# Patient Record
Sex: MALE | Race: WHITE
[De-identification: names, ages, dates, MRNs, and addresses within clinical notes are randomized per-mention and may not be internally consistent; named-entity substitution may affect disease eponyms.]

---

## 2017-01-12 ENCOUNTER — HOSPITAL ENCOUNTER (OUTPATIENT)
Dept: HOSPITAL 75 - RAD | Age: 77
End: 2017-01-12
Attending: INTERNAL MEDICINE
Payer: MEDICARE

## 2017-01-12 DIAGNOSIS — I48.0: ICD-10-CM

## 2017-01-12 DIAGNOSIS — I65.23: ICD-10-CM

## 2017-01-12 DIAGNOSIS — R06.02: ICD-10-CM

## 2017-01-12 DIAGNOSIS — E78.2: ICD-10-CM

## 2017-01-12 DIAGNOSIS — J96.00: Primary | ICD-10-CM

## 2017-01-12 DIAGNOSIS — I10: ICD-10-CM

## 2017-01-12 PROCEDURE — 71020: CPT

## 2017-01-12 NOTE — DIAGNOSTIC IMAGING REPORT
INDICATION: Respiratory failure.



PA and lateral chest.



FINDINGS: There is interstitial fibrosis at the lung bases.

There are no consolidating alveolar infiltrates. Heart size and

pulmonary vascularity are normal.



IMPRESSION: Basilar interstitial fibrosis. No interval change

compared to 12/31/2016.



Dictated by:



Dictated on workstation # [id number]

## 2017-02-13 ENCOUNTER — HOSPITAL ENCOUNTER (INPATIENT)
Dept: HOSPITAL 75 - ER | Age: 77
LOS: 4 days | Discharge: NURSING, REHAB OR LONG-TERM CARE | DRG: 871 | End: 2017-02-17
Attending: FAMILY MEDICINE | Admitting: FAMILY MEDICINE
Payer: MEDICARE

## 2017-02-13 VITALS — DIASTOLIC BLOOD PRESSURE: 72 MMHG | SYSTOLIC BLOOD PRESSURE: 100 MMHG

## 2017-02-13 VITALS — SYSTOLIC BLOOD PRESSURE: 122 MMHG | DIASTOLIC BLOOD PRESSURE: 91 MMHG

## 2017-02-13 VITALS — SYSTOLIC BLOOD PRESSURE: 116 MMHG | DIASTOLIC BLOOD PRESSURE: 87 MMHG

## 2017-02-13 VITALS — HEIGHT: 69 IN | WEIGHT: 179.25 LBS | BODY MASS INDEX: 26.55 KG/M2

## 2017-02-13 VITALS — SYSTOLIC BLOOD PRESSURE: 104 MMHG | DIASTOLIC BLOOD PRESSURE: 67 MMHG

## 2017-02-13 VITALS — SYSTOLIC BLOOD PRESSURE: 109 MMHG | DIASTOLIC BLOOD PRESSURE: 70 MMHG

## 2017-02-13 VITALS — SYSTOLIC BLOOD PRESSURE: 102 MMHG | DIASTOLIC BLOOD PRESSURE: 59 MMHG

## 2017-02-13 DIAGNOSIS — Z79.4: ICD-10-CM

## 2017-02-13 DIAGNOSIS — Z95.1: ICD-10-CM

## 2017-02-13 DIAGNOSIS — I48.91: ICD-10-CM

## 2017-02-13 DIAGNOSIS — Z86.73: ICD-10-CM

## 2017-02-13 DIAGNOSIS — I25.10: ICD-10-CM

## 2017-02-13 DIAGNOSIS — Z90.2: ICD-10-CM

## 2017-02-13 DIAGNOSIS — Z99.81: ICD-10-CM

## 2017-02-13 DIAGNOSIS — E11.9: ICD-10-CM

## 2017-02-13 DIAGNOSIS — G47.33: ICD-10-CM

## 2017-02-13 DIAGNOSIS — A41.52: Primary | ICD-10-CM

## 2017-02-13 DIAGNOSIS — Z87.891: ICD-10-CM

## 2017-02-13 DIAGNOSIS — Z95.5: ICD-10-CM

## 2017-02-13 DIAGNOSIS — J44.0: ICD-10-CM

## 2017-02-13 DIAGNOSIS — J15.1: ICD-10-CM

## 2017-02-13 LAB
ALBUMIN SERPL-MCNC: 3.6 G/DL (ref 3.2–4.5)
ALT SERPL-CCNC: 12 U/L (ref 0–55)
ANION GAP SERPL CALC-SCNC: 8 MMOL/L (ref 5–14)
APTT BLD: 81 SEC (ref 24–35)
AST SERPL-CCNC: 20 U/L (ref 5–34)
BASOPHILS # BLD AUTO: 0 10^3/UL (ref 0–0.1)
BASOPHILS NFR BLD AUTO: 0 % (ref 0–10)
BILIRUB SERPL-MCNC: 0.4 MG/DL (ref 0.1–1)
BILIRUB UR QL STRIP: NEGATIVE
BUN SERPL-MCNC: 16 MG/DL (ref 7–18)
BUN/CREAT SERPL: 15
CALCIUM SERPL-MCNC: 8.2 MG/DL (ref 8.5–10.1)
CHLORIDE SERPL-SCNC: 101 MMOL/L (ref 98–107)
CO2 SERPL-SCNC: 26 MMOL/L (ref 21–32)
CREAT SERPL-MCNC: 1.09 MG/DL (ref 0.6–1.3)
EOSINOPHIL # BLD AUTO: 0.1 10^3/UL (ref 0–0.3)
EOSINOPHIL NFR BLD AUTO: 1 % (ref 0–10)
ERYTHROCYTE [DISTWIDTH] IN BLOOD BY AUTOMATED COUNT: 15.4 % (ref 10–14.5)
GFR SERPLBLD BASED ON 1.73 SQ M-ARVRAT: > 60 ML/MIN
GLUCOSE SERPL-MCNC: 189 MG/DL (ref 70–105)
INR PPP: 2.7 (ref 0.8–1.4)
KETONES UR QL STRIP: NEGATIVE
LEUKOCYTE ESTERASE UR QL STRIP: NEGATIVE
LYMPHOCYTES # BLD AUTO: 1.2 X 10^3 (ref 1–4)
LYMPHOCYTES NFR BLD AUTO: 19 % (ref 12–44)
MCH RBC QN AUTO: 30 PG (ref 25–34)
MCHC RBC AUTO-ENTMCNC: 33 G/DL (ref 32–36)
MCV RBC AUTO: 92 FL (ref 80–99)
MONOCYTES # BLD AUTO: 0.8 X 10^3 (ref 0–1)
MONOCYTES NFR BLD AUTO: 13 % (ref 0–12)
NEUTROPHILS # BLD AUTO: 4.2 X 10^3 (ref 1.8–7.8)
NEUTROPHILS NFR BLD AUTO: 67 % (ref 42–75)
NITRITE UR QL STRIP: NEGATIVE
PH UR STRIP: 6 [PH] (ref 5–9)
PLATELET # BLD: 221 10^3/UL (ref 130–400)
PMV BLD AUTO: 9 FL (ref 7.4–10.4)
POTASSIUM SERPL-SCNC: 3.7 MMOL/L (ref 3.6–5)
PROT SERPL-MCNC: 6.8 G/DL (ref 6.4–8.2)
PROT UR QL STRIP: NEGATIVE
PROTHROMBIN TIME: 28.8 SEC (ref 12.2–14.7)
RBC # BLD AUTO: 3.6 10^6/UL (ref 4.35–5.85)
SODIUM SERPL-SCNC: 135 MMOL/L (ref 135–145)
SP GR UR STRIP: 1.01 (ref 1.02–1.02)
SQUAMOUS #/AREA URNS HPF: (no result) /HPF
UROBILINOGEN UR-MCNC: NORMAL MG/DL
WBC # BLD AUTO: 6.3 10^3/UL (ref 4.3–11)
WBC #/AREA URNS HPF: (no result) /HPF

## 2017-02-13 PROCEDURE — 83605 ASSAY OF LACTIC ACID: CPT

## 2017-02-13 PROCEDURE — 87081 CULTURE SCREEN ONLY: CPT

## 2017-02-13 PROCEDURE — 87070 CULTURE OTHR SPECIMN AEROBIC: CPT

## 2017-02-13 PROCEDURE — 80053 COMPREHEN METABOLIC PANEL: CPT

## 2017-02-13 PROCEDURE — 96361 HYDRATE IV INFUSION ADD-ON: CPT

## 2017-02-13 PROCEDURE — 71010: CPT

## 2017-02-13 PROCEDURE — 85025 COMPLETE CBC W/AUTO DIFF WBC: CPT

## 2017-02-13 PROCEDURE — 85027 COMPLETE CBC AUTOMATED: CPT

## 2017-02-13 PROCEDURE — 86141 C-REACTIVE PROTEIN HS: CPT

## 2017-02-13 PROCEDURE — 84100 ASSAY OF PHOSPHORUS: CPT

## 2017-02-13 PROCEDURE — 94640 AIRWAY INHALATION TREATMENT: CPT

## 2017-02-13 PROCEDURE — 96365 THER/PROPH/DIAG IV INF INIT: CPT

## 2017-02-13 PROCEDURE — 83880 ASSAY OF NATRIURETIC PEPTIDE: CPT

## 2017-02-13 PROCEDURE — 82962 GLUCOSE BLOOD TEST: CPT

## 2017-02-13 PROCEDURE — 85730 THROMBOPLASTIN TIME PARTIAL: CPT

## 2017-02-13 PROCEDURE — 85610 PROTHROMBIN TIME: CPT

## 2017-02-13 PROCEDURE — 87205 SMEAR GRAM STAIN: CPT

## 2017-02-13 PROCEDURE — 80048 BASIC METABOLIC PNL TOTAL CA: CPT

## 2017-02-13 PROCEDURE — 94664 DEMO&/EVAL PT USE INHALER: CPT

## 2017-02-13 PROCEDURE — 94760 N-INVAS EAR/PLS OXIMETRY 1: CPT

## 2017-02-13 PROCEDURE — 80202 ASSAY OF VANCOMYCIN: CPT

## 2017-02-13 PROCEDURE — 83735 ASSAY OF MAGNESIUM: CPT

## 2017-02-13 PROCEDURE — 87804 INFLUENZA ASSAY W/OPTIC: CPT

## 2017-02-13 PROCEDURE — 36415 COLL VENOUS BLD VENIPUNCTURE: CPT

## 2017-02-13 PROCEDURE — 81000 URINALYSIS NONAUTO W/SCOPE: CPT

## 2017-02-13 PROCEDURE — 87040 BLOOD CULTURE FOR BACTERIA: CPT

## 2017-02-13 RX ADMIN — INSULIN HUMAN SCH UNIT: 100 INJECTION, SOLUTION PARENTERAL at 19:21

## 2017-02-13 RX ADMIN — SODIUM CHLORIDE SCH MLS/HR: 900 INJECTION, SOLUTION INTRAVENOUS at 19:29

## 2017-02-13 NOTE — ED GENERAL
General


Chief Complaint:  Respiratory Problems


Stated Complaint:  LUNG INFECTION, SOA, FEVER


Nursing Triage Note:  


PT C/O COUGH STARTING LAST NIGHT.  FEVER STARTING TODAY.


Nursing Sepsis Screen:  Possible Sepsis Risk


Source of Information:  Patient, Family, Old Records


Exam Limitations:  No Limitations





History of Present Illness


Time Seen by Provider:  13:22


Initial Comments


This 76-year-old gentleman presents to the emergency room with complaints of 

acute illness that started last night.  He reportedly coughed throughout the 

night and developed chills.  He has fever of 101.6 on presentation.  Patient 

has a history of recurrent pneumonia and is concerned he may have developed 

pneumonia again.  His oxygen saturations are stable on his home 2 L/m.  Wife 

reports that she herself has recently experienced bronchitis.  They were to see 

Dr. Connelly today but were directed to the emergency room given his sudden 

onset of fever and other symptoms.





Allergies and Home Medications


Allergies


Coded Allergies:  


     morphine (Verified  Allergy, Mild, 16)


     Sulfa (Sulfonamide Antibiotics) (Verified  Allergy, Unknown, 16)


     penicillin G (Verified  Allergy, Unknown, 16)


     levofloxacin (Verified  Adverse Reaction, Unknown, 16)





Home Medications


Albuterol Sulfate 2.5 Mg/3 Ml Vial.neb 2.5 MG IH Q6H PRN PRN SHORTNESS OF 

BREATH (Reported) 


Dronedarone HCl 400 Mg Tablet 400 MG PO BID (Reported) 


Enalapril Maleate 2.5 Mg Tablet 2.5 MG PO DAILY (Reported) 


Ferrous Sulfate 325 Mg Tablet 325 MG PO DAILY@1730 (Reported) 


Fluticasone/Vilanterol 1 Each Blst.w.dev 1 PUFF IH DAILY (Reported) 


Furosemide 40 Mg Tablet 40 MG PO DAILY (Reported) 


Insulin Nph Human Recom 10 Unit/0.1 Ml Susp 10 UNIT SQ HS (Reported) 


   MIXES WITH 4 UNITS OF R INSULIN EVERY EVENING FOR A TOTAL DOSE OF 14 UNITS 


Insulin Nph Human Recom 10 Unit/0.1 Ml Susp 15 UNIT SQ AM (Reported) 


   MIXES WITH 4 UNITS OF R INSULIN EVERY MORNING FOR A TOTAL DOSE OF 19 UNITS 


Insulin Regular, Human 100 Unit/1 Ml Vial 4 UNIT SQ BID (Reported) 


   MIXES WITH N INSULIN EVERY MORNING AND EVENING 


Pantoprazole Sodium 40 Mg Tablet.dr 40 MG PO DAILY (Reported) 


Potassium Chloride 10 Meq Tab.er.prt 10 MEQ PO Q48H (Reported) 


Simvastatin 20 Mg Tablet 20 MG PO HS (Reported) 


Warfarin Sodium 5 Mg Tablet 5 MG PO LilliuWGary (Reported) 


   LAST FILL DATE 16 #30 


Warfarin Sodium 5 Mg Tablet 7.5 MG PO Th (Reported) 


   TAKES 1 & 1/2 (5MG) TABLETS 





Constitutional:   see HPI


EENTM:   no symptoms reported


Respiratory:   see HPI


Cardiovascular:   no symptoms reported


Gastrointestinal:   no symptoms reported


Genitourinary:   no symptoms reported


Musculoskeletal:   no symptoms reported


Skin:   no symptoms reported


Psychiatric/Neurological:   No Symptoms Reported


Hematologic/Lymphatic:   No Symptoms Reported





Past Medical-Social-Family Hx


Patient Social History


Alcohol Use:  Denies Use


Recreational Drug Use:  No


Smoking Status:  Former Smoker


Type Used:  Cigarettes


Former Smoker/When Quit:  1985


2nd Hand Smoke Exposure:  No


Recent Foreign Travel:  No


Contact w/Someone Who Travel:  No


Recent Infectious Disease Expo:  No


Recent Hopitalizations:  Yes (WEMT HOME ABOUT 4 DAYS AGO FROM HOSPITAL FROM 

PNEUMONIA)





Immunizations Up To Date


Tetanus Booster (TDap):  More than 5yrs


PED Vaccines UTD:  No


Date of Pneumonia Vaccine:  Oct 3, 2016


Date of Influenza Vaccine:  Oct 1, 2016





Seasonal Allergies


Seasonal Allergies:  No





Surgeries


HX Surgeries:  Yes (LUNG SX, PROSTATE SX)


Surgeries:  CABG, Coronary Stent, Eye Surgery, Joint Replacement, Lobectomy, 

Orthopedic, Prostatectomy, Tonsillectomy





Respiratory


Hx Respiratory Disorders:  Yes (1/2 OF RIGHT LUNG REMOVED. )


Respiratory Disorders:  Pneumonia, COPD, Emphysema





Cardiovascular


Hx Cardiac Disorders:  Yes (implanted monitor)


Cardiac Disorders:  Atrial Fibrillation, Coronary Artery Disease





Neurological


Hx Neurological Disorders:  Yes


Neurological Disorders:  Stroke





Reproductive System


Hx Reproductive Disorders:  No


Sexually Transmitted Disease:  No





Genitourinary


Hx Genitourinary Disorders:  Yes


Genitourinary Disorders:  Benign Prostatic Hyperpl





Gastrointestinal


Hx Gastrointestinal Disorders:  No





Musculoskeletal


Hx Musculoskeletal Disorders:  Yes


Musculoskeletal Disorders:  Arthritis





Endocrine


Hx Endocrine Disorders:  Yes


Endocrine Disorders:  Diabetes, Insulin dep





HEENT


HX ENT Disorders:  Yes (CATARACTS REMOVED)


HEENT Disorders:  Cataract


Loss of Vision:  Denies


Hearing Impairment:  Hard of Hearing





Cancer


Hx Cancer:  No





Psychosocial


Hx Psychiatric Problems:  No





Integumentary


HX Skin/Integumentary Disorder:  No





Blood Transfusions


Hx Blood Disorders:  No


Adverse Reaction to a Blood Tr:  No





Family Medical History


Significant Family History:  Heart Disease, Cancer, Stroke, Other Conditions/Hx


Family Medial History:  


BLADD


  09 BROTHER


BLADD


  09 BROTHER


Cancer


  03 FATHER (THROAT)


  09 BROTHER


Dementia


  03 MOTHER


FH: bladder cancer


FH: bladder cancer


Family history: Diabetes mellitus


  03 MOTHER


Family history: Thyroid disorder


  03 MOTHER


Infertile


  03 MOTHER (X5 MISCARRIAGES)


Myocardial infarction


  09 BROTHER





No Family History of:


  Abdominal aortic aneurysm


  O'Brien's disease


  Alcoholism


  Aphasia


  Cancer of colon


  Cataract


  Chest pain


  Congenital heart disease


  Congestive heart failure


  Cystic fibrosis


  Dysphagia


  Family history: Allergy


  Family history: Alzheimer's disease


  Family history: Arthritis


  Family history: Asthma


  Family history: Breast disease


  Family history: Cardiovascular disease


  Family history: Coronary thrombosis


  Family history: Gastrointestinal disease


  Family history: Glaucoma


  Family history: Hypertension


  Family history: Osteoporosis


  Headache


  Hearing loss


  Heart disease


  Hereditary disease


  History of - anemia


  History of - disorder


  History of - respiratory disease


  History of drug abuse


  Human immunodeficiency virus (HIV) seropositivity


  Hypercholesterolemia


  Kidney disease


  Malignant neoplasm of lung


  Parkinson's disease


  Prostate cancer


  Psychotic disorder


  Seizure disorder


  Stroke


  Tuberculosis


  Visual impairment





Physical Exam


Vital Signs





 Vital Sign - Last 12Hours








 17





 13:10


 


Temp 101.6


 


Pulse 120


 


Resp 28


 


B/P 128/63


 


Pulse Ox 98


 


O2 Delivery Nasal Cannula


 


O2 Flow Rate 2





Capillary Refill : Less Than 3 Seconds


General Appearance:   WD/WN Mild Distress


HEENT:   PERRL/EOMI Normal ENT Inspection Pharynx Normal


Neck:   Normal Inspection


Respiratory:   No Accessory Muscle Use No Respiratory Distress Crackles (Coarse 

crackles in the left chest) Wheezing


Cardiovascular:   No Edema No Murmur Irregularly Irregular


Gastrointestinal:   Normal Bowel Sounds Non Tender Soft


Extremity:   Normal Inspection No Pedal Edema


Neurologic/Psychiatric:   Alert Oriented x3 No Motor/Sensory Deficits Normal 

Mood/Affect CNs II-XII Norm as Tested


Skin:   Normal Color Warm/Dry





Progress/Results/Core Measures


Results/Orders


Lab Results





 Laboratory Tests








Test


  17


13:30 17


14:35 17


18:17 Range/Units


 


 


Activated Partial


Thromboplast Time 81 H


  


  


  24-35  SEC


 


 


Alanine Aminotransferase


(ALT/SGPT) 12 


  


  


  0-55  U/L


 


 


Albumin 3.6    3.2-4.5  G/DL


 


Alkaline Phosphatase 63      U/L


 


Anion Gap 8    5-14  MMOL/L


 


Aspartate Amino Transf


(AST/SGOT) 20 


  


  


  5-34  U/L


 


 


BUN/Creatinine Ratio 15     


 


Basophils # (Auto)


  0.0 


  


  


  0.0-0.1


10^3/uL


 


Basophils (%) (Auto) 0    0-10  %


 


Blood Urea Nitrogen 16    7-18  MG/DL


 


C-Reactive Protein High


Sensitivity 9.59 H


  


  


  0.00-0.50


MG/DL


 


Calcium Level 8.2 L   8.5-10.1  MG/DL


 


Carbon Dioxide Level 26    21-32  MMOL/L


 


Chloride Level 101      MMOL/L


 


Creatinine


  1.09 


  


  


  0.60-1.30


MG/DL


 


Eosinophils # (Auto)


  0.1 


  


  


  0.0-0.3


10^3/uL


 


Eosinophils (%) (Auto) 1    0-10  %


 


Estimat Glomerular Filtration


Rate > 60 


  


  


   


 


 


Glucose Level 189 H     MG/DL


 


Hematocrit 33 L   40-54  %


 


Hemoglobin 10.8 L   13.3-17.7  G/DL


 


INR Comment 2.7 H   0.8-1.4  


 


Lactic Acid Level 1.6    0.5-2.0  MMOL/L


 


Lymphocytes # (Auto) 1.2    1.0-4.0  X 10^3


 


Lymphocytes (%) (Auto) 19    12-44  %


 


Mean Corpuscular Hemoglobin 30    25-34  PG


 


Mean Corpuscular Hemoglobin


Concent 33 


  


  


  32-36  G/DL


 


 


Mean Corpuscular Volume 92    80-99  FL


 


Mean Platelet Volume 9.0    7.4-10.4  FL


 


Monocytes # (Auto) 0.8    0.0-1.0  X 10^3


 


Monocytes (%) (Auto) 13 H   0-12  %


 


Neutrophils # (Auto) 4.2    1.8-7.8  X 10^3


 


Neutrophils (%) (Auto) 67    42-75  %


 


Platelet Count


  221 


  


  


  130-400


10^3/uL


 


Potassium Level 3.7    3.6-5.0  MMOL/L


 


Prothrombin Time 28.8 H   12.2-14.7  SEC


 


Red Blood Count


  3.60 L


  


  


  4.35-5.85


10^6/uL


 


Red Cell Distribution Width 15.4 H   10.0-14.5  %


 


Sodium Level 135    135-145  MMOL/L


 


Total Bilirubin 0.4    0.1-1.0  MG/DL


 


Total Protein 6.8    6.4-8.2  G/DL


 


White Blood Count


  6.3 


  


  


  4.3-11.0


10^3/uL


 


Urine Bacteria  NEGATIVE    /HPF


 


Urine Bilirubin  NEGATIVE   NEGATIVE  


 


Urine Casts  NONE    /LPF


 


Urine Clarity  CLEAR    


 


Urine Color  YELLOW    


 


Urine Crystals  NONE    /LPF


 


Urine Culture Indicated  NO    


 


Urine Glucose (UA)  NEGATIVE   NEGATIVE  


 


Urine Ketones  NEGATIVE   NEGATIVE  


 


Urine Leukocyte Esterase  NEGATIVE   NEGATIVE  


 


Urine Mucus  NEGATIVE    /LPF


 


Urine Nitrite  NEGATIVE   NEGATIVE  


 


Urine Protein  NEGATIVE   NEGATIVE  


 


Urine RBC  RARE    /HPF


 


Urine RBC (Auto)  4+ H  NEGATIVE  


 


Urine Specific Gravity  1.015 L  1.016-1.022  


 


Urine Squamous Epithelial


Cells 


  RARE 


  


   /HPF


 


 


Urine Urobilinogen  NORMAL   NORMAL  MG/DL


 


Urine WBC  NONE    /HPF


 


Urine pH  6   5-9  


 


Glucometer   138 H   MG/DL








Micro Results





 Microbiology


17 Influenza Types A,B Antigen ([place]) - Final, Complete


          





My Orders





 Orders-SHANNEN PALMER MD


Cbc With Automated Diff (17 13:29)


Comprehensive Metabolic Panel (17:)


Lactic Acid Analyzer (17:29)


Blood Culture (17:)


Sputum Culture (17:29)


Ua Culture If Indicated (17:29)


Protime With Inr (17:)


Partial Thromboplastin Time (17:29)


Chest 1 View, Ap/Pa Only (17:29)


O2 (17 13:29)


Saline Lock/Iv-Start (17 13:29)


Vital Signs Adult Sepsis Patie Q1HR (17 13:29)


Remove Rings In Anticipation O (17 13:29)


Influenza A And B Antigens (17 14:14)


Hs C Reactive Protein (17 14:48)


Acetaminophen  Tablet (Tylenol  Tablet) (17 16:15)


Ns Iv 1000 Ml (Sodium Chloride 0.9%) (17 16:09)


Meropenem (Merrem 500 Mg) (17 16:15)


Meropenem (Merrem 500 Mg) (17 16:13)


Ns (Ivpb) (Sodium Chloride 0.9% Ivpb Bag (17 16:13)


Albuterol/Ipra Inhalation Soln (Duoneb I (17 16:33)





Medications Given in ED





 Current Medications








 Medications  Dose


 Ordered  Sig/Bhavin


 Route  Start Time


 Stop Time Status Last Admin


Dose Admin


 


 Acetaminophen


 1000 mg  1,000 mg  ONCE  ONCE


 PO  17 16:15


 2/13/17 16:16 DC 17 16:25


1,000 MG


 


 Albuterol/


 Ipratropium  3 ml  STK-MED ONCE


 .ROUTE  17 16:33


 17 16:35 DC 17 16:36


3 ML


 


 Meropenem/Sodium


 Chloride  100 ml @ 


 200 mls/hr  ONCE  ONCE


 IV  17 16:15


 17 16:44 DC 17 16:24


200 MLS/HR


 


 Sodium Chloride  1,000 ml @ 


 0 mls/hr  Q0M ONCE


 IV  17 16:09


 17 16:11 DC 17 16:24


1,000 MLS/HR








Vital Signs/I&O





 Vital Sign - Last 12Hours








 17





 13:10 13:15 16:38 18:00


 


Temp 101.6   99.5


 


Pulse 120   84


 


Resp 28   24


 


B/P 128/63   


 


Pulse Ox 98  99 95


 


O2 Delivery Nasal Cannula Nasal Cannula  


 


O2 Flow Rate 2 2 2 2


 


    





 17





 18:00 18:05 18:30 19:00


 


Temp 99.5 99.5  


 


Pulse 84 109  90


 


Resp 24 24  26


 


B/P 91/59 100/72  116/87


 


Pulse Ox 95 99 99 99


 


O2 Delivery Nasal Cannula Nasal Cannula  Nasal Cannula


 


O2 Flow Rate 2 2.00 2.00 2.00


 


    





 17





 19:00 20:00 21:00 22:00


 


Temp  98.1  


 


Pulse 81 78 102 95


 


Resp  11  30


 


B/P  102/59 109/70 104/67


 


Pulse Ox  89 93 99


 


O2 Delivery  Nasal Cannula Nasal Cannula Nasal Cannula


 


O2 Flow Rate  2.00 2.00 2.00


 


    





 17   





 23:00   


 


Pulse 109   


 


Resp 22   


 


B/P 122/91   


 


Pulse Ox 98   


 


O2 Delivery Nasal Cannula   


 


O2 Flow Rate 2.00   














 Intake and Output 


 


 17





 00:00


 


Intake Total 200 ml


 


Balance 200 ml








Blood Pressure Mean:  84


Progress Note #1:  


   Time:  16:30


Progress Note


Source of infection was not identified until after x-ray was reviewed after 14:

30.  Sepsis was then suspected.  Prior cultures were reviewed and meropenem was 

ordered for initial antibiotic therapy.  Patient has multiple antibiotic 

allergy limitations as well.  IV fluids and meropenem are now infusing.  

Tylenol is being administered for fever.  DuoNeb will also be administered as 

patient's last treatment was at noon.  Case has been reviewed with Dr. Connelly and he agrees with administration of meropenem as initial therapy.  

He would like to consult Dr. Patino as well.


Progress Note #2:  


   Time:  16:39


Progress Note


Case was reviewed with Dr. Patino to would like to provide targeted antibiotic 

therapy with meropenem based on prior culture results.  Because of recent 

hospital admission with discharge , vancomycin will be added.  The 3 

drug therapy was not used because of fluoroquinolone allergy and known culture 

results from recent pneumonia.  DuoNeb treatment was given before admission.





Diagnostic Imaging





   Diagonstic Imaging:  Xray


   Plain Films/CT/US/NM/MRI:  chest


Comments


Chest x-ray viewed by me and report reviewed.  See report below:





NAME:   NESHA PANCHAL  


[facility] REC#:   [id number]  


ACCOUNT#:   [id number]  


PT STATUS:   REG ER  


:   1940  


PHYSICIAN:   SHANNEN PALMER MD  


ADMIT DATE:   17/ER  


 ***Signed***  


Date of Exam:17  


 


CHEST 1 VIEW, AP/PA ONLY  


 


Portable upright radiograph of the chest.  


 


 INDICATION: Shortness of breath, infection.  


 


 COMPARISON: 2017.  


 


 FINDINGS:  


 There are stable interstitial fibrotic changes in the lungs with  


 elevation of the right hemidiaphragm and opacified right lung  


 apex similar to 2017, exam. There is question of increased  


 opacity in the left infrahilar region when compared to the  


 previous study that may relate to superimposed pneumonia. The  


 heart size is mildly enlarged. No effusion or pneumothorax.  


 


 The mediastinum and andrey appear unremarkable. Cardiac monitor  


 and medial upper left lung surgical clip seen.  


 


 IMPRESSION: Extensive interstitial scarring is seen with  


 suggestion of increased opacity in the left infrahilar region  


 may relate to superimposed infiltrate or atelectasis.  


 


 Dictated by:  


 


 Dictated on workstation # [id number]  


 


Dict:   17 1419  


Trans:   17 1428  


 [id number]  


 


Interpreted by:     RADHA GALLEGO MD  


Electronically signed by: RADHA GALLEGO MD 17 1430





Departure


Communication


Communication


Leydi


Communication/Consulting


Carey





Impression


Impression:  


 Primary Impression:  


 Sepsis


 Qualified Code:  A41.9 - Sepsis, unspecified organism


 Additional Impression:  


 Pneumonia


 Qualified Code:  J18.9 - Pneumonia, unspecified organism


Disposition:   ADMITTED AS INPATIENT


Condition:  Improved


Decision to Admit Reason:  Admit from ER (General)


Decision to Admit/Date:  2017


Time/Decision to Admit Time:  14:45





Departure-Patient Inst.


Referrals:  


TOR CONNELLY DO (PCP/Family)


Primary Care Physician








SHANNEN PALMER MD 2017 14:50

## 2017-02-13 NOTE — DIAGNOSTIC IMAGING REPORT
Portable upright radiograph of the chest.



INDICATION: Shortness of breath, infection.



COMPARISON: 1/12/2017.



FINDINGS:

There are stable interstitial fibrotic changes in the lungs with

elevation of the right hemidiaphragm and opacified right lung

apex similar to 1/12/2017, exam. There is question of increased

opacity in the left infrahilar region when compared to the

previous study that may relate to superimposed pneumonia. The

heart size is mildly enlarged. No effusion or pneumothorax.



The mediastinum and andrey appear unremarkable. Cardiac monitor

and medial upper left lung surgical clip seen.



IMPRESSION: Extensive interstitial scarring is seen with

suggestion of increased opacity in the left infrahilar region

may relate to superimposed infiltrate or atelectasis.



Dictated by:



Dictated on workstation # [id number]

## 2017-02-13 NOTE — HISTORY & PHYSICIAL
History of Present Illness


History of Present Illness


Reason for visit/HPI


cough, congestion, running an elevated temperature and short of the air.


Patient states his wife had bronchitis and felt he has it.


Chest x-ray at hospital from the emergency room shows pneumonia.


Patient has a history of resistant pseudomonas.


Patient admitted


Surgeries mostly lung surgeries.


Patient has history of COPD.


Patient has history of atrial fibrillation.


Patient has history of smoking.


Patient known diabetic


Date of Admission


2017 at 17:32


I consulted on this patient on


17


 19:08


Attending Physician


Jay Connelly DO


Admitting Physician


Jay Connelly DO


Consult








Allergies and Home Medications


Allergies


Coded Allergies:  


     morphine (Verified  Allergy, Mild, 16)


     Sulfa (Sulfonamide Antibiotics) (Verified  Allergy, Unknown, 16)


     penicillin G (Verified  Allergy, Unknown, 16)


     levofloxacin (Verified  Adverse Reaction, Unknown, 16)





Home Medications


Albuterol Sulfate 2.5 Mg/3 Ml Vial.neb 2.5 MG IH Q6H PRN PRN SHORTNESS OF 

BREATH (Reported) 


Dronedarone HCl 400 Mg Tablet 400 MG PO BID (Reported) 


Enalapril Maleate 2.5 Mg Tablet 2.5 MG PO DAILY (Reported) 


Ferrous Sulfate 325 Mg Tablet 325 MG PO DAILY@1730 (Reported) 


Fluticasone/Vilanterol 1 Each Blst.w.dev 1 PUFF IH DAILY (Reported) 


Furosemide 40 Mg Tablet 40 MG PO DAILY (Reported) 


Insulin Nph Human Recom 10 Unit/0.1 Ml Susp 10 UNIT SQ HS (Reported) 


   MIXES WITH 4 UNITS OF R INSULIN EVERY EVENING FOR A TOTAL DOSE OF 14 UNITS 


Insulin Nph Human Recom 10 Unit/0.1 Ml Susp 15 UNIT SQ AM (Reported) 


   MIXES WITH 4 UNITS OF R INSULIN EVERY MORNING FOR A TOTAL DOSE OF 19 UNITS 


Insulin Regular, Human 100 Unit/1 Ml Vial 4 UNIT SQ BID (Reported) 


   MIXES WITH N INSULIN EVERY MORNING AND EVENING 


Pantoprazole Sodium 40 Mg Tablet.dr 40 MG PO DAILY (Reported) 


Potassium Chloride 10 Meq Tab.er.prt 10 MEQ PO Q48H (Reported) 


Simvastatin 20 Mg Tablet 20 MG PO HS (Reported) 


Warfarin Sodium 5 Mg Tablet 5 MG PO SuMoTuWeFrSa (Reported) 


   LAST FILL DATE 16 #30 


Warfarin Sodium 5 Mg Tablet 7.5 MG PO Th (Reported) 


   TAKES 1 & 1/2 (5MG) TABLETS 





Past Medical-Social-Family Hx


Patient Social History


Marrital Status:  


Employed/Student:  unemployed


Alcohol Use:  Denies Use


Recreational Drug Use:  No


Smoking Status:  Former Smoker


Former smoker/When Quit:  1985


Type Used:  Cigarettes


2nd Hand Smoke Exposure:  No


Physical Abuse Screen:  No


Sexual Abuse:  No


Recent Foreign Travel:  No


Contact w/other who traveled:  No


Recent Hopitalizations:  Yes (WEMT HOME ABOUT 4 DAYS AGO FROM HOSPITAL FROM 

PNEUMONIA)


Recent Infectious Disease Expo:  No





Immunizations Up To Date


Tetanus Booster (TDap):  More than 5yrs


Date of Pneumonia Vaccine:  Oct 3, 2016


Date of Influenza Vaccine:  Oct 1, 2016





Seasonal Allergies


Seasonal Allergies:  No





Surgeries


HX Surgeries:  Yes (LUNG SX, PROSTATE SX)


Surgeries:  CABG, Coronary Stent, Eye Surgery, Joint Replacement, Lobectomy, 

Orthopedic, Prostatectomy, Tonsillectomy





Respiratory


Hx Respiratory Disorders:  Yes (1/2 OF RIGHT LUNG REMOVED. )


Respiratory Disorders:  COPD, Emphysema, Pneumonia





Cardiovascular


Hx Cardiovascular Disorders:  Yes (implanted monitor)


Cardiac Disorders:  Atrial Fibrillation, Coronary Artery Disease





Neurological


Hx Neurological Disorders:  Yes


Neurological Disorders:  Stroke





Reproductive System


Hx Reproductive Disorders:  No


Sexually Transmitted Disease:  No





Genitourinary


Hx Genitourinary Disorders:  Yes


Genitourinary Disorders:  Benign Prostatic Hyperpl





Gastrointestinal


Hx Gastrointestinal Disorders:  No





Musculoskeletal


Hx Musculoskeletal Disorders:  Yes


Musculoskeletal Disorders:  Arthritis





Endocrine


Hx Endocrine Disorders:  Yes


Endocrine Disorders:  Diabetes, Insulin dep





HEENT


HX ENT Disorders:  Yes (CATARACTS REMOVED)


HEENT Disorders:  Cataract


Loss of Vision:  Denies


Hearing Impairment:  Hard of Hearing





Cancer


Hx Cancer:  No





Psychosocial


Hx Psychiatric Problems:  No





Integumentary


HX Skin/Integumentary Disorder:  No





Blood Transfusions


Hx Blood Disorders:  No


Adverse Reaction to a Blood Tr:  No





Family Medical History


Significant Family History:  Heart Disease, Cancer, Stroke, Other Conditions/Hx


Family Hx:  


BLADD


  09 BROTHER


BLADD


  09 BROTHER


Cancer


  03 FATHER (THROAT)


  09 BROTHER


Dementia


  03 MOTHER


FH: bladder cancer


FH: bladder cancer


Family history: Diabetes mellitus


  03 MOTHER


Family history: Thyroid disorder


  03 MOTHER


Infertile


  03 MOTHER (X5 MISCARRIAGES)


Myocardial infarction


  09 BROTHER





No Family History of:


  Abdominal aortic aneurysm


  Shayne's disease


  Alcoholism


  Aphasia


  Cancer of colon


  Cataract


  Chest pain


  Congenital heart disease


  Congestive heart failure


  Cystic fibrosis


  Dysphagia


  Family history: Allergy


  Family history: Alzheimer's disease


  Family history: Arthritis


  Family history: Asthma


  Family history: Breast disease


  Family history: Cardiovascular disease


  Family history: Coronary thrombosis


  Family history: Gastrointestinal disease


  Family history: Glaucoma


  Family history: Hypertension


  Family history: Osteoporosis


  Headache


  Hearing loss


  Heart disease


  Hereditary disease


  History of - anemia


  History of - disorder


  History of - respiratory disease


  History of drug abuse


  Human immunodeficiency virus (HIV) seropositivity


  Hypercholesterolemia


  Kidney disease


  Malignant neoplasm of lung


  Parkinson's disease


  Prostate cancer


  Psychotic disorder


  Seizure disorder


  Stroke


  Tuberculosis


  Visual impairment





Constitutional:   chills weakness


EENTM:   no symptoms reported


Respiratory:   cough short of breath wheezing


Cardiovascular:   other (history of atrial fibrillation, CAD,)


Gastrointestinal:   no symptoms reported


Genitourinary:   no symptoms reported





Physical Exam


Vital Signs





 Vital Sign - Last 12Hours








 17





 13:10


 


Temp 101.6


 


Pulse 120


 


Resp 28


 


B/P 128/63


 


Pulse Ox 98


 


O2 Delivery Nasal Cannula


 


O2 Flow Rate 2





Capillary Refill : Less Than 3 Seconds


General Appearance:   No Apparent Distress WD/WN


Eyes:  Bilateral Eye Normal Inspection


Neck:   Full Range of Motion Non Tender


Respiratory:   No Accessory Muscle Use No Respiratory Distress Decreased Breath 

Sounds


Cardiovascular:   Regular Rate, Rhythm No Murmur


Gastrointestinal:   Non Tender Soft





Assessment/Plan


Assessment and Plan


sepsis.


Pneumonia.


COPD.


Coronary artery disease.


History of atrial fibrillation.


Diabetes.





Clinical Quality Measures


DVT/VTE Risk/Contraindication:


Risk Factor Score Per Nursin


RFS Level Per Nursing on Admit:  4+=Very High








JAY CONNELLY DO 2017 19:11

## 2017-02-14 VITALS — DIASTOLIC BLOOD PRESSURE: 70 MMHG | SYSTOLIC BLOOD PRESSURE: 90 MMHG

## 2017-02-14 VITALS — SYSTOLIC BLOOD PRESSURE: 94 MMHG | DIASTOLIC BLOOD PRESSURE: 80 MMHG

## 2017-02-14 VITALS — SYSTOLIC BLOOD PRESSURE: 93 MMHG | DIASTOLIC BLOOD PRESSURE: 67 MMHG

## 2017-02-14 VITALS — DIASTOLIC BLOOD PRESSURE: 67 MMHG | SYSTOLIC BLOOD PRESSURE: 93 MMHG

## 2017-02-14 VITALS — SYSTOLIC BLOOD PRESSURE: 110 MMHG | DIASTOLIC BLOOD PRESSURE: 82 MMHG

## 2017-02-14 VITALS — DIASTOLIC BLOOD PRESSURE: 73 MMHG | SYSTOLIC BLOOD PRESSURE: 123 MMHG

## 2017-02-14 VITALS — DIASTOLIC BLOOD PRESSURE: 69 MMHG | SYSTOLIC BLOOD PRESSURE: 123 MMHG

## 2017-02-14 VITALS — SYSTOLIC BLOOD PRESSURE: 95 MMHG | DIASTOLIC BLOOD PRESSURE: 65 MMHG

## 2017-02-14 VITALS — DIASTOLIC BLOOD PRESSURE: 44 MMHG | SYSTOLIC BLOOD PRESSURE: 87 MMHG

## 2017-02-14 VITALS — SYSTOLIC BLOOD PRESSURE: 94 MMHG | DIASTOLIC BLOOD PRESSURE: 49 MMHG

## 2017-02-14 VITALS — SYSTOLIC BLOOD PRESSURE: 112 MMHG | DIASTOLIC BLOOD PRESSURE: 53 MMHG

## 2017-02-14 VITALS — DIASTOLIC BLOOD PRESSURE: 76 MMHG | SYSTOLIC BLOOD PRESSURE: 124 MMHG

## 2017-02-14 LAB
ANION GAP SERPL CALC-SCNC: 8 MMOL/L (ref 5–14)
BASOPHILS # BLD AUTO: 0 10^3/UL (ref 0–0.1)
BASOPHILS # BLD AUTO: 0 10^3/UL (ref 0–0.1)
BASOPHILS NFR BLD AUTO: 0 % (ref 0–10)
BASOPHILS NFR BLD AUTO: 1 % (ref 0–10)
BUN SERPL-MCNC: 11 MG/DL (ref 7–18)
BUN/CREAT SERPL: 13
CALCIUM SERPL-MCNC: 7.7 MG/DL (ref 8.5–10.1)
CHLORIDE SERPL-SCNC: 106 MMOL/L (ref 98–107)
CO2 SERPL-SCNC: 23 MMOL/L (ref 21–32)
CREAT SERPL-MCNC: 0.86 MG/DL (ref 0.6–1.3)
EOSINOPHIL # BLD AUTO: 0.1 10^3/UL (ref 0–0.3)
EOSINOPHIL # BLD AUTO: 0.2 10^3/UL (ref 0–0.3)
EOSINOPHIL NFR BLD AUTO: 2 % (ref 0–10)
EOSINOPHIL NFR BLD AUTO: 6 % (ref 0–10)
ERYTHROCYTE [DISTWIDTH] IN BLOOD BY AUTOMATED COUNT: 15.2 % (ref 10–14.5)
ERYTHROCYTE [DISTWIDTH] IN BLOOD BY AUTOMATED COUNT: 15.4 % (ref 10–14.5)
GFR SERPLBLD BASED ON 1.73 SQ M-ARVRAT: > 60 ML/MIN
GLUCOSE SERPL-MCNC: 169 MG/DL (ref 70–105)
INR PPP: 2.5 (ref 0.8–1.4)
INR PPP: 3.1 (ref 0.8–1.4)
LYMPHOCYTES # BLD AUTO: 0.7 X 10^3 (ref 1–4)
LYMPHOCYTES # BLD AUTO: 0.7 X 10^3 (ref 1–4)
LYMPHOCYTES NFR BLD AUTO: 15 % (ref 12–44)
LYMPHOCYTES NFR BLD AUTO: 19 % (ref 12–44)
MAGNESIUM SERPL-MCNC: 1.6 MG/DL (ref 1.8–2.4)
MCH RBC QN AUTO: 30 PG (ref 25–34)
MCH RBC QN AUTO: 30 PG (ref 25–34)
MCHC RBC AUTO-ENTMCNC: 32 G/DL (ref 32–36)
MCHC RBC AUTO-ENTMCNC: 32 G/DL (ref 32–36)
MCV RBC AUTO: 92 FL (ref 80–99)
MCV RBC AUTO: 93 FL (ref 80–99)
MONOCYTES # BLD AUTO: 0.5 X 10^3 (ref 0–1)
MONOCYTES # BLD AUTO: 0.6 X 10^3 (ref 0–1)
MONOCYTES NFR BLD AUTO: 12 % (ref 0–12)
MONOCYTES NFR BLD AUTO: 14 % (ref 0–12)
NEUTROPHILS # BLD AUTO: 2.3 X 10^3 (ref 1.8–7.8)
NEUTROPHILS # BLD AUTO: 3.4 X 10^3 (ref 1.8–7.8)
NEUTROPHILS NFR BLD AUTO: 60 % (ref 42–75)
NEUTROPHILS NFR BLD AUTO: 71 % (ref 42–75)
PHOSPHATE SERPL-MCNC: 2.4 MG/DL (ref 2.3–4.7)
PLATELET # BLD: 159 10^3/UL (ref 130–400)
PLATELET # BLD: 183 10^3/UL (ref 130–400)
PMV BLD AUTO: 8.6 FL (ref 7.4–10.4)
PMV BLD AUTO: 8.8 FL (ref 7.4–10.4)
POTASSIUM SERPL-SCNC: 4.3 MMOL/L (ref 3.6–5)
PROTHROMBIN TIME: 26.9 SEC (ref 12.2–14.7)
PROTHROMBIN TIME: 31.6 SEC (ref 12.2–14.7)
RBC # BLD AUTO: 3.31 10^6/UL (ref 4.35–5.85)
RBC # BLD AUTO: 3.48 10^6/UL (ref 4.35–5.85)
SODIUM SERPL-SCNC: 137 MMOL/L (ref 135–145)
WBC # BLD AUTO: 3.8 10^3/UL (ref 4.3–11)
WBC # BLD AUTO: 4.7 10^3/UL (ref 4.3–11)

## 2017-02-14 RX ADMIN — ACETAMINOPHEN PRN MG: 500 TABLET ORAL at 04:35

## 2017-02-14 RX ADMIN — FLUTICASONE PROPIONATE AND SALMETEROL XINAFOATE SCH PUFF: 115; 21 AEROSOL, METERED RESPIRATORY (INHALATION) at 18:45

## 2017-02-14 RX ADMIN — IPRATROPIUM BROMIDE AND ALBUTEROL SULFATE SCH ML: .5; 3 SOLUTION RESPIRATORY (INHALATION) at 11:02

## 2017-02-14 RX ADMIN — ATORVASTATIN CALCIUM SCH MG: 10 TABLET, FILM COATED ORAL at 20:46

## 2017-02-14 RX ADMIN — WARFARIN SODIUM SCH MG: 5 TABLET ORAL at 17:34

## 2017-02-14 RX ADMIN — IPRATROPIUM BROMIDE AND ALBUTEROL SULFATE SCH ML: .5; 3 SOLUTION RESPIRATORY (INHALATION) at 14:34

## 2017-02-14 RX ADMIN — FERROUS SULFATE TAB 325 MG (65 MG ELEMENTAL FE) SCH MG: 325 (65 FE) TAB at 17:34

## 2017-02-14 RX ADMIN — VANCOMYCIN HYDROCHLORIDE SCH MLS/HR: 1 INJECTION, POWDER, LYOPHILIZED, FOR SOLUTION INTRAVENOUS at 07:27

## 2017-02-14 RX ADMIN — INSULIN HUMAN SCH UNIT: 100 INJECTION, SOLUTION PARENTERAL at 06:00

## 2017-02-14 RX ADMIN — ACETAMINOPHEN PRN MG: 500 TABLET ORAL at 17:50

## 2017-02-14 RX ADMIN — SODIUM CHLORIDE SCH MLS/HR: 900 INJECTION INTRAVENOUS at 17:35

## 2017-02-14 RX ADMIN — IPRATROPIUM BROMIDE AND ALBUTEROL SULFATE SCH ML: .5; 3 SOLUTION RESPIRATORY (INHALATION) at 18:45

## 2017-02-14 RX ADMIN — VANCOMYCIN HYDROCHLORIDE SCH MLS/HR: 1 INJECTION, POWDER, LYOPHILIZED, FOR SOLUTION INTRAVENOUS at 18:50

## 2017-02-14 RX ADMIN — SODIUM CHLORIDE SCH MLS/HR: 900 INJECTION INTRAVENOUS at 11:57

## 2017-02-14 RX ADMIN — INSULIN HUMAN SCH UNIT: 100 INJECTION, SOLUTION PARENTERAL at 20:40

## 2017-02-14 RX ADMIN — DILTIAZEM HYDROCHLORIDE SCH MG: 30 TABLET, FILM COATED ORAL at 20:46

## 2017-02-14 RX ADMIN — SODIUM CHLORIDE SCH MLS/HR: 900 INJECTION INTRAVENOUS at 01:01

## 2017-02-14 RX ADMIN — SODIUM CHLORIDE SCH MLS/HR: 900 INJECTION INTRAVENOUS at 23:42

## 2017-02-14 RX ADMIN — SODIUM CHLORIDE SCH MLS/HR: 900 INJECTION INTRAVENOUS at 05:38

## 2017-02-14 RX ADMIN — INSULIN HUMAN SCH UNIT: 100 INJECTION, SOLUTION PARENTERAL at 14:30

## 2017-02-14 RX ADMIN — FLUTICASONE PROPIONATE AND SALMETEROL XINAFOATE SCH PUFF: 115; 21 AEROSOL, METERED RESPIRATORY (INHALATION) at 06:53

## 2017-02-14 RX ADMIN — MAGNESIUM SULFATE IN DEXTROSE SCH MLS/HR: 10 INJECTION, SOLUTION INTRAVENOUS at 06:07

## 2017-02-14 RX ADMIN — POTASSIUM CHLORIDE SCH MEQ: 750 TABLET, FILM COATED, EXTENDED RELEASE ORAL at 17:34

## 2017-02-14 RX ADMIN — IPRATROPIUM BROMIDE AND ALBUTEROL SULFATE SCH ML: .5; 3 SOLUTION RESPIRATORY (INHALATION) at 06:52

## 2017-02-14 RX ADMIN — MAGNESIUM SULFATE IN DEXTROSE SCH MLS/HR: 10 INJECTION, SOLUTION INTRAVENOUS at 08:14

## 2017-02-14 RX ADMIN — SODIUM CHLORIDE SCH MLS/HR: 900 INJECTION, SOLUTION INTRAVENOUS at 06:07

## 2017-02-14 RX ADMIN — INSULIN HUMAN SCH UNIT: 100 INJECTION, SOLUTION PARENTERAL at 10:30

## 2017-02-14 NOTE — DIAGNOSTIC IMAGING REPORT
INDICATION:

Dyspnea.



COMPARISON STUDY:

Chest from yesterday.



FINDINGS:

A portable semiupright view of the chest demonstrates a decrease

in the inspiratory effort compared to the previous day with

increased crowding of the lung markings. No focal area of

worsening is present. Pleural thickening or fluid in the right

apex and a right pleural effusion in the base are stable.

Diffuse bilateral pulmonary infiltrates are present. There is

somewhat more nodularity of the infiltrates in the left lower

lung. This is along the diaphragm now and previously was up

higher; however, due to the poor inspiration, it is much lower.

The heart size is normal.



IMPRESSION:

There is poor aspiration with increased crowding of the lung

markings. The pulmonary infiltrates, right pleural effusion,

right apical fluid or thickening, and ill-defined nodularity in

the left lower lung are stable.



Dictated by:



Dictated on workstation # [id number]

## 2017-02-14 NOTE — PULMONARY CONSULTATION
History of Present Illness


History of Present Illness


Date of Consultation


17


 06:52


Date of Admission





History of Present Illness


75yo with hx of severe COPD, pulmonary fibrosis/scarring, and frequent 

hospitalizations secondary to pneumonia presented secondary to worsening SOB 

and fever of 102. Pt has been coughing up sputum. He has grown out pseudomonus 

in the past. In the ED Merrem and vanco was started. Vital signs have been 

stable. I am consulted for pulmonary management.





Allergies and Home Medications


Allergies


Coded Allergies:  


     morphine (Verified  Allergy, Mild, 16)


     Sulfa (Sulfonamide Antibiotics) (Verified  Allergy, Unknown, 16)


     penicillin G (Verified  Allergy, Unknown, 16)


     levofloxacin (Verified  Adverse Reaction, Unknown, 16)





Home Medications


Albuterol Sulfate 2.5 Mg/3 Ml Vial.neb 2.5 MG IH Q6H PRN PRN SHORTNESS OF 

BREATH (Reported) 


Diltiazem HCl 30 Mg Tablet 30 MG PO TID (Reported) 


Enalapril Maleate 2.5 Mg Tablet 2.5 MG PO DAILY (Reported) 


Ferrous Sulfate 325 Mg Tablet 325 MG PO 1730 (Reported) 


Fluticasone/Vilanterol 1 Each Blst.w.dev 1 PUFF IH DAILY (Reported) 


Furosemide 40 Mg Tablet 40 MG PO DAILY (Reported) 


Insulin Nph Human Recom 10 Unit/0.1 Ml Susp 10 UNIT SQ HS (Reported) 


   MIXES WITH 4 UNITS OF R INSULIN EVERY EVENING FOR A TOTAL DOSE OF 14 UNITS 


Insulin Nph Human Recom 10 Unit/0.1 Ml Susp 15 UNIT SQ AM (Reported) 


   MIXES WITH 4 UNITS OF R INSULIN EVERY MORNING FOR A TOTAL DOSE OF 19 UNITS 


Insulin Regular, Human 100 Unit/1 Ml Vial 4 UNIT SQ BID (Reported) 


   MIXES WITH N INSULIN EVERY MORNING AND EVENING 


Pantoprazole Sodium 40 Mg Tablet.dr 40 MG PO DAILY (Reported) 


   LAST FILLED #30 16 


Potassium Chloride 10 Meq Tab.er.prt 10 MEQ PO Q48H (Reported) 


Simvastatin 20 Mg Tablet 20 MG PO HS (Reported) 


Warfarin Sodium 5 Mg Tablet 5 MG PO SuMoTuWeFrSa (Reported) 


Warfarin Sodium 5 Mg Tablet 7.5 MG PO Th (Reported) 


   TAKES 1 & 1/2 (5MG) TABLETS 





Past Medical-Social-Family Hx


Patient Social History


Alcohol Use:  Denies Use


Recreational Drug Use:  No


Smoking Status:  Former Smoker


Type Used:  Cigarettes


Former Smoker/When Quit:  1985


2nd Hand Smoke Exposure:  No


Recent Foreign Travel:  No


Contact w/Someone Who Travel:  No


Recent Infectious Disease Expo:  No


Recent Hopitalizations:  Yes (WEMT HOME ABOUT 4 DAYS AGO FROM HOSPITAL FROM 

PNEUMONIA)


Physical Abuse Screen:  No


Sexual Abuse:  No





Immunizations Up To Date


Tetanus Booster (TDap):  More than 5yrs


PED Vaccines UTD:  No


Date of Pneumonia Vaccine:  Oct 3, 2016


Date of Influenza Vaccine:  Oct 1, 2016





Seasonal Allergies


Seasonal Allergies:  No





Surgeries


HX Surgeries:  Yes (LUNG SX, PROSTATE SX)


Surgeries:  CABG, Coronary Stent, Eye Surgery, Joint Replacement, Lobectomy, 

Orthopedic, Prostatectomy, Tonsillectomy





Respiratory


Hx Respiratory Disorders:  Yes (1/2 OF RIGHT LUNG REMOVED. )


Respiratory Disorders:  Pneumonia, COPD, Emphysema





Cardiovascular


Hx Cardiac Disorders:  Yes (implanted monitor)


Cardiac Disorders:  Atrial Fibrillation, Coronary Artery Disease





Neurological


Hx Neurological Disorders:  Yes


Neurological Disorders:  Stroke





Reproductive System


Hx Reproductive Disorders:  No


Sexually Transmitted Disease:  No





Genitourinary


Hx Genitourinary Disorders:  Yes


Genitourinary Disorders:  Benign Prostatic Hyperpl





Gastrointestinal


Hx Gastrointestinal Disorders:  No





Musculoskeletal


Hx Musculoskeletal Disorders:  Yes


Musculoskeletal Disorders:  Arthritis





Endocrine


Hx Endocrine Disorders:  Yes


Endocrine Disorders:  Diabetes, Insulin dep





HEENT


HX ENT Disorders:  Yes (CATARACTS REMOVED)


HEENT Disorders:  Cataract


Loss of Vision:  Denies


Hearing Impairment:  Hard of Hearing





Cancer


Hx Cancer:  No





Psychosocial


Hx Psychiatric Problems:  No





Integumentary


HX Skin/Integumentary Disorder:  No





Blood Transfusions


Hx Blood Disorders:  No


Adverse Reaction to a Blood Tr:  No





Family Medical History


Significant Family History:  Heart Disease, Cancer, Stroke, Other Conditions/Hx


Family Medial History:  


BLADD


  09 BROTHER


BLADD


  09 BROTHER


Cancer


  03 FATHER (THROAT)


  09 BROTHER


Dementia


  03 MOTHER


FH: bladder cancer


FH: bladder cancer


Family history: Diabetes mellitus


  03 MOTHER


Family history: Thyroid disorder


  03 MOTHER


Infertile


  03 MOTHER (X5 MISCARRIAGES)


Myocardial infarction


  09 BROTHER





No Family History of:


  Abdominal aortic aneurysm


  [place]'s disease


  Alcoholism


  Aphasia


  Cancer of colon


  Cataract


  Chest pain


  Congenital heart disease


  Congestive heart failure


  Cystic fibrosis


  Dysphagia


  Family history: Allergy


  Family history: Alzheimer's disease


  Family history: Arthritis


  Family history: Asthma


  Family history: Breast disease


  Family history: Cardiovascular disease


  Family history: Coronary thrombosis


  Family history: Gastrointestinal disease


  Family history: Glaucoma


  Family history: Hypertension


  Family history: Osteoporosis


  Headache


  Hearing loss


  Heart disease


  Hereditary disease


  History of - anemia


  History of - disorder


  History of - respiratory disease


  History of drug abuse


  Human immunodeficiency virus (HIV) seropositivity


  Hypercholesterolemia


  Kidney disease


  Malignant neoplasm of lung


  Parkinson's disease


  Prostate cancer


  Psychotic disorder


  Seizure disorder


  Stroke


  Tuberculosis


  Visual impairment





Exam


Exam





 Vital Signs








  Date Time  Temp Pulse Resp B/P Pulse Ox O2 Delivery O2 Flow Rate FiO2


 


17 06:00  115 35 95/65 90 Nasal Cannula 2.00 


 


17 05:15 101.5       


 


17 05:00  100 15 87/44 97 Nasal Cannula 2.00 


 


17 04:35 102.0       


 


17 04:00 102.3 102 10 94/49 95 Nasal Cannula 2.00 


 


17 04:00     96  2.00 


 


17 03:00  124 12 123/69 94 Nasal Cannula 2.00 


 


17 02:34     98  2.00 


 


17 02:13     98   


 


17 02:00  99 38 94/80 100 Nasal Cannula 2.00 


 


17 01:00  106      


 


17 01:00  106 27 90/70 100 Nasal Cannula 2.00 


 


17 00:00 99.6 112 24 124/76 98 Nasal Cannula 2.00 


 


17 00:00     98  2.00 


 


17 23:00  109 22 122/91 98 Nasal Cannula 2.00 


 


17 22:00     98  2.00 


 


17 22:00  95 30 104/67 99 Nasal Cannula 2.00 


 


17 21:00  102 26 109/70 93 Nasal Cannula 2.00 


 


17 20:00 98.1 78 11 102/59 89 Nasal Cannula 2.00 


 


17 20:00       2.00 


 


17 19:00  81      


 


17 19:00  90 26 116/87 99 Nasal Cannula 2.00 


 


17 18:30     99  2.00 


 


17 18:05 99.5 109 24 100/72 99 Nasal Cannula 2.00 


 


17 18:00 99.5 84 24 91/59 95 Nasal Cannula 2 


 


17 18:00 99.5 84 24  95  2 


 


17 16:38     99  2 


 


17 13:15      Nasal Cannula 2 


 


17 13:10 101.6 120 28 128/63 98 Nasal Cannula 2 














 I & O 


 


 17





 07:00


 


Intake Total 2470 ml


 


Output Total 1550 ml


 


Balance 920 ml








General Appearance:   No Apparent Distress WD/WN


HEENT:   PERRL/EOMI Normal ENT Inspection Pharynx Normal


Neck:   Normal Inspection


Respiratory:   No Accessory Muscle Use No Respiratory Distress Crackles (Coarse 

crackles in the left chest) Wheezing


Cardiovascular:   No Edema No Murmur Irregularly Irregular


Capillary Refill:  Less Than 3 Seconds


Extremity:   Normal Inspection No Pedal Edema


Neurologic/Psychiatric:   Alert Oriented x3 No Motor/Sensory Deficits Normal 

Mood/Affect CNs II-XII Norm as Tested


Skin:   Normal Color Warm/Dry





Results


Lab


Laboratory Tests


17 13:30








17 03:53











Assessment/Plan


Assessment/Plan


PNeumonia with sepsis and hx of multi drug resistant pseudomonas -


   -Continue Merrem and vanco for now - await cultures


   -Decrease IVF to KVO 


   -Frequent pneumonia with hospitalizations--- will screen for CVID as out 

patient once pt is over pneumonia 





COPD and hx of fibrosis


   -SVNs  QID 


   -Oxygen 


[place]


hx of Afib 


CAD


DM





Clinical Quality Measures


DVT/VTE Risk/Contraindication:


Risk Factor Score Per Nursin


RFS Level Per Nursing on Admit:  4+=Very High


Contraindications-Pharm:  Other *list below*








SMITH MUNOZ DO 2017 06:58

## 2017-02-14 NOTE — PROGRESS NOTE (SOAP)
Subjective


Subjective/Events-last exam


patient states he's feeling better today.


Patient running an elevated temperature of 101.5.


Patient had a episode of coughing and spitting up stuff with breathing 

treatment.


Patient hypotensive.


Patient to go to medical floor today.


Pneumonia.


COPD.


Coronary artery disease.


History of atrial fibrillation.


Diabetes





Objective


Exam





 Vital Signs








  Date Time  Temp Pulse Resp B/P Pulse Ox O2 Delivery O2 Flow Rate FiO2


 


17 06:54     98  2.00 


 


17 06:53     98  2.00 


 


17 06:00  115 35 95/65 90 Nasal Cannula 2.00 


 


17 05:15 101.5       


 


17 05:00  100 15 87/44 97 Nasal Cannula 2.00 


 


17 04:35 102.0       


 


17 04:00 102.3 102 10 94/49 95 Nasal Cannula 2.00 


 


17 04:00     96  2.00 


 


17 03:00  124 12 123/69 94 Nasal Cannula 2.00 


 


17 02:34     98  2.00 


 


17 02:13     98   


 


17 02:00  99 38 94/80 100 Nasal Cannula 2.00 


 


17 01:00  106      


 


17 01:00  106 27 90/70 100 Nasal Cannula 2.00 


 


17 00:00 99.6 112 24 124/76 98 Nasal Cannula 2.00 


 


17 00:00     98  2.00 


 


17 23:00  109 22 122/91 98 Nasal Cannula 2.00 


 


17 22:00     98  2.00 


 


17 22:00  95 30 104/67 99 Nasal Cannula 2.00 


 


17 21:00  102 26 109/70 93 Nasal Cannula 2.00 


 


17 20:00 98.1 78 11 102/59 89 Nasal Cannula 2.00 


 


17 20:00       2.00 


 


17 19:00  81      


 


17 19:00  90 26 116/87 99 Nasal Cannula 2.00 


 


17 18:30     99  2.00 


 


17 18:05 99.5 109 24 100/72 99 Nasal Cannula 2.00 


 


17 18:00 99.5 84 24 91/59 95 Nasal Cannula 2 


 


17 18:00 99.5 84 24  95  2 


 


17 16:38     99  2 


 


17 13:15      Nasal Cannula 2 


 


17 13:10 101.6 120 28 128/63 98 Nasal Cannula 2 














 I & O 


 


 17





 07:00


 


Intake Total 2470 ml


 


Output Total 1550 ml


 


Balance 920 ml





Capillary Refill : Less Than 3 Seconds


General Appearance:   No Apparent Distress WD/WN


HEENT:   Normal ENT Inspection


Neck:   Full Range of Motion Normal Inspection Non Tender


Respiratory:   Chest Non Tender No Accessory Muscle Use No Respiratory Distress 

Decreased Breath Sounds Other (ingestion)


Cardiovascular:   Regular Rate, Rhythm No Murmur


Gastrointestinal:   non tender soft





Results


Lab


Laboratory Tests


17 13:30








17 03:53








 Laboratory Tests


17 13:30: 


Activated Partial Thromboplast Time 81H, Alanine Aminotransferase (ALT/SGPT) 12

, Albumin 3.6, Alkaline Phosphatase 63, Anion Gap 8, Aspartate Amino Transf (AST

/SGOT) 20, BUN/Creatinine Ratio 15, Basophils # (Auto) 0.0, Basophils (%) (Auto

) 0, Blood Urea Nitrogen 16, C-Reactive Protein High Sensitivity 9.59H, Calcium 

Level 8.2L, Carbon Dioxide Level 26, Chloride Level 101, Creatinine 1.09, 

Eosinophils # (Auto) 0.1, Eosinophils (%) (Auto) 1, Estimat Glomerular 

Filtration Rate > 60, Glucose Level 189H, Hematocrit 33L, Hemoglobin 10.8L, INR 

Comment 2.7H, Lactic Acid Level 1.6, Lymphocytes # (Auto) 1.2, Lymphocytes (%) (

Auto) 19, Mean Corpuscular Hemoglobin 30, Mean Corpuscular Hemoglobin Concent 33

, Mean Corpuscular Volume 92, Mean Platelet Volume 9.0, Monocytes # (Auto) 0.8, 

Monocytes (%) (Auto) 13H, Neutrophils # (Auto) 4.2, Neutrophils (%) (Auto) 67, 

Platelet Count 221, Potassium Level 3.7, Prothrombin Time 28.8H, Red Blood 

Count 3.60L, Red Cell Distribution Width 15.4H, Sodium Level 135, Total 

Bilirubin 0.4, Total Protein 6.8, White Blood Count 6.3


17 14:35: 


Urine Bacteria NEGATIVE, Urine Bilirubin NEGATIVE, Urine Casts NONE, Urine 

Clarity CLEAR, Urine Color YELLOW, Urine Crystals NONE, Urine Culture Indicated 

NO, Urine Glucose (UA) NEGATIVE, Urine Ketones NEGATIVE, Urine Leukocyte 

Esterase NEGATIVE, Urine Mucus NEGATIVE, Urine Nitrite NEGATIVE, Urine Protein 

NEGATIVE, Urine RBC RARE, Urine RBC (Auto) 4+H, Urine Specific Gravity 1.015L, 

Urine Squamous Epithelial Cells RARE, Urine Urobilinogen NORMAL, Urine WBC NONE

, Urine pH 6


17 18:17: Glucometer 138H


17 03:53: 


Anion Gap 8, BUN/Creatinine Ratio 13, Basophils # (Auto) 0.0, Basophils (%) (

Auto) 0, Blood Urea Nitrogen 11, Calcium Level 7.7L, Carbon Dioxide Level 23, 

Chloride Level 106, Creatinine 0.86, Eosinophils # (Auto) 0.1, Eosinophils (%) (

Auto) 2, Estimat Glomerular Filtration Rate > 60, Glucose Level 169H, 

Hematocrit 31L, Hemoglobin 9.9L, INR Comment 3.1H, Lymphocytes # (Auto) 0.7L, 

Lymphocytes (%) (Auto) 15, Mean Corpuscular Hemoglobin 30, Mean Corpuscular 

Hemoglobin Concent 32, Mean Corpuscular Volume 92, Mean Platelet Volume 8.8, 

Monocytes # (Auto) 0.6, Monocytes (%) (Auto) 12, Neutrophils # (Auto) 3.4, 

Neutrophils (%) (Auto) 71, Platelet Count 183, Potassium Level 4.3, Prothrombin 

Time 31.6H, Red Blood Count 3.31L, Red Cell Distribution Width 15.2H, Sodium 

Level 137, White Blood Count 4.7, Magnesium Level 1.6L, Phosphorus Level 2.4





 Microbiology


17 Influenza Types A,B Antigen ([place]) - Final, Complete





Assessment/Plan


Assessment/Plan


Assess & Plan/Chief Complaint


pneumonia.


COPD.


Diabetes.


History of atrial fibrillation.


Coronary artery disease.


Diagnosis/Problems:  





Clinical Quality Measures


DVT/VTE Risk/Contraindication:


Risk Factor Score Per Nursin


RFS Level Per Nursing on Admit:  4+=Very High


Contraindications-Pharm:  Other *list below*








TOR CONNELLY DO 2017 07:12

## 2017-02-15 VITALS — DIASTOLIC BLOOD PRESSURE: 58 MMHG | SYSTOLIC BLOOD PRESSURE: 99 MMHG

## 2017-02-15 VITALS — SYSTOLIC BLOOD PRESSURE: 107 MMHG | DIASTOLIC BLOOD PRESSURE: 65 MMHG

## 2017-02-15 VITALS — DIASTOLIC BLOOD PRESSURE: 87 MMHG | SYSTOLIC BLOOD PRESSURE: 135 MMHG

## 2017-02-15 VITALS — SYSTOLIC BLOOD PRESSURE: 96 MMHG | DIASTOLIC BLOOD PRESSURE: 55 MMHG

## 2017-02-15 VITALS — DIASTOLIC BLOOD PRESSURE: 51 MMHG | SYSTOLIC BLOOD PRESSURE: 92 MMHG

## 2017-02-15 LAB
ALBUMIN SERPL-MCNC: 3 G/DL (ref 3.2–4.5)
ALT SERPL-CCNC: 14 U/L (ref 0–55)
ANION GAP SERPL CALC-SCNC: 7 MMOL/L (ref 5–14)
AST SERPL-CCNC: 18 U/L (ref 5–34)
BASOPHILS # BLD AUTO: 0 10^3/UL (ref 0–0.1)
BASOPHILS NFR BLD AUTO: 0 % (ref 0–10)
BILIRUB SERPL-MCNC: 0.3 MG/DL (ref 0.1–1)
BUN SERPL-MCNC: 10 MG/DL (ref 7–18)
BUN/CREAT SERPL: 13
CALCIUM SERPL-MCNC: 8.3 MG/DL (ref 8.5–10.1)
CHLORIDE SERPL-SCNC: 106 MMOL/L (ref 98–107)
CO2 SERPL-SCNC: 25 MMOL/L (ref 21–32)
CREAT SERPL-MCNC: 0.79 MG/DL (ref 0.6–1.3)
EOSINOPHIL # BLD AUTO: 0.4 10^3/UL (ref 0–0.3)
EOSINOPHIL NFR BLD AUTO: 7 % (ref 0–10)
ERYTHROCYTE [DISTWIDTH] IN BLOOD BY AUTOMATED COUNT: 15.2 % (ref 10–14.5)
GFR SERPLBLD BASED ON 1.73 SQ M-ARVRAT: > 60 ML/MIN
GLUCOSE SERPL-MCNC: 140 MG/DL (ref 70–105)
LYMPHOCYTES # BLD AUTO: 0.9 X 10^3 (ref 1–4)
LYMPHOCYTES NFR BLD AUTO: 15 % (ref 12–44)
MAGNESIUM SERPL-MCNC: 2 MG/DL (ref 1.8–2.4)
MCH RBC QN AUTO: 30 PG (ref 25–34)
MCHC RBC AUTO-ENTMCNC: 32 G/DL (ref 32–36)
MCV RBC AUTO: 93 FL (ref 80–99)
MONOCYTES # BLD AUTO: 0.7 X 10^3 (ref 0–1)
MONOCYTES NFR BLD AUTO: 12 % (ref 0–12)
NEUTROPHILS # BLD AUTO: 3.9 X 10^3 (ref 1.8–7.8)
NEUTROPHILS NFR BLD AUTO: 66 % (ref 42–75)
PHOSPHATE SERPL-MCNC: 2.6 MG/DL (ref 2.3–4.7)
PLATELET # BLD: 183 10^3/UL (ref 130–400)
PMV BLD AUTO: 9.6 FL (ref 7.4–10.4)
POTASSIUM SERPL-SCNC: 4.4 MMOL/L (ref 3.6–5)
PROT SERPL-MCNC: 5.9 G/DL (ref 6.4–8.2)
RBC # BLD AUTO: 3.65 10^6/UL (ref 4.35–5.85)
SODIUM SERPL-SCNC: 138 MMOL/L (ref 135–145)
WBC # BLD AUTO: 5.8 10^3/UL (ref 4.3–11)

## 2017-02-15 RX ADMIN — INSULIN HUMAN SCH UNIT: 100 INJECTION, SOLUTION PARENTERAL at 06:00

## 2017-02-15 RX ADMIN — IPRATROPIUM BROMIDE AND ALBUTEROL SULFATE SCH ML: .5; 3 SOLUTION RESPIRATORY (INHALATION) at 19:24

## 2017-02-15 RX ADMIN — PANTOPRAZOLE SODIUM SCH MG: 40 TABLET, DELAYED RELEASE ORAL at 08:00

## 2017-02-15 RX ADMIN — SODIUM CHLORIDE SCH MLS/HR: 900 INJECTION INTRAVENOUS at 12:21

## 2017-02-15 RX ADMIN — FLUTICASONE PROPIONATE AND SALMETEROL XINAFOATE SCH PUFF: 115; 21 AEROSOL, METERED RESPIRATORY (INHALATION) at 19:24

## 2017-02-15 RX ADMIN — FERROUS SULFATE TAB 325 MG (65 MG ELEMENTAL FE) SCH MG: 325 (65 FE) TAB at 17:15

## 2017-02-15 RX ADMIN — ENALAPRIL MALEATE SCH MG: 2.5 TABLET ORAL at 08:36

## 2017-02-15 RX ADMIN — DILTIAZEM HYDROCHLORIDE SCH MG: 30 TABLET, FILM COATED ORAL at 08:37

## 2017-02-15 RX ADMIN — WARFARIN SODIUM SCH MG: 5 TABLET ORAL at 17:15

## 2017-02-15 RX ADMIN — VANCOMYCIN HYDROCHLORIDE SCH MLS/HR: 1 INJECTION, POWDER, LYOPHILIZED, FOR SOLUTION INTRAVENOUS at 07:00

## 2017-02-15 RX ADMIN — ATORVASTATIN CALCIUM SCH MG: 10 TABLET, FILM COATED ORAL at 20:52

## 2017-02-15 RX ADMIN — DILTIAZEM HYDROCHLORIDE SCH MG: 30 TABLET, FILM COATED ORAL at 12:21

## 2017-02-15 RX ADMIN — INSULIN HUMAN SCH UNIT: 100 INJECTION, SOLUTION PARENTERAL at 11:16

## 2017-02-15 RX ADMIN — IPRATROPIUM BROMIDE AND ALBUTEROL SULFATE SCH ML: .5; 3 SOLUTION RESPIRATORY (INHALATION) at 06:45

## 2017-02-15 RX ADMIN — SODIUM CHLORIDE SCH MLS/HR: 900 INJECTION INTRAVENOUS at 17:16

## 2017-02-15 RX ADMIN — INSULIN HUMAN SCH UNIT: 100 INJECTION, SOLUTION PARENTERAL at 15:42

## 2017-02-15 RX ADMIN — DILTIAZEM HYDROCHLORIDE SCH MG: 30 TABLET, FILM COATED ORAL at 20:52

## 2017-02-15 RX ADMIN — INSULIN HUMAN SCH UNIT: 100 INJECTION, SOLUTION PARENTERAL at 20:57

## 2017-02-15 RX ADMIN — IPRATROPIUM BROMIDE AND ALBUTEROL SULFATE SCH ML: .5; 3 SOLUTION RESPIRATORY (INHALATION) at 14:08

## 2017-02-15 RX ADMIN — FLUTICASONE PROPIONATE AND SALMETEROL XINAFOATE SCH PUFF: 115; 21 AEROSOL, METERED RESPIRATORY (INHALATION) at 10:38

## 2017-02-15 RX ADMIN — ACETAMINOPHEN PRN MG: 500 TABLET ORAL at 12:27

## 2017-02-15 RX ADMIN — SODIUM CHLORIDE SCH MLS/HR: 900 INJECTION INTRAVENOUS at 06:04

## 2017-02-15 RX ADMIN — IPRATROPIUM BROMIDE AND ALBUTEROL SULFATE SCH ML: .5; 3 SOLUTION RESPIRATORY (INHALATION) at 10:39

## 2017-02-15 RX ADMIN — FUROSEMIDE SCH MG: 40 TABLET ORAL at 08:36

## 2017-02-15 NOTE — DIAGNOSTIC IMAGING REPORT
INDICATION:

Dyspnea.



COMPARISON:

02/14/2017.



FINDINGS:

The severe COPD is a redemonstrated finding. There has been

improved inspiratory volume when compared to the prior exam. The

5 lobed infiltrates, right-sided pleural fluid, and thickening

at the base and apex are all unchanged. The upper lobe vascular

congestion is unchanged. The post interventional changes are

stable.



IMPRESSION:

There is improved inspiratory volume with no other change. The

five lobed infiltrates are chronic. The lung disease and pleural

reaction on the right are unchanged.



Dictated by:



Dictated on workstation # [id number]

## 2017-02-15 NOTE — PULMONARY PROGRESS NOTE
Subjective


Subjective/Events-last exam


Pt is doing much better. No complications noted.





Exam


Exam





 Vital Signs








  Date Time  Temp Pulse Resp B/P Pulse Ox O2 Delivery O2 Flow Rate FiO2


 


2/15/17 04:00 97.9       


 


2/15/17 04:00  84  96/55  Nasal Cannula 2.00 


 


2/15/17 01:00  62      


 


2/15/17 00:00 98.9       


 


2/15/17 00:00  75  92/51  Nasal Cannula 2.00 


 


17 21:43 99.8       


 


17 20:00     98  2.00 


 


17 20:00  105  112/53 97 Nasal Cannula 2.00 


 


17 19:00  118      


 


17 18:56     98  2.00 


 


17 18:48     100  2.00 


 


17 17:46 100.8 113 18 93/67 95 Nasal Cannula 2.00 


 


17 16:00     98  2.00 


 


17 13:00  120      


 


17 11:58 97.3 103 16 123/73 97 Nasal Cannula 2.00 


 


17 11:58     97  2.00 


 


17 11:02     100  2.00 


 


17 08:05     97  2.00 


 


17 08:00 98.9 112 16 110/82 97 Nasal Cannula 2.00 


 


17 07:00  100      


 


17 06:54     98  2.00 


 


17 06:53     98  2.00 














 I & O 


 


 2/15/17





 07:00


 


Intake Total 1860 ml


 


Output Total 2045 ml


 


Balance -185 ml








General Appearance:   No Apparent Distress WD/WN


HEENT:   PERRL/EOMI Normal ENT Inspection Pharynx Normal


Neck:   Normal Inspection


Respiratory:   No Accessory Muscle Use No Respiratory Distress Crackles (Coarse 

crackles in the left chest) Wheezing


Cardiovascular:   No Edema No Murmur Irregularly Irregular


Capillary Refill:  Less Than 3 Seconds


Gastrointestinal:   non tender soft


Extremity:   Normal Inspection No Pedal Edema


Neurologic/Psychiatric:   Alert Oriented x3 No Motor/Sensory Deficits Normal 

Mood/Affect CNs II-XII Norm as Tested


Skin:   Normal Color Warm/Dry





Results


Lab


Laboratory Tests


17 13:30








17 03:53








17 14:55








2/15/17 03:42











Assessment/Plan


Assessment/Plan


PNeumonia with sepsis and hx of multi drug resistant pseudomonas -  HX of RLL 

lobectomy 


   -Continue Merrem and vanco for now - await cultures


   - KVO -- hep lock IVF and check BNP and give lasix 40mg x 1 


   -Frequent pneumonia with hospitalizations--- will screen for CVID as out 

patient once pt is over pneumonia 





COPD and hx of fibrosis


   -SVNs  QID 


   -Oxygen 


[place]


hx of Afib 


CAD


DM





Clinical Quality Measures


DVT/VTE Risk/Contraindication:


Risk Factor Score Per Nursin


RFS Level Per Nursing on Admit:  4+=Very High


Contraindications-Pharm:  Other *list below*








SMITH MUNOZ DO Feb 15, 2017 06:52

## 2017-02-15 NOTE — PROGRESS NOTE (SOAP)
Subjective


Subjective/Events-last exam


pneumonia.


patient feels that it today.


Patient has a loose congestion in his chest.


Pseudomonas with sepsis.


COPD.


[place].


Coronary artery disease.


Diabetes mellitus.


Atrial fibrillation history





Objective


Exam





 Vital Signs








  Date Time  Temp Pulse Resp B/P Pulse Ox O2 Delivery O2 Flow Rate FiO2


 


2/15/17 04:00 97.9       


 


2/15/17 04:00  84  96/55  Nasal Cannula 2.00 


 


2/15/17 01:00  62      


 


2/15/17 00:00 98.9       


 


2/15/17 00:00  75  92/51  Nasal Cannula 2.00 


 


17 21:43 99.8       


 


17 20:00     98  2.00 


 


17 20:00  105  112/53 97 Nasal Cannula 2.00 


 


17 19:00  118      


 


17 18:56     98  2.00 


 


17 18:48     100  2.00 


 


17 17:46 100.8 113 18 93/67 95 Nasal Cannula 2.00 


 


17 16:00     98  2.00 


 


17 13:00  120      


 


17 11:58 97.3 103 16 123/73 97 Nasal Cannula 2.00 


 


17 11:58     97  2.00 


 


17 11:02     100  2.00 


 


17 08:05     97  2.00 


 


17 08:00 98.9 112 16 110/82 97 Nasal Cannula 2.00 














 I & O 


 


 2/15/17





 07:00


 


Intake Total 1860 ml


 


Output Total 2045 ml


 


Balance -185 ml





Capillary Refill : Less Than 3 Seconds


General Appearance:   No Apparent Distress WD/WN


HEENT:   Normal ENT Inspection


Neck:   Normal Inspection


Respiratory:   No Accessory Muscle Use No Respiratory Distress Decreased Breath 

Sounds Other (loose congestion)


Cardiovascular:   Regular Rate, Rhythm No Murmur


Gastrointestinal:   non tender soft





Results


Lab


Laboratory Tests


17 14:55








2/15/17 03:42








 Laboratory Tests


17 10:03: Glucometer 232H


17 14:17: Glucometer 135H


17 14:55: 


Basophils # (Auto) 0.0, Basophils (%) (Auto) 1, Eosinophils # (Auto) 0.2, 

Eosinophils (%) (Auto) 6, Hematocrit 32L, Hemoglobin 10.5L, INR Comment 2.5H, 

Lymphocytes # (Auto) 0.7L, Lymphocytes (%) (Auto) 19, Mean Corpuscular 

Hemoglobin 30, Mean Corpuscular Hemoglobin Concent 32, Mean Corpuscular Volume 

93, Mean Platelet Volume 8.6, Monocytes # (Auto) 0.5, Monocytes (%) (Auto) 14H, 

Neutrophils # (Auto) 2.3, Neutrophils (%) (Auto) 60, Platelet Count 159, 

Prothrombin Time 26.9H, Red Blood Count 3.48L, Red Cell Distribution Width 15.4H

, White Blood Count 3.8L


17 17:45: Vancomycin Level Trough 14.0


17 20:30: Glucometer 266H


2/15/17 03:42: 


Alanine Aminotransferase (ALT/SGPT) 14, Albumin 3.0L, Alkaline Phosphatase 57, 

Anion Gap 7, Aspartate Amino Transf (AST/SGOT) 18, BUN/Creatinine Ratio 13, 

Basophils # (Auto) 0.0, Basophils (%) (Auto) 0, Blood Urea Nitrogen 10, Calcium 

Level 8.3L, Carbon Dioxide Level 25, Chloride Level 106, Creatinine 0.79, 

Eosinophils # (Auto) 0.4H, Eosinophils (%) (Auto) 7, Estimat Glomerular 

Filtration Rate > 60, Glucose Level 140H, Hematocrit 34L, Hemoglobin 11.0L, 

Lymphocytes # (Auto) 0.9L, Lymphocytes (%) (Auto) 15, Magnesium Level 2.0, Mean 

Corpuscular Hemoglobin 30, Mean Corpuscular Hemoglobin Concent 32, Mean 

Corpuscular Volume 93, Mean Platelet Volume 9.6, Monocytes # (Auto) 0.7, 

Monocytes (%) (Auto) 12, Neutrophils # (Auto) 3.9, Neutrophils (%) (Auto) 66, 

Phosphorus Level 2.6, Platelet Count 183, Potassium Level 4.4, Red Blood Count 

3.65L, Red Cell Distribution Width 15.2H, Sodium Level 138, Total Bilirubin 0.3

, Total Protein 5.9L, White Blood Count 5.8





 Microbiology


17 Blood Culture - Preliminary, Resulted


          No growth


17 MRSA Screen - Final, Complete


          MRSA not isolated





Assessment/Plan


Assessment/Plan


Assess & Plan/Chief Complaint


pneumonia.


COPD.


Diabetes.


History of atrial fibrillation.


Coronary artery disease..


.


2/15/17 area


Sepsis.


Pneumonia.


Pseudomonas.


COPD.


[place].


CAD.


Diabetes mellitus.


Atrial fibrillation history


Diagnosis/Problems:  





Clinical Quality Measures


DVT/VTE Risk/Contraindication:


Risk Factor Score Per Nursin


RFS Level Per Nursing on Admit:  4+=Very High


Contraindications-Pharm:  Other *list below*








TOR CONNELLY DO Feb 15, 2017 07:10

## 2017-02-16 VITALS — SYSTOLIC BLOOD PRESSURE: 102 MMHG | DIASTOLIC BLOOD PRESSURE: 52 MMHG

## 2017-02-16 VITALS — SYSTOLIC BLOOD PRESSURE: 121 MMHG | DIASTOLIC BLOOD PRESSURE: 62 MMHG

## 2017-02-16 VITALS — SYSTOLIC BLOOD PRESSURE: 112 MMHG | DIASTOLIC BLOOD PRESSURE: 73 MMHG

## 2017-02-16 LAB
ANION GAP SERPL CALC-SCNC: 7 MMOL/L (ref 5–14)
BASOPHILS # BLD AUTO: 0 10^3/UL (ref 0–0.1)
BASOPHILS NFR BLD AUTO: 0 % (ref 0–10)
BUN SERPL-MCNC: 10 MG/DL (ref 7–18)
BUN/CREAT SERPL: 13
CALCIUM SERPL-MCNC: 8.1 MG/DL (ref 8.5–10.1)
CHLORIDE SERPL-SCNC: 104 MMOL/L (ref 98–107)
CO2 SERPL-SCNC: 26 MMOL/L (ref 21–32)
CREAT SERPL-MCNC: 0.76 MG/DL (ref 0.6–1.3)
EOSINOPHIL # BLD AUTO: 0.3 10^3/UL (ref 0–0.3)
EOSINOPHIL NFR BLD AUTO: 5 % (ref 0–10)
ERYTHROCYTE [DISTWIDTH] IN BLOOD BY AUTOMATED COUNT: 15 % (ref 10–14.5)
GFR SERPLBLD BASED ON 1.73 SQ M-ARVRAT: > 60 ML/MIN
GLUCOSE SERPL-MCNC: 124 MG/DL (ref 70–105)
LYMPHOCYTES # BLD AUTO: 1.6 X 10^3 (ref 1–4)
LYMPHOCYTES NFR BLD AUTO: 24 % (ref 12–44)
MAGNESIUM SERPL-MCNC: 1.7 MG/DL (ref 1.8–2.4)
MCH RBC QN AUTO: 30 PG (ref 25–34)
MCHC RBC AUTO-ENTMCNC: 32 G/DL (ref 32–36)
MCV RBC AUTO: 92 FL (ref 80–99)
MONOCYTES # BLD AUTO: 0.7 X 10^3 (ref 0–1)
MONOCYTES NFR BLD AUTO: 11 % (ref 0–12)
NEUTROPHILS # BLD AUTO: 3.9 X 10^3 (ref 1.8–7.8)
NEUTROPHILS NFR BLD AUTO: 60 % (ref 42–75)
PHOSPHATE SERPL-MCNC: 2.9 MG/DL (ref 2.3–4.7)
PLATELET # BLD: 169 10^3/UL (ref 130–400)
PMV BLD AUTO: 9.4 FL (ref 7.4–10.4)
POTASSIUM SERPL-SCNC: 4.2 MMOL/L (ref 3.6–5)
RBC # BLD AUTO: 3.35 10^6/UL (ref 4.35–5.85)
SODIUM SERPL-SCNC: 137 MMOL/L (ref 135–145)
WBC # BLD AUTO: 6.4 10^3/UL (ref 4.3–11)

## 2017-02-16 RX ADMIN — SODIUM CHLORIDE SCH MLS/HR: 900 INJECTION INTRAVENOUS at 11:50

## 2017-02-16 RX ADMIN — SODIUM CHLORIDE SCH MLS/HR: 900 INJECTION INTRAVENOUS at 06:06

## 2017-02-16 RX ADMIN — DILTIAZEM HYDROCHLORIDE SCH MG: 30 TABLET, FILM COATED ORAL at 08:23

## 2017-02-16 RX ADMIN — SODIUM CHLORIDE SCH MLS/HR: 900 INJECTION INTRAVENOUS at 00:13

## 2017-02-16 RX ADMIN — IPRATROPIUM BROMIDE AND ALBUTEROL SULFATE SCH ML: .5; 3 SOLUTION RESPIRATORY (INHALATION) at 14:31

## 2017-02-16 RX ADMIN — INSULIN HUMAN SCH UNIT: 100 INJECTION, SOLUTION PARENTERAL at 14:30

## 2017-02-16 RX ADMIN — INSULIN HUMAN SCH UNIT: 100 INJECTION, SOLUTION PARENTERAL at 20:47

## 2017-02-16 RX ADMIN — FLUTICASONE PROPIONATE AND SALMETEROL XINAFOATE SCH PUFF: 115; 21 AEROSOL, METERED RESPIRATORY (INHALATION) at 18:38

## 2017-02-16 RX ADMIN — IPRATROPIUM BROMIDE AND ALBUTEROL SULFATE SCH ML: .5; 3 SOLUTION RESPIRATORY (INHALATION) at 18:24

## 2017-02-16 RX ADMIN — ATORVASTATIN CALCIUM SCH MG: 10 TABLET, FILM COATED ORAL at 20:46

## 2017-02-16 RX ADMIN — SODIUM CHLORIDE SCH MLS/HR: 900 INJECTION INTRAVENOUS at 17:15

## 2017-02-16 RX ADMIN — INSULIN HUMAN SCH UNIT: 100 INJECTION, SOLUTION PARENTERAL at 11:51

## 2017-02-16 RX ADMIN — ENALAPRIL MALEATE SCH MG: 2.5 TABLET ORAL at 08:23

## 2017-02-16 RX ADMIN — FLUTICASONE PROPIONATE AND SALMETEROL XINAFOATE SCH PUFF: 115; 21 AEROSOL, METERED RESPIRATORY (INHALATION) at 07:35

## 2017-02-16 RX ADMIN — IPRATROPIUM BROMIDE AND ALBUTEROL SULFATE SCH ML: .5; 3 SOLUTION RESPIRATORY (INHALATION) at 07:27

## 2017-02-16 RX ADMIN — DILTIAZEM HYDROCHLORIDE SCH MG: 30 TABLET, FILM COATED ORAL at 20:46

## 2017-02-16 RX ADMIN — POTASSIUM CHLORIDE SCH MEQ: 750 TABLET, FILM COATED, EXTENDED RELEASE ORAL at 17:15

## 2017-02-16 RX ADMIN — FERROUS SULFATE TAB 325 MG (65 MG ELEMENTAL FE) SCH MG: 325 (65 FE) TAB at 17:15

## 2017-02-16 RX ADMIN — IPRATROPIUM BROMIDE AND ALBUTEROL SULFATE SCH ML: .5; 3 SOLUTION RESPIRATORY (INHALATION) at 11:01

## 2017-02-16 RX ADMIN — DILTIAZEM HYDROCHLORIDE SCH MG: 30 TABLET, FILM COATED ORAL at 14:16

## 2017-02-16 RX ADMIN — INSULIN HUMAN SCH UNIT: 100 INJECTION, SOLUTION PARENTERAL at 06:00

## 2017-02-16 RX ADMIN — PANTOPRAZOLE SODIUM SCH MG: 40 TABLET, DELAYED RELEASE ORAL at 06:06

## 2017-02-16 RX ADMIN — SODIUM CHLORIDE SCH MLS/HR: 900 INJECTION INTRAVENOUS at 23:31

## 2017-02-16 RX ADMIN — FUROSEMIDE SCH MG: 40 TABLET ORAL at 08:23

## 2017-02-16 NOTE — PROGRESS NOTE (SOAP)
Subjective


Subjective/Events-last exam


patient states she's feeling better today.


Patient states he is breathing better today patient's lying in bed comfortable





Objective


Exam





 Vital Signs








  Date Time  Temp Pulse Resp B/P Pulse Ox O2 Delivery O2 Flow Rate FiO2


 


17 07:45 98.6 80 20 121/62 95 Nasal Cannula 2.00 


 


17 07:35     94  2.00 


 


17 07:27     93  2.00 


 


17 00:00 99.3 88 20 102/52 96 Nasal Cannula 2.00 


 


2/15/17 19:35       2.00 


 


2/15/17 19:27       2.00 


 


2/15/17 19:24     97  2.00 


 


2/15/17 16:25 98.9 74 20 99/58 95 Nasal Cannula 2.00 


 


2/15/17 14:09     92  2.00 


 


2/15/17 13:19 100.0       


 


2/15/17 13:00  88      


 


2/15/17 12:27 100.3       


 


2/15/17 12:15 100.3 103 24 107/65 98 Nasal Cannula 2.00 


 


2/15/17 08:35 99.6 118 16 135/87 97 Nasal Cannula 2.00 


 


2/15/17 08:30     97  2.00 














 I & O 


 


 17





 07:00


 


Intake Total 2480 ml


 


Output Total 1150 ml


 


Balance 1330 ml





Capillary Refill : Less Than 3 Seconds


General Appearance:   No Apparent Distress WD/WN


HEENT:   Normal ENT Inspection


Neck:   Full Range of Motion Normal Inspection Non Tender


Respiratory:   Chest Non Tender Normal Breath Sounds No Accessory Muscle Use No 

Respiratory Distress Decreased Breath Sounds


Cardiovascular:   Regular Rate, Rhythm No Murmur


Gastrointestinal:   non tender soft





Results


Lab


Laboratory Tests


17 05:59








 Laboratory Tests


2/15/17 11:08: Glucometer 312H


2/15/17 15:25: Glucometer 189H


2/15/17 18:05: Vancomycin Level Trough 16.7


2/15/17 20:54: Glucometer 238H


17 05:59: 


Anion Gap 7, BUN/Creatinine Ratio 13, Basophils # (Auto) 0.0, Basophils (%) (

Auto) 0, Blood Urea Nitrogen 10, Calcium Level 8.1L, Carbon Dioxide Level 26, 

Chloride Level 104, Creatinine 0.76, Eosinophils # (Auto) 0.3, Eosinophils (%) (

Auto) 5, Estimat Glomerular Filtration Rate > 60, Glucometer 141H, Glucose 

Level 124H, Hematocrit 31L, Hemoglobin 10.0L, Lymphocytes # (Auto) 1.6, 

Lymphocytes (%) (Auto) 24, Magnesium Level 1.7L, Mean Corpuscular Hemoglobin 30

, Mean Corpuscular Hemoglobin Concent 32, Mean Corpuscular Volume 92, Mean 

Platelet Volume 9.4, Monocytes # (Auto) 0.7, Monocytes (%) (Auto) 11, 

Neutrophils # (Auto) 3.9, Neutrophils (%) (Auto) 60, Phosphorus Level 2.9, 

Platelet Count 169, Potassium Level 4.2, Red Blood Count 3.35L, Red Cell 

Distribution Width 15.0H, Sodium Level 137, White Blood Count 6.4





 Microbiology


17 Blood Culture - Preliminary, Resulted


          No growth


17 MRSA Screen - Final, Complete


          MRSA not isolated





Assessment/Plan


Assessment/Plan


Assess & Plan/Chief Complaint


pneumonia.


COPD.


Diabetes.


History of atrial fibrillation.


Coronary artery disease..


.


2/15/17 area


Sepsis.


Pneumonia.


Pseudomonas.


COPD.


[place].


CAD.


Diabetes mellitus.


Atrial fibrillation history.


.


17.


Pneumonia.


COPD.


Diabetes.


Patient in sinus rhythm but history of atrial fibrillation.


Coronary artery disease.


Patient feels he is improving


Diagnosis/Problems:  





Clinical Quality Measures


DVT/VTE Risk/Contraindication:


Risk Factor Score Per Nursin


RFS Level Per Nursing on Admit:  4+=Very High


Contraindications-Pharm:  Other *list below*








TOR CONNELLY DO 2017 08:02

## 2017-02-16 NOTE — PULMONARY PROGRESS NOTE
Subjective


Subjective/Events-last exam


no complications noted





Exam


Exam





 Vital Signs








  Date Time  Temp Pulse Resp B/P Pulse Ox O2 Delivery O2 Flow Rate FiO2


 


17 07:45 98.6 80 20 121/62 95 Nasal Cannula 2.00 


 


17 07:35     94  2.00 


 


17 07:27     93  2.00 


 


17 00:00 99.3 88 20 102/52 96 Nasal Cannula 2.00 


 


2/15/17 19:35       2.00 


 


2/15/17 19:27       2.00 


 


2/15/17 19:24     97  2.00 


 


2/15/17 16:25 98.9 74 20 99/58 95 Nasal Cannula 2.00 


 


2/15/17 14:09     92  2.00 


 


2/15/17 13:19 100.0       


 


2/15/17 13:00  88      


 


2/15/17 12:27 100.3       


 


2/15/17 12:15 100.3 103 24 107/65 98 Nasal Cannula 2.00 














 I & O 


 


 17





 07:00


 


Intake Total 2480 ml


 


Output Total 1150 ml


 


Balance 1330 ml








General Appearance:   No Apparent Distress WD/WN


HEENT:   Normal ENT Inspection


Neck:   Full Range of Motion Normal Inspection Non Tender


Respiratory:   Chest Non Tender Normal Breath Sounds No Accessory Muscle Use No 

Respiratory Distress Decreased Breath Sounds


Cardiovascular:   Regular Rate, Rhythm No Murmur


Capillary Refill:  Less Than 3 Seconds


Gastrointestinal:   non tender soft


Extremity:   Normal Inspection No Pedal Edema


Neurologic/Psychiatric:   Alert Oriented x3 No Motor/Sensory Deficits Normal 

Mood/Affect CNs II-XII Norm as Tested


Skin:   Normal Color Warm/Dry





Results


Lab


Laboratory Tests


17 14:55








2/15/17 03:42








17 05:59











Assessment/Plan


Assessment/Plan


PNeumonia with sepsis and hx of multi drug resistant pseudomonas -  HX of RLL 

lobectomy 


   -Merrem 


   -Frequent pneumonia with hospitalizations--- will screen for CVID as out 

patient once pt is over pneumonia 





COPD and hx of fibrosis


   -SVNs  QID 


   -Oxygen 


[place]


hx of Afib 


CAD


DM





Clinical Quality Measures


DVT/VTE Risk/Contraindication:


Risk Factor Score Per Nursin


RFS Level Per Nursing on Admit:  4+=Very High


Contraindications-Pharm:  Other *list below*








SMITH MUNOZ DO 2017 08:43

## 2017-02-16 NOTE — DIAGNOSTIC IMAGING REPORT
INDICATION: Dyspnea



Frontal chest obtained at 5:09 AM and compared with yesterday.



There is cardiomegaly. There are diffuse chronic appearing

increased interstitial markings. There is patchy alveolar

infiltrate in both lung bases, right greater than left,

compatible with superimposed pneumonia. There is no

pneumothorax. There is unchanged right apical pleural

thickening.



IMPRESSION:



Stable appearance compared to yesterday with bibasilar alveolar

infiltrates and chronic interstitial changes. There is prominent

right apical pleural thickening which is also unchanged.



Dictated by:



Dictated on workstation # [id number]

## 2017-02-17 ENCOUNTER — HOSPITAL ENCOUNTER (INPATIENT)
Dept: HOSPITAL 75 - 4TH | Age: 77
LOS: 3 days | Discharge: HOME | DRG: 871 | End: 2017-02-20
Attending: FAMILY MEDICINE | Admitting: FAMILY MEDICINE
Payer: MEDICARE

## 2017-02-17 VITALS — HEIGHT: 69 IN | BODY MASS INDEX: 26.55 KG/M2 | WEIGHT: 179.25 LBS

## 2017-02-17 VITALS — SYSTOLIC BLOOD PRESSURE: 112 MMHG | DIASTOLIC BLOOD PRESSURE: 69 MMHG

## 2017-02-17 VITALS — SYSTOLIC BLOOD PRESSURE: 113 MMHG | DIASTOLIC BLOOD PRESSURE: 66 MMHG

## 2017-02-17 VITALS — SYSTOLIC BLOOD PRESSURE: 124 MMHG | DIASTOLIC BLOOD PRESSURE: 70 MMHG

## 2017-02-17 DIAGNOSIS — Z87.891: ICD-10-CM

## 2017-02-17 DIAGNOSIS — Z86.73: ICD-10-CM

## 2017-02-17 DIAGNOSIS — Z99.81: ICD-10-CM

## 2017-02-17 DIAGNOSIS — Z90.2: ICD-10-CM

## 2017-02-17 DIAGNOSIS — G47.33: ICD-10-CM

## 2017-02-17 DIAGNOSIS — A41.52: Primary | ICD-10-CM

## 2017-02-17 DIAGNOSIS — Z79.4: ICD-10-CM

## 2017-02-17 DIAGNOSIS — I48.91: ICD-10-CM

## 2017-02-17 DIAGNOSIS — Z95.1: ICD-10-CM

## 2017-02-17 DIAGNOSIS — J44.0: ICD-10-CM

## 2017-02-17 DIAGNOSIS — Z95.5: ICD-10-CM

## 2017-02-17 DIAGNOSIS — I25.10: ICD-10-CM

## 2017-02-17 DIAGNOSIS — J15.1: ICD-10-CM

## 2017-02-17 DIAGNOSIS — E11.9: ICD-10-CM

## 2017-02-17 LAB
ANION GAP SERPL CALC-SCNC: 9 MMOL/L (ref 5–14)
BASOPHILS # BLD AUTO: 0 10^3/UL (ref 0–0.1)
BASOPHILS NFR BLD AUTO: 0 % (ref 0–10)
BUN SERPL-MCNC: 14 MG/DL (ref 7–18)
BUN/CREAT SERPL: 18
CALCIUM SERPL-MCNC: 8.3 MG/DL (ref 8.5–10.1)
CHLORIDE SERPL-SCNC: 104 MMOL/L (ref 98–107)
CO2 SERPL-SCNC: 26 MMOL/L (ref 21–32)
CREAT SERPL-MCNC: 0.79 MG/DL (ref 0.6–1.3)
EOSINOPHIL # BLD AUTO: 0.3 10^3/UL (ref 0–0.3)
EOSINOPHIL NFR BLD AUTO: 6 % (ref 0–10)
ERYTHROCYTE [DISTWIDTH] IN BLOOD BY AUTOMATED COUNT: 14.9 % (ref 10–14.5)
GFR SERPLBLD BASED ON 1.73 SQ M-ARVRAT: > 60 ML/MIN
GLUCOSE SERPL-MCNC: 111 MG/DL (ref 70–105)
LYMPHOCYTES # BLD AUTO: 1.5 X 10^3 (ref 1–4)
LYMPHOCYTES NFR BLD AUTO: 32 % (ref 12–44)
MAGNESIUM SERPL-MCNC: 2 MG/DL (ref 1.8–2.4)
MCH RBC QN AUTO: 30 PG (ref 25–34)
MCHC RBC AUTO-ENTMCNC: 33 G/DL (ref 32–36)
MCV RBC AUTO: 91 FL (ref 80–99)
MONOCYTES # BLD AUTO: 0.6 X 10^3 (ref 0–1)
MONOCYTES NFR BLD AUTO: 12 % (ref 0–12)
NEUTROPHILS # BLD AUTO: 2.4 X 10^3 (ref 1.8–7.8)
NEUTROPHILS NFR BLD AUTO: 50 % (ref 42–75)
PHOSPHATE SERPL-MCNC: 3.4 MG/DL (ref 2.3–4.7)
PLATELET # BLD: 191 10^3/UL (ref 130–400)
PMV BLD AUTO: 9.5 FL (ref 7.4–10.4)
POTASSIUM SERPL-SCNC: 4.3 MMOL/L (ref 3.6–5)
RBC # BLD AUTO: 3.39 10^6/UL (ref 4.35–5.85)
SODIUM SERPL-SCNC: 139 MMOL/L (ref 135–145)
WBC # BLD AUTO: 4.7 10^3/UL (ref 4.3–11)

## 2017-02-17 PROCEDURE — 94760 N-INVAS EAR/PLS OXIMETRY 1: CPT

## 2017-02-17 PROCEDURE — 94640 AIRWAY INHALATION TREATMENT: CPT

## 2017-02-17 PROCEDURE — 82962 GLUCOSE BLOOD TEST: CPT

## 2017-02-17 PROCEDURE — 71020: CPT

## 2017-02-17 RX ADMIN — FLUTICASONE PROPIONATE AND SALMETEROL XINAFOATE SCH PUFF: 115; 21 AEROSOL, METERED RESPIRATORY (INHALATION) at 07:51

## 2017-02-17 RX ADMIN — PANTOPRAZOLE SODIUM SCH MG: 40 TABLET, DELAYED RELEASE ORAL at 06:39

## 2017-02-17 RX ADMIN — ENALAPRIL MALEATE SCH MG: 2.5 TABLET ORAL at 10:40

## 2017-02-17 RX ADMIN — INSULIN HUMAN SCH UNIT: 100 INJECTION, SOLUTION PARENTERAL at 20:52

## 2017-02-17 RX ADMIN — WARFARIN SODIUM SCH MG: 5 TABLET ORAL at 18:20

## 2017-02-17 RX ADMIN — IPRATROPIUM BROMIDE AND ALBUTEROL SULFATE SCH ML: .5; 3 SOLUTION RESPIRATORY (INHALATION) at 11:26

## 2017-02-17 RX ADMIN — INSULIN HUMAN SCH UNIT: 100 INJECTION, SOLUTION PARENTERAL at 15:00

## 2017-02-17 RX ADMIN — DILTIAZEM HYDROCHLORIDE SCH MG: 30 TABLET, FILM COATED ORAL at 10:40

## 2017-02-17 RX ADMIN — IPRATROPIUM BROMIDE AND ALBUTEROL SULFATE SCH ML: .5; 3 SOLUTION RESPIRATORY (INHALATION) at 22:15

## 2017-02-17 RX ADMIN — DILTIAZEM HYDROCHLORIDE SCH MG: 30 TABLET, FILM COATED ORAL at 20:52

## 2017-02-17 RX ADMIN — FERROUS SULFATE TAB 325 MG (65 MG ELEMENTAL FE) SCH MG: 325 (65 FE) TAB at 16:23

## 2017-02-17 RX ADMIN — SODIUM CHLORIDE SCH MLS/HR: 900 INJECTION INTRAVENOUS at 05:36

## 2017-02-17 RX ADMIN — IPRATROPIUM BROMIDE AND ALBUTEROL SULFATE SCH ML: .5; 3 SOLUTION RESPIRATORY (INHALATION) at 18:47

## 2017-02-17 RX ADMIN — DILTIAZEM HYDROCHLORIDE SCH MG: 30 TABLET, FILM COATED ORAL at 14:01

## 2017-02-17 RX ADMIN — FUROSEMIDE SCH MG: 40 TABLET ORAL at 10:40

## 2017-02-17 RX ADMIN — SODIUM CHLORIDE SCH MLS/HR: 900 INJECTION INTRAVENOUS at 18:20

## 2017-02-17 RX ADMIN — SODIUM CHLORIDE SCH MLS/HR: 900 INJECTION INTRAVENOUS at 11:47

## 2017-02-17 RX ADMIN — INSULIN HUMAN SCH UNIT: 100 INJECTION, SOLUTION PARENTERAL at 10:45

## 2017-02-17 RX ADMIN — IPRATROPIUM BROMIDE AND ALBUTEROL SULFATE SCH ML: .5; 3 SOLUTION RESPIRATORY (INHALATION) at 14:19

## 2017-02-17 RX ADMIN — IPRATROPIUM BROMIDE AND ALBUTEROL SULFATE SCH ML: .5; 3 SOLUTION RESPIRATORY (INHALATION) at 07:47

## 2017-02-17 RX ADMIN — ATORVASTATIN CALCIUM SCH MG: 10 TABLET, FILM COATED ORAL at 20:52

## 2017-02-17 RX ADMIN — INSULIN HUMAN SCH UNIT: 100 INJECTION, SOLUTION PARENTERAL at 05:38

## 2017-02-17 RX ADMIN — FLUTICASONE PROPIONATE AND SALMETEROL XINAFOATE SCH PUFF: 115; 21 AEROSOL, METERED RESPIRATORY (INHALATION) at 18:47

## 2017-02-17 NOTE — PULMONARY PROGRESS NOTE
Subjective


Subjective/Events-last exam


no complications noted. Pt is doing better





Exam


Exam





 Vital Signs








  Date Time  Temp Pulse Resp B/P Pulse Ox O2 Delivery O2 Flow Rate FiO2


 


17 00:28 98.1 100 20 112/69 97   


 


17 19:30       2.00 


 


17 18:39     95  2.00 


 


17 18:25     95  2.00 


 


17 16:00 98.3 99 18 112/73 96 Nasal Cannula 2.00 


 


17 14:32     95  2.00 


 


17 11:01     90   


 


17 08:00     90  2.00 


 


17 07:45 98.6 80 20 121/62 95 Nasal Cannula 2.00 


 


17 07:35     94  2.00 


 


17 07:27     93  2.00 














 I & O 


 


 17





 07:00


 


Intake Total 1920 ml


 


Balance 1920 ml








General Appearance:   No Apparent Distress WD/WN


HEENT:   Normal ENT Inspection


Neck:   Full Range of Motion Normal Inspection Non Tender


Respiratory:   Chest Non Tender Normal Breath Sounds No Accessory Muscle Use No 

Respiratory Distress Decreased Breath Sounds


Cardiovascular:   Regular Rate, Rhythm No Murmur


Capillary Refill:  Less Than 3 Seconds


Gastrointestinal:   non tender soft


Extremity:   Normal Inspection No Pedal Edema


Neurologic/Psychiatric:   Alert Oriented x3 No Motor/Sensory Deficits Normal 

Mood/Affect CNs II-XII Norm as Tested


Skin:   Normal Color Warm/Dry





Results


Lab


Laboratory Tests


17 05:59








17 05:38











Assessment/Plan


Assessment/Plan


PNeumonia with sepsis and hx of multi drug resistant pseudomonas -  HX of RLL 

lobectomy 


   -Merrem 


   -Frequent pneumonia with hospitalizations--- will screen for CVID as out 

patient once pt is over pneumonia 





COPD and hx of fibrosis


   -SVNs  QID 


   -Oxygen 


[place]


hx of Afib 


CAD


DM





Clinical Quality Measures


DVT/VTE Risk/Contraindication:


Risk Factor Score Per Nursin


RFS Level Per Nursing on Admit:  4+=Very High


Contraindications-Pharm:  Other *list below*








SMITH MUNOZ DO 2017 07:25

## 2017-02-17 NOTE — PROGRESS NOTE (SOAP)
Subjective


Subjective/Events-last exam


patient feeling better.


Meropenem is working.


Sensitivity not back.


Not sure which oral antibiotics to give





Objective


Exam





 Vital Signs








  Date Time  Temp Pulse Resp B/P Pulse Ox O2 Delivery O2 Flow Rate FiO2


 


17 07:49     96  2.00 


 


17 00:28 98.1 100 20 112/69 97   


 


17 19:30       2.00 


 


17 18:39     95  2.00 


 


17 18:25     95  2.00 


 


17 16:00 98.3 99 18 112/73 96 Nasal Cannula 2.00 


 


17 14:32     95  2.00 


 


17 11:01     90   














 I & O 


 


 17





 06:59


 


Intake Total 1920 ml


 


Balance 1920 ml





Capillary Refill : Less Than 3 Seconds


General Appearance:   No Apparent Distress WD/WN


HEENT:   Normal ENT Inspection


Neck:   Normal Inspection


Respiratory:   Chest Non Tender No Accessory Muscle Use No Respiratory Distress


Cardiovascular:   Regular Rate, Rhythm No Murmur


Gastrointestinal:   non tender soft





Results


Lab


Laboratory Tests


17 05:38








 Laboratory Tests


17 10:12: Glucometer 264H


17 11:38: Glucometer 329H


17 14:37: Glucometer 145H


17 20:29: Glucometer 243H


17 05:37: Glucometer 113H


17 05:38: 


Anion Gap 9, BUN/Creatinine Ratio 18, Basophils # (Auto) 0.0, Basophils (%) (

Auto) 0, Blood Urea Nitrogen 14, Calcium Level 8.3L, Carbon Dioxide Level 26, 

Chloride Level 104, Creatinine 0.79, Eosinophils # (Auto) 0.3, Eosinophils (%) (

Auto) 6, Estimat Glomerular Filtration Rate > 60, Glucose Level 111H, 

Hematocrit 31L, Hemoglobin 10.1L, Lymphocytes # (Auto) 1.5, Lymphocytes (%) (

Auto) 32, Magnesium Level 2.0, Mean Corpuscular Hemoglobin 30, Mean Corpuscular 

Hemoglobin Concent 33, Mean Corpuscular Volume 91, Mean Platelet Volume 9.5, 

Monocytes # (Auto) 0.6, Monocytes (%) (Auto) 12, Neutrophils # (Auto) 2.4, 

Neutrophils (%) (Auto) 50, Phosphorus Level 3.4, Platelet Count 191, Potassium 

Level 4.3, Red Blood Count 3.39L, Red Cell Distribution Width 14.9H, Sodium 

Level 139, White Blood Count 4.7





 Microbiology


17 Blood Culture - Preliminary, Resulted


          No growth


17 MRSA Screen - Final, Complete


          MRSA not isolated





Assessment/Plan


Assessment/Plan


Assess & Plan/Chief Complaint


pneumonia.


COPD.


Diabetes.


History of atrial fibrillation.


Coronary artery disease..


.


2/15/17 area


Sepsis.


Pneumonia.


Pseudomonas.


COPD.


[place].


CAD.


Diabetes mellitus.


Atrial fibrillation history.


.


17.


Pneumonia.


COPD.


Diabetes.


Patient in sinus rhythm but history of atrial fibrillation.


Coronary artery disease.


Patient feels he is improving.


.


17.


Pneumonia.


COPD.


Diabetes.


History of atrial fibrillation.


Coronary artery disease.


Patient feeling better and doing better.


Patient doing good on meropenem.


Try to figure out what oral antibiotics


Diagnosis/Problems:  





Clinical Quality Measures


DVT/VTE Risk/Contraindication:


Risk Factor Score Per Nursin


RFS Level Per Nursing on Admit:  4+=Very High


Contraindications-Pharm:  Other *list below*








TOR CONNELLY DO 2017 08:16

## 2017-02-18 VITALS — DIASTOLIC BLOOD PRESSURE: 58 MMHG | SYSTOLIC BLOOD PRESSURE: 127 MMHG

## 2017-02-18 VITALS — SYSTOLIC BLOOD PRESSURE: 109 MMHG | DIASTOLIC BLOOD PRESSURE: 54 MMHG

## 2017-02-18 RX ADMIN — SODIUM CHLORIDE SCH MLS/HR: 900 INJECTION INTRAVENOUS at 06:22

## 2017-02-18 RX ADMIN — SODIUM CHLORIDE SCH MLS/HR: 900 INJECTION INTRAVENOUS at 00:30

## 2017-02-18 RX ADMIN — IPRATROPIUM BROMIDE AND ALBUTEROL SULFATE SCH ML: .5; 3 SOLUTION RESPIRATORY (INHALATION) at 09:58

## 2017-02-18 RX ADMIN — IPRATROPIUM BROMIDE AND ALBUTEROL SULFATE SCH ML: .5; 3 SOLUTION RESPIRATORY (INHALATION) at 13:50

## 2017-02-18 RX ADMIN — PANTOPRAZOLE SODIUM SCH MG: 40 TABLET, DELAYED RELEASE ORAL at 06:22

## 2017-02-18 RX ADMIN — DILTIAZEM HYDROCHLORIDE SCH MG: 30 TABLET, FILM COATED ORAL at 13:50

## 2017-02-18 RX ADMIN — DILTIAZEM HYDROCHLORIDE SCH MG: 30 TABLET, FILM COATED ORAL at 08:42

## 2017-02-18 RX ADMIN — IPRATROPIUM BROMIDE AND ALBUTEROL SULFATE SCH ML: .5; 3 SOLUTION RESPIRATORY (INHALATION) at 06:44

## 2017-02-18 RX ADMIN — DILTIAZEM HYDROCHLORIDE SCH MG: 30 TABLET, FILM COATED ORAL at 20:05

## 2017-02-18 RX ADMIN — FLUTICASONE PROPIONATE AND SALMETEROL XINAFOATE SCH PUFF: 115; 21 AEROSOL, METERED RESPIRATORY (INHALATION) at 06:44

## 2017-02-18 RX ADMIN — SODIUM CHLORIDE SCH MLS/HR: 900 INJECTION INTRAVENOUS at 12:56

## 2017-02-18 RX ADMIN — IPRATROPIUM BROMIDE AND ALBUTEROL SULFATE SCH ML: .5; 3 SOLUTION RESPIRATORY (INHALATION) at 18:52

## 2017-02-18 RX ADMIN — FUROSEMIDE SCH MG: 40 TABLET ORAL at 08:42

## 2017-02-18 RX ADMIN — INSULIN HUMAN SCH UNIT: 100 INJECTION, SOLUTION PARENTERAL at 11:32

## 2017-02-18 RX ADMIN — IPRATROPIUM BROMIDE AND ALBUTEROL SULFATE SCH ML: .5; 3 SOLUTION RESPIRATORY (INHALATION) at 22:26

## 2017-02-18 RX ADMIN — ATORVASTATIN CALCIUM SCH MG: 10 TABLET, FILM COATED ORAL at 20:05

## 2017-02-18 RX ADMIN — INSULIN HUMAN SCH UNIT: 100 INJECTION, SOLUTION PARENTERAL at 17:17

## 2017-02-18 RX ADMIN — IPRATROPIUM BROMIDE AND ALBUTEROL SULFATE SCH ML: .5; 3 SOLUTION RESPIRATORY (INHALATION) at 02:00

## 2017-02-18 RX ADMIN — FLUTICASONE PROPIONATE AND SALMETEROL XINAFOATE SCH PUFF: 115; 21 AEROSOL, METERED RESPIRATORY (INHALATION) at 18:52

## 2017-02-18 RX ADMIN — INSULIN HUMAN SCH UNIT: 100 INJECTION, SOLUTION PARENTERAL at 20:05

## 2017-02-18 RX ADMIN — ENALAPRIL MALEATE SCH MG: 2.5 TABLET ORAL at 08:42

## 2017-02-18 RX ADMIN — WARFARIN SODIUM SCH MG: 5 TABLET ORAL at 17:16

## 2017-02-18 RX ADMIN — FERROUS SULFATE TAB 325 MG (65 MG ELEMENTAL FE) SCH MG: 325 (65 FE) TAB at 17:16

## 2017-02-18 RX ADMIN — INSULIN HUMAN SCH UNIT: 100 INJECTION, SOLUTION PARENTERAL at 06:00

## 2017-02-18 RX ADMIN — SODIUM CHLORIDE SCH MLS/HR: 900 INJECTION INTRAVENOUS at 18:40

## 2017-02-19 VITALS — DIASTOLIC BLOOD PRESSURE: 64 MMHG | SYSTOLIC BLOOD PRESSURE: 107 MMHG

## 2017-02-19 VITALS — DIASTOLIC BLOOD PRESSURE: 57 MMHG | SYSTOLIC BLOOD PRESSURE: 102 MMHG

## 2017-02-19 RX ADMIN — PANTOPRAZOLE SODIUM SCH MG: 40 TABLET, DELAYED RELEASE ORAL at 06:06

## 2017-02-19 RX ADMIN — SODIUM CHLORIDE SCH MLS/HR: 900 INJECTION INTRAVENOUS at 05:03

## 2017-02-19 RX ADMIN — IPRATROPIUM BROMIDE AND ALBUTEROL SULFATE SCH ML: .5; 3 SOLUTION RESPIRATORY (INHALATION) at 18:36

## 2017-02-19 RX ADMIN — DOCUSATE SODIUM AND SENNOSIDES SCH EA: 8.6; 5 TABLET, FILM COATED ORAL at 21:17

## 2017-02-19 RX ADMIN — IPRATROPIUM BROMIDE AND ALBUTEROL SULFATE SCH ML: .5; 3 SOLUTION RESPIRATORY (INHALATION) at 22:00

## 2017-02-19 RX ADMIN — FLUTICASONE PROPIONATE AND SALMETEROL XINAFOATE SCH PUFF: 115; 21 AEROSOL, METERED RESPIRATORY (INHALATION) at 06:45

## 2017-02-19 RX ADMIN — INSULIN HUMAN SCH UNIT: 100 INJECTION, SOLUTION PARENTERAL at 21:14

## 2017-02-19 RX ADMIN — FUROSEMIDE SCH MG: 40 TABLET ORAL at 09:54

## 2017-02-19 RX ADMIN — DILTIAZEM HYDROCHLORIDE SCH MG: 30 TABLET, FILM COATED ORAL at 09:54

## 2017-02-19 RX ADMIN — INSULIN HUMAN SCH UNIT: 100 INJECTION, SOLUTION PARENTERAL at 15:13

## 2017-02-19 RX ADMIN — INSULIN HUMAN SCH UNIT: 100 INJECTION, SOLUTION PARENTERAL at 12:42

## 2017-02-19 RX ADMIN — INSULIN HUMAN SCH UNIT: 100 INJECTION, SOLUTION PARENTERAL at 05:03

## 2017-02-19 RX ADMIN — IPRATROPIUM BROMIDE AND ALBUTEROL SULFATE SCH ML: .5; 3 SOLUTION RESPIRATORY (INHALATION) at 14:00

## 2017-02-19 RX ADMIN — IPRATROPIUM BROMIDE AND ALBUTEROL SULFATE SCH ML: .5; 3 SOLUTION RESPIRATORY (INHALATION) at 02:00

## 2017-02-19 RX ADMIN — DILTIAZEM HYDROCHLORIDE SCH MG: 30 TABLET, FILM COATED ORAL at 21:17

## 2017-02-19 RX ADMIN — SODIUM CHLORIDE SCH MLS/HR: 900 INJECTION INTRAVENOUS at 18:32

## 2017-02-19 RX ADMIN — SODIUM CHLORIDE SCH MLS/HR: 900 INJECTION INTRAVENOUS at 12:40

## 2017-02-19 RX ADMIN — IPRATROPIUM BROMIDE AND ALBUTEROL SULFATE SCH ML: .5; 3 SOLUTION RESPIRATORY (INHALATION) at 06:45

## 2017-02-19 RX ADMIN — SODIUM CHLORIDE SCH MLS/HR: 900 INJECTION INTRAVENOUS at 23:58

## 2017-02-19 RX ADMIN — SODIUM CHLORIDE SCH MLS/HR: 900 INJECTION INTRAVENOUS at 00:20

## 2017-02-19 RX ADMIN — IPRATROPIUM BROMIDE AND ALBUTEROL SULFATE SCH ML: .5; 3 SOLUTION RESPIRATORY (INHALATION) at 10:00

## 2017-02-19 RX ADMIN — ENALAPRIL MALEATE SCH MG: 2.5 TABLET ORAL at 09:54

## 2017-02-19 RX ADMIN — ATORVASTATIN CALCIUM SCH MG: 10 TABLET, FILM COATED ORAL at 21:17

## 2017-02-19 RX ADMIN — FLUTICASONE PROPIONATE AND SALMETEROL XINAFOATE SCH PUFF: 115; 21 AEROSOL, METERED RESPIRATORY (INHALATION) at 18:36

## 2017-02-19 RX ADMIN — FERROUS SULFATE TAB 325 MG (65 MG ELEMENTAL FE) SCH MG: 325 (65 FE) TAB at 17:16

## 2017-02-19 RX ADMIN — DILTIAZEM HYDROCHLORIDE SCH MG: 30 TABLET, FILM COATED ORAL at 14:11

## 2017-02-19 RX ADMIN — WARFARIN SODIUM SCH MG: 5 TABLET ORAL at 17:16

## 2017-02-20 VITALS — DIASTOLIC BLOOD PRESSURE: 67 MMHG | SYSTOLIC BLOOD PRESSURE: 116 MMHG

## 2017-02-20 RX ADMIN — INSULIN HUMAN SCH UNIT: 100 INJECTION, SOLUTION PARENTERAL at 09:30

## 2017-02-20 RX ADMIN — DILTIAZEM HYDROCHLORIDE SCH MG: 30 TABLET, FILM COATED ORAL at 08:29

## 2017-02-20 RX ADMIN — FUROSEMIDE SCH MG: 40 TABLET ORAL at 08:29

## 2017-02-20 RX ADMIN — INSULIN HUMAN SCH UNIT: 100 INJECTION, SOLUTION PARENTERAL at 05:39

## 2017-02-20 RX ADMIN — IPRATROPIUM BROMIDE AND ALBUTEROL SULFATE SCH ML: .5; 3 SOLUTION RESPIRATORY (INHALATION) at 02:00

## 2017-02-20 RX ADMIN — PANTOPRAZOLE SODIUM SCH MG: 40 TABLET, DELAYED RELEASE ORAL at 05:39

## 2017-02-20 RX ADMIN — SODIUM CHLORIDE SCH MLS/HR: 900 INJECTION INTRAVENOUS at 11:23

## 2017-02-20 RX ADMIN — IPRATROPIUM BROMIDE AND ALBUTEROL SULFATE SCH ML: .5; 3 SOLUTION RESPIRATORY (INHALATION) at 10:44

## 2017-02-20 RX ADMIN — FLUTICASONE PROPIONATE AND SALMETEROL XINAFOATE SCH PUFF: 115; 21 AEROSOL, METERED RESPIRATORY (INHALATION) at 06:53

## 2017-02-20 RX ADMIN — DOCUSATE SODIUM AND SENNOSIDES SCH EA: 8.6; 5 TABLET, FILM COATED ORAL at 08:29

## 2017-02-20 RX ADMIN — ENALAPRIL MALEATE SCH MG: 2.5 TABLET ORAL at 08:29

## 2017-02-20 RX ADMIN — IPRATROPIUM BROMIDE AND ALBUTEROL SULFATE SCH ML: .5; 3 SOLUTION RESPIRATORY (INHALATION) at 06:53

## 2017-02-20 RX ADMIN — SODIUM CHLORIDE SCH MLS/HR: 900 INJECTION INTRAVENOUS at 05:39

## 2017-02-20 NOTE — PROGRESS NOTE (SOAP)
Subjective


Subjective/Events-last exam


COPD with acute exacerbation.


Pneumonia.


Patient feeling better today.


Patient breathing good for him.


Waiting for C&S report





Objective


Exam





 Vital Signs








  Date Time  Temp Pulse Resp B/P Pulse Ox O2 Delivery O2 Flow Rate FiO2


 


2/20/17 06:53     90   


 


2/19/17 21:00      Nasal Cannula 2.00 


 


2/19/17 19:46 97.8 100 22 107/64 92 Nasal Cannula 2.00 


 


2/19/17 18:36     91   


 


2/19/17 09:00     95 Nasal Cannula 2.00 


 


2/19/17 08:00 97.6 106 32 102/57 94 Nasal Cannula 2.00 














 I & O 


 


 2/20/17





 07:00


 


Intake Total 2162 ml


 


Output Total 300 ml


 


Balance 1862 ml





Capillary Refill :


General Appearance:   No Apparent Distress WD/WN


HEENT:   Normal ENT Inspection


Neck:   Full Range of Motion Normal Inspection


Respiratory:   Chest Non Tender No Accessory Muscle Use No Respiratory Distress 

Decreased Breath Sounds


Cardiovascular:   Regular Rate, Rhythm


Gastrointestinal:   non tender soft





Results


Lab


 Laboratory Tests


2/19/17 09:37: Glucometer 205H


2/19/17 14:34: Glucometer 352H


2/19/17 21:12: Glucometer 182H


2/20/17 05:32: Glucometer 136H





Assessment/Plan


Assessment/Plan


Assess & Plan/Chief Complaint


COPD with acute exacerbation Pseudomonas organism.


Pneumonia..


Diabetes.


Coronary artery disease.





History of atrial fibrillation


Diagnosis/Problems:  





Clinical Quality Measures


DVT/VTE Risk/Contraindication:


Contraindications-Pharm:  Other *list below*








TOR CONNELLY DO Feb 20, 2017 07:52

## 2017-02-20 NOTE — DIAGNOSTIC IMAGING REPORT
INDICATION: Pneumonia.



PA and lateral chest.



There is improved aeration at the right apex with some faint

residual infiltrate. There is some interstitial infiltrate at

both lung bases. There are surgical staples at the left apex.



IMPRESSION: Improving right upper lobe consolidation. There

continues to be basilar interstitial fibrosis/atelectasis.



Dictated by:



Dictated on workstation # [id number]

## 2017-02-20 NOTE — PULMONARY PROGRESS NOTE
Subjective


Subjective/Events-last exam


PT feels improved. No complications noted.





Exam


Exam





 Vital Signs








  Date Time  Temp Pulse Resp B/P Pulse Ox O2 Delivery O2 Flow Rate FiO2


 


2/20/17 06:53     90   


 


2/19/17 21:00      Nasal Cannula 2.00 


 


2/19/17 19:46 97.8 100 22 107/64 92 Nasal Cannula 2.00 


 


2/19/17 18:36     91   


 


2/19/17 09:00     95 Nasal Cannula 2.00 


 


2/19/17 08:00 97.6 106 32 102/57 94 Nasal Cannula 2.00 














 I & O 


 


 2/20/17





 07:00


 


Intake Total 2162 ml


 


Output Total 300 ml


 


Balance 1862 ml








General Appearance:   No Apparent Distress WD/WN


HEENT:   Normal ENT Inspection


Neck:   Full Range of Motion Normal Inspection


Respiratory:   Chest Non Tender No Accessory Muscle Use No Respiratory Distress 

Decreased Breath Sounds


Cardiovascular:   Regular Rate, Rhythm


Gastrointestinal:   non tender soft





Assessment/Plan


Assessment/Plan


PNeumonia with sepsis and hx of multi drug resistant pseudomonas -  HX of RLL 

lobectomy 


   -Merrem continue for 14 days 


   -Frequent pneumonia with hospitalizations--- will screen for CVID as out 

patient once pt is over pneumonia 





COPD and hx of fibrosis


   -SVNs  QID 


   -Oxygen 


[place]


hx of Afib 


CAD


DM





Clinical Quality Measures


DVT/VTE Risk/Contraindication:


Contraindications-Pharm:  Other *list below*








SMITH MUNOZ DO Feb 20, 2017 07:56

## 2017-02-25 NOTE — DISCHARGE SUMMARY
Diagnosis/Chief Complaint


Date of Admission


Feb 17, 2017 at 09:00


Date of Discharge


Feb 20, 2017 at 12:30


Discharge Date:  Feb 20, 2017


Discharge Diagnosis


sepsis due to Pseudomonas.


Chronic obstructive pulmonary disease.


Pneumonia due to his Pseudomonas.


Obstructive sleep apnea.


Coronary artery disease.


Unspecified atrial fibrillation.


Type II diabetes.


acquired absence of lung part





Discharge Summary


Discharge Physical Examination


Allergies:  


Coded Allergies:  


     morphine (Verified  Allergy, Mild, 12/16/16)


     Sulfa (Sulfonamide Antibiotics) (Verified  Allergy, Unknown, 12/16/16)


     penicillin G (Verified  Allergy, Unknown, 12/16/16)


     levofloxacin (Verified  Adverse Reaction, Unknown, 12/16/16)


Vitals & I&Os





Vital Signs








  Date Time  Temp Pulse Resp B/P Pulse Ox O2 Delivery O2 Flow Rate FiO2


 


2/20/17 12:30        


 


2/20/17 12:09     94   


 


2/20/17 10:44       2.00 


 


2/20/17 09:00      Nasal Cannula  


 


2/20/17 08:00 96.5 105 20     











Hospital Course


patient discharged feeling good.


Patient sent home on oral antibiotics


Labs (last 24 hrs)


 Laboratory Tests


2/17/17 14:50: Glucometer 188H


2/17/17 20:27: Glucometer 279H


2/18/17 05:26: Glucometer 121H


2/18/17 10:14: Glucometer 259H


2/18/17 16:01: Glucometer 228H


2/18/17 19:48: Glucometer 243H


2/19/17 04:24: Glucometer 132H


2/19/17 09:37: Glucometer 205H


2/19/17 14:34: Glucometer 352H


2/19/17 21:12: Glucometer 182H


2/20/17 05:32: Glucometer 136H


2/20/17 10:08: Glucometer 229H





Pending Labs


Laboratory Tests


2/17/17 14:50: Glucometer 188


2/17/17 20:27: Glucometer 279


2/18/17 05:26: Glucometer 121


2/18/17 10:14: Glucometer 259


2/18/17 16:01: Glucometer 228


2/18/17 19:48: Glucometer 243


2/19/17 04:24: Glucometer 132


2/19/17 09:37: Glucometer 205


2/19/17 14:34: Glucometer 352


2/19/17 21:12: Glucometer 182


2/20/17 05:32: Glucometer 136


2/20/17 10:08: Glucometer 229








Discussion & Recommendations


patient sent home on Levaquin.


To do pro time daily since sent home on a quinolone





Discharge


Home Medications:





 Active Scripts


 Active


Levaquin (Levofloxacin) 500 Mg Tablet 500 Mg PO DAILY 7 Days


 Reported


Diltiazem HCl 30 Mg Tablet 30 Mg PO TID


Breo Ellipta 100-25 Mcg INH (Fluticasone/Vilanterol) 1 Each Blst.w.dev 1 Puff 

IH DAILY


Warfarin Sodium 5 Mg Tablet 7.5 Mg PO TH


     TAKES 1 & 1/2 (5MG) TABLETS


Coumadin (Warfarin Sodium) 5 Mg Tablet 5 Mg PO SUMOTUWEFRSA


Furosemide 40 Mg Tablet 40 Mg PO DAILY


Ferrous Sulfate 325 Mg Tablet 325 Mg PO 1730


Protonix (Pantoprazole Sodium) 40 Mg Tablet.dr 40 Mg PO DAILY


     LAST FILLED #30 12-26-16


Albuterol Sulfate 2.5 Mg/3 Ml Vial.neb 2.5 Mg IH Q6H PRN


Klor-Con M10 (Potassium Chloride) 10 Meq Tab.er.prt 10 Meq PO Q48H


Enalapril Maleate 2.5 Mg Tablet 2.5 Mg PO DAILY


Simvastatin 20 Mg Tablet 20 Mg PO HS


Novolin N (Insulin Human NPH) 10 Unit/0.1 Ml Susp 15 Unit SQ AM


     MIXES WITH 4 UNITS OF R INSULIN EVERY MORNING FOR A TOTAL DOSE OF 19


     UNITS


Novolin N (Insulin Human NPH) 10 Unit/0.1 Ml Susp 10 Unit SQ HS


     MIXES WITH 4 UNITS OF R INSULIN EVERY EVENING FOR A TOTAL DOSE OF 14


     UNITS


Novolin R (Insulin Regular, Human) 100 Unit/1 Ml Vial 4 Unit SQ BID


     MIXES WITH N INSULIN EVERY MORNING AND EVENING





Instructions to patient/family


Please see electonic discharge instructions given to patient.





Clinical Quality Measures


DVT/VTE Risk/Contraindication:


Contraindications-Pharm:  Other *list below*








TOR CONNELLY DO Feb 25, 2017 07:24

## 2017-02-25 NOTE — DISCHARGE SUMMARY
Diagnosis/Chief Complaint


Date of Admission


2017 at 17:32


Date of Discharge


2017 at 08:45


Discharge Date:  2017


Admission Diagnosis


Admission Diagnosis


sepsis.


Pneumonia.


COPD.


Coronary artery disease.


History of atrial fibrillation.


Diabetes.





Discharge Diagnosis


sepsis.


Sepsis due to Pseudomonas.


Chronic obstructive pulmonary disease.


Pneumonia due to Pseudomonas.


Obstructive sleep apnea.


Coronary artery disease.


History of atrial fibrillation.


Type II diabetes.


Anemia.





Reason Hospital Visit


cough, congestion, running an elevated temperature and short of the air.


Patient states his wife had bronchitis and felt he has it.


Chest x-ray at hospital from the emergency room shows pneumonia.


Patient has a history of resistant pseudomonas.


Patient admitted


Surgeries mostly lung surgeries.


Patient has history of COPD.


Patient has history of atrial fibrillation.


Patient has history of smoking.


Patient known diabetic





Discharge Summary


Consultations


pulmonology


Discharge Physical Examination


Allergies:  


Coded Allergies:  


     morphine (Verified  Allergy, Mild, 16)


     Sulfa (Sulfonamide Antibiotics) (Verified  Allergy, Unknown, 16)


     penicillin G (Verified  Allergy, Unknown, 16)


     levofloxacin (Verified  Adverse Reaction, Unknown, 16)





Hospital Course


patient doing good.


Patient transferred to [facility] bed to receive IV antibiotics


Labs (last 24 hrs)


 Laboratory Tests


17 13:30: 


Activated Partial Thromboplast Time 81H, Alanine Aminotransferase (ALT/SGPT) 12

, Albumin 3.6, Alkaline Phosphatase 63, Anion Gap 8, Aspartate Amino Transf (AST

/SGOT) 20, BUN/Creatinine Ratio 15, Basophils # (Auto) 0.0, Basophils (%) (Auto

) 0, Blood Urea Nitrogen 16, C-Reactive Protein High Sensitivity 9.59H, Calcium 

Level 8.2L, Carbon Dioxide Level 26, Chloride Level 101, Creatinine 1.09, 

Eosinophils # (Auto) 0.1, Eosinophils (%) (Auto) 1, Estimat Glomerular 

Filtration Rate > 60, Glucose Level 189H, Hematocrit 33L, Hemoglobin 10.8L, INR 

Comment 2.7H, Lactic Acid Level 1.6, Lymphocytes # (Auto) 1.2, Lymphocytes (%) (

Auto) 19, Mean Corpuscular Hemoglobin 30, Mean Corpuscular Hemoglobin Concent 33

, Mean Corpuscular Volume 92, Mean Platelet Volume 9.0, Monocytes # (Auto) 0.8, 

Monocytes (%) (Auto) 13H, Neutrophils # (Auto) 4.2, Neutrophils (%) (Auto) 67, 

Platelet Count 221, Potassium Level 3.7, Prothrombin Time 28.8H, Red Blood 

Count 3.60L, Red Cell Distribution Width 15.4H, Sodium Level 135, Total 

Bilirubin 0.4, Total Protein 6.8, White Blood Count 6.3


17 14:35: 


Urine Bacteria NEGATIVE, Urine Bilirubin NEGATIVE, Urine Casts NONE, Urine 

Clarity CLEAR, Urine Color YELLOW, Urine Crystals NONE, Urine Culture Indicated 

NO, Urine Glucose (UA) NEGATIVE, Urine Ketones NEGATIVE, Urine Leukocyte 

Esterase NEGATIVE, Urine Mucus NEGATIVE, Urine Nitrite NEGATIVE, Urine Protein 

NEGATIVE, Urine RBC RARE, Urine RBC (Auto) 4+H, Urine Specific Gravity 1.015L, 

Urine Squamous Epithelial Cells RARE, Urine Urobilinogen NORMAL, Urine WBC NONE

, Urine pH 6


17 18:17: Glucometer 138H


17 03:53: 


Anion Gap 8, BUN/Creatinine Ratio 13, Basophils # (Auto) 0.0, Basophils (%) (

Auto) 0, Blood Urea Nitrogen 11, Calcium Level 7.7L, Carbon Dioxide Level 23, 

Chloride Level 106, Creatinine 0.86, Eosinophils # (Auto) 0.1, Eosinophils (%) (

Auto) 2, Estimat Glomerular Filtration Rate > 60, Glucose Level 169H, 

Hematocrit 31L, Hemoglobin 9.9L, INR Comment 3.1H, Lymphocytes # (Auto) 0.7L, 

Lymphocytes (%) (Auto) 15, Mean Corpuscular Hemoglobin 30, Mean Corpuscular 

Hemoglobin Concent 32, Mean Corpuscular Volume 92, Mean Platelet Volume 8.8, 

Monocytes # (Auto) 0.6, Monocytes (%) (Auto) 12, Neutrophils # (Auto) 3.4, 

Neutrophils (%) (Auto) 71, Platelet Count 183, Potassium Level 4.3, Prothrombin 

Time 31.6H, Red Blood Count 3.31L, Red Cell Distribution Width 15.2H, Sodium 

Level 137, White Blood Count 4.7, Magnesium Level 1.6L, Phosphorus Level 2.4


17 10:03: Glucometer 232H


17 14:17: Glucometer 135H


17 14:55: 


Basophils # (Auto) 0.0, Basophils (%) (Auto) 1, Eosinophils # (Auto) 0.2, 

Eosinophils (%) (Auto) 6, Hematocrit 32L, Hemoglobin 10.5L, INR Comment 2.5H, 

Lymphocytes # (Auto) 0.7L, Lymphocytes (%) (Auto) 19, Mean Corpuscular 

Hemoglobin 30, Mean Corpuscular Hemoglobin Concent 32, Mean Corpuscular Volume 

93, Mean Platelet Volume 8.6, Monocytes # (Auto) 0.5, Monocytes (%) (Auto) 14H, 

Neutrophils # (Auto) 2.3, Neutrophils (%) (Auto) 60, Platelet Count 159, 

Prothrombin Time 26.9H, Red Blood Count 3.48L, Red Cell Distribution Width 15.4H

, White Blood Count 3.8L


17 17:45: Vancomycin Level Trough 14.0


17 20:30: Glucometer 266H


2/15/17 03:42: 


Alanine Aminotransferase (ALT/SGPT) 14, Albumin 3.0L, Alkaline Phosphatase 57, 

Anion Gap 7, Aspartate Amino Transf (AST/SGOT) 18, B-Type Natriuretic Peptide 

210.9H, BUN/Creatinine Ratio 13, Basophils # (Auto) 0.0, Basophils (%) (Auto) 0

, Blood Urea Nitrogen 10, Calcium Level 8.3L, Carbon Dioxide Level 25, Chloride 

Level 106, Creatinine 0.79, Eosinophils # (Auto) 0.4H, Eosinophils (%) (Auto) 7

, Estimat Glomerular Filtration Rate > 60, Glucose Level 140H, Hematocrit 34L, 

Hemoglobin 11.0L, Lymphocytes # (Auto) 0.9L, Lymphocytes (%) (Auto) 15, 

Magnesium Level 2.0, Mean Corpuscular Hemoglobin 30, Mean Corpuscular 

Hemoglobin Concent 32, Mean Corpuscular Volume 93, Mean Platelet Volume 9.6, 

Monocytes # (Auto) 0.7, Monocytes (%) (Auto) 12, Neutrophils # (Auto) 3.9, 

Neutrophils (%) (Auto) 66, Phosphorus Level 2.6, Platelet Count 183, Potassium 

Level 4.4, Red Blood Count 3.65L, Red Cell Distribution Width 15.2H, Sodium 

Level 138, Total Bilirubin 0.3, Total Protein 5.9L, White Blood Count 5.8


2/15/17 11:08: Glucometer 312H


2/15/17 15:25: Glucometer 189H


2/15/17 18:05: Vancomycin Level Trough 16.7


2/15/17 20:54: Glucometer 238H


17 05:59: 


Glucometer 141H, Anion Gap 7, BUN/Creatinine Ratio 13, Basophils # (Auto) 0.0, 

Basophils (%) (Auto) 0, Blood Urea Nitrogen 10, Calcium Level 8.1L, Carbon 

Dioxide Level 26, Chloride Level 104, Creatinine 0.76, Eosinophils # (Auto) 0.3

, Eosinophils (%) (Auto) 5, Estimat Glomerular Filtration Rate > 60, Glucose 

Level 124H, Hematocrit 31L, Hemoglobin 10.0L, Lymphocytes # (Auto) 1.6, 

Lymphocytes (%) (Auto) 24, Magnesium Level 1.7L, Mean Corpuscular Hemoglobin 30

, Mean Corpuscular Hemoglobin Concent 32, Mean Corpuscular Volume 92, Mean 

Platelet Volume 9.4, Monocytes # (Auto) 0.7, Monocytes (%) (Auto) 11, 

Neutrophils # (Auto) 3.9, Neutrophils (%) (Auto) 60, Phosphorus Level 2.9, 

Platelet Count 169, Potassium Level 4.2, Red Blood Count 3.35L, Red Cell 

Distribution Width 15.0H, Sodium Level 137, White Blood Count 6.4


17 10:12: Glucometer 264H


17 11:38: Glucometer 329H


17 14:37: Glucometer 145H


17 20:29: Glucometer 243H


17 05:37: Glucometer 113H


17 05:38: 


Anion Gap 9, BUN/Creatinine Ratio 18, Basophils # (Auto) 0.0, Basophils (%) (

Auto) 0, Blood Urea Nitrogen 14, Calcium Level 8.3L, Carbon Dioxide Level 26, 

Chloride Level 104, Creatinine 0.79, Eosinophils # (Auto) 0.3, Eosinophils (%) (

Auto) 6, Estimat Glomerular Filtration Rate > 60, Glucose Level 111H, 

Hematocrit 31L, Hemoglobin 10.1L, Lymphocytes # (Auto) 1.5, Lymphocytes (%) (

Auto) 32, Magnesium Level 2.0, Mean Corpuscular Hemoglobin 30, Mean Corpuscular 

Hemoglobin Concent 33, Mean Corpuscular Volume 91, Mean Platelet Volume 9.5, 

Monocytes # (Auto) 0.6, Monocytes (%) (Auto) 12, Neutrophils # (Auto) 2.4, 

Neutrophils (%) (Auto) 50, Phosphorus Level 3.4, Platelet Count 191, Potassium 

Level 4.3, Red Blood Count 3.39L, Red Cell Distribution Width 14.9H, Sodium 

Level 139, White Blood Count 4.7


17 10:21: Glucometer 261H





 Microbiology


17 Blood Culture - Final, Complete


          No growth


17 MRSA Screen - Final, Complete


          MRSA not isolated





Laboratory Tests


17 13:30








17 03:53








17 14:55








2/15/17 03:42








17 05:59








17 05:38








Pending Labs





 Microbiology








 Date/Time


Source Procedure


Growth Status


 


 


 17 13:50


Peripheral Lt Ac Blood Culture - Final


No growth Complete


 


 17 13:30


Peripheral Rt Forearm Blood Culture - Final


No growth Complete


 


 17 18:40


Nasal MRSA Screen - Final


MRSA not isolated Complete





 17 16:25


Sputum Expectorated Gram Stain - Final Complete


 


 17 16:25 Sputum Culture - Final


Pseudomonas Aeruginosa Complete


 


 17 13:32


Nasopharynx Influenza Types A,B Antigen ([place]) - Final Complete





Laboratory Tests


17 13:30: 


Activated Partial Thromboplast Time 81, Alanine Aminotransferase (ALT/SGPT) 12, 

Albumin 3.6, Alkaline Phosphatase 63, Anion Gap 8, Aspartate Amino Transf (AST/

SGOT) 20, BUN/Creatinine Ratio 15, Basophils # (Auto) 0.0, Basophils (%) (Auto) 

0, Blood Urea Nitrogen 16, C-Reactive Protein High Sensitivity 9.59, Calcium 

Level 8.2, Carbon Dioxide Level 26, Chloride Level 101, Creatinine 1.09, 

Eosinophils # (Auto) 0.1, Eosinophils (%) (Auto) 1, Estimat Glomerular 

Filtration Rate > 60, Glucose Level 189, Hematocrit 33, Hemoglobin 10.8, INR 

Comment 2.7, Lactic Acid Level 1.6, Lymphocytes # (Auto) 1.2, Lymphocytes (%) (

Auto) 19, Mean Corpuscular Hemoglobin 30, Mean Corpuscular Hemoglobin Concent 33

, Mean Corpuscular Volume 92, Mean Platelet Volume 9.0, Monocytes # (Auto) 0.8, 

Monocytes (%) (Auto) 13, Neutrophils # (Auto) 4.2, Neutrophils (%) (Auto) 67, 

Platelet Count 221, Potassium Level 3.7, Prothrombin Time 28.8, Red Blood Count 

3.60, Red Cell Distribution Width 15.4, Sodium Level 135, Total Bilirubin 0.4, 

Total Protein 6.8, White Blood Count 6.3


17 14:35: 


Urine Bacteria NEGATIVE, Urine Bilirubin NEGATIVE, Urine Casts NONE, Urine 

Clarity CLEAR, Urine Color YELLOW, Urine Crystals NONE, Urine Culture Indicated 

NO, Urine Glucose (UA) NEGATIVE, Urine Ketones NEGATIVE, Urine Leukocyte 

Esterase NEGATIVE, Urine Mucus NEGATIVE, Urine Nitrite NEGATIVE, Urine Protein 

NEGATIVE, Urine RBC RARE, Urine RBC (Auto) 4+, Urine Specific Gravity 1.015, 

Urine Squamous Epithelial Cells RARE, Urine Urobilinogen NORMAL, Urine WBC NONE

, Urine pH 6


17 18:17: Glucometer 138


17 03:53: 


Anion Gap 8, BUN/Creatinine Ratio 13, Basophils # (Auto) 0.0, Basophils (%) (

Auto) 0, Blood Urea Nitrogen 11, Calcium Level 7.7, Carbon Dioxide Level 23, 

Chloride Level 106, Creatinine 0.86, Eosinophils # (Auto) 0.1, Eosinophils (%) (

Auto) 2, Estimat Glomerular Filtration Rate > 60, Glucose Level 169, Hematocrit 

31, Hemoglobin 9.9, INR Comment 3.1, Lymphocytes # (Auto) 0.7, Lymphocytes (%) (

Auto) 15, Mean Corpuscular Hemoglobin 30, Mean Corpuscular Hemoglobin Concent 32

, Mean Corpuscular Volume 92, Mean Platelet Volume 8.8, Monocytes # (Auto) 0.6, 

Monocytes (%) (Auto) 12, Neutrophils # (Auto) 3.4, Neutrophils (%) (Auto) 71, 

Platelet Count 183, Potassium Level 4.3, Prothrombin Time 31.6, Red Blood Count 

3.31, Red Cell Distribution Width 15.2, Sodium Level 137, White Blood Count 4.7

, Magnesium Level 1.6, Phosphorus Level 2.4


17 10:03: Glucometer 232


17 14:17: Glucometer 135


17 14:55: 


Basophils # (Auto) 0.0, Basophils (%) (Auto) 1, Eosinophils # (Auto) 0.2, 

Eosinophils (%) (Auto) 6, Hematocrit 32, Hemoglobin 10.5, INR Comment 2.5, 

Lymphocytes # (Auto) 0.7, Lymphocytes (%) (Auto) 19, Mean Corpuscular 

Hemoglobin 30, Mean Corpuscular Hemoglobin Concent 32, Mean Corpuscular Volume 

93, Mean Platelet Volume 8.6, Monocytes # (Auto) 0.5, Monocytes (%) (Auto) 14, 

Neutrophils # (Auto) 2.3, Neutrophils (%) (Auto) 60, Platelet Count 159, 

Prothrombin Time 26.9, Red Blood Count 3.48, Red Cell Distribution Width 15.4, 

White Blood Count 3.8


17 17:45: Vancomycin Level Trough 14.0


17 20:30: Glucometer 266


2/15/17 03:42: 


Alanine Aminotransferase (ALT/SGPT) 14, Albumin 3.0, Alkaline Phosphatase 57, 

Anion Gap 7, Aspartate Amino Transf (AST/SGOT) 18, B-Type Natriuretic Peptide 

210.9, BUN/Creatinine Ratio 13, Basophils # (Auto) 0.0, Basophils (%) (Auto) 0, 

Blood Urea Nitrogen 10, Calcium Level 8.3, Carbon Dioxide Level 25, Chloride 

Level 106, Creatinine 0.79, Eosinophils # (Auto) 0.4, Eosinophils (%) (Auto) 7, 

Estimat Glomerular Filtration Rate > 60, Glucose Level 140, Hematocrit 34, 

Hemoglobin 11.0, Lymphocytes # (Auto) 0.9, Lymphocytes (%) (Auto) 15, Magnesium 

Level 2.0, Mean Corpuscular Hemoglobin 30, Mean Corpuscular Hemoglobin Concent 

32, Mean Corpuscular Volume 93, Mean Platelet Volume 9.6, Monocytes # (Auto) 0.7

, Monocytes (%) (Auto) 12, Neutrophils # (Auto) 3.9, Neutrophils (%) (Auto) 66, 

Phosphorus Level 2.6, Platelet Count 183, Potassium Level 4.4, Red Blood Count 

3.65, Red Cell Distribution Width 15.2, Sodium Level 138, Total Bilirubin 0.3, 

Total Protein 5.9, White Blood Count 5.8


2/15/17 11:08: Glucometer 312


2/15/17 15:25: Glucometer 189


2/15/17 18:05: Vancomycin Level Trough 16.7


2/15/17 20:54: Glucometer 238


17 05:59: 


Glucometer 141, Anion Gap 7, BUN/Creatinine Ratio 13, Basophils # (Auto) 0.0, 

Basophils (%) (Auto) 0, Blood Urea Nitrogen 10, Calcium Level 8.1, Carbon 

Dioxide Level 26, Chloride Level 104, Creatinine 0.76, Eosinophils # (Auto) 0.3

, Eosinophils (%) (Auto) 5, Estimat Glomerular Filtration Rate > 60, Glucose 

Level 124, Hematocrit 31, Hemoglobin 10.0, Lymphocytes # (Auto) 1.6, 

Lymphocytes (%) (Auto) 24, Magnesium Level 1.7, Mean Corpuscular Hemoglobin 30, 

Mean Corpuscular Hemoglobin Concent 32, Mean Corpuscular Volume 92, Mean 

Platelet Volume 9.4, Monocytes # (Auto) 0.7, Monocytes (%) (Auto) 11, 

Neutrophils # (Auto) 3.9, Neutrophils (%) (Auto) 60, Phosphorus Level 2.9, 

Platelet Count 169, Potassium Level 4.2, Red Blood Count 3.35, Red Cell 

Distribution Width 15.0, Sodium Level 137, White Blood Count 6.4


17 10:12: Glucometer 264


17 11:38: Glucometer 329


17 14:37: Glucometer 145


17 20:29: Glucometer 243


17 05:37: Glucometer 113


17 05:38: 


Anion Gap 9, BUN/Creatinine Ratio 18, Basophils # (Auto) 0.0, Basophils (%) (

Auto) 0, Blood Urea Nitrogen 14, Calcium Level 8.3, Carbon Dioxide Level 26, 

Chloride Level 104, Creatinine 0.79, Eosinophils # (Auto) 0.3, Eosinophils (%) (

Auto) 6, Estimat Glomerular Filtration Rate > 60, Glucose Level 111, Hematocrit 

31, Hemoglobin 10.1, Lymphocytes # (Auto) 1.5, Lymphocytes (%) (Auto) 32, 

Magnesium Level 2.0, Mean Corpuscular Hemoglobin 30, Mean Corpuscular 

Hemoglobin Concent 33, Mean Corpuscular Volume 91, Mean Platelet Volume 9.5, 

Monocytes # (Auto) 0.6, Monocytes (%) (Auto) 12, Neutrophils # (Auto) 2.4, 

Neutrophils (%) (Auto) 50, Phosphorus Level 3.4, Platelet Count 191, Potassium 

Level 4.3, Red Blood Count 3.39, Red Cell Distribution Width 14.9, Sodium Level 

139, White Blood Count 4.7


17 10:21: Glucometer 261


Radiology Reviewed


chest x-ray on admission increased opacity in left f lung region.


Chest x-ray patchy alveolar infiltrate in both lung bases





Discussion & Recommendations


patient went to swing bed for IV antibiotics





Discharge


Home Medications:





 Active Scripts


 Active


Levaquin (Levofloxacin) 500 Mg Tablet 500 Mg PO DAILY 7 Days


 Reported


Diltiazem HCl 30 Mg Tablet 30 Mg PO TID


Breo Ellipta 100-25 Mcg INH (Fluticasone/Vilanterol) 1 Each Blst.w.dev 1 Puff 

IH DAILY


Warfarin Sodium 5 Mg Tablet 7.5 Mg PO TH


     TAKES 1 & 1/2 (5MG) TABLETS


Coumadin (Warfarin Sodium) 5 Mg Tablet 5 Mg PO SUMOTUWEFRSA


Furosemide 40 Mg Tablet 40 Mg PO DAILY


Ferrous Sulfate 325 Mg Tablet 325 Mg PO 1730


Protonix (Pantoprazole Sodium) 40 Mg Tablet.dr 40 Mg PO DAILY


     LAST FILLED #30 16


Albuterol Sulfate 2.5 Mg/3 Ml Vial.neb 2.5 Mg IH Q6H PRN


Klor-Con M10 (Potassium Chloride) 10 Meq Tab.er.prt 10 Meq PO Q48H


Enalapril Maleate 2.5 Mg Tablet 2.5 Mg PO DAILY


Simvastatin 20 Mg Tablet 20 Mg PO HS


Novolin N (Insulin Human NPH) 10 Unit/0.1 Ml Susp 15 Unit SQ AM


     MIXES WITH 4 UNITS OF R INSULIN EVERY MORNING FOR A TOTAL DOSE OF 19


     UNITS


Novolin N (Insulin Human NPH) 10 Unit/0.1 Ml Susp 10 Unit SQ HS


     MIXES WITH 4 UNITS OF R INSULIN EVERY EVENING FOR A TOTAL DOSE OF 14


     UNITS


Novolin R (Insulin Regular, Human) 100 Unit/1 Ml Vial 4 Unit SQ BID


     MIXES WITH N INSULIN EVERY MORNING AND EVENING





Instructions to patient/family


Please see electonic discharge instructions given to patient.





Clinical Quality Measures


DVT/VTE Risk/Contraindication:


Risk Factor Score Per Nursin


RFS Level Per Nursing on Admit:  4+=Very High


Contraindications-Pharm:  Other *list below*








TOR CONNELLY DO 2017 07:37

## 2017-08-16 ENCOUNTER — HOSPITAL ENCOUNTER (OUTPATIENT)
Dept: HOSPITAL 75 - CARD | Age: 77
End: 2017-08-16
Attending: INTERNAL MEDICINE
Payer: MEDICARE

## 2017-08-16 DIAGNOSIS — I25.10: Primary | ICD-10-CM

## 2017-08-16 DIAGNOSIS — R06.02: ICD-10-CM

## 2017-08-16 DIAGNOSIS — I47.2: ICD-10-CM

## 2017-08-16 DIAGNOSIS — E78.2: ICD-10-CM

## 2017-08-16 DIAGNOSIS — I10: ICD-10-CM

## 2017-08-16 PROCEDURE — 93306 TTE W/DOPPLER COMPLETE: CPT

## 2017-09-27 ENCOUNTER — HOSPITAL ENCOUNTER (OUTPATIENT)
Dept: HOSPITAL 75 - CARD | Age: 77
End: 2017-09-27
Attending: INTERNAL MEDICINE
Payer: MEDICARE

## 2017-09-27 VITALS — SYSTOLIC BLOOD PRESSURE: 108 MMHG | DIASTOLIC BLOOD PRESSURE: 74 MMHG

## 2017-09-27 VITALS — BODY MASS INDEX: 29.92 KG/M2 | HEIGHT: 69 IN | WEIGHT: 202 LBS

## 2017-09-27 VITALS — SYSTOLIC BLOOD PRESSURE: 113 MMHG | DIASTOLIC BLOOD PRESSURE: 69 MMHG

## 2017-09-27 DIAGNOSIS — E78.2: ICD-10-CM

## 2017-09-27 DIAGNOSIS — I47.2: ICD-10-CM

## 2017-09-27 DIAGNOSIS — I25.10: Primary | ICD-10-CM

## 2017-09-27 DIAGNOSIS — R06.02: ICD-10-CM

## 2017-09-27 DIAGNOSIS — I10: ICD-10-CM

## 2017-09-27 PROCEDURE — 78452 HT MUSCLE IMAGE SPECT MULT: CPT

## 2017-09-27 PROCEDURE — 93017 CV STRESS TEST TRACING ONLY: CPT

## 2017-09-28 NOTE — STRESS TEST
DATE OF SERVICE:  09/27/2017



LEXISCAN MYOVIEW STRESS TEST REPORT 



REFERRING PHYSICIAN:  

Dr. Holley.  



Baseline heart rate is 62.  Baseline blood pressure is 113/69.  Baseline EKG is

sinus rhythm with no ischemic changes.



In summary, the patient was injected with 10.22 mCi of technetium-99 Myoview and

the resting images were obtained.  Then, the patient received 0.4 mg of Lexiscan

followed by 29.2 mCi of technetium-99 Myoview.  Throughout the test, there were

no EKG changes.



The resting and stress images were reviewed and compared in the short axis,

horizontal long axis, and vertical long axis views.  Review of the images showed

diaphragmatic attenuation with decreased uptake involving the whole inferior

wall inferoapical segment and small segment of the anterior apical segment with

mild reversibility involving the mid to apical inferior wall, true apex and

anteroapical segment.  SSS is 8, SDS 4, TID value 1.04.  On the gated images,

the left ventricle appeared to be in normal size with normal contractility,

calculated ejection fraction 50%.



CONCLUSION:

1.  The patient tolerated Lexiscan well.

2.  Mild ischemia involving the mid to apical inferior wall, true apex and

anteroapical segment with diaphragmatic attenuation affecting the quality of the

images.

3.  Normal left ventricular size with normal contractility, calculated ejection

fraction 50%.





Job ID: [id number]

DocumentID: [id number]

Dictated Date:  09/27/2017 11:40:04

Transcription Date: 09/27/2017 12:50:13

Dictated By: SHAHRIAR BARRY MD

## 2017-10-04 ENCOUNTER — HOSPITAL ENCOUNTER (OUTPATIENT)
Dept: HOSPITAL 75 - CATH | Age: 77
Discharge: HOME | End: 2017-10-04
Attending: INTERNAL MEDICINE
Payer: MEDICARE

## 2017-10-04 VITALS — DIASTOLIC BLOOD PRESSURE: 77 MMHG | SYSTOLIC BLOOD PRESSURE: 148 MMHG

## 2017-10-04 VITALS — BODY MASS INDEX: 27.99 KG/M2 | HEIGHT: 69 IN | WEIGHT: 189 LBS

## 2017-10-04 VITALS — DIASTOLIC BLOOD PRESSURE: 67 MMHG | SYSTOLIC BLOOD PRESSURE: 139 MMHG

## 2017-10-04 VITALS — DIASTOLIC BLOOD PRESSURE: 79 MMHG | SYSTOLIC BLOOD PRESSURE: 139 MMHG

## 2017-10-04 VITALS — SYSTOLIC BLOOD PRESSURE: 131 MMHG | DIASTOLIC BLOOD PRESSURE: 80 MMHG

## 2017-10-04 VITALS — SYSTOLIC BLOOD PRESSURE: 135 MMHG | DIASTOLIC BLOOD PRESSURE: 68 MMHG

## 2017-10-04 VITALS — DIASTOLIC BLOOD PRESSURE: 87 MMHG | SYSTOLIC BLOOD PRESSURE: 122 MMHG

## 2017-10-04 VITALS — SYSTOLIC BLOOD PRESSURE: 112 MMHG | DIASTOLIC BLOOD PRESSURE: 62 MMHG

## 2017-10-04 VITALS — SYSTOLIC BLOOD PRESSURE: 139 MMHG | DIASTOLIC BLOOD PRESSURE: 79 MMHG

## 2017-10-04 VITALS — SYSTOLIC BLOOD PRESSURE: 138 MMHG | DIASTOLIC BLOOD PRESSURE: 94 MMHG

## 2017-10-04 VITALS — DIASTOLIC BLOOD PRESSURE: 77 MMHG | SYSTOLIC BLOOD PRESSURE: 129 MMHG

## 2017-10-04 VITALS — DIASTOLIC BLOOD PRESSURE: 70 MMHG | SYSTOLIC BLOOD PRESSURE: 127 MMHG

## 2017-10-04 DIAGNOSIS — Z79.899: ICD-10-CM

## 2017-10-04 DIAGNOSIS — I25.10: Primary | ICD-10-CM

## 2017-10-04 DIAGNOSIS — I25.84: ICD-10-CM

## 2017-10-04 DIAGNOSIS — Z95.5: ICD-10-CM

## 2017-10-04 DIAGNOSIS — E11.9: ICD-10-CM

## 2017-10-04 DIAGNOSIS — I47.2: ICD-10-CM

## 2017-10-04 DIAGNOSIS — Z99.81: ICD-10-CM

## 2017-10-04 DIAGNOSIS — I48.0: ICD-10-CM

## 2017-10-04 DIAGNOSIS — J44.9: ICD-10-CM

## 2017-10-04 DIAGNOSIS — Z87.891: ICD-10-CM

## 2017-10-04 DIAGNOSIS — I70.203: ICD-10-CM

## 2017-10-04 DIAGNOSIS — I49.5: ICD-10-CM

## 2017-10-04 DIAGNOSIS — E78.5: ICD-10-CM

## 2017-10-04 DIAGNOSIS — Z79.01: ICD-10-CM

## 2017-10-04 DIAGNOSIS — Z79.4: ICD-10-CM

## 2017-10-04 DIAGNOSIS — I10: ICD-10-CM

## 2017-10-04 LAB
ALBUMIN SERPL-MCNC: 3.7 GM/DL (ref 3.2–4.5)
ALT SERPL-CCNC: 17 U/L (ref 0–55)
ANION GAP SERPL CALC-SCNC: 9 MMOL/L (ref 5–14)
APTT BLD: 29 SEC (ref 24–35)
AST SERPL-CCNC: 17 U/L (ref 5–34)
BILIRUB SERPL-MCNC: 0.3 MG/DL (ref 0.1–1)
BILIRUB UR QL STRIP: NEGATIVE
BUN SERPL-MCNC: 18 MG/DL (ref 7–18)
BUN/CREAT SERPL: 20
CALCIUM SERPL-MCNC: 9.1 MG/DL (ref 8.5–10.1)
CHLORIDE SERPL-SCNC: 103 MMOL/L (ref 98–107)
CHOLEST SERPL-MCNC: 159 MG/DL (ref ?–200)
CO2 SERPL-SCNC: 26 MMOL/L (ref 21–32)
CREAT SERPL-MCNC: 0.92 MG/DL (ref 0.6–1.3)
ERYTHROCYTE [DISTWIDTH] IN BLOOD BY AUTOMATED COUNT: 14.2 % (ref 10–14.5)
GFR SERPLBLD BASED ON 1.73 SQ M-ARVRAT: > 60 ML/MIN
GLUCOSE SERPL-MCNC: 153 MG/DL (ref 70–105)
HYALINE CASTS #/AREA URNS LPF: (no result) /LPF
INR PPP: 1 (ref 0.8–1.4)
KETONES UR QL STRIP: NEGATIVE
LDLC SERPL DIRECT ASSAY-MCNC: 86 MG/DL (ref 1–129)
LEUKOCYTE ESTERASE UR QL STRIP: (no result)
MCH RBC QN AUTO: 31 PG (ref 25–34)
MCHC RBC AUTO-ENTMCNC: 33 G/DL (ref 32–36)
MCV RBC AUTO: 93 FL (ref 80–99)
NITRITE UR QL STRIP: NEGATIVE
PH UR STRIP: 6 [PH] (ref 5–9)
PLATELET # BLD: 398 10^3/UL (ref 130–400)
PMV BLD AUTO: 8.8 FL (ref 7.4–10.4)
POTASSIUM SERPL-SCNC: 4.4 MMOL/L (ref 3.6–5)
PROT SERPL-MCNC: 6.9 GM/DL (ref 6.4–8.2)
PROT UR QL STRIP: (no result)
PROTHROMBIN TIME: 13.1 SEC (ref 12.2–14.7)
RBC # BLD AUTO: 3.86 10^6/UL (ref 4.35–5.85)
SODIUM SERPL-SCNC: 138 MMOL/L (ref 135–145)
SP GR UR STRIP: 1.01 (ref 1.02–1.02)
SQUAMOUS #/AREA URNS HPF: (no result) /HPF
TRIGL SERPL-MCNC: 52 MG/DL (ref ?–150)
UROBILINOGEN UR-MCNC: NORMAL MG/DL
VLDLC SERPL CALC-MCNC: 10 MG/DL (ref 5–40)
WBC # BLD AUTO: 9.8 10^3/UL (ref 4.3–11)
WBC #/AREA URNS HPF: (no result) /HPF

## 2017-10-04 PROCEDURE — 36415 COLL VENOUS BLD VENIPUNCTURE: CPT

## 2017-10-04 PROCEDURE — 87081 CULTURE SCREEN ONLY: CPT

## 2017-10-04 PROCEDURE — 93005 ELECTROCARDIOGRAM TRACING: CPT

## 2017-10-04 PROCEDURE — 85730 THROMBOPLASTIN TIME PARTIAL: CPT

## 2017-10-04 PROCEDURE — 81000 URINALYSIS NONAUTO W/SCOPE: CPT

## 2017-10-04 PROCEDURE — 93458 L HRT ARTERY/VENTRICLE ANGIO: CPT

## 2017-10-04 PROCEDURE — 87088 URINE BACTERIA CULTURE: CPT

## 2017-10-04 PROCEDURE — 71010: CPT

## 2017-10-04 PROCEDURE — 85610 PROTHROMBIN TIME: CPT

## 2017-10-04 PROCEDURE — 85027 COMPLETE CBC AUTOMATED: CPT

## 2017-10-04 PROCEDURE — 75716 ARTERY X-RAYS ARMS/LEGS: CPT

## 2017-10-04 PROCEDURE — 80053 COMPREHEN METABOLIC PANEL: CPT

## 2017-10-04 PROCEDURE — 36245 INS CATH ABD/L-EXT ART 1ST: CPT

## 2017-10-04 PROCEDURE — 80061 LIPID PANEL: CPT

## 2017-10-04 NOTE — XMS REPORT
MU2 Ambulatory Summary

 Created on: 2016



Bernardo Moulton

External Reference #: [id number]

: 1940

Sex: Male



Demographics







 Address  [address]

[place]  [address]

 

 Home Phone  [phone]

 

 Preferred Language  English

 

 Marital Status  Never 

 

 Judaism Affiliation  Unknown

 

 Race  White

 

 Ethnic Group  Not  or 





Author







 Author  JUAN MANUEL Helms

 

 Organization  [facility]

 

 Address  [address]

[place], [place]  [id number]



 

 Phone  [phone]







Care Team Providers







 Care Team Member Name  Role  Phone

 

 JUAN MANUEL Helms  PCP  Unavailable







Allergies and Adverse Reactions







 Name  Reaction  Notes

 

 morphine      

 

 PENICILLINS      







Plan of Treatment

Not available.



Medications







 Active 

 

 Name  Start Date  Estimated Completion Date  SIG  Comments

 

 furosemide oral            

 

 pantoprazole 40 mg oral tablet,delayed release (DR/EC)        take 1 tablet (
40 mg) by oral route once daily   

 

 Xarelto 20 mg oral tablet        take 1 tablet (20 mg) by oral route once 
daily with the evening meal   

 

 linezolid 600 mg oral tablet        take 1 tablet (600 mg) by oral route every 
12 hours   

 

 albuterol sulfate 2.5 mg /3 mL (0.083 %) inhalation solution for nebulization 
       Inhale ever 4 hrs as needed for shortness of breath   

 

 enalapril maleate 2.5 mg oral tablet        take 1 tablet (2.5 mg) by oral 
route once daily   

 

 Novolin N 100 unit/mL subcutaneous suspension            

 

 Novolin R 100 unit/mL injection solution            

 

 nitrofurantoin macrocrystal 50 mg oral capsule        Take one tablet PO after 
supper   









 Discontinued 

 

 Name  Start Date  Discontinued Date  SIG  Comments

 

 warfarin oral     2015      

 

 Plavix oral     2015      

 

 simvastatin oral     2015      

 

 omeprazole oral     2015      







Problem List







 Description  Status  Onset

 

 Benign hypertrophy of prostate  Active  2015

 

 Post-traumatic bulbous urethral stricture  Active  2/3/2016

 

 Benign non-nodular prostatic hyperplasia without lower urinary tract symptoms  
Active  2/3/2016

 

 Bacterial UTI  Active  2/3/2016

 

 Stricture of male urethra  Active  6/3/2016

 

 Adenofibromatous hypertrophy of prostate  Active  6/3/2016







Vital Signs







 Date  Time  BP-Sys(mm[Hg]  BP-[place](mm[Hg])  HR(bpm)  RR(rpm)  Temp  WT  HT  HC  
BMI  BSA  BMI Percentile  O2 Sat(%)

 

 6/3/2016  9:56:00 AM                 183 lbs  69 in     27.02 kg/m2  2.01 m2  
    

 

 2/3/2016  8:11:00 AM  108 mmHg  62 mmHg  69 bpm  18 rpm  97.7 F  184 lbs  69 
in     27.1718 kg/m  2.0157 m     97 %

 

 2015  10:04:00 AM                 185 lbs  69 in     27.32 kg/m2  2.02 
m2      

 

 6/3/2015  11:25:00 AM                 205 lbs  69 in     30.2729 kg/m  
2.1277 m      

 

 2015  9:49:00 AM                 205 lbs  69 in     30.27 kg/m2  2.13 m2  
    







Social History







 Name  Description  Comments

 

 Alcohol  Never   

 

 Tobacco  Never smoker   







History of Procedures







 Date Ordered  Description  Order Status

 

 2/3/2016 12:00 AM  ASSAY OF PSA TOTAL  Reviewed

 

 6/3/2016 1:10 PM  URINALYSIS AUTO W/O SCOPE  Reviewed

 

 2015 1:41 PM  URINALYSIS AUTO W/O SCOPE  Reviewed

 

 2015 12:00 AM  ASSAY OF PSA TOTAL  Reviewed







Results Summary







 Data and Description  Results

 

 2015 1:41 PM  Bilirub Ur Ql Strip -VE Glucose Ur-sCnc -VE Hgb Ur Ql Strip -
VE Ketones Ur Ql Strip -VE Nitrite Ur Ql Strip -VE pH Ur-LsCnc 6.0 Prot Ur Ql 
Strip -VE Sp Gr Ur Qn 1020 Urobilinogen Ur-mCnc -VE WBC Est Ur Ql Strip -VE 

 

 2016 9:00 AM  COLOR YELLOW APPEARANCE CLEAR SPEC GRAV >=1.030 pH 6.0 
PROTEIN NEGATIVE GLUCOSE NEGATIVE mg/dLKETONE NEGATIVE BILIRUBIN NEGATIVE BLOOD 
SMALL NITRITE NEGATIVE LEUK SCREEN NEGATIVE CASTS/LPF NEGATIVE /LPFCRYSTALS 
NEGATIVE MUCOUS THRDS 1+ BACTERIA NEGATIVE EPITH CELLS FEW SQUAMOUS /
HPFTRICHOMONAS NEGATIVE YEAST NEGATIVE 

 

 6/3/2016 1:10 PM  Clarity Ur CLEAR Color Ur -- Glucose Ur-sCnc -VE Bilirub Ur 
Ql Strip -VE Ketones Ur Ql Strip -VE Sp Gr Ur Qn 1020 Hgb Ur Ql Strip -VE pH Ur-
LsCnc 6.00 Prot Ur Ql Strip TRACE Urobilinogen Ur-mCnc -VE Nitrite Ur Ql Strip -
VE WBC Est Ur Ql Strip -VE 







History Of Immunizations

Not available.



History of Past Illness







 Name  Date of Onset  Comments

 

 Benign hypertrophy of prostate  2015   

 

 Recurrent UTI      

 

 Post-traumatic bulbous urethral stricture  2016   

 

 Benign non-nodular prostatic hyperplasia without lower urinary tract symptoms  
2016   

 

 Bacterial UTI  2016   

 

 Stricture of male urethra  2016   

 

 Adenofibromatous hypertrophy of prostate  2016   

 

 Benign hypertrophy of prostate  2015  9:56AM   

 

 Benign Hypertrophy of Prostate (BPH) Stable  Paulo  3 2015 11:26AM   

 

 BPH (benign prostatic hypertrophy)  Feb  3 2016  8:33AM   

 

 Screening for prostate cancer  Feb  3 2016  8:33AM   

 

 Resolved Bacterial UTI  Feb  3 2016  8:16AM   

 

 Benign non-nodular prostatic hyperplasia without lower urinary tract symptoms  
Feb  3 2016  8:16AM   

 

 Post-traumatic bulbous urethral stricture  Feb  3 2016  8:16AM   

 

 Adenofibromatous hypertrophy of prostate  Paulo  3 2016  9:57AM   

 

 Resolved Stricture of male urethra  Paulo  3 2016  9:57AM   

 

 Resolved Bacterial UTI  Paulo  3 2016  9:57AM   







Payers







 Insurance Name  Company Name  Plan Name  Plan Number  Policy Number  Policy 
Group Number  Start Date

 

    Medicare Part B  [employer] [place]     [id number]     N/A

 

    [employer]     [id number]     N/A







History of Encounters







 Visit Date  Visit Type  Provider

 

 6/3/2016  Office visit  V ADA Helms MD

 

 2/3/2016  Office visit  V ADA Helms MD

 

 2016  Hospital  V ADA Helms MD

 

 2015  Office visit  V ADA Helms MD

 

 6/3/2015  Office visit   

 

 6/3/2015  Office visit  V ADA Helms MD

 

 2015  Office visit  V ADA Helms MD

## 2017-10-04 NOTE — XMS REPORT
MU2 Ambulatory Summary

 Created on: 2016



Bernardo Moulton

External Reference #: [id number]

: 1940

Sex: Male



Demographics







 Address  [address]

[place]  [address]

 

 Home Phone  [phone]

 

 Preferred Language  English

 

 Marital Status  Never 

 

 Synagogue Affiliation  Unknown

 

 Race  White

 

 Ethnic Group  Not  or 





Author







 Author  JUAN MANUEL Helms

 

 Organization  [facility]

 

 Address  [address]

[place], [place]  [id number]



 

 Phone  [phone]







Care Team Providers







 Care Team Member Name  Role  Phone

 

 JUAN MANUEL Helms  PCP  Unavailable







Allergies and Adverse Reactions







 Name  Reaction  Notes

 

 morphine      

 

 PENICILLINS      







Plan of Treatment

Not available.



Medications







 Active 

 

 Name  Start Date  Estimated Completion Date  SIG  Comments

 

 furosemide oral            

 

 pantoprazole 40 mg oral tablet,delayed release (DR/EC)        take 1 tablet (
40 mg) by oral route once daily   

 

 Xarelto 20 mg oral tablet        take 1 tablet (20 mg) by oral route once 
daily with the evening meal   

 

 linezolid 600 mg oral tablet        take 1 tablet (600 mg) by oral route every 
12 hours   

 

 albuterol sulfate 2.5 mg /3 mL (0.083 %) inhalation solution for nebulization 
       Inhale ever 4 hrs as needed for shortness of breath   

 

 enalapril maleate 2.5 mg oral tablet        take 1 tablet (2.5 mg) by oral 
route once daily   

 

 Novolin N 100 unit/mL subcutaneous suspension            

 

 Novolin R 100 unit/mL injection solution            

 

 nitrofurantoin macrocrystal 50 mg oral capsule        Take one tablet PO after 
supper   









 Discontinued 

 

 Name  Start Date  Discontinued Date  SIG  Comments

 

 warfarin oral     2015      

 

 Plavix oral     2015      

 

 simvastatin oral     2015      

 

 omeprazole oral     2015      







Problem List







 Description  Status  Onset

 

 Benign hypertrophy of prostate  Active  2015

 

 Post-traumatic bulbous urethral stricture  Active  2/3/2016

 

 Benign non-nodular prostatic hyperplasia without lower urinary tract symptoms  
Active  2/3/2016

 

 Bacterial UTI  Active  2/3/2016

 

 Stricture of male urethra  Active  6/3/2016

 

 Adenofibromatous hypertrophy of prostate  Active  6/3/2016

 

 Idiopathic stricture of urethra  Active  2016







Vital Signs







 Date  Time  BP-Sys(mm[Hg]  BP-Ansley(mm[Hg])  HR(bpm)  RR(rpm)  Temp  WT  HT  HC  
BMI  BSA  BMI Percentile  O2 Sat(%)

 

 2016  12:58:00 PM  106 mmHg  66 mmHg  66 bpm  20 rpm  97.9 F  200 lbs  69 
in     29.53 kg/m2  2.10 m2     98 %

 

 6/3/2016  9:56:00 AM                 183 lbs  69 in     27.0241 kg/m  2.0102 
m      

 

 2/3/2016  8:11:00 AM  108 mmHg  62 mmHg  69 bpm  18 rpm  97.7 F  184 lbs  69 
in     27.17 kg/m2  2.02 m2     97 %

 

 2015  10:04:00 AM                 185 lbs  69 in     27.3194 kg/m  
2.0212 m      

 

 6/3/2015  11:25:00 AM                 205 lbs  69 in     30.27 kg/m2  2.13 m2 
     

 

 2015  9:49:00 AM                 205 lbs  69 in     30.27 kg/m2  2.1277 m
      







Social History







 Name  Description  Comments

 

 Alcohol  Never   

 

 Tobacco  Never smoker   







History of Procedures







 Date Ordered  Description  Order Status

 

 2/3/2016 12:00 AM  ASSAY OF PSA TOTAL  Reviewed

 

 6/3/2016 1:10 PM  URINALYSIS AUTO W/O SCOPE  Reviewed

 

 2016 2:36 PM  URINALYSIS AUTO W/O SCOPE  Reviewed

 

 2015 1:41 PM  URINALYSIS AUTO W/O SCOPE  Reviewed

 

 2015 12:00 AM  ASSAY OF PSA TOTAL  Reviewed







Results Summary







 Data and Description  Results

 

 2015 1:41 PM  Bilirub Ur Ql Strip -VE Glucose Ur-sCnc -VE Hgb Ur Ql Strip -
VE Ketones Ur Ql Strip -VE Nitrite Ur Ql Strip -VE pH Ur-LsCnc 6.0 Prot Ur Ql 
Strip -VE Sp Gr Ur Qn 1020 Urobilinogen Ur-mCnc -VE WBC Est Ur Ql Strip -VE 

 

 2016 9:00 AM  COLOR YELLOW APPEARANCE CLEAR SPEC GRAV >=1.030 pH 6.0 
PROTEIN NEGATIVE GLUCOSE NEGATIVE mg/dLKETONE NEGATIVE BILIRUBIN NEGATIVE BLOOD 
SMALL NITRITE NEGATIVE LEUK SCREEN NEGATIVE WBC/HPF RARE RBC/HPF NEGATIVE CASTS/
LPF NEGATIVE /LPFCRYSTALS NEGATIVE MUCOUS THRDS 1+ BACTERIA NEGATIVE EPITH 
CELLS FEW SQUAMOUS /HPFTRICHOMONAS NEGATIVE YEAST NEGATIVE CULT ORDERED YES 

 

 6/3/2016 1:10 PM  Clarity Ur CLEAR Color Ur -- Glucose Ur-sCnc -VE Bilirub Ur 
Ql Strip -VE Ketones Ur Ql Strip -VE Sp Gr Ur Qn 1020 Hgb Ur Ql Strip -VE pH Ur-
LsCnc 6.00 Prot Ur Ql Strip TRACE Urobilinogen Ur-mCnc -VE Nitrite Ur Ql Strip -
VE WBC Est Ur Ql Strip -VE 

 

 2016 2:36 PM  Clarity Ur CLEAR Color Ur ------- Glucose Ur-sCnc -VE 
Bilirub Ur Ql Strip -VE Ketones Ur Ql Strip -VE Sp Gr Ur Qn 1015 Hgb Ur Ql 
Strip -VE pH Ur-LsCnc 6.0 Prot Ur Ql Strip TRACE Urobilinogen Ur-mCnc -VE 
Nitrite Ur Ql Strip -VE WBC Est Ur Ql Strip -VE 







History Of Immunizations

Not available.



History of Past Illness







 Name  Date of Onset  Comments

 

 Benign hypertrophy of prostate  2015   

 

 Recurrent UTI      

 

 Post-traumatic bulbous urethral stricture  2016   

 

 Benign non-nodular prostatic hyperplasia without lower urinary tract symptoms  
2016   

 

 Bacterial UTI  2016   

 

 Stricture of male urethra  2016   

 

 Adenofibromatous hypertrophy of prostate  2016   

 

 Idiopathic stricture of urethra  2016   

 

 Benign hypertrophy of prostate  2015  9:56AM   

 

 Benign Hypertrophy of Prostate (BPH) Stable  Paulo  3 2015 11:26AM   

 

 BPH (benign prostatic hypertrophy)  Feb  3 2016  8:33AM   

 

 Screening for prostate cancer  Feb  3 2016  8:33AM   

 

 Resolved Bacterial UTI  Feb  3 2016  8:16AM   

 

 Benign non-nodular prostatic hyperplasia without lower urinary tract symptoms  
Feb  3 2016  8:16AM   

 

 Post-traumatic bulbous urethral stricture  Feb  3 2016  8:16AM   

 

 Adenofibromatous hypertrophy of prostate  Paulo  3 2016  9:57AM   

 

 Resolved Stricture of male urethra  Paulo  3 2016  9:57AM   

 

 Resolved Bacterial UTI  Paulo  3 2016  9:57AM   

 

 Urinary tract infection, site not specified  Paulo  3 2016  9:57AM   

 

 Bacterial infection, unspecified  Paulo  3 2016  9:57AM   

 

 Adenofibromatous hypertrophy of prostate  Dec  7 2016  1:01PM   

 

 Resolved Idiopathic stricture of urethra  Dec  7 2016  1:01PM   

 

 Urinary tract infection, site not specified  Dec  7 2016  1:01PM   

 

 Bacterial infection, unspecified  Dec  7 2016  1:01PM   







Payers







 Insurance Name  Company Name  Plan Name  Plan Number  Policy Number  Policy 
Group Number  Start Date

 

    Medicare Part B  [employer] [place]     [id number]     N/A

 

    [id number]     N/A







History of Encounters







 Visit Date  Visit Type  Provider

 

 2016  Office visit  V ADA Helms MD

 

 6/3/2016  Office visit  V ADA Helms MD

 

 2/3/2016  Office visit  V ADA Helms MD

 

 2016  [facility]  V ADA Helms MD

 

 2015  Office visit  V ADA Helms MD

 

 6/3/2015  Office visit   

 

 6/3/2015  Office visit  V ADA Helms MD

 

 2015  Office visit  V ADA Helms MD

## 2017-10-04 NOTE — XMS REPORT
MU2 Ambulatory Summary

 Created on: 2016



Bernardo Moulton

External Reference #: [id number]

: 1940

Sex: Male



Demographics







 Address  [address]

[place]  [address]

 

 Home Phone  [phone]

 

 Preferred Language  English

 

 Marital Status  Never 

 

 Baptism Affiliation  Unknown

 

 Race  White

 

 Ethnic Group  Not  or 





Author







 Author  JUAN MANUEL Helms

 

 Organization  [facility]

 

 Address  [address]

[place], [place]  [id number]



 

 Phone  [phone]







Care Team Providers







 Care Team Member Name  Role  Phone

 

 JUAN MANUEL Helms  PCP  Unavailable







Allergies and Adverse Reactions







 Name  Reaction  Notes

 

 morphine      

 

 PENICILLINS      







Plan of Treatment







 Planned Activity  Comments  Planned Date  Planned Time  Plan/Goal

 

 ASSAY OF PSA TOTAL     2/3/2016  12:00 AM   







Medications







 Active 

 

 Name  Start Date  Estimated Completion Date  SIG  Comments

 

 furosemide oral            

 

 pantoprazole 40 mg oral tablet,delayed release (DR/EC)        take 1 tablet (
40 mg) by oral route once daily   

 

 Xarelto 20 mg oral tablet        take 1 tablet (20 mg) by oral route once 
daily with the evening meal   

 

 linezolid 600 mg oral tablet        take 1 tablet (600 mg) by oral route every 
12 hours   

 

 albuterol sulfate 2.5 mg /3 mL (0.083 %) inhalation solution for nebulization 
       Inhale ever 4 hrs as needed for shortness of breath   

 

 enalapril maleate 2.5 mg oral tablet        take 1 tablet (2.5 mg) by oral 
route once daily   

 

 Novolin N 100 unit/mL subcutaneous suspension            

 

 Novolin R 100 unit/mL injection solution            

 

 nitrofurantoin macrocrystal 50 mg oral capsule        Take one tablet PO after 
supper   









 Discontinued 

 

 Name  Start Date  Discontinued Date  SIG  Comments

 

 warfarin oral     2015      

 

 Plavix oral     2015      

 

 simvastatin oral     2015      

 

 omeprazole oral     2015      







Problem List







 Description  Status  Onset

 

 Benign hypertrophy of prostate  Active  2015







Vital Signs







 Date  Time  BP-Sys(mm[Hg]  BP-Ansley(mm[Hg])  HR(bpm)  RR(rpm)  Temp  WT  HT  HC  
BMI  BSA  BMI Percentile  O2 Sat(%)

 

 2/3/2016  8:11:00 AM  108 mmHg  62 mmHg  69 bpm  18 rpm  97.7 F  184 lbs  69 
in     27.17 kg/m2  2.02 m2     97 %

 

 2015  10:04:00 AM                 185 lbs  69 in     27.32 kg/m2  2.0212 
m      

 

 6/3/2015  11:25:00 AM                 205 lbs  69 in     30.2729 kg/m  
2.1277 m      

 

 2015  9:49:00 AM                 205 lbs  69 in     30.27 kg/m2  2.13 m2  
    







Social History







 Name  Description  Comments

 

 Alcohol  Never   

 

 Tobacco  Never smoker   







History of Procedures







 Date Ordered  Description  Order Status

 

 2015 1:41 PM  URINALYSIS AUTO W/O SCOPE  Reviewed

 

 2015 12:00 AM  ASSAY OF PSA TOTAL  Reviewed







Results Summary







 Data and Description  Results

 

 2015 1:41 PM  Bilirub Ur Ql Strip -VE Glucose Ur-sCnc -VE Hgb Ur Ql Strip -
VE Ketones Ur Ql Strip -VE Nitrite Ur Ql Strip -VE pH Ur-LsCnc 6.0 Prot Ur Ql 
Strip -VE Sp Gr Ur Qn 1020 Urobilinogen Ur-mCnc -VE WBC Est Ur Ql Strip -VE 

 

 2016 9:00 AM  COLOR YELLOW APPEARANCE CLEAR SPEC GRAV >=1.030 pH 6.0 
PROTEIN NEGATIVE GLUCOSE NEGATIVE mg/dLKETONE NEGATIVE BILIRUBIN NEGATIVE BLOOD 
SMALL NITRITE NEGATIVE LEUK SCREEN NEGATIVE CASTS/LPF NEGATIVE /LPFCRYSTALS 
NEGATIVE MUCOUS THRDS 1+ BACTERIA NEGATIVE EPITH CELLS FEW SQUAMOUS /
HPFTRICHOMONAS NEGATIVE YEAST NEGATIVE 







History Of Immunizations

Not available.



History of Past Illness







 Name  Date of Onset  Comments

 

 Benign hypertrophy of prostate  2015   

 

 Recurrent UTI      

 

 Benign hypertrophy of prostate  2015  9:56AM   

 

 Benign Hypertrophy of Prostate (BPH) Stable  Paulo  3 2015 11:26AM   

 

 BPH (benign prostatic hypertrophy)  Feb  3 2016  8:33AM   

 

 Screening for prostate cancer  Feb  3 2016  8:33AM   







Payers







 Insurance Name  Company Name  Plan Name  Plan Number  Policy Number  Policy 
Group Number  Start Date

 

    Medicare Part B  Medicare [facility]     [id number]     N/A

 

    BCBS  [employer] [facility]     [id number]     N/A







History of Encounters







 Visit Date  Visit Type  Provider

 

 2/3/2016  Office visit  V ADA Helms MD

 

 2016  Hospital  V ADA Helms MD

 

 2015  Office visit  V ADA Helms MD

 

 6/3/2015  Office visit   

 

 6/3/2015  Office visit  V ADA Helms MD

 

 2015  Office visit  V ADA Helms MD

## 2017-10-04 NOTE — XMS REPORT
MU2 Ambulatory Summary

 Created on: 2016



Bernardo Moulton

External Reference #: [id number]

: 1940

Sex: Male



Demographics







 Address  [address]

[place]  [address]

 

 Home Phone  [phone]

 

 Preferred Language  English

 

 Marital Status  Never 

 

 Orthodox Affiliation  Unknown

 

 Race  White

 

 Ethnic Group  Not  or 





Author







 Author  JUAN MANUEL Helms

 

 Organization  [facility]

 

 Address  [address]

[place], [place]  [address]



 

 Phone  [phone]







Care Team Providers







 Care Team Member Name  Role  Phone

 

 JUAN MANUEL Helms  PCP  Unavailable







Allergies and Adverse Reactions







 Name  Reaction  Notes

 

 morphine      

 

 PENICILLINS      







Plan of Treatment







 Planned Activity  Comments  Planned Date  Planned Time  Plan/Goal

 

 ASSAY OF PSA TOTAL     2/3/2016  12:00 AM   







Medications







 Active 

 

 Name  Start Date  Estimated Completion Date  SIG  Comments

 

 furosemide oral            

 

 pantoprazole 40 mg oral tablet,delayed release (DR/EC)        take 1 tablet (
40 mg) by oral route once daily   

 

 Xarelto 20 mg oral tablet        take 1 tablet (20 mg) by oral route once 
daily with the evening meal   

 

 linezolid 600 mg oral tablet        take 1 tablet (600 mg) by oral route every 
12 hours   

 

 albuterol sulfate 2.5 mg /3 mL (0.083 %) inhalation solution for nebulization 
       Inhale ever 4 hrs as needed for shortness of breath   

 

 enalapril maleate 2.5 mg oral tablet        take 1 tablet (2.5 mg) by oral 
route once daily   

 

 Novolin N 100 unit/mL subcutaneous suspension            

 

 Novolin R 100 unit/mL injection solution            

 

 nitrofurantoin macrocrystal 50 mg oral capsule        Take one tablet PO after 
supper   









 Discontinued 

 

 Name  Start Date  Discontinued Date  SIG  Comments

 

 warfarin oral     2015      

 

 Plavix oral     2015      

 

 simvastatin oral     2015      

 

 omeprazole oral     2015      







Problem List







 Description  Status  Onset

 

 Benign hypertrophy of prostate  Active  2015

 

 Post-traumatic bulbous urethral stricture  Active  2/3/2016

 

 Benign non-nodular prostatic hyperplasia without lower urinary tract symptoms  
Active  2/3/2016

 

 Bacterial UTI  Active  2/3/2016







Vital Signs







 Date  Time  BP-Sys(mm[Hg]  BP-Ansley(mm[Hg])  HR(bpm)  RR(rpm)  Temp  WT  HT  HC  
BMI  BSA  BMI Percentile  O2 Sat(%)

 

 2/3/2016  8:11:00 AM  108 mmHg  62 mmHg  69 bpm  18 rpm  97.7 F  184 lbs  69 
in     27.17 kg/m2  2.02 m2     97 %

 

 2015  10:04:00 AM                 185 lbs  69 in     27.32 kg/m2  2.0212 
m      

 

 6/3/2015  11:25:00 AM                 205 lbs  69 in     30.2729 kg/m  
2.1277 m      

 

 2015  9:49:00 AM                 205 lbs  69 in     30.27 kg/m2  2.13 m2  
    







Social History







 Name  Description  Comments

 

 Alcohol  Never   

 

 Tobacco  Never smoker   







History of Procedures







 Date Ordered  Description  Order Status

 

 2015 1:41 PM  URINALYSIS AUTO W/O SCOPE  Reviewed

 

 2015 12:00 AM  ASSAY OF PSA TOTAL  Reviewed







Results Summary







 Data and Description  Results

 

 2015 1:41 PM  Bilirub Ur Ql Strip -VE Glucose Ur-sCnc -VE Hgb Ur Ql Strip -
VE Ketones Ur Ql Strip -VE Nitrite Ur Ql Strip -VE pH Ur-LsCnc 6.0 Prot Ur Ql 
Strip -VE Sp Gr Ur Qn 1020 Urobilinogen Ur-mCnc -VE WBC Est Ur Ql Strip -VE 

 

 2016 9:00 AM  COLOR YELLOW APPEARANCE CLEAR SPEC GRAV >=1.030 pH 6.0 
PROTEIN NEGATIVE GLUCOSE NEGATIVE mg/dLKETONE NEGATIVE BILIRUBIN NEGATIVE BLOOD 
SMALL NITRITE NEGATIVE LEUK SCREEN NEGATIVE CASTS/LPF NEGATIVE /LPFCRYSTALS 
NEGATIVE MUCOUS THRDS 1+ BACTERIA NEGATIVE EPITH CELLS FEW SQUAMOUS /
HPFTRICHOMONAS NEGATIVE YEAST NEGATIVE 







History Of Immunizations

Not available.



History of Past Illness







 Name  Date of Onset  Comments

 

 Benign hypertrophy of prostate  2015   

 

 Recurrent UTI      

 

 Post-traumatic bulbous urethral stricture  2016   

 

 Benign non-nodular prostatic hyperplasia without lower urinary tract symptoms  
2016   

 

 Bacterial UTI  2016   

 

 Benign hypertrophy of prostate  2015  9:56AM   

 

 Benign Hypertrophy of Prostate (BPH) Stable  Paulo  3 2015 11:26AM   

 

 BPH (benign prostatic hypertrophy)  Feb  3 2016  8:33AM   

 

 Screening for prostate cancer  Feb  3 2016  8:33AM   

 

 Resolved Bacterial UTI  Feb  3 2016  8:16AM   

 

 Benign non-nodular prostatic hyperplasia without lower urinary tract symptoms  
Feb  3 2016  8:16AM   

 

 Post-traumatic bulbous urethral stricture  Feb  3 2016  8:16AM   







Payers







 Insurance Name  Company Name  Plan Name  Plan Number  Policy Number  Policy 
Group Number  Start Date

 

    Medicare Part B  [employer] [facility] [place]     [id number]     N/A

 

    BCBS  [employer] [facility]     [id number]     N/A







History of Encounters







 Visit Date  Visit Type  Provider

 

 2/3/2016  Office visit  V AAD Helms MD

 

 2016  [facility]  V ADA Helms MD

 

 2015  Office visit  V ADA Helms MD

 

 6/3/2015  Office visit   

 

 6/3/2015  Office visit  V ADA Helms MD

 

 2015  Office visit  JUAN MANUEL Helms MD

## 2017-10-04 NOTE — CARDIAC PROCEDURE NOTE-CS/ASA
Pre-Procedure Note


Pre-Op Procedure Note


H&P Reviewed


The H&P was reviewed, patient examined and no changes noted.


Date H&P Reviewed:  Oct 4, 2017


Time H&P Reviewed:  07:55





Conscious Sedation Pre-Proced


Time Reviewed:  07:55


ASA Class:  3











Airway Mallampati Classification: ([place] appropriate class) I.  II.  III,  IV


 


Lungs 


 


Heart 


 


 ASA score


 


 ASA 1: a normal healthy patient


 


 ASA 2:  a patient with a mild systemic disease (mid diabetes, controlled 

hypertension, obesity 


 


x ASA 3:  a patient with a severe systemic disease that limits activity  (angina

, COPD, prior Myocardial infarction)


 


 ASA 4:  a patient with an incapacitating disease that is a constant threat to 

life (CHF, renal failure)


 


 ASA 5:  a moribund patient not expected to survive 24 hrs.  (ruptured aneurysm)


 


 ASA 6:  a declared brain dead patient whose organs are being harvested.


 


 For emergent operations, add the letter E after the classification








Grade 3


Sedation Plan:  Analgesia, Amnesia, Plan communicated to team members, 

Discussed options with patient/fam, Discussed risks with patient/fam


Note


The patient is an appropriate candidate to undergo the planned procedure, 

sedation, and anesthesia.





The patient immediately re-assessed prior to indication.











SHAHRIAR BARRY MD Oct 4, 2017 07:55

## 2017-10-04 NOTE — CARDIAC CATH REPORT
Cardiac Cath Report


Physician (s)/Assistant (s)


Physician


SHAHRIAR BARRY MD





Pre-Procedure Diagnosis


Pre-Procedure Diagnosis:  CAD





Post-Procedure Note


Procedure Start Date:  Oct 4, 2017


Procedure Start Time:  08:00


Name of Procedure:  


C


Bilateral lower ext runoff


1st order


Findings/Procedure Note


PROCEDURE NOTE:


After explaining the procedure to the patient, all pros and cons were explained,


all questions were answered.  The patient signed the consent and then she  was


placed on the cardiac catheterization laboratory.





The patient was placed on the cardiac catheterization laboratory.


Groin was prepped SL fashion local anesthesia was used. 


Sheath placed in the artery with difficulties, I was unable to advance the wire 

initially, had manipulated then I advanced the wire through the iliac artery 

and I used long exchange wire throughout the procedure


Manuel right and left catheter were used to access the coronary system, 

Manuel right was prolapsed into the left ventricle, pressure was measured, 

pullback LV to aorta was done, I did cross over to the left iliac artery and 

did runoff to the left lower extremity then placed the right Manuel catheter 

into the right common iliac artery and did runoff to the right lower extremity


Left ventriculogram was not done


Aortic arch angiogram was not done


At the end of the procedure the sheath was removed.


Closure device was not used, manual pressure applied





FINDINGS:





Hemodynamics 


/13 end-diastolic pressure of 13


Aorta 147/77 mean of 60





ANATOMY:


Left Main has a patent stent with ostial 50 percent stenosis did not change 

compared to the previous study


Left Anterior Descending is small with mild to moderate diffuse disease, patent 

stent nonobstructive disease


Left Circumflex a small with mild-to-moderate disease nonobstructive disease


Right Coronory Artery is moderate in size, heavily calcified artery, no 

obstructive disease, at the bifurcation the PDA and PLV are very small arteries


Left lower extremity runoff was done with a Manuel right catheter crossing 

over at the left common iliac artery, moderate disease nonobstructive disease 

down to the knee.


Right lower extremity runoff done with a Manuel right at the right common 

iliac artery the stent in the iliac artery is patent, mild disease down to the 

trifurcation nonobstructive disease





CONCLUSION:


1.  Patent stent in the left main, 50 percent ostial left main stenosis 

nonobstructive disease


2.  Heavily calcified LAD and circumflex artery with patent stent, mild-to-

moderate disease nonobstructive disease


3.  Heavily calcified right coronary artery with mild to moderate disease 

nonobstructive disease


4.  Patent stent in the right iliac artery with mild disease in the right lower 

extremity


5.  Mild disease at the left lower except he nonobstructive disease











DISCUSSION AND RECOMMENDATION:





Medical therapy is recommended no intervention is warranted


Anesthesia Type:  Conscious Sedation


Estimated blood loss (mL):  5 ml


Contrast Amount:  67 ml


Total Radiation Dose:  546 mGy





Post-Procedure Diagnosis


Post-operative diagnosis:  


Coronary artery disease


Peripheral arterial disease


Hypertension


Hyperlipidemia














SHAHRIAR BARRY MD Oct 4, 2017 08:49

## 2017-10-04 NOTE — XMS REPORT
MU2 Ambulatory Summary

 Created on: 2017



Bernadro Moulton

External Reference #: [id number]

: 1940

Sex: Male



Demographics







 Address  [address]

[place]  [address]

 

 Home Phone  [phone]

 

 Preferred Language  English

 

 Marital Status  Never 

 

 Temple Affiliation  Unknown

 

 Race  White

 

 Ethnic Group  Not  or 





Author







 Author  JUAN MANUEL Helms

 

 Organization  [facility]

 

 Address  [address]

[place], [place]  [address]



 

 Phone  [phone]







Care Team Providers







 Care Team Member Name  Role  Phone

 

 JUAN MANUEL Helms  PCP  Unavailable







Allergies and Adverse Reactions







 Name  Reaction  Notes

 

 morphine      

 

 PENICILLINS      







Plan of Treatment

Not available.



Medications







 Active 

 

 Name  Start Date  Estimated Completion Date  SIG  Comments

 

 furosemide oral            

 

 pantoprazole 40 mg oral tablet,delayed release (DR/EC)        take 1 tablet (
40 mg) by oral route once daily   

 

 Xarelto 20 mg oral tablet        take 1 tablet (20 mg) by oral route once 
daily with the evening meal   

 

 linezolid 600 mg oral tablet        take 1 tablet (600 mg) by oral route every 
12 hours   

 

 albuterol sulfate 2.5 mg /3 mL (0.083 %) inhalation solution for nebulization 
       Inhale ever 4 hrs as needed for shortness of breath   

 

 enalapril maleate 2.5 mg oral tablet        take 1 tablet (2.5 mg) by oral 
route once daily   

 

 Novolin N 100 unit/mL subcutaneous suspension            

 

 Novolin R 100 unit/mL injection solution            

 

 nitrofurantoin macrocrystal 50 mg oral capsule        Take one tablet PO after 
supper   









 Discontinued 

 

 Name  Start Date  Discontinued Date  SIG  Comments

 

 warfarin oral     2015      

 

 Plavix oral     2015      

 

 simvastatin oral     2015      

 

 omeprazole oral     2015      







Problem List







 Description  Status  Onset

 

 Benign hypertrophy of prostate  Active  2015

 

 Post-traumatic bulbous urethral stricture  Active  2/3/2016

 

 Benign non-nodular prostatic hyperplasia without lower urinary tract symptoms  
Active  2/3/2016

 

 Bacterial UTI  Active  2/3/2016

 

 Stricture of male urethra  Active  6/3/2016

 

 Adenofibromatous hypertrophy of prostate  Active  6/3/2016

 

 Idiopathic stricture of urethra  Active  2016







Vital Signs







 Date  Time  BP-Sys(mm[Hg]  BP-Ansley(mm[Hg])  HR(bpm)  RR(rpm)  Temp  WT  HT  HC  
BMI  BSA  BMI Percentile  O2 Sat(%)

 

 2017  9:44:00 AM  100 mmHg  68 mmHg  72 bpm  18 rpm  98.1 F  198 lbs  69 
in     29.24 kg/m2  2.09 m2     96 %

 

 2016  12:58:00 PM  106 mmHg  66 mmHg  66 bpm  20 rpm  97.9 F  200 lbs  69 
in     29.5345 kg/m  2.1015 m     98 %

 

 6/3/2016  9:56:00 AM                 183 lbs  69 in     27.02 kg/m2  2.01 m2  
    

 

 2/3/2016  8:11:00 AM  108 mmHg  62 mmHg  69 bpm  18 rpm  97.7 F  184 lbs  69 
in     27.1718 kg/m  2.0157 m     97 %

 

 2015  10:04:00 AM                 185 lbs  69 in     27.32 kg/m2  2.02 
m2      

 

 6/3/2015  11:25:00 AM                 205 lbs  69 in     30.2729 kg/m  
2.1277 m      

 

 2015  9:49:00 AM                 205 lbs  69 in     30.27 kg/m2  2.13 m2  
    







Social History







 Name  Description  Comments

 

 Alcohol  Never   

 

 Tobacco  Never smoker   







History of Procedures







 Date Ordered  Description  Order Status

 

 2/3/2016 12:00 AM  ASSAY OF PSA TOTAL  Reviewed

 

 6/3/2016 1:10 PM  URINALYSIS AUTO W/O SCOPE  Reviewed

 

 2016 2:36 PM  URINALYSIS AUTO W/O SCOPE  Reviewed

 

 2017 12:00 AM  ASSAY OF PSA TOTAL  Reviewed

 

 2017 1:14 PM  URINALYSIS AUTO W/O SCOPE  Reviewed

 

 2015 1:41 PM  URINALYSIS AUTO W/O SCOPE  Reviewed

 

 2015 12:00 AM  ASSAY OF PSA TOTAL  Reviewed







Results Summary







 Date and Description  Results

 

 2015 1:41 PM  Bilirub Ur Ql Strip -VE Glucose Ur-sCnc -VE Hgb Ur Ql Strip -
VE Ketones Ur Ql Strip -VE Nitrite Ur Ql Strip -VE pH Ur-LsCnc 6.0 Prot Ur Ql 
Strip -VE Sp Gr Ur Qn 1020 Urobilinogen Ur-mCnc -VE WBC Est Ur Ql Strip -VE 

 

 2016 9:00 AM  COLOR YELLOW APPEARANCE CLEAR SPEC GRAV >=1.030 pH 6.0 
PROTEIN NEGATIVE GLUCOSE NEGATIVE mg/dLKETONE NEGATIVE BILIRUBIN NEGATIVE BLOOD 
SMALL NITRITE NEGATIVE LEUK SCREEN NEGATIVE WBC/HPF RARE RBC/HPF NEGATIVE CASTS/
LPF NEGATIVE /LPFCRYSTALS NEGATIVE MUCOUS THRDS 1+ BACTERIA NEGATIVE EPITH 
CELLS FEW SQUAMOUS /HPFTRICHOMONAS NEGATIVE YEAST NEGATIVE CULT ORDERED YES 

 

 6/3/2016 1:10 PM  Clarity Ur CLEAR Color Ur -- Glucose Ur-sCnc -VE Bilirub Ur 
Ql Strip -VE Ketones Ur Ql Strip -VE Sp Gr Ur Qn 1020 Hgb Ur Ql Strip -VE pH Ur-
LsCnc 6.00 Prot Ur Ql Strip TRACE Urobilinogen Ur-mCnc -VE Nitrite Ur Ql Strip -
VE WBC Est Ur Ql Strip -VE 

 

 2016 2:36 PM  Clarity Ur CLEAR Color Ur ------- Glucose Ur-sCnc -VE 
Bilirub Ur Ql Strip -VE Ketones Ur Ql Strip -VE Sp Gr Ur Qn 1015 Hgb Ur Ql 
Strip -VE pH Ur-LsCnc 6.0 Prot Ur Ql Strip TRACE Urobilinogen Ur-mCnc -VE 
Nitrite Ur Ql Strip -VE WBC Est Ur Ql Strip -VE 

 

 2017 1:14 PM  Clarity Ur CLEAR Color Ur --- Glucose Ur-sCnc -VE Bilirub 
Ur Ql Strip -VE Ketones Ur Ql Strip -VE Sp Gr Ur Qn 1020 Hgb Ur Ql Strip -VE pH 
Ur-LsCnc 5.0 Prot Ur Ql Strip -VE Urobilinogen Ur-mCnc -VE Nitrite Ur Ql Strip -
VE WBC Est Ur Ql Strip -VE 







History Of Immunizations

Not available.



History of Past Illness







 Name  Date of Onset  Comments

 

 Benign hypertrophy of prostate  2015   

 

 Recurrent UTI      

 

 Post-traumatic bulbous urethral stricture  2016   

 

 Benign non-nodular prostatic hyperplasia without lower urinary tract symptoms  
2016   

 

 Bacterial UTI  2016   

 

 Stricture of male urethra  2016   

 

 Adenofibromatous hypertrophy of prostate  2016   

 

 Idiopathic stricture of urethra  2016   

 

 Benign hypertrophy of prostate  2015  9:56AM   

 

 Benign Hypertrophy of Prostate (BPH) Stable  Paulo  3 2015 11:26AM   

 

 BPH (benign prostatic hypertrophy)  Feb  3 2016  8:33AM   

 

 Screening for prostate cancer  Feb  3 2016  8:33AM   

 

 Resolved Bacterial UTI  Feb  3 2016  8:16AM   

 

 Benign non-nodular prostatic hyperplasia without lower urinary tract symptoms  
Feb  3 2016  8:16AM   

 

 Post-traumatic bulbous urethral stricture  Feb  3 2016  8:16AM   

 

 Adenofibromatous hypertrophy of prostate  Paulo  3 2016  9:57AM   

 

 Resolved Stricture of male urethra  Paulo  3 2016  9:57AM   

 

 Resolved Bacterial UTI  Paulo  3 2016  9:57AM   

 

 Urinary tract infection, site not specified  Paulo  3 2016  9:57AM   

 

 Bacterial infection, unspecified  Paulo  3 2016  9:57AM   

 

 Adenofibromatous hypertrophy of prostate  Dec  7 2016  1:01PM   

 

 Resolved Idiopathic stricture of urethra  Dec  7 2016  1:01PM   

 

 Urinary tract infection, site not specified  Dec  7 2016  1:01PM   

 

 Bacterial infection, unspecified  Dec  7 2016  1:01PM   

 

 BPH (benign prostatic hypertrophy)  May 22 2017  8:15AM   

 

 Screening for prostate cancer  May 22 2017  8:15AM   

 

 Adenofibromatous hypertrophy of prostate  2017  9:47AM   

 

 Stricture of male urethra Stable  2017  9:47AM   







Payers







 Insurance Name  Company Name  Plan Name  Plan Number  Policy Number  Policy 
Group Number  Start Date

 

    Medicare Part B  [employer] [place]     [id number]     N/A

 

    BCBS  [employer] [facility]     [id number]     N/A







History of Encounters







 Visit Date  Visit Type  Provider

 

 2017  Office visit  V ADA Helms MD

 

 2016  Office visit  V ADA Helms MD

 

 6/3/2016  Office visit  V ADA Helms MD

 

 2/3/2016  Office visit  V ADA Helms MD

 

 2016  Hospital  V ADA Helms MD

 

 2015  Office visit  V ADA Helms MD

 

 6/3/2015  Office visit   

 

 6/3/2015  Office visit  V ADA Helms MD

 

 2015  Office visit  V ADA Helms MD

## 2017-10-04 NOTE — XMS REPORT
MU2 Ambulatory Summary

 Created on: 2017



Bernardo Moulton

External Reference #: [id number]

: 1940

Sex: Male



Demographics







 Address  [address]

[place]  [address]

 

 Home Phone  [phone]

 

 Preferred Language  English

 

 Marital Status  Never 

 

 Moravian Affiliation  Unknown

 

 Race  White

 

 Ethnic Group  Not  or 





Author







 Author  JUAN MANUEL Helms

 

 Organization  [facility]

 

 Address  [address]

[place], [place]  [address]



 

 Phone  [phone]







Care Team Providers







 Care Team Member Name  Role  Phone

 

 JUAN MANUEL Helms  PCP  Unavailable







Allergies and Adverse Reactions







 Name  Reaction  Notes

 

 morphine      

 

 PENICILLINS      







Plan of Treatment







 Planned Activity  Comments  Planned Date  Planned Time  Plan/Goal

 

 PSA  TOTAL     2017  12:00 AM   







Medications







 Active 

 

 Name  Start Date  Estimated Completion Date  SIG  Comments

 

 furosemide oral            

 

 pantoprazole 40 mg oral tablet,delayed release (DR/EC)        take 1 tablet (
40 mg) by oral route once daily   

 

 Xarelto 20 mg oral tablet        take 1 tablet (20 mg) by oral route once 
daily with the evening meal   

 

 linezolid 600 mg oral tablet        take 1 tablet (600 mg) by oral route every 
12 hours   

 

 albuterol sulfate 2.5 mg /3 mL (0.083 %) inhalation solution for nebulization 
       Inhale ever 4 hrs as needed for shortness of breath   

 

 enalapril maleate 2.5 mg oral tablet        take 1 tablet (2.5 mg) by oral 
route once daily   

 

 Novolin N 100 unit/mL subcutaneous suspension            

 

 Novolin R 100 unit/mL injection solution            

 

 nitrofurantoin macrocrystal 50 mg oral capsule        Take one tablet PO after 
supper   









 Discontinued 

 

 Name  Start Date  Discontinued Date  SIG  Comments

 

 warfarin oral     2015      

 

 Plavix oral     2015      

 

 simvastatin oral     2015      

 

 omeprazole oral     2015      







Problem List







 Description  Status  Onset

 

 Benign hypertrophy of prostate  Active  2015

 

 Post-traumatic bulbous urethral stricture  Active  2/3/2016

 

 Benign non-nodular prostatic hyperplasia without lower urinary tract symptoms  
Active  2/3/2016

 

 Bacterial UTI  Active  2/3/2016

 

 Stricture of male urethra  Active  6/3/2016

 

 Adenofibromatous hypertrophy of prostate  Active  6/3/2016

 

 Idiopathic stricture of urethra  Active  2016







Vital Signs







 Date  Time  BP-Sys(mm[Hg]  BP-Ansley(mm[Hg])  HR(bpm)  RR(rpm)  Temp  WT  HT  HC  
BMI  BSA  BMI Percentile  O2 Sat(%)

 

 2016  12:58:00 PM  106 mmHg  66 mmHg  66 bpm  20 rpm  97.9 F  200 lbs  69 
in     29.53 kg/m2  2.10 m2     98 %

 

 6/3/2016  9:56:00 AM                 183 lbs  69 in     27.0241 kg/m  2.0102 
m      

 

 2/3/2016  8:11:00 AM  108 mmHg  62 mmHg  69 bpm  18 rpm  97.7 F  184 lbs  69 
in     27.17 kg/m2  2.02 m2     97 %

 

 2015  10:04:00 AM                 185 lbs  69 in     27.3194 kg/m  
2.0212 m      

 

 6/3/2015  11:25:00 AM                 205 lbs  69 in     30.27 kg/m2  2.13 m2 
     

 

 2015  9:49:00 AM                 205 lbs  69 in     30.27 kg/m2  2.1277 m
      







Social History







 Name  Description  Comments

 

 Alcohol  Never   

 

 Tobacco  Never smoker   







History of Procedures







 Date Ordered  Description  Order Status

 

 2/3/2016 12:00 AM  ASSAY OF PSA TOTAL  Reviewed

 

 6/3/2016 1:10 PM  URINALYSIS AUTO W/O SCOPE  Reviewed

 

 2016 2:36 PM  URINALYSIS AUTO W/O SCOPE  Reviewed

 

 2015 1:41 PM  URINALYSIS AUTO W/O SCOPE  Reviewed

 

 2015 12:00 AM  ASSAY OF PSA TOTAL  Reviewed







Results Summary







 Date and Description  Results

 

 2015 1:41 PM  Bilirub Ur Ql Strip -VE Glucose Ur-sCnc -VE Hgb Ur Ql Strip -
VE Ketones Ur Ql Strip -VE Nitrite Ur Ql Strip -VE pH Ur-LsCnc 6.0 Prot Ur Ql 
Strip -VE Sp Gr Ur Qn 1020 Urobilinogen Ur-mCnc -VE WBC Est Ur Ql Strip -VE 

 

 2016 9:00 AM  COLOR YELLOW APPEARANCE CLEAR SPEC GRAV >=1.030 pH 6.0 
PROTEIN NEGATIVE GLUCOSE NEGATIVE mg/dLKETONE NEGATIVE BILIRUBIN NEGATIVE BLOOD 
SMALL NITRITE NEGATIVE LEUK SCREEN NEGATIVE WBC/HPF RARE RBC/HPF NEGATIVE CASTS/
LPF NEGATIVE /LPFCRYSTALS NEGATIVE MUCOUS THRDS 1+ BACTERIA NEGATIVE EPITH 
CELLS FEW SQUAMOUS /HPFTRICHOMONAS NEGATIVE YEAST NEGATIVE CULT ORDERED YES 

 

 6/3/2016 1:10 PM  Clarity Ur CLEAR Color Ur -- Glucose Ur-sCnc -VE Bilirub Ur 
Ql Strip -VE Ketones Ur Ql Strip -VE Sp Gr Ur Qn 1020 Hgb Ur Ql Strip -VE pH Ur-
LsCnc 6.00 Prot Ur Ql Strip TRACE Urobilinogen Ur-mCnc -VE Nitrite Ur Ql Strip -
VE WBC Est Ur Ql Strip -VE 

 

 2016 2:36 PM  Clarity Ur CLEAR Color Ur ------- Glucose Ur-sCnc -VE 
Bilirub Ur Ql Strip -VE Ketones Ur Ql Strip -VE Sp Gr Ur Qn 1015 Hgb Ur Ql 
Strip -VE pH Ur-LsCnc 6.0 Prot Ur Ql Strip TRACE Urobilinogen Ur-mCnc -VE 
Nitrite Ur Ql Strip -VE WBC Est Ur Ql Strip -VE 







History Of Immunizations

Not available.



History of Past Illness







 Name  Date of Onset  Comments

 

 Benign hypertrophy of prostate  2015   

 

 Recurrent UTI      

 

 Post-traumatic bulbous urethral stricture  2016   

 

 Benign non-nodular prostatic hyperplasia without lower urinary tract symptoms  
2016   

 

 Bacterial UTI  2016   

 

 Stricture of male urethra  2016   

 

 Adenofibromatous hypertrophy of prostate  2016   

 

 Idiopathic stricture of urethra  2016   

 

 Benign hypertrophy of prostate  2015  9:56AM   

 

 Benign Hypertrophy of Prostate (BPH) Stable  Paulo  3 2015 11:26AM   

 

 BPH (benign prostatic hypertrophy)  Feb  3 2016  8:33AM   

 

 Screening for prostate cancer  Feb  3 2016  8:33AM   

 

 Resolved Bacterial UTI  Feb  3 2016  8:16AM   

 

 Benign non-nodular prostatic hyperplasia without lower urinary tract symptoms  
Feb  3 2016  8:16AM   

 

 Post-traumatic bulbous urethral stricture  Feb  3 2016  8:16AM   

 

 Adenofibromatous hypertrophy of prostate  Paulo  3 2016  9:57AM   

 

 Resolved Stricture of male urethra  Paulo  3 2016  9:57AM   

 

 Resolved Bacterial UTI  Paulo  3 2016  9:57AM   

 

 Urinary tract infection, site not specified  Paulo  3 2016  9:57AM   

 

 Bacterial infection, unspecified  Paulo  3 2016  9:57AM   

 

 Adenofibromatous hypertrophy of prostate  Dec  7 2016  1:01PM   

 

 Resolved Idiopathic stricture of urethra  Dec  7 2016  1:01PM   

 

 Urinary tract infection, site not specified  Dec  7 2016  1:01PM   

 

 Bacterial infection, unspecified  Dec  7 2016  1:01PM   

 

 BPH (benign prostatic hypertrophy)  May 22 2017  8:15AM   

 

 Screening for prostate cancer  May 22 2017  8:15AM   







Payers







 Insurance Name  Company Name  Plan Name  Plan Number  Policy Number  Policy 
Group Number  Start Date

 

    Medicare Part B  [facility]ic[facility]     [id number]     N/A

 

    BCBS  [employer] [place]     [id number]     N/A







History of Encounters







 Visit Date  Visit Type  Provider

 

 2016  Office visit  V ADA Helms MD

 

 6/3/2016  Office visit  V ADA Helms MD

 

 2/3/2016  Office visit  V ADA Helms MD

 

 2016  Hospital  V ADA Helms MD

 

 2015  Office visit  V ADA Helms MD

 

 6/3/2015  Office visit   

 

 6/3/2015  Office visit  V ADA Helms MD

 

 2015  Office visit  V ADA Helms MD

## 2017-10-04 NOTE — XMS REPORT
MU2 Ambulatory Summary

 Created on: 2015



Bernardo Moulton

External Reference #: [id number]

: 1940

Sex: Male



Demographics







 Address  [address]

[place]  [address]

 

 Home Phone  [phone]

 

 Preferred Language  English

 

 Marital Status  Never 

 

 Congregation Affiliation  Unknown

 

 Race  White

 

 Ethnic Group  Not  or 





Author







 Author  JUAN MANUEL Helms

 

 Organization  [facility]

 

 Address  [address]

[place], [place]  [address]



 

 Phone  [phone]







Care Team Providers







 Care Team Member Name  Role  Phone

 

 JUAN MANUEL Helms  PCP  Unavailable







Allergies and Adverse Reactions







 Name  Reaction  Notes

 

 morphine      

 

 PENICILLINS      







Plan of Treatment

Not available.



Medications







 Active 

 

 Name  Start Date  Estimated Completion Date  SIG  Comments

 

 furosemide oral            

 

 pantoprazole 40 mg oral tablet,delayed release (DR/EC)        take 1 tablet (
40 mg) by oral route once daily   

 

 Xarelto 20 mg oral tablet        take 1 tablet (20 mg) by oral route once 
daily with the evening meal   

 

 linezolid 600 mg oral tablet        take 1 tablet (600 mg) by oral route every 
12 hours   

 

 albuterol sulfate 2.5 mg /3 mL (0.083 %) inhalation solution for nebulization 
       Inhale ever 4 hrs as needed for shortness of breath   

 

 enalapril maleate 2.5 mg oral tablet        take 1 tablet (2.5 mg) by oral 
route once daily   

 

 Novolin N 100 unit/mL subcutaneous suspension            

 

 Novolin R 100 unit/mL injection solution            









 Discontinued 

 

 Name  Start Date  Discontinued Date  SIG  Comments

 

 warfarin oral     2015      

 

 Plavix oral     2015      

 

 simvastatin oral     2015      

 

 omeprazole oral     2015      







Problem List







 Description  Status  Onset

 

 Benign hypertrophy of prostate  Active  2015







Vital Signs







 Date  Time  BP-Sys(mm[Hg]  BP-Ansley(mm[Hg])  HR(bpm)  RR(rpm)  Temp  WT  HT  HC  
BMI  BSA  BMI Percentile  O2 Sat(%)

 

 2015  10:04:00 AM                 185 lbs  69 in     27.32 kg/m2  2.02 
m2      

 

 6/3/2015  11:25:00 AM                 205 lbs  69 in     30.2729 kg/m  
2.1277 m      

 

 2015  9:49:00 AM                 205 lbs  69 in     30.27 kg/m2  2.13 m2  
    







Social History







 Name  Description  Comments

 

 Alcohol  Never   

 

 Tobacco  Never smoker   







History of Procedures







 Date Ordered  Description  Order Status

 

 2015 1:41 PM  URINALYSIS AUTO W/O SCOPE  Reviewed

 

 2015 12:00 AM  ASSAY OF PSA TOTAL  Reviewed







Results Summary







 Data and Description  Results

 

 2015 1:41 PM  Bilirub Ur Ql Strip -VE Glucose Ur-sCnc -VE Hgb Ur Ql Strip -
VE Ketones Ur Ql Strip -VE Nitrite Ur Ql Strip -VE pH Ur-LsCnc 6.0 Prot Ur Ql 
Strip -VE Sp Gr Ur Qn 1020 Urobilinogen Ur-mCnc -VE WBC Est Ur Ql Strip -VE 







History Of Immunizations

Not available.



History of Past Illness







 Name  Date of Onset  Comments

 

 Benign hypertrophy of prostate  2015   

 

 Benign hypertrophy of prostate  2015  9:56AM   

 

 Benign Hypertrophy of Prostate (BPH) Stable  Paulo  3 2015 11:26AM   







Payers







 Insurance Name  Company Name  Plan Name  Plan Number  Policy Number  Policy 
Group Number  Start Date

 

    Medicare Part B  [employer] [place]     [id number]     N/A

 

    [facility]     [id number]     N/A







History of Encounters







 Visit Date  Visit Type  Provider

 

 2015  Office visit  V ADA Helms MD

 

 6/3/2015  Office visit  V ADA Helms MD

 

 2015  Office visit  V ADA Helms MD

## 2017-10-04 NOTE — DIAGNOSTIC IMAGING REPORT
INDICATION: Pre-cardiac catheterization, possible coronary

angioplasty.



COMPARISON STUDY: Chest from February the 20th.



FINDINGS: Frontal view of the chest demonstrates previous

surgical clips in the left upper chest. Interstitial lung disease

is unchanged. A cardiac monitoring device is again identified.

The heart size and vascularity are normal.



IMPRESSION: Stable chronic interstitial lung disease.



Dictated by: 



  Dictated on workstation # [id number]

## 2017-10-04 NOTE — XMS REPORT
MU2 Ambulatory Summary

 Created on: 2015



Bernardo Moulton

External Reference #: [id number]

: 1940

Sex: Male



Demographics







 Address  [address]

[place]  [address]

 

 Home Phone  [phone]

 

 Preferred Language  English

 

 Marital Status  Never 

 

 Yazidi Affiliation  Unknown

 

 Race  White

 

 Ethnic Group  Not  or 





Author







 Author  [facility]

 

 Organization  [facility]

 

 Address  [address]

[place], [place]  [address]



 

 Phone  [phone]







Care Team Providers







 Care Team Member Name  Role  Phone

 

  PCP  Unavailable







Allergies and Adverse Reactions







 Name  Reaction  Notes

 

 morphine      

 

 PENICILLINS      







Plan of Treatment

Not available.



Medications







 Active 

 

 Name  Start Date  Estimated Completion Date  SIG  Comments

 

 warfarin oral            

 

 Plavix oral            

 

 simvastatin oral            

 

 furosemide oral            

 

 omeprazole oral            







Problem List







 Description  Status  Onset

 

 Benign hypertrophy of prostate  Active  2015







Vital Signs







 Date  Time  BP-Sys(mm[Hg]  BP-Ansley(mm[Hg])  HR(bpm)  RR(rpm)  Temp  WT  HT  HC  
BMI  BSA  BMI Percentile  O2 Sat(%)

 

 6/3/2015  11:25:00 AM                 205 lbs  69 in     30.27 kg/m2  2.13 m2 
     

 

 2015  9:49:00 AM                 205 lbs  69 in     30.2729 kg/m  2.1277 
m      







Social History







 Name  Description  Comments

 

 Alcohol      

 

 Tobacco  Never smoker   







History of Procedures







 Date Ordered  Description  Order Status

 

 2015 1:41 PM  URINALYSIS AUTO W/O SCOPE  Reviewed

 

 2015 12:00 AM  ASSAY OF PSA TOTAL  Reviewed







Results Summary







 Data and Description  Results

 

 2015 1:41 PM  Bilirub Ur Ql Strip -VE Glucose Ur-sCnc -VE Hgb Ur Ql Strip -
VE Ketones Ur Ql Strip -VE Nitrite Ur Ql Strip -VE pH Ur-LsCnc 6.0 Prot Ur Ql 
Strip -VE Sp Gr Ur Qn 1020 Urobilinogen Ur-mCnc -VE WBC Est Ur Ql Strip -VE 







History Of Immunizations

Not available.



History of Past Illness







 Name  Date of Onset  Comments

 

 Benign hypertrophy of prostate  2015   

 

 Benign hypertrophy of prostate  2015  9:56AM   

 

 Benign Hypertrophy of Prostate (BPH) Stable  Paulo  3 2015 11:26AM   







Payers







 Insurance Name  Company Name  Plan Name  Plan Number  Policy Number  Policy 
Group Number  Start Date

 

    Medicare Part B  [employer] [place]     [id number]     N/A

 

    [facility]     [id number]     N/A







History of Encounters







 Visit Date  Visit Type  Provider

 

 6/3/2015  Office visit  V ADA Helms MD

 

 2015  Office visit  V ADA Helms MD

## 2017-10-04 NOTE — XMS REPORT
MU2 Ambulatory Summary

 Created on: 2016



Bernardo Moulton

External Reference #: [id number]

: 1940

Sex: Male



Demographics







 Address  [address]

[place]  [address]

 

 Home Phone  [phone]

 

 Preferred Language  English

 

 Marital Status  Never 

 

 Spiritism Affiliation  Unknown

 

 Race  White

 

 Ethnic Group  Not  or 





Author







 Author  JUAN MANUEL Helms

 

 Organization  [facility]

 

 Address  [address]

[place], [place]  [id number]



 

 Phone  [phone]







Care Team Providers







 Care Team Member Name  Role  Phone

 

 JUAN MANUEL Helms  PCP  Unavailable







Allergies and Adverse Reactions







 Name  Reaction  Notes

 

 morphine      

 

 PENICILLINS      







Plan of Treatment

Not available.



Medications







 Active 

 

 Name  Start Date  Estimated Completion Date  SIG  Comments

 

 furosemide oral            

 

 pantoprazole 40 mg oral tablet,delayed release (DR/EC)        take 1 tablet (
40 mg) by oral route once daily   

 

 Xarelto 20 mg oral tablet        take 1 tablet (20 mg) by oral route once 
daily with the evening meal   

 

 linezolid 600 mg oral tablet        take 1 tablet (600 mg) by oral route every 
12 hours   

 

 albuterol sulfate 2.5 mg /3 mL (0.083 %) inhalation solution for nebulization 
       Inhale ever 4 hrs as needed for shortness of breath   

 

 enalapril maleate 2.5 mg oral tablet        take 1 tablet (2.5 mg) by oral 
route once daily   

 

 Novolin N 100 unit/mL subcutaneous suspension            

 

 Novolin R 100 unit/mL injection solution            

 

 nitrofurantoin macrocrystal 50 mg oral capsule        Take one tablet PO after 
supper   









 Discontinued 

 

 Name  Start Date  Discontinued Date  SIG  Comments

 

 warfarin oral     2015      

 

 Plavix oral     2015      

 

 simvastatin oral     2015      

 

 omeprazole oral     2015      







Problem List







 Description  Status  Onset

 

 Benign hypertrophy of prostate  Active  2015

 

 Post-traumatic bulbous urethral stricture  Active  2/3/2016

 

 Benign non-nodular prostatic hyperplasia without lower urinary tract symptoms  
Active  2/3/2016

 

 Bacterial UTI  Active  2/3/2016

 

 Stricture of male urethra  Active  6/3/2016

 

 Adenofibromatous hypertrophy of prostate  Active  6/3/2016







Vital Signs







 Date  Time  BP-Sys(mm[Hg]  BP-[place](mm[Hg])  HR(bpm)  RR(rpm)  Temp  WT  HT  HC  
BMI  BSA  BMI Percentile  O2 Sat(%)

 

 2016  12:58:00 PM  106 mmHg  66 mmHg  66 bpm  20 rpm  97.9 F  200 lbs  69 
in     29.53 kg/m2  2.10 m2     98 %

 

 6/3/2016  9:56:00 AM                 183 lbs  69 in     27.0241 kg/m  2.0102 
m      

 

 2/3/2016  8:11:00 AM  108 mmHg  62 mmHg  69 bpm  18 rpm  97.7 F  184 lbs  69 
in     27.17 kg/m2  2.02 m2     97 %

 

 2015  10:04:00 AM                 185 lbs  69 in     27.3194 kg/m  
2.0212 m      

 

 6/3/2015  11:25:00 AM                 205 lbs  69 in     30.27 kg/m2  2.13 m2 
     

 

 2015  9:49:00 AM                 205 lbs  69 in     30.27 kg/m2  2.1277 m
      







Social History







 Name  Description  Comments

 

 Alcohol  Never   

 

 Tobacco  Never smoker   







History of Procedures







 Date Ordered  Description  Order Status

 

 2/3/2016 12:00 AM  ASSAY OF PSA TOTAL  Reviewed

 

 6/3/2016 1:10 PM  URINALYSIS AUTO W/O SCOPE  Reviewed

 

 2015 1:41 PM  URINALYSIS AUTO W/O SCOPE  Reviewed

 

 2015 12:00 AM  ASSAY OF PSA TOTAL  Reviewed







Results Summary







 Data and Description  Results

 

 2015 1:41 PM  Bilirub Ur Ql Strip -VE Glucose Ur-sCnc -VE Hgb Ur Ql Strip -
VE Ketones Ur Ql Strip -VE Nitrite Ur Ql Strip -VE pH Ur-LsCnc 6.0 Prot Ur Ql 
Strip -VE Sp Gr Ur Qn 1020 Urobilinogen Ur-mCnc -VE WBC Est Ur Ql Strip -VE 

 

 2016 9:00 AM  COLOR YELLOW APPEARANCE CLEAR SPEC GRAV >=1.030 pH 6.0 
PROTEIN NEGATIVE GLUCOSE NEGATIVE mg/dLKETONE NEGATIVE BILIRUBIN NEGATIVE BLOOD 
SMALL NITRITE NEGATIVE LEUK SCREEN NEGATIVE WBC/HPF RARE RBC/HPF NEGATIVE CASTS/
LPF NEGATIVE /LPFCRYSTALS NEGATIVE MUCOUS THRDS 1+ BACTERIA NEGATIVE EPITH 
CELLS FEW SQUAMOUS /HPFTRICHOMONAS NEGATIVE YEAST NEGATIVE CULT ORDERED YES 

 

 6/3/2016 1:10 PM  Clarity Ur CLEAR Color Ur -- Glucose Ur-sCnc -VE Bilirub Ur 
Ql Strip -VE Ketones Ur Ql Strip -VE Sp Gr Ur Qn 1020 Hgb Ur Ql Strip -VE pH Ur-
LsCnc 6.00 Prot Ur Ql Strip TRACE Urobilinogen Ur-mCnc -VE Nitrite Ur Ql Strip -
VE WBC Est Ur Ql Strip -VE 







History Of Immunizations

Not available.



History of Past Illness







 Name  Date of Onset  Comments

 

 Benign hypertrophy of prostate  2015   

 

 Recurrent UTI      

 

 Post-traumatic bulbous urethral stricture  2016   

 

 Benign non-nodular prostatic hyperplasia without lower urinary tract symptoms  
2016   

 

 Bacterial UTI  2016   

 

 Stricture of male urethra  2016   

 

 Adenofibromatous hypertrophy of prostate  2016   

 

 Benign hypertrophy of prostate  2015  9:56AM   

 

 Benign Hypertrophy of Prostate (BPH) Stable  Paulo  3 2015 11:26AM   

 

 BPH (benign prostatic hypertrophy)  Feb  3 2016  8:33AM   

 

 Screening for prostate cancer  Feb  3 2016  8:33AM   

 

 Resolved Bacterial UTI  Feb  3 2016  8:16AM   

 

 Benign non-nodular prostatic hyperplasia without lower urinary tract symptoms  
Feb  3 2016  8:16AM   

 

 Post-traumatic bulbous urethral stricture  Feb  3 2016  8:16AM   

 

 Adenofibromatous hypertrophy of prostate  Paulo  3 2016  9:57AM   

 

 Resolved Stricture of male urethra  Paulo  3 2016  9:57AM   

 

 Resolved Bacterial UTI  Paulo  3 2016  9:57AM   

 

 Urinary tract infection, site not specified  Paulo  3 2016  9:57AM   

 

 Bacterial infection, unspecified  Paulo  3 2016  9:57AM   







Payers







 Insurance Name  Company Name  Plan Name  Plan Number  Policy Number  Policy 
Group Number  Start Date

 

    Medicare Part B  Medicare [facility]     [id number]     N/A

 

    BCBS  [facility]     [id number]     N/A







History of Encounters







 Visit Date  Visit Type  Provider

 

 2016  Office visit  V ADA Helms MD

 

 6/3/2016  Office visit  V ADA Helms MD

 

 2/3/2016  Office visit  V ADA Helms MD

 

 2016  Hospital  V ADA Helms MD

 

 2015  Office visit  V ADA Helms MD

 

 6/3/2015  Office visit   

 

 6/3/2015  Office visit  V ADA Helms MD

 

 2015  Office visit  V ADA Helms MD

## 2017-10-04 NOTE — XMS REPORT
Patient Summary (HL7 CCD)

 Created on: 2016



BARBARA PANCHAL

External Reference #: [id number]

: 1940

Sex: Male



Demographics







 Address  [address]

[place]  [address]

 

 Home Phone  [phone]

 

 Preferred Language  English

 

 Marital Status  Unknown

 

 Restoration Affiliation  Unknown

 

 Race  White

 

 Ethnic Group  Not  or 





Author







 Author  RADHIKA CASANOVA

 

 Organization  Unknown

 

 Address  [address]

[place], [place]  [address]



 

 Phone  [phone]







Care Team Providers







 Care Team Member Name  Role  Phone

 

 JUAN MANUEL STEELE MD  Attphys  [phone]



                                            



Vital Signs

          Unknown or Not Available.                                            
                        



Allergies

          





 Allergy        Code        Allergy Type        Reaction        Status    

 

 MORPHINE        7052        Drug allergy                 Active    

 

 PENICILLIN        0        Drug allergy                 Active    



                                                                               
         



Procedures

          





 Procedure        Code        Procedure Type        Date    

 

 Cystourethroscopy        37788        CPT        2016    

 

 UA W/MICRO W/C&S        [id number]        SNOMED CT        2016    

 

 CULTURE URINE        [id number]        SNOMED CT        2016    



                                                                               
                   



History of Immunizations

          Unknown or Not Available.                                            
                                  



Problems

          Unknown or Not Available.                                            
                                            



Results

          







 UA W/MICRO W/C&S - Collect Date/Time: 2016 09:00     

 

 Test Name        Code        Test Result        Test Units        Test Ref 
Range    

 

 COLOR                 YELLOW        N/A        NL: YELLOW    

 

 APPEARANCE                 CLEAR        N/A        NL: CLEAR    

 

 SPEC GRAV                 >=1.030        N/A        NL: 1.002 - 1.022    

 

 pH                 6.0        N/A        NL: 5 - 9    

 

 PROTEIN                 NEGATIVE        N/A        NL: NEGATIVE  mg/dl    

 

 GLUCOSE                 NEGATIVE        N/A        NL: NEGATIVE  mg/dl    

 

 KETONE                 NEGATIVE        N/A        NL: NEGATIVE  mg/dl    

 

 BILIRUBIN                 NEGATIVE        N/A        NL: NEGATIVE    

 

 BLOOD                 SMALL        N/A        NL: NEGATIVE    

 

 NITRITE                 NEGATIVE        N/A        NL: NEGATIVE    

 

 LEUK SCREEN                 NEGATIVE        N/A        NL: NEGATIVE    

 

 WBC/HPF                 RARE        N/A        NL: NEGATIVE    

 

 RBC/HPF                 NEGATIVE        N/A        NL: NEGATIVE    

 

 CASTS/LPF                 NEGATIVE        N/A        NL: NEGATIVE    

 

 CRYSTALS                 NEGATIVE        N/A        NL: NEGATIVE    

 

 MUCOUS THRDS                 1+        N/A        NL: NEGATIVE    

 

 BACTERIA                 NEGATIVE        N/A        NL: NEGATIVE    

 

 EPITH CELLS                 FEW SQUAMOUS        N/A        NL: NEGATIVE    

 

 TRICHOMONAS                 NEGATIVE        N/A        NL: NEGATIVE    

 

 YEAST                 NEGATIVE        N/A        NL: NEGATIVE    

 

 CULT ORDERED                 YES        N/A             



                                                                               
         



Active Medications

          Unknown or Not Available.                                            
                        



Medications Administered During Visit

          Unknown or Not Available.                                            
                                  



Encounters

          





 Encounter Diagnosis        Diagnosis Code        Start Date    

 

 Urinary tract infection, site not specified        N390        2016    



                                                                    



Social History

          





 Smoking Status        Code        Start Date        End Date    

 

 Never smoker        [id number]                      



                                                                    



Patient Decision Aids

          





 Patient Decision Aid    

 

 CYSTOSCOPY; AFTER THE PROCEDURE    



                                                                    



Discharge Instructions

          







         



You were admitted to        [facility] on 2016 07:39 with a principal 
diagnosis of Urinary tract infection, site not specified        



You had the following procedures done:                  Cystourethroscopy      
          



You had the following tests done:                  UA W/MICRO W/C&S            
    



You were discharged from        [facility] on 2016 09:15        



Should you have any questions prior to discharge, please contact a member of 
your healthcare team. If you have left the hospital and have any questions, 
please contact your primary care physician.          



                                                          



Chief Complaint and Reason For Visit

          





 Chief Complaint        Date of Onset    

 

 URO CYSTOSCOPY             



                                                          



Function Status

          Unknown or Not Available.                                            
              



Plan of Care

          Unknown or Not Available.                                            
              



Referral/Transition of Care

          Unknown or Not Available.

## 2018-01-11 ENCOUNTER — HOSPITAL ENCOUNTER (OUTPATIENT)
Dept: HOSPITAL 75 - RAD | Age: 78
End: 2018-01-11
Attending: FAMILY MEDICINE
Payer: MEDICARE

## 2018-01-11 DIAGNOSIS — R31.9: Primary | ICD-10-CM

## 2018-01-11 DIAGNOSIS — M51.36: ICD-10-CM

## 2018-01-11 PROCEDURE — 74176 CT ABD & PELVIS W/O CONTRAST: CPT

## 2018-01-11 NOTE — DIAGNOSTIC IMAGING REPORT
PROCEDURE: CT abdomen and pelvis without contrast.



TECHNIQUE: Multiple contiguous axial images were obtained through

the abdomen and pelvis without the use of intravenous contrast. 



INDICATION: Hematuria.



FINDINGS: The previous CT chest, abdomen and pelvis exam of

06/09/2010 fail to show any sign of nephrolithiasis or

urolithiasis. On this exam, there is still no evidence for a

calculus within either kidney or along the expected paths of the

ureters. Furthermore, the kidneys do not appear to be obstructed.

There is no renal mass identified although small renal mass could

be present yet undetected on noncontrast exam such as this. The

bladder wall is thickened. This is probably related to incomplete

distention. The possibility that there is an element of cystitis

present should still be considered. If further evaluation of the

bladder is desired, then cystoscopy would be recommended.



As noted on the previous exam, there are innumerable diverticula

in the sigmoid and descending colon. There is no evidence for

acute diverticulitis. The appendix was visualized and does not

seem to be abnormally thickened. There is no pelvic mass or free

fluid collection noted. The prostate gland is not enlarged.



The liver, spleen, pancreas, adrenals, gallbladder, aorta and

inferior vena cava are unremarkable for an acute abnormality. The

stomach is partially filled with particulate matter and

consequently difficult to assess.



The images through lung the bases again show chronic pulmonary

changes. There does seem to be a small amount of

pneumonia/atelectasis superimposed on the chronic changes in the

right lung base. Clinical followup is recommended. The bone

windows show no sign of a fracture or destructive lesion. There

is anterior translation of L4 with respect to L5 and narrowing of

the L4-L5 disc space. There also appears to be trefoil stenosis

at this level.



IMPRESSION:

1. There is no evidence for nephrolithiasis or urolithiasis to

account for patient's hematuria. The thickened appearance of the

bladder wall may be secondary to incomplete distention, but the

possibility that there is an element of cystitis present should

still be considered.

2. There is no acute abnormality of the abdomen or pelvis noted

otherwise.

3. There does appear to be small amount of pneumonia/atelectasis

involving the right lung base. Clinical followup is recommended.

4. 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

and there is trefoil stenosis at this level. 



Dictated by: 



  Dictated on workstation # [id number]

## 2018-04-30 ENCOUNTER — HOSPITAL ENCOUNTER (INPATIENT)
Dept: HOSPITAL 75 - ER | Age: 78
LOS: 7 days | Discharge: HOME | DRG: 871 | End: 2018-05-07
Attending: FAMILY MEDICINE | Admitting: FAMILY MEDICINE
Payer: MEDICARE

## 2018-04-30 VITALS — BODY MASS INDEX: 26.75 KG/M2 | HEIGHT: 69 IN | WEIGHT: 180.6 LBS

## 2018-04-30 VITALS — SYSTOLIC BLOOD PRESSURE: 99 MMHG | DIASTOLIC BLOOD PRESSURE: 55 MMHG

## 2018-04-30 VITALS — DIASTOLIC BLOOD PRESSURE: 53 MMHG | SYSTOLIC BLOOD PRESSURE: 98 MMHG

## 2018-04-30 DIAGNOSIS — M19.91: ICD-10-CM

## 2018-04-30 DIAGNOSIS — H91.90: ICD-10-CM

## 2018-04-30 DIAGNOSIS — Z79.4: ICD-10-CM

## 2018-04-30 DIAGNOSIS — Z95.828: ICD-10-CM

## 2018-04-30 DIAGNOSIS — I48.91: ICD-10-CM

## 2018-04-30 DIAGNOSIS — Z97.8: ICD-10-CM

## 2018-04-30 DIAGNOSIS — Z95.1: ICD-10-CM

## 2018-04-30 DIAGNOSIS — Z90.2: ICD-10-CM

## 2018-04-30 DIAGNOSIS — G47.30: ICD-10-CM

## 2018-04-30 DIAGNOSIS — Z99.81: ICD-10-CM

## 2018-04-30 DIAGNOSIS — Z90.79: ICD-10-CM

## 2018-04-30 DIAGNOSIS — I25.10: ICD-10-CM

## 2018-04-30 DIAGNOSIS — J18.9: ICD-10-CM

## 2018-04-30 DIAGNOSIS — I11.0: ICD-10-CM

## 2018-04-30 DIAGNOSIS — A41.9: Primary | ICD-10-CM

## 2018-04-30 DIAGNOSIS — E78.00: ICD-10-CM

## 2018-04-30 DIAGNOSIS — Z86.73: ICD-10-CM

## 2018-04-30 DIAGNOSIS — Z96.651: ICD-10-CM

## 2018-04-30 DIAGNOSIS — Z87.891: ICD-10-CM

## 2018-04-30 DIAGNOSIS — Z95.5: ICD-10-CM

## 2018-04-30 DIAGNOSIS — I50.9: ICD-10-CM

## 2018-04-30 DIAGNOSIS — E11.51: ICD-10-CM

## 2018-04-30 DIAGNOSIS — J43.9: ICD-10-CM

## 2018-04-30 LAB
ALBUMIN SERPL-MCNC: 3.9 GM/DL (ref 3.2–4.5)
ALP SERPL-CCNC: 71 U/L (ref 40–136)
ALT SERPL-CCNC: 15 U/L (ref 0–55)
APTT BLD: 47 SEC (ref 24–35)
APTT PPP: YELLOW S
BACTERIA #/AREA URNS HPF: (no result) /HPF
BASOPHILS # BLD AUTO: 0 10^3/UL (ref 0–0.1)
BASOPHILS NFR BLD AUTO: 0 % (ref 0–10)
BILIRUB SERPL-MCNC: 0.6 MG/DL (ref 0.1–1)
BILIRUB UR QL STRIP: NEGATIVE
BUN/CREAT SERPL: 18
CALCIUM SERPL-MCNC: 8.5 MG/DL (ref 8.5–10.1)
CHLORIDE SERPL-SCNC: 102 MMOL/L (ref 98–107)
CK MB SERPL-MCNC: 1.4 NG/ML (ref ?–6.6)
CK SERPL-CCNC: 136 U/L (ref 30–200)
CO2 SERPL-SCNC: 26 MMOL/L (ref 21–32)
CREAT SERPL-MCNC: 0.98 MG/DL (ref 0.6–1.3)
EOSINOPHIL # BLD AUTO: 0 10^3/UL (ref 0–0.3)
EOSINOPHIL NFR BLD AUTO: 0 % (ref 0–10)
ERYTHROCYTE [DISTWIDTH] IN BLOOD BY AUTOMATED COUNT: 14.3 % (ref 10–14.5)
FIBRINOGEN PPP-MCNC: CLEAR MG/DL
GFR SERPLBLD BASED ON 1.73 SQ M-ARVRAT: > 60 ML/MIN
GLUCOSE SERPL-MCNC: 198 MG/DL (ref 70–105)
GLUCOSE UR STRIP-MCNC: NEGATIVE MG/DL
HCT VFR BLD CALC: 35 % (ref 40–54)
HGB BLD-MCNC: 11.7 G/DL (ref 13.3–17.7)
INR PPP: 1.5 (ref 0.8–1.4)
KETONES UR QL STRIP: NEGATIVE
LEUKOCYTE ESTERASE UR QL STRIP: NEGATIVE
LYMPHOCYTES # BLD AUTO: 1.5 X 10^3 (ref 1–4)
LYMPHOCYTES NFR BLD AUTO: 12 % (ref 12–44)
MAGNESIUM SERPL-MCNC: 2 MG/DL (ref 1.8–2.4)
MANUAL DIFFERENTIAL PERFORMED BLD QL: NO
MCH RBC QN AUTO: 31 PG (ref 25–34)
MCHC RBC AUTO-ENTMCNC: 33 G/DL (ref 32–36)
MCV RBC AUTO: 95 FL (ref 80–99)
MONOCYTES # BLD AUTO: 1.3 X 10^3 (ref 0–1)
MONOCYTES NFR BLD AUTO: 10 % (ref 0–12)
NEUTROPHILS # BLD AUTO: 10.4 X 10^3 (ref 1.8–7.8)
NEUTROPHILS NFR BLD AUTO: 78 % (ref 42–75)
NITRITE UR QL STRIP: NEGATIVE
PH UR STRIP: 5 [PH] (ref 5–9)
PLATELET # BLD: 234 10^3/UL (ref 130–400)
PMV BLD AUTO: 9 FL (ref 7.4–10.4)
POTASSIUM SERPL-SCNC: 4.3 MMOL/L (ref 3.6–5)
PROT SERPL-MCNC: 7 GM/DL (ref 6.4–8.2)
PROT UR QL STRIP: NEGATIVE
PROTHROMBIN TIME: 18.5 SEC (ref 12.2–14.7)
RBC # BLD AUTO: 3.72 10^6/UL (ref 4.35–5.85)
RBC #/AREA URNS HPF: (no result) /HPF
SODIUM SERPL-SCNC: 137 MMOL/L (ref 135–145)
SP GR UR STRIP: 1.01 (ref 1.02–1.02)
SQUAMOUS #/AREA URNS HPF: (no result) /HPF
UROBILINOGEN UR-MCNC: NORMAL MG/DL
WBC # BLD AUTO: 13.3 10^3/UL (ref 4.3–11)
WBC #/AREA URNS HPF: (no result) /HPF

## 2018-04-30 PROCEDURE — 71045 X-RAY EXAM CHEST 1 VIEW: CPT

## 2018-04-30 PROCEDURE — 80053 COMPREHEN METABOLIC PANEL: CPT

## 2018-04-30 PROCEDURE — 80048 BASIC METABOLIC PNL TOTAL CA: CPT

## 2018-04-30 PROCEDURE — 85007 BL SMEAR W/DIFF WBC COUNT: CPT

## 2018-04-30 PROCEDURE — 82553 CREATINE MB FRACTION: CPT

## 2018-04-30 PROCEDURE — 81000 URINALYSIS NONAUTO W/SCOPE: CPT

## 2018-04-30 PROCEDURE — 96375 TX/PRO/DX INJ NEW DRUG ADDON: CPT

## 2018-04-30 PROCEDURE — 87077 CULTURE AEROBIC IDENTIFY: CPT

## 2018-04-30 PROCEDURE — 85610 PROTHROMBIN TIME: CPT

## 2018-04-30 PROCEDURE — 85730 THROMBOPLASTIN TIME PARTIAL: CPT

## 2018-04-30 PROCEDURE — 85027 COMPLETE CBC AUTOMATED: CPT

## 2018-04-30 PROCEDURE — 87205 SMEAR GRAM STAIN: CPT

## 2018-04-30 PROCEDURE — 71046 X-RAY EXAM CHEST 2 VIEWS: CPT

## 2018-04-30 PROCEDURE — 94640 AIRWAY INHALATION TREATMENT: CPT

## 2018-04-30 PROCEDURE — 82550 ASSAY OF CK (CPK): CPT

## 2018-04-30 PROCEDURE — 83605 ASSAY OF LACTIC ACID: CPT

## 2018-04-30 PROCEDURE — 87804 INFLUENZA ASSAY W/OPTIC: CPT

## 2018-04-30 PROCEDURE — 96374 THER/PROPH/DIAG INJ IV PUSH: CPT

## 2018-04-30 PROCEDURE — 83880 ASSAY OF NATRIURETIC PEPTIDE: CPT

## 2018-04-30 PROCEDURE — 93005 ELECTROCARDIOGRAM TRACING: CPT

## 2018-04-30 PROCEDURE — 93041 RHYTHM ECG TRACING: CPT

## 2018-04-30 PROCEDURE — 87040 BLOOD CULTURE FOR BACTERIA: CPT

## 2018-04-30 PROCEDURE — 84484 ASSAY OF TROPONIN QUANT: CPT

## 2018-04-30 PROCEDURE — 36415 COLL VENOUS BLD VENIPUNCTURE: CPT

## 2018-04-30 PROCEDURE — 83735 ASSAY OF MAGNESIUM: CPT

## 2018-04-30 PROCEDURE — 94760 N-INVAS EAR/PLS OXIMETRY 1: CPT

## 2018-04-30 PROCEDURE — 94664 DEMO&/EVAL PT USE INHALER: CPT

## 2018-04-30 PROCEDURE — 87186 SC STD MICRODIL/AGAR DIL: CPT

## 2018-04-30 PROCEDURE — 87070 CULTURE OTHR SPECIMN AEROBIC: CPT

## 2018-04-30 PROCEDURE — 85025 COMPLETE CBC W/AUTO DIFF WBC: CPT

## 2018-04-30 PROCEDURE — 82962 GLUCOSE BLOOD TEST: CPT

## 2018-04-30 RX ADMIN — IPRATROPIUM BROMIDE AND ALBUTEROL SULFATE SCH ML: .5; 3 SOLUTION RESPIRATORY (INHALATION) at 19:12

## 2018-04-30 RX ADMIN — DILTIAZEM HYDROCHLORIDE SCH MG: 30 TABLET, FILM COATED ORAL at 21:34

## 2018-04-30 RX ADMIN — SODIUM CHLORIDE SCH MLS/HR: 900 INJECTION INTRAVENOUS at 14:12

## 2018-04-30 RX ADMIN — Medication SCH ML: at 14:13

## 2018-04-30 RX ADMIN — SODIUM CHLORIDE SCH MLS/HR: 900 INJECTION INTRAVENOUS at 15:18

## 2018-04-30 RX ADMIN — METHYLPREDNISOLONE SODIUM SUCCINATE SCH MG: 125 INJECTION, POWDER, FOR SOLUTION INTRAMUSCULAR; INTRAVENOUS at 17:08

## 2018-04-30 RX ADMIN — METHYLPREDNISOLONE SODIUM SUCCINATE SCH MG: 125 INJECTION, POWDER, FOR SOLUTION INTRAMUSCULAR; INTRAVENOUS at 23:23

## 2018-04-30 RX ADMIN — WARFARIN SODIUM SCH MG: 5 TABLET ORAL at 17:08

## 2018-04-30 RX ADMIN — FLUTICASONE PROPIONATE AND SALMETEROL XINAFOATE SCH PUFF: 115; 21 AEROSOL, METERED RESPIRATORY (INHALATION) at 19:13

## 2018-04-30 RX ADMIN — Medication SCH ML: at 21:35

## 2018-04-30 RX ADMIN — IPRATROPIUM BROMIDE AND ALBUTEROL SULFATE SCH ML: .5; 3 SOLUTION RESPIRATORY (INHALATION) at 22:57

## 2018-04-30 RX ADMIN — POTASSIUM CHLORIDE SCH MEQ: 750 TABLET, FILM COATED, EXTENDED RELEASE ORAL at 17:08

## 2018-04-30 RX ADMIN — INSULIN ASPART SCH UNIT: 100 INJECTION, SOLUTION INTRAVENOUS; SUBCUTANEOUS at 21:34

## 2018-04-30 RX ADMIN — DILTIAZEM HYDROCHLORIDE SCH MG: 30 TABLET, FILM COATED ORAL at 17:08

## 2018-04-30 RX ADMIN — INSULIN ASPART SCH UNITS: 100 INJECTION, SOLUTION INTRAVENOUS; SUBCUTANEOUS at 17:22

## 2018-04-30 RX ADMIN — SIMVASTATIN SCH MG: 20 TABLET, FILM COATED ORAL at 21:35

## 2018-04-30 NOTE — DIAGNOSTIC IMAGING REPORT
INDICATION:  Several days of shortness of breath..



TECHNIQUE:  Single view chest 11:48 AM.



CORRELATION STUDY:  02/20/2017, 10/04/2017



FINDINGS: 

Asymmetric elevated right diaphragm is again demonstrated. There

appears to be some degree of increasing atelectasis or perhaps

infiltrate of the right lung base. Additionally, there is

irregular parenchymal density in the right lung apex.  The

remaining lung fields with prominent interstitial markings

overall generally stable. Heart size is enlarged with a cardiac

monitor device over the left heart margins. Vasculature is

increased suggesting some degree of fluid overload or failure.

Some fullness in the right andrey appears generally stable.

Deformity about the left posterior lateral chest wall unchanged.

Surgical clips over the left superior paramediastinal region.



IMPRESSION: 

1. Chronic change of the lung fields. There does appear to be

likely superimposed area of infiltrate of the right lung base.

2. Cardiac enlargement with vascular congestion appearing

adversely changed since previous imaging.

3. Suggestion of faint parenchymal density right lung apex. Would

recommend short-term followup imaging for reassessment. This

could be owing to chronic pleural thickening with possibility of

mass lesion not excluded.     



Dictated by: 



  Dictated on workstation # [id number]

## 2018-04-30 NOTE — ED RESPIRATORY
General


Chief Complaint:  Respiratory Problems


Stated Complaint:  PNEUMONIA;SEPSIS;CHF


Nursing Triage Note:  


PATIENT ON FLOOR OF ED SUDA HUI. PLACED IN WHEELCHAIR AND SENT BACK FOR 


SOB. PATIENT SOB SINCE SATURDAY, CAME FROM DR. CONNELLY'S OFFICE. PATIENT 


STATES HE HAS 1/2 A LUNG.


Source:  patient





History of Present Illness


Date Seen by Provider:  Apr 30, 2018


Time Seen by Provider:  11:28


Initial Comments


PT ARRIVES VIA POV FROM DR. CONNELLY'S OFFICE-NO CALL


PT UNABLE TO MAKE IT INTO ER FROM PARKING LOT--COLLAPSED IN THE ENTRYWAY, DUE 

TO SHORTNESS OF BREATH  AND GENERALIZED WEAKNESS--NO INJURY OR SYNCOPE


PT STATES HE HAS BEEN SHORT OF BREATH SINCE SATURDAY 04/28/18


HAS ALSO BEEN RUNNING FEVER SINCE AT LEAST YESTERDAY--PT STATES IT  AT 

DR. CONNELLY'S OFFICE


TOOK 1 TYLENOL LAST PM FOR FEVER


C/O PRODUCTIVE COUGH WITH COLORED SPUTUM


NO CHEST PAIN 


NO SWELLING IN LEGS /FEET OR PAIN IN CALVES





PT STATES HE ONLY HAS HALF A LUNG ON THE RIGHT--PT STATES WAS REMOVED FOR 

BENIGN DISEASE


WEARS HOME O2 AT NIGHT ONLY


HAS COPD, HAS NOT USED INHALERS THIS MORNING





PCP: DR. CONNELLY





Allergies and Home Medications


Allergies


Coded Allergies:  


     morphine (Verified  Allergy, Mild, 12/16/16)


     Sulfa (Sulfonamide Antibiotics) (Verified  Allergy, Unknown, 12/16/16)


     penicillin G (Verified  Allergy, Unknown, 12/16/16)


     levofloxacin (Verified  Adverse Reaction, Unknown, 12/16/16)





Home Medications


Albuterol Sulfate 2.5 Mg/3 Ml Vial.neb, 2.5 MG IH Q6H PRN for SHORTNESS OF 

BREATH, (Reported)


Diltiazem HCl 30 Mg Tablet, 30 MG PO TID, (Reported)


Enalapril Maleate 2.5 Mg Tablet, 2.5 MG PO DAILY, (Reported)


Fluticasone/Vilanterol 1 Each Blst.w.dev, 1 PUFF IH DAILY, (Reported)


Furosemide 40 Mg Tablet, 40 MG PO DAILY, (Reported)


Gabapentin 100 Mg Capsule, 100 MG PO TID, (Reported)


Insulin Nph Human Recom 10 Unit/0.1 Ml Susp, 10 UNIT SQ HS, (Reported)


   MIXES WITH 4 UNITS OF R INSULIN EVERY EVENING FOR A TOTAL DOSE OF 14 UNITS 


Insulin Nph Human Recom 10 Unit/0.1 Ml Susp, 15 UNIT SQ AM, (Reported)


   MIXES WITH 4 UNITS OF R INSULIN EVERY MORNING FOR A TOTAL DOSE OF 19 UNITS 


Insulin Regular, Human 100 Unit/1 Ml Vial, 4 UNIT SQ BID, (Reported)


   MIXES WITH N INSULIN EVERY MORNING AND EVENING 


Montelukast Sodium 10 Mg Tablet, 10 MG PO DAILY, (Reported)


Pantoprazole Sodium 40 Mg Tablet.dr, 40 MG PO DAILY, (Reported)


Potassium Chloride 10 Meq Tab.er.prt, 10 MEQ PO Q48H, (Reported)


Simvastatin 20 Mg Tablet, 20 MG PO HS, (Reported)


Tramadol HCl 50 Mg Tablet, 50 MG PO Q6HR, (Reported)


Warfarin Sodium 5 Mg Tablet, 5 MG PO SuMoTuWeFrSa, (Reported)


Warfarin Sodium 5 Mg Tablet, 7.5 MG PO Th, (Reported)


   TAKES 1 & 1/2 (5MG) TABLETS 





Patient Home Medication List


Home Medication List Reviewed:  Yes





Review of Systems


Constitutional:  see HPI, chills, fever, malaise, weakness


EENTM:  no symptoms reported


Respiratory:  see HPI, cough, dyspnea on exertion, phlegm, short of breath, 

wheezing


Cardiovascular:  No chest pain, No edema, No palpitations, No syncope


Gastrointestinal:  no symptoms reported


Genitourinary:  no symptoms reported


Musculoskeletal:  no symptoms reported


Skin:  no symptoms reported


Psychiatric/Neurological:  No Symptoms Reported


Hematologic/Lymphatic:  No Symptoms Reported


Immunological/Allergic:  no symptoms reported





Past Medical-Social-Family Hx


Patient Social History


Alcohol Use:  Denies Use


Recreational Drug Use:  No


Smoking Status:  Former Smoker (1 PPD SINCE AGE 6-SMOKED FOR OVER 40 YEARS, 

QUIT 1985)


Type Used:  Cigarettes


2nd Hand Smoke Exposure:  No


Recent Foreign Travel:  No


Contact w/Someone Who Travel:  No


Recent Infectious Disease Expo:  No


Recent Hopitalizations:  No (WEMT HOME ABOUT 4 DAYS AGO FROM HOSPITAL FROM 

PNEUMONIA)


Physical Abuse:  No


Sexual Abuse:  No





Immunizations Up To Date


Tetanus Booster (TDap):  More than 5yrs


PED Vaccines UTD:  No


Date of Pneumonia Vaccine:  Oct 3, 2016


Date of Influenza Vaccine:  Sep 4, 2017





Seasonal Allergies


Seasonal Allergies:  No





Past Medical History


Surgeries:  Yes (RIGHT PARTIAL PNUEMONECTOMY, PROSTATE SX; LOOP RECORDER 

IMPLANTED; CATARACTS; RIGHT KNEE REPLACEMENT; CARDIAC CATHS--CORONARY STENTS + 

RIGHT ILIAC STENT--LAST CARDIAC CATH 10/04/17--NO INTERVENTION)


Cardiac, CABG, Coronary Stent, Eye Surgery, Joint Replacement, Lobectomy, 

Orthopedic, Prostatectomy, Tonsillectomy


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 )


Pneumonia, Sleep Apnea, COPD, Emphysema


Currently Using CPAP:  No


Currently Using BIPAP:  No


Cardiac:  Yes (IMPLATED LOOP RECORDER; CAROTID DISEASE + PERIPHERAL VASCULAR 

DISEASE; CARDIAC STENTS + RIGHT ILIAC STENT; EPISODE OF V-TACH)


Atrial Fibrillation, Coronary Artery Disease, High Cholesterol, Hypertension, 

Peripheral Vascular


Neurological:  Yes


Stroke


Reproductive Disorders:  No


Sexually Transmitted Disease:  No


Genitourinary:  Yes


Benign Prostatic Hyperpl, Bladder Infection


Gastrointestinal:  Yes


Gastrointestinal Bleed


Musculoskeletal:  Yes


Arthritis


Endocrine:  Yes


Diabetes, Insulin dep


HEENT:  Yes


Cataract


Loss of Vision:  Denies


Hearing Impairment:  Hard of Hearing


Cancer:  No


Psychosocial:  No


Nursing Suicide Risk Score:  0


Integumentary:  No


Blood Disorders:  No


Adverse Reaction/Blood Tranf:  No





Family Medical History





BLADD


  09 BROTHER


BLADD


  09 BROTHER


Cancer


  03 FATHER (THROAT)


  09 BROTHER


Dementia


  03 MOTHER


FH: bladder cancer


FH: bladder cancer


Family history: Diabetes mellitus


  03 MOTHER


Family history: Thyroid disorder


  03 MOTHER


Infertile


  03 MOTHER (X5 MISCARRIAGES)


Myocardial infarction


  09 BROTHER





No Family History of:


  Abdominal aortic aneurysm


  [place]'s disease


  Alcoholism


  Aphasia


  Cancer of colon


  Cataract


  Chest pain


  Congenital heart disease


  Congestive heart failure


  Cystic fibrosis


  Dysphagia


  Family history: Allergy


  Family history: Alzheimer's disease


  Family history: Arthritis


  Family history: Asthma


  Family history: Breast disease


  Family history: Cardiovascular disease


  Family history: Coronary thrombosis


  Family history: Gastrointestinal disease


  Family history: Glaucoma


  Family history: Hypertension


  Family history: Osteoporosis


  Headache


  Hearing loss


  Heart disease


  Hereditary disease


  History of - anemia


  History of - disorder


  History of - respiratory disease


  History of drug abuse


  Human immunodeficiency virus (HIV) seropositivity


  Hypercholesterolemia


  Kidney disease


  Malignant neoplasm of lung


  Parkinson's disease


  Prostate cancer


  Psychotic disorder


  Seizure disorder


  Stroke


  Tuberculosis


  Visual impairment


Heart Disease, Cancer, Stroke, Other Conditions/Hx





Physical Exam


Vital Signs





Vital Signs - First Documented








 4/30/18





 11:30


 


Temp 100.7


 


Pulse 95


 


Resp 26


 


B/P (MAP) 137/79 (98)


 


Pulse Ox 97


 


O2 Delivery Nasal Cannula


 


O2 Flow Rate 2.00





Capillary Refill : Less Than 3 Seconds


General Appearance:  moderate distress


HEENT:  PERRL/EOMI


Neck:  normal inspection


Respiratory:  respiratory distress, decreased breath sounds (IN RIGHT BASE), 

accessory muscle use, rales, wheezing


Cardiovascular:  normal peripheral pulses, regular rate, rhythm, no edema, no 

JVD, no murmur


Gastrointestinal:  normal bowel sounds, non tender, soft


Extremities:  normal inspection, no pedal edema, no calf tenderness, normal 

capillary refill


Neurologic/Psychiatric:  CNs II-XII nml as tested, no motor/sensory deficits, 

alert, normal mood/affect, oriented x 3


Skin:  normal color, warm/dry





Focused Exam


Lactate Level


4/30/18 11:30: Lactic Acid Level 2.77*H





Lactic Acid Level





Laboratory Tests








Test


 4/30/18


11:30


 


Lactic Acid Level


 2.77 MMOL/L


(0.50-2.00)  *H











Progress/Results/Core Measures


Suspected Sepsis


Recent Fever Within 48 Hours:  Yes


Infection Criteria Present:  Suspected New Infection


New/Unexplained  Altered Menta:  No


Sepsis Screen:  Possible Severe Sepsis Risk


SIRS


Temperature:100.7 


Pulse: 91 


Respiratory Rate: 25


 


Laboratory Tests


4/30/18 11:30: White Blood Count 13.3H


Blood Pressure 111 /49 


Mean: 69


 


4/30/18 11:30: Lactic Acid Level 2.77*H


Laboratory Tests


4/30/18 11:30: 


Creatinine 0.98, INR Comment 1.5H, Platelet Count 234, Total Bilirubin 0.6








Results/Orders


Lab Results





Laboratory Tests








Test


 4/30/18


11:30 4/30/18


12:15 Range/Units


 


 


White Blood Count


 13.3 H


 


 4.3-11.0


10^3/uL


 


Red Blood Count


 3.72 L


 


 4.35-5.85


10^6/uL


 


Hemoglobin 11.7 L  13.3-17.7  G/DL


 


Hematocrit 35 L  40-54  %


 


Mean Corpuscular Volume 95   80-99  FL


 


Mean Corpuscular Hemoglobin 31   25-34  PG


 


Mean Corpuscular Hemoglobin


Concent 33 


 


 32-36  G/DL





 


Red Cell Distribution Width 14.3   10.0-14.5  %


 


Platelet Count


 234 


 


 130-400


10^3/uL


 


Mean Platelet Volume 9.0   7.4-10.4  FL


 


Neutrophils (%) (Auto) 78 H  42-75  %


 


Lymphocytes (%) (Auto) 12   12-44  %


 


Monocytes (%) (Auto) 10   0-12  %


 


Eosinophils (%) (Auto) 0   0-10  %


 


Basophils (%) (Auto) 0   0-10  %


 


Neutrophils # (Auto) 10.4 H  1.8-7.8  X 10^3


 


Lymphocytes # (Auto) 1.5   1.0-4.0  X 10^3


 


Monocytes # (Auto) 1.3 H  0.0-1.0  X 10^3


 


Eosinophils # (Auto)


 0.0 


 


 0.0-0.3


10^3/uL


 


Basophils # (Auto)


 0.0 


 


 0.0-0.1


10^3/uL


 


Prothrombin Time 18.5 H  12.2-14.7  SEC


 


INR Comment 1.5 H  0.8-1.4  


 


Activated Partial


Thromboplast Time 47 H


 


 24-35  SEC





 


Sodium Level 137   135-145  MMOL/L


 


Potassium Level 4.3   3.6-5.0  MMOL/L


 


Chloride Level 102     MMOL/L


 


Carbon Dioxide Level 26   21-32  MMOL/L


 


Anion Gap 9   5-14  MMOL/L


 


Blood Urea Nitrogen 18   7-18  MG/DL


 


Creatinine


 0.98 


 


 0.60-1.30


MG/DL


 


Estimat Glomerular Filtration


Rate > 60 


 


  





 


BUN/Creatinine Ratio 18    


 


Glucose Level 198 H    MG/DL


 


Lactic Acid Level


 2.77 *H


 


 0.50-2.00


MMOL/L


 


Calcium Level 8.5   8.5-10.1  MG/DL


 


Magnesium Level 2.0   1.8-2.4  MG/DL


 


Total Bilirubin 0.6   0.1-1.0  MG/DL


 


Aspartate Amino Transf


(AST/SGOT) 18 


 


 5-34  U/L





 


Alanine Aminotransferase


(ALT/SGPT) 15 


 


 0-55  U/L





 


Alkaline Phosphatase 71     U/L


 


Total Creatine Kinase 136     U/L


 


Creatine Kinase MB 1.4   <6.6  NG/ML


 


Troponin I < 0.30   <0.30  NG/ML


 


B-Type Natriuretic Peptide 127.1 H  <100.0  PG/ML


 


Total Protein 7.0   6.4-8.2  GM/DL


 


Albumin 3.9   3.2-4.5  GM/DL


 


Urine Color  YELLOW   


 


Urine Clarity  CLEAR   


 


Urine pH  5  5-9  


 


Urine Specific Gravity  1.010 L 1.016-1.022  


 


Urine Protein  NEGATIVE  NEGATIVE  


 


Urine Glucose (UA)  NEGATIVE  NEGATIVE  


 


Urine Ketones  NEGATIVE  NEGATIVE  


 


Urine Nitrite  NEGATIVE  NEGATIVE  


 


Urine Bilirubin  NEGATIVE  NEGATIVE  


 


Urine Urobilinogen  NORMAL  NORMAL  MG/DL


 


Urine Leukocyte Esterase  NEGATIVE  NEGATIVE  


 


Urine RBC (Auto)  3+ H NEGATIVE  


 


Urine RBC  0-2   /HPF


 


Urine WBC  NONE   /HPF


 


Urine Squamous Epithelial


Cells 


 0-2 


  /HPF





 


Urine Crystals  NONE   /LPF


 


Urine Bacteria  TRACE   /HPF


 


Urine Casts  NONE   /LPF


 


Urine Mucus  NEGATIVE   /LPF


 


Urine Culture Indicated  NO   








Micro Results





Microbiology


4/30/18 Influenza Types A,B Antigen ([place]) - Final, Complete


          





My Orders





Orders - BACILIO MICHELLE DO


Saline Lock/Iv-Start (4/30/18 11:33)


Ekg Tracing (4/30/18 11:33)


O2 (4/30/18 11:33)


Monitor-Rhythm Ecg Trace Only (4/30/18 11:33)


BNP (4/30/18 11:33)


Cbc With Automated Diff (4/30/18 11:33)


Comprehensive Metabolic Panel (4/30/18 11:33)


Creatine Kinase (4/30/18 11:33)


Creatine Kinase Mb (4/30/18 11:33)


Lactic Acid Analyzer (4/30/18 11:33)


Magnesium (4/30/18 11:33)


Protime With Inr (4/30/18 11:33)


Partial Thromboplastin Time (4/30/18 11:33)


Troponin I (4/30/18 11:33)


Ua Culture If Indicated (4/30/18 11:33)


Blood Culture (4/30/18 11:33)


Influenza A And B Antigens (4/30/18 11:33)


Chest 1 View, Ap/Pa Only (4/30/18 11:33)


Methylprednisolone Sod Succ (Solu-Medrol (4/30/18 11:45)


Albuterol/Ipra Inhalation Soln (Duoneb I (4/30/18 11:45)


Dexamethasone Injection (Decadron Inject (4/30/18 11:45)


Rt Request For Service (Other) (4/30/18 11:33)


Svn Small Volume Nebulizer (4/30/18 11:33)


Sputum Culture (4/30/18 11:33)


Acetaminophen  Tablet (Tylenol  Tablet) (4/30/18 12:00)


Ibuprofen  Tablet (Motrin  Tablet) (4/30/18 12:00)


Furosemide  Injection (Lasix  Injection) (4/30/18 12:45)





Medications Given in ED





Current Medications








 Medications  Dose


 Ordered  Sig/Bhavin


 Route  Start Time


 Stop Time Status Last Admin


Dose Admin


 


 Acetaminophen  1,000 mg  ONCE  ONCE


 PO  4/30/18 12:00


 4/30/18 12:01 DC 4/30/18 12:05


1,000 MG


 


 Albuterol/


 Ipratropium  3 ml  ONCE  ONCE


 INH  4/30/18 11:45


 4/30/18 11:46 DC 4/30/18 11:49


3 ML


 


 Dexamethasone


 Sodium Phosphate  20 mg  ONCE  ONCE


 IH  4/30/18 11:45


 4/30/18 11:46 DC 4/30/18 11:49


20 MG


 


 Ibuprofen  800 mg  ONCE  ONCE


 PO  4/30/18 12:00


 4/30/18 12:01 DC 4/30/18 12:05


800 MG


 


 Methylprednisolone


 Sodium Succinate  125 mg  ONCE  ONCE


 IVP  4/30/18 11:45


 4/30/18 11:46 DC 4/30/18 11:53


125 MG








Vital Signs/I&O











 4/30/18 4/30/18 4/30/18 4/30/18





 11:30 11:31 11:49 12:50


 


Temp 100.7   100.7


 


Pulse 95   91


 


Resp 26   25


 


B/P (MAP) 137/79 (98)   111/49


 


Pulse Ox 97 97 98 98


 


O2 Delivery Nasal Cannula Nasal Cannula Nasal Cannula Nasal Cannula


 


O2 Flow Rate 2.00 2.00 2.00 2.00





Capillary Refill : Less Than 3 Seconds








Blood Pressure Mean:  69








Progress Note :  


Progress Note


INCREASED AERATION, DECREASED WHEEZING AND DECREASED DYSPNEA AFTER O2 AND NEB 

TREATMENT


NO DETERIORATION IN PT'S CONDITION DURING ER STAY





ECG


Initial ECG Impression Date:  Apr 30, 2018


Initial ECG Impression Time:  11:40


Initial ECG Rate:  95


Initial ECG Rhythm:  Normal Sinus (PAC'S, INTERIOR/INFERIOR Q WAVES)


Initial ECG Impression:  Nonspecific Changes





Diagnostic Imaging





Comments


CXR--CHRONIC CHANGES ,BUT HAS SUPERIMPOSED INFILTRATE IN RLL, INCREASED 

CARDIOMEGALY AND VASCULAR CONGESTION, RIGHT APEX DENSITY--PER RADIOLOGIST 

REPORT @ 1242


   Reviewed:  Reviewed by Me





Departure


Communication (Admissions)


1242--SPOKE WITH DR. CONNELLY, ACCEPTS PT FOR ADMIT.





Impression





 Primary Impression:  


 RLL pneumonia


 Additional Impressions:  


 COPD (chronic obstructive pulmonary disease)


 CHF (congestive heart failure)


 Sepsis


 History of atrial fibrillation


Disposition:  09 ADMITTED AS INPATIENT


Condition:  Improved





Admissions


Decision to Admit Reason:  Admit from ER (General)


Decision to Admit/Date:  Apr 30, 2018


Time/Decision to Admit Time:  12:45





Departure-Patient Inst.


Referrals:  


TOR CONNELLY DO (PCP/Family)


Primary Care Physician











BACILIO MICHELLE DO Apr 30, 2018 13:07

## 2018-04-30 NOTE — XMS REPORT
MU2 Ambulatory Summary

 Created on: 2018



Bernardo Moulton

External Reference #: [id number]

: 1940

Sex: Male



Demographics







 Address  [address]

[place]  [address]

 

 Home Phone  [phone]

 

 Preferred Language  English

 

 Marital Status  Never 

 

 Pentecostalism Affiliation  Unknown

 

 Race  White

 

 Ethnic Group  Not  or 





Author







 Author  JUAN MANUEL Helms

 

 Organization  [facility]

 

 Address  [address]

[place], [place]  [address]



 

 Phone  [phone]







Care Team Providers







 Care Team Member Name  Role  Phone

 

 JUAN MANUEL Helms  PCP  Unavailable







Allergies and Adverse Reactions







 Name  Reaction  Notes

 

 morphine      

 

 PENICILLINS      







Plan of Treatment

Not available.



Medications







 Active 

 

 Name  Start Date  Estimated Completion Date  SIG  Comments

 

 furosemide oral            

 

 pantoprazole 40 mg oral tablet,delayed release (DR/EC)        take 1 tablet (
40 mg) by oral route once daily   

 

 Xarelto 20 mg oral tablet        take 1 tablet (20 mg) by oral route once 
daily with the evening meal   

 

 linezolid 600 mg oral tablet        take 1 tablet (600 mg) by oral route every 
12 hours   

 

 albuterol sulfate 2.5 mg /3 mL (0.083 %) inhalation solution for nebulization 
       Inhale ever 4 hrs as needed for shortness of breath   

 

 enalapril maleate 2.5 mg oral tablet        take 1 tablet (2.5 mg) by oral 
route once daily   

 

 Novolin N 100 unit/mL subcutaneous suspension            

 

 Novolin R 100 unit/mL injection solution            

 

 nitrofurantoin macrocrystal 50 mg oral capsule        Take one tablet PO after 
supper   









 Discontinued 

 

 Name  Start Date  Discontinued Date  SIG  Comments

 

 warfarin oral     2015      

 

 Plavix oral     2015      

 

 simvastatin oral     2015      

 

 omeprazole oral     2015      







Problem List







 Description  Status  Onset

 

 Benign hypertrophy of prostate  Active  2015

 

 Post-traumatic bulbous urethral stricture  Active  2/3/2016

 

 Benign non-nodular prostatic hyperplasia without lower urinary tract symptoms  
Active  2/3/2016

 

 Bacterial UTI  Active  2/3/2016

 

 Stricture of male urethra  Active  6/3/2016

 

 Adenofibromatous hypertrophy of prostate  Active  6/3/2016

 

 Idiopathic stricture of urethra  Active  2016

 

 Anticoagulated on warfarin  Active  2018

 

 Urethral stenosis  Active  2018

 

 Hematuria  Active  2018







Vital Signs







 Date  Time  BP-Sys(mm[Hg]  BP-[place](mm[Hg])  HR(bpm)  RR(rpm)  Temp  WT  HT  HC  
BMI  BSA  BMI Percentile  O2 Sat(%)

 

 2018  11:46:00 AM  128 mmHg  68 mmHg  63 bpm  18 rpm     185 lbs  69 in  
   27.32 kg/m2  2.02 m2     99 %

 

 2017  9:44:00 AM  100 mmHg  68 mmHg  72 bpm  18 rpm  98.1 F  198 lbs  69 
in     29.2392 kg/m  2.091 m     96 %

 

 2016  12:58:00 PM  106 mmHg  66 mmHg  66 bpm  20 rpm  97.9 F  200 lbs  69 
in     29.53 kg/m2  2.10 m2     98 %

 

 6/3/2016  9:56:00 AM                 183 lbs  69 in     27.0241 kg/m  2.0102 
m      

 

 2/3/2016  8:11:00 AM  108 mmHg  62 mmHg  69 bpm  18 rpm  97.7 F  184 lbs  69 
in     27.17 kg/m2  2.02 m2     97 %

 

 2015  10:04:00 AM                 185 lbs  69 in     27.3194 kg/m  
2.0212 m      

 

 6/3/2015  11:25:00 AM                 205 lbs  69 in     30.27 kg/m2  2.13 m2 
     

 

 2015  9:49:00 AM                 205 lbs  69 in     30.2729 kg/m  2.1277 
m      







Social History







 Name  Description  Comments

 

 Alcohol  Never   

 

 Tobacco  Never smoker   







History of Procedures







 Date Ordered  Description  Order Status

 

 2/3/2016 12:00 AM  ASSAY OF PSA TOTAL  Reviewed

 

 6/3/2016 1:10 PM  URINALYSIS AUTO W/O SCOPE  Reviewed

 

 2016 2:36 PM  URINALYSIS AUTO W/O SCOPE  Reviewed

 

 2017 12:00 AM  ASSAY OF PSA TOTAL  Reviewed

 

 2017 1:14 PM  URINALYSIS AUTO W/O SCOPE  Reviewed

 

 2018 3:15 PM  URINALYSIS AUTO W/O SCOPE  Reviewed

 

 2015 1:41 PM  URINALYSIS AUTO W/O SCOPE  Reviewed

 

 2015 12:00 AM  ASSAY OF PSA TOTAL  Reviewed







Results Summary







 Date and Description  Results

 

 2015 1:41 PM  Bilirub Ur Ql Strip -VE Glucose Ur-sCnc -VE Hgb Ur Ql Strip -
VE Ketones Ur Ql Strip -VE Nitrite Ur Ql Strip -VE pH Ur-LsCnc 6.0 Prot Ur Ql 
Strip -VE Sp Gr Ur Qn 1020 Urobilinogen Ur-mCnc -VE WBC Est Ur Ql Strip -VE 

 

 6/3/2016 1:10 PM  Clarity Ur CLEAR Color Ur -- Glucose Ur-sCnc -VE Bilirub Ur 
Ql Strip -VE Ketones Ur Ql Strip -VE Sp Gr Ur Qn 1020 Hgb Ur Ql Strip -VE pH Ur-
LsCnc 6.00 Prot Ur Ql Strip TRACE Urobilinogen Ur-mCnc -VE Nitrite Ur Ql Strip -
VE WBC Est Ur Ql Strip -VE 

 

 2016 2:36 PM  Clarity Ur CLEAR Color Ur ------- Glucose Ur-sCnc -VE 
Bilirub Ur Ql Strip -VE Ketones Ur Ql Strip -VE Sp Gr Ur Qn 1015 Hgb Ur Ql 
Strip -VE pH Ur-LsCnc 6.0 Prot Ur Ql Strip TRACE Urobilinogen Ur-mCnc -VE 
Nitrite Ur Ql Strip -VE WBC Est Ur Ql Strip -VE 

 

 2017 1:14 PM  Clarity Ur CLEAR Color Ur --- Glucose Ur-sCnc -VE Bilirub 
Ur Ql Strip -VE Ketones Ur Ql Strip -VE Sp Gr Ur Qn 1020 Hgb Ur Ql Strip -VE pH 
Ur-LsCnc 5.0 Prot Ur Ql Strip -VE Urobilinogen Ur-mCnc -VE Nitrite Ur Ql Strip -
VE WBC Est Ur Ql Strip -VE 

 

 2018 3:15 PM  Clarity Ur clear Color Ur ---- Glucose Ur-sCnc -VE Bilirub 
Ur Ql Strip -VE Ketones Ur Ql Strip -VE Sp Gr Ur Qn 1015 Hgb Ur Ql Strip -VE pH 
Ur-LsCnc 6.0 Prot Ur Ql Strip -VE Urobilinogen Ur-mCnc -VE Nitrite Ur Ql Strip -
VE WBC Est Ur Ql Strip -VE 







History Of Immunizations

Not available.



History of Past Illness







 Name  Date of Onset  Comments

 

 Benign hypertrophy of prostate  2015   

 

 Recurrent UTI      

 

 Post-traumatic bulbous urethral stricture  2016   

 

 Benign non-nodular prostatic hyperplasia without lower urinary tract symptoms  
2016   

 

 Bacterial UTI  2016   

 

 Stricture of male urethra  2016   

 

 Adenofibromatous hypertrophy of prostate  2016   

 

 Idiopathic stricture of urethra  2016   

 

 Anticoagulated on warfarin  2018   

 

 Urethral stenosis  2018   

 

 Hematuria  2018   

 

 Benign hypertrophy of prostate  2015  9:56AM   

 

 Benign Hypertrophy of Prostate (BPH) Stable  Paulo  3 2015 11:26AM   

 

 BPH (benign prostatic hypertrophy)  Feb  3 2016  8:33AM   

 

 Screening for prostate cancer  Feb  3 2016  8:33AM   

 

 Resolved Bacterial UTI  Feb  3 2016  8:16AM   

 

 Benign non-nodular prostatic hyperplasia without lower urinary tract symptoms  
Feb  3 2016  8:16AM   

 

 Post-traumatic bulbous urethral stricture  Feb  3 2016  8:16AM   

 

 Adenofibromatous hypertrophy of prostate  Paulo  3 2016  9:57AM   

 

 Resolved Stricture of male urethra  Paulo  3 2016  9:57AM   

 

 Resolved Bacterial UTI  Paulo  3 2016  9:57AM   

 

 Urinary tract infection, site not specified  Paulo  3 2016  9:57AM   

 

 Bacterial infection, unspecified  Paulo  3 2016  9:57AM   

 

 Adenofibromatous hypertrophy of prostate  Dec  7 2016  1:01PM   

 

 Resolved Idiopathic stricture of urethra  Dec  7 2016  1:01PM   

 

 Urinary tract infection, site not specified  Dec  7 2016  1:01PM   

 

 Bacterial infection, unspecified  Dec  7 2016  1:01PM   

 

 BPH (benign prostatic hypertrophy)  May 22 2017  8:15AM   

 

 Screening for prostate cancer  May 22 2017  8:15AM   

 

 Adenofibromatous hypertrophy of prostate  2017  9:47AM   

 

 Stricture of male urethra Stable  2017  9:47AM   

 

 Hematuria  2018 11:50AM   

 

 Urethral stenosis  2018 11:50AM   

 

 Adenofibromatous hypertrophy of prostate  2018 11:50AM   

 

 Anticoagulated on warfarin  2018 11:50AM   







Payers







 Insurance Name  Company Name  Plan Name  Plan Number  Policy Number  Policy 
Group Number  Start Date

 

    Medicare Part B  Medicare [facility]     [id number]     N/A

 

    BCBS  [employer] [facility]     [id number]     N/A







History of Encounters







 Visit Date  Visit Type  Provider

 

 2018  Office visit  V ADA Helms MD

 

 2017  Office visit  V ADA Helms MD

 

 2016  Office visit  V ADA Helms MD

 

 6/3/2016  Office visit  V ADA Helms MD

 

 2/3/2016  Office visit  V ADA Helms MD

 

 2016  Hospital  V ADA Helms MD

 

 2015  Office visit  V ADA Helms MD

 

 6/3/2015  Office visit   

 

 6/3/2015  Office visit  V ADA Helms MD

 

 2015  Office visit  JUAN MANUEL Helms MD

## 2018-05-01 VITALS — SYSTOLIC BLOOD PRESSURE: 119 MMHG | DIASTOLIC BLOOD PRESSURE: 56 MMHG

## 2018-05-01 VITALS — SYSTOLIC BLOOD PRESSURE: 139 MMHG | DIASTOLIC BLOOD PRESSURE: 61 MMHG

## 2018-05-01 VITALS — DIASTOLIC BLOOD PRESSURE: 57 MMHG | SYSTOLIC BLOOD PRESSURE: 98 MMHG

## 2018-05-01 VITALS — DIASTOLIC BLOOD PRESSURE: 60 MMHG | SYSTOLIC BLOOD PRESSURE: 110 MMHG

## 2018-05-01 VITALS — SYSTOLIC BLOOD PRESSURE: 107 MMHG | DIASTOLIC BLOOD PRESSURE: 50 MMHG

## 2018-05-01 VITALS — SYSTOLIC BLOOD PRESSURE: 126 MMHG | DIASTOLIC BLOOD PRESSURE: 62 MMHG

## 2018-05-01 LAB
ALBUMIN SERPL-MCNC: 3.5 GM/DL (ref 3.2–4.5)
ALP SERPL-CCNC: 64 U/L (ref 40–136)
ALT SERPL-CCNC: 15 U/L (ref 0–55)
BASOPHILS # BLD AUTO: 0 10^3/UL (ref 0–0.1)
BASOPHILS NFR BLD AUTO: 0 % (ref 0–10)
BILIRUB SERPL-MCNC: 0.3 MG/DL (ref 0.1–1)
BUN/CREAT SERPL: 19
CALCIUM SERPL-MCNC: 8.7 MG/DL (ref 8.5–10.1)
CHLORIDE SERPL-SCNC: 100 MMOL/L (ref 98–107)
CO2 SERPL-SCNC: 24 MMOL/L (ref 21–32)
CREAT SERPL-MCNC: 1.34 MG/DL (ref 0.6–1.3)
EOSINOPHIL # BLD AUTO: 0 10^3/UL (ref 0–0.3)
EOSINOPHIL NFR BLD AUTO: 0 % (ref 0–10)
ERYTHROCYTE [DISTWIDTH] IN BLOOD BY AUTOMATED COUNT: 13.7 % (ref 10–14.5)
GFR SERPLBLD BASED ON 1.73 SQ M-ARVRAT: 51 ML/MIN
GLUCOSE SERPL-MCNC: 400 MG/DL (ref 70–105)
HCT VFR BLD CALC: 33 % (ref 40–54)
HGB BLD-MCNC: 11.1 G/DL (ref 13.3–17.7)
INR PPP: 1.7 (ref 0.8–1.4)
LYMPHOCYTES # BLD AUTO: 0.2 X 10^3 (ref 1–4)
LYMPHOCYTES NFR BLD AUTO: 3 % (ref 12–44)
MAGNESIUM SERPL-MCNC: 1.8 MG/DL (ref 1.8–2.4)
MANUAL DIFFERENTIAL PERFORMED BLD QL: NO
MCH RBC QN AUTO: 31 PG (ref 25–34)
MCHC RBC AUTO-ENTMCNC: 34 G/DL (ref 32–36)
MCV RBC AUTO: 93 FL (ref 80–99)
MONOCYTES # BLD AUTO: 0.3 X 10^3 (ref 0–1)
MONOCYTES NFR BLD AUTO: 4 % (ref 0–12)
NEUTROPHILS # BLD AUTO: 8.4 X 10^3 (ref 1.8–7.8)
NEUTROPHILS NFR BLD AUTO: 94 % (ref 42–75)
PLATELET # BLD: 194 10^3/UL (ref 130–400)
PMV BLD AUTO: 9.3 FL (ref 7.4–10.4)
POTASSIUM SERPL-SCNC: 3.4 MMOL/L (ref 3.6–5)
PROT SERPL-MCNC: 6.4 GM/DL (ref 6.4–8.2)
PROTHROMBIN TIME: 19.9 SEC (ref 12.2–14.7)
RBC # BLD AUTO: 3.57 10^6/UL (ref 4.35–5.85)
SODIUM SERPL-SCNC: 137 MMOL/L (ref 135–145)
WBC # BLD AUTO: 9 10^3/UL (ref 4.3–11)

## 2018-05-01 RX ADMIN — SODIUM CHLORIDE SCH MLS/HR: 900 INJECTION INTRAVENOUS at 13:24

## 2018-05-01 RX ADMIN — DILTIAZEM HYDROCHLORIDE SCH MG: 30 TABLET, FILM COATED ORAL at 21:22

## 2018-05-01 RX ADMIN — ENALAPRIL MALEATE SCH MG: 2.5 TABLET ORAL at 08:13

## 2018-05-01 RX ADMIN — SODIUM CHLORIDE SCH MLS/HR: 900 INJECTION INTRAVENOUS at 01:11

## 2018-05-01 RX ADMIN — Medication SCH ML: at 06:27

## 2018-05-01 RX ADMIN — IPRATROPIUM BROMIDE AND ALBUTEROL SULFATE SCH ML: .5; 3 SOLUTION RESPIRATORY (INHALATION) at 19:20

## 2018-05-01 RX ADMIN — WARFARIN SODIUM SCH MG: 5 TABLET ORAL at 17:48

## 2018-05-01 RX ADMIN — INSULIN ASPART SCH UNIT: 100 INJECTION, SOLUTION INTRAVENOUS; SUBCUTANEOUS at 06:31

## 2018-05-01 RX ADMIN — IPRATROPIUM BROMIDE AND ALBUTEROL SULFATE SCH ML: .5; 3 SOLUTION RESPIRATORY (INHALATION) at 11:46

## 2018-05-01 RX ADMIN — IPRATROPIUM BROMIDE AND ALBUTEROL SULFATE SCH ML: .5; 3 SOLUTION RESPIRATORY (INHALATION) at 22:45

## 2018-05-01 RX ADMIN — MONTELUKAST SCH MG: 10 TABLET, FILM COATED ORAL at 08:13

## 2018-05-01 RX ADMIN — GABAPENTIN SCH MG: 100 CAPSULE ORAL at 11:28

## 2018-05-01 RX ADMIN — INSULIN ASPART SCH UNIT: 100 INJECTION, SOLUTION INTRAVENOUS; SUBCUTANEOUS at 16:11

## 2018-05-01 RX ADMIN — IPRATROPIUM BROMIDE AND ALBUTEROL SULFATE SCH ML: .5; 3 SOLUTION RESPIRATORY (INHALATION) at 03:15

## 2018-05-01 RX ADMIN — FUROSEMIDE SCH MG: 40 TABLET ORAL at 08:13

## 2018-05-01 RX ADMIN — IPRATROPIUM BROMIDE AND ALBUTEROL SULFATE SCH ML: .5; 3 SOLUTION RESPIRATORY (INHALATION) at 07:54

## 2018-05-01 RX ADMIN — DILTIAZEM HYDROCHLORIDE SCH MG: 30 TABLET, FILM COATED ORAL at 13:24

## 2018-05-01 RX ADMIN — SODIUM CHLORIDE SCH MLS/HR: 900 INJECTION INTRAVENOUS at 08:14

## 2018-05-01 RX ADMIN — INSULIN ASPART SCH UNIT: 100 INJECTION, SOLUTION INTRAVENOUS; SUBCUTANEOUS at 11:29

## 2018-05-01 RX ADMIN — INSULIN DETEMIR SCH UNIT: 100 INJECTION, SOLUTION SUBCUTANEOUS at 21:23

## 2018-05-01 RX ADMIN — Medication SCH ML: at 13:25

## 2018-05-01 RX ADMIN — INSULIN ASPART SCH UNIT: 100 INJECTION, SOLUTION INTRAVENOUS; SUBCUTANEOUS at 21:23

## 2018-05-01 RX ADMIN — Medication SCH ML: at 21:24

## 2018-05-01 RX ADMIN — DILTIAZEM HYDROCHLORIDE SCH MG: 30 TABLET, FILM COATED ORAL at 06:27

## 2018-05-01 RX ADMIN — METHYLPREDNISOLONE SODIUM SUCCINATE SCH MG: 125 INJECTION, POWDER, FOR SOLUTION INTRAMUSCULAR; INTRAVENOUS at 06:27

## 2018-05-01 RX ADMIN — GABAPENTIN SCH MG: 100 CAPSULE ORAL at 06:27

## 2018-05-01 RX ADMIN — SIMVASTATIN SCH MG: 20 TABLET, FILM COATED ORAL at 21:22

## 2018-05-01 RX ADMIN — IPRATROPIUM BROMIDE AND ALBUTEROL SULFATE SCH ML: .5; 3 SOLUTION RESPIRATORY (INHALATION) at 15:18

## 2018-05-01 RX ADMIN — MONTELUKAST SCH MG: 10 TABLET, FILM COATED ORAL at 21:22

## 2018-05-01 RX ADMIN — FLUTICASONE PROPIONATE AND SALMETEROL XINAFOATE SCH PUFF: 115; 21 AEROSOL, METERED RESPIRATORY (INHALATION) at 07:56

## 2018-05-01 RX ADMIN — PANTOPRAZOLE SODIUM SCH MG: 40 TABLET, DELAYED RELEASE ORAL at 06:27

## 2018-05-01 NOTE — HISTORY & PHYSICIAL
History of Present Illness


History of Present Illness


Reason for visit/HPI


Patient came to the office yesterday short of breath running an elevated 

temperature of 102, and wheezing.


Patient sent out to the emergency room after speaking to [facility].


Chest x-ray and emergency room shows right lower lobe pneumonia.


Patient has a history of COPD.


Patient has a history of atrial fibrillation.


Surgeries partial removal of lung for benign condition.  Consults, prostate,.


Family history daughter has asthma.  Brother  of cancer.  Denies TB diabetes


Date of Admission


2018 at 12:42


Time Seen by Provider:  07:45


I consulted on this patient on


18


 07:44


Attending Physician


Jay Connelly DO


Admitting Physician


Jay Connelly DO


Consult








Allergies and Home Medications


Allergies


Coded Allergies:  


     morphine (Verified  Allergy, Mild, 16)


     Sulfa (Sulfonamide Antibiotics) (Verified  Allergy, Unknown, 16)


     penicillin G (Verified  Allergy, Unknown, 16)


     levofloxacin (Verified  Adverse Reaction, Unknown, 16)





Home Medications


Diltiazem HCl 30 Mg Tablet, 30 MG PO TID, (Reported)


Enalapril Maleate 2.5 Mg Tablet, 2.5 MG PO DAILY, (Reported)


Fluticasone/Vilanterol 1 Each Blst.w.dev, 1 PUFF IH DAILY, (Reported)


Furosemide 40 Mg Tablet, 40 MG PO DAILY, (Reported)


Gabapentin 100 Mg Capsule, 100 MG PO 0700,1200, (Reported)


Gabapentin 100 Mg Capsule, 200-300 MG PO HS, (Reported)


   TAKES 2-3 OF A (100 MG) CAPSULE / DEPENDING ON PAIN LEVEL 


Insulin NPH Human Isophane 100 Unit/1 Ml Vial, 5 UNITS SQ BID, (Reported)


   MIXES WITH 5 UNITS OF R INSULIN EVERY MORNING AND EVERY EVENING FOR A TOTAL 

DOSE OF 10 UNITS 


Insulin Regular, Human 1,000 Units/10 Ml Soln, 5 UNITS SQ BID, (Reported)


   MIXES WITH 5 UNITS OF N INSULIN EVERY MORNING AND EVERY EVENING FOR A TOTAL 

DOSE OF 10 UNITS 


Montelukast Sodium 10 Mg Tablet, 10 MG PO HS, (Reported)


Pantoprazole Sodium 40 Mg Tablet.dr, 40 MG PO DAILY, (Reported)


Potassium Chloride 10 Meq Tab.er.prt, 10 MEQ PO Q48H, (Reported)


Simvastatin 20 Mg Tablet, 20 MG PO HS, (Reported)


Warfarin Sodium 5 Mg Tablet, 5 MG PO DAILY@1830, (Reported)





Patient Home Medication List


Home Medication List Reviewed:  Yes





Past Medical-Social-Family Hx


Patient Social History


Marrital Status:  


Employed/Student:  retired


Alcohol Use:  Denies Use


Recreational Drug Use:  No


Smoking Status:  Former Smoker


Former Smoker, Quit:  1985


Type Used:  Cigarettes


2nd Hand Smoke Exposure:  No


Physical Abuse Screen:  No


Sexual Abuse:  No


Recent Foreign Travel:  No


Contact w/other who traveled:  No


Recent Hopitalizations:  No


Recent Infectious Disease Expo:  No





Immunizations Up To Date


Tetanus Booster (TDap):  More than 5yrs


Pediatric:  No


Date of Pneumonia Vaccine:  Oct 3, 2016


Date of Influenza Vaccine:  Sep 4, 2017





Seasonal Allergies


Seasonal Allergies:  No





Surgeries


Yes (1/2 right lung removed)


Cardiac, CABG, Coronary Stent, Eye Surgery, Joint Replacement, Lobectomy, 

Orthopedic, Prostatectomy, Tonsillectomy





Respiratory


Yes (1/2 OF RIGHT LUNG REMOVED FOR BENIGN DISEASE, PER PT. )


COPD, Emphysema, Pneumonia


Currently Using CPAP:  No


Currently Using BIPAP:  No





Cardiovascular


Yes (stents)


Atrial Fibrillation, Coronary Artery Disease, High Cholesterol, Hypertension, 

Peripheral Vascular





Neurological


Yes


Stroke





Reproductive System


Hx Reproductive Disorders:  No


Sexually Transmitted Disease:  No


HIV/AIDS:  No





Genitourinary


Yes


Benign Prostatic Hyperpl, Bladder Infection





Gastrointestinal


Yes


Gastrointestinal Bleed





Musculoskeletal


Yes


Arthritis





Endocrine


History of Endocrine Disorders:  Yes


Endocrine Disorders:  Diabetes, Insulin dep





HEENT


History of HEENT Disorders:  Yes


HEENT Disorders:  Cataract


Loss of Vision:  Denies


Hearing Impairment:  Hard of Hearing





Cancer


No





Psychosocial


History of Psychiatric Problem:  No





Integumentary


History of Skin or Integumenta:  No





Blood Transfusions


History of Blood Disorders:  No


Adverse Reaction to a Blood Tr:  No





Family Medical History


Significant Family History:  Heart Disease, Cancer, Stroke, Other Conditions/Hx


Family Hx:  


BLADD


  09 BROTHER


BLADD


  09 BROTHER


Cancer


  03 FATHER (THROAT)


  09 BROTHER


Dementia


  03 MOTHER


FH: bladder cancer


FH: bladder cancer


Family history: Diabetes mellitus


  03 MOTHER


Family history: Thyroid disorder


  03 MOTHER


Infertile


  03 MOTHER (X5 MISCARRIAGES)


Myocardial infarction


  09 BROTHER





No Family History of:


  Abdominal aortic aneurysm


  Shayne's disease


  Alcoholism


  Aphasia


  Cancer of colon


  Cataract


  Chest pain


  Congenital heart disease


  Congestive heart failure


  Cystic fibrosis


  Dysphagia


  Family history: Allergy


  Family history: Alzheimer's disease


  Family history: Arthritis


  Family history: Asthma


  Family history: Breast disease


  Family history: Cardiovascular disease


  Family history: Coronary thrombosis


  Family history: Gastrointestinal disease


  Family history: Glaucoma


  Family history: Hypertension


  Family history: Osteoporosis


  Headache


  Hearing loss


  Heart disease


  Hereditary disease


  History of - anemia


  History of - disorder


  History of - respiratory disease


  History of drug abuse


  Human immunodeficiency virus (HIV) seropositivity


  Hypercholesterolemia


  Kidney disease


  Malignant neoplasm of lung


  Parkinson's disease


  Prostate cancer


  Psychotic disorder


  Seizure disorder


  Stroke


  Tuberculosis


  Visual impairment





Constitutional:  chills, malaise, weakness


EENTM:  no symptoms reported


Respiratory:  short of breath, wheezing


Cardiovascular:  no symptoms reported


Gastrointestinal:  no symptoms reported


Genitourinary:  no symptoms reported





Physical Exam


Vital Signs





Vital Signs - First Documented








 18





 11:30 15:26


 


Temp 100.7 


 


Pulse 95 


 


Resp 26 


 


B/P (MAP) 137/79 (98) 


 


Pulse Ox 97 


 


O2 Delivery Nasal Cannula 


 


O2 Flow Rate 2.00 


 


FiO2  28





Capillary Refill : Less Than 3 Seconds


General Appearance:  WD/WN


Eyes:  Bilateral Eye Normal Inspection


HEENT:  Normal ENT Inspection


Neck:  Normal Inspection, Non Tender


Respiratory:  Decreased Breath Sounds, Wheezing


Cardiovascular:  Regular Rate, Rhythm, No Murmur


Gastrointestinal:  Non Tender, Soft





Assessment/Plan


Assessment and Plan


Right lower lobe pneumonia.


COPD.


Congestive heart failure.


Sepsis.


Diabetes.





Admission Diagnosis


Admission Status:  Inpatient Order (span 2 midnights)


Reason for Inpatient Admission:  


Right lower lobe pneumonia.


Temperature 502.


Short of breath.


COPD.


Congestive heart failure..


Coronary artery disease





Clinical Quality Measures


DVT/VTE Risk/Contraindication:


Risk Factor Score Per Nursin


RFS Level Per Nursing on Admit:  4+=Very High











JAY CONNELLY DO May 1, 2018 07:50

## 2018-05-01 NOTE — PHYSICAL THERAPY EVALUATION
PT Evaluation-General


Medical Diagnosis


Admission Date


2018 at 12:42


Medical Diagnosis:  pneumonia/sepsis/CHF


Onset Date:  2018





Therapy Diagnosis


Therapy Diagnosis:  debility





Height/Weight


Height (Feet):  5


Height (Inches):  9.00


Weight (Pounds):  184


Weight (Ounces):  0.0





Precautions


Precautions/Isolations:  Standard Precautions





Weight Bear Status


Right Lower Extremity:  Right


Full Weight Bearing


Left Lower Extremity:  Left


Weight Bearing/Tolerated





Referral


Physician:  Monica


Reason for Referral:  Evaluation/Treatment





Medical History


Pertinent Medical History:  Atrial Fib, Arthritis, CABG, CAD, COPD, CVA, DM


Additional Medical History


recent hospital stay


he has 1/2 lung


Current History


ED with increase SOB x 3-4 days/collapsed on ED floor


Reviewed History:  Yes





Social History


Home:  Single Level


Current Living Status:  Spouse





Prior/Core FIM


Prior Level of Function


              Functional Cameron Measure


0=Not Assessed/NA   4=Minimal Assistance


1=Total Assistance   5=Supervision or Setup


2=Maximal Assistance   6=Modified Cameron


3=Moderate Assistance   7=Complete Cameron


Bed Mobility:  7


Transfers (B,C,W/C) (FIM):  7


Gait:  7


Locomotion:  7





PT Evaluation-Current


Subjective


Patient agrees to PT.





Pain





   Numeric Pain Scale:  0-No Pain


   Location:  No Pain Reported





Objective


Patient Orientation:  Normal For Age


Problem Solving:  Good


Attachments:  Oxygen (HS)





ROM/Strength


ROM Lower Extremities


bilateral LE WFL


Strength Lower Extremities


4+/5 bilaterally all planes





Integumentary/Posture


Integumentary


refer to nursing notes





Neuromuscular


(Tone, Coordination, Reflexes)


grossly intact





Sensory


Vision:  Functional


Hearing:  Functional


Sensation Right Lower Extremit:  Impaired


Sensation Left Lower Extremity:  Impaired





Transfers


              Functional Cameron Measure


0=Not Assessed/NA   4=Minimal Assistance


1=Total Assistance   5=Supervision or Setup


2=Maximal Assistance   6=Modified Cameron


3=Moderate Assistance   7=Complete Cameron


Transfers (B, C, W/C) (FIM):  7


Scootin


Rollin


Supine to/from Sit:  7


Sit to/from Stand:  7





Gait


Mode of Locomotion:  Walk


Anticipated Mode of Locomotion:  Walk


Gait (FIM):  7


Distance (FIM):  3=150 ft


Distance:  500'


Gait Level of Assist:  7


Gait Assistive Device:  None


Comments/Gait Description


safe and functional





Balance


Sitting Static:  Normal


Sitting Dynamic:  Normal


Standing Static:  Normal


 Standing Dynamic:  Normal





Assessment/Needs


77 y.o. male, is currently at independent [facility] with all gross motor skills 

safely and does not require skilled PT at this time.  PT instructed patient to 

ambulate PRN in hallway.  He voiced understanding.


Rehab Potential:  Fair


Post Rehab Potential-Barriers:  COPD





PT Plan


Treatment/Plan


Treatment Plan:  Discontinue PT, goals met


Treatment Plan:  Other


Treatment Duration:  May 1, 2018


Frequency:  1 time per week


Estimated Hrs Per Day:  .25 hour per day


Patient and/or Family Agrees t:  Yes





Safety Risks/Education


Patient Education:  Safety Issues





Discharge Recommendations


Therapy D/C Recommendations:  Home w/ Family Support





Time/GCodes


Time In:  906


Time Out:  920


Total Billed Treatment Time:  14


Total Billed Treatment


1 visit


EVModC 14 min


G Codes Necessary:  DOMINGO Gordon PT May 1, 2018 10:02

## 2018-05-02 VITALS — DIASTOLIC BLOOD PRESSURE: 55 MMHG | SYSTOLIC BLOOD PRESSURE: 115 MMHG

## 2018-05-02 VITALS — DIASTOLIC BLOOD PRESSURE: 69 MMHG | SYSTOLIC BLOOD PRESSURE: 97 MMHG

## 2018-05-02 VITALS — SYSTOLIC BLOOD PRESSURE: 106 MMHG | DIASTOLIC BLOOD PRESSURE: 53 MMHG

## 2018-05-02 VITALS — SYSTOLIC BLOOD PRESSURE: 113 MMHG | DIASTOLIC BLOOD PRESSURE: 56 MMHG

## 2018-05-02 VITALS — SYSTOLIC BLOOD PRESSURE: 99 MMHG | DIASTOLIC BLOOD PRESSURE: 62 MMHG

## 2018-05-02 VITALS — SYSTOLIC BLOOD PRESSURE: 98 MMHG | DIASTOLIC BLOOD PRESSURE: 54 MMHG

## 2018-05-02 VITALS — SYSTOLIC BLOOD PRESSURE: 97 MMHG | DIASTOLIC BLOOD PRESSURE: 50 MMHG

## 2018-05-02 LAB
BASOPHILS # BLD AUTO: 0 10^3/UL (ref 0–0.1)
BASOPHILS NFR BLD AUTO: 0 % (ref 0–10)
BUN/CREAT SERPL: 37
CALCIUM SERPL-MCNC: 8.8 MG/DL (ref 8.5–10.1)
CHLORIDE SERPL-SCNC: 105 MMOL/L (ref 98–107)
CO2 SERPL-SCNC: 27 MMOL/L (ref 21–32)
CREAT SERPL-MCNC: 0.87 MG/DL (ref 0.6–1.3)
EOSINOPHIL # BLD AUTO: 0 10^3/UL (ref 0–0.3)
EOSINOPHIL NFR BLD AUTO: 0 % (ref 0–10)
ERYTHROCYTE [DISTWIDTH] IN BLOOD BY AUTOMATED COUNT: 14.2 % (ref 10–14.5)
GFR SERPLBLD BASED ON 1.73 SQ M-ARVRAT: > 60 ML/MIN
GLUCOSE SERPL-MCNC: 273 MG/DL (ref 70–105)
HCT VFR BLD CALC: 31 % (ref 40–54)
HGB BLD-MCNC: 10.3 G/DL (ref 13.3–17.7)
LYMPHOCYTES # BLD AUTO: 0.3 X 10^3 (ref 1–4)
LYMPHOCYTES NFR BLD AUTO: 3 % (ref 12–44)
MANUAL DIFFERENTIAL PERFORMED BLD QL: NO
MCH RBC QN AUTO: 31 PG (ref 25–34)
MCHC RBC AUTO-ENTMCNC: 34 G/DL (ref 32–36)
MCV RBC AUTO: 93 FL (ref 80–99)
MONOCYTES # BLD AUTO: 0.6 X 10^3 (ref 0–1)
MONOCYTES NFR BLD AUTO: 6 % (ref 0–12)
NEUTROPHILS # BLD AUTO: 8.9 X 10^3 (ref 1.8–7.8)
NEUTROPHILS NFR BLD AUTO: 90 % (ref 42–75)
PLATELET # BLD: 226 10^3/UL (ref 130–400)
PMV BLD AUTO: 9.1 FL (ref 7.4–10.4)
POTASSIUM SERPL-SCNC: 4 MMOL/L (ref 3.6–5)
RBC # BLD AUTO: 3.27 10^6/UL (ref 4.35–5.85)
SODIUM SERPL-SCNC: 141 MMOL/L (ref 135–145)
WBC # BLD AUTO: 9.9 10^3/UL (ref 4.3–11)

## 2018-05-02 RX ADMIN — IPRATROPIUM BROMIDE AND ALBUTEROL SULFATE SCH ML: .5; 3 SOLUTION RESPIRATORY (INHALATION) at 19:13

## 2018-05-02 RX ADMIN — SODIUM CHLORIDE SCH MLS/HR: 900 INJECTION INTRAVENOUS at 20:20

## 2018-05-02 RX ADMIN — INSULIN ASPART SCH UNIT: 100 INJECTION, SOLUTION INTRAVENOUS; SUBCUTANEOUS at 17:04

## 2018-05-02 RX ADMIN — INSULIN ASPART SCH UNIT: 100 INJECTION, SOLUTION INTRAVENOUS; SUBCUTANEOUS at 06:10

## 2018-05-02 RX ADMIN — Medication SCH ML: at 06:08

## 2018-05-02 RX ADMIN — SODIUM CHLORIDE SCH MLS/HR: 900 INJECTION INTRAVENOUS at 10:09

## 2018-05-02 RX ADMIN — WARFARIN SODIUM SCH MG: 5 TABLET ORAL at 19:25

## 2018-05-02 RX ADMIN — DILTIAZEM HYDROCHLORIDE SCH MG: 30 TABLET, FILM COATED ORAL at 16:48

## 2018-05-02 RX ADMIN — IPRATROPIUM BROMIDE AND ALBUTEROL SULFATE SCH ML: .5; 3 SOLUTION RESPIRATORY (INHALATION) at 10:48

## 2018-05-02 RX ADMIN — GABAPENTIN SCH MG: 100 CAPSULE ORAL at 11:48

## 2018-05-02 RX ADMIN — Medication SCH ML: at 20:21

## 2018-05-02 RX ADMIN — FLUTICASONE PROPIONATE AND SALMETEROL XINAFOATE SCH PUFF: 115; 21 AEROSOL, METERED RESPIRATORY (INHALATION) at 07:08

## 2018-05-02 RX ADMIN — IPRATROPIUM BROMIDE AND ALBUTEROL SULFATE SCH ML: .5; 3 SOLUTION RESPIRATORY (INHALATION) at 07:08

## 2018-05-02 RX ADMIN — IPRATROPIUM BROMIDE AND ALBUTEROL SULFATE SCH ML: .5; 3 SOLUTION RESPIRATORY (INHALATION) at 02:15

## 2018-05-02 RX ADMIN — SIMVASTATIN SCH MG: 20 TABLET, FILM COATED ORAL at 20:20

## 2018-05-02 RX ADMIN — IPRATROPIUM BROMIDE AND ALBUTEROL SULFATE SCH ML: .5; 3 SOLUTION RESPIRATORY (INHALATION) at 23:07

## 2018-05-02 RX ADMIN — ENALAPRIL MALEATE SCH MG: 2.5 TABLET ORAL at 10:09

## 2018-05-02 RX ADMIN — INSULIN ASPART SCH UNIT: 100 INJECTION, SOLUTION INTRAVENOUS; SUBCUTANEOUS at 11:48

## 2018-05-02 RX ADMIN — POTASSIUM CHLORIDE SCH MEQ: 750 TABLET, FILM COATED, EXTENDED RELEASE ORAL at 06:08

## 2018-05-02 RX ADMIN — FUROSEMIDE SCH MG: 40 TABLET ORAL at 10:09

## 2018-05-02 RX ADMIN — PANTOPRAZOLE SODIUM SCH MG: 40 TABLET, DELAYED RELEASE ORAL at 06:10

## 2018-05-02 RX ADMIN — SODIUM CHLORIDE SCH MLS/HR: 900 INJECTION INTRAVENOUS at 16:48

## 2018-05-02 RX ADMIN — DILTIAZEM HYDROCHLORIDE SCH MG: 30 TABLET, FILM COATED ORAL at 06:09

## 2018-05-02 RX ADMIN — DILTIAZEM HYDROCHLORIDE SCH MG: 30 TABLET, FILM COATED ORAL at 21:17

## 2018-05-02 RX ADMIN — INSULIN DETEMIR SCH UNIT: 100 INJECTION, SOLUTION SUBCUTANEOUS at 21:17

## 2018-05-02 RX ADMIN — INSULIN ASPART SCH UNIT: 100 INJECTION, SOLUTION INTRAVENOUS; SUBCUTANEOUS at 21:17

## 2018-05-02 RX ADMIN — IPRATROPIUM BROMIDE AND ALBUTEROL SULFATE SCH ML: .5; 3 SOLUTION RESPIRATORY (INHALATION) at 15:11

## 2018-05-02 RX ADMIN — MONTELUKAST SCH MG: 10 TABLET, FILM COATED ORAL at 20:20

## 2018-05-02 RX ADMIN — Medication SCH ML: at 16:48

## 2018-05-02 RX ADMIN — SODIUM CHLORIDE SCH MLS/HR: 900 INJECTION INTRAVENOUS at 01:08

## 2018-05-02 RX ADMIN — GABAPENTIN SCH MG: 100 CAPSULE ORAL at 06:09

## 2018-05-02 NOTE — PROGRESS NOTE (SOAP)
Subjective


Time Seen by Provider:  07:45


Subjective/Events-last exam


Patient states he feels better today.


Patient breathing better.


Patient to have chest x-ray this morning.


COPD.


History of atrial fibrillation.





Focused Exam


Lactate Level


18 11:30: Lactic Acid Level 2.77*H


18 13:55: Lactic Acid Level 0.76





Objective


Exam





Vital Signs








  Date Time  Temp Pulse Resp B/P (MAP) Pulse Ox O2 Delivery O2 Flow Rate FiO2


 


18 07:08     93 Nasal Cannula 2.00 


 


18 03:55 97.7 99 20 99/62 (74) 95 Nasal Cannula 2.00 


 


18 02:15     92 Nasal Cannula 2.00 


 


18 01:00  93      


 


18 00:06 97.6 99 17 97/69 (78) 94 Nasal Cannula 2.00 


 


18 22:46     92 Nasal Cannula 2.00 


 


18 20:35     90 Room Air 2.00 


 


18 19:40 97.3 99 20 119/56 (77) 95 Nasal Cannula 2.00 


 


18 19:21     97 Nasal Cannula 2.00 


 


18 19:00  94      


 


18 15:20 98.0 97 18 107/50 (69) 94 Nasal Cannula 2.00 


 


18 15:19     93 Nasal Cannula 2.00 


 


18 13:10  101      


 


18 12:00 98.6 93 20 110/60 (77) 95 Nasal Cannula 2.00 


 


18 11:46     93 Nasal Cannula 2.00 


 


18 08:00     92 Nasal Cannula 2.00 


 


18 08:00 98.0 105 16 98/57 (71) 93 Nasal Cannula 2.00 


 


18 07:54     92 Nasal Cannula 2.00 














I & O 


 


 18





 07:00


 


Intake Total 2190 ml


 


Balance 2190 ml





Capillary Refill : Less Than 3 Seconds


General Appearance:  No Apparent Distress, WD/WN


HEENT:  Normal ENT Inspection


Neck:  Full Range of Motion


Respiratory:  Lungs Clear, No Accessory Muscle Use, No Respiratory Distress, 

Decreased Breath Sounds


Cardiovascular:  Regular Rate, Rhythm, No Murmur


Gastrointestinal:  non tender, soft





Results


Lab


Laboratory Tests


18 05:40








Laboratory Tests


18 11:10: Glucometer 316H


18 15:20: Glucometer 360H


18 20:30: Glucometer 443*H


18 05:37: Glucometer 285H


18 05:40: 


White Blood Count 9.9, Red Blood Count 3.27L, Hemoglobin 10.3L, Hematocrit 31L, 

Mean Corpuscular Volume 93, Mean Corpuscular Hemoglobin 31, Mean Corpuscular 

Hemoglobin Concent 34, Red Cell Distribution Width 14.2, Platelet Count 226, 

Mean Platelet Volume 9.1, Neutrophils (%) (Auto) 90H, Lymphocytes (%) (Auto) 3L

, Monocytes (%) (Auto) 6, Eosinophils (%) (Auto) 0, Basophils (%) (Auto) 0, 

Neutrophils # (Auto) 8.9H, Lymphocytes # (Auto) 0.3L, Monocytes # (Auto) 0.6, 

Eosinophils # (Auto) 0.0, Basophils # (Auto) 0.0, Sodium Level 141, Potassium 

Level 4.0, Chloride Level 105, Carbon Dioxide Level 27, Anion Gap 9, Blood Urea 

Nitrogen 32H, Creatinine 0.87, Estimat Glomerular Filtration Rate > 60, BUN/

Creatinine Ratio 37, Glucose Level 273H, Calcium Level 8.8





Microbiology


18 Blood Culture - Preliminary, Resulted


          No growth


18 Gram Stain - Final, Resulted


          


18 Sputum Culture - Preliminary, Resulted


          Gram Negative Chepe





Assessment/Plan


Assessment/Plan


Assess & Plan/Chief Complaint


Pneumonia.


Short of breath.


Febrile.


COPD 


Patient feeling better today





Clinical Quality Measures


Admission Status


Admission Dx


Right lower lobe pneumonia.


COPD.


Congestive heart failure.


Sepsis.


Diabetes.





DVT/VTE Risk/Contraindication:


Risk Factor Score Per Nursin


RFS Level Per Nursing on Admit:  4+=Very High


Contraindications-Pharm:  Other *list below*











TOR CONNELLY DO May 2, 2018 07:50

## 2018-05-02 NOTE — DIAGNOSTIC IMAGING REPORT
INDICATION: Pneumonia, followup.



TIME OF EXAMINATION: 07:58 a.m.



COMPARISON: Correlation is made with prior study from one day

earlier.



FINDINGS: Heart size is stable. Bibasilar infiltrates do show

some mild improvement and partial clearing. Right hemidiaphragm

is chronically elevated. Mid and upper lung fields are clear.

Surgical clips overlie the medial left upper chest. No

pneumothorax is seen.



IMPRESSION: Improving bibasilar infiltrates when compared with

exam one day earlier.



Dictated by: 



  Dictated on workstation # [id number]

## 2018-05-03 VITALS — DIASTOLIC BLOOD PRESSURE: 55 MMHG | SYSTOLIC BLOOD PRESSURE: 99 MMHG

## 2018-05-03 VITALS — SYSTOLIC BLOOD PRESSURE: 114 MMHG | DIASTOLIC BLOOD PRESSURE: 56 MMHG

## 2018-05-03 VITALS — SYSTOLIC BLOOD PRESSURE: 115 MMHG | DIASTOLIC BLOOD PRESSURE: 60 MMHG

## 2018-05-03 VITALS — SYSTOLIC BLOOD PRESSURE: 105 MMHG | DIASTOLIC BLOOD PRESSURE: 53 MMHG

## 2018-05-03 VITALS — SYSTOLIC BLOOD PRESSURE: 90 MMHG | DIASTOLIC BLOOD PRESSURE: 51 MMHG

## 2018-05-03 LAB
BASOPHILS # BLD AUTO: 0 10^3/UL (ref 0–0.1)
BASOPHILS NFR BLD AUTO: 0 % (ref 0–10)
BUN/CREAT SERPL: 22
CALCIUM SERPL-MCNC: 8.3 MG/DL (ref 8.5–10.1)
CHLORIDE SERPL-SCNC: 103 MMOL/L (ref 98–107)
CO2 SERPL-SCNC: 27 MMOL/L (ref 21–32)
CREAT SERPL-MCNC: 0.94 MG/DL (ref 0.6–1.3)
EOSINOPHIL # BLD AUTO: 0 10^3/UL (ref 0–0.3)
EOSINOPHIL NFR BLD AUTO: 0 % (ref 0–10)
ERYTHROCYTE [DISTWIDTH] IN BLOOD BY AUTOMATED COUNT: 14.2 % (ref 10–14.5)
GFR SERPLBLD BASED ON 1.73 SQ M-ARVRAT: > 60 ML/MIN
GLUCOSE SERPL-MCNC: 280 MG/DL (ref 70–105)
HCT VFR BLD CALC: 34 % (ref 40–54)
HGB BLD-MCNC: 11.1 G/DL (ref 13.3–17.7)
INR PPP: 2.5 (ref 0.8–1.4)
LYMPHOCYTES # BLD AUTO: 1 X 10^3 (ref 1–4)
LYMPHOCYTES NFR BLD AUTO: 9 % (ref 12–44)
MANUAL DIFFERENTIAL PERFORMED BLD QL: NO
MCH RBC QN AUTO: 31 PG (ref 25–34)
MCHC RBC AUTO-ENTMCNC: 33 G/DL (ref 32–36)
MCV RBC AUTO: 95 FL (ref 80–99)
MONOCYTES # BLD AUTO: 1.1 X 10^3 (ref 0–1)
MONOCYTES NFR BLD AUTO: 10 % (ref 0–12)
NEUTROPHILS # BLD AUTO: 8.6 X 10^3 (ref 1.8–7.8)
NEUTROPHILS NFR BLD AUTO: 80 % (ref 42–75)
PLATELET # BLD: 225 10^3/UL (ref 130–400)
PMV BLD AUTO: 9.4 FL (ref 7.4–10.4)
POTASSIUM SERPL-SCNC: 4.4 MMOL/L (ref 3.6–5)
PROTHROMBIN TIME: 26.6 SEC (ref 12.2–14.7)
RBC # BLD AUTO: 3.56 10^6/UL (ref 4.35–5.85)
SODIUM SERPL-SCNC: 139 MMOL/L (ref 135–145)
WBC # BLD AUTO: 10.8 10^3/UL (ref 4.3–11)

## 2018-05-03 RX ADMIN — INSULIN ASPART SCH UNIT: 100 INJECTION, SOLUTION INTRAVENOUS; SUBCUTANEOUS at 20:55

## 2018-05-03 RX ADMIN — DILTIAZEM HYDROCHLORIDE SCH MG: 30 TABLET, FILM COATED ORAL at 14:06

## 2018-05-03 RX ADMIN — INSULIN ASPART SCH UNIT: 100 INJECTION, SOLUTION INTRAVENOUS; SUBCUTANEOUS at 06:31

## 2018-05-03 RX ADMIN — DILTIAZEM HYDROCHLORIDE SCH MG: 30 TABLET, FILM COATED ORAL at 20:59

## 2018-05-03 RX ADMIN — SODIUM CHLORIDE SCH MLS/HR: 900 INJECTION INTRAVENOUS at 20:54

## 2018-05-03 RX ADMIN — SODIUM CHLORIDE SCH MLS/HR: 900 INJECTION INTRAVENOUS at 02:28

## 2018-05-03 RX ADMIN — INSULIN DETEMIR SCH UNIT: 100 INJECTION, SOLUTION SUBCUTANEOUS at 20:55

## 2018-05-03 RX ADMIN — Medication SCH ML: at 14:06

## 2018-05-03 RX ADMIN — IPRATROPIUM BROMIDE AND ALBUTEROL SULFATE SCH ML: .5; 3 SOLUTION RESPIRATORY (INHALATION) at 18:29

## 2018-05-03 RX ADMIN — IPRATROPIUM BROMIDE AND ALBUTEROL SULFATE SCH ML: .5; 3 SOLUTION RESPIRATORY (INHALATION) at 23:05

## 2018-05-03 RX ADMIN — GABAPENTIN SCH MG: 100 CAPSULE ORAL at 06:29

## 2018-05-03 RX ADMIN — DILTIAZEM HYDROCHLORIDE SCH MG: 30 TABLET, FILM COATED ORAL at 06:37

## 2018-05-03 RX ADMIN — IPRATROPIUM BROMIDE AND ALBUTEROL SULFATE SCH ML: .5; 3 SOLUTION RESPIRATORY (INHALATION) at 07:17

## 2018-05-03 RX ADMIN — GABAPENTIN SCH MG: 100 CAPSULE ORAL at 11:20

## 2018-05-03 RX ADMIN — Medication SCH ML: at 20:55

## 2018-05-03 RX ADMIN — FUROSEMIDE SCH MG: 40 TABLET ORAL at 08:26

## 2018-05-03 RX ADMIN — IPRATROPIUM BROMIDE AND ALBUTEROL SULFATE SCH ML: .5; 3 SOLUTION RESPIRATORY (INHALATION) at 02:55

## 2018-05-03 RX ADMIN — WARFARIN SODIUM SCH MG: 5 TABLET ORAL at 18:12

## 2018-05-03 RX ADMIN — MONTELUKAST SCH MG: 10 TABLET, FILM COATED ORAL at 20:54

## 2018-05-03 RX ADMIN — SODIUM CHLORIDE SCH MLS/HR: 900 INJECTION INTRAVENOUS at 08:26

## 2018-05-03 RX ADMIN — ENALAPRIL MALEATE SCH MG: 2.5 TABLET ORAL at 08:26

## 2018-05-03 RX ADMIN — IPRATROPIUM BROMIDE AND ALBUTEROL SULFATE SCH ML: .5; 3 SOLUTION RESPIRATORY (INHALATION) at 11:26

## 2018-05-03 RX ADMIN — Medication SCH ML: at 06:30

## 2018-05-03 RX ADMIN — INSULIN ASPART SCH UNIT: 100 INJECTION, SOLUTION INTRAVENOUS; SUBCUTANEOUS at 11:14

## 2018-05-03 RX ADMIN — PANTOPRAZOLE SODIUM SCH MG: 40 TABLET, DELAYED RELEASE ORAL at 06:29

## 2018-05-03 RX ADMIN — IPRATROPIUM BROMIDE AND ALBUTEROL SULFATE SCH ML: .5; 3 SOLUTION RESPIRATORY (INHALATION) at 14:48

## 2018-05-03 RX ADMIN — SIMVASTATIN SCH MG: 20 TABLET, FILM COATED ORAL at 20:54

## 2018-05-03 RX ADMIN — INSULIN ASPART SCH UNIT: 100 INJECTION, SOLUTION INTRAVENOUS; SUBCUTANEOUS at 16:05

## 2018-05-03 NOTE — DIAGNOSTIC IMAGING REPORT
Indication:  Pneumonia.



Procedure:  PA and lateral chest obtained at 8:42 hours a.m. and

compared with 05/02/2018.



Findings:  Heart is normal in size.  There is extensive chronic

interstitial disease which appears similar to the prior study.

Mild patchy bibasilar infiltrates remain stable compared to the

previous day. There is no pneumothorax or pleural fluid.



Impression:

Underlying chronic interstitial disease. Unchanged bibasilar

infiltrates compared to the prior study of yesterday. No new

finding is seen.



Dictated by: 



  Dictated on workstation # [id number]

## 2018-05-03 NOTE — PROGRESS NOTE (SOAP)
Subjective


Time Seen by Provider:  07:55


Subjective/Events-last exam


Patient feeling better.


Patient still has some congestion in his chest.


Sputum cultures shows 3 organisms.


Patient improving





Focused Exam


Lactate Level


18 11:30: Lactic Acid Level 2.77*H


18 13:55: Lactic Acid Level 0.76





Objective


Exam





Vital Signs








  Date Time  Temp Pulse Resp B/P (MAP) Pulse Ox O2 Delivery O2 Flow Rate FiO2


 


5/3/18 07:18  95   86   


 


5/3/18 07:18     86 Room Air  


 


5/3/18 04:15 99.2 93 24 114/56 (75) 97 Nasal Cannula 2.00 


 


5/3/18 01:00  103      


 


18 23:54 98.6 91 14 106/53 (70) 95 Nasal Cannula 2.00 


 


18 20:30      Room Air 2.00 


 


18 20:00 97.4 97 14 97/50 (66) 93   


 


18 19:14     95 Nasal Cannula 2.00 


 


18 19:00  93      


 


18 17:37 97.6 95 16 113/56 (75) 93   


 


18 15:11     94 Room Air  


 


18 13:00  95      


 


18 12:00 98.0 104 20 115/55 (75) 94 Nasal Cannula 2.00 


 


18 10:48     92   


 


18 08:30     90 Room Air 2.00 


 


18 08:00 97.1 90 20 98/54 (69) 95 Nasal Cannula 2.00 














I & O 


 


 5/3/18





 07:00


 


Intake Total 2380 ml


 


Balance 2380 ml





Capillary Refill : Less Than 3 Seconds


General Appearance:  No Apparent Distress, WD/WN


HEENT:  Normal ENT Inspection


Neck:  Normal Inspection


Respiratory:  No Accessory Muscle Use, No Respiratory Distress, Decreased 

Breath Sounds, Other (Congestion and chest)


Cardiovascular:  Regular Rate, Rhythm, No Murmur


Gastrointestinal:  non tender, soft





Results


Lab


Laboratory Tests


18 11:23: Glucometer 369H


18 16:41: Glucometer 346H


18 20:53: Glucometer 275H


5/3/18 05:06: Glucometer 109





Microbiology


18 Blood Culture - Preliminary, Resulted


          No growth


18 Gram Stain - Final, Resulted


          


18 Sputum Culture - Preliminary, Resulted


          Serratia liquefaciens complex


          Probable Pseudomonas


          Normal haydee





Assessment/Plan


Assessment/Plan


Assess & Plan/Chief Complaint


Pneumonia.


Short of breath.


Febrile.


COPD 


Patient feeling better today.


.


5/3/18.


Pneumonia.


Short of breath better.


Febrile resolved.


COPD.


Diabetes.


Coronary artery disease.


Sputum culture shows 3 organisms.





Clinical Quality Measures


Admission Status


Admission Dx


Right lower lobe pneumonia.


COPD.


Congestive heart failure.


Sepsis.


Diabetes.





DVT/VTE Risk/Contraindication:


Risk Factor Score Per Nursin


RFS Level Per Nursing on Admit:  4+=Very High


Contraindications-Pharm:  Other *list below*











TOR CONNELLY DO May 3, 2018 08:01

## 2018-05-04 VITALS — DIASTOLIC BLOOD PRESSURE: 65 MMHG | SYSTOLIC BLOOD PRESSURE: 95 MMHG

## 2018-05-04 VITALS — DIASTOLIC BLOOD PRESSURE: 62 MMHG | SYSTOLIC BLOOD PRESSURE: 115 MMHG

## 2018-05-04 VITALS — DIASTOLIC BLOOD PRESSURE: 54 MMHG | SYSTOLIC BLOOD PRESSURE: 105 MMHG

## 2018-05-04 VITALS — DIASTOLIC BLOOD PRESSURE: 56 MMHG | SYSTOLIC BLOOD PRESSURE: 112 MMHG

## 2018-05-04 VITALS — DIASTOLIC BLOOD PRESSURE: 61 MMHG | SYSTOLIC BLOOD PRESSURE: 115 MMHG

## 2018-05-04 VITALS — SYSTOLIC BLOOD PRESSURE: 97 MMHG | DIASTOLIC BLOOD PRESSURE: 57 MMHG

## 2018-05-04 LAB
BASOPHILS # BLD AUTO: 0 10^3/UL (ref 0–0.1)
BASOPHILS NFR BLD AUTO: 0 % (ref 0–10)
BUN/CREAT SERPL: 23
CALCIUM SERPL-MCNC: 8.2 MG/DL (ref 8.5–10.1)
CHLORIDE SERPL-SCNC: 102 MMOL/L (ref 98–107)
CO2 SERPL-SCNC: 28 MMOL/L (ref 21–32)
CREAT SERPL-MCNC: 0.81 MG/DL (ref 0.6–1.3)
EOSINOPHIL # BLD AUTO: 0 10^3/UL (ref 0–0.3)
EOSINOPHIL NFR BLD AUTO: 0 % (ref 0–10)
ERYTHROCYTE [DISTWIDTH] IN BLOOD BY AUTOMATED COUNT: 14.3 % (ref 10–14.5)
GFR SERPLBLD BASED ON 1.73 SQ M-ARVRAT: > 60 ML/MIN
GLUCOSE SERPL-MCNC: 97 MG/DL (ref 70–105)
HCT VFR BLD CALC: 32 % (ref 40–54)
HGB BLD-MCNC: 10.7 G/DL (ref 13.3–17.7)
INR PPP: 2.5 (ref 0.8–1.4)
LYMPHOCYTES # BLD AUTO: 1.5 X 10^3 (ref 1–4)
LYMPHOCYTES NFR BLD AUTO: 16 % (ref 12–44)
MANUAL DIFFERENTIAL PERFORMED BLD QL: NO
MCH RBC QN AUTO: 31 PG (ref 25–34)
MCHC RBC AUTO-ENTMCNC: 33 G/DL (ref 32–36)
MCV RBC AUTO: 94 FL (ref 80–99)
MONOCYTES # BLD AUTO: 1 X 10^3 (ref 0–1)
MONOCYTES NFR BLD AUTO: 11 % (ref 0–12)
NEUTROPHILS # BLD AUTO: 6.9 X 10^3 (ref 1.8–7.8)
NEUTROPHILS NFR BLD AUTO: 73 % (ref 42–75)
PLATELET # BLD: 214 10^3/UL (ref 130–400)
PMV BLD AUTO: 9.2 FL (ref 7.4–10.4)
POTASSIUM SERPL-SCNC: 3.9 MMOL/L (ref 3.6–5)
PROTHROMBIN TIME: 26.9 SEC (ref 12.2–14.7)
RBC # BLD AUTO: 3.43 10^6/UL (ref 4.35–5.85)
SODIUM SERPL-SCNC: 140 MMOL/L (ref 135–145)
WBC # BLD AUTO: 9.4 10^3/UL (ref 4.3–11)

## 2018-05-04 RX ADMIN — Medication SCH ML: at 14:11

## 2018-05-04 RX ADMIN — IPRATROPIUM BROMIDE AND ALBUTEROL SULFATE SCH ML: .5; 3 SOLUTION RESPIRATORY (INHALATION) at 18:53

## 2018-05-04 RX ADMIN — INSULIN ASPART SCH UNIT: 100 INJECTION, SOLUTION INTRAVENOUS; SUBCUTANEOUS at 21:02

## 2018-05-04 RX ADMIN — FUROSEMIDE SCH MG: 40 TABLET ORAL at 09:45

## 2018-05-04 RX ADMIN — Medication SCH ML: at 21:03

## 2018-05-04 RX ADMIN — DILTIAZEM HYDROCHLORIDE SCH MG: 30 TABLET, FILM COATED ORAL at 21:02

## 2018-05-04 RX ADMIN — SIMVASTATIN SCH MG: 20 TABLET, FILM COATED ORAL at 21:02

## 2018-05-04 RX ADMIN — WARFARIN SODIUM SCH MG: 5 TABLET ORAL at 19:25

## 2018-05-04 RX ADMIN — PANTOPRAZOLE SODIUM SCH MG: 40 TABLET, DELAYED RELEASE ORAL at 05:50

## 2018-05-04 RX ADMIN — DILTIAZEM HYDROCHLORIDE SCH MG: 30 TABLET, FILM COATED ORAL at 14:13

## 2018-05-04 RX ADMIN — ENALAPRIL MALEATE SCH MG: 2.5 TABLET ORAL at 09:45

## 2018-05-04 RX ADMIN — DILTIAZEM HYDROCHLORIDE SCH MG: 30 TABLET, FILM COATED ORAL at 05:50

## 2018-05-04 RX ADMIN — INSULIN ASPART SCH UNIT: 100 INJECTION, SOLUTION INTRAVENOUS; SUBCUTANEOUS at 16:16

## 2018-05-04 RX ADMIN — Medication SCH ML: at 05:49

## 2018-05-04 RX ADMIN — IPRATROPIUM BROMIDE AND ALBUTEROL SULFATE SCH ML: .5; 3 SOLUTION RESPIRATORY (INHALATION) at 15:47

## 2018-05-04 RX ADMIN — INSULIN ASPART SCH UNIT: 100 INJECTION, SOLUTION INTRAVENOUS; SUBCUTANEOUS at 14:10

## 2018-05-04 RX ADMIN — GABAPENTIN SCH MG: 100 CAPSULE ORAL at 05:51

## 2018-05-04 RX ADMIN — IPRATROPIUM BROMIDE AND ALBUTEROL SULFATE SCH ML: .5; 3 SOLUTION RESPIRATORY (INHALATION) at 10:52

## 2018-05-04 RX ADMIN — INSULIN ASPART SCH UNIT: 100 INJECTION, SOLUTION INTRAVENOUS; SUBCUTANEOUS at 05:52

## 2018-05-04 RX ADMIN — SODIUM CHLORIDE SCH MLS/HR: 900 INJECTION INTRAVENOUS at 21:02

## 2018-05-04 RX ADMIN — IPRATROPIUM BROMIDE AND ALBUTEROL SULFATE SCH ML: .5; 3 SOLUTION RESPIRATORY (INHALATION) at 21:17

## 2018-05-04 RX ADMIN — GABAPENTIN SCH MG: 100 CAPSULE ORAL at 14:11

## 2018-05-04 RX ADMIN — SODIUM CHLORIDE SCH MLS/HR: 900 INJECTION INTRAVENOUS at 09:45

## 2018-05-04 RX ADMIN — MONTELUKAST SCH MG: 10 TABLET, FILM COATED ORAL at 21:02

## 2018-05-04 RX ADMIN — FLUTICASONE PROPIONATE AND SALMETEROL XINAFOATE SCH PUFF: 115; 21 AEROSOL, METERED RESPIRATORY (INHALATION) at 07:12

## 2018-05-04 RX ADMIN — INSULIN DETEMIR SCH UNIT: 100 INJECTION, SOLUTION SUBCUTANEOUS at 21:02

## 2018-05-04 RX ADMIN — POTASSIUM CHLORIDE SCH MEQ: 750 TABLET, FILM COATED, EXTENDED RELEASE ORAL at 05:50

## 2018-05-04 RX ADMIN — IPRATROPIUM BROMIDE AND ALBUTEROL SULFATE SCH ML: .5; 3 SOLUTION RESPIRATORY (INHALATION) at 07:12

## 2018-05-04 RX ADMIN — IPRATROPIUM BROMIDE AND ALBUTEROL SULFATE SCH ML: .5; 3 SOLUTION RESPIRATORY (INHALATION) at 03:10

## 2018-05-04 NOTE — DIAGNOSTIC IMAGING REPORT
Indication: Pneumonia.



PA and lateral chest obtained at 1137 hrs. a.m. and compared to

yesterday.



Findings:  Heart is normal in size. There is extensive chronic

interstitial disease again noted with no significant change from

yesterday.  There are mild patchy infiltrates in both lung bases

which are also stable.  There is no pneumothorax or pleural fluid

or other new finding. There are surgical clips over the left side

of the neck.



Impression:



Stable underlying chronic interstitial disease. Unchanged

bibasilar infiltrates compared to the prior study of yesterday.

No new abnormality.



Dictated by: 



  Dictated on workstation # [id number]

## 2018-05-04 NOTE — PROGRESS NOTE (SOAP)
Subjective


Time Seen by Provider:  08:20


Subjective/Events-last exam


Patient feeling better.


Patient wheezing.


Patient has history of getting pneumonia and in getting it again.


Patient needs more IV antibiotics.


Plan to discharge patient on Monday





Objective


Exam





Vital Signs








  Date Time  Temp Pulse Resp B/P (MAP) Pulse Ox O2 Delivery O2 Flow Rate FiO2


 


18 07:12     93 Nasal Cannula 2.00 


 


18 04:30 98.3 80 20 97/57 (70) 93 Nasal Cannula 2.00 


 


18 03:11     94 Nasal Cannula 2.00 


 


18 01:00  94      


 


18 00:27 98.2 99 22 95/65 (75) 95 Nasal Cannula 2.00 


 


5/3/18 23:06     90 Room Air  


 


5/3/18 20:15 99.7 96 18 105/53 (70) 96 Nasal Cannula 2.00 


 


5/3/18 20:00      Room Air 2.00 


 


5/3/18 19:00  102      


 


5/3/18 18:31     93 Nasal Cannula 2.00 


 


5/3/18 15:55 100.2 111 20 90/51 (64) 93 Nasal Cannula 2.00 


 


5/3/18 14:49     97 Nasal Cannula 2.00 


 


5/3/18 13:00  99      


 


5/3/18 12:00 99.8 99 20 99/55 (70) 94 Nasal Cannula 2.00 


 


5/3/18 11:26     95 Nasal Cannula 2.00 


 


5/3/18 09:19 99.5 96 18 115/60 (78) 90 Nasal Cannula 2.00 














I & O 


 


 18





 07:00


 


Intake Total 1860 ml


 


Output Total 450 ml


 


Balance 1410 ml





Capillary Refill : Less Than 3 Seconds


General Appearance:  No Apparent Distress, WD/WN


Neck:  Normal Inspection


Respiratory:  Decreased Breath Sounds, Wheezing


Cardiovascular:  Regular Rate, Rhythm, No Murmur


Gastrointestinal:  non tender, soft





Results


Lab


Laboratory Tests


5/3/18 09:28








18 04:32








Laboratory Tests


5/3/18 09:28: 


White Blood Count 10.8, Red Blood Count 3.56L, Hemoglobin 11.1L, Hematocrit 34L

, Mean Corpuscular Volume 95, Mean Corpuscular Hemoglobin 31, Mean Corpuscular 

Hemoglobin Concent 33, Red Cell Distribution Width 14.2, Platelet Count 225, 

Mean Platelet Volume 9.4, Neutrophils (%) (Auto) 80H, Lymphocytes (%) (Auto) 9L

, Monocytes (%) (Auto) 10, Eosinophils (%) (Auto) 0, Basophils (%) (Auto) 0, 

Neutrophils # (Auto) 8.6H, Lymphocytes # (Auto) 1.0, Monocytes # (Auto) 1.1H, 

Eosinophils # (Auto) 0.0, Basophils # (Auto) 0.0, Prothrombin Time 26.6H, INR 

Comment 2.5H, Sodium Level 139, Potassium Level 4.4, Chloride Level 103, Carbon 

Dioxide Level 27, Anion Gap 9, Blood Urea Nitrogen 21H, Creatinine 0.94, 

Estimat Glomerular Filtration Rate > 60, BUN/Creatinine Ratio 22, Glucose Level 

280H, Calcium Level 8.3L


5/3/18 10:56: Glucometer 388H


5/3/18 15:56: Glucometer 135H


5/3/18 20:13: Glucometer 311H


18 04:32: 


White Blood Count 9.4, Red Blood Count 3.43L, Hemoglobin 10.7L, Hematocrit 32L, 

Mean Corpuscular Volume 94, Mean Corpuscular Hemoglobin 31, Mean Corpuscular 

Hemoglobin Concent 33, Red Cell Distribution Width 14.3, Platelet Count 214, 

Mean Platelet Volume 9.2, Neutrophils (%) (Auto) 73, Lymphocytes (%) (Auto) 16, 

Monocytes (%) (Auto) 11, Eosinophils (%) (Auto) 0, Basophils (%) (Auto) 0, 

Neutrophils # (Auto) 6.9, Lymphocytes # (Auto) 1.5, Monocytes # (Auto) 1.0, 

Eosinophils # (Auto) 0.0, Basophils # (Auto) 0.0, Prothrombin Time 26.9H, INR 

Comment 2.5H, Sodium Level 140, Potassium Level 3.9, Chloride Level 102, Carbon 

Dioxide Level 28, Anion Gap 10, Blood Urea Nitrogen 19H, Creatinine 0.81, 

Estimat Glomerular Filtration Rate > 60, BUN/Creatinine Ratio 23, Glucose Level 

97, Calcium Level 8.2L


18 05:12: Glucometer 107





Microbiology


18 Blood Culture - Preliminary, Resulted


          No growth


18 Gram Stain - Final, Resulted


          


18 Sputum Culture - Preliminary, Resulted


          Serratia liquefaciens complex


          Pseudomonas aeruginosa


          Normal haydee





Assessment/Plan


Assessment/Plan


Assess & Plan/Chief Complaint


Pneumonia.


Short of breath.


Febrile.


COPD 


Patient feeling better today.


.


5/3/18.


Pneumonia.


Short of breath better.


Febrile resolved.


COPD.


Diabetes.


Coronary artery disease.


Sputum culture shows 3 organisms..


.


18.


Pneumonia.


COPD.


Coronary artery disease.


Patient will need more IV antibiotics till Monday





Clinical Quality Measures


Admission Status


Admission Dx


Right lower lobe pneumonia.


COPD.


Congestive heart failure.


Sepsis.


Diabetes.





DVT/VTE Risk/Contraindication:


Risk Factor Score Per Nursin


RFS Level Per Nursing on Admit:  4+=Very High


Contraindications-Pharm:  Other *list below*











TOR CONNELLY DO May 4, 2018 08:21

## 2018-05-05 VITALS — SYSTOLIC BLOOD PRESSURE: 179 MMHG | DIASTOLIC BLOOD PRESSURE: 84 MMHG

## 2018-05-05 VITALS — DIASTOLIC BLOOD PRESSURE: 58 MMHG | SYSTOLIC BLOOD PRESSURE: 115 MMHG

## 2018-05-05 VITALS — SYSTOLIC BLOOD PRESSURE: 112 MMHG | DIASTOLIC BLOOD PRESSURE: 54 MMHG

## 2018-05-05 VITALS — SYSTOLIC BLOOD PRESSURE: 100 MMHG | DIASTOLIC BLOOD PRESSURE: 53 MMHG

## 2018-05-05 VITALS — DIASTOLIC BLOOD PRESSURE: 51 MMHG | SYSTOLIC BLOOD PRESSURE: 95 MMHG

## 2018-05-05 VITALS — SYSTOLIC BLOOD PRESSURE: 100 MMHG | DIASTOLIC BLOOD PRESSURE: 54 MMHG

## 2018-05-05 VITALS — DIASTOLIC BLOOD PRESSURE: 54 MMHG | SYSTOLIC BLOOD PRESSURE: 101 MMHG

## 2018-05-05 LAB
BASOPHILS # BLD AUTO: 0 10^3/UL (ref 0–0.1)
BASOPHILS NFR BLD AUTO: 0 % (ref 0–10)
BUN/CREAT SERPL: 24
CALCIUM SERPL-MCNC: 8.6 MG/DL (ref 8.5–10.1)
CHLORIDE SERPL-SCNC: 106 MMOL/L (ref 98–107)
CO2 SERPL-SCNC: 26 MMOL/L (ref 21–32)
CREAT SERPL-MCNC: 0.84 MG/DL (ref 0.6–1.3)
EOSINOPHIL # BLD AUTO: 0.2 10^3/UL (ref 0–0.3)
EOSINOPHIL NFR BLD AUTO: 3 % (ref 0–10)
ERYTHROCYTE [DISTWIDTH] IN BLOOD BY AUTOMATED COUNT: 13.8 % (ref 10–14.5)
GFR SERPLBLD BASED ON 1.73 SQ M-ARVRAT: > 60 ML/MIN
GLUCOSE SERPL-MCNC: 90 MG/DL (ref 70–105)
HCT VFR BLD CALC: 33 % (ref 40–54)
HGB BLD-MCNC: 11 G/DL (ref 13.3–17.7)
INR PPP: 1.7 (ref 0.8–1.4)
LYMPHOCYTES # BLD AUTO: 1.5 X 10^3 (ref 1–4)
LYMPHOCYTES NFR BLD AUTO: 19 % (ref 12–44)
MANUAL DIFFERENTIAL PERFORMED BLD QL: NO
MCH RBC QN AUTO: 31 PG (ref 25–34)
MCHC RBC AUTO-ENTMCNC: 33 G/DL (ref 32–36)
MCV RBC AUTO: 94 FL (ref 80–99)
MONOCYTES # BLD AUTO: 0.9 X 10^3 (ref 0–1)
MONOCYTES NFR BLD AUTO: 11 % (ref 0–12)
NEUTROPHILS # BLD AUTO: 5.3 X 10^3 (ref 1.8–7.8)
NEUTROPHILS NFR BLD AUTO: 68 % (ref 42–75)
PLATELET # BLD: 219 10^3/UL (ref 130–400)
PMV BLD AUTO: 9.4 FL (ref 7.4–10.4)
POTASSIUM SERPL-SCNC: 4.2 MMOL/L (ref 3.6–5)
PROTHROMBIN TIME: 20.2 SEC (ref 12.2–14.7)
RBC # BLD AUTO: 3.53 10^6/UL (ref 4.35–5.85)
SODIUM SERPL-SCNC: 141 MMOL/L (ref 135–145)
WBC # BLD AUTO: 7.8 10^3/UL (ref 4.3–11)

## 2018-05-05 RX ADMIN — Medication SCH ML: at 14:52

## 2018-05-05 RX ADMIN — SODIUM CHLORIDE SCH MLS/HR: 900 INJECTION INTRAVENOUS at 09:11

## 2018-05-05 RX ADMIN — GUAIFENESIN AND DEXTROMETHORPHAN PRN ML: 100; 10 SYRUP ORAL at 22:19

## 2018-05-05 RX ADMIN — IPRATROPIUM BROMIDE AND ALBUTEROL SULFATE SCH ML: .5; 3 SOLUTION RESPIRATORY (INHALATION) at 21:27

## 2018-05-05 RX ADMIN — MONTELUKAST SCH MG: 10 TABLET, FILM COATED ORAL at 21:04

## 2018-05-05 RX ADMIN — Medication SCH ML: at 06:00

## 2018-05-05 RX ADMIN — SIMVASTATIN SCH MG: 20 TABLET, FILM COATED ORAL at 21:04

## 2018-05-05 RX ADMIN — INSULIN ASPART SCH UNIT: 100 INJECTION, SOLUTION INTRAVENOUS; SUBCUTANEOUS at 12:52

## 2018-05-05 RX ADMIN — FLUTICASONE PROPIONATE AND SALMETEROL XINAFOATE SCH PUFF: 115; 21 AEROSOL, METERED RESPIRATORY (INHALATION) at 06:32

## 2018-05-05 RX ADMIN — DILTIAZEM HYDROCHLORIDE SCH MG: 30 TABLET, FILM COATED ORAL at 05:59

## 2018-05-05 RX ADMIN — Medication SCH ML: at 21:05

## 2018-05-05 RX ADMIN — WARFARIN SODIUM SCH MG: 5 TABLET ORAL at 17:51

## 2018-05-05 RX ADMIN — INSULIN DETEMIR SCH UNIT: 100 INJECTION, SOLUTION SUBCUTANEOUS at 21:05

## 2018-05-05 RX ADMIN — DILTIAZEM HYDROCHLORIDE SCH MG: 30 TABLET, FILM COATED ORAL at 14:51

## 2018-05-05 RX ADMIN — IPRATROPIUM BROMIDE AND ALBUTEROL SULFATE SCH ML: .5; 3 SOLUTION RESPIRATORY (INHALATION) at 09:35

## 2018-05-05 RX ADMIN — INSULIN ASPART SCH UNIT: 100 INJECTION, SOLUTION INTRAVENOUS; SUBCUTANEOUS at 06:00

## 2018-05-05 RX ADMIN — IPRATROPIUM BROMIDE AND ALBUTEROL SULFATE SCH ML: .5; 3 SOLUTION RESPIRATORY (INHALATION) at 18:39

## 2018-05-05 RX ADMIN — INSULIN ASPART SCH UNIT: 100 INJECTION, SOLUTION INTRAVENOUS; SUBCUTANEOUS at 21:05

## 2018-05-05 RX ADMIN — GABAPENTIN SCH MG: 100 CAPSULE ORAL at 05:59

## 2018-05-05 RX ADMIN — PANTOPRAZOLE SODIUM SCH MG: 40 TABLET, DELAYED RELEASE ORAL at 05:59

## 2018-05-05 RX ADMIN — IPRATROPIUM BROMIDE AND ALBUTEROL SULFATE SCH ML: .5; 3 SOLUTION RESPIRATORY (INHALATION) at 03:20

## 2018-05-05 RX ADMIN — ENALAPRIL MALEATE SCH MG: 2.5 TABLET ORAL at 09:11

## 2018-05-05 RX ADMIN — DILTIAZEM HYDROCHLORIDE SCH MG: 30 TABLET, FILM COATED ORAL at 21:04

## 2018-05-05 RX ADMIN — IPRATROPIUM BROMIDE AND ALBUTEROL SULFATE SCH ML: .5; 3 SOLUTION RESPIRATORY (INHALATION) at 14:39

## 2018-05-05 RX ADMIN — GABAPENTIN SCH MG: 100 CAPSULE ORAL at 12:52

## 2018-05-05 RX ADMIN — IPRATROPIUM BROMIDE AND ALBUTEROL SULFATE SCH ML: .5; 3 SOLUTION RESPIRATORY (INHALATION) at 06:32

## 2018-05-05 RX ADMIN — FUROSEMIDE SCH MG: 40 TABLET ORAL at 09:11

## 2018-05-05 RX ADMIN — INSULIN ASPART SCH UNIT: 100 INJECTION, SOLUTION INTRAVENOUS; SUBCUTANEOUS at 16:56

## 2018-05-05 RX ADMIN — SODIUM CHLORIDE SCH MLS/HR: 900 INJECTION INTRAVENOUS at 21:04

## 2018-05-05 NOTE — PROGRESS NOTE-HOSPITALIST
Subjective


HPI/CC On Admission


Date Seen by Provider:  May 5, 2018


Time Seen by Provider:  07:45


Subjective/Events-last exam


Patient is doing well and has no complaints


Breathing better


Maintain on oxygen 24/7 at home


Cefepime tolerated well


Will likely go home at the first of the week


Bowels are moving





Review of Systems


General:  Fatigue, Malaise


Pulmonary:  Dyspnea





Objective


Exam


Vital Signs





Vital Signs








  Date Time  Temp Pulse Resp B/P (MAP) Pulse Ox O2 Delivery O2 Flow Rate FiO2


 


18 12:00 97.7 70 18 115/58 (77) 94 Nasal Cannula 2.00 


 


18 15:26        28





Capillary Refill : Less Than 3 Seconds


General Appearance:  No Apparent Distress, WD/WN, Chronically ill


Respiratory:  No Accessory Muscle Use, No Respiratory Distress, Crackles, 

Decreased Breath Sounds, Wheezing


Cardiovascular:  No Edema, Irregularly Irregular


Neurologic/Psychiatric:  Alert, Oriented x3, No Motor/Sensory Deficits, Normal 

Mood/Affect, CNs II-XII Norm as Tested


Skin:  Normal Color, Warm/Dry


Lymphatic:  No Adenopathy





Results/Procedures


Lab


Laboratory Tests


18 06:00








Patient resulted labs reviewed.





Assessment/Plan


Assessment and Plan


Assess & Plan/Chief Complaint


Sepsis


Pneumonia


Congestive heart failure


COPD


Oxygen dependent





Plan:


Maintain antibiotics


Maintain nebulizer treatments and MDI


Monitor closely





Diagnosis/Problems


Diagnosis/Problems





(1) Sepsis


Status:  Resolved


Qualifiers:  


   Sepsis type:  sepsis due to unspecified organism  Qualified Codes:  A41.9 - 

Sepsis, unspecified organism


(2) RLL pneumonia


Status:  Acute


Qualifiers:  


   Pneumonia type:  due to unspecified organism  Qualified Codes:  J18.1 - 

Lobar pneumonia, unspecified organism


(3) History of atrial fibrillation


Status:  Chronic


(4) CHF (congestive heart failure)


Status:  Acute


Qualifiers:  


   Heart failure type:  systolic  Heart failure chronicity:  acute on chronic  

Qualified Codes:  I50.23 - Acute on chronic systolic (congestive) heart failure


(5) COPD (chronic obstructive pulmonary disease)


Status:  Chronic


Qualifiers:  


   COPD type:  unspecified COPD  Qualified Codes:  J44.9 - Chronic obstructive 

pulmonary disease, unspecified





Clinical Quality Measures


DVT/VTE Risk/Contraindication:


Risk Factor Score Per Nursin


RFS Level Per Nursing on Admit:  4+=Very High


Contraindications-Pharm:  Other *list below*











ALEKS WILSON DO May 5, 2018 08:06

## 2018-05-06 VITALS — SYSTOLIC BLOOD PRESSURE: 158 MMHG | DIASTOLIC BLOOD PRESSURE: 70 MMHG

## 2018-05-06 VITALS — SYSTOLIC BLOOD PRESSURE: 94 MMHG | DIASTOLIC BLOOD PRESSURE: 55 MMHG

## 2018-05-06 VITALS — DIASTOLIC BLOOD PRESSURE: 51 MMHG | SYSTOLIC BLOOD PRESSURE: 90 MMHG

## 2018-05-06 VITALS — SYSTOLIC BLOOD PRESSURE: 151 MMHG | DIASTOLIC BLOOD PRESSURE: 68 MMHG

## 2018-05-06 VITALS — SYSTOLIC BLOOD PRESSURE: 102 MMHG | DIASTOLIC BLOOD PRESSURE: 51 MMHG

## 2018-05-06 VITALS — SYSTOLIC BLOOD PRESSURE: 106 MMHG | DIASTOLIC BLOOD PRESSURE: 51 MMHG

## 2018-05-06 RX ADMIN — Medication SCH ML: at 05:53

## 2018-05-06 RX ADMIN — INSULIN ASPART SCH UNIT: 100 INJECTION, SOLUTION INTRAVENOUS; SUBCUTANEOUS at 06:14

## 2018-05-06 RX ADMIN — DILTIAZEM HYDROCHLORIDE SCH MG: 30 TABLET, FILM COATED ORAL at 05:52

## 2018-05-06 RX ADMIN — INSULIN ASPART SCH UNIT: 100 INJECTION, SOLUTION INTRAVENOUS; SUBCUTANEOUS at 11:32

## 2018-05-06 RX ADMIN — GABAPENTIN SCH MG: 100 CAPSULE ORAL at 11:45

## 2018-05-06 RX ADMIN — DILTIAZEM HYDROCHLORIDE SCH MG: 30 TABLET, FILM COATED ORAL at 14:21

## 2018-05-06 RX ADMIN — SIMVASTATIN SCH MG: 20 TABLET, FILM COATED ORAL at 21:30

## 2018-05-06 RX ADMIN — IPRATROPIUM BROMIDE AND ALBUTEROL SULFATE SCH ML: .5; 3 SOLUTION RESPIRATORY (INHALATION) at 14:23

## 2018-05-06 RX ADMIN — FLUTICASONE PROPIONATE AND SALMETEROL XINAFOATE SCH PUFF: 115; 21 AEROSOL, METERED RESPIRATORY (INHALATION) at 07:17

## 2018-05-06 RX ADMIN — ENALAPRIL MALEATE SCH MG: 2.5 TABLET ORAL at 08:37

## 2018-05-06 RX ADMIN — DILTIAZEM HYDROCHLORIDE SCH MG: 30 TABLET, FILM COATED ORAL at 21:30

## 2018-05-06 RX ADMIN — PANTOPRAZOLE SODIUM SCH MG: 40 TABLET, DELAYED RELEASE ORAL at 05:52

## 2018-05-06 RX ADMIN — WARFARIN SODIUM SCH MG: 5 TABLET ORAL at 17:43

## 2018-05-06 RX ADMIN — GUAIFENESIN AND DEXTROMETHORPHAN PRN ML: 100; 10 SYRUP ORAL at 22:55

## 2018-05-06 RX ADMIN — IPRATROPIUM BROMIDE AND ALBUTEROL SULFATE SCH ML: .5; 3 SOLUTION RESPIRATORY (INHALATION) at 19:18

## 2018-05-06 RX ADMIN — IPRATROPIUM BROMIDE AND ALBUTEROL SULFATE SCH ML: .5; 3 SOLUTION RESPIRATORY (INHALATION) at 01:12

## 2018-05-06 RX ADMIN — POTASSIUM CHLORIDE SCH MEQ: 750 TABLET, FILM COATED, EXTENDED RELEASE ORAL at 05:52

## 2018-05-06 RX ADMIN — SODIUM CHLORIDE SCH MLS/HR: 900 INJECTION INTRAVENOUS at 21:31

## 2018-05-06 RX ADMIN — INSULIN ASPART SCH UNIT: 100 INJECTION, SOLUTION INTRAVENOUS; SUBCUTANEOUS at 21:30

## 2018-05-06 RX ADMIN — GABAPENTIN SCH MG: 100 CAPSULE ORAL at 05:52

## 2018-05-06 RX ADMIN — IPRATROPIUM BROMIDE AND ALBUTEROL SULFATE SCH ML: .5; 3 SOLUTION RESPIRATORY (INHALATION) at 07:16

## 2018-05-06 RX ADMIN — MONTELUKAST SCH MG: 10 TABLET, FILM COATED ORAL at 21:30

## 2018-05-06 RX ADMIN — IPRATROPIUM BROMIDE AND ALBUTEROL SULFATE SCH ML: .5; 3 SOLUTION RESPIRATORY (INHALATION) at 10:54

## 2018-05-06 RX ADMIN — Medication SCH ML: at 21:31

## 2018-05-06 RX ADMIN — INSULIN DETEMIR SCH UNIT: 100 INJECTION, SOLUTION SUBCUTANEOUS at 21:30

## 2018-05-06 RX ADMIN — SODIUM CHLORIDE SCH MLS/HR: 900 INJECTION INTRAVENOUS at 08:36

## 2018-05-06 RX ADMIN — Medication SCH ML: at 14:21

## 2018-05-06 RX ADMIN — INSULIN ASPART SCH UNIT: 100 INJECTION, SOLUTION INTRAVENOUS; SUBCUTANEOUS at 16:55

## 2018-05-06 RX ADMIN — FUROSEMIDE SCH MG: 40 TABLET ORAL at 08:37

## 2018-05-06 NOTE — PROGRESS NOTE-HOSPITALIST
Subjective


HPI/CC On Admission


Date Seen by Provider:  May 6, 2018


Time Seen by Provider:  13:00


Subjective/Events-last exam


Patient doing well except for a cough and I did give Tessalon Perles and 

Robitussin DM and that has helped him


Denies any pain


Waiting for discharge tomorrow





Review of Systems


Pulmonary:  Cough





Objective


Exam


Vital Signs





Vital Signs








  Date Time  Temp Pulse Resp B/P (MAP) Pulse Ox O2 Delivery O2 Flow Rate FiO2


 


18 10:56     93 Nasal Cannula 2.00 


 


18 08:30 98.4 82 20 151/68 (95)    


 


18 07:21        93





Capillary Refill : Less Than 3 Seconds


General Appearance:  No Apparent Distress, WD/WN, Chronically ill


Respiratory:  Decreased Breath Sounds, Wheezing


Cardiovascular:  No Edema


Neurologic/Psychiatric:  Alert, Oriented x3, No Motor/Sensory Deficits, Normal 

Mood/Affect, CNs II-XII Norm as Tested





Results/Procedures


Lab


Patient resulted labs reviewed.





Assessment/Plan


Assessment and Plan


Assess & Plan/Chief Complaint


Sepsis


Pneumonia


Congestive heart failure


COPD


Oxygen dependent


Cough





Plan:


Maintain antibiotics


Maintain nebulizer treatments and MDI


Monitor closely


Chanda Huynh





Diagnosis/Problems


Diagnosis/Problems





(1) Sepsis


Status:  Resolved


Qualifiers:  


   Sepsis type:  sepsis due to unspecified organism  Qualified Codes:  A41.9 - 

Sepsis, unspecified organism


(2) RLL pneumonia


Status:  Acute


Qualifiers:  


   Pneumonia type:  due to unspecified organism  Qualified Codes:  J18.1 - 

Lobar pneumonia, unspecified organism


(3) History of atrial fibrillation


Status:  Chronic


(4) CHF (congestive heart failure)


Status:  Acute


Qualifiers:  


   Heart failure type:  systolic  Heart failure chronicity:  acute on chronic  

Qualified Codes:  I50.23 - Acute on chronic systolic (congestive) heart failure


(5) COPD (chronic obstructive pulmonary disease)


Status:  Chronic


Qualifiers:  


   COPD type:  unspecified COPD  Qualified Codes:  J44.9 - Chronic obstructive 

pulmonary disease, unspecified





Clinical Quality Measures


DVT/VTE Risk/Contraindication:


Risk Factor Score Per Nursin


RFS Level Per Nursing on Admit:  4+=Very High


Contraindications-Pharm:  Other *list below*











ALEKS WILSON DO May 6, 2018 12:54

## 2018-05-07 VITALS — SYSTOLIC BLOOD PRESSURE: 120 MMHG | DIASTOLIC BLOOD PRESSURE: 57 MMHG

## 2018-05-07 VITALS — DIASTOLIC BLOOD PRESSURE: 62 MMHG | SYSTOLIC BLOOD PRESSURE: 98 MMHG

## 2018-05-07 VITALS — SYSTOLIC BLOOD PRESSURE: 107 MMHG | DIASTOLIC BLOOD PRESSURE: 64 MMHG

## 2018-05-07 VITALS — SYSTOLIC BLOOD PRESSURE: 115 MMHG | DIASTOLIC BLOOD PRESSURE: 57 MMHG

## 2018-05-07 VITALS — DIASTOLIC BLOOD PRESSURE: 57 MMHG | SYSTOLIC BLOOD PRESSURE: 115 MMHG

## 2018-05-07 LAB
BASOPHILS # BLD AUTO: 0 10^3/UL (ref 0–0.1)
BASOPHILS NFR BLD AUTO: 0 % (ref 0–10)
BUN/CREAT SERPL: 21
CALCIUM SERPL-MCNC: 8.6 MG/DL (ref 8.5–10.1)
CHLORIDE SERPL-SCNC: 101 MMOL/L (ref 98–107)
CO2 SERPL-SCNC: 28 MMOL/L (ref 21–32)
CREAT SERPL-MCNC: 0.96 MG/DL (ref 0.6–1.3)
EOSINOPHIL # BLD AUTO: 0.4 10^3/UL (ref 0–0.3)
EOSINOPHIL NFR BLD AUTO: 6 % (ref 0–10)
EOSINOPHIL NFR BLD MANUAL: 5 %
ERYTHROCYTE [DISTWIDTH] IN BLOOD BY AUTOMATED COUNT: 13.7 % (ref 10–14.5)
GFR SERPLBLD BASED ON 1.73 SQ M-ARVRAT: > 60 ML/MIN
GLUCOSE SERPL-MCNC: 301 MG/DL (ref 70–105)
HCT VFR BLD CALC: 34 % (ref 40–54)
HGB BLD-MCNC: 11.2 G/DL (ref 13.3–17.7)
LYMPHOCYTES # BLD AUTO: 1.4 X 10^3 (ref 1–4)
LYMPHOCYTES NFR BLD AUTO: 22 % (ref 12–44)
MCH RBC QN AUTO: 31 PG (ref 25–34)
MCHC RBC AUTO-ENTMCNC: 33 G/DL (ref 32–36)
MCV RBC AUTO: 94 FL (ref 80–99)
MONOCYTES # BLD AUTO: 0.8 X 10^3 (ref 0–1)
MONOCYTES NFR BLD AUTO: 13 % (ref 0–12)
MONOCYTES NFR BLD: 9 %
NEUTROPHILS # BLD AUTO: 3.6 X 10^3 (ref 1.8–7.8)
NEUTROPHILS NFR BLD AUTO: 58 % (ref 42–75)
NEUTS BAND NFR BLD MANUAL: 64 %
PLATELET # BLD: 260 10^3/UL (ref 130–400)
PMV BLD AUTO: 8.8 FL (ref 7.4–10.4)
POTASSIUM SERPL-SCNC: 4.4 MMOL/L (ref 3.6–5)
RBC # BLD AUTO: 3.64 10^6/UL (ref 4.35–5.85)
RBC MORPH BLD: NORMAL
SODIUM SERPL-SCNC: 136 MMOL/L (ref 135–145)
VARIANT LYMPHS NFR BLD MANUAL: 22 %
WBC # BLD AUTO: 6.1 10^3/UL (ref 4.3–11)

## 2018-05-07 RX ADMIN — FLUTICASONE PROPIONATE AND SALMETEROL XINAFOATE SCH PUFF: 115; 21 AEROSOL, METERED RESPIRATORY (INHALATION) at 07:36

## 2018-05-07 RX ADMIN — INSULIN ASPART SCH UNIT: 100 INJECTION, SOLUTION INTRAVENOUS; SUBCUTANEOUS at 05:43

## 2018-05-07 RX ADMIN — PANTOPRAZOLE SODIUM SCH MG: 40 TABLET, DELAYED RELEASE ORAL at 06:39

## 2018-05-07 RX ADMIN — SODIUM CHLORIDE SCH MLS/HR: 900 INJECTION INTRAVENOUS at 10:22

## 2018-05-07 RX ADMIN — DILTIAZEM HYDROCHLORIDE SCH MG: 30 TABLET, FILM COATED ORAL at 06:39

## 2018-05-07 RX ADMIN — IPRATROPIUM BROMIDE AND ALBUTEROL SULFATE SCH ML: .5; 3 SOLUTION RESPIRATORY (INHALATION) at 07:36

## 2018-05-07 RX ADMIN — INSULIN ASPART SCH UNIT: 100 INJECTION, SOLUTION INTRAVENOUS; SUBCUTANEOUS at 12:57

## 2018-05-07 RX ADMIN — GABAPENTIN SCH MG: 100 CAPSULE ORAL at 12:57

## 2018-05-07 RX ADMIN — IPRATROPIUM BROMIDE AND ALBUTEROL SULFATE SCH ML: .5; 3 SOLUTION RESPIRATORY (INHALATION) at 11:14

## 2018-05-07 RX ADMIN — Medication SCH ML: at 06:39

## 2018-05-07 RX ADMIN — ENALAPRIL MALEATE SCH MG: 2.5 TABLET ORAL at 10:24

## 2018-05-07 RX ADMIN — FUROSEMIDE SCH MG: 40 TABLET ORAL at 10:24

## 2018-05-07 RX ADMIN — GABAPENTIN SCH MG: 100 CAPSULE ORAL at 06:39

## 2018-05-07 NOTE — PROGRESS NOTE (SOAP)
Subjective


Time Seen by Provider:  08:25


Subjective/Events-last exam


Patient feeling better today.


Plan to discharge today.


Nurse to call at 11 a.m.





Objective


Exam





Vital Signs








  Date Time  Temp Pulse Resp B/P (MAP) Pulse Ox O2 Delivery O2 Flow Rate FiO2


 


18 07:37     93 Room Air  


 


18 03:30 97.0 74 22 107/64 (78) 95 Nasal Cannula 2.00 


 


18 01:00  84      


 


18 00:20 97.5 68 22 98/62 (74) 98 Nasal Cannula 2.00 


 


18 20:30      Nasal Cannula 2.00 


 


18 20:00 99.4 89 18 102/51 (68) 96 Room Air  


 


18 19:19     97 Nasal Cannula 2.00 


 


18 16:00 98.2 64 20 158/70 (99) 97 Room Air  


 


18 14:24      Room Air  


 


18 13:00  106      


 


18 12:30 97.6 91 18 106/51 (69) 98 Nasal Cannula 2.00 


 


18 10:56     93 Nasal Cannula 2.00 


 


18 08:30 98.4 82 20 151/68 (95) 94 Nasal Cannula 2.00 





       2.00 














I & O 


 


 18





 07:00


 


Intake Total 1350 ml


 


Output Total 1025 ml


 


Balance 325 ml





Capillary Refill : Less Than 3 Seconds


General Appearance:  No Apparent Distress, WD/WN


HEENT:  Normal ENT Inspection


Neck:  Normal Inspection


Respiratory:  No Accessory Muscle Use, No Respiratory Distress, Other (Loose 

congestion)


Cardiovascular:  Regular Rate, Rhythm


Gastrointestinal:  non tender, soft





Results


Lab


Laboratory Tests


18 11:28: Glucometer 166H


18 16:39: Glucometer 294H


18 21:06: Glucometer 214H


18 05:29: Glucometer 105





Microbiology


18 Blood Culture - Final, Complete


          No growth


18 Gram Stain - Final, Resulted


          


18 Sputum Culture - Preliminary, Resulted


          Serratia liquefaciens complex


          Pseudomonas aeruginosa


          Normal haydee





Assessment/Plan


Assessment/Plan


Assess & Plan/Chief Complaint


Pneumonia.


Short of breath.


Febrile.


COPD 


Patient feeling better today.


.


5/3/18.


Pneumonia.


Short of breath better.


Febrile resolved.


COPD.


Diabetes.


Coronary artery disease.


Sputum culture shows 3 organisms..


.


18.


Pneumonia.


COPD.


Coronary artery disease.


Patient will need more IV antibiotics till Monday.


.


18.


Pneumonia.


COPD.


Coronary artery disease.


Patient feeling better





Clinical Quality Measures


Admission Status


Admission Dx


Right lower lobe pneumonia.


COPD.


Congestive heart failure.


Sepsis.


Diabetes.





DVT/VTE Risk/Contraindication:


Risk Factor Score Per Nursin


RFS Level Per Nursing on Admit:  4+=Very High


Contraindications-Pharm:  Other *list below*











TOR CONNELLY DO May 7, 2018 08:26

## 2018-05-07 NOTE — DIAGNOSTIC IMAGING REPORT
Procedure:  PA and lateral chest at 9:03.



Indication:  Pneumonia.



Findings:  When compared to the prior exam of 05/04/2018, there

does not appear to have been any significant change. There are

still alveolar/interstitial pulmonary infiltrates involving both

lung bases. The left lung base may be slightly better aerated

than on the prior study. The vague area of increased density

overlying the right apex seen previous is essentially no

different The right hilum is prominent but no different than on

the prior exam. The right hilum also seems similar to the prior

study of 02/20/2017. The heart is stable in size. The mediastinum

is not widened. The osseous structures are intact.



The loop recorder device overlying the left thorax and the

surgical clips overlying the left apex seen previously are again

evident and no different. 



Impression:  There is persistent involvement of both lung bases

by pneumonia/atelectasis and fluid. Overall, there has been no

significant change since the prior study. A followup exam would

recommended for continued evaluation. 



Dictated by: 



  Dictated on workstation # [id number]

## 2018-05-10 NOTE — DISCHARGE SUMMARY
Diagnosis/Chief Complaint


Date of Admission


2018 at 12:42


Date of Discharge


May 7, 2018 at 12:30


Discharge Date:  May 7, 2018


Discharge Time:  07:05


Discharge Diagnosis


Right lower lobe pneumonia.


Sepsis.


Anemia.


History of atrial fibrillation.


Elevated lactic acid.


Renal insufficiency.


Diabetes.


COPD.


Coronary artery disease.


CHF.


Acquired absence of lung.





Reason Hospital Visit


Patient came to the office yesterday short of breath running an elevated 

temperature of 102, and wheezing.


Patient sent out to the emergency room after speaking to [facility].


Chest x-ray and emergency room shows right lower lobe pneumonia.


Patient has a history of COPD.


Patient has a history of atrial fibrillation.


Surgeries partial removal of lung for benign condition.  Consults, prostate,.


Family history daughter has asthma.  Brother  of cancer.  Denies TB diabetes





Discharge Summary


Discharge Physical Examination


Allergies:  


Coded Allergies:  


     morphine (Verified  Allergy, Mild, 16)


     Sulfa (Sulfonamide Antibiotics) (Verified  Allergy, Unknown, 16)


     penicillin G (Verified  Allergy, Unknown, 16)


     levofloxacin (Verified  Adverse Reaction, Unknown, 16)


Vitals & I&Os





Vital Signs








  Date Time  Temp Pulse Resp B/P (MAP) Pulse Ox O2 Delivery O2 Flow Rate FiO2


 


18 12:30  82 18 115/57 95 Nasal Cannula 2.00 


 


18 12:00 98.0       


 


18 07:21        93











Hospital Course


Patient in hospital did improve.


Patient breathing good on discharge


Labs (last 24 hrs)


Laboratory Tests


18 11:30: 


White Blood Count 13.3H, Red Blood Count 3.72L, Hemoglobin 11.7L, Hematocrit 35L

, Mean Corpuscular Volume 95, Mean Corpuscular Hemoglobin 31, Mean Corpuscular 

Hemoglobin Concent 33, Red Cell Distribution Width 14.3, Platelet Count 234, 

Mean Platelet Volume 9.0, Neutrophils (%) (Auto) 78H, Lymphocytes (%) (Auto) 12

, Monocytes (%) (Auto) 10, Eosinophils (%) (Auto) 0, Basophils (%) (Auto) 0, 

Neutrophils # (Auto) 10.4H, Lymphocytes # (Auto) 1.5, Monocytes # (Auto) 1.3H, 

Eosinophils # (Auto) 0.0, Basophils # (Auto) 0.0, Prothrombin Time 18.5H, INR 

Comment 1.5H, Activated Partial Thromboplast Time 47H, Sodium Level 137, 

Potassium Level 4.3, Chloride Level 102, Carbon Dioxide Level 26, Anion Gap 9, 

Blood Urea Nitrogen 18, Creatinine 0.98, Estimat Glomerular Filtration Rate > 60

, BUN/Creatinine Ratio 18, Glucose Level 198H, Lactic Acid Level 2.77*H, 

Calcium Level 8.5, Magnesium Level 2.0, Total Bilirubin 0.6, Aspartate Amino 

Transf (AST/SGOT) 18, Alanine Aminotransferase (ALT/SGPT) 15, Alkaline 

Phosphatase 71, Total Creatine Kinase 136, Creatine Kinase MB 1.4, Troponin I < 

0.30, B-Type Natriuretic Peptide 127.1H, Total Protein 7.0, Albumin 3.9


18 12:15: 


Urine Color YELLOW, Urine Clarity CLEAR, Urine pH 5, Urine Specific Gravity 

1.010L, Urine Protein NEGATIVE, Urine Glucose (UA) NEGATIVE, Urine Ketones 

NEGATIVE, Urine Nitrite NEGATIVE, Urine Bilirubin NEGATIVE, Urine Urobilinogen 

NORMAL, Urine Leukocyte Esterase NEGATIVE, Urine RBC (Auto) 3+H, Urine RBC 0-2, 

Urine WBC NONE, Urine Squamous Epithelial Cells 0-2, Urine Crystals NONE, Urine 

Bacteria TRACE, Urine Casts NONE, Urine Mucus NEGATIVE, Urine Culture Indicated 

NO


18 12:42: Lab Scanned Report Referred Lab Report


18 13:55: Lactic Acid Level 0.76


18 17:05: Glucometer 415*H


18 20:47: Glucometer 448*H


18 05:52: Glucometer 398H


18 06:20: 


White Blood Count 9.0, Red Blood Count 3.57L, Hemoglobin 11.1L, Hematocrit 33L, 

Mean Corpuscular Volume 93, Mean Corpuscular Hemoglobin 31, Mean Corpuscular 

Hemoglobin Concent 34, Red Cell Distribution Width 13.7, Platelet Count 194, 

Mean Platelet Volume 9.3, Neutrophils (%) (Auto) 94H, Lymphocytes (%) (Auto) 3L

, Monocytes (%) (Auto) 4, Eosinophils (%) (Auto) 0, Basophils (%) (Auto) 0, 

Neutrophils # (Auto) 8.4H, Lymphocytes # (Auto) 0.2L, Monocytes # (Auto) 0.3, 

Eosinophils # (Auto) 0.0, Basophils # (Auto) 0.0, Prothrombin Time 19.9H, INR 

Comment 1.7H, Sodium Level 137, Potassium Level 3.4L, Chloride Level 100, 

Carbon Dioxide Level 24, Anion Gap 13, Blood Urea Nitrogen 26H, Creatinine 1.34H

, Estimat Glomerular Filtration Rate 51, BUN/Creatinine Ratio 19, Glucose Level 

400H, Calcium Level 8.7, Magnesium Level 1.8, Total Bilirubin 0.3, Aspartate 

Amino Transf (AST/SGOT) 16, Alanine Aminotransferase (ALT/SGPT) 15, Alkaline 

Phosphatase 64, B-Type Natriuretic Peptide 263.2H, Total Protein 6.4, Albumin 

3.5


18 11:10: Glucometer 316H


18 15:20: Glucometer 360H


18 20:30: Glucometer 443*H


18 05:37: Glucometer 285H


18 05:40: 


White Blood Count 9.9, Red Blood Count 3.27L, Hemoglobin 10.3L, Hematocrit 31L, 

Mean Corpuscular Volume 93, Mean Corpuscular Hemoglobin 31, Mean Corpuscular 

Hemoglobin Concent 34, Red Cell Distribution Width 14.2, Platelet Count 226, 

Mean Platelet Volume 9.1, Neutrophils (%) (Auto) 90H, Lymphocytes (%) (Auto) 3L

, Monocytes (%) (Auto) 6, Eosinophils (%) (Auto) 0, Basophils (%) (Auto) 0, 

Neutrophils # (Auto) 8.9H, Lymphocytes # (Auto) 0.3L, Monocytes # (Auto) 0.6, 

Eosinophils # (Auto) 0.0, Basophils # (Auto) 0.0, Sodium Level 141, Potassium 

Level 4.0, Chloride Level 105, Carbon Dioxide Level 27, Anion Gap 9, Blood Urea 

Nitrogen 32H, Creatinine 0.87, Estimat Glomerular Filtration Rate > 60, BUN/

Creatinine Ratio 37, Glucose Level 273H, Calcium Level 8.8


18 11:23: Glucometer 369H


18 16:41: Glucometer 346H


18 20:53: Glucometer 275H


5/3/18 05:06: Glucometer 109


5/3/18 09:28: 


White Blood Count 10.8, Red Blood Count 3.56L, Hemoglobin 11.1L, Hematocrit 34L

, Mean Corpuscular Volume 95, Mean Corpuscular Hemoglobin 31, Mean Corpuscular 

Hemoglobin Concent 33, Red Cell Distribution Width 14.2, Platelet Count 225, 

Mean Platelet Volume 9.4, Neutrophils (%) (Auto) 80H, Lymphocytes (%) (Auto) 9L

, Monocytes (%) (Auto) 10, Eosinophils (%) (Auto) 0, Basophils (%) (Auto) 0, 

Neutrophils # (Auto) 8.6H, Lymphocytes # (Auto) 1.0, Monocytes # (Auto) 1.1H, 

Eosinophils # (Auto) 0.0, Basophils # (Auto) 0.0, Prothrombin Time 26.6H, INR 

Comment 2.5H, Sodium Level 139, Potassium Level 4.4, Chloride Level 103, Carbon 

Dioxide Level 27, Anion Gap 9, Blood Urea Nitrogen 21H, Creatinine 0.94, 

Estimat Glomerular Filtration Rate > 60, BUN/Creatinine Ratio 22, Glucose Level 

280H, Calcium Level 8.3L


5/3/18 10:56: Glucometer 388H


5/3/18 15:56: Glucometer 135H


5/3/18 20:13: Glucometer 311H


18 04:32: 


White Blood Count 9.4, Red Blood Count 3.43L, Hemoglobin 10.7L, Hematocrit 32L, 

Mean Corpuscular Volume 94, Mean Corpuscular Hemoglobin 31, Mean Corpuscular 

Hemoglobin Concent 33, Red Cell Distribution Width 14.3, Platelet Count 214, 

Mean Platelet Volume 9.2, Neutrophils (%) (Auto) 73, Lymphocytes (%) (Auto) 16, 

Monocytes (%) (Auto) 11, Eosinophils (%) (Auto) 0, Basophils (%) (Auto) 0, 

Neutrophils # (Auto) 6.9, Lymphocytes # (Auto) 1.5, Monocytes # (Auto) 1.0, 

Eosinophils # (Auto) 0.0, Basophils # (Auto) 0.0, Prothrombin Time 26.9H, INR 

Comment 2.5H, Sodium Level 140, Potassium Level 3.9, Chloride Level 102, Carbon 

Dioxide Level 28, Anion Gap 10, Blood Urea Nitrogen 19H, Creatinine 0.81, 

Estimat Glomerular Filtration Rate > 60, BUN/Creatinine Ratio 23, Glucose Level 

97, Calcium Level 8.2L


18 05:12: Glucometer 107


18 11:12: Glucometer 229H


18 16:01: Glucometer 198H


18 20:43: Glucometer 293H


18 05:08: Glucometer 87


18 06:00: 


White Blood Count 7.8, Red Blood Count 3.53L, Hemoglobin 11.0L, Hematocrit 33L, 

Mean Corpuscular Volume 94, Mean Corpuscular Hemoglobin 31, Mean Corpuscular 

Hemoglobin Concent 33, Red Cell Distribution Width 13.8, Platelet Count 219, 

Mean Platelet Volume 9.4, Neutrophils (%) (Auto) 68, Lymphocytes (%) (Auto) 19, 

Monocytes (%) (Auto) 11, Eosinophils (%) (Auto) 3, Basophils (%) (Auto) 0, 

Neutrophils # (Auto) 5.3, Lymphocytes # (Auto) 1.5, Monocytes # (Auto) 0.9, 

Eosinophils # (Auto) 0.2, Basophils # (Auto) 0.0, Prothrombin Time 20.2H, INR 

Comment 1.7H, Sodium Level 141, Potassium Level 4.2, Chloride Level 106, Carbon 

Dioxide Level 26, Anion Gap 9, Blood Urea Nitrogen 20H, Creatinine 0.84, 

Estimat Glomerular Filtration Rate > 60, BUN/Creatinine Ratio 24, Glucose Level 

90, Calcium Level 8.6


18 11:07: Glucometer 203H


18 16:23: Glucometer 125H


18 20:08: Glucometer 336H


18 06:05: Glucometer 102


18 11:28: Glucometer 166H


18 16:39: Glucometer 294H


18 21:06: Glucometer 214H


18 05:29: Glucometer 105


18 09:50: 


White Blood Count 6.1, Red Blood Count 3.64L, Hemoglobin 11.2L, Hematocrit 34L, 

Mean Corpuscular Volume 94, Mean Corpuscular Hemoglobin 31, Mean Corpuscular 

Hemoglobin Concent 33, Red Cell Distribution Width 13.7, Platelet Count 260, 

Mean Platelet Volume 8.8, Neutrophils (%) (Auto) 58, Lymphocytes (%) (Auto) 22, 

Monocytes (%) (Auto) 13H, Eosinophils (%) (Auto) 6, Basophils (%) (Auto) 0, 

Neutrophils # (Auto) 3.6, Lymphocytes # (Auto) 1.4, Monocytes # (Auto) 0.8, 

Eosinophils # (Auto) 0.4H, Basophils # (Auto) 0.0, Neutrophils % (Manual) 64, 

Lymphocytes % (Manual) 22, Monocytes % (Manual) 9, Eosinophils % (Manual) 5, 

Blood Morphology Comment NORMAL, Sodium Level 136, Potassium Level 4.4, 

Chloride Level 101, Carbon Dioxide Level 28, Anion Gap 7, Blood Urea Nitrogen 

20H, Creatinine 0.96, Estimat Glomerular Filtration Rate > 60, BUN/Creatinine 

Ratio 21, Glucose Level 301H, Calcium Level 8.6


18 12:16: Glucometer 206H





Microbiology


18 Blood Culture - Final, Complete


          No growth


18 Gram Stain - Final, Complete


          


18 Sputum Culture - Final, Complete


          Serratia liquefaciens complex


          Pseudomonas aeruginosa


          Normal haydee





Laboratory Tests


18 11:30








18 06:20








18 05:40








5/3/18 09:28








18 04:32








18 06:00








18 09:50








Pending Labs





Microbiology








 Date/Time


Source Procedure


Growth Status





 


 18 12:31


Peripheral Rt Ac Blood Culture - Final


No growth Complete


 


 18 11:30


Peripheral Lt Ac Blood Culture - Final


No growth Complete





 18 13:15


Sputum Expectorated Gram Stain - Final Complete


 


 18 13:15 Sputum Culture - Final


Serratia liquefaciens complex


Pseudomonas aeruginosa


Normal haydee Complete


 


 18 11:35


Nasopharynx Influenza Types A,B Antigen ([place]) - Final Complete





Laboratory Tests


18 11:30: 


White Blood Count 13.3, Red Blood Count 3.72, Hemoglobin 11.7, Hematocrit 35, 

Mean Corpuscular Volume 95, Mean Corpuscular Hemoglobin 31, Mean Corpuscular 

Hemoglobin Concent 33, Red Cell Distribution Width 14.3, Platelet Count 234, 

Mean Platelet Volume 9.0, Neutrophils (%) (Auto) 78, Lymphocytes (%) (Auto) 12, 

Monocytes (%) (Auto) 10, Eosinophils (%) (Auto) 0, Basophils (%) (Auto) 0, 

Neutrophils # (Auto) 10.4, Lymphocytes # (Auto) 1.5, Monocytes # (Auto) 1.3, 

Eosinophils # (Auto) 0.0, Basophils # (Auto) 0.0, Prothrombin Time 18.5, INR 

Comment 1.5, Activated Partial Thromboplast Time 47, Sodium Level 137, 

Potassium Level 4.3, Chloride Level 102, Carbon Dioxide Level 26, Anion Gap 9, 

Blood Urea Nitrogen 18, Creatinine 0.98, Estimat Glomerular Filtration Rate > 60

, BUN/Creatinine Ratio 18, Glucose Level 198, Lactic Acid Level 2.77, Calcium 

Level 8.5, Magnesium Level 2.0, Total Bilirubin 0.6, Aspartate Amino Transf (AST

/SGOT) 18, Alanine Aminotransferase (ALT/SGPT) 15, Alkaline Phosphatase 71, 

Total Creatine Kinase 136, Creatine Kinase MB 1.4, Troponin I < 0.30, B-Type 

Natriuretic Peptide 127.1, Total Protein 7.0, Albumin 3.9


18 12:15: 


Urine Color YELLOW, Urine Clarity CLEAR, Urine pH 5, Urine Specific Gravity 

1.010, Urine Protein NEGATIVE, Urine Glucose (UA) NEGATIVE, Urine Ketones 

NEGATIVE, Urine Nitrite NEGATIVE, Urine Bilirubin NEGATIVE, Urine Urobilinogen 

NORMAL, Urine Leukocyte Esterase NEGATIVE, Urine RBC (Auto) 3+, Urine RBC 0-2, 

Urine WBC NONE, Urine Squamous Epithelial Cells 0-2, Urine Crystals NONE, Urine 

Bacteria TRACE, Urine Casts NONE, Urine Mucus NEGATIVE, Urine Culture Indicated 

NO


18 12:42: Lab Scanned Report Referred Lab Report


18 13:55: Lactic Acid Level 0.76


18 17:05: Glucometer 415


18 20:47: Glucometer 448


18 05:52: Glucometer 398


18 06:20: 


White Blood Count 9.0, Red Blood Count 3.57, Hemoglobin 11.1, Hematocrit 33, 

Mean Corpuscular Volume 93, Mean Corpuscular Hemoglobin 31, Mean Corpuscular 

Hemoglobin Concent 34, Red Cell Distribution Width 13.7, Platelet Count 194, 

Mean Platelet Volume 9.3, Neutrophils (%) (Auto) 94, Lymphocytes (%) (Auto) 3, 

Monocytes (%) (Auto) 4, Eosinophils (%) (Auto) 0, Basophils (%) (Auto) 0, 

Neutrophils # (Auto) 8.4, Lymphocytes # (Auto) 0.2, Monocytes # (Auto) 0.3, 

Eosinophils # (Auto) 0.0, Basophils # (Auto) 0.0, Prothrombin Time 19.9, INR 

Comment 1.7, Sodium Level 137, Potassium Level 3.4, Chloride Level 100, Carbon 

Dioxide Level 24, Anion Gap 13, Blood Urea Nitrogen 26, Creatinine 1.34, 

Estimat Glomerular Filtration Rate 51, BUN/Creatinine Ratio 19, Glucose Level 

400, Calcium Level 8.7, Magnesium Level 1.8, Total Bilirubin 0.3, Aspartate 

Amino Transf (AST/SGOT) 16, Alanine Aminotransferase (ALT/SGPT) 15, Alkaline 

Phosphatase 64, B-Type Natriuretic Peptide 263.2, Total Protein 6.4, Albumin 3.5


18 11:10: Glucometer 316


18 15:20: Glucometer 360


18 20:30: Glucometer 443


18 05:37: Glucometer 285


18 05:40: 


White Blood Count 9.9, Red Blood Count 3.27, Hemoglobin 10.3, Hematocrit 31, 

Mean Corpuscular Volume 93, Mean Corpuscular Hemoglobin 31, Mean Corpuscular 

Hemoglobin Concent 34, Red Cell Distribution Width 14.2, Platelet Count 226, 

Mean Platelet Volume 9.1, Neutrophils (%) (Auto) 90, Lymphocytes (%) (Auto) 3, 

Monocytes (%) (Auto) 6, Eosinophils (%) (Auto) 0, Basophils (%) (Auto) 0, 

Neutrophils # (Auto) 8.9, Lymphocytes # (Auto) 0.3, Monocytes # (Auto) 0.6, 

Eosinophils # (Auto) 0.0, Basophils # (Auto) 0.0, Sodium Level 141, Potassium 

Level 4.0, Chloride Level 105, Carbon Dioxide Level 27, Anion Gap 9, Blood Urea 

Nitrogen 32, Creatinine 0.87, Estimat Glomerular Filtration Rate > 60, BUN/

Creatinine Ratio 37, Glucose Level 273, Calcium Level 8.8


18 11:23: Glucometer 369


18 16:41: Glucometer 346


18 20:53: Glucometer 275


5/3/18 05:06: Glucometer 109


5/3/18 09:28: 


White Blood Count 10.8, Red Blood Count 3.56, Hemoglobin 11.1, Hematocrit 34, 

Mean Corpuscular Volume 95, Mean Corpuscular Hemoglobin 31, Mean Corpuscular 

Hemoglobin Concent 33, Red Cell Distribution Width 14.2, Platelet Count 225, 

Mean Platelet Volume 9.4, Neutrophils (%) (Auto) 80, Lymphocytes (%) (Auto) 9, 

Monocytes (%) (Auto) 10, Eosinophils (%) (Auto) 0, Basophils (%) (Auto) 0, 

Neutrophils # (Auto) 8.6, Lymphocytes # (Auto) 1.0, Monocytes # (Auto) 1.1, 

Eosinophils # (Auto) 0.0, Basophils # (Auto) 0.0, Prothrombin Time 26.6, INR 

Comment 2.5, Sodium Level 139, Potassium Level 4.4, Chloride Level 103, Carbon 

Dioxide Level 27, Anion Gap 9, Blood Urea Nitrogen 21, Creatinine 0.94, Estimat 

Glomerular Filtration Rate > 60, BUN/Creatinine Ratio 22, Glucose Level 280, 

Calcium Level 8.3


5/3/18 10:56: Glucometer 388


5/3/18 15:56: Glucometer 135


5/3/18 20:13: Glucometer 311


18 04:32: 


White Blood Count 9.4, Red Blood Count 3.43, Hemoglobin 10.7, Hematocrit 32, 

Mean Corpuscular Volume 94, Mean Corpuscular Hemoglobin 31, Mean Corpuscular 

Hemoglobin Concent 33, Red Cell Distribution Width 14.3, Platelet Count 214, 

Mean Platelet Volume 9.2, Neutrophils (%) (Auto) 73, Lymphocytes (%) (Auto) 16, 

Monocytes (%) (Auto) 11, Eosinophils (%) (Auto) 0, Basophils (%) (Auto) 0, 

Neutrophils # (Auto) 6.9, Lymphocytes # (Auto) 1.5, Monocytes # (Auto) 1.0, 

Eosinophils # (Auto) 0.0, Basophils # (Auto) 0.0, Prothrombin Time 26.9, INR 

Comment 2.5, Sodium Level 140, Potassium Level 3.9, Chloride Level 102, Carbon 

Dioxide Level 28, Anion Gap 10, Blood Urea Nitrogen 19, Creatinine 0.81, 

Estimat Glomerular Filtration Rate > 60, BUN/Creatinine Ratio 23, Glucose Level 

97, Calcium Level 8.2


18 05:12: Glucometer 107


18 11:12: Glucometer 229


18 16:01: Glucometer 198


18 20:43: Glucometer 293


18 05:08: Glucometer 87


18 06:00: 


White Blood Count 7.8, Red Blood Count 3.53, Hemoglobin 11.0, Hematocrit 33, 

Mean Corpuscular Volume 94, Mean Corpuscular Hemoglobin 31, Mean Corpuscular 

Hemoglobin Concent 33, Red Cell Distribution Width 13.8, Platelet Count 219, 

Mean Platelet Volume 9.4, Neutrophils (%) (Auto) 68, Lymphocytes (%) (Auto) 19, 

Monocytes (%) (Auto) 11, Eosinophils (%) (Auto) 3, Basophils (%) (Auto) 0, 

Neutrophils # (Auto) 5.3, Lymphocytes # (Auto) 1.5, Monocytes # (Auto) 0.9, 

Eosinophils # (Auto) 0.2, Basophils # (Auto) 0.0, Prothrombin Time 20.2, INR 

Comment 1.7, Sodium Level 141, Potassium Level 4.2, Chloride Level 106, Carbon 

Dioxide Level 26, Anion Gap 9, Blood Urea Nitrogen 20, Creatinine 0.84, Estimat 

Glomerular Filtration Rate > 60, BUN/Creatinine Ratio 24, Glucose Level 90, 

Calcium Level 8.6


18 11:07: Glucometer 203


18 16:23: Glucometer 125


18 20:08: Glucometer 336


18 06:05: Glucometer 102


18 11:28: Glucometer 166


18 16:39: Glucometer 294


18 21:06: Glucometer 214


18 05:29: Glucometer 105


18 09:50: 


White Blood Count 6.1, Red Blood Count 3.64, Hemoglobin 11.2, Hematocrit 34, 

Mean Corpuscular Volume 94, Mean Corpuscular Hemoglobin 31, Mean Corpuscular 

Hemoglobin Concent 33, Red Cell Distribution Width 13.7, Platelet Count 260, 

Mean Platelet Volume 8.8, Neutrophils (%) (Auto) 58, Lymphocytes (%) (Auto) 22, 

Monocytes (%) (Auto) 13, Eosinophils (%) (Auto) 6, Basophils (%) (Auto) 0, 

Neutrophils # (Auto) 3.6, Lymphocytes # (Auto) 1.4, Monocytes # (Auto) 0.8, 

Eosinophils # (Auto) 0.4, Basophils # (Auto) 0.0, Neutrophils % (Manual) 64, 

Lymphocytes % (Manual) 22, Monocytes % (Manual) 9, Eosinophils % (Manual) 5, 

Blood Morphology Comment NORMAL, Sodium Level 136, Potassium Level 4.4, 

Chloride Level 101, Carbon Dioxide Level 28, Anion Gap 7, Blood Urea Nitrogen 20

, Creatinine 0.96, Estimat Glomerular Filtration Rate > 60, BUN/Creatinine 

Ratio 21, Glucose Level 301, Calcium Level 8.6


18 12:16: Glucometer 206





Discharge


Home Medications:





Active Scripts


Active


Doxycycline Hyclate 100 Mg Tablet.dr 100 Mg PO BID


Reported


Gabapentin 100 Mg Capsule 200-300 Mg PO HS


     TAKES 2-3 OF A (100 MG) CAPSULE / DEPENDING ON PAIN LEVEL


Novolin N (Insulin NPH Human Isophane) 100 Unit/1 Ml Vial 5 Units SQ BID


     MIXES WITH 5 UNITS OF R INSULIN EVERY MORNING AND EVERY EVENING FOR A


     TOTAL DOSE OF 10 UNITS


Humulin R (Insulin Regular, Human) 1,000 Units/10 Ml Soln 5 Units SQ BID


     MIXES WITH 5 UNITS OF N INSULIN EVERY MORNING AND EVERY EVENING FOR A


     TOTAL DOSE OF 10 UNITS


Gabapentin 100 Mg Capsule 100 Mg PO 0700,1200


Montelukast Sodium 10 Mg Tablet 10 Mg PO HS


Protonix (Pantoprazole Sodium) 40 Mg Tablet.dr 40 Mg PO DAILY


Diltiazem HCl 30 Mg Tablet 30 Mg PO TID


Breo Ellipta 100-25 Mcg INH (Fluticasone/Vilanterol) 1 Each Blst.w.dev 1 Puff 

IH DAILY


Coumadin (Warfarin Sodium) 5 Mg Tablet 5 Mg PO DAILY@1830


Furosemide 40 Mg Tablet 40 Mg PO DAILY


Klor-Con M10 (Potassium Chloride) 10 Meq Tab.er.prt 10 Meq PO Q48H


Enalapril Maleate 2.5 Mg Tablet 2.5 Mg PO DAILY


Simvastatin 20 Mg Tablet 20 Mg PO HS





Instructions to patient/family


Please see electronic discharge instructions given to patient.





Clinical Quality Measures


DVT/VTE Risk/Contraindication:


Risk Factor Score Per Nursin


RFS Level Per Nursing on Admit:  4+=Very High


Contraindications-Pharm:  Other *list below*











TOR CONNELLY DO May 10, 2018 07:08

## 2018-05-13 ENCOUNTER — HOSPITAL ENCOUNTER (EMERGENCY)
Dept: HOSPITAL 75 - ER | Age: 78
Discharge: HOME | End: 2018-05-13
Payer: MEDICARE

## 2018-05-13 VITALS — BODY MASS INDEX: 27.28 KG/M2 | WEIGHT: 180 LBS | HEIGHT: 68 IN

## 2018-05-13 VITALS — DIASTOLIC BLOOD PRESSURE: 72 MMHG | SYSTOLIC BLOOD PRESSURE: 128 MMHG

## 2018-05-13 DIAGNOSIS — Z80.0: ICD-10-CM

## 2018-05-13 DIAGNOSIS — Z88.0: ICD-10-CM

## 2018-05-13 DIAGNOSIS — E11.9: ICD-10-CM

## 2018-05-13 DIAGNOSIS — Z80.52: ICD-10-CM

## 2018-05-13 DIAGNOSIS — G47.30: ICD-10-CM

## 2018-05-13 DIAGNOSIS — Z88.2: ICD-10-CM

## 2018-05-13 DIAGNOSIS — Z95.5: ICD-10-CM

## 2018-05-13 DIAGNOSIS — Z88.5: ICD-10-CM

## 2018-05-13 DIAGNOSIS — Z82.49: ICD-10-CM

## 2018-05-13 DIAGNOSIS — Z90.89: ICD-10-CM

## 2018-05-13 DIAGNOSIS — Z79.01: ICD-10-CM

## 2018-05-13 DIAGNOSIS — K21.9: ICD-10-CM

## 2018-05-13 DIAGNOSIS — Z87.448: ICD-10-CM

## 2018-05-13 DIAGNOSIS — Z87.891: ICD-10-CM

## 2018-05-13 DIAGNOSIS — Z87.01: ICD-10-CM

## 2018-05-13 DIAGNOSIS — E78.00: ICD-10-CM

## 2018-05-13 DIAGNOSIS — Z79.4: ICD-10-CM

## 2018-05-13 DIAGNOSIS — Z86.73: ICD-10-CM

## 2018-05-13 DIAGNOSIS — Z90.2: ICD-10-CM

## 2018-05-13 DIAGNOSIS — I48.91: ICD-10-CM

## 2018-05-13 DIAGNOSIS — Z95.1: ICD-10-CM

## 2018-05-13 DIAGNOSIS — I25.10: ICD-10-CM

## 2018-05-13 DIAGNOSIS — Z88.1: ICD-10-CM

## 2018-05-13 DIAGNOSIS — I73.9: ICD-10-CM

## 2018-05-13 DIAGNOSIS — Z79.51: ICD-10-CM

## 2018-05-13 DIAGNOSIS — I10: ICD-10-CM

## 2018-05-13 DIAGNOSIS — J44.1: Primary | ICD-10-CM

## 2018-05-13 LAB
ALBUMIN SERPL-MCNC: 3.4 GM/DL (ref 3.2–4.5)
ALP SERPL-CCNC: 83 U/L (ref 40–136)
ALT SERPL-CCNC: 14 U/L (ref 0–55)
APTT BLD: 51 SEC (ref 24–35)
APTT PPP: YELLOW S
BACTERIA #/AREA URNS HPF: (no result) /HPF
BASOPHILS # BLD AUTO: 0 10^3/UL (ref 0–0.1)
BASOPHILS NFR BLD AUTO: 0 % (ref 0–10)
BILIRUB SERPL-MCNC: 0.3 MG/DL (ref 0.1–1)
BILIRUB UR QL STRIP: NEGATIVE
BUN/CREAT SERPL: 15
CALCIUM SERPL-MCNC: 8.1 MG/DL (ref 8.5–10.1)
CHLORIDE SERPL-SCNC: 101 MMOL/L (ref 98–107)
CO2 SERPL-SCNC: 24 MMOL/L (ref 21–32)
CREAT SERPL-MCNC: 1.03 MG/DL (ref 0.6–1.3)
D DIMER PPP FEU-MCNC: 0.38 UG/ML (ref 0–0.49)
EOSINOPHIL # BLD AUTO: 0.1 10^3/UL (ref 0–0.3)
EOSINOPHIL NFR BLD AUTO: 1 % (ref 0–10)
ERYTHROCYTE [DISTWIDTH] IN BLOOD BY AUTOMATED COUNT: 13.5 % (ref 10–14.5)
FIBRINOGEN PPP-MCNC: CLEAR MG/DL
GFR SERPLBLD BASED ON 1.73 SQ M-ARVRAT: > 60 ML/MIN
GLUCOSE SERPL-MCNC: 265 MG/DL (ref 70–105)
GLUCOSE UR STRIP-MCNC: NEGATIVE MG/DL
HCT VFR BLD CALC: 33 % (ref 40–54)
HGB BLD-MCNC: 11 G/DL (ref 13.3–17.7)
HYALINE CASTS #/AREA URNS LPF: (no result) /LPF
INR PPP: 2.6 (ref 0.8–1.4)
KETONES UR QL STRIP: NEGATIVE
LEUKOCYTE ESTERASE UR QL STRIP: NEGATIVE
LYMPHOCYTES # BLD AUTO: 2.3 X 10^3 (ref 1–4)
LYMPHOCYTES NFR BLD AUTO: 27 % (ref 12–44)
MANUAL DIFFERENTIAL PERFORMED BLD QL: NO
MCH RBC QN AUTO: 31 PG (ref 25–34)
MCHC RBC AUTO-ENTMCNC: 33 G/DL (ref 32–36)
MCV RBC AUTO: 94 FL (ref 80–99)
MONOCYTES # BLD AUTO: 0.7 X 10^3 (ref 0–1)
MONOCYTES NFR BLD AUTO: 8 % (ref 0–12)
NEUTROPHILS # BLD AUTO: 5.5 X 10^3 (ref 1.8–7.8)
NEUTROPHILS NFR BLD AUTO: 64 % (ref 42–75)
NITRITE UR QL STRIP: NEGATIVE
PH UR STRIP: 5 [PH] (ref 5–9)
PLATELET # BLD: 245 10^3/UL (ref 130–400)
PMV BLD AUTO: 9 FL (ref 7.4–10.4)
POTASSIUM SERPL-SCNC: 4 MMOL/L (ref 3.6–5)
PROT SERPL-MCNC: 6.4 GM/DL (ref 6.4–8.2)
PROT UR QL STRIP: NEGATIVE
PROTHROMBIN TIME: 27.7 SEC (ref 12.2–14.7)
RBC # BLD AUTO: 3.54 10^6/UL (ref 4.35–5.85)
RBC #/AREA URNS HPF: (no result) /HPF
SODIUM SERPL-SCNC: 136 MMOL/L (ref 135–145)
SP GR UR STRIP: 1.01 (ref 1.02–1.02)
SQUAMOUS #/AREA URNS HPF: (no result) /HPF
UROBILINOGEN UR-MCNC: NORMAL MG/DL
WBC # BLD AUTO: 8.6 10^3/UL (ref 4.3–11)
WBC #/AREA URNS HPF: (no result) /HPF

## 2018-05-13 PROCEDURE — 87040 BLOOD CULTURE FOR BACTERIA: CPT

## 2018-05-13 PROCEDURE — 36415 COLL VENOUS BLD VENIPUNCTURE: CPT

## 2018-05-13 PROCEDURE — 85025 COMPLETE CBC W/AUTO DIFF WBC: CPT

## 2018-05-13 PROCEDURE — 96374 THER/PROPH/DIAG INJ IV PUSH: CPT

## 2018-05-13 PROCEDURE — 87070 CULTURE OTHR SPECIMN AEROBIC: CPT

## 2018-05-13 PROCEDURE — 96361 HYDRATE IV INFUSION ADD-ON: CPT

## 2018-05-13 PROCEDURE — 85379 FIBRIN DEGRADATION QUANT: CPT

## 2018-05-13 PROCEDURE — 87186 SC STD MICRODIL/AGAR DIL: CPT

## 2018-05-13 PROCEDURE — 84484 ASSAY OF TROPONIN QUANT: CPT

## 2018-05-13 PROCEDURE — 85730 THROMBOPLASTIN TIME PARTIAL: CPT

## 2018-05-13 PROCEDURE — 87205 SMEAR GRAM STAIN: CPT

## 2018-05-13 PROCEDURE — 93005 ELECTROCARDIOGRAM TRACING: CPT

## 2018-05-13 PROCEDURE — 87077 CULTURE AEROBIC IDENTIFY: CPT

## 2018-05-13 PROCEDURE — 81000 URINALYSIS NONAUTO W/SCOPE: CPT

## 2018-05-13 PROCEDURE — 85610 PROTHROMBIN TIME: CPT

## 2018-05-13 PROCEDURE — 94640 AIRWAY INHALATION TREATMENT: CPT

## 2018-05-13 PROCEDURE — 83605 ASSAY OF LACTIC ACID: CPT

## 2018-05-13 PROCEDURE — 80053 COMPREHEN METABOLIC PANEL: CPT

## 2018-05-13 PROCEDURE — 71046 X-RAY EXAM CHEST 2 VIEWS: CPT

## 2018-05-13 NOTE — DIAGNOSTIC IMAGING REPORT
INDICATION: Shortness of air.



TECHNIQUE: Two view chest, 3:13 p.m.



CORRELATION STUDY: 05/07/2018.



FINDINGS: Loop recorder over the left heart. Heart size and

mediastinum are generally stable. Vascularity is slightly

increased. There are patchy densities within both lung fields,

particularly at the base. Additionally, there is a more focal

patchy somewhat matched masslike density at the right lung apex.

Soft tissue density over the right andrey also appears stable.

Accentuated thoracic kyphotic curvature.



IMPRESSION: Scattered patchy pulmonary parenchymal densities over

both lung bases, overall generally stable. Additionally, there is

a more focal masslike density about the right lung apex appearing

unchanged. Findings could be reflective of multifocal pneumonia,

neoplastic involvement is not excluded. Given continued

appearance, CT imaging may be of additional benefit. 



Dictated by: 



  Dictated on workstation # [id number]

## 2018-05-13 NOTE — ED RESPIRATORY
General


Stated Complaint:  SOB,PNEUMONIA


Source:  patient, spouse


Exam Limitations:  no limitations





History of Present Illness


Date Seen by Provider:  May 13, 2018


Time Seen by Provider:  13:32


Initial Comments


The patient presents to the ER by private conveyance with chief complaint he is 

very short of breath. He was discharged about 5 days ago from the hospital 

after about for 5 days stay for pneumonia bilateral base. Medicine on 

doxycycline but not on steroids because his blood sugars were getting in the 

500 range. He is a significant history of COPD for which she uses Breo, 

Symbicort, albuterol and took a breathing treatment before coming in but does 

not feel that it helped. He also does not feel the breathing treatments that 

they gave him while he was in the hospital help. He is a significant history of 

tooth different thoracotomies to remove part of the lung for blebs. He is not 

having any fevers chills nausea vomiting or chest pain. He has a significant 

history of heart disease and he is on warfarin.





Allergies and Home Medications


Allergies


Coded Allergies:  


     morphine (Verified  Allergy, Mild, 16)


     Sulfa (Sulfonamide Antibiotics) (Verified  Allergy, Unknown, 16)


     penicillin G (Verified  Allergy, Unknown, 16)


     levofloxacin (Verified  Adverse Reaction, Unknown, 16)





Home Medications


Diltiazem HCl 30 Mg Tablet, 30 MG PO TID, (Reported)


Doxycycline Hyclate 100 Mg Tablet.dr, 100 MG PO BID


   Prescribed by: RUY MATA on 18 1200


Enalapril Maleate 2.5 Mg Tablet, 2.5 MG PO DAILY, (Reported)


Fluticasone/Vilanterol 1 Each Blst.w.dev, 1 PUFF IH DAILY, (Reported)


Furosemide 40 Mg Tablet, 40 MG PO DAILY, (Reported)


Gabapentin 100 Mg Capsule, 100 MG PO 0700,1200, (Reported)


Gabapentin 100 Mg Capsule, 200-300 MG PO HS, (Reported)


   TAKES 2-3 OF A (100 MG) CAPSULE / DEPENDING ON PAIN LEVEL 


Insulin NPH Human Isophane 100 Unit/1 Ml Vial, 5 UNITS SQ BID, (Reported)


   MIXES WITH 5 UNITS OF R INSULIN EVERY MORNING AND EVERY EVENING FOR A TOTAL 

DOSE OF 10 UNITS 


Insulin Regular, Human 1,000 Units/10 Ml Soln, 5 UNITS SQ BID, (Reported)


   MIXES WITH 5 UNITS OF N INSULIN EVERY MORNING AND EVERY EVENING FOR A TOTAL 

DOSE OF 10 UNITS 


Montelukast Sodium 10 Mg Tablet, 10 MG PO HS, (Reported)


Pantoprazole Sodium 40 Mg Tablet.dr, 40 MG PO DAILY, (Reported)


Potassium Chloride 10 Meq Tab.er.prt, 10 MEQ PO Q48H, (Reported)


Simvastatin 20 Mg Tablet, 20 MG PO HS, (Reported)


Warfarin Sodium 5 Mg Tablet, 5 MG PO DAILY@1830, (Reported)





Patient Home Medication List


Home Medication List Reviewed:  Yes





Review of Systems


Constitutional:  No chills, No diaphoresis, No fever, No malaise


EENTM:  No ear discharge, No ear pain


Respiratory:  cough (dry); No hemoptysis, No phlegm; short of breath; No stridor

; wheezing


Cardiovascular:  No chest pain, No edema


Gastrointestinal:  No abdominal pain, No constipation, No diarrhea, No nausea, 

No vomiting


Genitourinary:  No discharge, No dysuria


Musculoskeletal:  No back pain, No joint pain





Past Medical-Social-Family Hx


Patient Social History


Alcohol Use:  Denies Use


Recreational Drug Use:  No


Smoking Status:  Former Smoker


Type Used:  Cigarettes


Former Smoker, Quit:  1985


2nd Hand Smoke Exposure:  No


Recent Foreign Travel:  No


Contact w/Someone Who Travel:  No


Recent Hopitalizations:  No





Immunizations Up To Date


Tetanus Booster (TDap):  More than 5yrs


PED Vaccines UTD:  No


Date of Pneumonia Vaccine:  Oct 3, 2016


Date of Influenza Vaccine:  Sep 4, 2017





Seasonal Allergies


Seasonal Allergies:  No





Past Medical History


Surgeries:  Yes (1/2 right lung removed)


Cardiac, CABG, Coronary Stent, Eye Surgery, Joint Replacement, Lobectomy, 

Orthopedic, Prostatectomy, Tonsillectomy


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 )


Pneumonia, Sleep Apnea, COPD, Emphysema


Currently Using CPAP:  No


Currently Using BIPAP:  No


Cardiac:  Yes (stents)


Atrial Fibrillation, Coronary Artery Disease, High Cholesterol, Hypertension, 

Peripheral Vascular


Neurological:  Yes


Stroke


Reproductive Disorders:  No


Sexually Transmitted Disease:  No


HIV/AIDS:  No


Genitourinary:  Yes


Benign Prostatic Hyperpl, Bladder Infection


Gastrointestinal:  Yes


Gastrointestinal Bleed


Musculoskeletal:  Yes


Arthritis


Endocrine:  Yes


Diabetes, Insulin dep


HEENT:  Yes


Cataract


Loss of Vision:  Denies


Hearing Impairment:  Hard of Hearing


Cancer:  No


Psychosocial:  No


Integumentary:  No


Blood Disorders:  No


Adverse Reaction/Blood Tranf:  No





Family Medical History





BLADD


  09 BROTHER


BLADD


  09 BROTHER


Cancer


  03 FATHER (THROAT)


  09 BROTHER


Dementia


  03 MOTHER


FH: bladder cancer


FH: bladder cancer


Family history: Diabetes mellitus


  03 MOTHER


Family history: Thyroid disorder


  03 MOTHER


Infertile


  03 MOTHER (X5 MISCARRIAGES)


Myocardial infarction


  09 BROTHER





No Family History of:


  Abdominal aortic aneurysm


  [place]'s disease


  Alcoholism


  Aphasia


  Cancer of colon


  Cataract


  Chest pain


  Congenital heart disease


  Congestive heart failure


  Cystic fibrosis


  Dysphagia


  Family history: Allergy


  Family history: Alzheimer's disease


  Family history: Arthritis


  Family history: Asthma


  Family history: Breast disease


  Family history: Cardiovascular disease


  Family history: Coronary thrombosis


  Family history: Gastrointestinal disease


  Family history: Glaucoma


  Family history: Hypertension


  Family history: Osteoporosis


  Headache


  Hearing loss


  Heart disease


  Hereditary disease


  History of - anemia


  History of - disorder


  History of - respiratory disease


  History of drug abuse


  Human immunodeficiency virus (HIV) seropositivity


  Hypercholesterolemia


  Kidney disease


  Malignant neoplasm of lung


  Parkinson's disease


  Prostate cancer


  Psychotic disorder


  Seizure disorder


  Stroke


  Tuberculosis


  Visual impairment


Heart Disease, Cancer, Stroke, Other Conditions/Hx





Physical Exam


Vital Signs





Vital Signs - First Documented








 18





 15:20


 


Pulse Ox 96


 


O2 Delivery Nasal Cannula


 


O2 Flow Rate 2.00





Capillary Refill :


General Appearance:  mild distress, thin


Eyes:  Bilateral Eye Normal Inspection, Bilateral Eye PERRL, Bilateral Eye EOMI


HEENT:  PERRL/EOMI, pharynx normal, other (clear fusion with retraction left TM)


Neck:  non-tender, supple, normal inspection


Respiratory:  chest non-tender, no accessory muscle use, respiratory distress, 

decreased breath sounds, crackles (left base), wheezing


Cardiovascular:  normal peripheral pulses, regular rate, rhythm, no edema


Gastrointestinal:  normal bowel sounds, non tender, soft


Extremities:  non-tender, normal inspection, no pedal edema, no calf tenderness

, normal capillary refill


Neurologic/Psychiatric:  alert, oriented x 3


Skin:  normal color, warm/dry





Focused Exam


Lactate Level


18 13:30: Lactic Acid Level 1.62





Lactic Acid Level





Laboratory Tests








Test


 18


13:30


 


Lactic Acid Level


 1.62 MMOL/L


(0.50-2.00)











Progress/Results/Core Measures


Suspected Sepsis


SIRS


Temperature: 


Pulse:  


Respiratory Rate: 


 


Laboratory Tests


18 13:30: White Blood Count 8.6


Blood Pressure  / 


Mean: 


 





18 13:30: Lactic Acid Level 1.62


Laboratory Tests


18 13:30: 


Creatinine 1.03, INR Comment 2.6H, Platelet Count 245, Total Bilirubin 0.3








Results/Orders


Lab Results





Laboratory Tests








Test


 18


13:30 18


16:00 Range/Units


 


 


White Blood Count


 8.6 


 


 4.3-11.0


10^3/uL


 


Red Blood Count


 3.54 L


 


 4.35-5.85


10^6/uL


 


Hemoglobin 11.0 L  13.3-17.7  G/DL


 


Hematocrit 33 L  40-54  %


 


Mean Corpuscular Volume 94   80-99  FL


 


Mean Corpuscular Hemoglobin 31   25-34  PG


 


Mean Corpuscular Hemoglobin


Concent 33 


 


 32-36  G/DL





 


Red Cell Distribution Width 13.5   10.0-14.5  %


 


Platelet Count


 245 


 


 130-400


10^3/uL


 


Mean Platelet Volume 9.0   7.4-10.4  FL


 


Neutrophils (%) (Auto) 64   42-75  %


 


Lymphocytes (%) (Auto) 27   12-44  %


 


Monocytes (%) (Auto) 8   0-12  %


 


Eosinophils (%) (Auto) 1   0-10  %


 


Basophils (%) (Auto) 0   0-10  %


 


Neutrophils # (Auto) 5.5   1.8-7.8  X 10^3


 


Lymphocytes # (Auto) 2.3   1.0-4.0  X 10^3


 


Monocytes # (Auto) 0.7   0.0-1.0  X 10^3


 


Eosinophils # (Auto)


 0.1 


 


 0.0-0.3


10^3/uL


 


Basophils # (Auto)


 0.0 


 


 0.0-0.1


10^3/uL


 


Prothrombin Time 27.7 H  12.2-14.7  SEC


 


INR Comment 2.6 H  0.8-1.4  


 


Activated Partial


Thromboplast Time 51 H


 


 24-35  SEC





 


D-Dimer


 0.38 


 


 0.00-0.49


UG/ML


 


Sodium Level 136   135-145  MMOL/L


 


Potassium Level 4.0   3.6-5.0  MMOL/L


 


Chloride Level 101     MMOL/L


 


Carbon Dioxide Level 24   21-32  MMOL/L


 


Anion Gap 11   5-14  MMOL/L


 


Blood Urea Nitrogen 15   7-18  MG/DL


 


Creatinine


 1.03 


 


 0.60-1.30


MG/DL


 


Estimat Glomerular Filtration


Rate > 60 


 


  





 


BUN/Creatinine Ratio 15    


 


Glucose Level 265 H    MG/DL


 


Lactic Acid Level


 1.62 


 


 0.50-2.00


MMOL/L


 


Calcium Level 8.1 L  8.5-10.1  MG/DL


 


Total Bilirubin 0.3   0.1-1.0  MG/DL


 


Aspartate Amino Transf


(AST/SGOT) 17 


 


 5-34  U/L





 


Alanine Aminotransferase


(ALT/SGPT) 14 


 


 0-55  U/L





 


Alkaline Phosphatase 83     U/L


 


Troponin I < 0.30   <0.30  NG/ML


 


Total Protein 6.4   6.4-8.2  GM/DL


 


Albumin 3.4   3.2-4.5  GM/DL








My Orders





Orders - ZACH BAZAN


Cbc With Automated Diff (18 13:38)


Comprehensive Metabolic Panel (18 13:38)


Lactic Acid Analyzer (18 13:38)


Blood Culture (18 13:38)


Sputum Culture (18 13:38)


Ua Culture If Indicated (18 13:38)


Protime With Inr (18:38)


Partial Thromboplastin Time (18 13:38)


O2 (18 13:38)


Saline Lock/Iv-Start (18 13:38)


Saline Lock/Iv-Start (18 13:38)


Ekg Tracing (18 13:38)


Troponin I (18 13:38)


Vital Signs Adult Sepsis Patie Q1H (18 13:38)


Remove Rings In Anticipation O (18 13:38)


Saline Lock/Iv-Start (18 13:38)


Ns Iv 500 Ml (Sodium Chloride 0.9%) (18 13:38)


Albuterol  Pre-Mix Nebs (Rt) (Proventil (18 13:38)


Albuterol/Ipra Inhalation Soln (Duoneb I (18 13:45)


Svn Small Volume Nebulizer (18 13:38)


Fibrin Degradation Products (18 13:38)


Chest Pa/Lat (2 View) (18 13:38)





Medications Given in ED





Current Medications








 Medications  Dose


 Ordered  Sig/Bhavin


 Route  Start Time


 Stop Time Status Last Admin


Dose Admin


 


 Albuterol/


 Ipratropium  3 ml  ONCE  ONCE


 INH  18 13:45


 18 13:46 DC 18 15:19


3 ML


 


 Sodium Chloride  500 ml @ 0


 mls/hr  Q0M ONCE


 IV  18 13:38


 18 13:42 DC 18 15:12


500 MLS/HR








Vital Signs/I&O











 18





 15:20


 


Pulse Ox 96


 


O2 Delivery Nasal Cannula


 


O2 Flow Rate 2.00





Capillary Refill :


Progress Note #1:  


   Time:  16:11


Progress Note


Still having left lower base crackles and a little bit of wheezing but his 

overall wheezing and breath sounds are much improved and is breathing much 

better. He is satting 97-99% on 2 L now instead of in the mid to low 90s. His 

blood pressure has improved significantly to 117 systolic. Chest x-ray is 

unchanged but he probably could benefit from CT imaging. This could probably be 

done outpatient with his primary care office. We'll discuss this with him. He 

wants to trial walking around the ER before deciding whether or not to attempt 

outpatient COPD management with some steroids and breathing treatments.


Progress Note #2:  


   Time:  16:16


Progress Note


The patient walked up and down the ER told a couple stories and then walked 

himself back to the room without assistance. We'll allow him to get some Solu-

Medrol and go home get some scripts from [facility] to try an outpatient treatment 

and follow-up this week or next with his primary care provider.





ECG


Initial ECG Impression Date:  May 13, 2018


Initial ECG Impression Time:  14:45


Initial ECG Rate:  56


Initial ECG Rhythm:  A Fib/Flutter


Initial ECG Intervals:  QT (437)


Initial ECG Impression:  Atrial Fibrillation


Initial ECG Comparisson:  Unchanged


Comment


Atrial fibrillation without ST elevation or depression





Diagnostic Imaging





   Diagonstic Imaging:  Xray


   Plain Films/CT/US/NM/MRI:  chest (2v)


Comments


 [facility].


 [place], [place]





NAME:   NESHA PANCHAL


MED REC#:   [id number]


ACCOUNT#:   [id number]


PT STATUS:   REG ER


:   1940


PHYSICIAN:   ZACH BAZAN MD


ADMIT DATE:   05/13/18/ER


 ***Draft***


Date of Exam:18





CHEST PA/LAT (2 VIEW)








INDICATION: Shortness of air.





TECHNIQUE: Two view chest, 3:13 p.m.





CORRELATION STUDY: 2018.





FINDINGS: Loop recorder over the left heart. Heart size and


mediastinum are generally stable. Vascularity is slightly


increased. There are patchy densities within both lung fields,


particularly at the base. Additionally, there is a more focal


patchy somewhat matched masslike density at the right lung apex.


Soft tissue density over the right andrey also appears stable.


Accentuated thoracic kyphotic curvature.





IMPRESSION: Scattered patchy pulmonary parenchymal densities over


both lung bases, overall generally stable. Additionally, there is


a more focal masslike density about the right lung apex appearing


unchanged. Findings could be reflective of multifocal pneumonia,


neoplastic involvement is not excluded. Given continued


appearance, CT imaging may be of additional benefit. 





  Dictated on workstation # [id number]








Dict:   [id number]


Trans:   18 [id number]


[facility] [id number]





Interpreted by:     THANH COSTA DO


Electronically signed by:


   Reviewed:  Reviewed by Me





Departure


Impression





 Primary Impression:  


 COPD exacerbation


Disposition:  01 HOME, SELF-CARE


Condition:  Improved





Departure-Patient Inst.


Decision time for Depature:  16:17


Referrals:  


TOR CONNELLY DO (PCP/Family)


Primary Care Physician


Patient Instructions:  Exacerbation of COPD (DC)





Add. Discharge Instructions:  


 the prednisone and take 40 mg daily for the next 5 days. If he started 

having problems with your blood sugars you can reduce the dose in half or 

follow up with your primary care provider if you feel like your breathing is 

getting worse and not able to keep up by taking her breathing treatments at 

least every 6 hours or more often as needed then you should return to the ER. 

Tomorrow morning please call Dr. Connelly's office and make an appointment to 

follow up within 1-2 weeks for your COPD exacerbation.


Scripts


Prednisone (Prednisone) 20 Mg Tab


40 MG PO DAILY for 5 Days, #10 TAB 0 Refills


   Prov: ZACH BAZAN         18





Copy


Copies To 1:   SMITH MUNOZ DO; TOR CONNELLY TITUS J May 13, 2018 13:46

## 2018-08-28 ENCOUNTER — HOSPITAL ENCOUNTER (INPATIENT)
Dept: HOSPITAL 75 - IRF | Age: 78
LOS: 16 days | Discharge: HOME HEALTH | DRG: 559 | End: 2018-09-13
Attending: PHYSICAL MEDICINE & REHABILITATION | Admitting: PHYSICAL MEDICINE & REHABILITATION
Payer: MEDICARE

## 2018-08-28 VITALS — DIASTOLIC BLOOD PRESSURE: 61 MMHG | SYSTOLIC BLOOD PRESSURE: 119 MMHG

## 2018-08-28 VITALS — DIASTOLIC BLOOD PRESSURE: 69 MMHG | SYSTOLIC BLOOD PRESSURE: 109 MMHG

## 2018-08-28 VITALS — DIASTOLIC BLOOD PRESSURE: 68 MMHG | SYSTOLIC BLOOD PRESSURE: 106 MMHG

## 2018-08-28 VITALS — HEIGHT: 69 IN | WEIGHT: 180.31 LBS | BODY MASS INDEX: 26.71 KG/M2

## 2018-08-28 DIAGNOSIS — I95.9: ICD-10-CM

## 2018-08-28 DIAGNOSIS — J18.9: ICD-10-CM
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
## 2018-08-28 DIAGNOSIS — Z95.1: ICD-10-CM

## 2018-08-28 DIAGNOSIS — R41.0: ICD-10-CM

## 2018-08-28 DIAGNOSIS — Z79.4: ICD-10-CM

## 2018-08-28 DIAGNOSIS — R00.0: ICD-10-CM

## 2018-08-28 DIAGNOSIS — Z79.01: ICD-10-CM

## 2018-08-28 DIAGNOSIS — I65.23: ICD-10-CM

## 2018-08-28 DIAGNOSIS — E78.5: ICD-10-CM

## 2018-08-28 DIAGNOSIS — I10: ICD-10-CM

## 2018-08-28 DIAGNOSIS — E11.9: ICD-10-CM

## 2018-08-28 DIAGNOSIS — S82.142D: Primary | ICD-10-CM

## 2018-08-28 DIAGNOSIS — J43.9: ICD-10-CM

## 2018-08-28 DIAGNOSIS — I48.0: ICD-10-CM

## 2018-08-28 DIAGNOSIS — Z95.5: ICD-10-CM

## 2018-08-28 DIAGNOSIS — G47.30: ICD-10-CM

## 2018-08-28 DIAGNOSIS — I73.9: ICD-10-CM

## 2018-08-28 DIAGNOSIS — I25.10: ICD-10-CM

## 2018-08-28 DIAGNOSIS — Z99.81: ICD-10-CM

## 2018-08-28 DIAGNOSIS — E87.1: ICD-10-CM
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
## 2018-08-28 DIAGNOSIS — M54.9: ICD-10-CM

## 2018-08-28 DIAGNOSIS — D64.9: ICD-10-CM

## 2018-08-28 DIAGNOSIS — Z87.891: ICD-10-CM

## 2018-08-28 DIAGNOSIS — E88.09: ICD-10-CM

## 2018-08-28 PROCEDURE — 93005 ELECTROCARDIOGRAM TRACING: CPT

## 2018-08-28 PROCEDURE — 85025 COMPLETE CBC W/AUTO DIFF WBC: CPT

## 2018-08-28 PROCEDURE — 71046 X-RAY EXAM CHEST 2 VIEWS: CPT

## 2018-08-28 PROCEDURE — 81000 URINALYSIS NONAUTO W/SCOPE: CPT

## 2018-08-28 PROCEDURE — 85027 COMPLETE CBC AUTOMATED: CPT

## 2018-08-28 PROCEDURE — 94640 AIRWAY INHALATION TREATMENT: CPT

## 2018-08-28 PROCEDURE — 80053 COMPREHEN METABOLIC PANEL: CPT

## 2018-08-28 PROCEDURE — 85610 PROTHROMBIN TIME: CPT

## 2018-08-28 PROCEDURE — 80170 ASSAY OF GENTAMICIN: CPT

## 2018-08-28 PROCEDURE — 71045 X-RAY EXAM CHEST 1 VIEW: CPT

## 2018-08-28 PROCEDURE — 94760 N-INVAS EAR/PLS OXIMETRY 1: CPT

## 2018-08-28 PROCEDURE — 80048 BASIC METABOLIC PNL TOTAL CA: CPT

## 2018-08-28 PROCEDURE — 36415 COLL VENOUS BLD VENIPUNCTURE: CPT

## 2018-08-28 PROCEDURE — 82962 GLUCOSE BLOOD TEST: CPT

## 2018-08-28 RX ADMIN — INSULIN HUMAN SCH UNIT: 100 INJECTION, SOLUTION PARENTERAL at 20:17

## 2018-08-28 RX ADMIN — FLUTICASONE PROPIONATE AND SALMETEROL XINAFOATE SCH PUFF: 115; 21 AEROSOL, METERED RESPIRATORY (INHALATION) at 19:29

## 2018-08-28 RX ADMIN — IPRATROPIUM BROMIDE AND ALBUTEROL SULFATE SCH ML: .5; 3 SOLUTION RESPIRATORY (INHALATION) at 19:29

## 2018-08-28 RX ADMIN — HYDROCODONE BITARTRATE AND ACETAMINOPHEN SCH TAB: 5; 325 TABLET ORAL at 17:59

## 2018-08-28 RX ADMIN — MONTELUKAST SCH MG: 10 TABLET, FILM COATED ORAL at 20:17

## 2018-08-28 RX ADMIN — IPRATROPIUM BROMIDE AND ALBUTEROL SULFATE SCH ML: .5; 3 SOLUTION RESPIRATORY (INHALATION) at 15:51

## 2018-08-28 RX ADMIN — DILTIAZEM HYDROCHLORIDE SCH MG: 30 TABLET, FILM COATED ORAL at 14:12

## 2018-08-28 RX ADMIN — INSULIN HUMAN SCH UNIT: 100 INJECTION, SUSPENSION SUBCUTANEOUS at 20:16

## 2018-08-28 RX ADMIN — DILTIAZEM HYDROCHLORIDE SCH MG: 30 TABLET, FILM COATED ORAL at 20:16

## 2018-08-28 RX ADMIN — WARFARIN SODIUM SCH MG: 5 TABLET ORAL at 17:59

## 2018-08-28 RX ADMIN — POTASSIUM CHLORIDE SCH MEQ: 750 TABLET, FILM COATED, EXTENDED RELEASE ORAL at 14:12

## 2018-08-28 RX ADMIN — GABAPENTIN SCH MG: 100 CAPSULE ORAL at 20:15

## 2018-08-28 RX ADMIN — METOPROLOL TARTRATE SCH MG: 25 TABLET, FILM COATED ORAL at 20:16

## 2018-08-28 RX ADMIN — HYDROCODONE BITARTRATE AND ACETAMINOPHEN SCH TAB: 5; 325 TABLET ORAL at 14:12

## 2018-08-28 RX ADMIN — SIMVASTATIN SCH MG: 20 TABLET, FILM COATED ORAL at 20:16

## 2018-08-28 NOTE — HISTORY AND PHYSICAL
DATE OF SERVICE:  08/28/2018



CHIEF COMPLAINT:

Difficulty with walking.



HISTORY OF PRESENT ILLNESS:

The patient is an 78-year-old male who presented to ED [facility] on

08/23/2018 after falling at his home on 08/22/2018 going through some boards in

his porch that were rotton. He had an evaluation and imaging studies revealed a 
lateral

tibial plateau fracture of the left leg.  The patient was seen by Dr. Latham and

on 08/24/2018, he was placed in an immobilizer and made toe-touch weightbearing

left lower extremity.  Cardiology was consulted on 08/25/2018 for hypotension. 

Medications were adjusted.  Therapies were begun.  He was felt to be appropriate

for inpatient rehabilitation.  He uses supplemental O2 at home at night.  He is

currently on O2 by nasal cannula continuous.  He has a cough, which appears to

be chronic and carries a history of COPD.  He had been independent prior to this

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 

Currently, requires assistance for his ADLs and mobility skills.  He currently

is min assist for transfers, bed mobility; min assist for ambulation with a

front wheel walker.  Left leg is in a knee immobilizer.  Strength to right is

3/5 hip flexion, 4/5 knee flexion; knee extension, plantar flexion and

dorsiflexion 5/5.  Left lower extremity, hip flexion 2/5, dorsiflexion 3/5,

plantar flexion 3/5, knee not tested.  The patient is set up for eating, min

assist for grooming, mod assist for bathing, max assist for lower body dressing,

mod assist for transfers, min assist for toileting.



PAST MEDICAL HISTORY:

COPD, emphysema, O2 dependence, atrial fibrillation, coronary artery disease,

hypercholesterolemia, hypertension, peripheral vascular disease, bladder

infection, GI bleed, arthritis.



PAST SURGICAL HISTORY:

CABG, coronary stent, eye surgery, joint replacement, lobectomy, prostatectomy,

tonsillectomy.



ALLERGIES:

SULFA.



FAMILY HISTORY:

Noncontributory.



SOCIAL HISTORY:

Essentially as per above.  He lives in [place], [place] with his spouse.  He is

retired.



PRIMARY CARE PHYSICIAN:

Dr. Holley.



REVIEW OF SYSTEMS:

A 10-point review of systems significant for shortness of breath, cough, left

leg pain.



MEDICATIONS:

Vasotec 2.5 mg p.o. daily, furosemide 40 mg p.o. daily, Colace 100 mg p.o.

daily, Protonix 40 mg p.o. daily, gabapentin 100 mg p.o. at 7 and 12:00 noon,

Novolin human insulin 5 units subQ b.i.d., regular _____ units subQ b.i.d.,

Singulair 10 mg p.o. each day at bedtime, Zocor 20 mg p.o. each day at bedtime,

Metoprolol 12.5 mg p.o. b.i.d., gabapentin 200 mg p.o. each day at bedtime,

Advair 2 puffs b.i.d., Coumadin 5 mg p.o. daily, DuoNeb treatments q.i.d.,

diltiazem 30 mg p.o. t.i.d., hydrocodone/APAP 5 one tablet p.o. q.6 hours p.r.n.

pain, Tylenol 500 mg p.o. q.4 hours p.r.n. mild pain, KCl 10 mEq p.o. q. 48

hours.



PHYSICAL EXAMINATION:

GENERAL:  Significant for a pleasant male appearing stated age, lying in bed,

coughing sounding congested with O2 by nasal cannula in place.

VITAL SIGNS:  He is afebrile, pulse 102, respirations 18, blood pressure 109/69,

O2 sat 92% on 2 liters O2 by nasal cannula.

HEENT:  Vision, speech, hearing grossly intact.  No oral lesion is noted.

NECK:  Supple without mass.

HEART:  Regular rhythm.

CHEST:  Few rhonchi appreciated.  No wheezes.  Decreased breath sounds.

ABDOMEN:  Soft, nontender, bowel sounds present.

EXTREMITIES:  Left knee in immobilizer.  There is no lower leg edema, no calf

tenderness on the right.

MUSCULOSKELETAL:  He has functional active range of motion both upper limbs and

right lower limb.

NEUROLOGIC:  Strength as per above.  Sensation grossly intact to touch. 

Cognition grossly intact.



IMPRESSION:

1.  Ambulatory dysfunction secondary to fall with resulting left lateral tibial

plateau fracture managed with a knee immobilizer and toe-touch weightbearing

left lower extremity, Dr. Latham.

2.  Hypertension, controlled with medication.

3.  Chronic obstructive pulmonary disease, on O2 and treatments.

4.  Atrial fibrillation, controlled with medication.

5.  Coronary artery disease.

6.  Hypercholesterolemia.

7.  Peripheral vascular disease.

8.  History of gastrointestinal bleed and bladder infection.

9.  Arthritis.

10.  History of coronary artery bypass grafting and coronary stent.

11.  Diabetes mellitus.  Plan:  Monitor Accu-Cheks and adjust medications as

needed

12.  Chronic anticoagulation.



PLAN:

The patient is admitted for a comprehensive program of inpatient rehabilitation 
with

goal of maximizing level of functional independence prior to discharge home with

spouse.  The patient will have PT, OT 90 minutes per day each discipline, 5 days

a week for 10 days with the above goals in mind.  Please see post-admission

physician evaluation, which is a separate document for details of plan of care. 

Speech therapy to do cognitive assessment and treat as indicated. 

Rehabilitation nursing assists with bowel, bladder, skin care, medication

administration, pain management and  for discharge planning,

community reentry, respiratory therapy to assist with O2 administration,

monitoring O2 sats and administering respiratory treatments.  Follow up with Dr. Holley, PCP and orthopedics as per their schedule.



ESTIMATED LENGTH OF STAY:

14 days.



PROGNOSIS:

Rehab prognosis appears good for goal of discharging home with spouse, modified

independent to supervision for ADLs and mobility skills at the wheelchair level

of function due to toe-touch weightbearing status left lower extremity and the

use of knee immobilizer.



DIET:

Carb consistent.



CODE STATUS:

Full code.





Job ID: [id number]

DocumentID: [id number]

Dictated Date:  08/28/2018 16:35:48

Transcription Date: 08/28/2018 17:48:55

Dictated By: VINCENT ROGERS MD

MTDD

## 2018-08-28 NOTE — ST DYSPHAGIA EVALUATION
Speech Evaluation-General


Medical Diagnosis


left tibial plateau fx


Onset Date:  Aug 23, 2018





Precautions


Precautions/Isolations:  Fall Prevention, Standard Precautions





Medical History


Pertinent Medical History:  Atrial Fib, Arthritis, CABG, CAD, COPD, CVA, DM


Reviewed History:  Yes





Social History


Current Living Status:  Spouse





Speech-Plan


Treatment Plan


Rehab Potential:  COLETTE Caldwell Aug 28, 2018 16:42

## 2018-08-28 NOTE — PHYSICAL THERAPY EVALUATION
PT Evaluation-General


Medical Diagnosis


Admission Date


Aug 28, 2018 at 11:00


Medical Diagnosis:  left tibial plateau fx


Onset Date:  Aug 23, 2018





Therapy Diagnosis


Therapy Diagnosis:  impaired mobility, strength, endurance, balance





Height/Weight


Height (Feet):  5


Height (Inches):  9.00


Weight (Pounds):  188


Weight (Ounces):  9.0





Weight Bear Status


Right Lower Extremity:  Right


Full Weight Bearing


Left Lower Extremity:  Left


Touch Toe Bearing





Referral


Physician:  Shreyas


Reason for Referral:  Evaluation/Treatment





Medical History


Pertinent Medical History:  Atrial Fib, Arthritis, CABG, CAD, COPD, CVA, DM


Current History


Pt stepped on a rotten step on his porch and it broke causing him to fall, 

sustained a left tib plateau fx; currently in an immobilizer and is TTWB left


Reviewed History:  Yes





Social History


Home:  Single Level


Current Living Status:  Spouse


Entry Into Home:  Stairs With Railing


PT Steps Into Home:  5





Prior/Core FIM


Prior Level of Function


              Functional [place] Measure


0=Not Assessed/NA   4=Minimal Assistance


1=Total Assistance   5=Supervision or Setup


2=Maximal Assistance   6=Modified [place]


3=Moderate Assistance   7=Complete [place]


Bed Mobility:  7


Transfers (B,C,W/C) (FIM):  7


Gait:  7





PT Evaluation-Current


Subjective


Patient in recliner pre tx, agrees to PT, will be taking him down to rehab this 

morning.  Patient has pain in left leg but is unrated.





Pt/Family Goals


to be independent at home.





Objective


Patient Orientation:  Person, Place, Situation


Attachments:  Oxygen, IV


2L of O2 nasal canula





ROM/Strength


ROM Lower Extremities


WNL in RLE, LLE has knee immobilizer


Strenght Lower Extremities


right lower extremity (hip flexion 3/5, knee flexion 4/5, knee extension 5/5, 

plantarflexion 5/5, dorsiflexion 5/5), left lower extremity (hip flexion 2/5, 

dorsiflexion 3/5, plantarflexion 3/5)





Neuromuscular


(Tone, Coordination, Reflexes)


NT





Sensory


Hearing:  Functional


Sensation Right Lower Extremit:  Intact


Sensation Left Lower Extremity:  Intact





Transfers


              Functional [place] Measure


0=Not Assessed/NA   4=Minimal Assistance


1=Total Assistance   5=Supervision or Setup


2=Maximal Assistance   6=Modified [place]


3=Moderate Assistance   7=Complete IndependenceIRFPAI Quality Coding Scale











6 Independent with activity with or without an assistive device


 


5  Patient requires set up or clean up by helper.  Patient completes activity  

by  themselves


 


4 Supervision or touching assist (CGA). [place] provide cues , steadying assist


 


3 The helper provides less than half the effort to complete the activity


 


2 The helper provides more than half the effort to complete the activity


 


1 Dependent.  The helper does all the effort to complete an activity 


 


7 Patient refused to complete or attempt activity


 


9 The patient did not perform the activity before the current illness or injury


 


88 Not attempted due to Medical conditions or safety concerns








Transfers (B, C, W/C) (FIM):  4


Scootin


Rollin


Roll Left to Right (QC):  4


Supine to/from Sit:  4


Sit to/from Stand:  4


bed t/f WC(FIM only if WC use):  4


Sit to Lying (QC):  3


Lying to Sitting/Side of Bed(Q:  3


Sit to Stand (QC):  3


Chair/Bed-to-Chair Xfer(QC):  4


Car Transfer (QC):  88


Patient performs bed mobility with SBA but needs min assist for supine <-> sit, 

min assist for sit to stand, CGA for transfers, patient cannot perform a car 

transfer due to knee immobilizer.  Patient needs cues for hand placement and 

will almost always place them inappropriately.





Gait


Does the Patient Walk?:  Yes


Mode of Locomotion:  Walk


Anticipated Mode of Locomotion:  Walk


Gait (FIM):  1


Walk 10 feet (QC):  4


Walk 50 ft with 2 Turns(QC):  88


Walk 150 ft (QC):  88


Walking 10ft/uneven surface-QC:  88


Distance:  10'


Gait Level of Assist:  4


Gait Persons Needed:  1


Gait Assistive Device:  FWW


Comments/Gait Description


Patient can ambulate 10' with a rolling walker with CGA.  He is able to 

maintain his TTWB status at first but quickly wears out and cannot maintain it 

any more.  Patient has unsteady gait but no LOB.





Wheelchair Training


Does the Pt Use a Wheelchair?:  Yes


Wheelchair (FIM):  1


Distance:  20'


Wheelchair Level of Assist:  4


Wheel 50 ft with 2 turns (QC):  88


Wheel 150 ft (QC):  88


Type of Wheelchair:  Manual


Patient can propel a manual wheelchair 20' with CGA/min to go around turns.





Stairs


1 Step (curb) (QC):  88


4 Steps (QC):  88


12 Steps (QC):  88


Patient cannot perform stairs yet and maintain his weight bearing status.





Balance


Sitting Static:  Normal


Sitting Dynamic:  Normal


Standing Static:  Fair


 Standing Dynamic:  Fair





Assessment/Needs


Patient has impaired mobility, strength, endurance, balance post surgery for 

tibial plateau fx.  He is at risk for a fall.


Rehab Potential:  Fair





PT Short Term Goals


Short Term Goals


Time Frame:  Sep 4, 2018


Transfers (B,C,W/C) (FIM):  4 (CGA)


Gait (FIM):  1


Gait Distance Comment:  25'


Gait Level of Assist:  4


Gait Assistive Device:  FWW





PT Long Term Goals


Long Term Goals


PT Long Term Goals Time Frame:  Sep 18, 2018


Transfers (B,C,W/C) (FIM):  5


Sit to Lying (QC):  4


Lying-Sitting on Side/Bed(QC):  4


Sit to Stand (QC):  4


Rollin


Roll Left to Right (QC):  4


Chair/Bed-to-Chair Xfer(QC):  4


Car Transfer (QC):  4


Gait (FIM):  2


Distance:  50'


Walk 10 feet (QC):  4


Walk 10ft-Uneven Surface(QC):  4


Walk 50ft with 2 Turns (QC):  4


Gait Level of Assist:  5


Gait Assistive Device:  FWW


Stairs (FIM):  1


# of Steps:  1


1 Step (curb) (QC):  4


Stairs Level Of Assist:  4





PT Plan


Problem List


Problem List:  Activity Tolerance, Functional Strength, Safety, Balance, Gait, 

Transfer, Bed Mobility, ROM





Treatment/Plan


Treatment Plan:  Continue Plan of Care


Treatment Plan:  Bed Mobility, Concurrent Therapy, Education, Functional 

Activity Hema, Functional Strength, Group Therapy, Gait, Safety, Therapeutic 

Exercise, Transfers


Treatment Duration:  Sep 18, 2018


Frequency:  At least 5 of 7 days/Wk (IRF)


Estimated Hrs Per Day:  1.5 hours per day


Patient and/or Family Agrees t:  Yes





Safety Risks/Education


Patient Education:  Gait Training, Transfer Techniques, Reviewed Precautions, 

Correct Positioning, W/C Management, Disease Process, Safety Issues


Teaching Recipient:  Patient


Teaching Methods:  Demonstration, Discussion


Response to Teaching:  Reinforcement Needed





Discharge Recommendations


Plan


Patient will perform bed mobility and transfer training, balance and endurance 

training, functional strengthening, stair training, gait training, and education

, to improve functional mobility and independence at home.


Therapy D/C Recommendations:  Home w/ Family Support





Time/GCodes


Time In:  1100


Time Out:  1200


Total Billed Treatment Time:  50


Total Billed Treatment


1 Visit





EVM 10'


FA 30'


GT 10'  (GT won't be charged for this visit)





PT eval was from 4038-9627, OT eval was from 1194-8529, and co-treatment was 

from 5898-0252.  PT worked on ambulation, transfers, bed mobility, OT worked on 

UE positioning, balance during mobility.











ANGELIA BROWN PT Aug 28, 2018 13:35

## 2018-08-28 NOTE — PM&R POST ADMISSION ASSESSMENT
Post Admission Physician Asses


Date seen by provider:  Aug 28, 2018


Time seen by provider:  13:30


The preadmission screen agrees with the post admission assessment that the 

patient is a good candidate for inpatient rehabilitation.





The patient will have a comprehensive program of inpatient rehabilitation with 

a goal of maximizing level of functional independence prior to discharge home 

with spouse.   The patient will have PT/OT ninety minutes per day, each 

discipline, five days a week for 14 days for gait, strengthening, conditioning, 

balance, ADLs, any patient/family/caregiver training as necessary.  Speech 

therapy to do cognitive assessment and treat as indicated. Rehabilitation 

nursing to assist with bowel, bladder, skin, wound care, medication 

administration, pain management.   to assist with discharge 

planning, community reentry. SCD's  and Coumadin for DVT prophylaxis.





He appears to be well motivated to participate in three hours of therapy a day.

  He should be able to tolerate three hours of therapy a day from a medical 

standpoint.  He should benefit from the three hours of therapy a day.  He has a 

reasonable discharge plan, reasonable discharge rehabilitation goals and a 

supportive family. He has various comorbidities that need to be closely 

monitored with medications and treatments adjusted on a daily basis as needed. 


These include:


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


DM


Chronic anticoagulation


CAD


A FIB





[facility] code [facility]9


Etiologic DX Nondisplaced bicondylar fracture of the left tibia





Barriers to discharge for this patient who had been independent prior to this 

are for him to be modified independent to supervision for ADLs and mobility 

skills prior to discharge home with [family], so as to lessen the burden of the 

caregivers. 





Risks for this patient include:


 1.  Fall


 2.  Fracture


 3.  DVT


 4.  Pulmonary embolism


 5.  Wound infection


 6.  Skin breakdown


 7.  Contractures


 8.  Poorly controlled pain


 9.  Urinary retention


10.  UTI


11.  Respiratory infection


12.  Aspiration


[]





Estimated Length of Stay:  []days





Prognosis:  Rehab prognosis appears good for goal of discharge home with [family

] modified independent to supervision for ADLs and mobility skills.


General:  Alert, Oriented X3, Cooperative, No Acute Distress


HEENT:  Atraumatic, PERRLA, EOMI, Mucous Memb Moist/Pink, Other (02 by n/c in 

place)


Neck:  Supple, No JVD


Lungs:  Other (Cough with congestion rhonchi)


Heart:  Regular Rate


Abdomen:  Normal Bowel Sounds, Soft, No Tenderness


Extremities:  Other (Knee immobilizer left)


Neuro:  Other (Functional strength and ROM ( knee not tested))


Psych/Mental Status:  Mental Status NL, Mood NL











VINCENT ROGERS MD Aug 28, 2018 16:42

## 2018-08-28 NOTE — OCCUPATIONAL THERAPY EVAL
OT Evaluation-General/PLF


Medical Diagnosis


Admission Date


Aug 28, 2018 at 11:00


Medical Diagnosis:  left tibial plateau fx


Onset Date:  Aug 23, 2018





Therapy Diagnosis


Therapy Diagnosis:  Weakness





Height/Weight


Height (Feet):  5


Height (Inches):  9.00


Weight (Pounds):  188


Weight (Ounces):  9.0





Precautions


Precautions/Isolations:  Fall Prevention, Standard Precautions





Weight Bear Status


Weight Bearing Restriction:  Touch Toe Bearing


Location Restriction:  L LE


Knee immobilizer to be worn at all times.





Referral


Physician:  Shreyas


Referral Reason:  Activity Tolerance, Self Care, Evaluation/Treatment, 

Strengthening/ROM





Medical History


Pertinent Medical History:  Atrial Fib, Arthritis, CABG, CAD, COPD, CVA, DM


Additional Medical History


lung surgery


Current History


Pt. fell on porch at home.  States that he laid there approximately 30 minutes, 

because his wife "couldn't hear me."


Reviewed History:  Yes





Social History


Home:  Single Level


Current Living Status:  Spouse


Entry Into Home:  Stairs With Railing


 Steps Into Home:  5


Trailer home





ADL-Prior Level of Function


ADL PLOF Comments


Pt. states that he was independent with daily tasks.


DME/Equipment:  Bath Chair, Shower, Tub/Shower


DME/Equipment Comments


Pt. also has a walker that he does not use, and a wheelchair.


Drive Self:  Yes





OT Current Status


Subjective


Pt. indicates pain but does not report level.  Pt. has had pain medication.





Appearance


Pt. alert and oriented.





Mental Status/Objective


Patient Orientation:  Person





Current


Glasses/Contacts:  Yes


Dentures/Partials:  Yes


Hand Dominance:  Right


Upper Extremity ROM


WFL


Upper Extremity Strength


WFL





ADL-Treatment


              Functional [place] Measure


0=Not Assessed/NA   4=Minimal Assistance


1=Total Assistance   5=Supervision or Setup


2=Maximal Assistance   6=Modified [place]


3=Moderate Assistance   7=Complete IndependenceIRFPAI Quality Coding Scale











6 Independent with activity with or without an assistive device


 


5  Patient requires set up or clean up by helper.  Patient completes activity  

by  themselves


 


4 Supervision or touching assist (CGA). [place] provide cues , steadying assist


 


3 The helper provides less than half the effort to complete the activity


 


2 The helper provides more than half the effort to complete the activity


 


1 Dependent.  The helper does all the effort to complete an activity 


 


7 Patient refused to complete or attempt activity


 


9 The patient did not perform the activity before the current illness or injury


 


88 Not attempted due to Medical conditions or safety concerns








Eating (FIM):  5


Eating (QC):  4


Grooming (FIM):  4 (Min assist to comb hair without mirror.)


Bathing (FIM):  3 (Pt. is able to wash chest and under arms.  Pt. is able to 

wash franck area while seated on side of bed by leaning over.  OT washed 

bilateral feet and right leg.  Pt. declined for OT to doff leg brace at this 

time.)


Shower/Bathe Self (QC):  3


Lower Body Dressing (FIM):  2 (Pt. requires max assist overall to don socks, 

and shorts.  OT donned shorts over feet and pulled up to thighs.  In stance 

with support, pt. able to pull over hips.)


Lower Body Dressing (QC):  2


On/Off Footwear (QC):  1


Toileting (FIM):  4 (Min assist in stance to hold urinal and use urinal at 

walker.)


Transfers (B, C, W/C) (FIM):  2 (Mod assist sit-stand.  Min assist to ambulate.

  Min assist supine to and from sit.  Max x 2 for bed mobility.)


Pt. seen this date in two treatments for full evaluation.  At first treatment, 

pt. was partially co-treated due to fatigue level and need for skilled 

assistance.  Pt. had oxygen tank as well as IV pole.  PT facilitated transfer 

training while OT completed UE assessment and education for ADL training/

assessment.  At second treatment session, pt. declined showering but did agree 

to spongebathe on side of bed.  After this was performed, pt. transferred back 

to bed.  All needs met.





Education


OT Patient Education:  Correct positioning, Energy conservation, Modified ADL 

techniques, Progress toward Goal/Update tx plan, Purpose of tx/functional 

activities, Reviewed precautions, Rehab process, Transfer techniques


Teaching Recipient:  Patient


Teaching Methods:  Demonstration, Discussion


Response to Teaching:  Verbalize Understanding, Return Demonstration





OT Short Term Goals


Short Term Goals


Time Frame:  Sep 4, 2018


Eating(FIM):  5


Grooming(FIM):  5


Bathing(FIM):  4


Upper Body Dressing(FIM):  5


Lower Body Dressing(FIM):  3


Toileting(FIM):  4


Transfers (B,C,W/C) (FIM):  4 (CGA)


Toilet/Commode Transfer(FIM):  5


Shower Transfer(FIM):  4


Additional Short Term Goals:  1-Demonstrate ADL Tasks, 2-Verbalize Understanding

, 3-ImproveStrength/Hema


1=Demonstrate adherence to instructed precautions during ADL tasks.


2=Patient will verbalize/demonstrate understanding of assistive devices/

modifications for ADL.


3=Patient will improve strength/tolerance for activity to enable patient to 

perform ADL's.





OT Long Term Goals


Long Term Goals


Time Frame:  Sep 11, 2018


Eating (FIM):  6


Eating (QC):  6


Groomin


Oral Hygiene (QC):  6


Bathing(FIM):  5


Shower/Bathe Self (QC):  5


Upper Body Dressing(FIM):  6


Upper Body Dressing (QC):  6


Lower Body Dressing(FIM):  5


Lower Body Dressing (QC):  5


On/Off Footwear (QC):  5


Toileting(FIM):  6


Toileting Hygiene (QC):  6


Transfers (B,C,W/C) (FIM):  6


Toilet/Commode Transfer(FIM):  6


Toilet/Commode Transfer (QC):  6


Shower Transfer(FIM):  5


Additional Goals:  1-Demonstrate ADL Tasks, 2-Verbalize Understanding, 3-

ImproveStrength/Hema


1=Demonstrate adherence to instructed precautions during ADL tasks.


2=Patient will verbalize/demonstrate understanding of assistive devices/

modifications for ADL.


3=Patient will improve strength/tolerance for activity to enable patient to 

perform ADL's.





OT Education/Plan


Problem List/Assessment


Assessment:  Decreased Activ Tolerance, Dependent Transfers, Impaired Bed 

Mobility, Impaired Funct Balance, Impaired I ADL's, Impaired Self-Care Skills





Discharge Recommendations


Plan/Recommendations:  Continue POC


Therapy D/C Recommendations:  Home w/ Family Support, Occupational Therapy Home 

Care


Equpiment Recommendations-D/C:  Extended Bath Bench, Hip Kit





Treatment Plan/Plan of Care


Treatment,Training & Education:  Yes


Patient would benefit from OT for education, treatment and training to promote 

independence in ADL's, mobility, safety and/or upper extremity function for ADL'

s.


Plan of Care:  ADL Retraining, Functional Mobility, Group Exercise/Act as Ind, 

UE Funct Exercise/Act


Treatment Duration:  Sep 11, 2018


Frequency:  At least 5 of 7 days/Wk (IRF)


Estimated Hrs Per Day:  1.5 hours per day


Agreement:  Yes


Rehab Potential:  Good





Time/GCodes


Start Time:  11:10


Stop Time:  14:00


Total Time Billed (hr/min):  110


Billed Treatment Time


2844-3631 1, [facility] OT eval


3642-4519 FA x 40minutes co-treat with PT.  Please see above for designated 

roles.


9512-9342 1, ADL x 60minutes











RIZWAN MURPHY OT Aug 28, 2018 14:43

## 2018-08-29 VITALS — DIASTOLIC BLOOD PRESSURE: 66 MMHG | SYSTOLIC BLOOD PRESSURE: 115 MMHG

## 2018-08-29 VITALS — SYSTOLIC BLOOD PRESSURE: 116 MMHG | DIASTOLIC BLOOD PRESSURE: 70 MMHG

## 2018-08-29 LAB
ALBUMIN SERPL-MCNC: 2.6 GM/DL (ref 3.2–4.5)
ALP SERPL-CCNC: 95 U/L (ref 40–136)
ALT SERPL-CCNC: 24 U/L (ref 0–55)
BILIRUB SERPL-MCNC: 0.5 MG/DL (ref 0.1–1)
BUN/CREAT SERPL: 20
CALCIUM SERPL-MCNC: 8.3 MG/DL (ref 8.5–10.1)
CHLORIDE SERPL-SCNC: 101 MMOL/L (ref 98–107)
CO2 SERPL-SCNC: 23 MMOL/L (ref 21–32)
CREAT SERPL-MCNC: 0.84 MG/DL (ref 0.6–1.3)
ERYTHROCYTE [DISTWIDTH] IN BLOOD BY AUTOMATED COUNT: 15.3 % (ref 10–14.5)
GFR SERPLBLD BASED ON 1.73 SQ M-ARVRAT: > 60 ML/MIN
GLUCOSE SERPL-MCNC: 139 MG/DL (ref 70–105)
HCT VFR BLD CALC: 28 % (ref 40–54)
HGB BLD-MCNC: 9.1 G/DL (ref 13.3–17.7)
INR PPP: 2.6 (ref 0.8–1.4)
MCH RBC QN AUTO: 31 PG (ref 25–34)
MCHC RBC AUTO-ENTMCNC: 33 G/DL (ref 32–36)
MCV RBC AUTO: 93 FL (ref 80–99)
PLATELET # BLD: 188 10^3/UL (ref 130–400)
PMV BLD AUTO: 9.3 FL (ref 7.4–10.4)
POTASSIUM SERPL-SCNC: 4.3 MMOL/L (ref 3.6–5)
PROT SERPL-MCNC: 5.5 GM/DL (ref 6.4–8.2)
PROTHROMBIN TIME: 28.3 SEC (ref 12.2–14.7)
RBC # BLD AUTO: 2.95 10^6/UL (ref 4.35–5.85)
SODIUM SERPL-SCNC: 133 MMOL/L (ref 135–145)
WBC # BLD AUTO: 8.6 10^3/UL (ref 4.3–11)

## 2018-08-29 RX ADMIN — SIMVASTATIN SCH MG: 20 TABLET, FILM COATED ORAL at 21:17

## 2018-08-29 RX ADMIN — DILTIAZEM HYDROCHLORIDE SCH MG: 30 TABLET, FILM COATED ORAL at 13:30

## 2018-08-29 RX ADMIN — PANTOPRAZOLE SODIUM SCH MG: 40 TABLET, DELAYED RELEASE ORAL at 06:24

## 2018-08-29 RX ADMIN — IPRATROPIUM BROMIDE AND ALBUTEROL SULFATE SCH ML: .5; 3 SOLUTION RESPIRATORY (INHALATION) at 07:06

## 2018-08-29 RX ADMIN — INSULIN HUMAN SCH UNIT: 100 INJECTION, SUSPENSION SUBCUTANEOUS at 09:47

## 2018-08-29 RX ADMIN — GABAPENTIN SCH MG: 100 CAPSULE ORAL at 06:24

## 2018-08-29 RX ADMIN — MONTELUKAST SCH MG: 10 TABLET, FILM COATED ORAL at 21:17

## 2018-08-29 RX ADMIN — FLUTICASONE PROPIONATE AND SALMETEROL XINAFOATE SCH PUFF: 115; 21 AEROSOL, METERED RESPIRATORY (INHALATION) at 07:06

## 2018-08-29 RX ADMIN — ACETAMINOPHEN PRN MG: 500 TABLET ORAL at 01:28

## 2018-08-29 RX ADMIN — FLUTICASONE PROPIONATE AND SALMETEROL XINAFOATE SCH PUFF: 115; 21 AEROSOL, METERED RESPIRATORY (INHALATION) at 19:27

## 2018-08-29 RX ADMIN — INSULIN HUMAN SCH UNIT: 100 INJECTION, SOLUTION PARENTERAL at 21:18

## 2018-08-29 RX ADMIN — WARFARIN SODIUM SCH MG: 5 TABLET ORAL at 18:26

## 2018-08-29 RX ADMIN — DILTIAZEM HYDROCHLORIDE SCH MG: 30 TABLET, FILM COATED ORAL at 21:17

## 2018-08-29 RX ADMIN — DILTIAZEM HYDROCHLORIDE SCH MG: 30 TABLET, FILM COATED ORAL at 09:47

## 2018-08-29 RX ADMIN — METOPROLOL TARTRATE SCH MG: 25 TABLET, FILM COATED ORAL at 21:16

## 2018-08-29 RX ADMIN — GABAPENTIN SCH MG: 100 CAPSULE ORAL at 21:17

## 2018-08-29 RX ADMIN — DOCUSATE SODIUM SCH MG: 100 CAPSULE ORAL at 09:47

## 2018-08-29 RX ADMIN — INSULIN HUMAN SCH UNIT: 100 INJECTION, SUSPENSION SUBCUTANEOUS at 21:17

## 2018-08-29 RX ADMIN — IPRATROPIUM BROMIDE AND ALBUTEROL SULFATE SCH ML: .5; 3 SOLUTION RESPIRATORY (INHALATION) at 15:24

## 2018-08-29 RX ADMIN — IPRATROPIUM BROMIDE AND ALBUTEROL SULFATE SCH ML: .5; 3 SOLUTION RESPIRATORY (INHALATION) at 19:26

## 2018-08-29 RX ADMIN — FUROSEMIDE SCH MG: 40 TABLET ORAL at 09:47

## 2018-08-29 RX ADMIN — METOPROLOL TARTRATE SCH MG: 25 TABLET, FILM COATED ORAL at 09:47

## 2018-08-29 RX ADMIN — INSULIN HUMAN SCH UNIT: 100 INJECTION, SOLUTION PARENTERAL at 09:46

## 2018-08-29 RX ADMIN — GABAPENTIN SCH MG: 100 CAPSULE ORAL at 13:30

## 2018-08-29 RX ADMIN — ENALAPRIL MALEATE SCH MG: 2.5 TABLET ORAL at 09:47

## 2018-08-29 RX ADMIN — IPRATROPIUM BROMIDE AND ALBUTEROL SULFATE SCH ML: .5; 3 SOLUTION RESPIRATORY (INHALATION) at 15:23

## 2018-08-29 NOTE — ST COGNITIVE LINGUISTIC EVAL
Speech Evaluation-General


Medical Diagnosis


left tibial plateau fx


Onset Date:  Aug 23, 2018





Therapy Diagnosis


Therapy Diagnosis:  Cognition





Precautions


Precautions/Isolations:  Fall Prevention, Standard Precautions





Referral


Referring Physician:  Dr. Pritchett


Reason for Referral:  Evaluation/Treatment





Medical History


Pertinent Medical History:  Atrial Fib, Arthritis, CABG, CAD, COPD, CVA, DM


Reviewed History:  Yes





Social History


Current Living Status:  Spouse





Speech PLF-Current Status


Prior Level of Function





Independent





Subjective


Pt pleasant and cooperative.





Pain





   Numeric Pain Scale:  0-No Pain





Language Eval: Auditory


Comprehends Simple Yes/No Ques:  Functional


Follows 1-Step Commands:  Functional


Follows Complex Directions:  Functional


Follows General Conversations:  Functional





Language Eval: Verbal Language


Produces Auto, Serial Info:  Mild


Requests Basic Needs:  Functional


States Basic Personal Info:  Functional


Expresses Complex Ideas:  Functional





Objective Cognitive Domain


Attention:  Mild


Memory:  Moderate


Problem Solving:  Mild





Objective


Results


The [facility] assessment was administered to determine cognitive-linguistic 

functioning. Results are:





Memory - 3 word recall  pt was 3/3 for immediate, 1/3 for delayed and 1/3 for 

remote delay..


Problem Solving:  pt was 3/6 accurate.


Speech/language WNL


Oral Motor/Speech Production


WNL


Impression


Moderate deficits in problem solving and memory.





Communication/Social Cognition


Comprehension:  6


Expression:  7


Social Interaction:  7


Problem Solvin


Memory:  4





Speech Patient Assess


Expression of Ideas/Wants:  Expression (4)


Understanding Verbal Content:  Understands (4)


Brief Interview-Mental Status:  Yes


Repetition of Three Words:  Three (3)


Temporal Orientation: Year:  Correct (3)


Temporal Orientation: Month:  Accurate within 5 days(2)


Temporal Orientation: Day:  Correct (1)


Recall : Wear to say "Sock":  No, could not recall (0)


Recall : Color:  Yes, no cue required (2)


Recall : Bed:  No, could not recall (0)





Speech Short Term Goals


Short Term Goals


Short Term Goals


1.  Pt will complete various memory tasks with at least 80% accuracy with  min 

assist.


2.  Pt will complete various problem solving tasks with at least 80% accuracy 

and min assist.


Comprehension:  6


Expression:  7


Social Interaction:  7


Problem Solvin


Memory:  5





Speech Long Term Goals


Long Term Goals


Pt will demonstrate functional memory and problem solving ability for maximum 

independence in the home.


Comprehension:  6


Expression:  7


Social Interaction:  7


Problem Solvin


Memory:  6





Speech-Plan


Patient/Family Goals


Patient/Family Goals:  


to return home





Treatment Plan


Speech Therapy Treatment Plan:  Continue Plan of Care


skilled ST indicated due to moderate cognitive deficits.


Frequency:  5 times per week


Estimated Hrs Per Day:  .5 hour per day


Rehab Potential:  Fair


Barriers to Learning:  


decreased cognition


Pt/Family Agrees to Plan:  Yes





Safety Risks/Education


Teaching Recipient:  Patient


Teaching Methods:  Discussion


Response to Teaching:  Verbalize Understanding





Time


Speech Therapy Time In:  16:20


Speech Therapy Time Out:  16:35


Total Billed Time:  15


Billed Treatment Time


1, SPSNDCOMP


No


This evaluation took place on 2018.











COLETTE MENARD Aug 29, 2018 08:31

## 2018-08-29 NOTE — PROGRESS NOTE (SOAP)
Subjective


Time Seen by Provider:  07:00


Subjective/Events-last exam


Patient confused last night.


Patient this morning can answer all my questions.


Patient is coughing.


Patient has a tibial fracture of the knee.


Heart irregular this morning.


Patient not confused.


Patient was in the hospital in acute care previously.


Patient fell through floorboard area


Patient has history of COPD and coronary artery disease





Objective


Exam





Vital Signs








  Date Time  Temp Pulse Resp B/P (MAP) Pulse Ox O2 Delivery O2 Flow Rate FiO2


 


18 05:49 99.0 89 18 116/70 (85) 97 Nasal Cannula 2.00 


 


18 21:00     93 Nasal Cannula 2.00 


 


18 20:14  109  119/61 (80)    


 


18 19:31     93 Nasal Cannula 2.00 


 


18 16:49 100.0 109 18 106/68 (81) 93 Room Air  


 


18 15:51     92 Nasal Cannula 2.00 


 


18 11:00      Nasal Cannula 2.00 


 


18 10:30 98.8 102 18 109/69 (82) 95 Room Air  














I & O 


 


 18





 07:00


 


Intake Total 800 ml


 


Output Total 200 ml


 


Balance 600 ml





Capillary Refill : Less Than 3 Seconds


General Appearance:  No Apparent Distress, WD/WN


HEENT:  Normal ENT Inspection


Neck:  Full Range of Motion, Normal Inspection


Respiratory:  No Accessory Muscle Use, No Respiratory Distress, Decreased 

Breath Sounds, Other (Coughing)


Cardiovascular:  Irregularly Irregular


Gastrointestinal:  non tender, soft





Results


Lab


Laboratory Tests


18 19:59: Glucometer 297H


18 04:13: 


Prothrombin Time 28.3H, INR Comment 2.6H





Assessment/Plan


Assessment/Plan


Assess & Plan/Chief Complaint


Left tibial plateau fracture.


Atrial fibrillation history.


COPD.


Coronary artery disease.


Diabetes area


Patient more alert today than yesterday





Clinical Quality Measures


DVT/VTE Risk/Contraindication:


Risk Factor Score Per Nursin


RFS Level Per Nursing on Admit:  4+=Very High











TOR CONNELLY DO Aug 29, 2018 07:02

## 2018-08-29 NOTE — PROGRESS NOTE-STANDARD
Standard Progress Note


Progress Notes/Assess & Plan


Date Seen by Provider:  Aug 29, 2018


Time Seen by Provider:  07:26


Progress/Assessment & Plan


no complaints


LLE in brace


NVI


imp--L tibial plateau fx


TTWB











TERRANCE RAMIREZ MD Aug 29, 2018 09:13

## 2018-08-29 NOTE — SPEECH THERAPY DAILY NOTE
Speech Daily Progress Note


Subjective


Date Seen by Provider:  Aug 29, 2018


Time Seen by Provider:  10:00


Pt in bed eating sadevin zapienin.





Pain





   Numeric Pain Scale:  0-No Pain





Objective


Memory - Picture recall after 20 minutes was 100% with min assist.





Word list  retention - SLP read four words and was asked a question and pt was 

to choose from the 4 words to answer. He was 90% accurate, however, he would 

require repetitions of the words which kind of defeats the task.





Assessment


Assessment Current Status:  Fair Progress





Treatment Plan


Continue Plan of Care





Communication


Comprehension:  6


Expression:  7





Social Cognition


Social Interaction:  7


Problem Solvin


Memory:  4





Speech Short Term Goals


Short Term Goals


Short Term Goals


1.  Pt will complete various memory tasks with at least 80% accuracy with  min 

assist.


2.  Pt will complete various problem solving tasks with at least 80% accuracy 

and min assist.


Comprehension:  6


Expression:  7


Social Interaction:  7


Problem Solvin


Memory:  5





Speech Long Term Goals


Long Term Goals


Pt will demonstrate functional memory and problem solving ability for maximum 

independence in the home.


Comprehension:  6


Expression:  7


Social Interaction:  7


Problem Solvin


Memory:  6





Speech-Plan


Patient/Family Goals


Patient/Family Goals:  


to return home





Treatment Plan


Speech Therapy Treatment Plan:  Continue Plan of Care


Unclear if pt understands reason for tx despite having it explained to him.


Frequency:  5 times per week


Estimated Hrs Per Day:  .5 hour per day


Rehab Potential:  Fair


Pt/Family Agrees to Plan:  Yes





Safety Risks/Education


Teaching Recipient:  Patient


Teaching Methods:  Discussion


Response to Teaching:  Verbalize Understanding





Time


Speech Therapy Time In:  10:00


Speech Therapy Time Out:  10:30


Total Billed Time:  30


Billed Treatment Time


1, COLETTE Mills Aug 29, 2018 11:20

## 2018-08-29 NOTE — PM & R (SOAP) PROGRESS NOTE
Subjective


This was a face to face visit with the patient.


Date Seen by Provider:  Aug 29, 2018


Time Seen by Provider:  07:50


Subjective/Events-last exam


Patient was seen in his room this AM Patient mod assist for transfers INR and 

other labs noted


Review of Systems


Musculoskeletal:  leg pain





Objective


Physician Exam


Last Set of Vital Signs





Vital Signs








  Date Time  Temp Pulse Resp B/P (MAP) Pulse Ox O2 Delivery O2 Flow Rate FiO2


 


8/29/18 07:07     98 Nasal Cannula 2.00 


 


8/29/18 05:49 99.0 89 18 116/70 (85)    





Capillary Refill : Less Than 3 Seconds


I&O











Intake and Output 


 


 8/29/18





 00:00


 


Intake Total 300 ml


 


Output Total 200 ml


 


Balance 100 ml


 


 


 


Intake Oral 300 ml


 


Output Urine Total 200 ml


 


Daily Weight Change No








General:  Alert, Oriented X3, Cooperative, No Acute Distress


HEENT:  Atraumatic, PERRLA, EOMI, Mucous Memb Moist/Pink, Other (02 by n/c in 

place)


Neck:  Supple, No JVD


Lungs:  Other (Cough with congestion rhonchi)


Heart:  Regular Rate


Abdomen:  Normal Bowel Sounds, Soft, No Tenderness


Extremities:  Other (Knee immobilizer left)


Neuro:  Other (Functional strength and ROM ( knee not tested))


Psych/Mental Status:  Mental Status NL, Mood NL





Results


Lab Data


Laboratory Tests


8/28/18 19:59: Glucometer 297H


8/29/18 04:13: 


White Blood Count 8.6, Red Blood Count 2.95L, Hemoglobin 9.1L, Hematocrit 28L, 

Mean Corpuscular Volume 93, Mean Corpuscular Hemoglobin 31, Mean Corpuscular 

Hemoglobin Concent 33, Red Cell Distribution Width 15.3H, Platelet Count 188, 

Mean Platelet Volume 9.3, Prothrombin Time 28.3H, INR Comment 2.6H, Sodium 

Level 133L, Potassium Level 4.3, Chloride Level 101, Carbon Dioxide Level 23, 

Anion Gap 9, Blood Urea Nitrogen 17, Creatinine 0.84, Estimat Glomerular 

Filtration Rate > 60, BUN/Creatinine Ratio 20, Glucose Level 139H, Calcium 

Level 8.3L, Corrected Calcium 9.4, Total Bilirubin 0.5, Aspartate Amino Transf (

AST/SGOT) 29, Alanine Aminotransferase (ALT/SGPT) 24, Alkaline Phosphatase 95, 

Total Protein 5.5L, Albumin 2.6L





Assessment/Plan


Assessment and Plan


Left tibial plateau fracture managed with Knee immobilizer and TTWB LLE


Hyponatremia


Hypoalbuminemia


Anemia


HTN


A FIb with chronic anticoagulation


PVD


DM


Plan Continue Pt/OT


Team Conference later today-see report for full functional update and POC ane 

ELOS


Check INR and adjust  Coumadin as needed


F/U with PCP DR sánchez


Co-Morbidities that are continuing to impact the rehab process: (include details

)











VINCENT ROGERS MD Aug 29, 2018 08:51

## 2018-08-29 NOTE — CONSULTATION
History of Present Illness


History of Present Illness


Patient Consulted On(catalina/time)


18


 08:27


Time Seen by Provider:  08:25


History of Present Illness


Redness up as a progress note.


Tibial plateau fracture.


See progress note for today





Allergies and Home Medications


Allergies


Coded Allergies:  


     morphine (Verified  Allergy, Mild, Pt has received Lortab in the past w/o 

issue, 18)


     Sulfa (Sulfonamide Antibiotics) (Verified  Allergy, Unknown, 18)


     penicillin G (Verified  Allergy, Unknown, 18)


     levofloxacin (Verified  Adverse Reaction, Unknown, 18)





Home Medications


Diltiazem HCl 30 Mg Tablet, 30 MG PO TID, (Reported)


Enalapril Maleate 2.5 Mg Tablet, 2.5 MG PO DAILY, (Reported)


Fluticasone/Vilanterol 1 Each Blst.w.dev, 1 PUFF IH DAILY, (Reported)


Furosemide 40 Mg Tablet, 40 MG PO DAILY, (Reported)


Gabapentin 100 Mg Capsule, 100 MG PO 0700,1200, (Reported)


Gabapentin 100 Mg Capsule, 200-300 MG PO HS, (Reported)


   TAKES 2-3 OF A (100 MG) CAPSULE / DEPENDING ON PAIN LEVEL 


Insulin NPH Human Isophane 100 Unit/1 Ml Vial, 5 UNITS SQ BID, (Reported)


   USES ALONG WITH NOVOLIN R 


Insulin Regular, Human 1,000 Units/10 Ml Soln, 4 UNITS SQ BID, (Reported)


   USES ALONG WITH NOVOLIN N 


Montelukast Sodium 10 Mg Tablet, 10 MG PO HS, (Reported)


Pantoprazole Sodium 40 Mg Tablet.dr, 40 MG PO DAILY, (Reported)


Potassium Chloride 10 Meq Tab.er.prt, 10 MEQ PO Q48H, (Reported)


Simvastatin 20 Mg Tablet, 20 MG PO HS, (Reported)


Warfarin Sodium 5 Mg Tablet, 5 MG PO 1800, (Reported)





Patient Home Medication List


Home Medication List Reviewed:  Yes





Past Medical-Social-Family Hx


Patient Social History


Alcohol Use:  Denies Use


Recreational Drug Use:  No


Smoking Status:  Former Smoker


Type Used:  Cigarettes


Former Smoker, Quit:  1985


2nd Hand Smoke Exposure:  No


Recent Foreign Travel:  No


Contact w/Someone Who Travel:  No


Recent Infectious Disease Expo:  No


Recent Hopitalizations:  No





Immunizations Up To Date


Tetanus Booster (TDap):  More than 5yrs


PED Vaccines UTD:  No


Date of Pneumonia Vaccine:  Oct 3, 2016


Date of Influenza Vaccine:  Sep 4, 2017





Seasonal Allergies


Seasonal Allergies:  No





Past Medical History


Surgeries:  Yes (1/2 right lung removed)


Cardiac, CABG, Coronary Stent, Eye Surgery, Joint Replacement, Lobectomy, 

Orthopedic, Prostatectomy, Tonsillectomy


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 )


Pneumonia, Sleep Apnea, COPD, Emphysema


Currently Using CPAP:  No


Currently Using BIPAP:  No


Cardiac:  Yes (stents)


Atrial Fibrillation, Coronary Artery Disease, High Cholesterol, Hypertension, 

Peripheral Vascular


Neurological:  Yes


Stroke


Reproductive Disorders:  No


Sexually Transmitted Disease:  No


HIV/AIDS:  No


Genitourinary:  Yes


Benign Prostatic Hyperpl, Bladder Infection


Gastrointestinal:  Yes


Gastrointestinal Bleed


Musculoskeletal:  Yes


Arthritis


Endocrine:  Yes


Diabetes, Insulin dep


HEENT:  Yes


Cataract


Loss of Vision:  Denies


Hearing Impairment:  Hard of Hearing


Cancer:  No


Psychosocial:  No


Integumentary:  No


Blood Disorders:  No


Adverse Reaction/Blood Tranf:  No





Family Medical History





BLADD


  09 BROTHER


BLADD


  09 BROTHER


Cancer


  03 FATHER (THROAT)


  09 BROTHER


Dementia


  03 MOTHER


FH: bladder cancer


FH: bladder cancer


Family history: Diabetes mellitus


  03 MOTHER


Family history: Thyroid disorder


  03 MOTHER


Infertile


  03 MOTHER (X5 MISCARRIAGES)


Myocardial infarction


  09 BROTHER





No Family History of:


  Abdominal aortic aneurysm


  Shayne's disease


  Alcoholism


  Aphasia


  Cancer of colon


  Cataract


  Chest pain


  Congenital heart disease


  Congestive heart failure


  Cystic fibrosis


  Dysphagia


  Family history: Allergy


  Family history: Alzheimer's disease


  Family history: Arthritis


  Family history: Asthma


  Family history: Breast disease


  Family history: Cardiovascular disease


  Family history: Coronary thrombosis


  Family history: Gastrointestinal disease


  Family history: Glaucoma


  Family history: Hypertension


  Family history: Osteoporosis


  Headache


  Hearing loss


  Heart disease


  Hereditary disease


  History of - anemia


  History of - disorder


  History of - respiratory disease


  History of drug abuse


  Human immunodeficiency virus (HIV) seropositivity


  Hypercholesterolemia


  Kidney disease


  Malignant neoplasm of lung


  Parkinson's disease


  Prostate cancer


  Psychotic disorder


  Seizure disorder


  Stroke


  Tuberculosis


  Visual impairment


Heart Disease, Cancer, Stroke, Other Conditions/Hx





Review of Systems-General


Constitutional:  no symptoms reported, weakness, other (Confusion last night)


EENTM:  no symptoms reported


Respiratory:  other (COPD)


Cardiovascular:  other (CAD, atrial fibrillation)


Gastrointestinal:  no symptoms reported


Genitourinary:  no symptoms reported





Physical Exam-General Problems


Physical Exam


Vital Signs





Vital Signs - First Documented








 18





 10:30 11:00


 


Temp 98.8 


 


Pulse 102 


 


Resp 18 


 


B/P (MAP) 109/69 (82) 


 


Pulse Ox 95 


 


O2 Delivery Room Air 


 


O2 Flow Rate  2.00





Capillary Refill : Less Than 3 Seconds


General Appearance:  WD/WN, no apparent distress


Eyes:  Bilateral Eye Normal Inspection


HEENT:  normal ENT inspection


Neck:  full range of motion


Respiratory:  no respiratory distress, no accessory muscle use, decreased 

breath sounds


Cardiovascular:  irregularly irregular


Gastrointestinal:  non tender, soft





Assessment/Plan


Assessment/Plan


Admission Diagnosis/Plan


Left tibial plateau fracture.


Atrial fibrillation history.


COPD.


Coronary artery disease.


Diabetes area


Patient more alert today than yesterday


Admission Status:  Inpatient Order (span 2 midnights)


Reason for Inpatient Admission:  


Tibial fracture.


Confusion.


Coronary artery disease.


 COPD.


Atrial fibrillation.


Diabetes





Clinical Quality Measures


DVT/VTE Risk/Contraindication:


Risk Factor Score Per Nursin


RFS Level Per Nursing on Admit:  4+=Very High











TOR CONNELLY DO Aug 29, 2018 08:31

## 2018-08-29 NOTE — DIAGNOSTIC IMAGING REPORT
INDICATION: Cough and dyspnea



Portable upright AP view of the chest is obtained. Since

08/27/2018, there is continued bilateral air trapping and

prominence of interstitial markings. There is elevation of the

right hemidiaphragm with blunting of the costophrenic sulci.

Right apical density persists and may be slightly increased in

conspicuity. No pneumothorax is seen.



IMPRESSION: Chronic background findings with increasing focal

density in the apical region of the right lung. This may

represent worsening pneumonia and clinical correlation is

recommended. Followup study is recommended to document

resolution.



Dictated by: 



  Dictated on workstation # [id number]

## 2018-08-30 VITALS — DIASTOLIC BLOOD PRESSURE: 62 MMHG | SYSTOLIC BLOOD PRESSURE: 100 MMHG

## 2018-08-30 VITALS — SYSTOLIC BLOOD PRESSURE: 112 MMHG | DIASTOLIC BLOOD PRESSURE: 64 MMHG

## 2018-08-30 LAB
APTT PPP: YELLOW S
BACTERIA #/AREA URNS HPF: NEGATIVE /HPF
BILIRUB UR QL STRIP: NEGATIVE
BUN/CREAT SERPL: 20
CALCIUM SERPL-MCNC: 8.3 MG/DL (ref 8.5–10.1)
CHLORIDE SERPL-SCNC: 104 MMOL/L (ref 98–107)
CO2 SERPL-SCNC: 24 MMOL/L (ref 21–32)
CREAT SERPL-MCNC: 0.83 MG/DL (ref 0.6–1.3)
ERYTHROCYTE [DISTWIDTH] IN BLOOD BY AUTOMATED COUNT: 15 % (ref 10–14.5)
FIBRINOGEN PPP-MCNC: CLEAR MG/DL
GFR SERPLBLD BASED ON 1.73 SQ M-ARVRAT: > 60 ML/MIN
GLUCOSE SERPL-MCNC: 108 MG/DL (ref 70–105)
GLUCOSE UR STRIP-MCNC: NEGATIVE MG/DL
HCT VFR BLD CALC: 27 % (ref 40–54)
HGB BLD-MCNC: 9.2 G/DL (ref 13.3–17.7)
HYALINE CASTS #/AREA URNS LPF: (no result) /LPF
INR PPP: 2.8 (ref 0.8–1.4)
KETONES UR QL STRIP: NEGATIVE
LEUKOCYTE ESTERASE UR QL STRIP: NEGATIVE
MCH RBC QN AUTO: 31 PG (ref 25–34)
MCHC RBC AUTO-ENTMCNC: 34 G/DL (ref 32–36)
MCV RBC AUTO: 93 FL (ref 80–99)
NITRITE UR QL STRIP: NEGATIVE
PH UR STRIP: 5 [PH] (ref 5–9)
PLATELET # BLD: 170 10^3/UL (ref 130–400)
PMV BLD AUTO: 9.4 FL (ref 7.4–10.4)
POTASSIUM SERPL-SCNC: 4.1 MMOL/L (ref 3.6–5)
PROT UR QL STRIP: (no result)
PROTHROMBIN TIME: 29.9 SEC (ref 12.2–14.7)
RBC # BLD AUTO: 2.94 10^6/UL (ref 4.35–5.85)
RBC #/AREA URNS HPF: (no result) /HPF
SODIUM SERPL-SCNC: 139 MMOL/L (ref 135–145)
SP GR UR STRIP: 1.02 (ref 1.02–1.02)
UROBILINOGEN UR-MCNC: NORMAL MG/DL
WBC # BLD AUTO: 7.3 10^3/UL (ref 4.3–11)
WBC #/AREA URNS HPF: (no result) /HPF

## 2018-08-30 RX ADMIN — INSULIN ASPART SCH UNIT: 100 INJECTION, SOLUTION INTRAVENOUS; SUBCUTANEOUS at 11:28

## 2018-08-30 RX ADMIN — FLUTICASONE PROPIONATE AND SALMETEROL XINAFOATE SCH PUFF: 115; 21 AEROSOL, METERED RESPIRATORY (INHALATION) at 07:49

## 2018-08-30 RX ADMIN — ENALAPRIL MALEATE SCH MG: 2.5 TABLET ORAL at 09:31

## 2018-08-30 RX ADMIN — GABAPENTIN SCH MG: 100 CAPSULE ORAL at 20:21

## 2018-08-30 RX ADMIN — ACETAMINOPHEN PRN MG: 500 TABLET ORAL at 00:31

## 2018-08-30 RX ADMIN — METOPROLOL TARTRATE SCH MG: 25 TABLET, FILM COATED ORAL at 20:22

## 2018-08-30 RX ADMIN — DILTIAZEM HYDROCHLORIDE SCH MG: 30 TABLET, FILM COATED ORAL at 09:31

## 2018-08-30 RX ADMIN — INSULIN ASPART SCH UNIT: 100 INJECTION, SOLUTION INTRAVENOUS; SUBCUTANEOUS at 20:28

## 2018-08-30 RX ADMIN — IPRATROPIUM BROMIDE AND ALBUTEROL SULFATE SCH ML: .5; 3 SOLUTION RESPIRATORY (INHALATION) at 20:49

## 2018-08-30 RX ADMIN — MONTELUKAST SCH MG: 10 TABLET, FILM COATED ORAL at 20:21

## 2018-08-30 RX ADMIN — POTASSIUM CHLORIDE SCH MEQ: 750 TABLET, FILM COATED, EXTENDED RELEASE ORAL at 09:31

## 2018-08-30 RX ADMIN — INSULIN HUMAN SCH UNIT: 100 INJECTION, SUSPENSION SUBCUTANEOUS at 09:32

## 2018-08-30 RX ADMIN — DILTIAZEM HYDROCHLORIDE SCH MG: 30 TABLET, FILM COATED ORAL at 13:01

## 2018-08-30 RX ADMIN — INSULIN HUMAN SCH UNIT: 100 INJECTION, SUSPENSION SUBCUTANEOUS at 20:28

## 2018-08-30 RX ADMIN — PANTOPRAZOLE SODIUM SCH MG: 40 TABLET, DELAYED RELEASE ORAL at 06:03

## 2018-08-30 RX ADMIN — INSULIN ASPART SCH UNIT: 100 INJECTION, SOLUTION INTRAVENOUS; SUBCUTANEOUS at 15:56

## 2018-08-30 RX ADMIN — IPRATROPIUM BROMIDE AND ALBUTEROL SULFATE SCH ML: .5; 3 SOLUTION RESPIRATORY (INHALATION) at 10:48

## 2018-08-30 RX ADMIN — IPRATROPIUM BROMIDE AND ALBUTEROL SULFATE SCH ML: .5; 3 SOLUTION RESPIRATORY (INHALATION) at 15:39

## 2018-08-30 RX ADMIN — SIMVASTATIN SCH MG: 20 TABLET, FILM COATED ORAL at 20:22

## 2018-08-30 RX ADMIN — FLUTICASONE PROPIONATE AND SALMETEROL XINAFOATE SCH PUFF: 115; 21 AEROSOL, METERED RESPIRATORY (INHALATION) at 20:49

## 2018-08-30 RX ADMIN — METOPROLOL TARTRATE SCH MG: 25 TABLET, FILM COATED ORAL at 09:31

## 2018-08-30 RX ADMIN — IPRATROPIUM BROMIDE AND ALBUTEROL SULFATE SCH ML: .5; 3 SOLUTION RESPIRATORY (INHALATION) at 07:49

## 2018-08-30 RX ADMIN — WARFARIN SODIUM SCH MG: 5 TABLET ORAL at 18:13

## 2018-08-30 RX ADMIN — INSULIN HUMAN SCH UNIT: 100 INJECTION, SOLUTION PARENTERAL at 09:31

## 2018-08-30 RX ADMIN — GABAPENTIN SCH MG: 100 CAPSULE ORAL at 06:03

## 2018-08-30 RX ADMIN — GABAPENTIN SCH MG: 100 CAPSULE ORAL at 13:01

## 2018-08-30 RX ADMIN — FUROSEMIDE SCH MG: 40 TABLET ORAL at 09:32

## 2018-08-30 RX ADMIN — DILTIAZEM HYDROCHLORIDE SCH MG: 30 TABLET, FILM COATED ORAL at 20:22

## 2018-08-30 RX ADMIN — DOCUSATE SODIUM SCH MG: 100 CAPSULE ORAL at 09:31

## 2018-08-30 NOTE — DIAGNOSTIC IMAGING REPORT
Indication: Pneumonia.



Comparison made with prior examination of 08/29/2018.



Findings: Heart size is stable. There is bibasilar atelectasis

and/or pneumonitis. There is a right pleural effusion.  There is

no pneumothorax. Surgical clips in the left paramediastinal

region.



Impression: Persistent bibasilar atelectasis and/or pneumonitis

and right pleural effusion.  There may be some minimal venous

congestion.



Dictated by: 



  Dictated on workstation # [id number]

## 2018-08-30 NOTE — SPEECH THERAPY DAILY NOTE
Speech Daily Progress Note


Subjective


Date Seen by Provider:  Aug 30, 2018


Time Seen by Provider:  10:00


pt in bed...attempting to nap.





Pain





   Numeric Pain Scale:  0-No Pain





Objective


Memory - Picture recall after 20 minutes was 100% accuracy with min assist.


Recall of missing picture from a choice of 3 was 67% accurate.





Assessment


Assessment Current Status:  Fair Progress





Treatment Plan


Continue Plan of Care





Communication


Comprehension:  6


Expression:  7





Social Cognition


Social Interaction:  7


Problem Solvin


Memory:  4





Speech Short Term Goals


Short Term Goals


Short Term Goals


1.  Pt will complete various memory tasks with at least 80% accuracy with  min 

assist.


2.  Pt will complete various problem solving tasks with at least 80% accuracy 

and min assist.


Comprehension:  6


Expression:  7


Social Interaction:  7


Problem Solvin


Memory:  5





Speech Long Term Goals


Long Term Goals


Pt will demonstrate functional memory and problem solving ability for maximum 

independence in the home.


Comprehension:  6


Expression:  7


Social Interaction:  7


Problem Solvin


Memory:  6





Speech-Plan


Patient/Family Goals


Patient/Family Goals:  


to return home





Treatment Plan


Speech Therapy Treatment Plan:  Continue Plan of Care


pt very cooperative during treatment.


Frequency:  5 times per week


Estimated Hrs Per Day:  .5 hour per day


Rehab Potential:  Fair


Pt/Family Agrees to Plan:  Yes





Safety Risks/Education


Teaching Methods:  Discussion


Response to Teaching:  Verbalize Understanding





Time


Speech Therapy Time In:  10:00


Speech Therapy Time Out:  10:30


Total Billed Time:  30


Billed Treatment Time


1ASHLYN RANDY ST Aug 30, 2018 11:11

## 2018-08-30 NOTE — PROGRESS NOTE (SOAP)
Subjective


Time Seen by Provider:  08:15


Subjective/Events-last exam


Reason this morning not confused.


Temperature 100.


To reevaluate chest x-ray.





Objective


Exam





Vital Signs








  Date Time  Temp Pulse Resp B/P (MAP) Pulse Ox O2 Delivery O2 Flow Rate FiO2


 


18 07:49     97 Nasal Cannula 2.00 


 


18 05:11 100.0 73 18 112/64 (80) 97 Nasal Cannula 2.00 


 


18 21:28      Nasal Cannula 2.00 


 


18 19:28      Nasal Cannula 2.00 


 


18 18:14 97.1 91 18 115/66 (82) 92 Nasal Cannula 2.00 


 


18 09:00     93 Nasal Cannula 2.00 














I & O 


 


 18





 07:00


 


Intake Total 840 ml


 


Balance 840 ml





Capillary Refill : Less Than 3 Seconds


General Appearance:  No Apparent Distress, WD/WN





Results


Lab


Laboratory Tests


18 08:57: Glucometer 187H


18 20:44: Glucometer 390H


18 05:24: 


Prothrombin Time 29.9H, INR Comment 2.8H





Assessment/Plan


Assessment/Plan


Assess & Plan/Chief Complaint


Left tibial plateau fracture.


Atrial fibrillation history.


COPD.


Coronary artery disease.


Diabetes area


Patient more alert today than yesterday.


.


18.


Left tibial plateau fracture.


Atrial fibrillation history.


COPD.


Coronary artery disease.


Diabetes.


Patient confused last night.


Patient not confused this morning.


Patient have chest x-ray today





Clinical Quality Measures


DVT/VTE Risk/Contraindication:


Risk Factor Score Per Nursin


RFS Level Per Nursing on Admit:  4+=Very High











TOR CONNELLY DO Aug 30, 2018 08:18

## 2018-08-30 NOTE — INDIVIDUALIZED PLAN OF CARE
Individualized Plan of Care


Rehab Nursing IPOC Order


Admission Date


Aug 28, 2018 at 11:00


Current Orders





Orders


Admission Order(Inpt,Obs,Sdc) (8/28/18 11:23)


Vital Signs: Routine (Order) 08,16,00 (8/28/18 11:23)


-Inpt Rehab Con (8/28/18 11:23)


Rehab Nursing Orders-Ipoc (8/28/18 11:23)


Physical Therapy Rehab Orders (8/28/18 11:23)


Occupational Therapy Rehab Ord (8/28/18 11:23)


Speech Therapy Rehab Orders (8/28/18 11:23)


Weight Bearing Status (8/28/18 11:23)


Precautions (Aru) (8/28/18 11:23)


Weekly Weight (Lbs) WEEK (8/28/18 11:23)


Code/Resuscitation (8/28/18 11:23)


Consult Physician (8/28/18 11:28)


Admission Arrival Bed Request (8/28/18 11:35)


Diltiazem Tablet (Cardizem Tablet) (8/28/18 13:00)


Enalapril Tablet (Vasotec Tablet) (8/29/18 09:00)


Furosemide  Tablet (Lasix  Tablet) (8/29/18 09:00)


Insulin Nph Human (Per Unit) (Novolin N (8/28/18 21:00)


Insulin (Regular) Human (Humulin R (Per (8/28/18 21:00)


Montelukast Tablet (Singulair Tablet) (8/28/18 21:00)


Pantoprazole Tablet (Protonix Tablet) (8/29/18 07:00)


Potassium Chloride (Tablet) (Klor Con Ta (8/28/18 11:30)


Simvastatin Tablet (Zocor Tablet) (8/28/18 21:00)


Warfarin Tablet (Coumadin Tablet) (8/28/18 18:00)


Pharmacy Communication (Pharmacy Communi (8/28/18 11:30)


Incentive Spirometry (Nursing) Q2H (8/28/18 11:40)


General/Regular (8/28/18 Dinner)


Acetaminophen  Tablet (Tylenol  Tablet) (8/28/18 11:45)


Albuterol/Ipra Inhalation Soln (Duoneb I (8/28/18 15:00)


Docusate Sodium Capsule (Colace Capsule) (8/29/18 09:00)


Fluticasone/Salmeterol Common (Advair 11 (8/28/18 20:00)


Hydrocodone/Apap 5/325 Tablet (Lortab 5 (8/28/18 12:00)


Knee Immobilizer (8/28/18 11:40)


Metoprolol Tartrate (Ir) Tab (Lopressor (8/28/18 21:00)


Svn Small Volume Nebulizer (8/28/18 11:40)


Mdi Treatment (8/28/18 11:40)


Protime With Inr (8/29/18 06:00)


Pharmacy Communication (Pharmacy Communi (8/28/18 11:45)


Gabapentin Capsule/Tablet (Neurontin Cap (8/29/18 07:00)


Gabapentin Capsule/Tablet (Neurontin Cap (8/28/18 21:00)


Sequential Compression Device 08,20 (8/28/18 13:52)


Dvt/Vte Risk - Notifiy Physici 08 (8/28/18 13:52)


Patient Visit (8/28/18 )


Pt Eval Moderate Complexity (8/28/18 )


Functional Activities, Ea 15 (8/28/18 )


Patient Visit (8/28/18 )


Wheelchair Mgmt/Propulsn 15min (8/28/18 )


Gait Training, Ea 15 Min (8/28/18 )


Comprehensive Metabolic Panel (8/29/18 06:58)


Cbc No Diff (8/29/18 06:59)


Ekg Tracing (8/29/18 06:59)


Chest 1 View, Ap/Pa Only (8/29/18 06:59)


Protime With Inr (8/30/18 05:00)


Protime With Inr (8/31/18 05:00)


Protime With Inr (9/1/18 05:00)


Protime With Inr (9/2/18 05:00)


Protime With Inr (9/3/18 05:00)


Protime With Inr (9/4/18 05:00)


Protime With Inr (9/5/18 05:00)


Protime With Inr (9/6/18 05:00)


Protime With Inr (9/7/18 05:00)


Protime With Inr (9/8/18 05:00)


Protime With Inr (9/9/18 05:00)


Protime With Inr (9/10/18 05:00)


Protime With Inr (9/11/18 05:00)


Protime With Inr (9/12/18 05:00)


Protime With Inr (9/13/18 05:00)


Protime With Inr (9/14/18 05:00)


Protime With Inr (9/15/18 05:00)


Protime With Inr (9/16/18 05:00)


Protime With Inr (9/17/18 05:00)


Protime With Inr (9/18/18 05:00)


Protime With Inr (9/19/18 05:00)


Protime With Inr (9/20/18 05:00)


Protime With Inr (9/21/18 05:00)


Protime With Inr (9/22/18 05:00)


Protime With Inr (9/23/18 05:00)


Protime With Inr (9/24/18 05:00)


Protime With Inr (9/25/18 05:00)


Protime With Inr (9/26/18 05:00)


Protime With Inr (9/27/18 05:00)


Protime With Inr (9/28/18 05:00)


Protime With Inr (9/29/18 05:00)


Protime With Inr (9/30/18 05:00)


Protime With Inr (10/1/18 05:00)


Protime With Inr (10/2/18 05:00)


Protime With Inr (10/3/18 05:00)


Protime With Inr (10/4/18 05:00)


Protime With Inr (10/5/18 05:00)


Protime With Inr (10/6/18 05:00)


Protime With Inr (10/7/18 05:00)


Protime With Inr (10/8/18 05:00)


Protime With Inr (10/9/18 05:00)


Protime With Inr (10/10/18 05:00)


Protime With Inr (10/11/18 05:00)


Protime With Inr (10/12/18 05:00)


Protime With Inr (10/13/18 05:00)


Protime With Inr (10/14/18 05:00)


Protime With Inr (10/15/18 05:00)


Protime With Inr (10/16/18 05:00)


Protime With Inr (10/17/18 05:00)


Protime With Inr (10/18/18 05:00)


Protime With Inr (10/19/18 05:00)


Protime With Inr (10/20/18 05:00)


Protime With Inr (10/21/18 05:00)


Protime With Inr (10/22/18 05:00)


Protime With Inr (10/23/18 05:00)


Protime With Inr (10/24/18 05:00)


Protime With Inr (10/25/18 05:00)


Protime With Inr (10/26/18 05:00)


Protime With Inr (10/27/18 05:00)


Protime With Inr (10/28/18 05:00)


Protime With Inr (10/29/18 05:00)


Protime With Inr (10/30/18 05:00)


Protime With Inr (10/31/18 05:00)


Protime With Inr (11/1/18 05:00)


Protime With Inr (11/2/18 05:00)


Protime With Inr (11/3/18 05:00)


Protime With Inr (11/4/18 05:00)


Protime With Inr (11/5/18 05:00)


Protime With Inr (11/6/18 05:00)


Protime With Inr (11/7/18 05:00)


Protime With Inr (11/8/18 05:00)


Protime With Inr (11/9/18 05:00)


Protime With Inr (11/10/18 05:00)


Protime With Inr (11/11/18 05:00)


Protime With Inr (11/12/18 05:00)


Protime With Inr (11/13/18 05:00)


Protime With Inr (11/14/18 05:00)


Protime With Inr (11/15/18 05:00)


Protime With Inr (11/16/18 05:00)


Protime With Inr (11/17/18 05:00)


Protime With Inr (11/18/18 05:00)


Protime With Inr (11/19/18 05:00)


Protime With Inr (11/20/18 05:00)


Protime With Inr (11/21/18 05:00)


Protime With Inr (11/22/18 05:00)


Protime With Inr (11/23/18 05:00)


Protime With Inr (11/24/18 05:00)


Protime With Inr (11/25/18 05:00)


Protime With Inr (11/26/18 05:00)


Protime With Inr (11/27/18 05:00)


Protime With Inr (11/28/18 05:00)


Protime With Inr (11/29/18 05:00)


Patient Visit (8/28/18 )


Speech Sound Lang Comp (8/28/18 )


Patient Visit (8/29/18 )


Treat. Speech/Lang/Voice (8/29/18 )


Patient Visit (8/29/18 )


Exercise Therap, Ea 15 Min (8/29/18 )


Wheelchair Mgmt/Propulsn 15min (8/29/18 )


Functional Activities, Ea 15 (8/29/18 )


Transfer - Bed/Room/Location (8/29/18 20:43)


Insulin (Regular) Human (Humulin R (Per (8/29/18 21:15)


Basic Metabolic Panel (8/30/18 08:09)


Cbc No Diff (8/30/18 08:09)


Ua Culture If Indicated (8/30/18 15:47)


Chest 1 View, Ap/Pa Only (8/30/18 08:11)


Insulin Aspart (Novolog) (Novolog (Charg (8/30/18 09:45)


Insulin Aspart (Novolog) (Novolog (Charg (8/30/18 11:00)


Patient Visit (8/30/18 )


Functional Activities, Ea 15 (8/30/18 )


Exercise Therap, Ea 15 Min (8/30/18 )


Wheelchair Mgmt/Propulsn 15min (8/30/18 )


Cbc No Diff (8/31/18 06:00)


Gentamicin (Adult) Injection (Garamycin (8/30/18 15:00)


Basic Metabolic Panel (8/31/18 06:00)


Basic Metabolic Panel (9/2/18 06:00)


Basic Metabolic Panel (9/4/18 06:00)


Patient Visit (8/30/18 )


Wheelchair Mgmt/Propulsn 15min (8/30/18 )


Exercise Therap, Ea 15 Min (8/30/18 )


Patient Visit (8/30/18 )


Treat. Speech/Lang/Voice (8/30/18 )


Gentamicin,Random (8/30/18 23:00)


Trough Order (Trough Order-Pharmacy Orde (8/30/18 23:00)





Rehab Nursing Orders:  Ongoing Assess. of Cognitive Status, Ongoing Assess. of 

Function Status, Disease Management & Educaiton, DVT Prophylaxis, Fall 

Prevention, Fluid/Electrolyte/Nutrition Mgmt, Infection Prevention, Medication 

Management & Education, Management of Risks & Complications, Management of Skin 

Intergrity, Nutrition Management, Pain Management, Patient/Family Support





PT IPOC


Problem List:  Activity Tolerance, Functional Strength, Safety, Balance, Gait, 

Transfer, Bed Mobility, ROM


Treatment Plan:  Continue Plan of Care


Bed Mobility, Concurrent Therapy, Education, Functional Activity Hema, 

Functional Strength, Group Therapy, Gait, Safety, Therapeutic Exercise, 

Transfers


Treatment Duration:  Sep 18, 2018


Frequency:  At least 5 of 7 days/Wk (IRF)


Estimated Hrs Per Day:  1.5 hours per day





OT IPOC


Problems:  Decreased Activ Tolerance, Decreased Safety Aware, Decreased UE 

Strength, Dependent Transfers, Impaired Bed Mobility, Impaired Cognition, 

Impaired Funct Balance, Impaired I ADL's, Impaired Self-Care Skills


OT Treatment, Training and Edu:  Yes


Plan of Care:  ADL Retraining, Functional Mobility, Group Exercise/Act as Ind, 

UE Funct Exercise/Act


Treatment Duration:  Sep 11, 2018


Frequency:  At least 5 of 7 days/Wk (IRF)


Estimated Hrs Per Day:  1.5 hours per day





ST IPOC


Speech Therapy Treatment Plan:  Continue Plan of Care


Treatment Duration:  Sep 11, 2018


Frequency:  5 times per week


Estimated Hrs Per Day:  .5 hour per day





/Case Mgmt


/Case Managemen:  Discharge Planning, Patient/Family Counseling





Dietitian/Nutritionist


Dietitian/Nutritionist to monitor nutritional status and make changes and/or 

recommendations as needed and work with speech pathology on dietary upgrades as 

the occur.





Physician IPOC


Medical Issues being managed closely and that require the 24 hour availability 

of a physician:


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


anemia


HTN


Hyponatremia


AFIB with chronic anticoagulation


S/P partial lung lobectomy


DM


PVD


IGC code 08.9


Etiologic DX Nondisplaced bicondylar Fracture left tibia


Medical Issues:  Bowel/Bladder Function, DVT Prophylaxis, Falls Precautions, 

Fluid/Electrolyte/Nutrition Balance, Infection Protection, Pain Management, 

Weight Bearing Precautions, Other (List) (as per above)


Brief Synthesis of Preadmission Screen, Post-Admission Evaluation, and Therapy 


Evaluations: 79 yo male who had been Independent who fell thru his deck with a 

resulting Tibial plateau fracture managed with Knee immobilizer and TTWB LLE 

ortho


Has COPD and is 02 dependent has multiple comorbidities as well as some 

increased confusion after his injury Patient Moved closer to Nurses staion when 

he was found wandering yesterday on unit Telemonitor ordered as well,Dr Holley is PCP and following


Medical Prognosis:  Good


Anticipated Length of Stay:  09-11-18


Modified Independent for adls and mobility skills at the w/c level due to TTWB 

status LLE


Anticipated d/c Destination:  Home with family and [facility]











VINCENT ROGERS MD Aug 30, 2018 16:53

## 2018-08-30 NOTE — PM & R (SOAP) PROGRESS NOTE
Subjective


This was a face to face visit with the patient.


Date Seen by Provider:  Aug 30, 2018


Time Seen by Provider:  07:05


Subjective/Events-last exam


Patient was seen in his room this AM Moved closer to [facility] last 

evening as patient was wandereing into other rooms Telemonitor orderd for 

patients safety.Patient Mod assist for transfers Confused at times.Patient has 

been 02 dependent at home CXR noted


Review of Systems


Pulmonary:  Dyspnea


Neurological:  Confusion





Objective


Physician Exam


Last Set of Vital Signs





Vital Signs








  Date Time  Temp Pulse Resp B/P (MAP) Pulse Ox O2 Delivery O2 Flow Rate FiO2


 


8/30/18 05:11 100.0 73 18 112/64 (80) 97 Nasal Cannula 2.00 





Capillary Refill : Less Than 3 Seconds


I&O











Intake and Output 


 


 8/30/18





 00:00


 


Intake Total 1100 ml


 


Balance 1100 ml


 


 


 


Intake Oral 1100 ml


 


# Voids 9


 


# Bowel Movements 2








General:  Alert, Oriented X3, Cooperative, No Acute Distress


HEENT:  Atraumatic, PERRLA, EOMI, Mucous Memb Moist/Pink, Other (02 by n/c in 

place)


Neck:  Supple, No JVD


Lungs:  Other (Cough with congestion rhonchi)


Heart:  Regular Rate


Abdomen:  Normal Bowel Sounds, Soft, No Tenderness


Extremities:  Other (Knee immobilizer left)


Neuro:  Other (Functional strength and ROM ( knee not tested))


Psych/Mental Status:  Mental Status NL, Mood NL





Results


Lab Data


Laboratory Tests


8/28/18 19:59: Glucometer 297H


8/29/18 04:13: 


White Blood Count 8.6, Red Blood Count 2.95L, Hemoglobin 9.1L, Hematocrit 28L, 

Mean Corpuscular Volume 93, Mean Corpuscular Hemoglobin 31, Mean Corpuscular 

Hemoglobin Concent 33, Red Cell Distribution Width 15.3H, Platelet Count 188, 

Mean Platelet Volume 9.3, Prothrombin Time 28.3H, INR Comment 2.6H, Sodium 

Level 133L, Potassium Level 4.3, Chloride Level 101, Carbon Dioxide Level 23, 

Anion Gap 9, Blood Urea Nitrogen 17, Creatinine 0.84, Estimat Glomerular 

Filtration Rate > 60, BUN/Creatinine Ratio 20, Glucose Level 139H, Calcium 

Level 8.3L, Corrected Calcium 9.4, Total Bilirubin 0.5, Aspartate Amino Transf (

AST/SGOT) 29, Alanine Aminotransferase (ALT/SGPT) 24, Alkaline Phosphatase 95, 

Total Protein 5.5L, Albumin 2.6L


8/29/18 08:57: Glucometer 187H


8/29/18 20:44: Glucometer 390H


8/30/18 05:24: 


Prothrombin Time 29.9H, INR Comment 2.8H





Assessment/Plan


Assessment and Plan


Left tibial plateau fracture manaaaaaaaged with knee immobilizer and TTWB LLE


Hyponatremia


Confusion multifactorila


Anemia


HTN


A FIB with chronic anticoagulation


S/P Lung lobectormy remote partial


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


PVD


DM


Plan Continue PT/OT/ST


F/U with DR Holley 


Team Conference hedl yesterday-See report for full functional update and POC 

and ELOS


Co-Morbidities that are continuing to impact the rehab process: (include details

)











VINCENT ROGERS MD Aug 30, 2018 07:25

## 2018-08-31 VITALS — DIASTOLIC BLOOD PRESSURE: 60 MMHG | SYSTOLIC BLOOD PRESSURE: 102 MMHG

## 2018-08-31 VITALS — DIASTOLIC BLOOD PRESSURE: 51 MMHG | SYSTOLIC BLOOD PRESSURE: 91 MMHG

## 2018-08-31 LAB
BUN/CREAT SERPL: 23
CALCIUM SERPL-MCNC: 8.4 MG/DL (ref 8.5–10.1)
CHLORIDE SERPL-SCNC: 102 MMOL/L (ref 98–107)
CO2 SERPL-SCNC: 25 MMOL/L (ref 21–32)
CREAT SERPL-MCNC: 0.83 MG/DL (ref 0.6–1.3)
ERYTHROCYTE [DISTWIDTH] IN BLOOD BY AUTOMATED COUNT: 15.4 % (ref 10–14.5)
GFR SERPLBLD BASED ON 1.73 SQ M-ARVRAT: > 60 ML/MIN
GLUCOSE SERPL-MCNC: 130 MG/DL (ref 70–105)
HCT VFR BLD CALC: 27 % (ref 40–54)
HGB BLD-MCNC: 8.8 G/DL (ref 13.3–17.7)
INR PPP: 3.1 (ref 0.8–1.4)
MCH RBC QN AUTO: 30 PG (ref 25–34)
MCHC RBC AUTO-ENTMCNC: 32 G/DL (ref 32–36)
MCV RBC AUTO: 93 FL (ref 80–99)
PLATELET # BLD: 186 10^3/UL (ref 130–400)
PMV BLD AUTO: 8.8 FL (ref 7.4–10.4)
POTASSIUM SERPL-SCNC: 3.9 MMOL/L (ref 3.6–5)
PROTHROMBIN TIME: 32.5 SEC (ref 12.2–14.7)
RBC # BLD AUTO: 2.94 10^6/UL (ref 4.35–5.85)
SODIUM SERPL-SCNC: 135 MMOL/L (ref 135–145)
WBC # BLD AUTO: 6.9 10^3/UL (ref 4.3–11)

## 2018-08-31 RX ADMIN — INSULIN HUMAN SCH UNIT: 100 INJECTION, SUSPENSION SUBCUTANEOUS at 08:25

## 2018-08-31 RX ADMIN — ENALAPRIL MALEATE SCH MG: 2.5 TABLET ORAL at 08:23

## 2018-08-31 RX ADMIN — DOCUSATE SODIUM SCH MG: 100 CAPSULE ORAL at 08:23

## 2018-08-31 RX ADMIN — INSULIN ASPART SCH UNIT: 100 INJECTION, SOLUTION INTRAVENOUS; SUBCUTANEOUS at 20:32

## 2018-08-31 RX ADMIN — IPRATROPIUM BROMIDE AND ALBUTEROL SULFATE SCH ML: .5; 3 SOLUTION RESPIRATORY (INHALATION) at 15:57

## 2018-08-31 RX ADMIN — DILTIAZEM HYDROCHLORIDE SCH MG: 30 TABLET, FILM COATED ORAL at 11:35

## 2018-08-31 RX ADMIN — FLUTICASONE PROPIONATE AND SALMETEROL XINAFOATE SCH PUFF: 115; 21 AEROSOL, METERED RESPIRATORY (INHALATION) at 07:42

## 2018-08-31 RX ADMIN — FUROSEMIDE SCH MG: 40 TABLET ORAL at 08:23

## 2018-08-31 RX ADMIN — GABAPENTIN SCH MG: 100 CAPSULE ORAL at 11:35

## 2018-08-31 RX ADMIN — METOPROLOL TARTRATE SCH MG: 25 TABLET, FILM COATED ORAL at 20:11

## 2018-08-31 RX ADMIN — WARFARIN SODIUM SCH MG: 5 TABLET ORAL at 17:02

## 2018-08-31 RX ADMIN — MONTELUKAST SCH MG: 10 TABLET, FILM COATED ORAL at 20:11

## 2018-08-31 RX ADMIN — FLUTICASONE PROPIONATE AND SALMETEROL XINAFOATE SCH PUFF: 115; 21 AEROSOL, METERED RESPIRATORY (INHALATION) at 20:04

## 2018-08-31 RX ADMIN — DILTIAZEM HYDROCHLORIDE SCH MG: 30 TABLET, FILM COATED ORAL at 20:11

## 2018-08-31 RX ADMIN — INSULIN ASPART SCH UNIT: 100 INJECTION, SOLUTION INTRAVENOUS; SUBCUTANEOUS at 05:51

## 2018-08-31 RX ADMIN — IPRATROPIUM BROMIDE AND ALBUTEROL SULFATE SCH ML: .5; 3 SOLUTION RESPIRATORY (INHALATION) at 20:04

## 2018-08-31 RX ADMIN — IPRATROPIUM BROMIDE AND ALBUTEROL SULFATE SCH ML: .5; 3 SOLUTION RESPIRATORY (INHALATION) at 07:42

## 2018-08-31 RX ADMIN — PANTOPRAZOLE SODIUM SCH MG: 40 TABLET, DELAYED RELEASE ORAL at 05:57

## 2018-08-31 RX ADMIN — DILTIAZEM HYDROCHLORIDE SCH MG: 30 TABLET, FILM COATED ORAL at 08:23

## 2018-08-31 RX ADMIN — INSULIN HUMAN SCH UNIT: 100 INJECTION, SUSPENSION SUBCUTANEOUS at 20:31

## 2018-08-31 RX ADMIN — IPRATROPIUM BROMIDE AND ALBUTEROL SULFATE SCH ML: .5; 3 SOLUTION RESPIRATORY (INHALATION) at 11:32

## 2018-08-31 RX ADMIN — GABAPENTIN SCH MG: 100 CAPSULE ORAL at 20:11

## 2018-08-31 RX ADMIN — INSULIN ASPART SCH UNIT: 100 INJECTION, SOLUTION INTRAVENOUS; SUBCUTANEOUS at 11:35

## 2018-08-31 RX ADMIN — GABAPENTIN SCH MG: 100 CAPSULE ORAL at 05:57

## 2018-08-31 RX ADMIN — INSULIN ASPART SCH UNIT: 100 INJECTION, SOLUTION INTRAVENOUS; SUBCUTANEOUS at 17:02

## 2018-08-31 RX ADMIN — SIMVASTATIN SCH MG: 20 TABLET, FILM COATED ORAL at 20:12

## 2018-08-31 RX ADMIN — METOPROLOL TARTRATE SCH MG: 25 TABLET, FILM COATED ORAL at 08:23

## 2018-08-31 RX ADMIN — ACETAMINOPHEN PRN MG: 500 TABLET ORAL at 20:34

## 2018-08-31 NOTE — PROGRESS NOTE (SOAP)
Subjective


Time Seen by Provider:  08:25


Subjective/Events-last exam


Patient was confused today.


Patient feels worn out.


Chest x-ray pneumonia.





Objective


Exam





Vital Signs








  Date Time  Temp Pulse Resp B/P (MAP) Pulse Ox O2 Delivery O2 Flow Rate FiO2


 


18 07:50      Nasal Cannula 2.00 


 


18 07:43     92 Nasal Cannula 2.00 


 


18 05:01 97.3 96 20 102/60 (74) 91 Nasal Cannula 2.00 


 


18 20:50     93 Nasal Cannula 2.00 


 


18 20:00      Nasal Cannula 2.00 


 


18 17:31 97.9 90 18 100/62 (75) 97 Nasal Cannula 2.00 


 


18 10:49     95 Nasal Cannula 2.00 


 


18 09:00     93 Nasal Cannula 2.00 














I & O 


 


 18





 07:00


 


Intake Total 1070 ml


 


Output Total 600 ml


 


Balance 470 ml





Capillary Refill : Less Than 3 Seconds


General Appearance:  No Apparent Distress, WD/WN


HEENT:  Normal ENT Inspection


Neck:  Full Range of Motion


Respiratory:  No Accessory Muscle Use, No Respiratory Distress, Decreased 

Breath Sounds


Cardiovascular:  No Murmur


Gastrointestinal:  non tender, soft





Results


Lab


Laboratory Tests


18 09:30: Glucometer 336H


18 11:23: Glucometer 262H


18 15:56: Glucometer 136H


18 18:18: 


Urine Color YELLOW, Urine Clarity CLEAR, Urine pH 5, Urine Specific Gravity 

1.020, Urine Protein 2+H, Urine Glucose (UA) NEGATIVE, Urine Ketones NEGATIVE, 

Urine Nitrite NEGATIVE, Urine Bilirubin NEGATIVE, Urine Urobilinogen NORMAL, 

Urine Leukocyte Esterase NEGATIVE, Urine RBC (Auto) 4+H, Urine RBC 0-2, Urine 

WBC RARE, Urine Crystals NONE, Urine Bacteria NEGATIVE, Urine Casts PRESENT, 

Urine Hyaline Casts 0-2H, Urine Mucus NEGATIVE, Urine Culture Indicated NO


18 20:25: Glucometer 277H


18 23:04: Random Gentamicin Level 7.7


18 05:50: 


White Blood Count 6.9, Red Blood Count 2.94L, Hemoglobin 8.8L, Hematocrit 27L, 

Mean Corpuscular Volume 93, Mean Corpuscular Hemoglobin 30, Mean Corpuscular 

Hemoglobin Concent 32, Red Cell Distribution Width 15.4H, Platelet Count 186, 

Mean Platelet Volume 8.8, Prothrombin Time 32.5H, INR Comment 3.1H, Sodium 

Level 135, Potassium Level 3.9, Chloride Level 102, Carbon Dioxide Level 25, 

Anion Gap 8, Blood Urea Nitrogen 19H, Creatinine 0.83, Estimat Glomerular 

Filtration Rate > 60, BUN/Creatinine Ratio 23, Glucose Level 130H, Calcium 

Level 8.4L


18 05:51: Glucometer 128H





Assessment/Plan


Assessment/Plan


Assess & Plan/Chief Complaint


Left tibial plateau fracture.


Atrial fibrillation history.


COPD.


Coronary artery disease.


Diabetes area


Patient more alert today than yesterday.


.


18.


Left tibial plateau fracture.


Atrial fibrillation history.


COPD.


Coronary artery disease.


Diabetes.


Patient confused last night.


Patient not confused this morning.


Patient have chest x-ray today.


.


/left tibial plateau fracture.


Atrial fibrillation history.


COPD.


Pneumonia.


Coronary artery disease.


Diabetes.





Clinical Quality Measures


DVT/VTE Risk/Contraindication:


Risk Factor Score Per Nursin


RFS Level Per Nursing on Admit:  4+=Very High











TOR CONNELLY DO Aug 31, 2018 08:31

## 2018-08-31 NOTE — SPEECH THERAPY DAILY NOTE
Speech Daily Progress Note


Subjective


Date Seen by Provider:  Aug 31, 2018


Time Seen by Provider:  08:30


Pt alert and in bed.





Pain





   Numeric Pain Scale:  0-No Pain





Objective


Memory activities - Picture recall after 20 minutes.  Pt was 100% accurate with 

no assist.


Which picture is missing from a choice of 3.  Pt was 100% accurate with cues x 

1. Instructed pt to use rehearsal method of repeating the names of the objects 2

-3 times.





Communication


Comprehension:  6


Expression:  7





Social Cognition


Social Interaction:  7


Problem Solvin


Memory:  4





Speech Short Term Goals


Short Term Goals


Short Term Goals


1.  Pt will complete various memory tasks with at least 80% accuracy with  min 

assist.


2.  Pt will complete various problem solving tasks with at least 80% accuracy 

and min assist.


Comprehension:  6


Expression:  7


Social Interaction:  7


Problem Solvin


Memory:  5





Speech Long Term Goals


Long Term Goals


Pt will demonstrate functional memory and problem solving ability for maximum 

independence in the home.


Comprehension:  6


Expression:  7


Social Interaction:  7


Problem Solvin


Memory:  6





Speech-Plan


Patient/Family Goals


Patient/Family Goals:  


to return home.





Treatment Plan


Speech Therapy Treatment Plan:  Continue Plan of Care


Pt participates well in tx.


Treatment Duration:  Sep 11, 2018


Frequency:  5 times per week


Estimated Hrs Per Day:  .5 hour per day


Rehab Potential:  Fair


Pt/Family Agrees to Plan:  Yes





Safety Risks/Education


Teaching Recipient:  Patient


Teaching Methods:  Discussion


Response to Teaching:  Verbalize Understanding





Time


Speech Therapy Time In:  08:30


Speech Therapy Time Out:  09:00


Total Billed Time:  30


Billed Treatment Time


1, SLCOLETTE Stoner Aug 31, 2018 09:09

## 2018-08-31 NOTE — THERAPY GROUP DAILY NOTE
Therapy Daily Group Note


Patient Education Topic


Other List Below (wellness, nutrition and home safety)





Exercises


LE Seated Exercise, UE Exercise





Other/Notes


Pt. participated in group PT OT session . Pt. came and went via w/c with assist 

for O2 and to propel w/c.  Pts. very much enjoyed the introduction and 

socialization portion of group and engaged conversation well; all pertinent to 

the education topics.  Pts. participated in seated U&L extremity exercises. 

Game using hand paddles for question and answer options for wellness was played 

which started much conversation and sharing. Pt. required assist to room after  

as well as to bed . Call bell at hand


Start Time:  13:00


Stop Time:  14:15


Total Billed Treatment Time:  75


Total Billed Treatment


1,GRP











YA AARON PTA Aug 31, 2018 14:47

## 2018-08-31 NOTE — PM & R (SOAP) PROGRESS NOTE
Subjective


This was a face to face visit with the patient.


Date Seen by Provider:  Aug 31, 2018


Time Seen by Provider:  07:35


Subjective/Events-last exam


Patient was seen in his room this AM Patient Min assist for transfers,Complains 

of pain all over but nonspecific Discussed case with RN will f/u.





Objective


Physician Exam


Last Set of Vital Signs





Vital Signs








  Date Time  Temp Pulse Resp B/P (MAP) Pulse Ox O2 Delivery O2 Flow Rate FiO2


 


8/31/18 07:50      Nasal Cannula 2.00 


 


8/31/18 07:43     92   


 


8/31/18 05:01 97.3 96 20 102/60 (74)    





Capillary Refill : Less Than 3 Seconds


I&O











Intake and Output 


 


 8/31/18





 00:00


 


Intake Total 1060 ml


 


Output Total 600 ml


 


Balance 460 ml


 


 


 


Intake Oral 960 ml


 


IV Total 100 ml


 


Output Urine Total 600 ml


 


# Voids 5


 


# Bowel Movements 2








General:  Alert, Oriented X3, Cooperative, No Acute Distress


HEENT:  Atraumatic, PERRLA, EOMI, Mucous Memb Moist/Pink, Other (02 by n/c in 

place)


Neck:  Supple, No JVD


Lungs:  Other (Cough with congestion rhonchi)


Heart:  Regular Rate


Abdomen:  Normal Bowel Sounds, Soft, No Tenderness


Extremities:  Other (Knee immobilizer left)


Neuro:  Other (Functional strength and ROM ( knee not tested))


Psych/Mental Status:  Mental Status NL, Mood NL





Results


Lab Data


Laboratory Tests


8/28/18 19:59: Glucometer 297H


8/29/18 04:13: 


White Blood Count 8.6, Red Blood Count 2.95L, Hemoglobin 9.1L, Hematocrit 28L, 

Mean Corpuscular Volume 93, Mean Corpuscular Hemoglobin 31, Mean Corpuscular 

Hemoglobin Concent 33, Red Cell Distribution Width 15.3H, Platelet Count 188, 

Mean Platelet Volume 9.3, Prothrombin Time 28.3H, INR Comment 2.6H, Sodium 

Level 133L, Potassium Level 4.3, Chloride Level 101, Carbon Dioxide Level 23, 

Anion Gap 9, Blood Urea Nitrogen 17, Creatinine 0.84, Estimat Glomerular 

Filtration Rate > 60, BUN/Creatinine Ratio 20, Glucose Level 139H, Calcium 

Level 8.3L, Corrected Calcium 9.4, Total Bilirubin 0.5, Aspartate Amino Transf (

AST/SGOT) 29, Alanine Aminotransferase (ALT/SGPT) 24, Alkaline Phosphatase 95, 

Total Protein 5.5L, Albumin 2.6L


8/29/18 08:57: Glucometer 187H


8/29/18 20:44: Glucometer 390H


8/30/18 05:24: 


Prothrombin Time 29.9H, INR Comment 2.8H


8/30/18 05:40: 


White Blood Count 7.3, Red Blood Count 2.94L, Hemoglobin 9.2L, Hematocrit 27L, 

Mean Corpuscular Volume 93, Mean Corpuscular Hemoglobin 31, Mean Corpuscular 

Hemoglobin Concent 34, Red Cell Distribution Width 15.0H, Platelet Count 170, 

Mean Platelet Volume 9.4, Sodium Level 139, Potassium Level 4.1, Chloride Level 

104, Carbon Dioxide Level 24, Anion Gap 11, Blood Urea Nitrogen 17, Creatinine 

0.83, Estimat Glomerular Filtration Rate > 60, BUN/Creatinine Ratio 20, Glucose 

Level 108H, Calcium Level 8.3L


8/30/18 09:30: Glucometer 336H


8/30/18 11:23: Glucometer 262H


8/30/18 15:56: Glucometer 136H


8/30/18 18:18: 


Urine Color YELLOW, Urine Clarity CLEAR, Urine pH 5, Urine Specific Gravity 

1.020, Urine Protein 2+H, Urine Glucose (UA) NEGATIVE, Urine Ketones NEGATIVE, 

Urine Nitrite NEGATIVE, Urine Bilirubin NEGATIVE, Urine Urobilinogen NORMAL, 

Urine Leukocyte Esterase NEGATIVE, Urine RBC (Auto) 4+H, Urine RBC 0-2, Urine 

WBC RARE, Urine Crystals NONE, Urine Bacteria NEGATIVE, Urine Casts PRESENT, 

Urine Hyaline Casts 0-2H, Urine Mucus NEGATIVE, Urine Culture Indicated NO


8/30/18 20:25: Glucometer 277H


8/30/18 23:04: Random Gentamicin Level 7.7


8/31/18 05:50: 


White Blood Count 6.9, Red Blood Count 2.94L, Hemoglobin 8.8L, Hematocrit 27L, 

Mean Corpuscular Volume 93, Mean Corpuscular Hemoglobin 30, Mean Corpuscular 

Hemoglobin Concent 32, Red Cell Distribution Width 15.4H, Platelet Count 186, 

Mean Platelet Volume 8.8, Prothrombin Time 32.5H, INR Comment 3.1H, Sodium 

Level 135, Potassium Level 3.9, Chloride Level 102, Carbon Dioxide Level 25, 

Anion Gap 8, Blood Urea Nitrogen 19H, Creatinine 0.83, Estimat Glomerular 

Filtration Rate > 60, BUN/Creatinine Ratio 23, Glucose Level 130H, Calcium 

Level 8.4L


8/31/18 05:51: Glucometer 128H





Assessment/Plan


Assessment and Plan


Left tibial plateua fracture managed with Knee immobilizer and TTWB LLE


Hyponatremia


Confusion multifactorial


Anemia


HTN


A FIB with chronic anticoagulation


S/P Lung Lobectomy remote partial


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


PVD


DM


Plan Continue PT/OT/ST


Next Team Conference 9-5-18


F/U with DR Holley


Goal Return home with spouse at max level of functional [place]


Co-Morbidities that are continuing to impact the rehab process: (include details

)











VINCENT ROGERS MD Aug 31, 2018 08:17

## 2018-09-01 VITALS — DIASTOLIC BLOOD PRESSURE: 57 MMHG | SYSTOLIC BLOOD PRESSURE: 102 MMHG

## 2018-09-01 VITALS — DIASTOLIC BLOOD PRESSURE: 50 MMHG | SYSTOLIC BLOOD PRESSURE: 81 MMHG

## 2018-09-01 VITALS — SYSTOLIC BLOOD PRESSURE: 93 MMHG | DIASTOLIC BLOOD PRESSURE: 54 MMHG

## 2018-09-01 VITALS — DIASTOLIC BLOOD PRESSURE: 61 MMHG | SYSTOLIC BLOOD PRESSURE: 104 MMHG

## 2018-09-01 LAB
BUN/CREAT SERPL: 22
CALCIUM SERPL-MCNC: 8.3 MG/DL (ref 8.5–10.1)
CHLORIDE SERPL-SCNC: 103 MMOL/L (ref 98–107)
CO2 SERPL-SCNC: 23 MMOL/L (ref 21–32)
CREAT SERPL-MCNC: 0.83 MG/DL (ref 0.6–1.3)
ERYTHROCYTE [DISTWIDTH] IN BLOOD BY AUTOMATED COUNT: 14.9 % (ref 10–14.5)
GFR SERPLBLD BASED ON 1.73 SQ M-ARVRAT: > 60 ML/MIN
GLUCOSE SERPL-MCNC: 135 MG/DL (ref 70–105)
HCT VFR BLD CALC: 24 % (ref 40–54)
HGB BLD-MCNC: 8.3 G/DL (ref 13.3–17.7)
INR PPP: 3.4 (ref 0.8–1.4)
MCH RBC QN AUTO: 32 PG (ref 25–34)
MCHC RBC AUTO-ENTMCNC: 34 G/DL (ref 32–36)
MCV RBC AUTO: 93 FL (ref 80–99)
PLATELET # BLD: 163 10^3/UL (ref 130–400)
PMV BLD AUTO: 9.6 FL (ref 7.4–10.4)
POTASSIUM SERPL-SCNC: 4.2 MMOL/L (ref 3.6–5)
PROTHROMBIN TIME: 34.7 SEC (ref 12.2–14.7)
RBC # BLD AUTO: 2.62 10^6/UL (ref 4.35–5.85)
SODIUM SERPL-SCNC: 138 MMOL/L (ref 135–145)
WBC # BLD AUTO: 6.6 10^3/UL (ref 4.3–11)

## 2018-09-01 RX ADMIN — MONTELUKAST SCH MG: 10 TABLET, FILM COATED ORAL at 20:04

## 2018-09-01 RX ADMIN — GABAPENTIN SCH MG: 100 CAPSULE ORAL at 11:51

## 2018-09-01 RX ADMIN — DOCUSATE SODIUM SCH MG: 100 CAPSULE ORAL at 08:04

## 2018-09-01 RX ADMIN — FLUTICASONE PROPIONATE AND SALMETEROL XINAFOATE SCH PUFF: 115; 21 AEROSOL, METERED RESPIRATORY (INHALATION) at 06:49

## 2018-09-01 RX ADMIN — DILTIAZEM HYDROCHLORIDE SCH MG: 30 TABLET, FILM COATED ORAL at 14:43

## 2018-09-01 RX ADMIN — ENALAPRIL MALEATE SCH MG: 2.5 TABLET ORAL at 08:04

## 2018-09-01 RX ADMIN — INSULIN HUMAN SCH UNIT: 100 INJECTION, SUSPENSION SUBCUTANEOUS at 08:06

## 2018-09-01 RX ADMIN — GABAPENTIN SCH MG: 100 CAPSULE ORAL at 06:07

## 2018-09-01 RX ADMIN — GENTAMICIN SULFATE SCH MLS/HR: 40 INJECTION, SOLUTION INTRAMUSCULAR; INTRAVENOUS at 05:03

## 2018-09-01 RX ADMIN — WARFARIN SODIUM SCH MG: 5 TABLET ORAL at 07:31

## 2018-09-01 RX ADMIN — POTASSIUM CHLORIDE SCH MEQ: 750 TABLET, FILM COATED, EXTENDED RELEASE ORAL at 11:51

## 2018-09-01 RX ADMIN — ACETAMINOPHEN PRN MG: 500 TABLET ORAL at 08:15

## 2018-09-01 RX ADMIN — INSULIN ASPART SCH UNIT: 100 INJECTION, SOLUTION INTRAVENOUS; SUBCUTANEOUS at 16:46

## 2018-09-01 RX ADMIN — METOPROLOL TARTRATE SCH MG: 25 TABLET, FILM COATED ORAL at 08:05

## 2018-09-01 RX ADMIN — PANTOPRAZOLE SODIUM SCH MG: 40 TABLET, DELAYED RELEASE ORAL at 06:07

## 2018-09-01 RX ADMIN — GABAPENTIN SCH MG: 100 CAPSULE ORAL at 20:03

## 2018-09-01 RX ADMIN — INSULIN ASPART SCH UNIT: 100 INJECTION, SOLUTION INTRAVENOUS; SUBCUTANEOUS at 06:01

## 2018-09-01 RX ADMIN — DILTIAZEM HYDROCHLORIDE SCH MG: 30 TABLET, FILM COATED ORAL at 08:04

## 2018-09-01 RX ADMIN — IPRATROPIUM BROMIDE AND ALBUTEROL SULFATE SCH ML: .5; 3 SOLUTION RESPIRATORY (INHALATION) at 14:57

## 2018-09-01 RX ADMIN — INSULIN ASPART SCH UNIT: 100 INJECTION, SOLUTION INTRAVENOUS; SUBCUTANEOUS at 11:49

## 2018-09-01 RX ADMIN — INSULIN ASPART SCH UNIT: 100 INJECTION, SOLUTION INTRAVENOUS; SUBCUTANEOUS at 20:08

## 2018-09-01 RX ADMIN — IPRATROPIUM BROMIDE AND ALBUTEROL SULFATE SCH ML: .5; 3 SOLUTION RESPIRATORY (INHALATION) at 19:09

## 2018-09-01 RX ADMIN — IPRATROPIUM BROMIDE AND ALBUTEROL SULFATE SCH ML: .5; 3 SOLUTION RESPIRATORY (INHALATION) at 10:13

## 2018-09-01 RX ADMIN — FLUTICASONE PROPIONATE AND SALMETEROL XINAFOATE SCH PUFF: 115; 21 AEROSOL, METERED RESPIRATORY (INHALATION) at 19:09

## 2018-09-01 RX ADMIN — METOPROLOL TARTRATE SCH MG: 25 TABLET, FILM COATED ORAL at 20:05

## 2018-09-01 RX ADMIN — SIMVASTATIN SCH MG: 20 TABLET, FILM COATED ORAL at 20:04

## 2018-09-01 RX ADMIN — IPRATROPIUM BROMIDE AND ALBUTEROL SULFATE SCH ML: .5; 3 SOLUTION RESPIRATORY (INHALATION) at 06:49

## 2018-09-01 RX ADMIN — FUROSEMIDE SCH MG: 40 TABLET ORAL at 08:05

## 2018-09-01 RX ADMIN — INSULIN HUMAN SCH UNIT: 100 INJECTION, SUSPENSION SUBCUTANEOUS at 20:05

## 2018-09-01 NOTE — PM & R (SOAP) PROGRESS NOTE
Subjective


This was a face to face visit with the patient.


Date Seen by Provider:  Sep 1, 2018


Time Seen by Provider:  07:00


Subjective/Events-last exam


Patient was seen in his room this AM Patient Min assist for transfers Receiving 

Resp treatments and supplemental 02 as well as antibiotic for pneumonia.


Review of Systems


Pulmonary:  Dyspnea





Objective


Physician Exam


Last Set of Vital Signs





Vital Signs








  Date Time  Temp Pulse Resp B/P (MAP) Pulse Ox O2 Delivery O2 Flow Rate FiO2


 


9/1/18 06:51     92 Nasal Cannula 2.00 


 


9/1/18 05:36 98.4 96 18 102/57 (72)    





Capillary Refill : Less Than 3 Seconds


I&O











Intake and Output 


 


 9/1/18





 00:00


 


Intake Total 850 ml


 


Output Total 400 ml


 


Balance 450 ml


 


 


 


Intake Oral 850 ml


 


Output Urine Total 400 ml


 


# Voids 3








General:  Alert, Oriented X3, Cooperative, No Acute Distress


HEENT:  Atraumatic, PERRLA, EOMI, Mucous Memb Moist/Pink, Other (02 by n/c in 

place)


Neck:  Supple, No JVD


Lungs:  Other (Cough with congestion rhonchi)


Heart:  Regular Rate


Abdomen:  Normal Bowel Sounds, Soft, No Tenderness


Extremities:  Other (Knee immobilizer left)


Neuro:  Other (Functional strength and ROM ( knee not tested))


Psych/Mental Status:  Mental Status NL, Mood NL





Results


Lab Data


Laboratory Tests


8/29/18 08:57: Glucometer 187H


8/29/18 20:44: Glucometer 390H


8/30/18 05:24: 


Prothrombin Time 29.9H, INR Comment 2.8H


8/30/18 05:40: 


White Blood Count 7.3, Red Blood Count 2.94L, Hemoglobin 9.2L, Hematocrit 27L, 

Mean Corpuscular Volume 93, Mean Corpuscular Hemoglobin 31, Mean Corpuscular 

Hemoglobin Concent 34, Red Cell Distribution Width 15.0H, Platelet Count 170, 

Mean Platelet Volume 9.4, Sodium Level 139, Potassium Level 4.1, Chloride Level 

104, Carbon Dioxide Level 24, Anion Gap 11, Blood Urea Nitrogen 17, Creatinine 

0.83, Estimat Glomerular Filtration Rate > 60, BUN/Creatinine Ratio 20, Glucose 

Level 108H, Calcium Level 8.3L


8/30/18 09:30: Glucometer 336H


8/30/18 11:23: Glucometer 262H


8/30/18 15:56: Glucometer 136H


8/30/18 18:18: 


Urine Color YELLOW, Urine Clarity CLEAR, Urine pH 5, Urine Specific Gravity 

1.020, Urine Protein 2+H, Urine Glucose (UA) NEGATIVE, Urine Ketones NEGATIVE, 

Urine Nitrite NEGATIVE, Urine Bilirubin NEGATIVE, Urine Urobilinogen NORMAL, 

Urine Leukocyte Esterase NEGATIVE, Urine RBC (Auto) 4+H, Urine RBC 0-2, Urine 

WBC RARE, Urine Crystals NONE, Urine Bacteria NEGATIVE, Urine Casts PRESENT, 

Urine Hyaline Casts 0-2H, Urine Mucus NEGATIVE, Urine Culture Indicated NO


8/30/18 20:25: Glucometer 277H


8/30/18 23:04: Random Gentamicin Level 7.7


8/31/18 05:50: 


White Blood Count 6.9, Red Blood Count 2.94L, Hemoglobin 8.8L, Hematocrit 27L, 

Mean Corpuscular Volume 93, Mean Corpuscular Hemoglobin 30, Mean Corpuscular 

Hemoglobin Concent 32, Red Cell Distribution Width 15.4H, Platelet Count 186, 

Mean Platelet Volume 8.8, Prothrombin Time 32.5H, INR Comment 3.1H, Sodium 

Level 135, Potassium Level 3.9, Chloride Level 102, Carbon Dioxide Level 25, 

Anion Gap 8, Blood Urea Nitrogen 19H, Creatinine 0.83, Estimat Glomerular 

Filtration Rate > 60, BUN/Creatinine Ratio 23, Glucose Level 130H, Calcium 

Level 8.4L


8/31/18 05:51: Glucometer 128H


8/31/18 11:16: Glucometer 303H


8/31/18 15:45: Glucometer 217H


8/31/18 20:16: Glucometer 298H


9/1/18 05:56: Glucometer 148H


9/1/18 06:40: 


White Blood Count 6.6, Red Blood Count 2.62L, Hemoglobin 8.3L, Hematocrit 24L, 

Mean Corpuscular Volume 93, Mean Corpuscular Hemoglobin 32, Mean Corpuscular 

Hemoglobin Concent 34, Red Cell Distribution Width 14.9H, Platelet Count 163, 

Mean Platelet Volume 9.6





Assessment/Plan


Assessment and Plan


Left tibilal plateau fracture managed with knee immobilizer and TTWB LLE


Hyponatremia


Confusion multifactorila


Pneumonia RX as per above


Anemia


HTN


A FIB with chronic anticoagulation


S/P lung lobectomy remote partial


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


PVD


DM


Plan Continue PT/OT/St


F/U with DR Holley PCP


TEam Conference 9-5-18


Co-Morbidities that are continuing to impact the rehab process: (include details

)











VINCENT ROGERS MD Sep 1, 2018 07:18

## 2018-09-02 VITALS — DIASTOLIC BLOOD PRESSURE: 65 MMHG | SYSTOLIC BLOOD PRESSURE: 108 MMHG

## 2018-09-02 VITALS — SYSTOLIC BLOOD PRESSURE: 96 MMHG | DIASTOLIC BLOOD PRESSURE: 60 MMHG

## 2018-09-02 VITALS — SYSTOLIC BLOOD PRESSURE: 110 MMHG | DIASTOLIC BLOOD PRESSURE: 50 MMHG

## 2018-09-02 VITALS — DIASTOLIC BLOOD PRESSURE: 73 MMHG | SYSTOLIC BLOOD PRESSURE: 111 MMHG

## 2018-09-02 LAB
BUN/CREAT SERPL: 21
CALCIUM SERPL-MCNC: 8.1 MG/DL (ref 8.5–10.1)
CHLORIDE SERPL-SCNC: 102 MMOL/L (ref 98–107)
CO2 SERPL-SCNC: 25 MMOL/L (ref 21–32)
CREAT SERPL-MCNC: 0.86 MG/DL (ref 0.6–1.3)
GFR SERPLBLD BASED ON 1.73 SQ M-ARVRAT: > 60 ML/MIN
GLUCOSE SERPL-MCNC: 152 MG/DL (ref 70–105)
INR PPP: 3.2 (ref 0.8–1.4)
POTASSIUM SERPL-SCNC: 4.1 MMOL/L (ref 3.6–5)
PROTHROMBIN TIME: 32.9 SEC (ref 12.2–14.7)
SODIUM SERPL-SCNC: 135 MMOL/L (ref 135–145)

## 2018-09-02 RX ADMIN — METOPROLOL TARTRATE SCH MG: 25 TABLET, FILM COATED ORAL at 08:43

## 2018-09-02 RX ADMIN — GABAPENTIN SCH MG: 100 CAPSULE ORAL at 06:08

## 2018-09-02 RX ADMIN — FLUTICASONE PROPIONATE AND SALMETEROL XINAFOATE SCH PUFF: 115; 21 AEROSOL, METERED RESPIRATORY (INHALATION) at 19:51

## 2018-09-02 RX ADMIN — GABAPENTIN SCH MG: 100 CAPSULE ORAL at 20:46

## 2018-09-02 RX ADMIN — PANTOPRAZOLE SODIUM SCH MG: 40 TABLET, DELAYED RELEASE ORAL at 06:08

## 2018-09-02 RX ADMIN — METOPROLOL TARTRATE SCH MG: 25 TABLET, FILM COATED ORAL at 20:45

## 2018-09-02 RX ADMIN — INSULIN HUMAN SCH UNIT: 100 INJECTION, SUSPENSION SUBCUTANEOUS at 20:47

## 2018-09-02 RX ADMIN — INSULIN HUMAN SCH UNIT: 100 INJECTION, SUSPENSION SUBCUTANEOUS at 08:43

## 2018-09-02 RX ADMIN — ACETAMINOPHEN PRN MG: 500 TABLET ORAL at 20:45

## 2018-09-02 RX ADMIN — SIMVASTATIN SCH MG: 20 TABLET, FILM COATED ORAL at 20:46

## 2018-09-02 RX ADMIN — IPRATROPIUM BROMIDE AND ALBUTEROL SULFATE SCH ML: .5; 3 SOLUTION RESPIRATORY (INHALATION) at 10:43

## 2018-09-02 RX ADMIN — IPRATROPIUM BROMIDE AND ALBUTEROL SULFATE SCH ML: .5; 3 SOLUTION RESPIRATORY (INHALATION) at 08:21

## 2018-09-02 RX ADMIN — GABAPENTIN SCH MG: 100 CAPSULE ORAL at 11:39

## 2018-09-02 RX ADMIN — FLUTICASONE PROPIONATE AND SALMETEROL XINAFOATE SCH PUFF: 115; 21 AEROSOL, METERED RESPIRATORY (INHALATION) at 08:21

## 2018-09-02 RX ADMIN — DILTIAZEM HYDROCHLORIDE SCH MG: 120 CAPSULE, COATED, EXTENDED RELEASE ORAL at 09:26

## 2018-09-02 RX ADMIN — MONTELUKAST SCH MG: 10 TABLET, FILM COATED ORAL at 20:46

## 2018-09-02 RX ADMIN — INSULIN ASPART SCH UNIT: 100 INJECTION, SOLUTION INTRAVENOUS; SUBCUTANEOUS at 06:08

## 2018-09-02 RX ADMIN — FUROSEMIDE SCH MG: 40 TABLET ORAL at 08:44

## 2018-09-02 RX ADMIN — IPRATROPIUM BROMIDE AND ALBUTEROL SULFATE SCH ML: .5; 3 SOLUTION RESPIRATORY (INHALATION) at 19:50

## 2018-09-02 RX ADMIN — DOCUSATE SODIUM SCH MG: 100 CAPSULE ORAL at 08:42

## 2018-09-02 RX ADMIN — IPRATROPIUM BROMIDE AND ALBUTEROL SULFATE SCH ML: .5; 3 SOLUTION RESPIRATORY (INHALATION) at 14:29

## 2018-09-02 RX ADMIN — INSULIN ASPART SCH UNIT: 100 INJECTION, SOLUTION INTRAVENOUS; SUBCUTANEOUS at 20:47

## 2018-09-02 RX ADMIN — INSULIN ASPART SCH UNIT: 100 INJECTION, SOLUTION INTRAVENOUS; SUBCUTANEOUS at 11:39

## 2018-09-02 RX ADMIN — WARFARIN SODIUM SCH MG: 5 TABLET ORAL at 12:56

## 2018-09-02 RX ADMIN — INSULIN ASPART SCH UNIT: 100 INJECTION, SOLUTION INTRAVENOUS; SUBCUTANEOUS at 16:20

## 2018-09-02 RX ADMIN — GENTAMICIN SULFATE SCH MLS/HR: 40 INJECTION, SOLUTION INTRAMUSCULAR; INTRAVENOUS at 16:20

## 2018-09-02 NOTE — PROGRESS NOTE-STANDARD
Standard Progress Note


Progress Notes/Assess & Plan


Date Seen by Provider:  Sep 2, 2018


Time Seen by Provider:  11:30


Progress/Assessment & Plan


no complaints


LLE in brace


NVI


imp--L tibial plateau fx


TTWB


Final Diagnosis


reports less knee pain


LLE--NVI no calf tenderness


L tibial plateau fx


TTWB











TERRANCE RAMIREZ MD Sep 2, 2018 11:31

## 2018-09-02 NOTE — PROGRESS NOTE-CARDIOLOGY
Cardiology SOAP Progress Note


Subjective:


Consult from Dr Peres regarding intermittently low bp:





No cp or palp or syncope or shortness of breath


No focal weakness or seizures


No leg swelling


No N/V/D


No dysuria





Objective:


I&O/Vital Signs











 9/2/18 9/2/18 9/2/18 9/2/18





 05:04 08:21 08:26 08:41


 


Temp 99.0   


 


Pulse 99   96


 


Resp 18   18


 


B/P (MAP) 96/60 (72)   108/65 (79)


 


Pulse Ox 94 97  94


 


O2 Delivery Nasal Cannula Nasal Cannula Nasal Cannula Nasal Cannula


 


O2 Flow Rate 2.00 2.00 2.00 2.00


 


    





 9/2/18 9/2/18  





 09:30 10:43  


 


Pulse Ox  92  


 


O2 Delivery Nasal Cannula Nasal Cannula  


 


O2 Flow Rate 2.00 2.00  














 9/2/18





 00:00


 


Intake Total 800 ml


 


Output Total 500 ml


 


Balance 300 ml








Weight (Pounds):  188


Weight (Ounces):  9.0


Weight (Calculated Kilograms):  85.457218


Constitutional:  AAO x 3, well-developed, other (thin appearing)


Respiratory:  No accessory muscle use; other (good bilat air entry)


Cardiovascular:  irregularly irregular, S1 and S2, systolic murmur (faint JOSH 

at card base)


Gastrointestional:  No tender; soft; No guarding, No rebound; audible bowel 

sounds


Extremities:  No clubbing, No cyanosis, No significant edema


Neurologic/Psychiatric:  oriented x 3, grossly intact, power is 5/5 both on 

sides


Skin:  No rash on exposed areas, No ulcerations on exposed areas





Results/Procedures:


Labs


Laboratory Tests


9/1/18 17:42: Glucometer 292H


9/1/18 20:01: Glucometer 152H


9/2/18 06:06: Glucometer 148H


9/2/18 06:50: 


Prothrombin Time 32.9H, INR Comment 3.2H, Sodium Level 135, Potassium Level 4.1

, Chloride Level 102, Carbon Dioxide Level 25, Anion Gap 8, Blood Urea Nitrogen 

18, Creatinine 0.86, Estimat Glomerular Filtration Rate > 60, BUN/Creatinine 

Ratio 21, Glucose Level 152H, Calcium Level 8.1L


9/2/18 11:36: Glucometer 238H





Laboratory Tests


9/1/18 06:40








9/2/18 06:50








Laboratory Tests


9/1/18 06:40








9/2/18 06:50











A/P:


Assessment:





L lateral tibial plateau fracture, managed by Ortho Svce





Intermittently somewhat low blood pressure





Coronary artery disease with multiple stent placement in the past with most 

recent cardiac catheterization in October 2017 showing patent stent in the left 

main, 50 percent ostial left main stenosis, heavily calcified LAD and 

circumflex artery with patent stent, mild to moderate disease, heavily 

calcified right coronary artery with mild-to-moderate disease, patent stent in 

the right iliac artery with mild disease in the right lower extremity, mild 

disease IN the left lower extremity.  Continue to monitor





Intolerance of beta blockers secondary to bradycardia. 





Paroxysmal atrial fibrillation, sick sinus syndrome.  Was unable to tolerate 

sotalol, metoprolol, Multaq





On stroke prophylaxis with warfarin with therapeutic INR





Generalized fatigue and loss of energy, dyspnea on exertion, chronic





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





Has history of obstructive sleep apnea and emphysema followed by Dr. Patino.





Hyperlipidemia





Diabetes mellitus II, followed and managed by primary care physician.





Moderate bilateral carotid stenosis, by history





Chronic back pain


Plan:





* D/c enalapril for more room on bp


* Change dilt to long-acting dilt


* Monitor labs from time to time


* Continue oral anticoag for stroke prophylaxis


* I reviewed his records


* I discussed his CV issues with him and answered questions











BLAYNE LARKIN MD FACP FAC CCDS Sep 2, 2018 15:44

## 2018-09-03 VITALS — DIASTOLIC BLOOD PRESSURE: 62 MMHG | SYSTOLIC BLOOD PRESSURE: 116 MMHG

## 2018-09-03 VITALS — DIASTOLIC BLOOD PRESSURE: 62 MMHG | SYSTOLIC BLOOD PRESSURE: 99 MMHG

## 2018-09-03 LAB
INR PPP: 2.7 (ref 0.8–1.4)
PROTHROMBIN TIME: 28.6 SEC (ref 12.2–14.7)

## 2018-09-03 RX ADMIN — INSULIN ASPART SCH UNIT: 100 INJECTION, SOLUTION INTRAVENOUS; SUBCUTANEOUS at 17:30

## 2018-09-03 RX ADMIN — GABAPENTIN SCH MG: 100 CAPSULE ORAL at 11:47

## 2018-09-03 RX ADMIN — METOPROLOL TARTRATE SCH MG: 25 TABLET, FILM COATED ORAL at 21:01

## 2018-09-03 RX ADMIN — IPRATROPIUM BROMIDE AND ALBUTEROL SULFATE SCH ML: .5; 3 SOLUTION RESPIRATORY (INHALATION) at 07:26

## 2018-09-03 RX ADMIN — INSULIN HUMAN SCH UNIT: 100 INJECTION, SUSPENSION SUBCUTANEOUS at 21:03

## 2018-09-03 RX ADMIN — POTASSIUM CHLORIDE SCH MEQ: 750 TABLET, FILM COATED, EXTENDED RELEASE ORAL at 11:47

## 2018-09-03 RX ADMIN — GABAPENTIN SCH MG: 100 CAPSULE ORAL at 21:02

## 2018-09-03 RX ADMIN — IPRATROPIUM BROMIDE AND ALBUTEROL SULFATE SCH ML: .5; 3 SOLUTION RESPIRATORY (INHALATION) at 12:11

## 2018-09-03 RX ADMIN — GABAPENTIN SCH MG: 100 CAPSULE ORAL at 06:21

## 2018-09-03 RX ADMIN — INSULIN ASPART SCH UNIT: 100 INJECTION, SOLUTION INTRAVENOUS; SUBCUTANEOUS at 11:47

## 2018-09-03 RX ADMIN — ACETAMINOPHEN PRN MG: 500 TABLET ORAL at 06:21

## 2018-09-03 RX ADMIN — DILTIAZEM HYDROCHLORIDE SCH MG: 120 CAPSULE, COATED, EXTENDED RELEASE ORAL at 08:32

## 2018-09-03 RX ADMIN — INSULIN HUMAN SCH UNIT: 100 INJECTION, SUSPENSION SUBCUTANEOUS at 09:21

## 2018-09-03 RX ADMIN — FLUTICASONE PROPIONATE AND SALMETEROL XINAFOATE SCH PUFF: 115; 21 AEROSOL, METERED RESPIRATORY (INHALATION) at 07:26

## 2018-09-03 RX ADMIN — IPRATROPIUM BROMIDE AND ALBUTEROL SULFATE SCH ML: .5; 3 SOLUTION RESPIRATORY (INHALATION) at 16:15

## 2018-09-03 RX ADMIN — FUROSEMIDE SCH MG: 40 TABLET ORAL at 08:32

## 2018-09-03 RX ADMIN — INSULIN ASPART SCH UNIT: 100 INJECTION, SOLUTION INTRAVENOUS; SUBCUTANEOUS at 21:03

## 2018-09-03 RX ADMIN — SIMVASTATIN SCH MG: 20 TABLET, FILM COATED ORAL at 21:01

## 2018-09-03 RX ADMIN — DOCUSATE SODIUM SCH MG: 100 CAPSULE ORAL at 08:32

## 2018-09-03 RX ADMIN — IPRATROPIUM BROMIDE AND ALBUTEROL SULFATE SCH ML: .5; 3 SOLUTION RESPIRATORY (INHALATION) at 19:30

## 2018-09-03 RX ADMIN — MONTELUKAST SCH MG: 10 TABLET, FILM COATED ORAL at 21:01

## 2018-09-03 RX ADMIN — INSULIN ASPART SCH UNIT: 100 INJECTION, SOLUTION INTRAVENOUS; SUBCUTANEOUS at 05:08

## 2018-09-03 RX ADMIN — FLUTICASONE PROPIONATE AND SALMETEROL XINAFOATE SCH PUFF: 115; 21 AEROSOL, METERED RESPIRATORY (INHALATION) at 19:30

## 2018-09-03 RX ADMIN — METOPROLOL TARTRATE SCH MG: 25 TABLET, FILM COATED ORAL at 08:32

## 2018-09-03 RX ADMIN — WARFARIN SODIUM SCH MG: 5 TABLET ORAL at 17:30

## 2018-09-03 RX ADMIN — PANTOPRAZOLE SODIUM SCH MG: 40 TABLET, DELAYED RELEASE ORAL at 06:21

## 2018-09-03 NOTE — THERAPY GROUP DAILY NOTE
Therapy Daily Group Note


Patient Education Topic


Home Safety, Fall Prevention, Exercises





Exercises


LE Seated Exercise, UE Exercise





Other/Notes


Pt ambulated to PT/OT Group this afternoon using [facility]WW at [facility].  Group consisted 

of Introduction (Name, Where are you from & A Favorite Holiday Memory), 

Socialization, Home Safety and Fall Prevention Techniques and Equipment as well 

as Seated Exercise.  Pt vocally participated by giving examples of ways 

patients could fall as well as gave personal example of what patient will 

change at home to prevent falls and make home safer.  Pt also completed Seated 

Exercises before ambulating back to room to rest Supine in bed at end of Group.


Start Time:  13:00


Stop Time:  14:10


Total Billed Treatment Time:  70


Total Billed Treatment


1, GRP (70m)











LINDSAY DARBY PTA Sep 3, 2018 14:57

## 2018-09-03 NOTE — PROGRESS NOTE-CARDIOLOGY
Cardiology SOAP Progress Note


Subjective:


No cp or palp or syncope or shortness of breath at rest or ankle swelling





Objective:


I&O/Vital Signs











 9/3/18 9/3/18 9/3/18 9/3/18





 05:13 07:29 09:00 12:13


 


Temp 97.9   


 


Pulse 67   


 


Resp 18   


 


B/P (MAP) 99/62 (74)   


 


Pulse Ox 92 92  96


 


O2 Delivery Nasal Cannula Nasal Cannula Nasal Cannula Nasal Cannula


 


O2 Flow Rate 2.00 1.00 2.00 1.00














 9/3/18





 00:00


 


Intake Total 863.25 ml


 


Output Total 1375 ml


 


Balance -511.75 ml








Weight (Pounds):  188


Weight (Ounces):  9.0


Weight (Calculated Kilograms):  85.983715


Constitutional:  AAO x 3, well-developed, other (thin appearing)


Respiratory:  No accessory muscle use; other (good bilat air entry)


Cardiovascular:  irregularly irregular, S1 and S2, systolic murmur (faint JOSH 

at card base)


Gastrointestional:  No tender; soft; No guarding, No rebound; audible bowel 

sounds


Extremities:  No clubbing, No cyanosis, No significant edema


Neurologic/Psychiatric:  oriented x 3, grossly intact, power is 5/5 both on 

sides


Skin:  No rash on exposed areas, No ulcerations on exposed areas





Results/Procedures:


Labs


Laboratory Tests


9/2/18 16:10: Glucometer 207H


9/2/18 20:07: Glucometer 191H


9/3/18 05:08: Glucometer 141H


9/3/18 05:50: 


Prothrombin Time 28.6H, INR Comment 2.7H


9/3/18 11:05: Glucometer 216H





Laboratory Tests


9/2/18 06:50











A/P:


Assessment:





L lateral tibial plateau fracture, managed by Ortho Svce





Intermittently somewhat low blood pressure





Coronary artery disease with multiple stent placement in the past with most 

recent cardiac catheterization in October 2017 showing patent stent in the left 

main, 50 percent ostial left main stenosis, heavily calcified LAD and 

circumflex artery with patent stent, mild to moderate disease, heavily 

calcified right coronary artery with mild-to-moderate disease, patent stent in 

the right iliac artery with mild disease in the right lower extremity, mild 

disease IN the left lower extremity.  Continue to monitor 





Paroxysmal atrial fibrillation, sick sinus syndrome.  H/o intolerance to 

sotalol and Multaq





On stroke prophylaxis with warfarin with therapeutic INR





Generalized fatigue and loss of energy, dyspnea on exertion, chronic





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





Has history of obstructive sleep apnea and emphysema followed by Dr. Patino.





Hyperlipidemia





Diabetes mellitus II, followed and managed by primary care physician.





Moderate bilateral carotid stenosis, by history





Chronic back pain


Plan:





* Enalpril was changed for more room on bp


* Dilt was changed to long-acting dilt


* Monitor labs from time to time


* Continue oral anticoag for stroke prophylaxis


* I discussed his CV issues with him and answered questions











BLAYNE LARKIN MD FACP FACC CCDS Sep 3, 2018 12:17

## 2018-09-03 NOTE — PM & R (SOAP) PROGRESS NOTE
Subjective


This was a face to face visit with the patient.


Date Seen by Provider:  Sep 3, 2018


Time Seen by Provider:  21:30


Subjective/Events-last exam


Patient was seen in his room this evening Appreciate Cardiology note and orders 

re Low Blood pressure 9-1-18 now improved Patient min assist for transfers


Review of Systems


Pulmonary:  Dyspnea


Neurological:  Confusion





Objective


Physician Exam


Last Set of Vital Signs





Vital Signs








  Date Time  Temp Pulse Resp B/P (MAP) Pulse Ox O2 Delivery O2 Flow Rate FiO2


 


9/3/18 21:00      Nasal Cannula 2.00 


 


9/3/18 19:32     96   


 


9/3/18 17:23 98.0 70 20 116/62 (80)    





Capillary Refill : Less Than 3 Seconds


I&O











Intake and Output 


 


 9/3/18





 00:00


 


Intake Total 1363.25 ml


 


Output Total 2125 ml


 


Balance -761.75 ml


 


 


 


Intake Oral 1250 ml


 


IV Total 113.25 ml


 


Output Urine Total 2125 ml


 


# Voids 1








General:  Alert, Oriented X3, Cooperative, No Acute Distress


HEENT:  Atraumatic, PERRLA, EOMI, Mucous Memb Moist/Pink, Other (02 by n/c in 

place)


Neck:  Supple, No JVD


Lungs:  Other (Cough with congestion rhonchi)


Heart:  Regular Rate


Abdomen:  Normal Bowel Sounds, Soft, No Tenderness


Extremities:  Other (Knee immobilizer left)


Neuro:  Other (Functional strength and ROM ( knee not tested))


Psych/Mental Status:  Mental Status NL, Mood NL





Results


Lab Data


Laboratory Tests


9/1/18 05:56: Glucometer 148H


9/1/18 06:40: 


White Blood Count 6.6, Red Blood Count 2.62L, Hemoglobin 8.3L, Hematocrit 24L, 

Mean Corpuscular Volume 93, Mean Corpuscular Hemoglobin 32, Mean Corpuscular 

Hemoglobin Concent 34, Red Cell Distribution Width 14.9H, Platelet Count 163, 

Mean Platelet Volume 9.6, Prothrombin Time 34.7H, INR Comment 3.4H, Sodium 

Level 138, Potassium Level 4.2, Chloride Level 103, Carbon Dioxide Level 23, 

Anion Gap 12, Blood Urea Nitrogen 18, Creatinine 0.83, Estimat Glomerular 

Filtration Rate > 60, BUN/Creatinine Ratio 22, Glucose Level 135H, Calcium 

Level 8.3L


9/1/18 11:48: Glucometer 168H


9/1/18 17:42: Glucometer 292H


9/1/18 20:01: Glucometer 152H


9/2/18 06:06: Glucometer 148H


9/2/18 06:50: 


Prothrombin Time 32.9H, INR Comment 3.2H, Sodium Level 135, Potassium Level 4.1

, Chloride Level 102, Carbon Dioxide Level 25, Anion Gap 8, Blood Urea Nitrogen 

18, Creatinine 0.86, Estimat Glomerular Filtration Rate > 60, BUN/Creatinine 

Ratio 21, Glucose Level 152H, Calcium Level 8.1L


9/2/18 11:36: Glucometer 238H


9/2/18 16:10: Glucometer 207H


9/2/18 20:07: Glucometer 191H


9/3/18 05:08: Glucometer 141H


9/3/18 05:50: 


Prothrombin Time 28.6H, INR Comment 2.7H


9/3/18 11:05: Glucometer 216H


9/3/18 15:16: Glucometer 205H


9/3/18 20:14: Glucometer 434*H





Assessment/Plan


Assessment and Plan


Tibial plateau fracture amanged with Knee Immobilizer and TTWB LLE


CAD


PAF on meds


Chronic anticoagulation INR noted


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


[place]


HLP


DM2


Hypotension improved with adjustment in meds


Anemia recheck lab


Plan CONtinue PT/OT


F/U with PCP and Cardiology


Next Team Conference 9-5-18


Co-Morbidities that are continuing to impact the rehab process: (include details

)











VINCENT ROGERS MD Sep 3, 2018 22:09

## 2018-09-04 VITALS — SYSTOLIC BLOOD PRESSURE: 96 MMHG | DIASTOLIC BLOOD PRESSURE: 60 MMHG

## 2018-09-04 VITALS — SYSTOLIC BLOOD PRESSURE: 94 MMHG | DIASTOLIC BLOOD PRESSURE: 60 MMHG

## 2018-09-04 VITALS — SYSTOLIC BLOOD PRESSURE: 107 MMHG | DIASTOLIC BLOOD PRESSURE: 68 MMHG

## 2018-09-04 LAB
BASOPHILS # BLD AUTO: 0 10^3/UL (ref 0–0.1)
BASOPHILS NFR BLD AUTO: 0 % (ref 0–10)
BUN/CREAT SERPL: 18
CALCIUM SERPL-MCNC: 8.4 MG/DL (ref 8.5–10.1)
CHLORIDE SERPL-SCNC: 99 MMOL/L (ref 98–107)
CO2 SERPL-SCNC: 26 MMOL/L (ref 21–32)
CREAT SERPL-MCNC: 0.93 MG/DL (ref 0.6–1.3)
EOSINOPHIL # BLD AUTO: 0.4 10^3/UL (ref 0–0.3)
EOSINOPHIL NFR BLD AUTO: 5 % (ref 0–10)
ERYTHROCYTE [DISTWIDTH] IN BLOOD BY AUTOMATED COUNT: 14.7 % (ref 10–14.5)
GFR SERPLBLD BASED ON 1.73 SQ M-ARVRAT: > 60 ML/MIN
GLUCOSE SERPL-MCNC: 114 MG/DL (ref 70–105)
HCT VFR BLD CALC: 27 % (ref 40–54)
HGB BLD-MCNC: 8.8 G/DL (ref 13.3–17.7)
INR PPP: 2.4 (ref 0.8–1.4)
LYMPHOCYTES # BLD AUTO: 1.5 X 10^3 (ref 1–4)
LYMPHOCYTES NFR BLD AUTO: 20 % (ref 12–44)
MANUAL DIFFERENTIAL PERFORMED BLD QL: NO
MCH RBC QN AUTO: 31 PG (ref 25–34)
MCHC RBC AUTO-ENTMCNC: 33 G/DL (ref 32–36)
MCV RBC AUTO: 93 FL (ref 80–99)
MONOCYTES # BLD AUTO: 0.7 X 10^3 (ref 0–1)
MONOCYTES NFR BLD AUTO: 10 % (ref 0–12)
NEUTROPHILS # BLD AUTO: 4.7 X 10^3 (ref 1.8–7.8)
NEUTROPHILS NFR BLD AUTO: 64 % (ref 42–75)
PLATELET # BLD: 370 10^3/UL (ref 130–400)
PMV BLD AUTO: 9.4 FL (ref 7.4–10.4)
POTASSIUM SERPL-SCNC: 3.9 MMOL/L (ref 3.6–5)
PROTHROMBIN TIME: 26.6 SEC (ref 12.2–14.7)
RBC # BLD AUTO: 2.88 10^6/UL (ref 4.35–5.85)
SODIUM SERPL-SCNC: 136 MMOL/L (ref 135–145)
WBC # BLD AUTO: 7.2 10^3/UL (ref 4.3–11)

## 2018-09-04 RX ADMIN — INSULIN ASPART SCH UNIT: 100 INJECTION, SOLUTION INTRAVENOUS; SUBCUTANEOUS at 11:10

## 2018-09-04 RX ADMIN — INSULIN ASPART SCH UNIT: 100 INJECTION, SOLUTION INTRAVENOUS; SUBCUTANEOUS at 05:19

## 2018-09-04 RX ADMIN — WARFARIN SODIUM SCH MG: 5 TABLET ORAL at 17:11

## 2018-09-04 RX ADMIN — INSULIN HUMAN SCH UNIT: 100 INJECTION, SUSPENSION SUBCUTANEOUS at 07:47

## 2018-09-04 RX ADMIN — SIMVASTATIN SCH MG: 20 TABLET, FILM COATED ORAL at 20:42

## 2018-09-04 RX ADMIN — IPRATROPIUM BROMIDE AND ALBUTEROL SULFATE SCH ML: .5; 3 SOLUTION RESPIRATORY (INHALATION) at 19:17

## 2018-09-04 RX ADMIN — IPRATROPIUM BROMIDE AND ALBUTEROL SULFATE SCH ML: .5; 3 SOLUTION RESPIRATORY (INHALATION) at 14:41

## 2018-09-04 RX ADMIN — PANTOPRAZOLE SODIUM SCH MG: 40 TABLET, DELAYED RELEASE ORAL at 06:42

## 2018-09-04 RX ADMIN — GABAPENTIN SCH MG: 100 CAPSULE ORAL at 11:09

## 2018-09-04 RX ADMIN — DOCUSATE SODIUM SCH MG: 100 CAPSULE ORAL at 07:46

## 2018-09-04 RX ADMIN — METOPROLOL TARTRATE SCH MG: 25 TABLET, FILM COATED ORAL at 07:46

## 2018-09-04 RX ADMIN — INSULIN ASPART SCH UNIT: 100 INJECTION, SOLUTION INTRAVENOUS; SUBCUTANEOUS at 16:00

## 2018-09-04 RX ADMIN — FLUTICASONE PROPIONATE AND SALMETEROL XINAFOATE SCH PUFF: 115; 21 AEROSOL, METERED RESPIRATORY (INHALATION) at 07:24

## 2018-09-04 RX ADMIN — IPRATROPIUM BROMIDE AND ALBUTEROL SULFATE SCH ML: .5; 3 SOLUTION RESPIRATORY (INHALATION) at 11:14

## 2018-09-04 RX ADMIN — DILTIAZEM HYDROCHLORIDE SCH MG: 120 CAPSULE, COATED, EXTENDED RELEASE ORAL at 07:46

## 2018-09-04 RX ADMIN — INSULIN ASPART SCH UNIT: 100 INJECTION, SOLUTION INTRAVENOUS; SUBCUTANEOUS at 20:50

## 2018-09-04 RX ADMIN — INSULIN HUMAN SCH UNIT: 100 INJECTION, SUSPENSION SUBCUTANEOUS at 20:50

## 2018-09-04 RX ADMIN — IPRATROPIUM BROMIDE AND ALBUTEROL SULFATE SCH ML: .5; 3 SOLUTION RESPIRATORY (INHALATION) at 07:24

## 2018-09-04 RX ADMIN — GABAPENTIN SCH MG: 100 CAPSULE ORAL at 06:42

## 2018-09-04 RX ADMIN — GABAPENTIN SCH MG: 100 CAPSULE ORAL at 20:41

## 2018-09-04 RX ADMIN — FLUTICASONE PROPIONATE AND SALMETEROL XINAFOATE SCH PUFF: 115; 21 AEROSOL, METERED RESPIRATORY (INHALATION) at 19:17

## 2018-09-04 RX ADMIN — FUROSEMIDE SCH MG: 40 TABLET ORAL at 07:46

## 2018-09-04 RX ADMIN — MONTELUKAST SCH MG: 10 TABLET, FILM COATED ORAL at 20:41

## 2018-09-04 NOTE — PROGRESS NOTE (SOAP)
Subjective


Time Seen by Provider:  08:35


Subjective/Events-last exam


Patient feeling better today.


Patient hypotensive this last weekend.


Blood sugars elevated with cookies.





Objective


Exam





Vital Signs








  Date Time  Temp Pulse Resp B/P (MAP) Pulse Ox O2 Delivery O2 Flow Rate FiO2


 


18 07:44 99.0 93 18 107/68 (81) 91 Room Air  


 


18 07:25     95 Nasal Cannula 1.00 


 


18 05:20 98.5 82 18 96/60 (72) 96 Nasal Cannula 2.00 


 


9/3/18 21:00      Nasal Cannula 2.00 


 


9/3/18 19:32     96 Nasal Cannula 1.00 


 


9/3/18 17:23 98.0 70 20 116/62 (80) 96 Nasal Cannula 2.00 


 


9/3/18 12:13     96 Nasal Cannula 1.00 


 


9/3/18 09:00      Nasal Cannula 2.00 














I & O 


 


 18





 07:00


 


Intake Total 900 ml


 


Output Total 750 ml


 


Balance 150 ml





Capillary Refill : Less Than 3 Seconds


General Appearance:  No Apparent Distress, WD/WN


HEENT:  Normal ENT Inspection


Neck:  Normal Inspection


Respiratory:  No Accessory Muscle Use, No Respiratory Distress


Cardiovascular:  Irregularly Irregular


Gastrointestinal:  non tender, soft





Results


Lab


Laboratory Tests


18 07:23








Laboratory Tests


9/3/18 11:05: Glucometer 216H


9/3/18 15:16: Glucometer 205H


9/3/18 20:14: Glucometer 434*H


9/3/18 22:08: Glucometer 170H


18 05:17: Glucometer 113H


18 07:23: 


White Blood Count 7.2, Red Blood Count 2.88L, Hemoglobin 8.8L, Hematocrit 27L, 

Mean Corpuscular Volume 93, Mean Corpuscular Hemoglobin 31, Mean Corpuscular 

Hemoglobin Concent 33, Red Cell Distribution Width 14.7H, Platelet Count 370, 

Mean Platelet Volume 9.4, Neutrophils (%) (Auto) 64, Lymphocytes (%) (Auto) 20, 

Monocytes (%) (Auto) 10, Eosinophils (%) (Auto) 5, Basophils (%) (Auto) 0, 

Neutrophils # (Auto) 4.7, Lymphocytes # (Auto) 1.5, Monocytes # (Auto) 0.7, 

Eosinophils # (Auto) 0.4H, Basophils # (Auto) 0.0, Prothrombin Time 26.6H, INR 

Comment 2.4H, Sodium Level 136, Potassium Level 3.9, Chloride Level 99, Carbon 

Dioxide Level 26, Anion Gap 11, Blood Urea Nitrogen 17, Creatinine 0.93, 

Estimat Glomerular Filtration Rate > 60, BUN/Creatinine Ratio 18, Glucose Level 

114H, Calcium Level 8.4L





Assessment/Plan


Assessment/Plan


Assess & Plan/Chief Complaint


Left tibial plateau fracture.


Atrial fibrillation history.


COPD.


Coronary artery disease.


Diabetes area


Patient more alert today than yesterday.


.


18.


Left tibial plateau fracture.


Atrial fibrillation history.


COPD.


Coronary artery disease.


Diabetes.


Patient confused last night.


Patient not confused this morning.


Patient have chest x-ray today.


.


/left tibial plateau fracture.


Atrial fibrillation history.


COPD.


Pneumonia.


Coronary artery disease.


Diabetes..


.


.


Left tibial plateau fracture.


Atrial fibrillation.


COPD.


Pneumonia.


Coronary artery disease.


Diabetes





Clinical Quality Measures


DVT/VTE Risk/Contraindication:


Risk Factor Score Per Nursin


RFS Level Per Nursing on Admit:  4+=Very High











TOR CONNELLY DO Sep 4, 2018 08:38

## 2018-09-04 NOTE — DIAGNOSTIC IMAGING REPORT
INDICATION: Cough.



TIME OF EXAMINATION: 09:28 a.m.



COMPARISON: Correlation is made with prior study from 08/30/2018.



FINDINGS: Heart size is stable. Surgical clips in the left upper

lobe medially are noted. Basilar interstitial changes as well as

some probable infiltrate in the right base is similar to prior

exam. There may be a small amount of pleural fluid on the right.

Upper lung fields are fairly clear. There is no pneumothorax.



IMPRESSION: Overall stable parenchymal changes when compared with

examination from five days earlier.



Dictated by: 



  Dictated on workstation # [id number]

## 2018-09-04 NOTE — CARDIOLOGY PROGRESS NOTE
Subjective


Date Seen by Provider:  Sep 4, 2018


Time Seen by Provider:  08:28


Subjective/Events-last exam


Patient is in bed, no new complaint, no chest pain or shortness of breath, 

still borderline hypotensive


Review of Systems


General:  No Chills, No Night Sweats, No Fatigue, No Malaise, No Appetite, No 

Other


HEENT:  No Head Aches, No Visual Changes, No Eye Pain, No Ear Pain, No Dysphasia

, No Sinus Congestion, No Post Nasal Drip, No Sore Throat, No Other


Pulmonary:  No Dyspnea, No Cough, No Pleuritic Chest Pain, No Other


Cardiovascular:  No: Chest Pain, Palpitations, Orthopnea, Paroxysmal Noc. 

Dyspnea, Edema, Lt Headedness, Other





Objective-Cardiology


Exam


Last Set of Vital Signs





Vital Signs








 18





 07:25 07:44


 


Temp  99.0


 


Pulse  93


 


Resp  18


 


B/P (MAP)  107/68 (81)


 


Pulse Ox  91


 


O2 Delivery  Room Air


 


O2 Flow Rate 1.00 





Capillary Refill : Less Than 3 Seconds


I&O











Intake and Output 


 


 9/3/18





 23:59


 


Intake Total 1300 ml


 


Output Total 1250 ml


 


Balance 50 ml


 


 


 


Intake Oral 1300 ml


 


Output Urine Total 1250 ml








General:  Alert, Oriented X3, Cooperative, No Acute Distress


HEENT:  Atraumatic, PERRLA, EOMI, Mucous Memb Moist/Pink, Other (02 by n/c in 

place)


Neck:  Supple, No JVD


Lungs:  Other (Cough with congestion rhonchi)


Heart:  Regular Rate, Normal S1, Normal S2


Abdomen:  Normal Bowel Sounds, Soft, No Tenderness


Extremities:  No Clubbing, Other (Knee immobilizer left)


Skin:  No Rashes


Neuro:  Normal Speech, Other (Functional strength and ROM ( knee not tested))


Psych/Mental Status:  Mental Status NL, Mood NL





Results


Lab


Laboratory Tests


18 07:23














A/P-Cardiology


Admission Diagnosis


Tibial fracture


Hypotension


Coronary artery disease


Chronic atrial fibrillation





Assessment/Plan


L lateral tibial plateau fracture, managed by Ortho Service





Intermittently borderline hypotension, I will discontinue metoprolol, put 

parameters for Cardizem and monitor.





Sinus tachycardia, frequent APCs, history of paroxysmal atrial fibrillation and 

Sick sinus syndrome, was intolerant to beta blockers in the past due to 

bradycardia





Coronary artery disease with multiple stent placement in the past with most 

recent cardiac catheterization in 2017 showing patent stent in the left 

main, 50 percent ostial left main stenosis, heavily calcified LAD and 

circumflex artery with patent stent, mild to moderate disease, heavily 

calcified right coronary artery with mild-to-moderate disease, patent stent in 

the right iliac artery with mild disease in the right lower extremity, mild 

disease in the left lower extremity.  Continue to monitor





Intolerance of beta blockers secondary to bradycardia. Monitor closely on BB





History Paroxysmal atrial fibrillation, sick sinus syndrome.  Was unable to 

tolerate sotalol, metoprolol, Multaq,  has a Linq device implanted, continue to 

monitor





Generalized fatigue and loss of energy, dyspnea on exertion, chronic





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





Has history of obstructive sleep apnea and emphysema followed by Dr. Patino.





Hyperlipidemia, continue to monitor lipids





Diabetes mellitus II, followed and managed by primary care physician.





Moderate bilateral carotid stenosis, by history





Chronic back pain





Clinical Quality Measures


DVT/VTE Risk/Contraindication:


Risk Factor Score Per Nursin


RFS Level Per Nursing on Admit:  4+=Very High











SHAHRIAR BARRY MD Sep 4, 2018 08:31

## 2018-09-04 NOTE — PM & R (SOAP) PROGRESS NOTE
Subjective


This was a face to face visit with the patient.


Date Seen by Provider:  Sep 4, 2018


Time Seen by Provider:  06:45


Subjective/Events-last exam


Patient was seen in his room this AM Blood pressure low again this AM Will f/u 

with Cardiology/Hospitalist re this Patient min assist for transfers


Review of Systems


General:  Fatigue


Neurological:  Confusion





Objective


Physician Exam


Last Set of Vital Signs





Vital Signs








  Date Time  Temp Pulse Resp B/P (MAP) Pulse Ox O2 Delivery O2 Flow Rate FiO2


 


9/4/18 05:20 98.5 82 18 96/60 (72) 96 Nasal Cannula 2.00 





Capillary Refill : Less Than 3 Seconds


I&O











Intake and Output 


 


 9/4/18





 00:00


 


Intake Total 1300 ml


 


Output Total 1250 ml


 


Balance 50 ml


 


 


 


Intake Oral 1300 ml


 


Output Urine Total 1250 ml








General:  Alert, Oriented X3, Cooperative, No Acute Distress


HEENT:  Atraumatic, PERRLA, EOMI, Mucous Memb Moist/Pink, Other (02 by n/c in 

place)


Neck:  Supple, No JVD


Lungs:  Other (Cough with congestion rhonchi)


Heart:  Regular Rate


Abdomen:  Normal Bowel Sounds, Soft, No Tenderness


Extremities:  Other (Knee immobilizer left)


Neuro:  Other (Functional strength and ROM ( knee not tested))


Psych/Mental Status:  Mental Status NL, Mood NL





Results


Lab Data


Laboratory Tests


9/1/18 11:48: Glucometer 168H


9/1/18 17:42: Glucometer 292H


9/1/18 20:01: Glucometer 152H


9/2/18 06:06: Glucometer 148H


9/2/18 06:50: 


Prothrombin Time 32.9H, INR Comment 3.2H, Sodium Level 135, Potassium Level 4.1

, Chloride Level 102, Carbon Dioxide Level 25, Anion Gap 8, Blood Urea Nitrogen 

18, Creatinine 0.86, Estimat Glomerular Filtration Rate > 60, BUN/Creatinine 

Ratio 21, Glucose Level 152H, Calcium Level 8.1L


9/2/18 11:36: Glucometer 238H


9/2/18 16:10: Glucometer 207H


9/2/18 20:07: Glucometer 191H


9/3/18 05:08: Glucometer 141H


9/3/18 05:50: 


Prothrombin Time 28.6H, INR Comment 2.7H


9/3/18 11:05: Glucometer 216H


9/3/18 15:16: Glucometer 205H


9/3/18 20:14: Glucometer 434*H


9/3/18 22:08: Glucometer 170H


9/4/18 05:17: Glucometer 113H





Assessment/Plan


Assessment and Plan


Left Tibila plateau fracture managed with Knee immobilizer and TTWB LLE


Hypotension Cardiology adjusting meds


CAD


PAF on meds


Chronic anticoagulation INR noted


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


[place]


HLP


DM2


Anemia recheck lab





Plan Continue PT/OT


F/U with cardiology and DR sánchez re Anemia and Hypotension


Team Conference tomorrow


Recheck Labs 


see orders


Co-Morbidities that are continuing to impact the rehab process: (include details

)











VINCENT ROGERS MD Sep 4, 2018 06:57

## 2018-09-04 NOTE — SPEECH THERAPY DAILY NOTE
Speech Daily Progress Note


Subjective


Date Seen by Provider:  Sep 4, 2018


Time Seen by Provider:  11:15


Pt up in chair





Pain





   Numeric Pain Scale:  0-No Pain





Objective


Memory - Picture recall.  Pt verbally described picture out loud to facilitate 

recall 20 minutes later.  Pt was 100% accurate with no cues.


Which picture is missing from a choice of 3.  Pt was 80% accurate with min 

assist.





Assessment


Assessment Current Status:  Fair Progress





Treatment Plan


Continue Plan of Care





Communication


Comprehension:  6


Expression:  7





Social Cognition


Social Interaction:  7


Problem Solvin


Memory:  4





Speech Short Term Goals


Short Term Goals


Short Term Goals


1.  Pt will complete various memory tasks with at least 80% accuracy with  min 

assist.


2.  Pt will complete various problem solving tasks with at least 80% accuracy 

and min assist.


Comprehension:  6


Expression:  7


Social Interaction:  7


Problem Solvin


Memory:  5





Speech Long Term Goals


Long Term Goals


Pt will demonstrate functional memory and problem solving ability for maximum 

independence in the home.


Comprehension:  6


Expression:  7


Social Interaction:  7


Problem Solvin


Memory:  6





Speech-Plan


Patient/Family Goals


Patient/Family Goals:  


to return home





Treatment Plan


Speech Therapy Treatment Plan:  Continue Plan of Care


pt very pleasant and cooperative during tx.


Treatment Duration:  Sep 11, 2018


Frequency:  5 times per week


Estimated Hrs Per Day:  .5 hour per day


Rehab Potential:  Fair


Pt/Family Agrees to Plan:  Yes





Safety Risks/Education


Teaching Recipient:  Patient


Teaching Methods:  Discussion


Response to Teaching:  Verbalize Understanding





Time


Speech Therapy Time In:  11:15


Speech Therapy Time Out:  11:45


Total Billed Time:  30


Billed Treatment Time


1, COLETTE Mills Sep 4, 2018 16:33

## 2018-09-05 VITALS — DIASTOLIC BLOOD PRESSURE: 57 MMHG | SYSTOLIC BLOOD PRESSURE: 98 MMHG

## 2018-09-05 VITALS — SYSTOLIC BLOOD PRESSURE: 99 MMHG | DIASTOLIC BLOOD PRESSURE: 56 MMHG

## 2018-09-05 VITALS — SYSTOLIC BLOOD PRESSURE: 113 MMHG | DIASTOLIC BLOOD PRESSURE: 67 MMHG

## 2018-09-05 LAB
INR PPP: 2.5 (ref 0.8–1.4)
PROTHROMBIN TIME: 26.9 SEC (ref 12.2–14.7)

## 2018-09-05 RX ADMIN — POTASSIUM CHLORIDE SCH MEQ: 750 TABLET, FILM COATED, EXTENDED RELEASE ORAL at 12:02

## 2018-09-05 RX ADMIN — IPRATROPIUM BROMIDE AND ALBUTEROL SULFATE SCH ML: .5; 3 SOLUTION RESPIRATORY (INHALATION) at 06:55

## 2018-09-05 RX ADMIN — IPRATROPIUM BROMIDE AND ALBUTEROL SULFATE SCH ML: .5; 3 SOLUTION RESPIRATORY (INHALATION) at 10:58

## 2018-09-05 RX ADMIN — GABAPENTIN SCH MG: 100 CAPSULE ORAL at 06:37

## 2018-09-05 RX ADMIN — GABAPENTIN SCH MG: 100 CAPSULE ORAL at 21:11

## 2018-09-05 RX ADMIN — MONTELUKAST SCH MG: 10 TABLET, FILM COATED ORAL at 21:11

## 2018-09-05 RX ADMIN — INSULIN ASPART SCH UNIT: 100 INJECTION, SOLUTION INTRAVENOUS; SUBCUTANEOUS at 12:17

## 2018-09-05 RX ADMIN — PANTOPRAZOLE SODIUM SCH MG: 40 TABLET, DELAYED RELEASE ORAL at 06:37

## 2018-09-05 RX ADMIN — INSULIN HUMAN SCH UNIT: 100 INJECTION, SUSPENSION SUBCUTANEOUS at 08:25

## 2018-09-05 RX ADMIN — IPRATROPIUM BROMIDE AND ALBUTEROL SULFATE SCH ML: .5; 3 SOLUTION RESPIRATORY (INHALATION) at 14:36

## 2018-09-05 RX ADMIN — DILTIAZEM HYDROCHLORIDE SCH MG: 120 CAPSULE, COATED, EXTENDED RELEASE ORAL at 08:17

## 2018-09-05 RX ADMIN — DOCUSATE SODIUM SCH MG: 100 CAPSULE ORAL at 08:23

## 2018-09-05 RX ADMIN — IPRATROPIUM BROMIDE AND ALBUTEROL SULFATE SCH ML: .5; 3 SOLUTION RESPIRATORY (INHALATION) at 19:35

## 2018-09-05 RX ADMIN — INSULIN ASPART SCH UNIT: 100 INJECTION, SOLUTION INTRAVENOUS; SUBCUTANEOUS at 21:17

## 2018-09-05 RX ADMIN — GABAPENTIN SCH MG: 100 CAPSULE ORAL at 12:06

## 2018-09-05 RX ADMIN — ACETAMINOPHEN PRN MG: 500 TABLET ORAL at 08:37

## 2018-09-05 RX ADMIN — WARFARIN SODIUM SCH MG: 5 TABLET ORAL at 17:00

## 2018-09-05 RX ADMIN — INSULIN ASPART SCH UNIT: 100 INJECTION, SOLUTION INTRAVENOUS; SUBCUTANEOUS at 06:00

## 2018-09-05 RX ADMIN — INSULIN HUMAN SCH UNIT: 100 INJECTION, SUSPENSION SUBCUTANEOUS at 21:18

## 2018-09-05 RX ADMIN — INSULIN ASPART SCH UNIT: 100 INJECTION, SOLUTION INTRAVENOUS; SUBCUTANEOUS at 16:39

## 2018-09-05 RX ADMIN — FLUTICASONE PROPIONATE AND SALMETEROL XINAFOATE SCH PUFF: 115; 21 AEROSOL, METERED RESPIRATORY (INHALATION) at 06:56

## 2018-09-05 RX ADMIN — FLUTICASONE PROPIONATE AND SALMETEROL XINAFOATE SCH PUFF: 115; 21 AEROSOL, METERED RESPIRATORY (INHALATION) at 19:35

## 2018-09-05 RX ADMIN — ATORVASTATIN CALCIUM SCH MG: 10 TABLET, FILM COATED ORAL at 21:11

## 2018-09-05 NOTE — CARDIOLOGY PROGRESS NOTE
Subjective


Date Seen by Provider:  Sep 5, 2018


Time Seen by Provider:  08:20


Subjective/Events-last exam


Patient is in bed, no new complaints. Denies any dizziness or lightheadedness.


Review of Systems


General:  No Night Sweats; Fatigue, Malaise


HEENT:  No Visual Changes, No Dysphasia, No Sore Throat


Pulmonary:  No Dyspnea, No Cough


Cardiovascular:  No: Chest Pain, Palpitations, Paroxysmal Noc. Dyspnea, Edema


Gastrointestinal:  No: Nausea, Vomiting, Abdominal Pain


Genitourinary:  No Dysuria, No Frequency


Musculoskeletal:  No: neck pain, back pain


Neurological:  No: Weakness, Numbness, Change in speech, Confusion





Objective-Cardiology


Exam


Last Set of Vital Signs





Vital Signs








 18





 05:54 06:56 08:14


 


Temp 96.8  


 


Pulse 82  


 


Resp 22  


 


B/P (MAP)   99/56 (70)


 


Pulse Ox  97 


 


O2 Delivery   Nasal Cannula


 


O2 Flow Rate   2.00





Capillary Refill : Less Than 3 Seconds


I&O











Intake and Output 


 


 18





 00:00


 


Intake Total 1150 ml


 


Output Total 1150 ml


 


Balance 0 ml


 


 


 


Intake Oral 1150 ml


 


Output Urine Total 1150 ml


 


# Voids 1


 


# Bowel Movements 1








General:  Alert, Oriented X3, Cooperative, No Acute Distress


HEENT:  Atraumatic, PERRLA, EOMI, Mucous Memb Moist/Pink, Other (02 by n/c in 

place)


Neck:  Supple, No JVD


Lungs:  Other (Cough with congestion rhonchi)


Heart:  Regular Rate, Normal S1, Normal S2


Abdomen:  Normal Bowel Sounds, Soft, No Tenderness


Extremities:  No Clubbing, Other (Knee immobilizer left)


Skin:  No Rashes


Neuro:  Normal Speech, Other (Functional strength and ROM ( knee not tested))


Psych/Mental Status:  Mental Status NL, Mood NL





A/P-Cardiology


Admission Diagnosis


Tibial fracture


Hypotension


Coronary artery disease


Chronic atrial fibrillation





Assessment/Plan


L lateral tibial plateau fracture, managed by Ortho Service





Intermittently borderline hypotension,  metoprolol discontinued yesterday, 

continue Cardizem with parameters. Continue to monitor. 





Sinus tachycardia, frequent APCs, history of paroxysmal atrial fibrillation and 

Sick sinus syndrome, was intolerant to beta blockers in the past due to 

bradycardia





Coronary artery disease with multiple stent placement in the past with most 

recent cardiac catheterization in 2017 showing patent stent in the left 

main, 50 percent ostial left main stenosis, heavily calcified LAD and 

circumflex artery with patent stent, mild to moderate disease, heavily 

calcified right coronary artery with mild-to-moderate disease, patent stent in 

the right iliac artery with mild disease in the right lower extremity, mild 

disease in the left lower extremity.  Continue to monitor





Intolerance of beta blockers secondary to bradycardia. Continue to monitor. 





History Paroxysmal atrial fibrillation, sick sinus syndrome.  Was unable to 

tolerate sotalol, metoprolol, Multaq,  has a Linq device implanted, continue to 

monitor





Generalized fatigue and loss of energy, dyspnea on exertion, chronic





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





Has history of obstructive sleep apnea and emphysema followed by Dr. Patino.





Hyperlipidemia, continue to monitor lipids





Diabetes mellitus II, followed and managed by primary care physician.





Moderate bilateral carotid stenosis, by history





Chronic back pain





Clinical Quality Measures


DVT/VTE Risk/Contraindication:


Risk Factor Score Per Nursin


RFS Level Per Nursing on Admit:  4+=Very High











GURDEEP GONZÁLES Sep 5, 2018 08:21

## 2018-09-05 NOTE — PROGRESS NOTE (SOAP)
Subjective


Time Seen by Provider:  08:35


Subjective/Events-last exam


Patient feeling better today.


Patient alert..


Patient voices no complaints.





Objective


Exam





Vital Signs








  Date Time  Temp Pulse Resp B/P (MAP) Pulse Ox O2 Delivery O2 Flow Rate FiO2


 


18 08:14 96.4 101 20 99/56 (70) 98 Nasal Cannula 2.00 


 


18 06:56     97 Nasal Cannula 2.00 


 


18 05:54 96.8 82 22 98/57 (71) 95 Nasal Cannula 2.00 


 


18 21:00      Nasal Cannula 2.00 


 


18 19:17     91 Nasal Cannula 2.00 


 


18 16:56 98.6 107 18 94/60 (71) 96 Nasal Cannula 2.00 


 


18 14:41     94 Nasal Cannula 2.00 


 


18 11:14     97 Nasal Cannula 2.00 


 


18 08:40      Nasal Cannula 1.00 














I & O 


 


 18





 07:00


 


Intake Total 1350 ml


 


Output Total 1450 ml


 


Balance -100 ml





Capillary Refill : Less Than 3 Seconds


General Appearance:  No Apparent Distress, WD/WN


HEENT:  Normal ENT Inspection


Neck:  Full Range of Motion, Normal Inspection


Respiratory:  No Accessory Muscle Use, No Respiratory Distress, Decreased 

Breath Sounds


Gastrointestinal:  non tender, soft





Results


Lab


Laboratory Tests


18 10:51: Glucometer 211H


18 15:59: Glucometer 154H


18 20:39: Glucometer 200H


18 05:00: 


Prothrombin Time 26.9H, INR Comment 2.5H


18 06:35: Glucometer 162H





Assessment/Plan


Assessment/Plan


Assess & Plan/Chief Complaint


Left tibial plateau fracture.


Atrial fibrillation history.


COPD.


Coronary artery disease.


Diabetes area


Patient more alert today than yesterday.


.


18.


Left tibial plateau fracture.


Atrial fibrillation history.


COPD.


Coronary artery disease.


Diabetes.


Patient confused last night.


Patient not confused this morning.


Patient have chest x-ray today.


.


/left tibial plateau fracture.


Atrial fibrillation history.


COPD.


Pneumonia.


Coronary artery disease.


Diabetes..


.


.


Left tibial plateau fracture.


Atrial fibrillation.


COPD.


Pneumonia.


Coronary artery disease.


Diabetes 18.


Left tibial plateau fracture.


History of atrial fibrillation.


COPD.


Pneumonia history.


Coronary artery disease.


Diabetes





Clinical Quality Measures


DVT/VTE Risk/Contraindication:


Risk Factor Score Per Nursin


RFS Level Per Nursing on Admit:  4+=Very High











TOR CONNELLY DO Sep 2018 08:39

## 2018-09-05 NOTE — PM & R (SOAP) PROGRESS NOTE
Subjective


This was a face to face visit with the patient.


Date Seen by Provider:  Sep 5, 2018


Time Seen by Provider:  07:10


Subjective/Events-last exam


Patient was seen in his room this AM Patient More alert Appreciate DR brown note and current labs  and CXR.Appreciate PT/OT and ST notes





Objective


Physician Exam


Last Set of Vital Signs





Vital Signs








  Date Time  Temp Pulse Resp B/P (MAP) Pulse Ox O2 Delivery O2 Flow Rate FiO2


 


9/5/18 06:56     97 Nasal Cannula 2.00 


 


9/5/18 05:54 96.8 82 22 98/57 (71)    





Capillary Refill : Less Than 3 Seconds


I&O











Intake and Output 


 


 9/5/18





 00:00


 


Intake Total 1150 ml


 


Output Total 1150 ml


 


Balance 0 ml


 


 


 


Intake Oral 1150 ml


 


Output Urine Total 1150 ml


 


# Voids 1


 


# Bowel Movements 1








General:  Alert, Oriented X3, Cooperative, No Acute Distress


HEENT:  Atraumatic, PERRLA, EOMI, Mucous Memb Moist/Pink, Other (02 by n/c in 

place)


Neck:  Supple, No JVD


Lungs:  Other (Cough with congestion rhonchi)


Heart:  Regular Rate, Normal S1, Normal S2


Abdomen:  Normal Bowel Sounds, Soft, No Tenderness


Extremities:  No Clubbing, Other (Knee immobilizer left)


Skin:  No Rashes


Neuro:  Normal Speech, Other (Functional strength and ROM ( knee not tested))


Psych/Mental Status:  Mental Status NL, Mood NL





Results


Lab Data


Laboratory Tests


9/2/18 11:36: Glucometer 238H


9/2/18 16:10: Glucometer 207H


9/2/18 20:07: Glucometer 191H


9/3/18 05:08: Glucometer 141H


9/3/18 05:50: 


Prothrombin Time 28.6H, INR Comment 2.7H


9/3/18 11:05: Glucometer 216H


9/3/18 15:16: Glucometer 205H


9/3/18 20:14: Glucometer 434*H


9/3/18 22:08: Glucometer 170H


9/4/18 05:17: Glucometer 113H


9/4/18 07:23: 


White Blood Count 7.2, Red Blood Count 2.88L, Hemoglobin 8.8L, Hematocrit 27L, 

Mean Corpuscular Volume 93, Mean Corpuscular Hemoglobin 31, Mean Corpuscular 

Hemoglobin Concent 33, Red Cell Distribution Width 14.7H, Platelet Count 370, 

Mean Platelet Volume 9.4, Neutrophils (%) (Auto) 64, Lymphocytes (%) (Auto) 20, 

Monocytes (%) (Auto) 10, Eosinophils (%) (Auto) 5, Basophils (%) (Auto) 0, 

Neutrophils # (Auto) 4.7, Lymphocytes # (Auto) 1.5, Monocytes # (Auto) 0.7, 

Eosinophils # (Auto) 0.4H, Basophils # (Auto) 0.0, Prothrombin Time 26.6H, INR 

Comment 2.4H, Sodium Level 136, Potassium Level 3.9, Chloride Level 99, Carbon 

Dioxide Level 26, Anion Gap 11, Blood Urea Nitrogen 17, Creatinine 0.93, 

Estimat Glomerular Filtration Rate > 60, BUN/Creatinine Ratio 18, Glucose Level 

114H, Calcium Level 8.4L


9/4/18 10:51: Glucometer 211H


9/4/18 15:59: Glucometer 154H


9/4/18 20:39: Glucometer 200H


9/5/18 05:00: 


Prothrombin Time 26.9H, INR Comment 2.5H


9/5/18 06:35: Glucometer 162H





Assessment/Plan


Assessment and Plan


Left tibial plateau fracture managed with knee immobilizer and TTWB LLE


Hyoptension still low with adjustment in meds-Cardiology and PCP following


CAD


PAF on meds


Chronic anticoagulation INR noted


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


[place]


HLP


DM2


Anemia lab noted


Pneumonia treated


Cognitive deficits





Plan Continue PT/OT/ST


Team Conference later today-See report for full functional update and POC and 

ELOS


F/U with DR sánchez Prdustin


Co-Morbidities that are continuing to impact the rehab process: (include details

)











VINCENT ROGERS MD Sep 5, 2018 07:36

## 2018-09-05 NOTE — CARDIOLOGY PROGRESS NOTE
Subjective


Date Seen by Provider:  Sep 5, 2018


Time Seen by Provider:  14:56


Subjective/Events-last exam


Patient is in bed, feeling better, no new complaint, no chest pain or dizziness


Review of Systems


General:  No Chills, No Night Sweats, No Fatigue, No Malaise, No Appetite, No 

Other


HEENT:  No Head Aches, No Visual Changes, No Eye Pain, No Ear Pain, No Dysphasia

, No Sinus Congestion, No Post Nasal Drip, No Sore Throat, No Other


Pulmonary:  No Dyspnea, No Cough, No Pleuritic Chest Pain, No Other





Objective-Cardiology


Exam


Last Set of Vital Signs





Vital Signs








 18





 08:14 14:36


 


Temp 96.4 


 


Pulse 101 


 


Resp 20 


 


B/P (MAP) 99/56 (70) 


 


Pulse Ox  96


 


O2 Delivery  Nasal Cannula


 


O2 Flow Rate  2.00





Capillary Refill : Less Than 3 Seconds


I&O











Intake and Output 


 


 18





 00:00


 


Intake Total 1150 ml


 


Output Total 1150 ml


 


Balance 0 ml


 


 


 


Intake Oral 1150 ml


 


Output Urine Total 1150 ml


 


# Voids 1


 


# Bowel Movements 1








General:  Alert, Oriented X3, Cooperative, No Acute Distress


HEENT:  Atraumatic, PERRLA, EOMI, Mucous Memb Moist/Pink, Other (02 by n/c in 

place)


Neck:  Supple, No JVD


Lungs:  Other (Cough with congestion rhonchi)


Heart:  Regular Rate, Normal S1, Normal S2


Abdomen:  Normal Bowel Sounds, Soft, No Tenderness


Extremities:  No Clubbing, Other (Knee immobilizer left)


Skin:  No Rashes


Neuro:  Normal Speech, Other (Functional strength and ROM ( knee not tested))


Psych/Mental Status:  Mental Status NL, Mood NL





A/P-Cardiology


Admission Diagnosis


Tibial fracture


Hypotension


Coronary artery disease


Chronic atrial fibrillation





Assessment/Plan


L lateral tibial plateau fracture, managed by Ortho Service





Intermittently borderline hypotension,  metoprolol discontinued yesterday, I 

will change Cardizem to 90 mg with parameters and monitor her tolerance and 

response





Sinus tachycardia, frequent APCs, history of paroxysmal atrial fibrillation and 

Sick sinus syndrome, was intolerant to beta blockers in the past due to 

bradycardia





Coronary artery disease with multiple stent placement in the past with most 

recent cardiac catheterization in 2017 showing patent stent in the left 

main, 50 percent ostial left main stenosis, heavily calcified LAD and 

circumflex artery with patent stent, mild to moderate disease, heavily 

calcified right coronary artery with mild-to-moderate disease, patent stent in 

the right iliac artery with mild disease in the right lower extremity, mild 

disease in the left lower extremity.  Continue to monitor





Intolerance of beta blockers secondary to bradycardia. Continue to monitor. 





History Paroxysmal atrial fibrillation, sick sinus syndrome.  Was unable to 

tolerate sotalol, metoprolol, Multaq,  has a Linq device implanted, continue to 

monitor





Generalized fatigue and loss of energy, dyspnea on exertion, chronic





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





Has history of obstructive sleep apnea and emphysema followed by Dr. Patino.





Hyperlipidemia, continue to monitor lipids





Diabetes mellitus II, followed and managed by primary care physician.





Moderate bilateral carotid stenosis, by history





Chronic back pain





Clinical Quality Measures


DVT/VTE Risk/Contraindication:


Risk Factor Score Per Nursin


RFS Level Per Nursing on Admit:  4+=Very High











SHAHRIAR BRARY MD Sep 5, 2018 2:57 pm

## 2018-09-06 VITALS — DIASTOLIC BLOOD PRESSURE: 63 MMHG | SYSTOLIC BLOOD PRESSURE: 106 MMHG

## 2018-09-06 VITALS — DIASTOLIC BLOOD PRESSURE: 54 MMHG | SYSTOLIC BLOOD PRESSURE: 90 MMHG

## 2018-09-06 VITALS — SYSTOLIC BLOOD PRESSURE: 99 MMHG | DIASTOLIC BLOOD PRESSURE: 62 MMHG

## 2018-09-06 LAB
INR PPP: 2.7 (ref 0.8–1.4)
PROTHROMBIN TIME: 28.5 SEC (ref 12.2–14.7)

## 2018-09-06 RX ADMIN — INSULIN HUMAN SCH UNIT: 100 INJECTION, SUSPENSION SUBCUTANEOUS at 20:13

## 2018-09-06 RX ADMIN — DILTIAZEM HYDROCHLORIDE SCH MG: 90 CAPSULE, EXTENDED RELEASE ORAL at 08:00

## 2018-09-06 RX ADMIN — PANTOPRAZOLE SODIUM SCH MG: 40 TABLET, DELAYED RELEASE ORAL at 06:47

## 2018-09-06 RX ADMIN — INSULIN HUMAN SCH UNIT: 100 INJECTION, SUSPENSION SUBCUTANEOUS at 08:01

## 2018-09-06 RX ADMIN — ATORVASTATIN CALCIUM SCH MG: 10 TABLET, FILM COATED ORAL at 20:12

## 2018-09-06 RX ADMIN — GABAPENTIN SCH MG: 100 CAPSULE ORAL at 06:47

## 2018-09-06 RX ADMIN — WARFARIN SODIUM SCH MG: 5 TABLET ORAL at 18:48

## 2018-09-06 RX ADMIN — MONTELUKAST SCH MG: 10 TABLET, FILM COATED ORAL at 20:12

## 2018-09-06 RX ADMIN — IPRATROPIUM BROMIDE AND ALBUTEROL SULFATE SCH ML: .5; 3 SOLUTION RESPIRATORY (INHALATION) at 11:01

## 2018-09-06 RX ADMIN — IPRATROPIUM BROMIDE AND ALBUTEROL SULFATE SCH ML: .5; 3 SOLUTION RESPIRATORY (INHALATION) at 07:10

## 2018-09-06 RX ADMIN — INSULIN ASPART SCH UNIT: 100 INJECTION, SOLUTION INTRAVENOUS; SUBCUTANEOUS at 11:21

## 2018-09-06 RX ADMIN — INSULIN ASPART SCH UNIT: 100 INJECTION, SOLUTION INTRAVENOUS; SUBCUTANEOUS at 16:38

## 2018-09-06 RX ADMIN — IPRATROPIUM BROMIDE AND ALBUTEROL SULFATE SCH ML: .5; 3 SOLUTION RESPIRATORY (INHALATION) at 19:55

## 2018-09-06 RX ADMIN — IPRATROPIUM BROMIDE AND ALBUTEROL SULFATE SCH ML: .5; 3 SOLUTION RESPIRATORY (INHALATION) at 16:52

## 2018-09-06 RX ADMIN — DOCUSATE SODIUM SCH MG: 100 CAPSULE ORAL at 08:00

## 2018-09-06 RX ADMIN — GABAPENTIN SCH MG: 100 CAPSULE ORAL at 11:21

## 2018-09-06 RX ADMIN — FLUTICASONE PROPIONATE AND SALMETEROL XINAFOATE SCH PUFF: 115; 21 AEROSOL, METERED RESPIRATORY (INHALATION) at 19:56

## 2018-09-06 RX ADMIN — ACETAMINOPHEN PRN MG: 500 TABLET ORAL at 07:59

## 2018-09-06 RX ADMIN — GABAPENTIN SCH MG: 100 CAPSULE ORAL at 20:12

## 2018-09-06 RX ADMIN — INSULIN ASPART SCH UNIT: 100 INJECTION, SOLUTION INTRAVENOUS; SUBCUTANEOUS at 06:00

## 2018-09-06 RX ADMIN — FLUTICASONE PROPIONATE AND SALMETEROL XINAFOATE SCH PUFF: 115; 21 AEROSOL, METERED RESPIRATORY (INHALATION) at 07:10

## 2018-09-06 RX ADMIN — INSULIN ASPART SCH UNIT: 100 INJECTION, SOLUTION INTRAVENOUS; SUBCUTANEOUS at 20:13

## 2018-09-06 NOTE — SPEECH THERAPY DAILY NOTE
Speech Daily Progress Note


Subjective


Date Seen by Provider:  Sep 6, 2018


Time Seen by Provider:  09:30


Pt up in chair.  Pleasant and cooperative.





Pain





   Numeric Pain Scale:  0-No Pain





Objective


Memory





Picture recall after 20 minutes was 100% accurate independently.


Word List Retention - pt was ready 4 words and was asked a question following. 

The pt was to use one of  the 4 words to answer the question.  He was 80% 

accurate with min assist.





Assessment


Assessment Current Status:  Good Progress





Treatment Plan


Continue Plan of Care





Communication


Comprehension:  6


Expression:  7





Social Cognition


Social Interaction:  7


Problem Solvin


Memory:  4





Speech Short Term Goals


Short Term Goals


Short Term Goals


1.  Pt will complete various memory tasks with at least 80% accuracy with  min 

assist.


2.  Pt will complete various problem solving tasks with at least 80% accuracy 

and min assist.


Comprehension:  6


Expression:  7


Social Interaction:  7


Problem Solvin


Memory:  5





Speech Long Term Goals


Long Term Goals


Pt will demonstrate functional memory and problem solving ability for maximum 

independence in the home.


Comprehension:  6


Expression:  7


Social Interaction:  7


Problem Solvin


Memory:  6





Speech-Plan


Patient/Family Goals


Patient/Family Goals:  


to return home





Treatment Plan


Speech Therapy Treatment Plan:  Continue Plan of Care


pt continues to be pleasant and cooperative.


Treatment Duration:  Sep 11, 2018


Frequency:  5 times per week


Estimated Hrs Per Day:  .5 hour per day


Rehab Potential:  Fair


Pt/Family Agrees to Plan:  Yes





Safety Risks/Education


Teaching Recipient:  Patient


Teaching Methods:  Discussion


Response to Teaching:  Verbalize Understanding





Time


Speech Therapy Time In:  09:30


Speech Therapy Time Out:  10:00


Total Billed Time:  30


Billed Treatment Time


1, SLTS


COLETTE Burden Sep 6, 2018 08:59

## 2018-09-06 NOTE — PM & R (SOAP) PROGRESS NOTE
Subjective


This was a face to face visit with the patient.


Date Seen by Provider:  Sep 6, 2018


Time Seen by Provider:  07:30


Subjective/Events-last exam


Patient was seen in his room this AM Patient SBA for transfers,More alert today 

and eating Breakfast Blood pressure still low but stable Appreciate Cardiology 

and DR Dillon notes and orders.





Objective


Physician Exam


Last Set of Vital Signs





Vital Signs








  Date Time  Temp Pulse Resp B/P (MAP) Pulse Ox O2 Delivery O2 Flow Rate FiO2


 


9/6/18 07:10     96 Nasal Cannula 2.00 


 


9/6/18 05:50 97.4 87 18 99/62 (74)    





Capillary Refill : Less Than 3 Seconds


I&O











Intake and Output 


 


 9/6/18





 00:00


 


Intake Total 1550 ml


 


Output Total 1150 ml


 


Balance 400 ml


 


 


 


Intake Oral 1550 ml


 


Output Urine Total 1150 ml


 


# Voids 2


 


# Bowel Movements 1








General:  Alert, Oriented X3, Cooperative, No Acute Distress


HEENT:  Atraumatic, PERRLA, EOMI, Mucous Memb Moist/Pink, Other (02 by n/c in 

place)


Neck:  Supple, No JVD


Lungs:  Other (Cough with congestion rhonchi)


Heart:  Regular Rate, Normal S1, Normal S2


Abdomen:  Normal Bowel Sounds, Soft, No Tenderness


Extremities:  No Clubbing, Other (Knee immobilizer left)


Skin:  No Rashes


Neuro:  Normal Speech, Other (Functional strength and ROM ( knee not tested))


Psych/Mental Status:  Mental Status NL, Mood NL





Results


Lab Data


Laboratory Tests


9/3/18 11:05: Glucometer 216H


9/3/18 15:16: Glucometer 205H


9/3/18 20:14: Glucometer 434*H


9/3/18 22:08: Glucometer 170H


9/4/18 05:17: Glucometer 113H


9/4/18 07:23: 


White Blood Count 7.2, Red Blood Count 2.88L, Hemoglobin 8.8L, Hematocrit 27L, 

Mean Corpuscular Volume 93, Mean Corpuscular Hemoglobin 31, Mean Corpuscular 

Hemoglobin Concent 33, Red Cell Distribution Width 14.7H, Platelet Count 370, 

Mean Platelet Volume 9.4, Neutrophils (%) (Auto) 64, Lymphocytes (%) (Auto) 20, 

Monocytes (%) (Auto) 10, Eosinophils (%) (Auto) 5, Basophils (%) (Auto) 0, 

Neutrophils # (Auto) 4.7, Lymphocytes # (Auto) 1.5, Monocytes # (Auto) 0.7, 

Eosinophils # (Auto) 0.4H, Basophils # (Auto) 0.0, Prothrombin Time 26.6H, INR 

Comment 2.4H, Sodium Level 136, Potassium Level 3.9, Chloride Level 99, Carbon 

Dioxide Level 26, Anion Gap 11, Blood Urea Nitrogen 17, Creatinine 0.93, 

Estimat Glomerular Filtration Rate > 60, BUN/Creatinine Ratio 18, Glucose Level 

114H, Calcium Level 8.4L


9/4/18 10:51: Glucometer 211H


9/4/18 15:59: Glucometer 154H


9/4/18 20:39: Glucometer 200H


9/5/18 05:00: 


Prothrombin Time 26.9H, INR Comment 2.5H


9/5/18 06:35: Glucometer 162H


9/5/18 12:08: Glucometer 239H


9/5/18 16:39: Glucometer 173H


9/5/18 21:09: Glucometer 207H


9/6/18 05:07: Glucometer 111H


9/6/18 05:10: 


Prothrombin Time 28.5H, INR Comment 2.7H





Assessment/Plan


Assessment and Plan


Left tibial plateau fracture managed with Knee immobilizer and TTWB LLE


Hypotension stable


CAD


PAF diltiazam resumed


Chronic anticoagulation


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


[place]


DM2


Anemia


Pneumonia treated


Cognitive deficits appear to be improving back to baseline


Plan Continue PT/OT/St


F/U with PCP and Cardiology


Team Conference held yesterday-see report for full functional update and POC 

and ELOS


Co-Morbidities that are continuing to impact the rehab process: (include details

)











VINCENT ROGERS MD Sep 6, 2018 07:56

## 2018-09-06 NOTE — PROGRESS NOTE (SOAP)
Subjective


Time Seen by Provider:  07:46


Subjective/Events-last exam


Patient feeling better today.


Patient alert.


Heart in atrial fibrillation





Objective


Exam





Vital Signs








  Date Time  Temp Pulse Resp B/P (MAP) Pulse Ox O2 Delivery O2 Flow Rate FiO2


 


18 07:10     96 Nasal Cannula 2.00 


 


18 05:50 97.4 87 18 99/62 (74) 97 Nasal Cannula 2.00 


 


18 21:00      Nasal Cannula 2.00 


 


18 16:56 98.6 84 18 113/67 (82) 99 Nasal Cannula 2.00 


 


18 14:36     96 Nasal Cannula 2.00 


 


18 10:58     96 Nasal Cannula 2.00 


 


18 08:14 96.4 101 20 99/56 (70) 98 Nasal Cannula 2.00 


 


18 08:11      Nasal Cannula 2.00 














I & O 


 


 18





 07:00


 


Intake Total 1350 ml


 


Output Total 1200 ml


 


Balance 150 ml





Capillary Refill : Less Than 3 Seconds


General Appearance:  No Apparent Distress, WD/WN


HEENT:  Normal ENT Inspection


Neck:  Full Range of Motion, Normal Inspection


Respiratory:  No Accessory Muscle Use, No Respiratory Distress, Decreased 

Breath Sounds


Cardiovascular:  Irregularly Irregular


Gastrointestinal:  non tender, soft





Results


Lab


Laboratory Tests


18 12:08: Glucometer 239H


18 16:39: Glucometer 173H


18 21:09: Glucometer 207H


18 05:07: Glucometer 111H


18 05:10: 


Prothrombin Time 28.5H, INR Comment 2.7H





Assessment/Plan


Assessment/Plan


Assess & Plan/Chief Complaint


Left tibial plateau fracture.


Atrial fibrillation history.


COPD.


Coronary artery disease.


Diabetes area


Patient more alert today than yesterday.


.


18.


Left tibial plateau fracture.


Atrial fibrillation history.


COPD.


Coronary artery disease.


Diabetes.


Patient confused last night.


Patient not confused this morning.


Patient have chest x-ray today.


.


/left tibial plateau fracture.


Atrial fibrillation history.


COPD.


Pneumonia.


Coronary artery disease.


Diabetes..


.


.


Left tibial plateau fracture.


Atrial fibrillation.


COPD.


Pneumonia.


Coronary artery disease.


Diabetes 18.


Left tibial plateau fracture.


History of atrial fibrillation.


COPD.


Pneumonia history.


Coronary artery disease.


Diabetes.


.


18.


Left tibial plateau fracture.


History of atrial fibrillation.


Patient in atrial fib.


COPD.


Coronary artery disease.


Diabetes doing good





Clinical Quality Measures


DVT/VTE Risk/Contraindication:


Risk Factor Score Per Nursin


RFS Level Per Nursing on Admit:  4+=Very High











TOR CONNELLY DO Sep 6, 2018 07:51

## 2018-09-07 VITALS — DIASTOLIC BLOOD PRESSURE: 67 MMHG | SYSTOLIC BLOOD PRESSURE: 100 MMHG

## 2018-09-07 VITALS — DIASTOLIC BLOOD PRESSURE: 76 MMHG | SYSTOLIC BLOOD PRESSURE: 122 MMHG

## 2018-09-07 LAB
INR PPP: 2.7 (ref 0.8–1.4)
PROTHROMBIN TIME: 29.2 SEC (ref 12.2–14.7)

## 2018-09-07 RX ADMIN — POTASSIUM CHLORIDE SCH MEQ: 750 TABLET, FILM COATED, EXTENDED RELEASE ORAL at 11:58

## 2018-09-07 RX ADMIN — FLUTICASONE PROPIONATE AND SALMETEROL XINAFOATE SCH PUFF: 115; 21 AEROSOL, METERED RESPIRATORY (INHALATION) at 19:45

## 2018-09-07 RX ADMIN — IPRATROPIUM BROMIDE AND ALBUTEROL SULFATE SCH ML: .5; 3 SOLUTION RESPIRATORY (INHALATION) at 19:45

## 2018-09-07 RX ADMIN — GABAPENTIN SCH MG: 100 CAPSULE ORAL at 20:33

## 2018-09-07 RX ADMIN — INSULIN HUMAN SCH UNIT: 100 INJECTION, SUSPENSION SUBCUTANEOUS at 08:08

## 2018-09-07 RX ADMIN — FLUTICASONE PROPIONATE AND SALMETEROL XINAFOATE SCH PUFF: 115; 21 AEROSOL, METERED RESPIRATORY (INHALATION) at 10:35

## 2018-09-07 RX ADMIN — INSULIN ASPART SCH UNIT: 100 INJECTION, SOLUTION INTRAVENOUS; SUBCUTANEOUS at 05:53

## 2018-09-07 RX ADMIN — PANTOPRAZOLE SODIUM SCH MG: 40 TABLET, DELAYED RELEASE ORAL at 06:02

## 2018-09-07 RX ADMIN — IPRATROPIUM BROMIDE AND ALBUTEROL SULFATE SCH ML: .5; 3 SOLUTION RESPIRATORY (INHALATION) at 07:54

## 2018-09-07 RX ADMIN — GABAPENTIN SCH MG: 100 CAPSULE ORAL at 06:02

## 2018-09-07 RX ADMIN — ATORVASTATIN CALCIUM SCH MG: 10 TABLET, FILM COATED ORAL at 20:33

## 2018-09-07 RX ADMIN — DOCUSATE SODIUM SCH MG: 100 CAPSULE ORAL at 08:08

## 2018-09-07 RX ADMIN — IPRATROPIUM BROMIDE AND ALBUTEROL SULFATE SCH ML: .5; 3 SOLUTION RESPIRATORY (INHALATION) at 15:00

## 2018-09-07 RX ADMIN — DILTIAZEM HYDROCHLORIDE SCH MG: 90 CAPSULE, EXTENDED RELEASE ORAL at 08:08

## 2018-09-07 RX ADMIN — INSULIN ASPART SCH UNIT: 100 INJECTION, SOLUTION INTRAVENOUS; SUBCUTANEOUS at 17:01

## 2018-09-07 RX ADMIN — WARFARIN SODIUM SCH MG: 5 TABLET ORAL at 17:21

## 2018-09-07 RX ADMIN — GABAPENTIN SCH MG: 100 CAPSULE ORAL at 11:58

## 2018-09-07 RX ADMIN — INSULIN ASPART SCH UNIT: 100 INJECTION, SOLUTION INTRAVENOUS; SUBCUTANEOUS at 11:58

## 2018-09-07 RX ADMIN — MONTELUKAST SCH MG: 10 TABLET, FILM COATED ORAL at 20:33

## 2018-09-07 RX ADMIN — INSULIN HUMAN SCH UNIT: 100 INJECTION, SUSPENSION SUBCUTANEOUS at 20:34

## 2018-09-07 RX ADMIN — IPRATROPIUM BROMIDE AND ALBUTEROL SULFATE SCH ML: .5; 3 SOLUTION RESPIRATORY (INHALATION) at 10:35

## 2018-09-07 RX ADMIN — INSULIN ASPART SCH UNIT: 100 INJECTION, SOLUTION INTRAVENOUS; SUBCUTANEOUS at 20:34

## 2018-09-07 NOTE — PM & R (SOAP) PROGRESS NOTE
Subjective


This was a face to face visit with the patient.


Date Seen by Provider:  Sep 7, 2018


Time Seen by Provider:  07:20


Subjective/Events-last exam


Patient was seen in his room this AM Patient Min assist to SBA fo 

transfers.hypotension improved





Objective


Physician Exam


Last Set of Vital Signs





Vital Signs








  Date Time  Temp Pulse Resp B/P (MAP) Pulse Ox O2 Delivery O2 Flow Rate FiO2


 


9/7/18 05:58 96.8 85 16 100/67 (78) 95 Nasal Cannula 2.00 





Capillary Refill : Less Than 3 Seconds


I&O











Intake and Output 


 


 9/7/18





 00:00


 


Intake Total 1000 ml


 


Output Total 925 ml


 


Balance 75 ml


 


 


 


Intake Oral 1000 ml


 


Output Urine Total 925 ml


 


# Bowel Movements 2








General:  Alert, Oriented X3, Cooperative, No Acute Distress


HEENT:  Atraumatic, PERRLA, EOMI, Mucous Memb Moist/Pink, Other (02 by n/c in 

place)


Neck:  Supple, No JVD


Lungs:  Other (Cough with congestion rhonchi)


Heart:  Regular Rate, Normal S1, Normal S2


Abdomen:  Normal Bowel Sounds, Soft, No Tenderness


Extremities:  No Clubbing, Other (Knee immobilizer left)


Skin:  No Rashes


Neuro:  Normal Speech, Other (Functional strength and ROM ( knee not tested))


Psych/Mental Status:  Mental Status NL, Mood NL





Results


Lab Data


Laboratory Tests


9/4/18 10:51: Glucometer 211H


9/4/18 15:59: Glucometer 154H


9/4/18 20:39: Glucometer 200H


9/5/18 05:00: 


Prothrombin Time 26.9H, INR Comment 2.5H


9/5/18 06:35: Glucometer 162H


9/5/18 12:08: Glucometer 239H


9/5/18 16:39: Glucometer 173H


9/5/18 21:09: Glucometer 207H


9/6/18 05:07: Glucometer 111H


9/6/18 05:10: 


Prothrombin Time 28.5H, INR Comment 2.7H


9/6/18 10:52: Glucometer 254H


9/6/18 15:57: Glucometer 161H


9/6/18 20:07: Glucometer 327H


9/7/18 05:53: Glucometer 132H


9/7/18 06:00: 


Prothrombin Time 29.2H, INR Comment 2.7H





Assessment/Plan


Assessment and Plan


Left tibila plateau fracture managed with Knee immobilizer and TTWB LLE


Hypotension improved


CAD


PAF diltiazam resumed


Chronic anticoagulation with therapeutic INR


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


[place]


DM2


Anemia


Pneumonia treated


Cognitive deficits appear to be improving back to baseline


Plan Continue PT/OT/ST


F/U with PCP and Cardiology


and ortho


Next Team Conference 9-12-18


Co-Morbidities that are continuing to impact the rehab process: (include details

)











VINCENT ROGERS MD Sep 7, 2018 08:38

## 2018-09-07 NOTE — SPEECH THERAPY DAILY NOTE
Speech Daily Progress Note


Subjective


Date Seen by Provider:  Sep 7, 2018


Time Seen by Provider:  11:00


Pt in bed.  Pleasant and cooperative.





Pain





   Numeric Pain Scale:  0-No Pain





Objective


Match game on computer.  Initial time 20 cards and second time 10 cards.  Pt 

made appropriate matches without choosing the same cards over and over again.





Assessment


Assessment Current Status:  Fair Progress





Treatment Plan


Continue Plan of Care





Communication


Comprehension:  6


Expression:  7





Social Cognition


Social Interaction:  7


Problem Solvin


Memory:  4





Speech Short Term Goals


Short Term Goals


Short Term Goals


1.  Pt will complete various memory tasks with at least 80% accuracy with  min 

assist.


2.  Pt will complete various problem solving tasks with at least 80% accuracy 

and min assist.


Comprehension:  6


Expression:  7


Social Interaction:  7


Problem Solvin


Memory:  5





Speech Long Term Goals


Long Term Goals


Pt will demonstrate functional memory and problem solving ability for maximum 

independence in the home.


Comprehension:  6


Expression:  7


Social Interaction:  7


Problem Solvin


Memory:  6





Speech-Plan


Patient/Family Goals


Patient/Family Goals:  


to return home





Treatment Plan


Speech Therapy Treatment Plan:  Continue Plan of Care


pt very cooperative.


Treatment Duration:  Sep 11, 2018


Frequency:  5 times per week


Estimated Hrs Per Day:  .5 hour per day


Rehab Potential:  Fair


Pt/Family Agrees to Plan:  Yes





Safety Risks/Education


Teaching Recipient:  Patient


Teaching Methods:  Discussion


Response to Teaching:  Verbalize Understanding





Time


Speech Therapy Time In:  11:00


Speech Therapy Time Out:  11:30


Total Billed Time:  30


Billed Treatment Time


[address]ASHLYN RANDY ST Sep 7, 2018 14:57

## 2018-09-07 NOTE — THERAPY GROUP DAILY NOTE
Therapy Daily Group Note


Patient Education Topic


Other List Below (nutrition, rehab orientation, oral care, neuropathy)





Exercises


Fine Motor (hand to mouth)





Other/Notes


Pt ambulated with cane to OT/PT group.  Group consisted of introductions (name, 

place, worst food eaten), socialization, ARU description/expectation, education 

on nutrition/oral health/neuropathy and fine motor activity.  Pt introduced 

self appropriately and actively listened to peers.  Pt verbalized understanding 

about educational topics.  Contributed to topics and discussions throughout 

group.  Pt demonstrated good fine motor skills during activities during group.  

After group, pt ambulated to room SBA FWW.  In room with bell at hand. All 

needs met.


Start Time:  13:00


Stop Time:  14:15


Total Billed Treatment Time:  75


Total Billed Treatment


1,GRP











YA AARON PTA Sep 7, 2018 14:45

## 2018-09-07 NOTE — CARDIOLOGY PROGRESS NOTE
Subjective


Date Seen by Provider:  Sep 7, 2018


Time Seen by Provider:  07:48


Subjective/Events-last exam


Patient is in bed, feeling better, blood pressure was checked while I am in the 

room and it was 110/65, no dizziness


Review of Systems


General:  No Chills, No Night Sweats, No Fatigue, No Malaise, No Appetite, No 

Other


HEENT:  No Head Aches, No Visual Changes, No Eye Pain, No Ear Pain, No Dysphasia

, No Sinus Congestion, No Post Nasal Drip, No Sore Throat, No Other


Pulmonary:  No Dyspnea, No Cough, No Pleuritic Chest Pain, No Other


Cardiovascular:  No: Chest Pain, Palpitations, Orthopnea, Paroxysmal Noc. 

Dyspnea, Edema, Lt Headedness, Other





Objective-Cardiology


Exam


Last Set of Vital Signs





Vital Signs








 18





 05:58


 


Temp 96.8


 


Pulse 85


 


Resp 16


 


B/P (MAP) 100/67 (78)


 


Pulse Ox 95


 


O2 Delivery Nasal Cannula


 


O2 Flow Rate 2.00





Capillary Refill : Less Than 3 Seconds


I&O











Intake and Output 


 


 18





 00:00


 


Intake Total 1000 ml


 


Output Total 925 ml


 


Balance 75 ml


 


 


 


Intake Oral 1000 ml


 


Output Urine Total 925 ml


 


# Bowel Movements 2








General:  Alert, Oriented X3, Cooperative, No Acute Distress


HEENT:  Atraumatic, PERRLA, EOMI, Mucous Memb Moist/Pink, Other (02 by n/c in 

place)


Neck:  Supple, No JVD


Lungs:  Other (Cough with congestion rhonchi)


Heart:  Regular Rate, Normal S1, Normal S2


Abdomen:  Normal Bowel Sounds, Soft, No Tenderness


Extremities:  No Clubbing, Other (Knee immobilizer left)


Skin:  No Rashes


Neuro:  Normal Speech, Other (Functional strength and ROM ( knee not tested))


Psych/Mental Status:  Mental Status NL, Mood NL





Results


Lab








Laboratory Tests








Test


 18


10:52 18


15:57 18


20:07 18


05:53 Range/Units


 


 


Glucometer 254 H 161 H 327 H 132 H   MG/DL


 


Test


 18


06:00 


 


 


 Range/Units


 


 


Prothrombin Time 29.2 H    12.2-14.7  SEC


 


INR Comment 2.7 H    0.8-1.4  











A/P-Cardiology


Admission Diagnosis


Tibial fracture


Hypotension


Coronary artery disease


Chronic atrial fibrillation





Assessment/Plan


L lateral tibial plateau fracture, managed by Ortho Service





Intermittently borderline hypotension,  better at this point and he is 

tolerating Cardizem CD 90, continue to monitor





Sinus tachycardia, frequent APCs, history of paroxysmal atrial fibrillation and 

Sick sinus syndrome, was intolerant to beta blockers in the past due to 

bradycardia





Coronary artery disease with multiple stent placement in the past with most 

recent cardiac catheterization in 2017 showing patent stent in the left 

main, 50 percent ostial left main stenosis, heavily calcified LAD and 

circumflex artery with patent stent, mild to moderate disease, heavily 

calcified right coronary artery with mild-to-moderate disease, patent stent in 

the right iliac artery with mild disease in the right lower extremity, mild 

disease in the left lower extremity.  Continue to monitor





Intolerance of beta blockers secondary to bradycardia. Continue to monitor. 





History Paroxysmal atrial fibrillation, sick sinus syndrome.  Was unable to 

tolerate sotalol, metoprolol, Multaq,  has a Linq device implanted, continue to 

monitor





Generalized fatigue and loss of energy, dyspnea on exertion, chronic





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





Has history of obstructive sleep apnea and emphysema followed by Dr. Patino.





Hyperlipidemia, continue to monitor lipids





Diabetes mellitus II, followed and managed by primary care physician.





Moderate bilateral carotid stenosis, by history





Chronic back pain





Clinical Quality Measures


DVT/VTE Risk/Contraindication:


Risk Factor Score Per Nursin


RFS Level Per Nursing on Admit:  4+=Very High











SHAHRIAR BARRY MD Sep 7, 2018 07:52

## 2018-09-07 NOTE — PROGRESS NOTE (SOAP)
Subjective


Time Seen by Provider:  08:15


Subjective/Events-last exam


Patient walking around today.


Patient hypotensive.


Patient not disease.


Patient in atrial fibrillation.





Objective


Exam





Vital Signs








  Date Time  Temp Pulse Resp B/P (MAP) Pulse Ox O2 Delivery O2 Flow Rate FiO2


 


18 05:58 96.8 85 16 100/67 (78) 95 Nasal Cannula 2.00 


 


18 20:21      Nasal Cannula 2.00 


 


18 19:56     90 Room Air  


 


18 18:55  87  106/63 (77)    


 


18 15:58 96.7 98 14 90/54 (66) 91 Room Air  


 


18 11:01     96 Nasal Cannula 2.00 


 


18 09:00      Nasal Cannula 2.00 














I & O 


 


 18





 07:00


 


Intake Total 1590 ml


 


Output Total 775 ml


 


Balance 815 ml





Capillary Refill : Less Than 3 Seconds


General Appearance:  No Apparent Distress, Thin


HEENT:  Normal ENT Inspection


Neck:  Full Range of Motion


Respiratory:  No Accessory Muscle Use, No Respiratory Distress, Decreased 

Breath Sounds


Cardiovascular:  Irregularly Irregular


Gastrointestinal:  non tender, soft





Results


Lab


Laboratory Tests


18 10:52: Glucometer 254H


18 15:57: Glucometer 161H


18 20:07: Glucometer 327H


18 05:53: Glucometer 132H


18 06:00: 


Prothrombin Time 29.2H, INR Comment 2.7H





Assessment/Plan


Assessment/Plan


Assess & Plan/Chief Complaint


Left tibial plateau fracture.


Atrial fibrillation history.


COPD.


Coronary artery disease.


Diabetes area


Patient more alert today than yesterday.


.


18.


Left tibial plateau fracture.


Atrial fibrillation history.


COPD.


Coronary artery disease.


Diabetes.


Patient confused last night.


Patient not confused this morning.


Patient have chest x-ray today.


.


/left tibial plateau fracture.


Atrial fibrillation history.


COPD.


Pneumonia.


Coronary artery disease.


Diabetes..


.


.


Left tibial plateau fracture.


Atrial fibrillation.


COPD.


Pneumonia.


Coronary artery disease.


Diabetes 18.


Left tibial plateau fracture.


History of atrial fibrillation.


COPD.


Pneumonia history.


Coronary artery disease.


Diabetes.


.


18.


Left tibial plateau fracture.


History of atrial fibrillation.


Patient in atrial fib.


COPD.


Coronary artery disease.


Diabetes doing good.


.


18.


Left tibial plateau fracture.


Patient in atrial fibrillation.


Hypertension.


COPD.


Coronary artery disease.


Diabetes





Clinical Quality Measures


DVT/VTE Risk/Contraindication:


Risk Factor Score Per Nursin


RFS Level Per Nursing on Admit:  4+=Very High











TOR CONNELLY DO Sep 7, 2018 08:21

## 2018-09-08 VITALS — DIASTOLIC BLOOD PRESSURE: 51 MMHG | SYSTOLIC BLOOD PRESSURE: 95 MMHG

## 2018-09-08 VITALS — DIASTOLIC BLOOD PRESSURE: 62 MMHG | SYSTOLIC BLOOD PRESSURE: 107 MMHG

## 2018-09-08 RX ADMIN — GABAPENTIN SCH MG: 100 CAPSULE ORAL at 06:02

## 2018-09-08 RX ADMIN — DILTIAZEM HYDROCHLORIDE SCH MG: 90 CAPSULE, EXTENDED RELEASE ORAL at 08:24

## 2018-09-08 RX ADMIN — DOCUSATE SODIUM SCH MG: 100 CAPSULE ORAL at 08:24

## 2018-09-08 RX ADMIN — INSULIN ASPART SCH UNIT: 100 INJECTION, SOLUTION INTRAVENOUS; SUBCUTANEOUS at 05:35

## 2018-09-08 RX ADMIN — INSULIN HUMAN SCH UNIT: 100 INJECTION, SUSPENSION SUBCUTANEOUS at 08:25

## 2018-09-08 RX ADMIN — PANTOPRAZOLE SODIUM SCH MG: 40 TABLET, DELAYED RELEASE ORAL at 06:02

## 2018-09-08 RX ADMIN — FLUTICASONE PROPIONATE AND SALMETEROL XINAFOATE SCH PUFF: 115; 21 AEROSOL, METERED RESPIRATORY (INHALATION) at 06:46

## 2018-09-08 RX ADMIN — GABAPENTIN SCH MG: 100 CAPSULE ORAL at 12:12

## 2018-09-08 RX ADMIN — INSULIN ASPART SCH UNIT: 100 INJECTION, SOLUTION INTRAVENOUS; SUBCUTANEOUS at 16:45

## 2018-09-08 RX ADMIN — INSULIN ASPART SCH UNIT: 100 INJECTION, SOLUTION INTRAVENOUS; SUBCUTANEOUS at 12:12

## 2018-09-08 RX ADMIN — MONTELUKAST SCH MG: 10 TABLET, FILM COATED ORAL at 20:05

## 2018-09-08 RX ADMIN — ATORVASTATIN CALCIUM SCH MG: 10 TABLET, FILM COATED ORAL at 20:04

## 2018-09-08 RX ADMIN — IPRATROPIUM BROMIDE AND ALBUTEROL SULFATE SCH ML: .5; 3 SOLUTION RESPIRATORY (INHALATION) at 19:59

## 2018-09-08 RX ADMIN — INSULIN HUMAN SCH UNIT: 100 INJECTION, SUSPENSION SUBCUTANEOUS at 20:16

## 2018-09-08 RX ADMIN — IPRATROPIUM BROMIDE AND ALBUTEROL SULFATE SCH ML: .5; 3 SOLUTION RESPIRATORY (INHALATION) at 10:28

## 2018-09-08 RX ADMIN — FLUTICASONE PROPIONATE AND SALMETEROL XINAFOATE SCH PUFF: 115; 21 AEROSOL, METERED RESPIRATORY (INHALATION) at 19:59

## 2018-09-08 RX ADMIN — IPRATROPIUM BROMIDE AND ALBUTEROL SULFATE SCH ML: .5; 3 SOLUTION RESPIRATORY (INHALATION) at 06:45

## 2018-09-08 RX ADMIN — INSULIN ASPART SCH UNIT: 100 INJECTION, SOLUTION INTRAVENOUS; SUBCUTANEOUS at 20:17

## 2018-09-08 RX ADMIN — IPRATROPIUM BROMIDE AND ALBUTEROL SULFATE SCH ML: .5; 3 SOLUTION RESPIRATORY (INHALATION) at 14:21

## 2018-09-08 RX ADMIN — WARFARIN SODIUM SCH MG: 5 TABLET ORAL at 16:45

## 2018-09-08 RX ADMIN — GABAPENTIN SCH MG: 100 CAPSULE ORAL at 20:05

## 2018-09-08 NOTE — CARDIOLOGY PROGRESS NOTE
Subjective


Date Seen by Provider:  Sep 8, 2018


Time Seen by Provider:  10:16


Subjective/Events-last exam


Patient was seen and evaluated, feeling well, sitting in a chair, denied any 

dizziness or chest pain.


Review of Systems


General:  No Chills, No Night Sweats, No Fatigue, No Malaise, No Appetite, No 

Other


HEENT:  No Head Aches, No Visual Changes, No Eye Pain, No Ear Pain, No Dysphasia

, No Sinus Congestion, No Post Nasal Drip, No Sore Throat, No Other


Pulmonary:  No Dyspnea, No Cough, No Pleuritic Chest Pain, No Other


Cardiovascular:  No: Chest Pain, Palpitations, Orthopnea, Paroxysmal Noc. 

Dyspnea, Edema, Lt Headedness, Other





Objective-Cardiology


Exam


Last Set of Vital Signs





Vital Signs








 18





 05:38 06:50 09:00


 


Temp 99.0  


 


Pulse 83  


 


Resp 18  


 


B/P (MAP) 95/51 (66)  


 


Pulse Ox  94 


 


O2 Delivery   Nasal Cannula


 


O2 Flow Rate   2.00





Capillary Refill : Less Than 3 Seconds


I&O











Intake and Output 


 


 18





 00:00


 


Intake Total 1690 ml


 


Output Total 1050 ml


 


Balance 640 ml


 


 


 


Intake Oral 1690 ml


 


Output Urine Total 1050 ml


 


# Bowel Movements 1








General:  Alert, Oriented X3, Cooperative, No Acute Distress


HEENT:  Atraumatic, PERRLA, EOMI, Mucous Memb Moist/Pink, Other (02 by n/c in 

place)


Neck:  Supple, No JVD


Lungs:  Other (Cough with congestion rhonchi)


Heart:  Regular Rate, Normal S1, Normal S2


Abdomen:  Normal Bowel Sounds, Soft, No Tenderness


Extremities:  No Clubbing, Other (Knee immobilizer left)


Skin:  No Rashes


Neuro:  Normal Speech, Other (Functional strength and ROM ( knee not tested))


Psych/Mental Status:  Mental Status NL, Mood NL





A/P-Cardiology


Admission Diagnosis


Tibial fracture


Hypotension


Coronary artery disease


Chronic atrial fibrillation





Assessment/Plan


L lateral tibial plateau fracture, managed by Ortho Service





Intermittently borderline hypotension,  better at this point and he is 

tolerating Cardizem CD 90, continue to monitor





Sinus tachycardia, frequent APCs, history of paroxysmal atrial fibrillation and 

Sick sinus syndrome, was intolerant to beta blockers in the past due to 

bradycardia, continue to monitor





Coronary artery disease with multiple stent placement in the past with most 

recent cardiac catheterization in 2017 showing patent stent in the left 

main, 50 percent ostial left main stenosis, heavily calcified LAD and 

circumflex artery with patent stent, mild to moderate disease, heavily 

calcified right coronary artery with mild-to-moderate disease, patent stent in 

the right iliac artery with mild disease in the right lower extremity, mild 

disease in the left lower extremity.  Continue to monitor





Intolerance of beta blockers secondary to bradycardia. Continue to monitor. 





History Paroxysmal atrial fibrillation, sick sinus syndrome.  Was unable to 

tolerate sotalol, metoprolol, Multaq,  has a Linq device implanted, continue to 

monitor





Generalized fatigue and loss of energy, dyspnea on exertion, chronic





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





Has history of obstructive sleep apnea and emphysema followed by Dr. Patino.





Hyperlipidemia, continue to monitor lipids





Diabetes mellitus II, followed and managed by primary care physician.





Moderate bilateral carotid stenosis, by history





Chronic back pain





Clinical Quality Measures


DVT/VTE Risk/Contraindication:


Risk Factor Score Per Nursin


RFS Level Per Nursing on Admit:  4+=Very High











SHAHRIAR BARRY MD Sep 8, 2018 10:17

## 2018-09-08 NOTE — PM & R (SOAP) PROGRESS NOTE
Subjective


This was a face to face visit with the patient.


Date Seen by Provider:  Sep 8, 2018


Time Seen by Provider:  07:20


Subjective/Events-last exam


Patient was seen in his room this AM More alert Patient Modified Independent 

for transfers





Objective


Physician Exam


Last Set of Vital Signs





Vital Signs








  Date Time  Temp Pulse Resp B/P (MAP) Pulse Ox O2 Delivery O2 Flow Rate FiO2


 


9/8/18 06:50     94 Nasal Cannula 2.00 


 


9/8/18 05:38 99.0 83 18 95/51 (66)    





Capillary Refill : Less Than 3 Seconds


I&O











Intake and Output 


 


 9/8/18





 00:00


 


Intake Total 1690 ml


 


Output Total 1050 ml


 


Balance 640 ml


 


 


 


Intake Oral 1690 ml


 


Output Urine Total 1050 ml


 


# Bowel Movements 1








General:  Alert, Oriented X3, Cooperative, No Acute Distress


HEENT:  Atraumatic, PERRLA, EOMI, Mucous Memb Moist/Pink, Other (02 by n/c in 

place)


Neck:  Supple, No JVD


Lungs:  Other (Cough with congestion rhonchi)


Heart:  Regular Rate, Normal S1, Normal S2


Abdomen:  Normal Bowel Sounds, Soft, No Tenderness


Extremities:  No Clubbing, Other (Knee immobilizer left)


Skin:  No Rashes


Neuro:  Normal Speech, Other (Functional strength and ROM ( knee not tested))


Psych/Mental Status:  Mental Status NL, Mood NL





Results


Lab Data


Laboratory Tests


9/5/18 12:08: Glucometer 239H


9/5/18 16:39: Glucometer 173H


9/5/18 21:09: Glucometer 207H


9/6/18 05:07: Glucometer 111H


9/6/18 05:10: 


Prothrombin Time 28.5H, INR Comment 2.7H


9/6/18 10:52: Glucometer 254H


9/6/18 15:57: Glucometer 161H


9/6/18 20:07: Glucometer 327H


9/7/18 05:53: Glucometer 132H


9/7/18 06:00: 


Prothrombin Time 29.2H, INR Comment 2.7H


9/7/18 11:06: Glucometer 235H


9/7/18 16:17: Glucometer 155H


9/7/18 20:29: Glucometer 264H


9/8/18 05:34: Glucometer 125H





Assessment/Plan


Assessment and Plan


Left Tibial plateau fracture managed with Knee Immobilzier and TTWB LLE


Hypotension


CAD


PAF diltiazam resumed


Chronic anticoagulation with therapeutic INR


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


[place]


DM2


Anemia


Pneumonia treated


Cognitive deficits apear to be returning to baseline


Plan Continue PT/Ot/ST


F/U with PCP and Cardiology


Next Team Conference 9-12-18


Co-Morbidities that are continuing to impact the rehab process: (include details

)











VINCENT ROGERS MD Sep 8, 2018 08:12

## 2018-09-09 VITALS — DIASTOLIC BLOOD PRESSURE: 68 MMHG | SYSTOLIC BLOOD PRESSURE: 110 MMHG

## 2018-09-09 VITALS — SYSTOLIC BLOOD PRESSURE: 109 MMHG | DIASTOLIC BLOOD PRESSURE: 60 MMHG

## 2018-09-09 RX ADMIN — MONTELUKAST SCH MG: 10 TABLET, FILM COATED ORAL at 21:13

## 2018-09-09 RX ADMIN — INSULIN ASPART SCH UNIT: 100 INJECTION, SOLUTION INTRAVENOUS; SUBCUTANEOUS at 06:28

## 2018-09-09 RX ADMIN — FLUTICASONE PROPIONATE AND SALMETEROL XINAFOATE SCH PUFF: 115; 21 AEROSOL, METERED RESPIRATORY (INHALATION) at 19:25

## 2018-09-09 RX ADMIN — GABAPENTIN SCH MG: 100 CAPSULE ORAL at 11:44

## 2018-09-09 RX ADMIN — DILTIAZEM HYDROCHLORIDE SCH MG: 90 CAPSULE, EXTENDED RELEASE ORAL at 09:15

## 2018-09-09 RX ADMIN — ATORVASTATIN CALCIUM SCH MG: 10 TABLET, FILM COATED ORAL at 21:13

## 2018-09-09 RX ADMIN — INSULIN HUMAN SCH UNIT: 100 INJECTION, SUSPENSION SUBCUTANEOUS at 21:13

## 2018-09-09 RX ADMIN — GABAPENTIN SCH MG: 100 CAPSULE ORAL at 21:13

## 2018-09-09 RX ADMIN — IPRATROPIUM BROMIDE AND ALBUTEROL SULFATE SCH ML: .5; 3 SOLUTION RESPIRATORY (INHALATION) at 07:08

## 2018-09-09 RX ADMIN — IPRATROPIUM BROMIDE AND ALBUTEROL SULFATE SCH ML: .5; 3 SOLUTION RESPIRATORY (INHALATION) at 19:25

## 2018-09-09 RX ADMIN — INSULIN ASPART SCH UNIT: 100 INJECTION, SOLUTION INTRAVENOUS; SUBCUTANEOUS at 17:07

## 2018-09-09 RX ADMIN — INSULIN ASPART SCH UNIT: 100 INJECTION, SOLUTION INTRAVENOUS; SUBCUTANEOUS at 21:14

## 2018-09-09 RX ADMIN — INSULIN ASPART SCH UNIT: 100 INJECTION, SOLUTION INTRAVENOUS; SUBCUTANEOUS at 11:45

## 2018-09-09 RX ADMIN — IPRATROPIUM BROMIDE AND ALBUTEROL SULFATE SCH ML: .5; 3 SOLUTION RESPIRATORY (INHALATION) at 10:57

## 2018-09-09 RX ADMIN — INSULIN HUMAN SCH UNIT: 100 INJECTION, SUSPENSION SUBCUTANEOUS at 09:15

## 2018-09-09 RX ADMIN — PANTOPRAZOLE SODIUM SCH MG: 40 TABLET, DELAYED RELEASE ORAL at 06:26

## 2018-09-09 RX ADMIN — IPRATROPIUM BROMIDE AND ALBUTEROL SULFATE SCH ML: .5; 3 SOLUTION RESPIRATORY (INHALATION) at 15:13

## 2018-09-09 RX ADMIN — FLUTICASONE PROPIONATE AND SALMETEROL XINAFOATE SCH PUFF: 115; 21 AEROSOL, METERED RESPIRATORY (INHALATION) at 07:08

## 2018-09-09 RX ADMIN — WARFARIN SODIUM SCH MG: 5 TABLET ORAL at 17:07

## 2018-09-09 RX ADMIN — GABAPENTIN SCH MG: 100 CAPSULE ORAL at 06:26

## 2018-09-09 RX ADMIN — DOCUSATE SODIUM SCH MG: 100 CAPSULE ORAL at 09:15

## 2018-09-09 RX ADMIN — POTASSIUM CHLORIDE SCH MEQ: 750 TABLET, FILM COATED, EXTENDED RELEASE ORAL at 11:45

## 2018-09-09 NOTE — CARDIOLOGY PROGRESS NOTE
Subjective


Date Seen by Provider:  Sep 9, 2018


Time Seen by Provider:  08:22


Subjective/Events-last exam


Patient is laying down in bed, sleeping.  No reported complaints.





Objective-Cardiology


Exam


Last Set of Vital Signs





Vital Signs








 18





 06:00 07:14


 


Temp 98.8 


 


Pulse 76 


 


Resp 20 


 


B/P (MAP) 109/60 (76) 


 


Pulse Ox  92


 


O2 Delivery  Nasal Cannula


 


O2 Flow Rate  2.00





Capillary Refill : Less Than 3 Seconds


I&O











Intake and Output 


 


 18





 00:00


 


Intake Total 2020 ml


 


Output Total 1575 ml


 


Balance 445 ml


 


 


 


Intake Oral 2020 ml


 


Output Urine Total 1575 ml








General:  No Acute Distress


HEENT:  Atraumatic, PERRLA, EOMI, Mucous Memb Moist/Pink, Other (02 by n/c in 

place)


Neck:  Supple, No JVD


Lungs:  Other (Cough with congestion rhonchi)


Heart:  Regular Rate, Normal S1, Normal S2


Abdomen:  Normal Bowel Sounds, Soft, No Tenderness


Extremities:  No Clubbing, Other (Knee immobilizer left)


Skin:  No Rashes


Neuro:  Normal Speech, Other (Functional strength and ROM ( knee not tested))


Psych/Mental Status:  Mental Status NL, Mood NL





A/P-Cardiology


Admission Diagnosis


Tibial fracture


Hypotension


Coronary artery disease


Chronic atrial fibrillation





Assessment/Plan


L lateral tibial plateau fracture, managed by Ortho Service





Intermittently borderline hypotension,  better blood pressure at this time, 

asymptomatic, continue to monitor





Sinus tachycardia, frequent APCs, history of paroxysmal atrial fibrillation and 

Sick sinus syndrome, was intolerant to beta blockers in the past due to 

bradycardia, continue to monitor





Coronary artery disease with multiple stent placement in the past with most 

recent cardiac catheterization in 2017 showing patent stent in the left 

main, 50 percent ostial left main stenosis, heavily calcified LAD and 

circumflex artery with patent stent, mild to moderate disease, heavily 

calcified right coronary artery with mild-to-moderate disease, patent stent in 

the right iliac artery with mild disease in the right lower extremity, mild 

disease in the left lower extremity.  Continue to monitor





Intolerance of beta blockers secondary to bradycardia. Continue to monitor. 





History Paroxysmal atrial fibrillation, sick sinus syndrome.  Was unable to 

tolerate sotalol, metoprolol, Multaq,  has a Linq device implanted, continue to 

monitor





Generalized fatigue and loss of energy, dyspnea on exertion, chronic





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





Has history of obstructive sleep apnea and emphysema followed by Dr. Patino.





Hyperlipidemia, continue to monitor lipids





Diabetes mellitus II, followed and managed by primary care physician.





Moderate bilateral carotid stenosis, by history





Chronic back pain





Clinical Quality Measures


DVT/VTE Risk/Contraindication:


Risk Factor Score Per Nursin


RFS Level Per Nursing on Admit:  4+=Very High











SHAHRIAR BARRY MD Sep 9, 2018 08:23

## 2018-09-10 VITALS — SYSTOLIC BLOOD PRESSURE: 102 MMHG | DIASTOLIC BLOOD PRESSURE: 63 MMHG

## 2018-09-10 VITALS — DIASTOLIC BLOOD PRESSURE: 64 MMHG | SYSTOLIC BLOOD PRESSURE: 106 MMHG

## 2018-09-10 RX ADMIN — IPRATROPIUM BROMIDE AND ALBUTEROL SULFATE SCH ML: .5; 3 SOLUTION RESPIRATORY (INHALATION) at 20:51

## 2018-09-10 RX ADMIN — INSULIN ASPART SCH UNIT: 100 INJECTION, SOLUTION INTRAVENOUS; SUBCUTANEOUS at 21:00

## 2018-09-10 RX ADMIN — GABAPENTIN SCH MG: 100 CAPSULE ORAL at 11:52

## 2018-09-10 RX ADMIN — IPRATROPIUM BROMIDE AND ALBUTEROL SULFATE SCH ML: .5; 3 SOLUTION RESPIRATORY (INHALATION) at 14:32

## 2018-09-10 RX ADMIN — INSULIN ASPART SCH UNIT: 100 INJECTION, SOLUTION INTRAVENOUS; SUBCUTANEOUS at 11:31

## 2018-09-10 RX ADMIN — WARFARIN SODIUM SCH MG: 5 TABLET ORAL at 17:37

## 2018-09-10 RX ADMIN — FLUTICASONE PROPIONATE AND SALMETEROL XINAFOATE SCH PUFF: 115; 21 AEROSOL, METERED RESPIRATORY (INHALATION) at 10:49

## 2018-09-10 RX ADMIN — MONTELUKAST SCH MG: 10 TABLET, FILM COATED ORAL at 21:14

## 2018-09-10 RX ADMIN — DILTIAZEM HYDROCHLORIDE SCH MG: 90 CAPSULE, EXTENDED RELEASE ORAL at 08:05

## 2018-09-10 RX ADMIN — IPRATROPIUM BROMIDE AND ALBUTEROL SULFATE SCH ML: .5; 3 SOLUTION RESPIRATORY (INHALATION) at 11:08

## 2018-09-10 RX ADMIN — INSULIN HUMAN SCH UNIT: 100 INJECTION, SUSPENSION SUBCUTANEOUS at 08:05

## 2018-09-10 RX ADMIN — ATORVASTATIN CALCIUM SCH MG: 10 TABLET, FILM COATED ORAL at 21:14

## 2018-09-10 RX ADMIN — GABAPENTIN SCH MG: 100 CAPSULE ORAL at 06:00

## 2018-09-10 RX ADMIN — DOCUSATE SODIUM SCH MG: 100 CAPSULE ORAL at 08:05

## 2018-09-10 RX ADMIN — GABAPENTIN SCH MG: 100 CAPSULE ORAL at 21:15

## 2018-09-10 RX ADMIN — INSULIN HUMAN SCH UNIT: 100 INJECTION, SUSPENSION SUBCUTANEOUS at 21:20

## 2018-09-10 RX ADMIN — INSULIN ASPART SCH UNIT: 100 INJECTION, SOLUTION INTRAVENOUS; SUBCUTANEOUS at 05:02

## 2018-09-10 RX ADMIN — FLUTICASONE PROPIONATE AND SALMETEROL XINAFOATE SCH PUFF: 115; 21 AEROSOL, METERED RESPIRATORY (INHALATION) at 20:51

## 2018-09-10 RX ADMIN — INSULIN ASPART SCH UNIT: 100 INJECTION, SOLUTION INTRAVENOUS; SUBCUTANEOUS at 17:37

## 2018-09-10 RX ADMIN — PANTOPRAZOLE SODIUM SCH MG: 40 TABLET, DELAYED RELEASE ORAL at 06:00

## 2018-09-10 RX ADMIN — IPRATROPIUM BROMIDE AND ALBUTEROL SULFATE SCH ML: .5; 3 SOLUTION RESPIRATORY (INHALATION) at 07:32

## 2018-09-10 NOTE — THERAPY TEAM DISCHARGE SUMMARY
Therapy Discharge Summary


Discharge Recommendations


Date of Discharge





Therapy D/C Recommendations:  Home w/ Family Support, Occupational Therapy Home 

Care





Occupational Therapy


Decreased Activ Tolerance, Impaired I ADL's, Impaired Self-Care Skills





PT Long Term Goals


Long Term Goals


PT Long Term Goals Time Frame:  Sep 18, 2018


Transfers (B,C,W/C) (FIM):  5


Roll Left to Right (QC):  4


Sit to Lying (QC):  4


Lying-Sitting on Side/Bed(QC):  4


Sit to Stand (QC):  4


Chair/Bed-to-Chair Xfer(QC):  4


Car Transfer (QC):  4


Gait (FIM):  2


Distance:  50'


Walk 10 feet (QC):  4


Walk 10ft-Uneven Surface(QC):  4


Walk 50ft with 2 Turns (QC):  4


Gait Level of Assist:  5


Gait Assistive Device:  FWW


Stairs (FIM):  1


# of Steps:  1


1 Step (curb) (QC):  4


Stairs Level Of Assist:  4





OT Long Term Goals


Long Term Goals


Time Frame:  Sep 11, 2018


Eating (FIM):  6


Eating (QC):  6


Oral Hygiene (QC):  6


Grooming(FIM):  6


Bathing(FIM):  5


Shower/Bathe Self (QC):  5


Upper Body Dressing(FIM):  6


Upper Body Dressing (QC):  6


Lower Body Dressing(FIM):  5


Lower Body Dressing (QC):  5


On/Off Footwear (QC):  5


Toileting(FIM):  6


Toileting Hygiene (QC):  6


Transfers (B,C,W/C) (FIM):  6


Toilet/Commode Transfer(FIM):  6


Toilet/Commode Transfer (QC):  6


Shower Transfer(FIM):  5


Comprehension(FIM):  6


Expression (FIM):  7


Social Interaction(FIM):  7


Problem Solving(FIM):  6


Memory(FIM):  6


Additional Goals:  1-Demonstrate ADL Tasks, 2-Verbalize Understanding, 3-

ImproveStrength/Hema


1=Demonstrate adherence to instructed precautions during ADL tasks.


2=Patient will verbalize/demonstrate understanding of assistive devices/

modifications for ADL.


3=Patient will improve strength/tolerance for activity to enable patient to 

perform ADL's.





Speech Long Term Goals


Long Term Goals


Pt will demonstrate functional memory and problem solving ability for maximum 

independence in the home.


Comprehension:  6


Expression:  7


Social Interaction:  7


Problem Solvin


Memory:  6











COLETTE MENADR Sep 10, 2018 15:17

## 2018-09-10 NOTE — SPEECH THERAPY DAILY NOTE
Speech Daily Progress Note


Subjective


Date Seen by Provider:  Sep 10, 2018


Time Seen by Provider:  11:00


Pt in bed.  Pleasant and cooperative.





Pain





   Numeric Pain Scale:  0-No Pain





Objective


Memory - Picture recall after 20 minutes was 100% with min cues.


Ordering 4 words into  a sentence..pt was 95% accurate with min cues.





Assessment


Assessment Current Status:  Fair Progress





Communication


Comprehension:  6


Expression:  7





Social Cognition


Social Interaction:  7


Problem Solvin


Memory:  4





Speech Short Term Goals


Short Term Goals


Short Term Goals


1.  Pt will complete various memory tasks with at least 80% accuracy with  min 

assist.


2.  Pt will complete various problem solving tasks with at least 80% accuracy 

and min assist.


Comprehension:  6


Expression:  7


Social Interaction:  7


Problem Solvin


Memory:  5





Speech Long Term Goals


Long Term Goals


Pt will demonstrate functional memory and problem solving ability for maximum 

independence in the home.


Comprehension:  6


Expression:  7


Social Interaction:  7


Problem Solvin


Memory:  6





Speech-Plan


Patient/Family Goals


Patient/Family Goals:  


to return home





Treatment Plan


Speech Therapy Treatment Plan:  Continue Plan of Care


pt cooperative.


Treatment Duration:  Sep 11, 2018


Frequency:  5 times per week


Estimated Hrs Per Day:  .5 hour per day


Rehab Potential:  Fair


Pt/Family Agrees to Plan:  Yes





Safety Risks/Education


Teaching Recipient:  Patient


Teaching Methods:  Discussion


Response to Teaching:  Verbalize Understanding





Time


Speech Therapy Time In:  11:00


Speech Therapy Time Out:  11:30


Total Billed Time:  30


Billed Treatment Time


1ASHLYN RANDY ST Sep 10, 2018 10:43

## 2018-09-10 NOTE — PM & R (SOAP) PROGRESS NOTE
Subjective


This was a face to face visit with the patient.


Date Seen by Provider:  Sep 10, 2018


Time Seen by Provider:  18:20


Subjective/Events-last exam


Patient was seen in her room patient SBA for transfers





Objective


Physician Exam


Last Set of Vital Signs





Vital Signs








  Date Time  Temp Pulse Resp B/P (MAP) Pulse Ox O2 Delivery O2 Flow Rate FiO2


 


9/10/18 18:06      Room Air  


 


9/10/18 16:04 96.7 72 16 106/64 (78) 94   


 


9/10/18 14:32       2.00 





Capillary Refill : Less Than 3 Seconds


I&O











Intake and Output 


 


 9/10/18





 00:00


 


Intake Total 1110 ml


 


Output Total 750 ml


 


Balance 360 ml


 


 


 


Intake Oral 1110 ml


 


Output Urine Total 750 ml








General:  Alert, Oriented X3, Cooperative, No Acute Distress


HEENT:  Atraumatic, PERRLA, EOMI, Mucous Memb Moist/Pink


Neck:  Supple, No JVD


Lungs:  Other (Cough with congestion rhonchi)


Heart:  Regular Rate, Normal S1, Normal S2


Abdomen:  Normal Bowel Sounds, Soft, No Tenderness


Extremities:  No Clubbing, Other (Knee immobilizer left)


Skin:  No Rashes


Neuro:  Normal Speech, Other (Functional strength and ROM ( knee not tested))


Psych/Mental Status:  Mental Status NL, Mood NL





Results


Lab Data


Laboratory Tests


9/7/18 20:29: Glucometer 264H


9/8/18 05:34: Glucometer 125H


9/8/18 11:15: Glucometer 232H


9/8/18 16:02: Glucometer 253H


9/8/18 20:14: Glucometer 187H


9/9/18 06:16: Glucometer 174H


9/9/18 11:17: Glucometer 242H


9/9/18 15:28: Glucometer 225H


9/9/18 20:49: Glucometer 156H


9/10/18 04:52: Glucometer 120H


9/10/18 11:14: Glucometer 168H


9/10/18 16:03: Glucometer 221H





Assessment/Plan


Assessment and Plan


RT ankle fractrue s/p repair DR Latham


HTN controlled


Postop anemia


DVT Prophylaxis on Lovenox subcut


Plan Continue PT/OT


Next Team Conference 9-12-18 to discuss discharge plans in more details


Co-Morbidities that are continuing to impact the rehab process: (include details

)











VINCENT ROGERS MD Sep 10, 2018 18:37

## 2018-09-10 NOTE — PM & R (SOAP) PROGRESS NOTE
Subjective


This was a face to face visit with the patient.


Date Seen by Provider:  Sep 10, 2018


Time Seen by Provider:  18:25


Subjective/Events-last exam


Patient was seen in his room this evening.Patient SBA for Transfers Patients WB 

advanced to WBAT by ortho Discussed case with RN Appreciate Cardiology note 

Blood pressure normalizing





Objective


Physician Exam


Last Set of Vital Signs





Vital Signs








  Date Time  Temp Pulse Resp B/P (MAP) Pulse Ox O2 Delivery O2 Flow Rate FiO2


 


9/10/18 18:06      Room Air  


 


9/10/18 16:04 96.7 72 16 106/64 (78) 94   


 


9/10/18 14:32       2.00 





Capillary Refill : Less Than 3 Seconds


I&O











Intake and Output 


 


 9/10/18





 00:00


 


Intake Total 1110 ml


 


Output Total 750 ml


 


Balance 360 ml


 


 


 


Intake Oral 1110 ml


 


Output Urine Total 750 ml








General:  Alert, Oriented X3, Cooperative, No Acute Distress


HEENT:  Atraumatic, PERRLA, EOMI, Mucous Memb Moist/Pink


Neck:  Supple, No JVD


Lungs:  Other (Cough with congestion rhonchi)


Heart:  Regular Rate, Normal S1, Normal S2


Abdomen:  Normal Bowel Sounds, Soft, No Tenderness


Extremities:  No Clubbing, Other (Knee immobilizer left)


Skin:  No Rashes


Neuro:  Normal Speech, Other (Functional strength and ROM ( knee not tested))


Psych/Mental Status:  Mental Status NL, Mood NL





Results


Lab Data


Laboratory Tests


9/7/18 20:29: Glucometer 264H


9/8/18 05:34: Glucometer 125H


9/8/18 11:15: Glucometer 232H


9/8/18 16:02: Glucometer 253H


9/8/18 20:14: Glucometer 187H


9/9/18 06:16: Glucometer 174H


9/9/18 11:17: Glucometer 242H


9/9/18 15:28: Glucometer 225H


9/9/18 20:49: Glucometer 156H


9/10/18 04:52: Glucometer 120H


9/10/18 11:14: Glucometer 168H


9/10/18 16:03: Glucometer 221H





Assessment/Plan


Assessment and Plan


Left tibila plateau fracture managed with Knee immobilizer with WB now advanced


Hypotension improving


CAD


PAF Diltiazam rssumed


Chronic anticoagulation


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


[place]


DM2


Anemia


Pneumonia treated


Cggnitive deficits appear to be returning to baseline


Plan Continue Pt/OT


Next Team Conference 9-12-18


F/U with PCP and Card PRN


Co-Morbidities that are continuing to impact the rehab process: (include details

)











VINCENT ROGERS MD Sep 10, 2018 18:34

## 2018-09-10 NOTE — CARDIOLOGY PROGRESS NOTE
Subjective


Date Seen by Provider:  Sep 10, 2018


Time Seen by Provider:  08:05


Subjective/Events-last exam


Patient in bed, no new complaints. Denies any CP or dyspnea.





Objective-Cardiology


Exam


Last Set of Vital Signs





Vital Signs








 9/10/18 9/10/18





 05:03 07:33


 


Temp 98.8 


 


Pulse 64 


 


Resp 20 


 


B/P (MAP) 102/63 (76) 


 


Pulse Ox  95


 


O2 Delivery  Nasal Cannula


 


O2 Flow Rate  2.00





Capillary Refill : Less Than 3 Seconds


I&O











Intake and Output 


 


 9/10/18





 00:00


 


Intake Total 1110 ml


 


Output Total 750 ml


 


Balance 360 ml


 


 


 


Intake Oral 1110 ml


 


Output Urine Total 750 ml








General:  No Acute Distress


HEENT:  Atraumatic, PERRLA, EOMI, Mucous Memb Moist/Pink


Neck:  Supple, No JVD


Lungs:  Other (Cough with congestion rhonchi)


Heart:  Regular Rate, Normal S1, Normal S2


Abdomen:  Normal Bowel Sounds, Soft, No Tenderness


Extremities:  No Clubbing, Other (Knee immobilizer left)


Skin:  No Rashes


Neuro:  Normal Speech, Other (Functional strength and ROM ( knee not tested))


Psych/Mental Status:  Mental Status NL, Mood NL





A/P-Cardiology


Admission Diagnosis


Tibial fracture


Hypotension


Coronary artery disease


Chronic atrial fibrillation





Assessment/Plan


L lateral tibial plateau fracture, managed by Ortho Service





Intermittently borderline hypotension,  better blood pressure at this time, 

asymptomatic, continue to monitor





Sinus tachycardia, frequent APCs, history of paroxysmal atrial fibrillation and 

Sick sinus syndrome, was intolerant to beta blockers in the past due to 

bradycardia, continue to monitor





Coronary artery disease with multiple stent placement in the past with most 

recent cardiac catheterization in 2017 showing patent stent in the left 

main, 50 percent ostial left main stenosis, heavily calcified LAD and 

circumflex artery with patent stent, mild to moderate disease, heavily 

calcified right coronary artery with mild-to-moderate disease, patent stent in 

the right iliac artery with mild disease in the right lower extremity, mild 

disease in the left lower extremity.  Continue to monitor





Intolerance of beta blockers secondary to bradycardia. Continue to monitor. 





History Paroxysmal atrial fibrillation, sick sinus syndrome.  Was unable to 

tolerate sotalol, metoprolol, Multaq,  has a Linq device implanted, continue to 

monitor





Generalized fatigue and loss of energy, dyspnea on exertion, chronic





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





Has history of obstructive sleep apnea and emphysema followed by Dr. Patino.





Hyperlipidemia, continue to monitor lipids





Diabetes mellitus II, followed and managed by primary care physician.





Moderate bilateral carotid stenosis, by history





Chronic back pain





Clinical Quality Measures


DVT/VTE Risk/Contraindication:


Risk Factor Score Per Nursin


RFS Level Per Nursing on Admit:  4+=Very High











GURDEEP GONZÁLES Sep 10, 2018 08:27

## 2018-09-10 NOTE — CARDIOLOGY PROGRESS NOTE
Subjective


Date Seen by Provider:  Sep 10, 2018


Time Seen by Provider:  10:35


Subjective/Events-last exam


Patient is receiving therapy and feeling well, mild dyspnea, no dizziness or 

chest pain


Review of Systems


General:  No Chills, No Night Sweats, No Fatigue, No Malaise, No Appetite, No 

Other


HEENT:  No Head Aches, No Visual Changes, No Eye Pain, No Ear Pain, No Dysphasia

, No Sinus Congestion, No Post Nasal Drip, No Sore Throat, No Other


Pulmonary:  No Dyspnea, No Cough, No Pleuritic Chest Pain, No Other


Cardiovascular:  No: Chest Pain, Palpitations, Orthopnea, Paroxysmal Noc. 

Dyspnea, Edema, Lt Headedness, Other





Objective-Cardiology


Exam


Last Set of Vital Signs





Vital Signs








 9/10/18 9/10/18 9/10/18





 05:03 07:33 08:05


 


Temp 98.8  


 


Pulse 64  


 


Resp 20  


 


B/P (MAP) 102/63 (76)  


 


Pulse Ox  95 


 


O2 Delivery   Room Air


 


O2 Flow Rate  2.00 





Capillary Refill : Less Than 3 Seconds


I&O











Intake and Output 


 


 9/10/18





 00:00


 


Intake Total 1110 ml


 


Output Total 750 ml


 


Balance 360 ml


 


 


 


Intake Oral 1110 ml


 


Output Urine Total 750 ml








General:  Alert, Oriented X3, Cooperative, No Acute Distress


HEENT:  Atraumatic, PERRLA, EOMI, Mucous Memb Moist/Pink


Neck:  Supple, No JVD


Lungs:  Other (Cough with congestion rhonchi)


Heart:  Regular Rate, Normal S1, Normal S2


Abdomen:  Normal Bowel Sounds, Soft, No Tenderness


Extremities:  No Clubbing, Other (Knee immobilizer left)


Skin:  No Rashes


Neuro:  Normal Speech, Other (Functional strength and ROM ( knee not tested))


Psych/Mental Status:  Mental Status NL, Mood NL





Results


Lab








Laboratory Tests








Test


 18


11:17 18


15:28 18


20:49 9/10/18


04:52 Range/Units


 


 


Glucometer 242 H 225 H 156 H 120 H   MG/DL











A/P-Cardiology


Admission Diagnosis


Tibial fracture


Hypotension


Coronary artery disease


Chronic atrial fibrillation





Assessment/Plan


L lateral tibial plateau fracture, receiving physical therapy,  managed by 

Ortho Service





Intermittently borderline hypotension,  better blood pressure at this time, 

asymptomatic, continue to monitor





Sinus tachycardia, frequent APCs, history of paroxysmal atrial fibrillation and 

Sick sinus syndrome, was intolerant to beta blockers in the past due to 

bradycardia, continue to monitor





Coronary artery disease with multiple stent placement in the past with most 

recent cardiac catheterization in 2017 showing patent stent in the left 

main, 50 percent ostial left main stenosis, heavily calcified LAD and 

circumflex artery with patent stent, mild to moderate disease, heavily 

calcified right coronary artery with mild-to-moderate disease, patent stent in 

the right iliac artery with mild disease in the right lower extremity, mild 

disease in the left lower extremity.  Continue to monitor





Intolerance of beta blockers secondary to bradycardia. Continue to monitor. 





History Paroxysmal atrial fibrillation, sick sinus syndrome.  Was unable to 

tolerate sotalol, metoprolol, Multaq,  has a Linq device implanted, continue to 

monitor





Generalized fatigue and loss of energy, dyspnea on exertion, chronic





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





Has history of obstructive sleep apnea and emphysema followed by Dr. Patino.





Hyperlipidemia, continue to monitor lipids





Diabetes mellitus II, followed and managed by primary care physician.





Moderate bilateral carotid stenosis, continue to monitor





Chronic back pain





Clinical Quality Measures


DVT/VTE Risk/Contraindication:


Risk Factor Score Per Nursin


RFS Level Per Nursing on Admit:  4+=Very High











SHAHRIAR BARRY MD Sep 10, 2018 10:37

## 2018-09-10 NOTE — PROGRESS NOTE (SOAP)
Subjective


Time Seen by Provider:  07:40


Subjective/Events-last exam


Patient feeling better today.


Patient walked up and down steps 2 times yesterday.


Patient voices no complaints.


Patient in atrial fibrillation





Objective


Exam





Vital Signs








  Date Time  Temp Pulse Resp B/P (MAP) Pulse Ox O2 Delivery O2 Flow Rate FiO2


 


9/10/18 07:33     95 Nasal Cannula 2.00 


 


9/10/18 05:03 98.8 64 20 102/63 (76) 96 Nasal Cannula 2.00 


 


18 21:00      Nasal Cannula 2.00 


 


18 19:29     96 Nasal Cannula 2.00 


 


18 19:25     95 Nasal Cannula 2.00 


 


18 17:49 98.0 100 18 110/68 (82) 96 Nasal Cannula 2.00 


 


18 15:15      Nasal Cannula 2.00 


 


18 11:00      Room Air  


 


18 09:00      Nasal Cannula 2.00 














I & O 


 


 9/10/18





 07:00


 


Intake Total 1610 ml


 


Output Total 1400 ml


 


Balance 210 ml





Capillary Refill : Less Than 3 Seconds


General Appearance:  No Apparent Distress, WD/WN


HEENT:  Normal ENT Inspection


Neck:  Full Range of Motion, Normal Inspection


Respiratory:  No Accessory Muscle Use, No Respiratory Distress, Decreased 

Breath Sounds


Cardiovascular:  Irregularly Irregular


Gastrointestinal:  non tender, soft





Results


Lab


Laboratory Tests


18 11:17: Glucometer 242H


18 15:28: Glucometer 225H


18 20:49: Glucometer 156H


9/10/18 04:52: Glucometer 120H





Assessment/Plan


Assessment/Plan


Assess & Plan/Chief Complaint


Left tibial plateau fracture.


Atrial fibrillation history.


COPD.


Coronary artery disease.


Diabetes area


Patient more alert today than yesterday.


.


18.


Left tibial plateau fracture.


Atrial fibrillation history.


COPD.


Coronary artery disease.


Diabetes.


Patient confused last night.


Patient not confused this morning.


Patient have chest x-ray today.


.


/left tibial plateau fracture.


Atrial fibrillation history.


COPD.


Pneumonia.


Coronary artery disease.


Diabetes..


.


.


Left tibial plateau fracture.


Atrial fibrillation.


COPD.


Pneumonia.


Coronary artery disease.


Diabetes 18.


Left tibial plateau fracture.


History of atrial fibrillation.


COPD.


Pneumonia history.


Coronary artery disease.


Diabetes.


.


18.


Left tibial plateau fracture.


History of atrial fibrillation.


Patient in atrial fib.


COPD.


Coronary artery disease.


Diabetes doing good.


.


18.


Left tibial plateau fracture.


Patient in atrial fibrillation.


Hypertension.


COPD.


Coronary artery disease.


Diabetes.


.


9/10/18.


Left tibial plateau fracture.


Hypertension history.


COPD.


Coronary artery disease.


Diabetes.





Clinical Quality Measures


DVT/VTE Risk/Contraindication:


Risk Factor Score Per Nursin


RFS Level Per Nursing on Admit:  4+=Very High











TOR CONNELLY DO Sep 10, 2018 07:43

## 2018-09-11 VITALS — DIASTOLIC BLOOD PRESSURE: 66 MMHG | SYSTOLIC BLOOD PRESSURE: 105 MMHG

## 2018-09-11 VITALS — DIASTOLIC BLOOD PRESSURE: 66 MMHG | SYSTOLIC BLOOD PRESSURE: 101 MMHG

## 2018-09-11 VITALS — DIASTOLIC BLOOD PRESSURE: 58 MMHG | SYSTOLIC BLOOD PRESSURE: 97 MMHG

## 2018-09-11 LAB
INR PPP: 3.2 (ref 0.8–1.4)
PROTHROMBIN TIME: 32.9 SEC (ref 12.2–14.7)

## 2018-09-11 RX ADMIN — WARFARIN SODIUM SCH MG: 5 TABLET ORAL at 10:20

## 2018-09-11 RX ADMIN — IPRATROPIUM BROMIDE AND ALBUTEROL SULFATE SCH ML: .5; 3 SOLUTION RESPIRATORY (INHALATION) at 10:39

## 2018-09-11 RX ADMIN — INSULIN ASPART SCH UNIT: 100 INJECTION, SOLUTION INTRAVENOUS; SUBCUTANEOUS at 21:50

## 2018-09-11 RX ADMIN — DILTIAZEM HYDROCHLORIDE SCH MG: 90 CAPSULE, EXTENDED RELEASE ORAL at 08:03

## 2018-09-11 RX ADMIN — INSULIN ASPART SCH UNIT: 100 INJECTION, SOLUTION INTRAVENOUS; SUBCUTANEOUS at 06:00

## 2018-09-11 RX ADMIN — FLUTICASONE PROPIONATE AND SALMETEROL XINAFOATE SCH PUFF: 115; 21 AEROSOL, METERED RESPIRATORY (INHALATION) at 19:33

## 2018-09-11 RX ADMIN — IPRATROPIUM BROMIDE AND ALBUTEROL SULFATE SCH ML: .5; 3 SOLUTION RESPIRATORY (INHALATION) at 06:59

## 2018-09-11 RX ADMIN — ATORVASTATIN CALCIUM SCH MG: 10 TABLET, FILM COATED ORAL at 21:50

## 2018-09-11 RX ADMIN — IPRATROPIUM BROMIDE AND ALBUTEROL SULFATE SCH ML: .5; 3 SOLUTION RESPIRATORY (INHALATION) at 14:56

## 2018-09-11 RX ADMIN — INSULIN HUMAN SCH UNIT: 100 INJECTION, SUSPENSION SUBCUTANEOUS at 08:04

## 2018-09-11 RX ADMIN — GABAPENTIN SCH MG: 100 CAPSULE ORAL at 11:48

## 2018-09-11 RX ADMIN — INSULIN ASPART SCH UNIT: 100 INJECTION, SOLUTION INTRAVENOUS; SUBCUTANEOUS at 11:16

## 2018-09-11 RX ADMIN — FLUTICASONE PROPIONATE AND SALMETEROL XINAFOATE SCH PUFF: 115; 21 AEROSOL, METERED RESPIRATORY (INHALATION) at 07:01

## 2018-09-11 RX ADMIN — MONTELUKAST SCH MG: 10 TABLET, FILM COATED ORAL at 21:50

## 2018-09-11 RX ADMIN — GABAPENTIN SCH MG: 100 CAPSULE ORAL at 06:45

## 2018-09-11 RX ADMIN — GABAPENTIN SCH MG: 100 CAPSULE ORAL at 21:50

## 2018-09-11 RX ADMIN — PANTOPRAZOLE SODIUM SCH MG: 40 TABLET, DELAYED RELEASE ORAL at 06:45

## 2018-09-11 RX ADMIN — INSULIN ASPART SCH UNIT: 100 INJECTION, SOLUTION INTRAVENOUS; SUBCUTANEOUS at 16:25

## 2018-09-11 RX ADMIN — INSULIN HUMAN SCH UNIT: 100 INJECTION, SUSPENSION SUBCUTANEOUS at 21:51

## 2018-09-11 RX ADMIN — POTASSIUM CHLORIDE SCH MEQ: 750 TABLET, FILM COATED, EXTENDED RELEASE ORAL at 11:46

## 2018-09-11 RX ADMIN — IPRATROPIUM BROMIDE AND ALBUTEROL SULFATE SCH ML: .5; 3 SOLUTION RESPIRATORY (INHALATION) at 19:33

## 2018-09-11 RX ADMIN — DOCUSATE SODIUM SCH MG: 100 CAPSULE ORAL at 08:04

## 2018-09-11 NOTE — PM & R (SOAP) PROGRESS NOTE
Subjective


This was a face to face visit with the patient.


Date Seen by Provider:  Sep 11, 2018


Time Seen by Provider:  07:20


Subjective/Events-last exam


Patient was seen in his room this AM Progressing well with therapies Tolerating 

advanced WB LLE well Patient SBA for transfers





Objective


Physician Exam


Last Set of Vital Signs





Vital Signs








  Date Time  Temp Pulse Resp B/P (MAP) Pulse Ox O2 Delivery O2 Flow Rate FiO2


 


9/11/18 08:06  103  105/66 (79)    


 


9/11/18 06:59     90 Room Air  


 


9/11/18 06:00 98.3  18     


 


9/10/18 21:00       2.00 





Capillary Refill : Less Than 3 Seconds


I&O











Intake and Output 


 


 9/11/18





 00:00


 


Intake Total 1200 ml


 


Output Total 1300 ml


 


Balance -100 ml


 


 


 


Intake Oral 1200 ml


 


Output Urine Total 1300 ml


 


# Voids 1








General:  Alert, Oriented X3, Cooperative, No Acute Distress


HEENT:  Atraumatic, PERRLA, EOMI, Mucous Memb Moist/Pink


Neck:  Supple, No JVD


Lungs:  Other (Cough with congestion rhonchi)


Heart:  Regular Rate, Normal S1, Normal S2


Abdomen:  Normal Bowel Sounds, Soft, No Tenderness


Extremities:  No Clubbing, Other (Knee immobilizer left)


Skin:  No Rashes


Neuro:  Normal Speech, Other (Functional strength and ROM ( knee not tested))


Psych/Mental Status:  Mental Status NL, Mood NL





Results


Lab Data


Laboratory Tests


9/8/18 11:15: Glucometer 232H


9/8/18 16:02: Glucometer 253H


9/8/18 20:14: Glucometer 187H


9/9/18 06:16: Glucometer 174H


9/9/18 11:17: Glucometer 242H


9/9/18 15:28: Glucometer 225H


9/9/18 20:49: Glucometer 156H


9/10/18 04:52: Glucometer 120H


9/10/18 11:14: Glucometer 168H


9/10/18 16:03: Glucometer 221H


9/10/18 21:13: Glucometer 112H


9/11/18 05:52: Glucometer 104


9/11/18 08:10: 





Assessment/Plan


Assessment and Plan


Left tibia plateau fracture managed with knee immobilizer with WB advanced to 

WBAT now


Hypotension improving


CAD


PAF Diltiazam resumed cardiology following


Chronic anticoagulation


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


[place]


DM2


Anemia


Pneumonia treated


Cognitive deficits improving


Plan Continue Pt/Ot


Team Conference tomorrow 9-12-18


F/U with SW /Team re discharge plans and ELOS


Co-Morbidities that are continuing to impact the rehab process: (include details

)











VINCENT ROGERS MD Sep 11, 2018 08:23

## 2018-09-11 NOTE — SPEECH THERAPY DAILY NOTE
Speech Daily Progress Note


Subjective


Date Seen by Provider:  Sep 11, 2018


Time Seen by Provider:  10:30


Pt in bed. Alert and cooperative.





Pain





   Numeric Pain Scale:  0-No Pain





Objective


Instructed pt in a new activity.  He exhibited appropriate recall of how to 

execute the activity.  Will do again in a few days to determine what carry over 

of recall of how to execute activity.





Assessment


Assessment Current Status:  Fair Progress





Treatment Plan


Continue Plan of Care





Communication


Comprehension:  6


Expression:  7





Social Cognition


Social Interaction:  7


Problem Solvin


Memory:  4





Speech Short Term Goals


Short Term Goals


Short Term Goals


1.  Pt will complete various memory tasks with at least 80% accuracy with  min 

assist.


2.  Pt will complete various problem solving tasks with at least 80% accuracy 

and min assist.


Comprehension:  6


Expression:  7


Social Interaction:  7


Problem Solvin


Memory:  5





Speech Long Term Goals


Long Term Goals


Pt will demonstrate functional memory and problem solving ability for maximum 

independence in the home.


Comprehension:  6


Expression:  7


Social Interaction:  7


Problem Solvin


Memory:  6





Speech-Plan


Patient/Family Goals


Patient/Family Goals:  


to return home





Treatment Plan


Speech Therapy Treatment Plan:  Continue Plan of Care


Pt is beginning to mention about going home soon.


Treatment Duration:  Sep 11, 2018


Frequency:  5 times per week


Estimated Hrs Per Day:  .5 hour per day


Rehab Potential:  Fair


Pt/Family Agrees to Plan:  Yes





Safety Risks/Education


Teaching Recipient:  Patient


Teaching Methods:  Discussion


Response to Teaching:  Verbalize Understanding





Time


Speech Therapy Time In:  10:30


Speech Therapy Time Out:  11:00


Total Billed Time:  30


Billed Treatment Time


1, SLCOLETTE Stoner Sep 11, 2018 11:12

## 2018-09-11 NOTE — PROGRESS NOTE (SOAP)
Subjective


Time Seen by Provider:  07:40


Subjective/Events-last exam


Patient resting comfortably in bed.


Heart in atrial fib.


Patient getting around better.


Patient states he's going up and down steps





Objective


Exam





Vital Signs








  Date Time  Temp Pulse Resp B/P (MAP) Pulse Ox O2 Delivery O2 Flow Rate FiO2


 


18 06:59     90 Room Air  


 


18 06:00 98.3 70 18 101/66 (78) 96 Room Air  


 


9/10/18 21:00      Nasal Cannula 2.00 


 


9/10/18 20:51     90 Room Air  


 


9/10/18 18:06      Room Air  


 


9/10/18 16:04 96.7 72 16 106/64 (78) 94 Room Air  


 


9/10/18 14:32     96 Nasal Cannula 2.00 


 


9/10/18 08:05      Room Air  














I & O 


 


 18





 07:00


 


Intake Total 1200 ml


 


Output Total 1450 ml


 


Balance -250 ml





Capillary Refill : Less Than 3 Seconds


General Appearance:  No Apparent Distress, WD/WN


HEENT:  Normal ENT Inspection


Neck:  Full Range of Motion, Normal Inspection


Respiratory:  No Accessory Muscle Use, No Respiratory Distress, Decreased 

Breath Sounds


Cardiovascular:  Irregularly Irregular


Gastrointestinal:  non tender, soft





Results


Lab


Laboratory Tests


9/10/18 11:14: Glucometer 168H


9/10/18 16:03: Glucometer 221H


9/10/18 21:13: Glucometer 112H


18 05:52: Glucometer 104





Assessment/Plan


Assessment/Plan


Assess & Plan/Chief Complaint


Left tibial plateau fracture.


Atrial fibrillation history.


COPD.


Coronary artery disease.


Diabetes area


Patient more alert today than yesterday.


.


18.


Left tibial plateau fracture.


Atrial fibrillation history.


COPD.


Coronary artery disease.


Diabetes.


Patient confused last night.


Patient not confused this morning.


Patient have chest x-ray today.


.


/left tibial plateau fracture.


Atrial fibrillation history.


COPD.


Pneumonia.


Coronary artery disease.


Diabetes..


.


.


Left tibial plateau fracture.


Atrial fibrillation.


COPD.


Pneumonia.


Coronary artery disease.


Diabetes 18.


Left tibial plateau fracture.


History of atrial fibrillation.


COPD.


Pneumonia history.


Coronary artery disease.


Diabetes.


.


18.


Left tibial plateau fracture.


History of atrial fibrillation.


Patient in atrial fib.


COPD.


Coronary artery disease.


Diabetes doing good.


.


18.


Left tibial plateau fracture.


Patient in atrial fibrillation.


Hypertension.


COPD.


Coronary artery disease.


Diabetes.


.


9/10/18.


Left tibial plateau fracture.


Hypertension history.


COPD.


Coronary artery disease.


Diabetes..


.


18.


Left tibial plateau fracture.


Hypertension.


COPD.


Coronary artery disease.


Diabetes





Clinical Quality Measures


DVT/VTE Risk/Contraindication:


Risk Factor Score Per Nursin


RFS Level Per Nursing on Admit:  4+=Very High











TOR CONNELLY DO Sep 11, 2018 07:42

## 2018-09-12 VITALS — DIASTOLIC BLOOD PRESSURE: 60 MMHG | SYSTOLIC BLOOD PRESSURE: 105 MMHG

## 2018-09-12 VITALS — DIASTOLIC BLOOD PRESSURE: 66 MMHG | SYSTOLIC BLOOD PRESSURE: 104 MMHG

## 2018-09-12 LAB
INR PPP: 3 (ref 0.8–1.4)
PROTHROMBIN TIME: 31.3 SEC (ref 12.2–14.7)

## 2018-09-12 RX ADMIN — INSULIN ASPART SCH UNIT: 100 INJECTION, SOLUTION INTRAVENOUS; SUBCUTANEOUS at 06:00

## 2018-09-12 RX ADMIN — IPRATROPIUM BROMIDE AND ALBUTEROL SULFATE SCH ML: .5; 3 SOLUTION RESPIRATORY (INHALATION) at 14:52

## 2018-09-12 RX ADMIN — DILTIAZEM HYDROCHLORIDE SCH MG: 90 CAPSULE, EXTENDED RELEASE ORAL at 08:36

## 2018-09-12 RX ADMIN — IPRATROPIUM BROMIDE AND ALBUTEROL SULFATE SCH ML: .5; 3 SOLUTION RESPIRATORY (INHALATION) at 11:00

## 2018-09-12 RX ADMIN — PANTOPRAZOLE SODIUM SCH MG: 40 TABLET, DELAYED RELEASE ORAL at 06:50

## 2018-09-12 RX ADMIN — ATORVASTATIN CALCIUM SCH MG: 10 TABLET, FILM COATED ORAL at 21:33

## 2018-09-12 RX ADMIN — MONTELUKAST SCH MG: 10 TABLET, FILM COATED ORAL at 21:34

## 2018-09-12 RX ADMIN — INSULIN HUMAN SCH UNIT: 100 INJECTION, SUSPENSION SUBCUTANEOUS at 21:36

## 2018-09-12 RX ADMIN — INSULIN ASPART SCH UNIT: 100 INJECTION, SOLUTION INTRAVENOUS; SUBCUTANEOUS at 21:38

## 2018-09-12 RX ADMIN — GABAPENTIN SCH MG: 100 CAPSULE ORAL at 11:38

## 2018-09-12 RX ADMIN — INSULIN ASPART SCH UNIT: 100 INJECTION, SOLUTION INTRAVENOUS; SUBCUTANEOUS at 16:28

## 2018-09-12 RX ADMIN — DOCUSATE SODIUM SCH MG: 100 CAPSULE ORAL at 08:36

## 2018-09-12 RX ADMIN — GABAPENTIN SCH MG: 100 CAPSULE ORAL at 21:34

## 2018-09-12 RX ADMIN — IPRATROPIUM BROMIDE AND ALBUTEROL SULFATE SCH ML: .5; 3 SOLUTION RESPIRATORY (INHALATION) at 19:45

## 2018-09-12 RX ADMIN — GABAPENTIN SCH MG: 100 CAPSULE ORAL at 06:50

## 2018-09-12 RX ADMIN — INSULIN ASPART SCH UNIT: 100 INJECTION, SOLUTION INTRAVENOUS; SUBCUTANEOUS at 11:38

## 2018-09-12 RX ADMIN — FLUTICASONE PROPIONATE AND SALMETEROL XINAFOATE SCH PUFF: 115; 21 AEROSOL, METERED RESPIRATORY (INHALATION) at 19:45

## 2018-09-12 RX ADMIN — INSULIN HUMAN SCH UNIT: 100 INJECTION, SUSPENSION SUBCUTANEOUS at 08:36

## 2018-09-12 RX ADMIN — WARFARIN SODIUM SCH MG: 5 TABLET ORAL at 17:42

## 2018-09-12 RX ADMIN — FLUTICASONE PROPIONATE AND SALMETEROL XINAFOATE SCH PUFF: 115; 21 AEROSOL, METERED RESPIRATORY (INHALATION) at 07:04

## 2018-09-12 RX ADMIN — IPRATROPIUM BROMIDE AND ALBUTEROL SULFATE SCH ML: .5; 3 SOLUTION RESPIRATORY (INHALATION) at 07:04

## 2018-09-12 NOTE — PM & R (SOAP) PROGRESS NOTE
Subjective


This was a face to face visit with the patient.


Date Seen by Provider:  Sep 12, 2018


Time Seen by Provider:  07:30


Subjective/Events-last exam


Patient was seen in his room this AM.Patient SBA for transfers.More alert INR 

3.0 Appreciate DR Spencer note





Objective


Physician Exam


Last Set of Vital Signs





Vital Signs








  Date Time  Temp Pulse Resp B/P (MAP) Pulse Ox O2 Delivery O2 Flow Rate FiO2


 


9/12/18 07:11     91 Nasal Cannula 2.00 


 


9/12/18 05:52 98.9 74 18 105/60 (75)    





Capillary Refill : Less Than 3 Seconds


I&O











Intake and Output 


 


 9/12/18





 00:00


 


Intake Total 1240 ml


 


Output Total 1350 ml


 


Balance -110 ml


 


 


 


Intake Oral 1240 ml


 


Output Urine Total 1350 ml


 


# Voids 1


 


# Bowel Movements 1








General:  Alert, Oriented X3, Cooperative, No Acute Distress


HEENT:  Atraumatic, PERRLA, EOMI, Mucous Memb Moist/Pink


Neck:  Supple, No JVD


Lungs:  Other (Cough with congestion rhonchi)


Heart:  Regular Rate, Normal S1, Normal S2


Abdomen:  Normal Bowel Sounds, Soft, No Tenderness


Extremities:  No Clubbing, Other (Knee immobilizer left)


Skin:  No Rashes


Neuro:  Normal Speech, Other (Functional strength and ROM ( knee not tested))


Psych/Mental Status:  Mental Status NL, Mood NL





Results


Lab Data


Laboratory Tests


9/9/18 11:17: Glucometer 242H


9/9/18 15:28: Glucometer 225H


9/9/18 20:49: Glucometer 156H


9/10/18 04:52: Glucometer 120H


9/10/18 11:14: Glucometer 168H


9/10/18 16:03: Glucometer 221H


9/10/18 21:13: Glucometer 112H


9/11/18 05:52: Glucometer 104


9/11/18 08:10: 


Prothrombin Time 32.9H, INR Comment 3.2H


9/11/18 11:07: Glucometer 154H


9/11/18 16:11: Glucometer 267H


9/11/18 20:14: Glucometer 203H


9/12/18 05:35: 


Prothrombin Time 31.3H, INR Comment 3.0H, Glucometer 109





Assessment/Plan


Assessment and Plan


Left Tibia plateau fracture managed with Knee immobilizer and now WBAT


Hypotension improved


CAD


Chronic anticoagulation


PAF Diltiazam resumed


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


[place]


DM2


Pneumonia treated


Cognitive deficits improving


Plan Continue PT/Ot


F/U with Cardiology and PCP PRN


Team Conference later today-see report for full functional update and POC and 

ELOS


Co-Morbidities that are continuing to impact the rehab process: (include details

)











VINCENT ROGERS MD Sep 12, 2018 09:13

## 2018-09-12 NOTE — SPEECH THERAPY DAILY NOTE
Speech Daily Progress Note


Subjective


Date Seen by Provider:  Sep 12, 2018


Time Seen by Provider:  09:45


Pt up in chair.  Pleasant.





Pain





   Numeric Pain Scale:  0-No Pain





Objective


Memory - Repeated activity from yesterday (). Pt able to recall most of the 

rules.  Required min assist to execute.





Treatment Plan


Discontinue ST (Pt is being dc'd to home on 2018)





Communication


Comprehension:  6


Expression:  7





Social Cognition


Social Interaction:  7


Problem Solvin


Memory:  5





Speech Short Term Goals


Short Term Goals


Short Term Goals


1.  Pt will complete various memory tasks with at least 80% accuracy with  min 

assist.


2.  Pt will complete various problem solving tasks with at least 80% accuracy 

and min assist.


Time Frame-ST-2 weeks


Comprehension:  6


Expression:  7


Social Interaction:  7


Problem Solvin


Memory:  5





Speech Long Term Goals


Long Term Goals


Pt will demonstrate functional memory and problem solving ability for maximum 

independence in the home.


Time Frame:  3-4 weeks


Comprehension:  6


Expression:  7


Social Interaction:  7


Problem Solvin


Memory:  6





Speech-Plan


Patient/Family Goals


Patient/Family Goals:  


to return to home.





Treatment Plan


Speech Therapy Treatment Plan:  Discontinue ST (Pt to be dc'd to home on 2018)


Pt being dc'd to home on 2018.  He partially met his Memory LTG of 6.  Pt 

is at level 5 on the FIM scores.


Treatment Duration:  Sep 11, 2018


Frequency:  Modified Program (IRF) (0)


Estimated Hrs Per Day:  Other (0)


Rehab Potential:  Fair


Pt/Family Agrees to Plan:  Yes





Safety Risks/Education


Teaching Recipient:  Patient


Teaching Methods:  Discussion


Response to Teaching:  Verbalize Understanding





Time


Speech Therapy Time In:  09:45


Speech Therapy Time Out:  10:15


Total Billed Time:  30


Billed Treatment Time


1, SLTS


No











COLETTE MENARD Sep 12, 2018 15:26

## 2018-09-12 NOTE — CARDIOLOGY PROGRESS NOTE
Subjective


Date Seen by Provider:  Sep 12, 2018


Time Seen by Provider:  07:47


Subjective/Events-last exam


Patient is in bed, feeling better, tolerating meds


Review of Systems


General:  No Chills, No Night Sweats, No Fatigue, No Malaise, No Appetite, No 

Other


HEENT:  No Head Aches, No Visual Changes, No Eye Pain, No Ear Pain, No Dysphasia

, No Sinus Congestion, No Post Nasal Drip, No Sore Throat, No Other


Pulmonary:  No Dyspnea, No Cough, No Pleuritic Chest Pain, No Other


Cardiovascular:  No: Chest Pain, Palpitations, Orthopnea, Paroxysmal Noc. 

Dyspnea, Edema, Lt Headedness, Other





Objective-Cardiology


Exam


Last Set of Vital Signs





Vital Signs








 18





 05:52 07:11


 


Temp 98.9 


 


Pulse 74 


 


Resp 18 


 


B/P (MAP) 105/60 (75) 


 


Pulse Ox  91


 


O2 Delivery  Nasal Cannula


 


O2 Flow Rate  2.00





Capillary Refill : Less Than 3 Seconds


I&O











Intake and Output 


 


 18





 00:00


 


Intake Total 1240 ml


 


Output Total 1350 ml


 


Balance -110 ml


 


 


 


Intake Oral 1240 ml


 


Output Urine Total 1350 ml


 


# Voids 1


 


# Bowel Movements 1








General:  Alert, Oriented X3, Cooperative, No Acute Distress


HEENT:  Atraumatic, PERRLA, EOMI, Mucous Memb Moist/Pink


Neck:  Supple, No JVD


Lungs:  Other (Cough with congestion rhonchi)


Heart:  Regular Rate, Normal S1, Normal S2


Abdomen:  Normal Bowel Sounds, Soft, No Tenderness


Extremities:  No Clubbing, Other (Knee immobilizer left)


Skin:  No Rashes


Neuro:  Normal Speech, Other (Functional strength and ROM ( knee not tested))


Psych/Mental Status:  Mental Status NL, Mood NL





Results


Lab








Laboratory Tests








Test


 18


08:10 18


11:07 18


16:11 18


20:14 Range/Units


 


 


Prothrombin Time 32.9 H    12.2-14.7  SEC


 


INR Comment 3.2 H    0.8-1.4  


 


Glucometer  154 H 267 H 203 H   MG/DL


 


Test


 18


05:35 


 


 


 Range/Units


 


 


Prothrombin Time 31.3 H    12.2-14.7  SEC


 


INR Comment 3.0 H    0.8-1.4  


 


Glucometer 109       MG/DL











A/P-Cardiology


Admission Diagnosis


Tibial fracture


Hypotension


Coronary artery disease


Chronic atrial fibrillation





Assessment/Plan


L lateral tibial plateau fracture, receiving physical therapy,  managed by 

Ortho Service





Intermittently borderline hypotension,  better blood pressure at this time, 

tolerating meds well, continue to monitor





Sinus tachycardia, frequent APCs, history of paroxysmal atrial fibrillation and 

Sick sinus syndrome, was intolerant to beta blockers in the past due to 

bradycardia, continue to monitor





Coronary artery disease with multiple stent placement in the past with most 

recent cardiac catheterization in 2017 showing patent stent in the left 

main, 50 percent ostial left main stenosis, heavily calcified LAD and 

circumflex artery with patent stent, mild to moderate disease, heavily 

calcified right coronary artery with mild-to-moderate disease, patent stent in 

the right iliac artery with mild disease in the right lower extremity, mild 

disease in the left lower extremity.  Continue to monitor





Intolerance of beta blockers secondary to bradycardia. Continue to monitor. 





History Paroxysmal atrial fibrillation, sick sinus syndrome.  Was unable to 

tolerate sotalol, metoprolol, Multaq,  has a Linq device implanted, continue to 

monitor





Generalized fatigue and loss of energy, dyspnea on exertion, chronic





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





Has history of obstructive sleep apnea and emphysema followed by Dr. Patino.





Hyperlipidemia, continue to monitor lipids





Diabetes mellitus II, followed and managed by primary care physician.





Moderate bilateral carotid stenosis, continue to monitor





Chronic back pain





Clinical Quality Measures


DVT/VTE Risk/Contraindication:


Risk Factor Score Per Nursin


RFS Level Per Nursing on Admit:  4+=Very High











SHAHRIAR BARRY MD Sep 12, 2018 07:49

## 2018-09-12 NOTE — PROGRESS NOTE (SOAP)
Subjective


Time Seen by Provider:  07:55


Subjective/Events-last exam


Sitting in chair today.


Patient worried about wife.


Patient improving with steps.


Patient now in sinus tachycardia





Objective


Exam





Vital Signs








  Date Time  Temp Pulse Resp B/P (MAP) Pulse Ox O2 Delivery O2 Flow Rate FiO2


 


18 07:11     91 Nasal Cannula 2.00 


 


18 05:52 98.9 74 18 105/60 (75) 98 Nasal Cannula 2.00 


 


18 21:00      Nasal Cannula 2.00 


 


18 19:34     95 Nasal Cannula 2.00 


 


18 16:12 97.9 86 16 97/58 (71) 98 Nasal Cannula 2.00 


 


18 14:56     93 Nasal Cannula 2.00 


 


18 10:39     90 Room Air  


 


18 09:00      Nasal Cannula 2.00 


 


18 08:06  103  105/66 (79)    














I & O 


 


 18





 07:00


 


Intake Total 990 ml


 


Output Total 1200 ml


 


Balance -210 ml





Capillary Refill : Less Than 3 Seconds


General Appearance:  No Apparent Distress, WD/WN


HEENT:  Normal ENT Inspection


Neck:  Full Range of Motion


Respiratory:  No Accessory Muscle Use, No Respiratory Distress, Decreased 

Breath Sounds


Cardiovascular:  Regular Rate, Rhythm, Tachycardia


Gastrointestinal:  non tender, soft





Results


Lab


Laboratory Tests


18 08:10: 


Prothrombin Time 32.9H, INR Comment 3.2H


18 11:07: Glucometer 154H


18 16:11: Glucometer 267H


18 20:14: Glucometer 203H


18 05:35: 


Prothrombin Time 31.3H, INR Comment 3.0H, Glucometer 109





Assessment/Plan


Assessment/Plan


Assess & Plan/Chief Complaint


Left tibial plateau fracture.


Atrial fibrillation history.


COPD.


Coronary artery disease.


Diabetes area


Patient more alert today than yesterday.


.


18.


Left tibial plateau fracture.


Atrial fibrillation history.


COPD.


Coronary artery disease.


Diabetes.


Patient confused last night.


Patient not confused this morning.


Patient have chest x-ray today.


.


/left tibial plateau fracture.


Atrial fibrillation history.


COPD.


Pneumonia.


Coronary artery disease.


Diabetes..


.


.


Left tibial plateau fracture.


Atrial fibrillation.


COPD.


Pneumonia.


Coronary artery disease.


Diabetes 18.


Left tibial plateau fracture.


History of atrial fibrillation.


COPD.


Pneumonia history.


Coronary artery disease.


Diabetes.


.


18.


Left tibial plateau fracture.


History of atrial fibrillation.


Patient in atrial fib.


COPD.


Coronary artery disease.


Diabetes doing good.


.


18.


Left tibial plateau fracture.


Patient in atrial fibrillation.


Hypertension.


COPD.


Coronary artery disease.


Diabetes.


.


9/10/18.


Left tibial plateau fracture.


Hypertension history.


COPD.


Coronary artery disease.


Diabetes..


.


18.


Left tibial plateau fracture.


Hypertension.


COPD.


Coronary artery disease.


Diabetes.


3


18 left tibial plateau fracture.


Hypertension.


COPD


Coronary artery disease.


Diabetes.


Atrial fibrillation.


Patient now in sinus tachycardia





Clinical Quality Measures


DVT/VTE Risk/Contraindication:


Risk Factor Score Per Nursin


RFS Level Per Nursing on Admit:  4+=Very High











TOR CONNELLY DO Sep 12, 2018 08:00

## 2018-09-13 VITALS — DIASTOLIC BLOOD PRESSURE: 75 MMHG | SYSTOLIC BLOOD PRESSURE: 137 MMHG

## 2018-09-13 VITALS — SYSTOLIC BLOOD PRESSURE: 95 MMHG | DIASTOLIC BLOOD PRESSURE: 57 MMHG

## 2018-09-13 LAB
INR PPP: 2.5 (ref 0.8–1.4)
PROTHROMBIN TIME: 26.8 SEC (ref 12.2–14.7)

## 2018-09-13 RX ADMIN — POTASSIUM CHLORIDE SCH MEQ: 750 TABLET, FILM COATED, EXTENDED RELEASE ORAL at 10:58

## 2018-09-13 RX ADMIN — GABAPENTIN SCH MG: 100 CAPSULE ORAL at 10:58

## 2018-09-13 RX ADMIN — IPRATROPIUM BROMIDE AND ALBUTEROL SULFATE SCH ML: .5; 3 SOLUTION RESPIRATORY (INHALATION) at 07:46

## 2018-09-13 RX ADMIN — FLUTICASONE PROPIONATE AND SALMETEROL XINAFOATE SCH PUFF: 115; 21 AEROSOL, METERED RESPIRATORY (INHALATION) at 07:46

## 2018-09-13 RX ADMIN — IPRATROPIUM BROMIDE AND ALBUTEROL SULFATE SCH ML: .5; 3 SOLUTION RESPIRATORY (INHALATION) at 11:19

## 2018-09-13 RX ADMIN — PANTOPRAZOLE SODIUM SCH MG: 40 TABLET, DELAYED RELEASE ORAL at 06:59

## 2018-09-13 RX ADMIN — DOCUSATE SODIUM SCH MG: 100 CAPSULE ORAL at 08:38

## 2018-09-13 RX ADMIN — INSULIN ASPART SCH UNIT: 100 INJECTION, SOLUTION INTRAVENOUS; SUBCUTANEOUS at 06:00

## 2018-09-13 RX ADMIN — INSULIN ASPART SCH UNIT: 100 INJECTION, SOLUTION INTRAVENOUS; SUBCUTANEOUS at 10:58

## 2018-09-13 RX ADMIN — GABAPENTIN SCH MG: 100 CAPSULE ORAL at 06:59

## 2018-09-13 RX ADMIN — DILTIAZEM HYDROCHLORIDE SCH MG: 90 CAPSULE, EXTENDED RELEASE ORAL at 08:38

## 2018-09-13 RX ADMIN — INSULIN HUMAN SCH UNIT: 100 INJECTION, SUSPENSION SUBCUTANEOUS at 08:38

## 2018-09-13 NOTE — PM & R (SOAP) PROGRESS NOTE
Subjective


This was a face to face visit with the patient.


Date Seen by Provider:  Sep 13, 2018


Time Seen by Provider:  10:00


Subjective/Events-last exam


Patient was discharged to home with family and HHC as per his request 

Appreciate Cardiology note and orders





Objective


Physician Exam


Last Set of Vital Signs





Vital Signs








  Date Time  Temp Pulse Resp B/P (MAP) Pulse Ox O2 Delivery O2 Flow Rate FiO2


 


9/13/18 13:24  64 18 95/57 93 Nasal Cannula 2.00 


 


9/13/18 07:18 97.8       





Capillary Refill : Less Than 3 Seconds


I&O











Intake and Output 


 


 9/13/18





 00:00


 


Intake Total 1090 ml


 


Output Total 654 ml


 


Balance 436 ml


 


 


 


Intake Oral 1090 ml


 


Output Urine Total 654 ml








General:  Alert, Oriented X3, Cooperative, No Acute Distress


HEENT:  Atraumatic, PERRLA, EOMI, Mucous Memb Moist/Pink


Neck:  Supple, No JVD


Lungs:  Other (Cough with congestion rhonchi)


Heart:  Regular Rate, Normal S1, Normal S2


Abdomen:  Normal Bowel Sounds, Soft, No Tenderness


Extremities:  No Clubbing, Other (Knee immobilizer left)


Skin:  No Rashes


Neuro:  Normal Speech, Other (Functional strength and ROM ( knee not tested))


Psych/Mental Status:  Mental Status NL, Mood NL





Results


Lab Data


Laboratory Tests


9/10/18 21:13: Glucometer 112H


9/11/18 05:52: Glucometer 104


9/11/18 08:10: 


Prothrombin Time 32.9H, INR Comment 3.2H


9/11/18 11:07: Glucometer 154H


9/11/18 16:11: Glucometer 267H


9/11/18 20:14: Glucometer 203H


9/12/18 05:35: 


Glucometer 109, Prothrombin Time 31.3H, INR Comment 3.0H


9/12/18 10:59: Glucometer 233H


9/12/18 16:24: Glucometer 131H


9/12/18 20:27: Glucometer 212H


9/13/18 05:55: 


Prothrombin Time 26.8H, INR Comment 2.5H


9/13/18 06:51: Glucometer 104


9/13/18 10:53: Glucometer 301H





Assessment/Plan


Assessment and Plan


Home today with family and HHC


F/U with Cardiology and PCP and Ortho


Current meds and labs reviewed


see orders


Co-Morbidities that are continuing to impact the rehab process: (include details

)











VINCENT ROGERS MD Sep 13, 2018 17:52

## 2018-09-13 NOTE — THERAPY TEAM DISCHARGE SUMMARY
Therapy Discharge Summary


Discharge Recommendations


Date of Discharge


Sep 13, 2018 at 13:28


Therapy D/C Recommendations:  Home w/ Family Support, Occupational Therapy Home 

Care





Physical Therapy


Patient s/o left tibial plateau fracture resulting in knee immobilized 

placement and TTWB status.  Upon initial evaluation, patient required moderate 

assist with all mobility and was limited with gait due to weight bearing 

status.  Patient also had confusion and impulsive behaviors requiring 

monitoring due to diminished safety awareness.  Patient progressed with 

treatment plan and was dismissed to home with caregivers and spouse at A to 

modified independent LOF with advancement of WBAT left LE with hinged knee 

brace in place.  Patient required education on donning knee brace and was able 

to perform with SBA.  Patient will also receive home health intervention to 

ensure a safe environment in his residence.  Goals address and attained.





Occupational Therapy


Decreased Activ Tolerance, Decreased Safety Aware, Impaired Self-Care Skills





PT Long Term Goals


Long Term Goals


PT Long Term Goals Time Frame:  Sep 18, 2018


Transfers (B,C,W/C) (FIM):  5 (met 18)


Roll Left to Right (QC):  4 (met 18)


Sit to Lying (QC):  4 (met 18)


Lying-Sitting on Side/Bed(QC):  4 (met 18)


Sit to Stand (QC):  4 (met 18)


Chair/Bed-to-Chair Xfer(QC):  4 (met 18)


Car Transfer (QC):  4 (met 18)


Gait (FIM):  2 (met 18)


Distance:  50'


Walk 10 feet (QC):  4 (met 18)


Walk 10ft-Uneven Surface(QC):  4 (met 18)


Walk 50ft with 2 Turns (QC):  4 (met 18)


Gait Level of Assist:  5 (met 18)


Gait Assistive Device:  FWW


Stairs (FIM):  1 (met 18)


# of Steps:  1 (met 18)


1 Step (curb) (QC):  4 (met 18)


Stairs Level Of Assist:  4 (met 18)





OT Long Term Goals


Long Term Goals


Time Frame:  Sep 11, 2018


Eating (FIM):  6 (met)


Eating (QC):  6 (met)


Oral Hygiene (QC):  6 (not met.  Pt. declines oral care.)


Grooming(FIM):  6 (met)


Bathing(FIM):  5 (met)


Shower/Bathe Self (QC):  5 (met)


Upper Body Dressing(FIM):  6 (not met)


Upper Body Dressing (QC):  6 (not met)


Lower Body Dressing(FIM):  5 (not met)


Lower Body Dressing (QC):  5 (not met)


On/Off Footwear (QC):  5 (not met)


Toileting(FIM):  6 (met)


Toileting Hygiene (QC):  6 (met)


Transfers (B,C,W/C) (FIM):  6 (met)


Toilet/Commode Transfer(FIM):  6 (met)


Toilet/Commode Transfer (QC):  6 (met)


Shower Transfer(FIM):  5


Comprehension(FIM):  6


Expression (FIM):  7


Social Interaction(FIM):  7


Problem Solving(FIM):  6


Memory(FIM):  6


Additional Goals:  1-Demonstrate ADL Tasks, 2-Verbalize Understanding, 3-

ImproveStrength/Hema


1=Demonstrate adherence to instructed precautions during ADL tasks.


2=Patient will verbalize/demonstrate understanding of assistive devices/

modifications for ADL.


3=Patient will improve strength/tolerance for activity to enable patient to 

perform ADL's.





Speech Long Term Goals


Long Term Goals


Pt will demonstrate functional memory and problem solving ability for maximum 

independence in the home.


Time Frame:  3-4 weeks


Comprehension:  6


Expression:  7


Social Interaction:  7


Problem Solvin


Memory:  6











DOMINGO OCHOA PT Sep 13, 2018 15:33

## 2018-09-13 NOTE — THERAPY TEAM DISCHARGE SUMMARY
Therapy Discharge Summary


Discharge Recommendations


Date of Discharge


18


Therapy D/C Recommendations:  Home w/ Family Support, Occupational Therapy Home 

Care





Occupational Therapy


Pt. has been seen by occupational therapy to increase overall strength and 

independence with daily skills.  Pt. has met many goals.  Please see goals met.

  However, does demonstrate safety concerns and does have difficulty with 

donning leg brace.  Pt. is insistent that he will have someone with him to 

assist.  Pt. is discharging home with spouse.  Do not recommend dressing stick 

or AE as pt. can't remember to use it.


Decreased Activ Tolerance, Decreased Safety Aware, Impaired Self-Care Skills





PT Long Term Goals


Long Term Goals


PT Long Term Goals Time Frame:  Sep 18, 2018


Transfers (B,C,W/C) (FIM):  5


Roll Left to Right (QC):  4


Sit to Lying (QC):  4


Lying-Sitting on Side/Bed(QC):  4


Sit to Stand (QC):  4


Chair/Bed-to-Chair Xfer(QC):  4


Car Transfer (QC):  4


Gait (FIM):  2


Distance:  50'


Walk 10 feet (QC):  4


Walk 10ft-Uneven Surface(QC):  4


Walk 50ft with 2 Turns (QC):  4


Gait Level of Assist:  5


Gait Assistive Device:  FWW


Stairs (FIM):  1


# of Steps:  1


1 Step (curb) (QC):  4


Stairs Level Of Assist:  4





OT Long Term Goals


Long Term Goals


Time Frame:  Sep 11, 2018


Eating (FIM):  6 (met)


Eating (QC):  6 (met)


Oral Hygiene (QC):  6 (not met.  Pt. declines oral care.)


Grooming(FIM):  6 (met)


Bathing(FIM):  5 (met)


Shower/Bathe Self (QC):  5 (met)


Upper Body Dressing(FIM):  6 (not met)


Upper Body Dressing (QC):  6 (not met)


Lower Body Dressing(FIM):  5 (not met)


Lower Body Dressing (QC):  5 (not met)


On/Off Footwear (QC):  5 (not met)


Toileting(FIM):  6 (met)


Toileting Hygiene (QC):  6 (met)


Transfers (B,C,W/C) (FIM):  6 (met)


Toilet/Commode Transfer(FIM):  6 (met)


Toilet/Commode Transfer (QC):  6 (met)


Shower Transfer(FIM):  5


Comprehension(FIM):  6


Expression (FIM):  7


Social Interaction(FIM):  7


Problem Solving(FIM):  6


Memory(FIM):  6


Additional Goals:  1-Demonstrate ADL Tasks, 2-Verbalize Understanding, 3-

ImproveStrength/Hema


1=Demonstrate adherence to instructed precautions during ADL tasks.


2=Patient will verbalize/demonstrate understanding of assistive devices/

modifications for ADL.


3=Patient will improve strength/tolerance for activity to enable patient to 

perform ADL's.





Speech Long Term Goals


Long Term Goals


Pt will demonstrate functional memory and problem solving ability for maximum 

independence in the home.


Comprehension:  6


Expression:  7


Social Interaction:  7


Problem Solvin


Memory:  6











RIZWAN MURPHY OT Sep 13, 2018 09:20

## 2018-09-13 NOTE — CARDIOLOGY PROGRESS NOTE
Subjective


Date Seen by Provider:  Sep 13, 2018


Time Seen by Provider:  08:10


Subjective/Events-last exam


Patient is in bed, no new complaints. Being discharged home today.





Objective-Cardiology


Exam


Last Set of Vital Signs





Vital Signs








 18





 07:44 07:46 07:48


 


Temp 98.2  


 


Pulse 80  


 


Resp 12  


 


B/P (MAP) 137/75 (95)  


 


Pulse Ox  92 


 


O2 Delivery   Nasal Cannula


 


O2 Flow Rate   2.00





Capillary Refill : Less Than 3 Seconds


I&O











Intake and Output 


 


 18





 00:00


 


Intake Total 1090 ml


 


Output Total 654 ml


 


Balance 436 ml


 


 


 


Intake Oral 1090 ml


 


Output Urine Total 654 ml








General:  Alert, Oriented X3, Cooperative, No Acute Distress


HEENT:  Atraumatic, PERRLA, EOMI, Mucous Memb Moist/Pink


Neck:  Supple, No JVD


Lungs:  Other (Cough with congestion rhonchi)


Heart:  Regular Rate, Normal S1, Normal S2


Abdomen:  Normal Bowel Sounds, Soft, No Tenderness


Extremities:  No Clubbing, Other (Knee immobilizer left)


Skin:  No Rashes


Neuro:  Normal Speech, Other (Functional strength and ROM ( knee not tested))


Psych/Mental Status:  Mental Status NL, Mood NL





A/P-Cardiology


Admission Diagnosis


Tibial fracture


Hypotension


Coronary artery disease


Chronic atrial fibrillation





Assessment/Plan


L lateral tibial plateau fracture, receiving physical therapy,  managed by 

Ortho Service





Intermittently borderline hypotension,  better blood pressure at this time, 

tolerating meds well, continue to monitor





Sinus tachycardia, frequent APCs, history of paroxysmal atrial fibrillation and 

Sick sinus syndrome, was intolerant to beta blockers in the past due to 

bradycardia, continue to monitor





Coronary artery disease with multiple stent placement in the past with most 

recent cardiac catheterization in 2017 showing patent stent in the left 

main, 50 percent ostial left main stenosis, heavily calcified LAD and 

circumflex artery with patent stent, mild to moderate disease, heavily 

calcified right coronary artery with mild-to-moderate disease, patent stent in 

the right iliac artery with mild disease in the right lower extremity, mild 

disease in the left lower extremity.  Continue to monitor





Intolerance of beta blockers secondary to bradycardia. Continue to monitor. 





History Paroxysmal atrial fibrillation, sick sinus syndrome.  Was unable to 

tolerate sotalol, metoprolol, Multaq,  has a Linq device implanted, continue to 

monitor





Generalized fatigue and loss of energy, dyspnea on exertion, chronic





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





Has history of obstructive sleep apnea and emphysema followed by Dr. Patino.





Hyperlipidemia, continue to monitor lipids





Diabetes mellitus II, followed and managed by primary care physician.





Moderate bilateral carotid stenosis, continue to monitor





Chronic back pain





Clinical Quality Measures


DVT/VTE Risk/Contraindication:


Risk Factor Score Per Nursin


RFS Level Per Nursing on Admit:  4+=Very High











GURDEEP GONZÁLES Sep 13, 2018 08:11

## 2018-09-13 NOTE — CARDIOLOGY PROGRESS NOTE
Subjective


Date Seen by Provider:  Sep 13, 2018


Time Seen by Provider:  08:19


Subjective/Events-last exam


Patient is in bed, feeling well, no new complaint


Review of Systems


General:  No Chills, No Night Sweats, No Fatigue, No Malaise, No Appetite, No 

Other


HEENT:  No Head Aches, No Visual Changes, No Eye Pain, No Ear Pain, No Dysphasia

, No Sinus Congestion, No Post Nasal Drip, No Sore Throat, No Other


Pulmonary:  No Dyspnea, No Cough, No Pleuritic Chest Pain, No Other


Cardiovascular:  No: Chest Pain, Palpitations, Orthopnea, Paroxysmal Noc. 

Dyspnea, Edema, Lt Headedness, Other





Objective-Cardiology


Exam


Last Set of Vital Signs





Vital Signs








 18





 07:18 07:46 07:48


 


Temp 97.8  


 


Pulse 64  


 


Resp 18  


 


B/P (MAP) 95/57 (70)  


 


Pulse Ox  92 


 


O2 Delivery   Nasal Cannula


 


O2 Flow Rate   2.00





Capillary Refill : Less Than 3 Seconds


I&O











Intake and Output 


 


 18





 00:00


 


Intake Total 1090 ml


 


Output Total 654 ml


 


Balance 436 ml


 


 


 


Intake Oral 1090 ml


 


Output Urine Total 654 ml








General:  Alert, Oriented X3, Cooperative, No Acute Distress


HEENT:  Atraumatic, PERRLA, EOMI, Mucous Memb Moist/Pink


Neck:  Supple, No JVD


Lungs:  Other (Cough with congestion rhonchi)


Heart:  Regular Rate, Normal S1, Normal S2


Abdomen:  Normal Bowel Sounds, Soft, No Tenderness


Extremities:  No Clubbing, Other (Knee immobilizer left)


Skin:  No Rashes


Neuro:  Normal Speech, Other (Functional strength and ROM ( knee not tested))


Psych/Mental Status:  Mental Status NL, Mood NL





Results


Lab








Laboratory Tests








Test


 18


10:59 18


16:24 18


20:27 18


05:55 Range/Units


 


 


Glucometer 233 H 131 H 212 H    MG/DL


 


Prothrombin Time    26.8 H 12.2-14.7  SEC


 


INR Comment    2.5 H 0.8-1.4  


 


Test


 18


06:51 


 


 


 Range/Units


 


 


Glucometer 104       MG/DL











A/P-Cardiology


Admission Diagnosis


Tibial fracture


Hypotension


Coronary artery disease


Chronic atrial fibrillation





Assessment/Plan


L lateral tibial plateau fracture, receiving physical therapy,  managed by 

Ortho Service





Intermittently borderline hypotension,  better blood pressure at this time, 

tolerating meds well, continue to monitor





Sinus tachycardia, frequent APCs, history of paroxysmal atrial fibrillation and 

Sick sinus syndrome, was intolerant to beta blockers in the past due to 

bradycardia, continue to monitor





Coronary artery disease with multiple stent placement in the past with most 

recent cardiac catheterization in 2017 showing patent stent in the left 

main, 50 percent ostial left main stenosis, heavily calcified LAD and 

circumflex artery with patent stent, mild to moderate disease, heavily 

calcified right coronary artery with mild-to-moderate disease, patent stent in 

the right iliac artery with mild disease in the right lower extremity, mild 

disease in the left lower extremity.  Continue to monitor





Intolerance of beta blockers secondary to bradycardia. Continue to monitor. 





History Paroxysmal atrial fibrillation, sick sinus syndrome.  Was unable to 

tolerate sotalol, metoprolol, Multaq,  has a Linq device implanted, continue to 

monitor





Generalized fatigue and loss of energy, dyspnea on exertion, chronic





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





Has history of obstructive sleep apnea and emphysema followed by Dr. Patino.





Hyperlipidemia, continue to monitor lipids





Diabetes mellitus II, followed and managed by primary care physician.





Moderate bilateral carotid stenosis, continue to monitor





Chronic back pain





Clinical Quality Measures


DVT/VTE Risk/Contraindication:


Risk Factor Score Per Nursin


RFS Level Per Nursing on Admit:  4+=Very High











SHAHRIAR BARRY MD Sep 13, 2018 08:19

## 2018-09-14 NOTE — THERAPY TEAM DISCHARGE SUMMARY
Therapy Discharge Summary


Discharge Recommendations


Date of Discharge


Sep 13, 2018 at 13:28


Therapy D/C Recommendations:  Home w/ Family Support, Occupational Therapy Home 

Care





Occupational Therapy


Decreased Activ Tolerance, Decreased Safety Aware, Impaired Self-Care Skills





Speech-Language Pathology


Pt admitted to ARU with L Tibial Plateau Fracture.  Initial cognitive 

assessment revealed cognitive deficits with  short-term memory.  Pt 

participated in ST completing various visual and auditory memory activities. Pt 

did much better with visual activities.  By the end of ST the pt complete 

memory tasks with high levels of accuracy (80-90%) The pt partially met his 

long term goal. He was goaled as a 6 but achieved a level of 5.  Dc from 

skilled ST.





PT Long Term Goals


Long Term Goals


PT Long Term Goals Time Frame:  Sep 18, 2018


Transfers (B,C,W/C) (FIM):  5 (met 18)


Roll Left to Right (QC):  4 (met 18)


Sit to Lying (QC):  4 (met 18)


Lying-Sitting on Side/Bed(QC):  4 (met 18)


Sit to Stand (QC):  4 (met 18)


Chair/Bed-to-Chair Xfer(QC):  4 (met 18)


Car Transfer (QC):  4 (met 18)


Gait (FIM):  2 (met 18)


Distance:  50'


Walk 10 feet (QC):  4 (met 18)


Walk 10ft-Uneven Surface(QC):  4 (met 18)


Walk 50ft with 2 Turns (QC):  4 (met 18)


Gait Level of Assist:  5 (met 18)


Gait Assistive Device:  FWW


Stairs (FIM):  1 (met 18)


# of Steps:  1 (met 18)


1 Step (curb) (QC):  4 (met 18)


Stairs Level Of Assist:  4 (met 18)





OT Long Term Goals


Long Term Goals


Time Frame:  Sep 11, 2018


Eating (FIM):  6 (met)


Eating (QC):  6 (met)


Oral Hygiene (QC):  6 (not met.  Pt. declines oral care.)


Grooming(FIM):  6 (met)


Bathing(FIM):  5 (met)


Shower/Bathe Self (QC):  5 (met)


Upper Body Dressing(FIM):  6 (not met)


Upper Body Dressing (QC):  6 (not met)


Lower Body Dressing(FIM):  5 (not met)


Lower Body Dressing (QC):  5 (not met)


On/Off Footwear (QC):  5 (not met)


Toileting(FIM):  6 (met)


Toileting Hygiene (QC):  6 (met)


Transfers (B,C,W/C) (FIM):  6 (met)


Toilet/Commode Transfer(FIM):  6 (met)


Toilet/Commode Transfer (QC):  6 (met)


Shower Transfer(FIM):  5


Comprehension(FIM):  6


Expression (FIM):  7


Social Interaction(FIM):  7


Problem Solving(FIM):  6


Memory(FIM):  6


Additional Goals:  1-Demonstrate ADL Tasks, 2-Verbalize Understanding, 3-

ImproveStrength/Hema


1=Demonstrate adherence to instructed precautions during ADL tasks.


2=Patient will verbalize/demonstrate understanding of assistive devices/

modifications for ADL.


3=Patient will improve strength/tolerance for activity to enable patient to 

perform ADL's.





Speech Long Term Goals


Long Term Goals


Pt will demonstrate functional memory and problem solving ability for maximum 

independence in the home.


Time Frame:  3-4 weeks


Comprehension:  6


Expression:  7


Social Interaction:  7


Problem Solvin


Memory:  6 (Pt achieved a 5 for his discharge status.  Goal partially met.)











COLETTE MENARD Sep 14, 2018 10:47

## 2018-09-25 NOTE — DISCHARGE SUMMARY
DATE OF SERVICE:  



HISTORY OF PRESENT ILLNESS:

The patient is an 78-year-old male who presented to ED at [facility]

on 08/23/2018 after falling at his home on 08/22/2018 going through some boards

in his porch that were rotten.  He had an evaluation and imaging studies, which

revealed lateral tibial plateau fracture of the left leg.  The patient was seen

by Dr. Latham and on 08/24/2018, he was placed in an immobilizer and made

toe-touch weightbearing left lower extremity.  Cardiology was consulted on

08/25/2018 for hypotension.  Medications were adjusted.  Therapies were begun. 

The patient was felt to be appropriate for inpatient rehabilitation.  He uses

supplemental O2 at home at night.  He is currently on O2 by nasal cannula

continuous.  He has a cough, which appears to be chronic and he carries a

history of COPD.  He had been independent prior to this.  He is a retired artist

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



PAST MEDICAL HISTORY:

COPD, emphysema, O2 dependent, atrial fibrillation, coronary artery disease,

hypercholesterolemia, hypertension, peripheral vascular disease, bladder

infection, GI bleed, arthritis, CABG, coronary stent, eye surgery, joint

replacement, lobectomy, prostatectomy, tonsillectomy.  He lives in [place], [place],

with his spouse.  The patient has had some increased confusion since this fall.



MEDICAL COURSE:

The patient was followed by PCP, Dr. Holley as well as cardiology and Dr. Latham.  His confusion gradually improved to baseline.  He was made

weightbearing as tolerated prior to discharge by orthopedics, but knee

immobilizer to continue.  He had a chest x-ray on 09/04/2018 showing overall

stable parenchymal changes when compared with examination from 5 days earlier,

basilar interstitial changes as well as a probable infiltrate in the right base.

 This is similar to prior exam, small amount of pleural fluid on the right.  He

was afebrile during his stay.  His pulse is 64 on 09/13/2018, respirations 18,

blood pressure remained low at 95/57 on 09/13/2018.  Cardiology has adjusted

medications.  O2 sats were 92% on 2 liters of O2 by nasal cannula.  His sinus

tachycardia 103 on 09/11/2018 improved.  His pulse remained irregular.  CBC on

09/04/2018 showed WBC 7.2, H and H 8.8/27, platelet count 370 K.  Accu-Cheks

from 09/12/2018 to 09/13/2018 varied between 104 and 301.  Chemistry on

09/04/2018 showed normal electrolytes, BUN and creatinine.  Blood glucose mildly

elevated at 114.  Calcium was 8.4.  His INR was monitored.  It was 2.5 on

09/13/2018 with Coumadin dose adjusted accordingly.  UA on 08/30/2018 was

negative.



REHABILITATION COURSE:

He had increased strength and endurance, decreased confusion.  Speech therapy

notes that initial cognitive assessment revealed cognitive deficits with short

term memory.  By the end of speech therapy, the patient completed memory tasks

with high levels of accuracy.  The patient partially met his long-term goals. 

He has achieved a level of 5.



PHYSICAL THERAPY NOTES:

Upon admission, the patient required mod assist with all mobility and was

limited with gait due to weightbearing status.  Also had some confusion,

impulsive behaviors requiring monitoring due to diminished safety awareness. 

The patient progressed well and upon discharge, he is standby assist to modified

independent level of function with advancement of weightbearing as tolerated

left lower extremity with hinged knee brace in place.



OCCUPATIONAL THERAPY NOTES:

Upon admission, he was set up for eating, min assist for grooming, mod assist

for bathing, max assist for lower body dressing, mod assist for transfers.  Upon

discharge, he was standby assist for dressing, modified independent for

toileting hygiene, modified independent for toilet transfers, standby assist for

bathing, modified independent for grooming and eating.



DISCHARGE INSTRUCTIONS:

The patient is discharged to home with home health care.  Continue current diet.

 The patient will have followup with orthopedics and PCP, Dr. Holley.  _____

follow up INR with Dr. Holley.



DISCHARGE MEDICATIONS:

Diltiazem 90 mg p.o. daily, Breo Ellipta 1 puff inhalation daily, gabapentin 100

mg p.o. at 7:00 a.m. and 12:00 noon and 200 to 300 mg p.o. at bedtime, Novolin N

5 units insulin subcutaneously b.i.d. and Humulin R 4 units subcutaneously

b.i.d., Singulair 10 mg p.o. at bedtime, Protonix 40 mg p.o. daily, KCl 10 mEq

p.o. every 48 hours, simvastatin 20 mg p.o. at bedtime, Coumadin 5 mg p.o. every

evening.



DISCHARGE DIAGNOSES:

1.  Rehabilitation left lateral tibial plateau fracture due to fall at home.

2.  Emphysema.

3.  Paroxysmal atrial fibrillation.

4.  Pneumonia.

5.  Coronary artery disease.

6.  Hypertension.

7.  Peripheral vascular disease.

8.  Type 2 diabetes mellitus.

9.  Hyponatremia.

10.  Hypoalbuminemia.

11.  Anemia.

12.  Confusion.

13.  Chronic back pain.

14.  Obstructive sleep apnea.

15.  Hypotension.

16.  Sinus tachycardia.

17.  Hyperlipidemia.

18.  Bilateral carotid artery stenosis.

19.  O2 dependence.

20.  Status post coronary artery bypass graft.

21.  Status post coronary stent.

22.  Long-term use anticoagulant.

23.  Long-term use insulin.

24.  History of smoking.

25.  Fracture due to fall at home.



CONDITION AT DISCHARGE:

Improved and stable.



PROGNOSIS:

Rehab prognosis appears good for some continued improvement at home; however,

due to his age, falls, orthopedic injuries, confusion and multiple

comorbidities, he may eventually benefit from more formal assisted living

setting if his family is unable to assist him.





Job ID: [id number]

DocumentID: [id number]

Dictated Date:  09/25/2018 10:57:53

Transcription Date: 09/25/2018 23:36:40

Dictated By: VINCENT ROGERS MD

## 2019-02-11 ENCOUNTER — HOSPITAL ENCOUNTER (EMERGENCY)
Dept: HOSPITAL 75 - ER | Age: 79
Discharge: HOME | End: 2019-02-11
Payer: MEDICARE

## 2019-02-11 VITALS — WEIGHT: 174 LBS | HEIGHT: 69 IN | BODY MASS INDEX: 25.77 KG/M2

## 2019-02-11 VITALS — DIASTOLIC BLOOD PRESSURE: 76 MMHG | SYSTOLIC BLOOD PRESSURE: 117 MMHG

## 2019-02-11 DIAGNOSIS — Z90.79: ICD-10-CM

## 2019-02-11 DIAGNOSIS — Z88.8: ICD-10-CM

## 2019-02-11 DIAGNOSIS — Z87.448: ICD-10-CM

## 2019-02-11 DIAGNOSIS — Z90.2: ICD-10-CM

## 2019-02-11 DIAGNOSIS — Z95.1: ICD-10-CM

## 2019-02-11 DIAGNOSIS — E78.00: ICD-10-CM

## 2019-02-11 DIAGNOSIS — G47.30: ICD-10-CM

## 2019-02-11 DIAGNOSIS — I25.10: ICD-10-CM

## 2019-02-11 DIAGNOSIS — Z88.0: ICD-10-CM

## 2019-02-11 DIAGNOSIS — I48.91: ICD-10-CM

## 2019-02-11 DIAGNOSIS — Z87.19: ICD-10-CM

## 2019-02-11 DIAGNOSIS — Z87.891: ICD-10-CM

## 2019-02-11 DIAGNOSIS — Z86.73: ICD-10-CM

## 2019-02-11 DIAGNOSIS — I10: ICD-10-CM

## 2019-02-11 DIAGNOSIS — Z79.4: ICD-10-CM

## 2019-02-11 DIAGNOSIS — I73.9: ICD-10-CM

## 2019-02-11 DIAGNOSIS — Z80.0: ICD-10-CM

## 2019-02-11 DIAGNOSIS — Z79.51: ICD-10-CM

## 2019-02-11 DIAGNOSIS — Z95.5: ICD-10-CM

## 2019-02-11 DIAGNOSIS — E11.9: ICD-10-CM

## 2019-02-11 DIAGNOSIS — J18.9: Primary | ICD-10-CM

## 2019-02-11 DIAGNOSIS — Z82.49: ICD-10-CM

## 2019-02-11 DIAGNOSIS — Z88.2: ICD-10-CM

## 2019-02-11 DIAGNOSIS — Z79.01: ICD-10-CM

## 2019-02-11 DIAGNOSIS — Z90.89: ICD-10-CM

## 2019-02-11 DIAGNOSIS — Z88.5: ICD-10-CM

## 2019-02-11 LAB
ALBUMIN SERPL-MCNC: 3.4 GM/DL (ref 3.2–4.5)
ALP SERPL-CCNC: 60 U/L (ref 40–136)
ALT SERPL-CCNC: 11 U/L (ref 0–55)
BASOPHILS # BLD AUTO: 0 10^3/UL (ref 0–0.1)
BASOPHILS NFR BLD AUTO: 0 % (ref 0–10)
BILIRUB SERPL-MCNC: 0.6 MG/DL (ref 0.1–1)
BUN/CREAT SERPL: 22
CALCIUM SERPL-MCNC: 8.5 MG/DL (ref 8.5–10.1)
CHLORIDE SERPL-SCNC: 103 MMOL/L (ref 98–107)
CO2 SERPL-SCNC: 26 MMOL/L (ref 21–32)
CREAT SERPL-MCNC: 0.85 MG/DL (ref 0.6–1.3)
EOSINOPHIL # BLD AUTO: 0.1 10^3/UL (ref 0–0.3)
EOSINOPHIL NFR BLD AUTO: 1 % (ref 0–10)
ERYTHROCYTE [DISTWIDTH] IN BLOOD BY AUTOMATED COUNT: 16 % (ref 10–14.5)
GFR SERPLBLD BASED ON 1.73 SQ M-ARVRAT: > 60 ML/MIN
GLUCOSE SERPL-MCNC: 115 MG/DL (ref 70–105)
HCT VFR BLD CALC: 33 % (ref 40–54)
HGB BLD-MCNC: 10.3 G/DL (ref 13.3–17.7)
INR PPP: 3.4 (ref 0.8–1.4)
LYMPHOCYTES # BLD AUTO: 2 X 10^3 (ref 1–4)
LYMPHOCYTES NFR BLD AUTO: 19 % (ref 12–44)
MANUAL DIFFERENTIAL PERFORMED BLD QL: NO
MCH RBC QN AUTO: 29 PG (ref 25–34)
MCHC RBC AUTO-ENTMCNC: 32 G/DL (ref 32–36)
MCV RBC AUTO: 92 FL (ref 80–99)
MONOCYTES # BLD AUTO: 0.9 X 10^3 (ref 0–1)
MONOCYTES NFR BLD AUTO: 9 % (ref 0–12)
NEUTROPHILS # BLD AUTO: 7.5 X 10^3 (ref 1.8–7.8)
NEUTROPHILS NFR BLD AUTO: 71 % (ref 42–75)
PLATELET # BLD: 268 10^3/UL (ref 130–400)
PMV BLD AUTO: 8.5 FL (ref 7.4–10.4)
POTASSIUM SERPL-SCNC: 4 MMOL/L (ref 3.6–5)
PROT SERPL-MCNC: 6.7 GM/DL (ref 6.4–8.2)
PROTHROMBIN TIME: 34.8 SEC (ref 12.2–14.7)
SODIUM SERPL-SCNC: 136 MMOL/L (ref 135–145)
WBC # BLD AUTO: 10.5 10^3/UL (ref 4.3–11)

## 2019-02-11 PROCEDURE — 85025 COMPLETE CBC W/AUTO DIFF WBC: CPT

## 2019-02-11 PROCEDURE — 36415 COLL VENOUS BLD VENIPUNCTURE: CPT

## 2019-02-11 PROCEDURE — 71046 X-RAY EXAM CHEST 2 VIEWS: CPT

## 2019-02-11 PROCEDURE — 85610 PROTHROMBIN TIME: CPT

## 2019-02-11 PROCEDURE — 80053 COMPREHEN METABOLIC PANEL: CPT

## 2019-02-11 PROCEDURE — 83605 ASSAY OF LACTIC ACID: CPT

## 2019-02-11 PROCEDURE — 87040 BLOOD CULTURE FOR BACTERIA: CPT

## 2019-02-11 NOTE — ED COUGH/URI
General


Stated Complaint:  PNEUMONIA


Source:  patient, family


Exam Limitations:  no limitations





History of Present Illness


Date Seen by Provider:  2019


Time Seen by Provider:  11:32


Initial Comments


To ER complaint by wife per private vehicle from Dr. Connelly's office with 

suspicion of pneumonia. Starting last night he had fever up to 103, confusion, 

general weakness. Currently he is alert and oriented and feeling somewhat 

better. He denies a cough but states that he clears his throat a lot and does 

get up some mucus.


Timing/Duration:  yesterday


Severity/Quality:  productive cough


Associated Symptoms:  cough, fever/chills, shortness of breath





Allergies and Home Medications


Allergies


Coded Allergies:  


     morphine (Verified  Allergy, Mild, Pt has received Lortab in the past w/o 

issue, 19)


     Sulfa (Sulfonamide Antibiotics) (Verified  Allergy, Unknown, 19)


     penicillin G (Verified  Allergy, Unknown, 19)


     levofloxacin (Verified  Adverse Reaction, Unknown, 19)





Home Medications


Diltiazem HCl 90 Mg Tab, 90 MG PO DAILY


   Prescribed by: VINCENT ROGERS on 18 1435


Fluticasone/Vilanterol 1 Each Blst.w.dev, 1 PUFF IH DAILY, (Reported)


Gabapentin 100 Mg Capsule, 100 MG PO 0700,1200, (Reported)


Gabapentin 100 Mg Capsule, 200-300 MG PO HS, (Reported)


   TAKES 2-3 OF A (100 MG) CAPSULE / DEPENDING ON PAIN LEVEL 


Insulin NPH Human Isophane 100 Unit/1 Ml Vial, 5 UNITS SQ BID, (Reported)


   USES ALONG WITH NOVOLIN R 


Insulin Regular, Human 1,000 Units/10 Ml Soln, 4 UNITS SQ BID, (Reported)


   USES ALONG WITH NOVOLIN N 


Montelukast Sodium 10 Mg Tablet, 10 MG PO HS, (Reported)


Pantoprazole Sodium 40 Mg Tablet.dr, 40 MG PO DAILY, (Reported)


Potassium Chloride 10 Meq Tab.er.prt, 10 MEQ PO Q48H, (Reported)


Simvastatin 20 Mg Tablet, 20 MG PO HS, (Reported)


Warfarin Sodium 5 Mg Tablet, 5 MG PO 1800, (Reported)





Patient Home Medication List


Home Medication List Reviewed:  Yes





Review of Systems


Review of Systems


Constitutional:  see HPI, fever


EENTM:  see HPI


Respiratory:  see HPI, cough


Cardiovascular:  no symptoms reported


Genitourinary:  no symptoms reported


Musculoskeletal:  no symptoms reported


Skin:  no symptoms reported


Psychiatric/Neurological:  No Symptoms Reported


Hematologic/Lymphatic:  No Symptoms Reported


Immunological/Allergic:  no symptoms reported





Past Medical-Social-Family Hx


Patient Social History


Type Used:  Cigarettes


Former Smoker, Quit:  1985


2nd Hand Smoke Exposure:  No


Recent Hopitalizations:  No





Immunizations Up To Date


Tetanus Booster (TDap):  More than 5yrs


PED Vaccines UTD:  No


Date of Pneumonia Vaccine:  Oct 3, 2016


Date of Influenza Vaccine:  Sep 4, 2017





Seasonal Allergies


Seasonal Allergies:  No





Past Medical History


Surgeries:  Yes (1/2 right lung removed)


Cardiac, CABG, Coronary Stent, Eye Surgery, Joint Replacement, Lobectomy, 

Orthopedic, Prostatectomy, Tonsillectomy


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 )


Pneumonia, Sleep Apnea, COPD, Emphysema


Currently Using CPAP:  No


Currently Using BIPAP:  No


Cardiac:  Yes (stents)


Atrial Fibrillation, Coronary Artery Disease, High Cholesterol, Hypertension, 

Peripheral Vascular


Neurological:  Yes


Stroke


Reproductive Disorders:  No


Sexually Transmitted Disease:  No


HIV/AIDS:  No


Genitourinary:  Yes


Benign Prostatic Hyperpl, Bladder Infection


Gastrointestinal:  Yes


Gastrointestinal Bleed


Musculoskeletal:  Yes


Arthritis


Endocrine:  Yes


Diabetes, Insulin dep


HEENT:  Yes


Cataract


Loss of Vision:  Denies


Hearing Impairment:  Hard of Hearing


Cancer:  No


Psychosocial:  No


Integumentary:  No


Blood Disorders:  No


Adverse Reaction/Blood Tranf:  No





Family Medical History





BLADD


  09 BROTHER


BLADD


  09 BROTHER


Cancer


  03 FATHER (THROAT)


  09 BROTHER


Dementia


  03 MOTHER


FH: bladder cancer


FH: bladder cancer


Family history: Diabetes mellitus


  03 MOTHER


Family history: Thyroid disorder


  03 MOTHER


Infertile


  03 MOTHER (X5 MISCARRIAGES)


Myocardial infarction


  09 BROTHER





No Family History of:


  Abdominal aortic aneurysm


  Nichols's disease


  Alcoholism


  Aphasia


  Cancer of colon


  Cataract


  Chest pain


  Congenital heart disease


  Congestive heart failure


  Cystic fibrosis


  Dysphagia


  Family history: Allergy


  Family history: Alzheimer's disease


  Family history: Arthritis


  Family history: Asthma


  Family history: Breast disease


  Family history: Cardiovascular disease


  Family history: Coronary thrombosis


  Family history: Gastrointestinal disease


  Family history: Glaucoma


  Family history: Hypertension


  Family history: Osteoporosis


  Headache


  Hearing loss


  Heart disease


  Hereditary disease


  History of - anemia


  History of - disorder


  History of - respiratory disease


  History of drug abuse


  Human immunodeficiency virus (HIV) seropositivity


  Hypercholesterolemia


  Kidney disease


  Malignant neoplasm of lung


  Parkinson's disease


  Prostate cancer


  Psychotic disorder


  Seizure disorder


  Stroke


  Tuberculosis


  Visual impairment


Heart Disease, Cancer, Stroke, Other Conditions/Hx





Physical Exam





Vital Signs - First Documented








 19





 11:23


 


Temp 97.2


 


Pulse 62


 


Resp 16


 


B/P (MAP) 117/76 (90)


 


Pulse Ox 95





Capillary Refill :


Height: 5'9.00"


Weight: 180lbs. 5.0oz. 81.443742xl; 27.9 BMI


Method:Estimated


General Appearance:  WD/WN, no apparent distress


Eyes:  Bilateral Eye Normal Inspection, Bilateral Eye PERRL, Bilateral Eye EOMI


HEENT:  PERRL/EOMI, normal ENT inspection


Neck:  non-tender, full range of motion


Respiratory:  no respiratory distress, no accessory muscle use, crackles (right 

base)


Gastrointestinal:  normal bowel sounds, soft


Neurologic/Psychiatric:  alert, normal mood/affect, oriented x 3


Skin:  normal color, warm/dry





Focused Exam


Lactate Level


19 11:40: Lactic Acid Level 0.55





Lactic Acid Level





Laboratory Tests








Test


 19


11:40


 


Lactic Acid Level


 0.55 MMOL/L


(0.50-2.00)











Progress/Results/Core Measures


Suspected Sepsis


SIRS


Temperature: 


Pulse:  


Respiratory Rate: 


 


Laboratory Tests


19 11:40: White Blood Count 10.5


Blood Pressure  / 


Mean: 


 





19 11:40: Lactic Acid Level 0.55


Laboratory Tests


19 11:40: 


Creatinine 0.85, Platelet Count 268, Total Bilirubin 0.6








Results/Orders


Lab Results





Laboratory Tests








Test


 19


11:40 Range/Units


 


 


White Blood Count


 10.5 


 4.3-11.0


10^3/uL


 


Red Blood Count


 3.55 L


 4.35-5.85


10^6/uL


 


Hemoglobin 10.3 L 13.3-17.7  G/DL


 


Hematocrit 33 L 40-54  %


 


Mean Corpuscular Volume 92  80-99  FL


 


Mean Corpuscular Hemoglobin 29  25-34  PG


 


Mean Corpuscular Hemoglobin


Concent 32 


 32-36  G/DL





 


Red Cell Distribution Width 16.0 H 10.0-14.5  %


 


Platelet Count


 268 


 130-400


10^3/uL


 


Mean Platelet Volume 8.5  7.4-10.4  FL


 


Neutrophils (%) (Auto) 71  42-75  %


 


Lymphocytes (%) (Auto) 19  12-44  %


 


Monocytes (%) (Auto) 9  0-12  %


 


Eosinophils (%) (Auto) 1  0-10  %


 


Basophils (%) (Auto) 0  0-10  %


 


Neutrophils # (Auto) 7.5  1.8-7.8  X 10^3


 


Lymphocytes # (Auto) 2.0  1.0-4.0  X 10^3


 


Monocytes # (Auto) 0.9  0.0-1.0  X 10^3


 


Eosinophils # (Auto)


 0.1 


 0.0-0.3


10^3/uL


 


Basophils # (Auto)


 0.0 


 0.0-0.1


10^3/uL


 


Sodium Level 136  135-145  MMOL/L


 


Potassium Level 4.0  3.6-5.0  MMOL/L


 


Chloride Level 103    MMOL/L


 


Carbon Dioxide Level 26  21-32  MMOL/L


 


Anion Gap 7  5-14  MMOL/L


 


Blood Urea Nitrogen 19 H 7-18  MG/DL


 


Creatinine


 0.85 


 0.60-1.30


MG/DL


 


Estimat Glomerular Filtration


Rate > 60 


  





 


BUN/Creatinine Ratio 22   


 


Glucose Level 115 H   MG/DL


 


Lactic Acid Level


 0.55 


 0.50-2.00


MMOL/L


 


Calcium Level 8.5  8.5-10.1  MG/DL


 


Corrected Calcium 9.0  8.5-10.1  MG/DL


 


Total Bilirubin 0.6  0.1-1.0  MG/DL


 


Aspartate Amino Transf


(AST/SGOT) 17 


 5-34  U/L





 


Alanine Aminotransferase


(ALT/SGPT) 11 


 0-55  U/L





 


Alkaline Phosphatase 60    U/L


 


Total Protein 6.7  6.4-8.2  GM/DL


 


Albumin 3.4  3.2-4.5  GM/DL








My Orders





Orders - [facility]


Cbc With Automated Diff (19 11:28)


Comprehensive Metabolic Panel (19 11:28)


Chest Pa/Lat (2 View) (19 11:28)


Iv Heplock-Insert (Order) (19 11:28)


Blood Culture (19 11:28)


Lactic Acid Analyzer (19 11:28)





Vital Signs/I&O











 19





 11:23


 


Temp 97.2


 


Pulse 62


 


Resp 16


 


B/P (MAP) 117/76 (90)


 


Pulse Ox 95





Capillary Refill :





Diagnostic Imaging





   Diagonstic Imaging:  Xray


Comments


NAME:   NESHA PANCHAL


MED REC#:   [id number]


ACCOUNT#:   [id number]


PT STATUS:   REG ER


:   1940


PHYSICIAN:   [facility] FIN[facility]


ADMIT DATE:   19/ER


 ***Draft***


Date of Exam:19





CHEST PA/LAT (2 VIEW)








Indication:  Shortness of breath.





Time of exam: 11:56 AM





Correlation is made with prior study from 2018.





The heart size stable. Cardiac monitoring device overlies the


left heart. Bibasilar infiltrates consistent with pneumonia are


noted. Some minimal patchy infiltrate in the midlung fields as


well bilaterally. Upper lung fields are clear. No significant


effusion or pneumothorax is seen.





Impression: Bilateral parenchymal opacities suggestive of


infiltrates, chronicity indeterminate. No other significant


abnormality is seen.





  Dictated on workstation # [id number]








Dict:   19 1208


Trans:   19 1215


CVB [id number]





Interpreted by:     SUKHJINDER GUERRERO MD


Electronically signed by:





Departure


Communication (Admissions)


I spoke with Dr. Dr. Connelly. We will proceed with outpatient therapy. We 

will place the patient on Omnicef, he'll follow-up with the patient tomorrow in 

clinic.





Impression





 Primary Impression:  


 Pneumonia


 Qualified Codes:  J18.9 - Pneumonia, unspecified organism


Disposition:  01 HOME, SELF-CARE


Condition:  Stable





Departure-Patient Inst.


Decision time for Depature:  12:39


Referrals:  


TOR CONNELLY DO (PCP/Family)


Primary Care Physician


Patient Instructions:  Pneumonia, Adult (DC)





Add. Discharge Instructions:  


1. Antibiotics as directed


2. Follow-up with Dr. Dr. Connelly tomorrow.


Scripts


Cefdinir (Cefdinir) 300 Mg Capsule


300 MG PO BID, #20 CAP


   Prov: [facility]         19











COSTA FINN 2019 11:34

## 2019-02-11 NOTE — DIAGNOSTIC IMAGING REPORT
Indication:  Shortness of breath.



Time of exam: 11:56 AM



Correlation is made with prior study from 09/04/2018.



The heart size stable. Cardiac monitoring device overlies the

left heart. Bibasilar infiltrates consistent with pneumonia are

noted. Some minimal patchy infiltrate in the midlung fields as

well bilaterally. Upper lung fields are clear. No significant

effusion or pneumothorax is seen.



Impression: Bilateral parenchymal opacities suggestive of

infiltrates, chronicity indeterminate. No other significant

abnormality is seen.



Dictated by: 



  Dictated on workstation # [id number]

## 2019-02-14 ENCOUNTER — HOSPITAL ENCOUNTER (OUTPATIENT)
Dept: HOSPITAL 75 - RAD | Age: 79
End: 2019-02-14
Attending: FAMILY MEDICINE
Payer: MEDICARE

## 2019-02-14 DIAGNOSIS — I25.10: ICD-10-CM

## 2019-02-14 DIAGNOSIS — J44.9: ICD-10-CM

## 2019-02-14 DIAGNOSIS — J18.9: Primary | ICD-10-CM

## 2019-02-14 LAB
BASOPHILS # BLD AUTO: 0 10^3/UL (ref 0–0.1)
BASOPHILS NFR BLD AUTO: 0 % (ref 0–10)
BUN/CREAT SERPL: 18
CALCIUM SERPL-MCNC: 8.3 MG/DL (ref 8.5–10.1)
CHLORIDE SERPL-SCNC: 105 MMOL/L (ref 98–107)
CO2 SERPL-SCNC: 22 MMOL/L (ref 21–32)
CREAT SERPL-MCNC: 0.99 MG/DL (ref 0.6–1.3)
EOSINOPHIL # BLD AUTO: 0.2 10^3/UL (ref 0–0.3)
EOSINOPHIL NFR BLD AUTO: 3 % (ref 0–10)
ERYTHROCYTE [DISTWIDTH] IN BLOOD BY AUTOMATED COUNT: 15.9 % (ref 10–14.5)
GFR SERPLBLD BASED ON 1.73 SQ M-ARVRAT: > 60 ML/MIN
GLUCOSE SERPL-MCNC: 247 MG/DL (ref 70–105)
HCT VFR BLD CALC: 31 % (ref 40–54)
HGB BLD-MCNC: 9.7 G/DL (ref 13.3–17.7)
INR PPP: 2.3 (ref 0.8–1.4)
LYMPHOCYTES # BLD AUTO: 1.7 X 10^3 (ref 1–4)
LYMPHOCYTES NFR BLD AUTO: 29 % (ref 12–44)
MANUAL DIFFERENTIAL PERFORMED BLD QL: NO
MCH RBC QN AUTO: 29 PG (ref 25–34)
MCHC RBC AUTO-ENTMCNC: 31 G/DL (ref 32–36)
MCV RBC AUTO: 92 FL (ref 80–99)
MONOCYTES # BLD AUTO: 0.5 X 10^3 (ref 0–1)
MONOCYTES NFR BLD AUTO: 9 % (ref 0–12)
NEUTROPHILS # BLD AUTO: 3.6 X 10^3 (ref 1.8–7.8)
NEUTROPHILS NFR BLD AUTO: 60 % (ref 42–75)
PLATELET # BLD: 260 10^3/UL (ref 130–400)
PMV BLD AUTO: 8.8 FL (ref 7.4–10.4)
POTASSIUM SERPL-SCNC: 3.9 MMOL/L (ref 3.6–5)
PROTHROMBIN TIME: 25.8 SEC (ref 12.2–14.7)
SODIUM SERPL-SCNC: 136 MMOL/L (ref 135–145)
WBC # BLD AUTO: 6 10^3/UL (ref 4.3–11)

## 2019-02-14 PROCEDURE — 36415 COLL VENOUS BLD VENIPUNCTURE: CPT

## 2019-02-14 PROCEDURE — 85025 COMPLETE CBC W/AUTO DIFF WBC: CPT

## 2019-02-14 PROCEDURE — 71046 X-RAY EXAM CHEST 2 VIEWS: CPT

## 2019-02-14 PROCEDURE — 80048 BASIC METABOLIC PNL TOTAL CA: CPT

## 2019-02-14 PROCEDURE — 85610 PROTHROMBIN TIME: CPT

## 2019-02-14 NOTE — DIAGNOSTIC IMAGING REPORT
INDICATION: Pneumonia.



PA and lateral chest obtained at 8:26 a.m. and compared to

02/11/2019.



FINDINGS: Heart is borderline in size. Mediastinal silhouette is

unremarkable. There are surgical clips in the left upper chest.

There is no change in bibasilar infiltrates compared to the prior

study. There is no pneumothorax or significant sized effusion.



IMPRESSION:



No significant change in bilateral infiltrates compared to the

prior study.



Dictated by: 



  Dictated on workstation # [id number]

## 2019-03-05 ENCOUNTER — HOSPITAL ENCOUNTER (EMERGENCY)
Dept: HOSPITAL 75 - ER | Age: 79
Discharge: HOME | End: 2019-03-05
Payer: MEDICARE

## 2019-03-05 VITALS — BODY MASS INDEX: 25.82 KG/M2 | WEIGHT: 174.31 LBS | HEIGHT: 69 IN

## 2019-03-05 VITALS — SYSTOLIC BLOOD PRESSURE: 113 MMHG | DIASTOLIC BLOOD PRESSURE: 77 MMHG

## 2019-03-05 DIAGNOSIS — Z88.2: ICD-10-CM

## 2019-03-05 DIAGNOSIS — Z87.19: ICD-10-CM

## 2019-03-05 DIAGNOSIS — Z79.01: ICD-10-CM

## 2019-03-05 DIAGNOSIS — Z98.890: ICD-10-CM

## 2019-03-05 DIAGNOSIS — Z90.2: ICD-10-CM

## 2019-03-05 DIAGNOSIS — Z88.0: ICD-10-CM

## 2019-03-05 DIAGNOSIS — Z88.8: ICD-10-CM

## 2019-03-05 DIAGNOSIS — Z79.51: ICD-10-CM

## 2019-03-05 DIAGNOSIS — Z88.5: ICD-10-CM

## 2019-03-05 DIAGNOSIS — Z86.73: ICD-10-CM

## 2019-03-05 DIAGNOSIS — Z80.0: ICD-10-CM

## 2019-03-05 DIAGNOSIS — I73.9: ICD-10-CM

## 2019-03-05 DIAGNOSIS — E78.00: ICD-10-CM

## 2019-03-05 DIAGNOSIS — J44.9: Primary | ICD-10-CM

## 2019-03-05 DIAGNOSIS — Z95.1: ICD-10-CM

## 2019-03-05 DIAGNOSIS — I25.10: ICD-10-CM

## 2019-03-05 DIAGNOSIS — Z80.52: ICD-10-CM

## 2019-03-05 DIAGNOSIS — Z87.09: ICD-10-CM

## 2019-03-05 DIAGNOSIS — Z87.448: ICD-10-CM

## 2019-03-05 DIAGNOSIS — E11.9: ICD-10-CM

## 2019-03-05 DIAGNOSIS — Z82.49: ICD-10-CM

## 2019-03-05 DIAGNOSIS — Z90.79: ICD-10-CM

## 2019-03-05 DIAGNOSIS — I48.91: ICD-10-CM

## 2019-03-05 DIAGNOSIS — Z79.4: ICD-10-CM

## 2019-03-05 DIAGNOSIS — Z95.5: ICD-10-CM

## 2019-03-05 DIAGNOSIS — Z87.01: ICD-10-CM

## 2019-03-05 DIAGNOSIS — Z87.891: ICD-10-CM

## 2019-03-05 DIAGNOSIS — I10: ICD-10-CM

## 2019-03-05 LAB
ALBUMIN SERPL-MCNC: 3.1 GM/DL (ref 3.2–4.5)
ALP SERPL-CCNC: 62 U/L (ref 40–136)
ALT SERPL-CCNC: 11 U/L (ref 0–55)
APTT BLD: 90 SEC (ref 24–35)
APTT PPP: YELLOW S
BACTERIA #/AREA URNS HPF: NEGATIVE /HPF
BASOPHILS # BLD AUTO: 0 10^3/UL (ref 0–0.1)
BASOPHILS NFR BLD AUTO: 1 % (ref 0–10)
BILIRUB SERPL-MCNC: 0.5 MG/DL (ref 0.1–1)
BILIRUB UR QL STRIP: NEGATIVE
BUN/CREAT SERPL: 13
CALCIUM SERPL-MCNC: 8.4 MG/DL (ref 8.5–10.1)
CHLORIDE SERPL-SCNC: 105 MMOL/L (ref 98–107)
CO2 SERPL-SCNC: 26 MMOL/L (ref 21–32)
CREAT SERPL-MCNC: 1 MG/DL (ref 0.6–1.3)
EOSINOPHIL # BLD AUTO: 0.5 10^3/UL (ref 0–0.3)
EOSINOPHIL NFR BLD AUTO: 8 % (ref 0–10)
ERYTHROCYTE [DISTWIDTH] IN BLOOD BY AUTOMATED COUNT: 16 % (ref 10–14.5)
FIBRINOGEN PPP-MCNC: CLEAR MG/DL
GFR SERPLBLD BASED ON 1.73 SQ M-ARVRAT: > 60 ML/MIN
GLUCOSE SERPL-MCNC: 168 MG/DL (ref 70–105)
GLUCOSE UR STRIP-MCNC: NEGATIVE MG/DL
HCT VFR BLD CALC: 30 % (ref 40–54)
HGB BLD-MCNC: 9.4 G/DL (ref 13.3–17.7)
INR PPP: 3.3 (ref 0.8–1.4)
KETONES UR QL STRIP: NEGATIVE
LEUKOCYTE ESTERASE UR QL STRIP: NEGATIVE
LYMPHOCYTES # BLD AUTO: 1 X 10^3 (ref 1–4)
LYMPHOCYTES NFR BLD AUTO: 16 % (ref 12–44)
MANUAL DIFFERENTIAL PERFORMED BLD QL: NO
MCH RBC QN AUTO: 29 PG (ref 25–34)
MCHC RBC AUTO-ENTMCNC: 32 G/DL (ref 32–36)
MCV RBC AUTO: 92 FL (ref 80–99)
MONOCYTES # BLD AUTO: 0.6 X 10^3 (ref 0–1)
MONOCYTES NFR BLD AUTO: 11 % (ref 0–12)
NEUTROPHILS # BLD AUTO: 3.9 X 10^3 (ref 1.8–7.8)
NEUTROPHILS NFR BLD AUTO: 65 % (ref 42–75)
NITRITE UR QL STRIP: NEGATIVE
PH UR STRIP: 7 [PH] (ref 5–9)
PLATELET # BLD: 238 10^3/UL (ref 130–400)
PMV BLD AUTO: 8.6 FL (ref 7.4–10.4)
POTASSIUM SERPL-SCNC: 4.2 MMOL/L (ref 3.6–5)
PROT SERPL-MCNC: 6.1 GM/DL (ref 6.4–8.2)
PROT UR QL STRIP: NEGATIVE
PROTHROMBIN TIME: 34.1 SEC (ref 12.2–14.7)
RBC #/AREA URNS HPF: (no result) /HPF
SODIUM SERPL-SCNC: 137 MMOL/L (ref 135–145)
SP GR UR STRIP: 1.01 (ref 1.02–1.02)
SQUAMOUS #/AREA URNS HPF: (no result) /HPF
UROBILINOGEN UR-MCNC: NORMAL MG/DL
WBC # BLD AUTO: 5.9 10^3/UL (ref 4.3–11)
WBC #/AREA URNS HPF: (no result) /HPF

## 2019-03-05 PROCEDURE — 85025 COMPLETE CBC W/AUTO DIFF WBC: CPT

## 2019-03-05 PROCEDURE — 87040 BLOOD CULTURE FOR BACTERIA: CPT

## 2019-03-05 PROCEDURE — 83605 ASSAY OF LACTIC ACID: CPT

## 2019-03-05 PROCEDURE — 85730 THROMBOPLASTIN TIME PARTIAL: CPT

## 2019-03-05 PROCEDURE — 83880 ASSAY OF NATRIURETIC PEPTIDE: CPT

## 2019-03-05 PROCEDURE — 36415 COLL VENOUS BLD VENIPUNCTURE: CPT

## 2019-03-05 PROCEDURE — 87088 URINE BACTERIA CULTURE: CPT

## 2019-03-05 PROCEDURE — 71046 X-RAY EXAM CHEST 2 VIEWS: CPT

## 2019-03-05 PROCEDURE — 94640 AIRWAY INHALATION TREATMENT: CPT

## 2019-03-05 PROCEDURE — 81000 URINALYSIS NONAUTO W/SCOPE: CPT

## 2019-03-05 PROCEDURE — 85610 PROTHROMBIN TIME: CPT

## 2019-03-05 PROCEDURE — 80053 COMPREHEN METABOLIC PANEL: CPT

## 2019-03-05 NOTE — ED GENERAL
General


Chief Complaint:  Respiratory Problems


Stated Complaint:  SOB BACK PAIN


Nursing Triage Note:  


AMB TO ROOM REPORTS HE HAS  BEEN DX  WITH PNEUMONIA  2 WEEKS AGO IS NOT ANY 


BETTER HAD APPOINTMENT WITH DR CONNELLY DECIDED TO COME HERE.


Nursing Sepsis Screen:  No Definite Risk


Source of Information:  Patient


Exam Limitations:  No Limitations





History of Present Illness


Date Seen by Provider:  Mar 5, 2019


Time Seen by Provider:  09:06


Initial Comments


Here with report of increasing cough and shortness of breath for the last few 

days. Was diagnosed with pneumonia on  and reconfirmed on the . 

Did take treatment for that was better for several days and recently became 

worse. He was due to have an appointment with his primary doctor this morning 

but opted to come here as he states he usually gets sent here and he is 

concerned about the pneumonia. Does have significant past history with respect 

to his lungs with multiple surgeries. Hasn't smoked in 35 years. Denies nausea 

or vomiting. Otherwise eating and drinking okay. Unsure about fever. Has been 

using his breathing treatments quite a bit at home and sometimes it helps and 

sometimes a don't. He does have oxygen available at home and he has been using 

that.


Timing/Duration:  2-3 Days, Getting Worse


Severity:  Moderate


Modifying Factors:  improves with Rest


Associated Systoms:  No Chest Pain; Cough; No Fever/Chills, No Nausea/Vomiting; 

Shortness of Air; No Weakness





Allergies and Home Medications


Allergies


Coded Allergies:  


     morphine (Verified  Allergy, Mild, Pt has received Lortab in the past w/o 

issue, 19)


     Sulfa (Sulfonamide Antibiotics) (Verified  Allergy, Unknown, 19)


     penicillin G (Verified  Allergy, Unknown, 19)


     levofloxacin (Verified  Adverse Reaction, Unknown, 19)





Home Medications


Cefdinir 300 Mg Capsule, 300 MG PO BID


   Prescribed by: COSTA FINN on 19 1240


Diltiazem HCl 90 Mg Tab, 90 MG PO DAILY


   Prescribed by: VINCENT ROGERS on 18 1435


Fluticasone/Vilanterol 1 Each Blst.w.dev, 1 PUFF IH DAILY, (Reported)


Gabapentin 100 Mg Capsule, 100 MG PO 0700,1200, (Reported)


Gabapentin 100 Mg Capsule, 200-300 MG PO HS, (Reported)


   TAKES 2-3 OF A (100 MG) CAPSULE / DEPENDING ON PAIN LEVEL 


Insulin NPH Human Isophane 100 Unit/1 Ml Vial, 5 UNITS SQ BID, (Reported)


   USES ALONG WITH NOVOLIN R 


Insulin Regular, Human 1,000 Units/10 Ml Soln, 4 UNITS SQ BID, (Reported)


   USES ALONG WITH NOVOLIN N 


Montelukast Sodium 10 Mg Tablet, 10 MG PO HS, (Reported)


Pantoprazole Sodium 40 Mg Tablet.dr, 40 MG PO DAILY, (Reported)


Potassium Chloride 10 Meq Tab.er.prt, 10 MEQ PO Q48H, (Reported)


Simvastatin 20 Mg Tablet, 20 MG PO HS, (Reported)


Warfarin Sodium 5 Mg Tablet, 5 MG PO 1800, (Reported)





Patient Home Medication List


Home Medication List Reviewed:  Yes





Review of Systems


Review of Systems


Constitutional:  see HPI; No chills, No fever


EENTM:  no symptoms reported


Respiratory:  cough, short of breath, wheezing


Cardiovascular:  no symptoms reported


Gastrointestinal:  no symptoms reported


Musculoskeletal:  No back pain, No muscle pain





All Other Systems Reviewed


Negative Unless Noted:  Yes





Past Medical-Social-Family Hx


Past Med/Social Hx:  Reviewed Nursing Past Med/Soc Hx


Patient Social History


Alcohol Use:  Denies Use


Recreational Drug Use:  No


Smoking Status:  Former Smoker


Type Used:  Cigarettes


Former Smoker, Quit:  1985


2nd Hand Smoke Exposure:  No


Recent Foreign Travel:  No


Contact w/Someone Who Travel:  No


Recent Infectious Disease Expo:  No


Recent Hopitalizations:  No





Immunizations Up To Date


Tetanus Booster (TDap):  More than 5yrs


PED Vaccines UTD:  No


Date of Pneumonia Vaccine:  Oct 3, 2016


Date of Influenza Vaccine:  Sep 4, 2017





Seasonal Allergies


Seasonal Allergies:  No





Past Medical History


Surgeries:  Yes (1/2 right lung removed)


Cardiac, CABG, Coronary Stent, Eye Surgery, Joint Replacement, Lobectomy, 

Orthopedic, Prostatectomy, Tonsillectomy


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 )


Pneumonia, Sleep Apnea, COPD, Emphysema


Currently Using CPAP:  No


Currently Using BIPAP:  No


Cardiac:  Yes (stents)


Atrial Fibrillation, Coronary Artery Disease, High Cholesterol, Hypertension, 

Peripheral Vascular


Neurological:  Yes


Stroke


Reproductive Disorders:  No


Sexually Transmitted Disease:  No


HIV/AIDS:  No


Genitourinary:  Yes


Benign Prostatic Hyperpl, Bladder Infection


Gastrointestinal:  Yes


Gastrointestinal Bleed


Musculoskeletal:  Yes


Arthritis


Endocrine:  Yes


Diabetes, Insulin dep


HEENT:  Yes


Cataract


Loss of Vision:  Denies


Hearing Impairment:  Hard of Hearing


Cancer:  No


Psychosocial:  No


Integumentary:  No


Blood Disorders:  No


Adverse Reaction/Blood Tranf:  No





Family Medical History


Reviewed Nursing Family Hx





BLADD


  09 BROTHER


BLADD


  09 BROTHER


Cancer


  03 FATHER (THROAT)


  09 BROTHER


Dementia


  03 MOTHER


FH: bladder cancer


FH: bladder cancer


Family history: Diabetes mellitus


  03 MOTHER


Family history: Thyroid disorder


  03 MOTHER


Infertile


  03 MOTHER (X5 MISCARRIAGES)


Myocardial infarction


  09 BROTHER





No Family History of:


  Abdominal aortic aneurysm


  Shayne's disease


  Alcoholism


  Aphasia


  Cancer of colon


  Cataract


  Chest pain


  Congenital heart disease


  Congestive heart failure


  Cystic fibrosis


  Dysphagia


  Family history: Allergy


  Family history: Alzheimer's disease


  Family history: Arthritis


  Family history: Asthma


  Family history: Breast disease


  Family history: Cardiovascular disease


  Family history: Coronary thrombosis


  Family history: Gastrointestinal disease


  Family history: Glaucoma


  Family history: Hypertension


  Family history: Osteoporosis


  Headache


  Hearing loss


  Heart disease


  Hereditary disease


  History of - anemia


  History of - disorder


  History of - respiratory disease


  History of drug abuse


  Human immunodeficiency virus (HIV) seropositivity


  Hypercholesterolemia


  Kidney disease


  Malignant neoplasm of lung


  Parkinson's disease


  Prostate cancer


  Psychotic disorder


  Seizure disorder


  Stroke


  Tuberculosis


  Visual impairment


Heart Disease, Cancer, Stroke, Other Conditions/Hx





Physical Exam-Suspected Sepsis


Physical Exam


Vital Signs





Vital Signs - First Documented








 3/5/19





 08:39


 


Temp 98.0


 


Pulse 80


 


Resp 18


 


B/P (MAP) 112/74 (87)


 


Pulse Ox 90


 


O2 Delivery Room Air


 


O2 Flow Rate 2.00





Capillary Refill : Less Than 3 Seconds








Blood Pressure Mean:  87








Height, Weight, BMI


Height: 5'9.00"


Weight: 174lbs. 5.0oz. [id number]; 27.9 BMI


Method:Stated


General Appearance:  No Apparent Distress, WD/WN


HEENT:  PERRL/EOMI, Pharynx Normal


Neck:  Non Tender, Supple


Respiratory:  Crackles, Expiration, Pleural Rub, Wheezing


Cardiovascular:  No Murmur, Tachycardia


Gastrointestinal:  Non Tender, Soft


Back:  Normal Inspection, No CVA Tenderness, No Vertebral Tenderness


Extremity:  Normal Range of Motion, Non Tender


Neurologic/Psychiatric:  Alert, Oriented x3


Skin:  normal color, warm/dry





Focused Exam


Lactate Level


3/5/19 09:28: Lactic Acid Level 0.76





Lactic Acid Level








Progress/Results/Core Measures


Suspected Sepsis


Recent Fever Within 48 Hours:  No


Infection Criteria Present:  Documented Infection


New/Unexplained  Altered Menta:  No


Sepsis Screen:  No Definite Risk


SIRS


Temperature:98.0 


Pulse: 80 


Respiratory Rate: 18


 


Laboratory Tests


3/5/19 09:28: White Blood Count 5.9


Blood Pressure 112 /74 


Mean: 87


 


3/5/19 09:28: Lactic Acid Level 0.76


Laboratory Tests


3/5/19 09:28: 


Creatinine 1.00, INR Comment 3.3H, Platelet Count 238, Total Bilirubin 0.5








Results/Orders


Lab Results





Laboratory Tests








Test


 3/5/19


09:28 3/5/19


12:40 Range/Units


 


 


White Blood Count


 5.9 


 


 4.3-11.0


10^3/uL


 


Red Blood Count


 3.22 L


 


 4.35-5.85


10^6/uL


 


Hemoglobin 9.4 L  13.3-17.7  G/DL


 


Hematocrit 30 L  40-54  %


 


Mean Corpuscular Volume 92   80-99  FL


 


Mean Corpuscular Hemoglobin 29   25-34  PG


 


Mean Corpuscular Hemoglobin


Concent 32 


 


 32-36  G/DL





 


Red Cell Distribution Width 16.0 H  10.0-14.5  %


 


Platelet Count


 238 


 


 130-400


10^3/uL


 


Mean Platelet Volume 8.6   7.4-10.4  FL


 


Neutrophils (%) (Auto) 65   42-75  %


 


Lymphocytes (%) (Auto) 16   12-44  %


 


Monocytes (%) (Auto) 11   0-12  %


 


Eosinophils (%) (Auto) 8   0-10  %


 


Basophils (%) (Auto) 1   0-10  %


 


Neutrophils # (Auto) 3.9   1.8-7.8  X 10^3


 


Lymphocytes # (Auto) 1.0   1.0-4.0  X 10^3


 


Monocytes # (Auto) 0.6   0.0-1.0  X 10^3


 


Eosinophils # (Auto)


 0.5 H


 


 0.0-0.3


10^3/uL


 


Basophils # (Auto)


 0.0 


 


 0.0-0.1


10^3/uL


 


Prothrombin Time 34.1 H  12.2-14.7  SEC


 


INR Comment 3.3 H  0.8-1.4  


 


Activated Partial


Thromboplast Time 90 H


 


 24-35  SEC





 


Sodium Level 137   135-145  MMOL/L


 


Potassium Level 4.2   3.6-5.0  MMOL/L


 


Chloride Level 105     MMOL/L


 


Carbon Dioxide Level 26   21-32  MMOL/L


 


Anion Gap 6   5-14  MMOL/L


 


Blood Urea Nitrogen 13   7-18  MG/DL


 


Creatinine


 1.00 


 


 0.60-1.30


MG/DL


 


Estimat Glomerular Filtration


Rate > 60 


 


  





 


BUN/Creatinine Ratio 13    


 


Glucose Level 168 H    MG/DL


 


Lactic Acid Level


 0.76 


 


 0.50-2.00


MMOL/L


 


Calcium Level 8.4 L  8.5-10.1  MG/DL


 


Corrected Calcium 9.1   8.5-10.1  MG/DL


 


Total Bilirubin 0.5   0.1-1.0  MG/DL


 


Aspartate Amino Transf


(AST/SGOT) 18 


 


 5-34  U/L





 


Alanine Aminotransferase


(ALT/SGPT) 11 


 


 0-55  U/L





 


Alkaline Phosphatase 62     U/L


 


B-Type Natriuretic Peptide 229.5 H  <100.0  PG/ML


 


Total Protein 6.1 L  6.4-8.2  GM/DL


 


Albumin 3.1 L  3.2-4.5  GM/DL


 


Urine Color  YELLOW   


 


Urine Clarity  CLEAR   


 


Urine pH  7  5-9  


 


Urine Specific Gravity  1.010 L 1.016-1.022  


 


Urine Protein  NEGATIVE  NEGATIVE  


 


Urine Glucose (UA)  NEGATIVE  NEGATIVE  


 


Urine Ketones  NEGATIVE  NEGATIVE  


 


Urine Nitrite  NEGATIVE  NEGATIVE  


 


Urine Bilirubin  NEGATIVE  NEGATIVE  


 


Urine Urobilinogen  NORMAL  NORMAL  MG/DL


 


Urine Leukocyte Esterase  NEGATIVE  NEGATIVE  


 


Urine RBC (Auto)  2+ H NEGATIVE  


 


Urine RBC  2-5 H  /HPF


 


Urine WBC  NONE   /HPF


 


Urine Squamous Epithelial


Cells 


 0-2 


  /HPF





 


Urine Crystals  NONE   /LPF


 


Urine Bacteria  NEGATIVE   /HPF


 


Urine Casts  NONE   /LPF


 


Urine Mucus  NEGATIVE   /LPF


 


Urine Culture Indicated  NO   








My Orders





Orders - ADRIANO ARAGON MD


Albuterol/Ipra Inhalation Soln (Duoneb I (3/5/19 09:15)


Cbc With Automated Diff (3/5/19 09:15)


Comprehensive Metabolic Panel (3/5/19 09:15)


Blood Culture (3/5/19 09:15)


Sputum Culture (3/5/19 09:15)


Urinalysis (3/5/19 09:15)


Urine Culture (3/5/19 09:15)


Protime With Inr (3/5/19 09:15)


Partial Thromboplastin Time (3/5/19 09:15)


Saline Lock/Iv-Start (3/5/19 09:15)


Saline Lock/Iv-Start (3/5/19 09:15)


Vital Signs Adult Sepsis Patie Q15M (3/5/19 09:15)


O2 (3/5/19 09:15)


Remove Rings In Anticipation O (3/5/19 09:15)


Lactic Acid Analyzer (3/5/19 09:15)


Svn Small Volume Nebulizer (3/5/19 09:15)


Chest Pa/Lat (2 View) (3/5/19 09:15)


BNP (3/5/19 10:18)





Medications Given in ED





Current Medications








 Medications  Dose


 Ordered  Sig/Bhavin


 Route  Start Time


 Stop Time Status Last Admin


Dose Admin


 


 Albuterol/


 Ipratropium  3 ml  ONCE  ONCE


 INH  3/5/19 09:15


 3/5/19 09:17 DC 3/5/19 09:27


3 ML








Vital Signs/I&O











 3/5/19 3/5/19 3/5/19





 08:39 08:39 09:28


 


Temp 98.0  


 


Pulse 80  


 


Resp 18  


 


B/P (MAP) 112/74 (87)  


 


Pulse Ox 90  95


 


O2 Delivery Room Air Nasal Cannula Nasal Cannula


 


O2 Flow Rate  2.00 2.00





Capillary Refill : Less Than 3 Seconds








Blood Pressure Mean:  87








Progress Note :  


Progress Note


Seen and evaluated. IV, labs, blood cultures, lactic acid, two-view chest x-ray 

and duo neb ordered. Presentation concerning for pneumonia so we will evaluate 

for that. Monitor patient. 1200: Pending UA. Otherwise everything else looks 

fine. BNP was added and slightly elevated but not significantly. Monitor 

patient. 1330: No acute findings other than slightly elevated BNP. Presentation 

most concerning for acute exacerbation of his COPD. He is using breathing 

treatments at home. He does have steroids available at home. We will go ahead 

and have him initiate outpatient prednisone at 40 mg daily for 4 days and follow

-up with Dr. Connelly on Thursday morning or Friday morning. I will send a 

copy of the chart to Dr. Connelly. Patient and family agreed with this plan. 

Discharged home with return precautions. Patient family verbalize understanding 

instructions and agreement with plan.





Diagnostic Imaging





   Diagonstic Imaging:  Xray


   Plain Films/CT/US/NM/MRI:  chest


Comments


 [facility].


 [place], [place]





NAME:   NESHA PANCHAL


MED REC#:   [id number]


ACCOUNT#:   [id number]


PT STATUS:   REG ER


:   1940


PHYSICIAN:   ADRIANO ARAGON MD


ADMIT DATE:   19/ER


 ***Draft***


Date of Exam:19





CHEST PA/LAT (2 VIEW)








INDICATION:  Recent diagnosis with continued symptoms.





TECHNIQUE:  Two view chest   9:53 AM





CORRELATION STUDY:   2019





FINDINGS: 


Heart size and mediastinum relatively stable.  Vascular appears


slightly increased from prior study. A loop recorder device over


left heart margin unchanged. Multiple clips projected over the


right lung apex medially.  Scattered pulmonary parenchymal


densities are again demonstrated. Overall improved aeration


suggested through the lung fields, particularly at the right lung


base. There is, however, a question of slight increasing


infiltrate or edema about the left lung base. Unchanged elevated


right diaphragm.  Accentuated thoracic kyphotic curvature. Slight


deformity of the left lateral chest wall which a nondisplaced


fracture, age indeterminate, suspect.





IMPRESSION: 


1. Vascular appears slightly increased from prior study, could be


reflective of early fluid overload or failure.


2. Chronic-appearing change about lung parenchyma. Findings do


appear slightly improved at the right lung base but slightly


increased at the left lung base.     





  Dictated on workstation # [id number]








Dict:   19 1004


Trans:   19 1015


[facility] [id number]





Interpreted by:     THANH COSTA DO


Electronically signed by:


   Reviewed:  Reviewed by Me





Departure


Impression





 Primary Impression:  


 COPD (chronic obstructive pulmonary disease)


 Qualified Codes:  J44.1 - Chronic obstructive pulmonary disease with (acute) 

exacerbation


Disposition:  01 HOME, SELF-CARE


Condition:  Stable





Departure-Patient Inst.


Decision time for Depature:  13:35


Referrals:  


TOR CONNELLY DO (PCP/Family)


Primary Care Physician


Patient Instructions:  Chronic Bronchitis (DC)





Add. Discharge Instructions:  


All discharge instructions reviewed with patient and/or family. Voiced 

understanding.





You should take prednisone 40 mg daily for the next 4 days. Follow-up with Dr. Connelly on Thursday or Friday morning for recheck and further evaluation and 

for further treatment as indicated. You do not currently have pneumonia. Return 

for worse pain, fever, vomiting, weakness, breathing problems or other concerns 

as needed. Continue home medications as previously prescribed.





Copy


Copies To 1:   TOR CONNELLY TIMOTHY D MD Mar 5, 2019 09:45

## 2019-03-05 NOTE — DIAGNOSTIC IMAGING REPORT
INDICATION:  Recent diagnosis with continued symptoms.



TECHNIQUE:  Two view chest   9:53 AM



CORRELATION STUDY:   02/14/2019



FINDINGS: 

Heart size and mediastinum relatively stable.  Vascular appears

slightly increased from prior study. A loop recorder device over

left heart margin unchanged. Multiple clips projected over the

right lung apex medially.  Scattered pulmonary parenchymal

densities are again demonstrated. Overall improved aeration

suggested through the lung fields, particularly at the right lung

base. There is, however, a question of slight increasing

infiltrate or edema about the left lung base. Unchanged elevated

right diaphragm.  Accentuated thoracic kyphotic curvature. Slight

deformity of the left lateral chest wall which a nondisplaced

fracture, age indeterminate, suspect.



IMPRESSION: 

1. Vascular appears slightly increased from prior study, could be

reflective of early fluid overload or failure.

2. Chronic-appearing change about lung parenchyma. Findings do

appear slightly improved at the right lung base but slightly

increased at the left lung base.     



Dictated by: 



  Dictated on workstation # [id number]

## 2019-08-30 ENCOUNTER — HOSPITAL ENCOUNTER (EMERGENCY)
Dept: HOSPITAL 75 - ER | Age: 79
Discharge: HOME | End: 2019-08-30
Payer: MEDICARE

## 2019-08-30 VITALS — DIASTOLIC BLOOD PRESSURE: 62 MMHG | SYSTOLIC BLOOD PRESSURE: 99 MMHG

## 2019-08-30 VITALS — BODY MASS INDEX: 23.11 KG/M2 | WEIGHT: 156 LBS | HEIGHT: 69 IN

## 2019-08-30 DIAGNOSIS — Z88.2: ICD-10-CM

## 2019-08-30 DIAGNOSIS — Z80.52: ICD-10-CM

## 2019-08-30 DIAGNOSIS — Z88.1: ICD-10-CM

## 2019-08-30 DIAGNOSIS — Z79.4: ICD-10-CM

## 2019-08-30 DIAGNOSIS — R50.9: Primary | ICD-10-CM

## 2019-08-30 DIAGNOSIS — J43.9: ICD-10-CM

## 2019-08-30 DIAGNOSIS — Z85.118: ICD-10-CM

## 2019-08-30 DIAGNOSIS — Z95.1: ICD-10-CM

## 2019-08-30 DIAGNOSIS — I25.10: ICD-10-CM

## 2019-08-30 DIAGNOSIS — Z80.8: ICD-10-CM

## 2019-08-30 DIAGNOSIS — Z88.0: ICD-10-CM

## 2019-08-30 DIAGNOSIS — I48.91: ICD-10-CM

## 2019-08-30 DIAGNOSIS — Z87.01: ICD-10-CM

## 2019-08-30 DIAGNOSIS — G47.30: ICD-10-CM

## 2019-08-30 DIAGNOSIS — Z79.51: ICD-10-CM

## 2019-08-30 DIAGNOSIS — E78.00: ICD-10-CM

## 2019-08-30 DIAGNOSIS — E11.51: ICD-10-CM

## 2019-08-30 DIAGNOSIS — Z99.81: ICD-10-CM

## 2019-08-30 DIAGNOSIS — Z86.73: ICD-10-CM

## 2019-08-30 DIAGNOSIS — Z95.5: ICD-10-CM

## 2019-08-30 DIAGNOSIS — Z87.891: ICD-10-CM

## 2019-08-30 DIAGNOSIS — N40.0: ICD-10-CM

## 2019-08-30 DIAGNOSIS — I10: ICD-10-CM

## 2019-08-30 DIAGNOSIS — Z90.89: ICD-10-CM

## 2019-08-30 DIAGNOSIS — Z88.5: ICD-10-CM

## 2019-08-30 LAB
ALBUMIN SERPL-MCNC: 3.4 GM/DL (ref 3.2–4.5)
ALP SERPL-CCNC: 71 U/L (ref 40–136)
ALT SERPL-CCNC: 13 U/L (ref 0–55)
APTT BLD: 67 SEC (ref 24–35)
APTT PPP: YELLOW S
ARTERIAL PATENCY WRIST A: (no result)
BACTERIA #/AREA URNS HPF: (no result) /HPF
BASE EXCESS STD BLDA CALC-SCNC: 2.4 MMOL/L (ref -2.5–2.5)
BASOPHILS # BLD AUTO: 0 10^3/UL (ref 0–0.1)
BASOPHILS NFR BLD AUTO: 0 % (ref 0–10)
BDY SITE: (no result)
BILIRUB SERPL-MCNC: 0.4 MG/DL (ref 0.1–1)
BILIRUB UR QL STRIP: NEGATIVE
BODY TEMPERATURE: 101.9
BUN/CREAT SERPL: 13
CALCIUM SERPL-MCNC: 8.4 MG/DL (ref 8.5–10.1)
CHLORIDE SERPL-SCNC: 99 MMOL/L (ref 98–107)
CO2 BLDA CALC-SCNC: 27.5 MMOL/L (ref 21–31)
CO2 SERPL-SCNC: 27 MMOL/L (ref 21–32)
CREAT SERPL-MCNC: 1.04 MG/DL (ref 0.6–1.3)
EOSINOPHIL # BLD AUTO: 0.1 10^3/UL (ref 0–0.3)
EOSINOPHIL NFR BLD AUTO: 1 % (ref 0–10)
ERYTHROCYTE [DISTWIDTH] IN BLOOD BY AUTOMATED COUNT: 15.8 % (ref 10–14.5)
FIBRINOGEN PPP-MCNC: CLEAR MG/DL
GFR SERPLBLD BASED ON 1.73 SQ M-ARVRAT: > 60 ML/MIN
GLUCOSE SERPL-MCNC: 133 MG/DL (ref 70–105)
GLUCOSE UR STRIP-MCNC: NEGATIVE MG/DL
HCT VFR BLD CALC: 34 % (ref 40–54)
HGB BLD-MCNC: 10.8 G/DL (ref 13.3–17.7)
INHALED O2 FLOW RATE: (no result) L/MIN
INR PPP: 2.2 (ref 0.8–1.4)
KETONES UR QL STRIP: NEGATIVE
LEUKOCYTE ESTERASE UR QL STRIP: NEGATIVE
LYMPHOCYTES # BLD AUTO: 1.8 X 10^3 (ref 1–4)
LYMPHOCYTES NFR BLD AUTO: 17 % (ref 12–44)
MANUAL DIFFERENTIAL PERFORMED BLD QL: NO
MCH RBC QN AUTO: 30 PG (ref 25–34)
MCHC RBC AUTO-ENTMCNC: 32 G/DL (ref 32–36)
MCV RBC AUTO: 93 FL (ref 80–99)
MONOCYTES # BLD AUTO: 0.9 X 10^3 (ref 0–1)
MONOCYTES NFR BLD AUTO: 8 % (ref 0–12)
NEUTROPHILS # BLD AUTO: 7.7 X 10^3 (ref 1.8–7.8)
NEUTROPHILS NFR BLD AUTO: 73 % (ref 42–75)
NITRITE UR QL STRIP: NEGATIVE
PCO2 BLDA: 43 MMHG (ref 35–45)
PH BLDA: 7.41 [PH] (ref 7.37–7.43)
PH UR STRIP: 7 [PH] (ref 5–9)
PLATELET # BLD: 265 10^3/UL (ref 130–400)
PMV BLD AUTO: 8.8 FL (ref 7.4–10.4)
PO2 BLDA: 85 MMHG (ref 79–93)
POTASSIUM SERPL-SCNC: 4.2 MMOL/L (ref 3.6–5)
PROT SERPL-MCNC: 7 GM/DL (ref 6.4–8.2)
PROT UR QL STRIP: NEGATIVE
PROTHROMBIN TIME: 25.3 SEC (ref 12.2–14.7)
RBC #/AREA URNS HPF: (no result) /HPF
SAO2 % BLDA FROM PO2: 96 % (ref 94–100)
SODIUM SERPL-SCNC: 136 MMOL/L (ref 135–145)
SP GR UR STRIP: 1.01 (ref 1.02–1.02)
UROBILINOGEN UR-MCNC: NORMAL MG/DL
VENTILATION MODE VENT: NO
WBC # BLD AUTO: 10.4 10^3/UL (ref 4.3–11)
WBC #/AREA URNS HPF: (no result) /HPF

## 2019-08-30 PROCEDURE — 71045 X-RAY EXAM CHEST 1 VIEW: CPT

## 2019-08-30 PROCEDURE — 36415 COLL VENOUS BLD VENIPUNCTURE: CPT

## 2019-08-30 PROCEDURE — 36600 WITHDRAWAL OF ARTERIAL BLOOD: CPT

## 2019-08-30 PROCEDURE — 87088 URINE BACTERIA CULTURE: CPT

## 2019-08-30 PROCEDURE — 87205 SMEAR GRAM STAIN: CPT

## 2019-08-30 PROCEDURE — 82805 BLOOD GASES W/O2 SATURATION: CPT

## 2019-08-30 PROCEDURE — 87040 BLOOD CULTURE FOR BACTERIA: CPT

## 2019-08-30 PROCEDURE — 80053 COMPREHEN METABOLIC PANEL: CPT

## 2019-08-30 PROCEDURE — 85025 COMPLETE CBC W/AUTO DIFF WBC: CPT

## 2019-08-30 PROCEDURE — 87804 INFLUENZA ASSAY W/OPTIC: CPT

## 2019-08-30 PROCEDURE — 94640 AIRWAY INHALATION TREATMENT: CPT

## 2019-08-30 PROCEDURE — 83605 ASSAY OF LACTIC ACID: CPT

## 2019-08-30 PROCEDURE — 85730 THROMBOPLASTIN TIME PARTIAL: CPT

## 2019-08-30 PROCEDURE — 85610 PROTHROMBIN TIME: CPT

## 2019-08-30 PROCEDURE — 83880 ASSAY OF NATRIURETIC PEPTIDE: CPT

## 2019-08-30 PROCEDURE — 87070 CULTURE OTHR SPECIMN AEROBIC: CPT

## 2019-08-30 PROCEDURE — 81000 URINALYSIS NONAUTO W/SCOPE: CPT

## 2019-08-30 NOTE — DIAGNOSTIC IMAGING REPORT
INDICATION: Fever and fatigue.



Frontal chest obtained at 2:53 p.m. is compared to 03/05/2019.



FINDINGS: Extensive chronic interstitial fibrotic change is again

noted without change in the appearance from prior study. Heart is

borderline in size. There is no pneumothorax or gross pleural

fluid.



IMPRESSION:



Extensive chronic fibrotic changes with no significant change

from 03/05/2019.



Dictated by: 



  Dictated on workstation # [id number]

## 2019-08-30 NOTE — ED FEVER
History of Present Illness


General


Stated Complaint:  FEVER


Source:  patient, family, spouse


Exam Limitations:  no limitations





History of Present Illness


Date Seen by Provider:  Aug 30, 2019


Time Seen by Provider:  14:12


Initial Comments


The patient presents to the ER by private conveyance with spouse and son and 

chief complaint of fever 102.9. They have not given any antipyretics. He has 

some subjective shortness of breath and weakness and just got off antibiotics 

for pneumonia one week ago. No dysuria abdominal pain nausea vomiting diarrhea. 

No skin rash or bug bites. He does have a history of lung resection has 

distantly secondary to cancer. He discontinued smoking many years ago and has 

about a [id number] history. He has a history of coronary disease followed by 

Dr. Meyer and called by Dr. Patino for pulmonology. He is known to Dr. Connelly

primary care and Dr. Pollard cardiothoracic surgery. His wife notices been more 

forgetful last 2 or 3 days and has some says he was out underneath a lawnmower 

in the heat for about 4 hours yesterday.  Intermittent use of oxygen at home. 

Has breathing treatments but does not feel it helped.


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 

they didn't know chemotherapy or radiation. He then was having difficulty and so

he was sent to [facility] for a fulguration and bleb ablation using RF. He then had to

return later to [place] because he had a pneumonia with a multidrug resistant 

Pseudomonas and had another right sided lobectomy or segment resection and has 

been doing well since then. The stents were placed in [place].





Allergies and Home Medications


Allergies


Coded Allergies:  


     morphine (Verified  Allergy, Mild, Pt has received Lortab in the past w/o 

issue, 19)


     Sulfa (Sulfonamide Antibiotics) (Verified  Allergy, Unknown, 19)


     penicillin G (Verified  Allergy, Unknown, 19)


     levofloxacin (Verified  Adverse Reaction, Unknown, 19)





Home Medications


Cefdinir 300 Mg Capsule, 300 MG PO BID


   Prescribed by: COSTA FINN on 19 1240


Diltiazem HCl 90 Mg Tab, 90 MG PO DAILY


   Prescribed by: VINCENT ROGERS on 18 1435


Fluticasone/Vilanterol 1 Each Blst.w.dev, 1 PUFF IH DAILY, (Reported)


Gabapentin 100 Mg Capsule, 100 MG PO 0700,1200, (Reported)


Gabapentin 100 Mg Capsule, 200-300 MG PO HS, (Reported)


   TAKES 2-3 OF A (100 MG) CAPSULE / DEPENDING ON PAIN LEVEL 


Insulin NPH Human Isophane 100 Unit/1 Ml Vial, 5 UNITS SQ BID, (Reported)


   USES ALONG WITH NOVOLIN R 


Insulin Regular, Human 1,000 Units/10 Ml Soln, 4 UNITS SQ BID, (Reported)


   USES ALONG WITH NOVOLIN N 


Montelukast Sodium 10 Mg Tablet, 10 MG PO HS, (Reported)


Pantoprazole Sodium 40 Mg Tablet.dr, 40 MG PO DAILY, (Reported)


Potassium Chloride 10 Meq Tab.er.prt, 10 MEQ PO Q48H, (Reported)


Simvastatin 20 Mg Tablet, 20 MG PO HS, (Reported)


Warfarin Sodium 5 Mg Tablet, 5 MG PO 1800, (Reported)





Patient Home Medication List


Home Medication List Reviewed:  Yes





Review of Systems


Review of Systems


Constitutional:  No chills, No diaphoresis


EENTM:  No ear discharge, No hearing loss


Respiratory:  No cough, No orthopnea; short of breath; No wheezing


Cardiovascular:  No chest pain, No edema


Gastrointestinal:  No abdominal pain, No constipation, No diarrhea, No nausea


Genitourinary:  No decreased output, No discharge


Musculoskeletal:  No back pain, No joint pain





Past Medical-Social-Family Hx


Patient Social History


Alcohol Use:  Denies Use


Recreational Drug Use:  No


Smoking Status:  Former Smoker


Type Used:  Cigarettes


Former Smoker, Quit:  1985


2nd Hand Smoke Exposure:  No


Recent Hopitalizations:  No





Immunizations Up To Date


Tetanus Booster (TDap):  More than 5yrs


PED Vaccines UTD:  No


Date of Pneumonia Vaccine:  Oct 3, 2016


Date of Influenza Vaccine:  Sep 4, 2017





Seasonal Allergies


Seasonal Allergies:  No





Past Medical History


Surgeries:  Yes (1/2 right lung removed)


Cardiac, CABG, Coronary Stent, Eye Surgery, Joint Replacement, Lobectomy, 

Orthopedic, Prostatectomy, Tonsillectomy


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 )


Pneumonia, Sleep Apnea, COPD, Emphysema


Currently Using CPAP:  No


Currently Using BIPAP:  No


Cardiac:  Yes (stents)


Atrial Fibrillation, Coronary Artery Disease, High Cholesterol, Hypertension, 

Peripheral Vascular


Neurological:  Yes


Stroke


Reproductive Disorders:  No


Sexually Transmitted Disease:  No


HIV/AIDS:  No


Genitourinary:  Yes


Benign Prostatic Hyperpl, Bladder Infection


Gastrointestinal:  Yes


Gastrointestinal Bleed


Musculoskeletal:  Yes


Arthritis


Endocrine:  Yes


Diabetes, Insulin dep


HEENT:  Yes


Cataract


Loss of Vision:  Denies


Hearing Impairment:  Hard of Hearing


Cancer:  No


Psychosocial:  No


Integumentary:  No


Blood Disorders:  No


Adverse Reaction/Blood Tranf:  No





Family Medical History





BLADD


  09 BROTHER


BLADD


  09 BROTHER


Cancer


  03 FATHER (THROAT)


  09 BROTHER


Dementia


  03 MOTHER


FH: bladder cancer


FH: bladder cancer


Family history: Diabetes mellitus


  03 MOTHER


Family history: Thyroid disorder


  03 MOTHER


Infertile


  03 MOTHER (X5 MISCARRIAGES)


Myocardial infarction


  09 BROTHER





No Family History of:


  Abdominal aortic aneurysm


  Shayne's disease


  Alcoholism


  Aphasia


  Cancer of colon


  Cataract


  Chest pain


  Congenital heart disease


  Congestive heart failure


  Cystic fibrosis


  Dysphagia


  Family history: Allergy


  Family history: Alzheimer's disease


  Family history: Arthritis


  Family history: Asthma


  Family history: Breast disease


  Family history: Cardiovascular disease


  Family history: Coronary thrombosis


  Family history: Gastrointestinal disease


  Family history: Glaucoma


  Family history: Hypertension


  Family history: Osteoporosis


  Headache


  Hearing loss


  Heart disease


  Hereditary disease


  History of - anemia


  History of - disorder


  History of - respiratory disease


  History of drug abuse


  Human immunodeficiency virus (HIV) seropositivity


  Hypercholesterolemia


  Kidney disease


  Malignant neoplasm of lung


  Parkinson's disease


  Prostate cancer


  Psychotic disorder


  Seizure disorder


  Stroke


  Tuberculosis


  Visual impairment


Heart Disease, Cancer, Stroke, Other Conditions/Hx





Physical Exam





Vital Signs - First Documented








 19





 14:12


 


Temp 101.9


 


Pulse 75


 


Resp 22


 


B/P (MAP) 122/68 (86)


 


Pulse Ox 96


 


O2 Delivery Nasal Cannula


 


O2 Flow Rate 2.00





Capillary Refill :


Height: 5'9.00"


Weight: 174lbs. 5.0oz. 79.617662mi; 27.9 BMI


Method:Stated


General Appearance:  WD/WN, no apparent distress, mild distress


Eyes:  Bilateral Eye Normal Inspection, Bilateral Eye PERRL, Bilateral Eye EOMI


HEENT:  PERRL/EOMI, normal ENT inspection, TMs normal, pharynx normal


Neck:  full range of motion, normal inspection


Respiratory:  no respiratory distress, no accessory muscle use, decreased breath

sounds, rales (right), rhonchi (right)


Cardiovascular:  normal peripheral pulses, regular rate, rhythm


Gastrointestinal:  normal bowel sounds, non tender, soft


Neurologic/Psychiatric:  alert, normal mood/affect, other (oriented to person 

and place)





Focused Exam


Lactate Level


19 14:15: Lactic Acid Level 0.83





Lactic Acid Level





Laboratory Tests








Test


 19


14:15


 


Lactic Acid Level


 0.83 MMOL/L


(0.50-2.00)











Progress/Results/Core Measures


Suspected Sepsis


SIRS


Temperature: 


Pulse:  


Respiratory Rate: 


 


Laboratory Tests


19 14:15: White Blood Count 10.4


Blood Pressure  / 


Mean: 


 





19 14:15: Lactic Acid Level 0.83


Laboratory Tests


19 14:15: 


Creatinine 1.04, INR Comment 2.2H, Platelet Count 265, Total Bilirubin 0.4








Results/Orders


Lab Results





Laboratory Tests








Test


 19


14:15 19


14:25 19


15:58 Range/Units


 


 


White Blood Count


 10.4 


 


 


 4.3-11.0


10^3/uL


 


Red Blood Count


 3.60 L


 


 


 4.35-5.85


10^6/uL


 


Hemoglobin 10.8 L   13.3-17.7  G/DL


 


Hematocrit 34 L   40-54  %


 


Mean Corpuscular Volume 93    80-99  FL


 


Mean Corpuscular Hemoglobin 30    25-34  PG


 


Mean Corpuscular Hemoglobin


Concent 32 


 


 


 32-36  G/DL





 


Red Cell Distribution Width 15.8 H   10.0-14.5  %


 


Platelet Count


 265 


 


 


 130-400


10^3/uL


 


Mean Platelet Volume 8.8    7.4-10.4  FL


 


Neutrophils (%) (Auto) 73    42-75  %


 


Lymphocytes (%) (Auto) 17    12-44  %


 


Monocytes (%) (Auto) 8    0-12  %


 


Eosinophils (%) (Auto) 1    0-10  %


 


Basophils (%) (Auto) 0    0-10  %


 


Neutrophils # (Auto) 7.7    1.8-7.8  X 10^3


 


Lymphocytes # (Auto) 1.8    1.0-4.0  X 10^3


 


Monocytes # (Auto) 0.9    0.0-1.0  X 10^3


 


Eosinophils # (Auto)


 0.1 


 


 


 0.0-0.3


10^3/uL


 


Basophils # (Auto)


 0.0 


 


 


 0.0-0.1


10^3/uL


 


Prothrombin Time 25.3 H   12.2-14.7  SEC


 


INR Comment 2.2 H   0.8-1.4  


 


Activated Partial


Thromboplast Time 67 H


 


 


 24-35  SEC





 


Sodium Level 136    135-145  MMOL/L


 


Potassium Level 4.2    3.6-5.0  MMOL/L


 


Chloride Level 99      MMOL/L


 


Carbon Dioxide Level 27    21-32  MMOL/L


 


Anion Gap 10    5-14  MMOL/L


 


Blood Urea Nitrogen 14    7-18  MG/DL


 


Creatinine


 1.04 


 


 


 0.60-1.30


MG/DL


 


Estimat Glomerular Filtration


Rate > 60 


 


 


  





 


BUN/Creatinine Ratio 13     


 


Glucose Level 133 H     MG/DL


 


Lactic Acid Level


 0.83 


 


 


 0.50-2.00


MMOL/L


 


Calcium Level 8.4 L   8.5-10.1  MG/DL


 


Corrected Calcium 8.9    8.5-10.1  MG/DL


 


Total Bilirubin 0.4    0.1-1.0  MG/DL


 


Aspartate Amino Transf


(AST/SGOT) 17 


 


 


 5-34  U/L





 


Alanine Aminotransferase


(ALT/SGPT) 13 


 


 


 0-55  U/L





 


Alkaline Phosphatase 71      U/L


 


B-Type Natriuretic Peptide 77.3    <100.0  PG/ML


 


Total Protein 7.0    6.4-8.2  GM/DL


 


Albumin 3.4    3.2-4.5  GM/DL


 


Blood Gas Puncture Site  LT RAD    


 


Blood Gas Patient Temperature  101.9    


 


Arterial Blood pH  7.41   7.37-7.43  


 


Arterial Blood Partial


Pressure CO2 


 43 


 


 35-45  MMHG





 


Arterial Blood Partial


Pressure O2 


 85 


 


 79-93  MMHG





 


Arterial Blood HCO3  26   23-27  MMOL/L


 


Arterial Blood Total CO2


 


 27.5 


 


 21.0-31.0


MMOL/L


 


Arterial Blood Oxygen


Saturation 


 96 


 


   %





 


Arterial Blood Base Excess


 


 2.4 


 


 -2.5-2.5


MMOL/L


 


Lm Test  YES-POS    


 


Blood Gas Ventilator Setting  NO    


 


Blood Gas Inspired Oxygen  2 L    


 


Urine Color   YELLOW   


 


Urine Clarity   CLEAR   


 


Urine pH   7  5-9  


 


Urine Specific Gravity   1.010 L 1.016-1.022  


 


Urine Protein   NEGATIVE  NEGATIVE  


 


Urine Glucose (UA)   NEGATIVE  NEGATIVE  


 


Urine Ketones   NEGATIVE  NEGATIVE  


 


Urine Nitrite   NEGATIVE  NEGATIVE  


 


Urine Bilirubin   NEGATIVE  NEGATIVE  


 


Urine Urobilinogen   NORMAL  NORMAL  MG/DL


 


Urine Leukocyte Esterase   NEGATIVE  NEGATIVE  


 


Urine RBC (Auto)   3+ H NEGATIVE  


 


Urine RBC   2-5 H  /HPF


 


Urine WBC   0-2   /HPF


 


Urine Crystals   NONE   /LPF


 


Urine Bacteria   TRACE   /HPF


 


Urine Casts   NONE   /LPF


 


Urine Mucus   NEGATIVE   /LPF


 


Urine Culture Indicated   NO   








Micro Results





Microbiology


19 Influenza Types A,B Antigen ([place]) - Final, Complete


          





My Orders





Orders - HORTENSIA,ZACH J


Albuterol/Ipra Inhalation Soln (Duoneb I (19 14:14)


Cbc With Automated Diff (19 14:21)


Comprehensive Metabolic Panel (19 14:21)


Blood Culture (19 14:21)


Sputum Culture (19 14:21)


Urinalysis (19 14:21)


Urine Culture (19 14:21)


Protime With Inr (19 14:21)


Partial Thromboplastin Time (19 14:21)


Chest 1 View, Ap/Pa Only (19 14:21)


Acetaminophen  Tablet (Tylenol  Tablet) (19 14:30)


Ed Iv/Invasive Line Start (19 14:21)


Ed Iv/Invasive Line Start (19 14:21)


Vital Signs Adult Sepsis Patie Q15M (19 14:21)


O2 (19 14:21)


Remove Rings In Anticipation O (19 14:21)


Lactic Acid Analyzer (19 14:21)


Influenza A And B Antigens (19 14:21)


Ns Iv 1000 Ml (Sodium Chloride 0.9%) (19 14:21)


Vancomycin Injection (Vancomycin Injecti (19 14:30)


Cefepime Injection (Maxipime Injection) (19 14:30)


Ed Iv/Invasive Line Start (19 14:21)


Ns Iv 1000 Ml (Sodium Chloride 0.9%) (19 14:21)


Albuterol/Ipra Inhalation Soln (Duoneb I (19 14:30)


Svn Small Volume Nebulizer (19 14:21)


BNP (19 14:21)


Arterial Blood Gas (19 14:26)


Arterial Blood Draw (19 )





Medications Given in ED





Current Medications








 Medications  Dose


 Ordered  Sig/Bhavin


 Route  Start Time


 Stop Time Status Last Admin


Dose Admin


 


 Acetaminophen  1,000 mg  ONCE  PRN


 PO  19 14:30


 19 14:31 DC 19 14:30


1,000 MG


 


 Albuterol/


 Ipratropium  3 ml  ONCE  ONCE


 INH  19 14:30


 19 14:31 DC 19 14:30


3 ML


 


 Cefepime HCl 1000


 mg/Sterile Water  10 ml @ 


 200 mls/hr  ONCE  ONCE


 IV  19 14:30


 19 14:32 DC 19 14:54


200 MLS/HR


 


 Vancomycin HCl


 1000 mg/Sodium


 Chloride  250 ml @ 


 250 mls/hr  ONCE  ONCE


 IV  19 14:30


 19 15:29 DC 19 15:22


250 MLS/HR








Vital Signs/I&O











 19





 14:12 14:30 14:30


 


Temp 101.9 101.9 


 


Pulse 75  


 


Resp 22  


 


B/P (MAP) 122/68 (86)  


 


Pulse Ox 96  94


 


O2 Delivery Nasal Cannula  Nasal Cannula


 


O2 Flow Rate 2.00  2.00





Capillary Refill :


Progress Note #1:  


   Time:  14:27


Progress Note


DuoNeb, ABG, BNP and a septic workup to include an influenza swab. He has a 

fever 101.9F tympanic So we're going to give him Tylenol. Maintaining a decent 

oxygen saturations in the mid to upper 90s on 2 L by nasal cannula. Respiratory 

rate around 20-25.





Head pneumonia 2019 and again 2019. CAD, PAD, hypertension, 

hyperlipidemia.





Cardiac catheterization 2017 by Dr. Meyer:


Patent stent in the left main descending with 50% ostial left main stenosis 

nonobstructive disease.


Heavily calcified LAD and circumflex artery with a patent stent mild to moderate

disease nonobstructive.


Heavily calcified right coronary artery with mild to moderate disease 

nonobstructive.


Patent stent in the right iliac artery with mild disease in the left lower 

extremity.


Mild disease in the left lower extremity nonobstructive disease.





Echocardiogram 2017 by Dr. Meyer:


Cavity and wall thickness normal with an EF of 60-65%. Diastolic functions 

normal for patient's age. Previously patent foramen ovale was not noted on the 

study.





History of multidrug resistant Pseudomonas with his pneumonia and right lower 

lobe lobectomy.


COPD with a history of pulmonary fibrosis on home oxygen.


Obstructive sleep apnea.


Atrial fibrillation.


Diabetes


History of CVA


Progress Note #2:  


   Time:  15:09


Progress Note


Breath sounds more audible after breathing treatment. Has some rhonchorous 

breath sounds mildly coarse over his right lung base. Chest x-ray unchanged. 

Heart rates in the 70-90 range atrial fibrillation. Good blood pressure in the 

105 and 110 systolic range. Oxygen saturations of been in the upper 90s on his 

baseline 2 L per nasal cannula. His urine is clean this patient is not meeting 

any criteria for admission. Delirium versus advancing dementia.


Progress Note #3:  


   Time:  16:43


Progress Note


Patient has not been coughing and his fever is controlled with Tylenol. His been

talking to family smiling and laughing. He is hungry. He doesn't show any 

evidence of any overt infection other than the fever. Perhaps a viral syndrome. 

We've given return precautions and he would prefer to go home and monitor it 

with Tylenol. We will encourage him to follow up next week with primary care. 

His family is very involved in his care





Diagnostic Imaging





   Diagonstic Imaging:  Xray


   Plain Films/CT/US/NM/MRI:  chest (1v)


Comments


Chronic appearing interstitial disease. No acute cardiopulmonary change from 

2019.


NAME:   NESHA PANCHAL


MED REC#:   [id number]


ACCOUNT#:   [id number]


PT STATUS:   REG ER


:   1940


PHYSICIAN:   ZACH BAZAN MD


ADMIT DATE:   19/ER


                                   ***Draft***


Date of Exam:19





CHEST 1 VIEW, AP/PA ONLY





INDICATION: Fever and fatigue.





Frontal chest obtained at 2:53 p.m. is compared to 2019.





FINDINGS: Extensive chronic interstitial fibrotic change is again


noted without change in the appearance from prior study. Heart is


borderline in size. There is no pneumothorax or gross pleural


fluid.





IMPRESSION:





Extensive chronic fibrotic changes with no significant change


from 2019.





  Dictated on workstation # [id number]





Dict:   19 1511


Trans:   19 1515


 [id number]





Interpreted by:     MARLI CARDENAS MD


Electronically signed by:


   Reviewed:  Reviewed by Me





Departure


Impression





   Primary Impression:  


   Fever


   Qualified Codes:  R50.9 - Fever, unspecified


Disposition:  01 HOME, SELF-CARE


Condition:  Stable





Departure-Patient Inst.


Decision time for Depature:  16:45


Referrals:  


TOR CONNELLY DO (PCP/Family)


Primary Care Physician


Patient Instructions:  Fever, Adult (DC)





Add. Discharge Instructions:  


Tylenol 1000 mg every 8 hours or 650 mg every 6 hours.


If he begins to have difficulty breathing, coughing up phlegm, chest pain or any

other worrisome symptoms please return to the ER.


Plan to follow up next week with primary care Dr. Connelly.











ZACH BAZAN                 Aug 30, 2019 14:28

## 2019-09-18 ENCOUNTER — HOSPITAL ENCOUNTER (EMERGENCY)
Dept: HOSPITAL 75 - ER | Age: 79
Discharge: HOME | End: 2019-09-18
Payer: MEDICARE

## 2019-09-18 VITALS — SYSTOLIC BLOOD PRESSURE: 100 MMHG | DIASTOLIC BLOOD PRESSURE: 70 MMHG

## 2019-09-18 VITALS — HEIGHT: 68.98 IN | BODY MASS INDEX: 24.62 KG/M2 | WEIGHT: 166.23 LBS

## 2019-09-18 DIAGNOSIS — E78.00: ICD-10-CM

## 2019-09-18 DIAGNOSIS — Z90.2: ICD-10-CM

## 2019-09-18 DIAGNOSIS — Z79.01: ICD-10-CM

## 2019-09-18 DIAGNOSIS — Z82.49: ICD-10-CM

## 2019-09-18 DIAGNOSIS — J20.9: Primary | ICD-10-CM

## 2019-09-18 DIAGNOSIS — I25.10: ICD-10-CM

## 2019-09-18 DIAGNOSIS — E11.9: ICD-10-CM

## 2019-09-18 DIAGNOSIS — Z80.8: ICD-10-CM

## 2019-09-18 DIAGNOSIS — Z95.5: ICD-10-CM

## 2019-09-18 DIAGNOSIS — Z90.89: ICD-10-CM

## 2019-09-18 DIAGNOSIS — Z79.4: ICD-10-CM

## 2019-09-18 DIAGNOSIS — Z88.5: ICD-10-CM

## 2019-09-18 DIAGNOSIS — Z88.1: ICD-10-CM

## 2019-09-18 DIAGNOSIS — Z86.73: ICD-10-CM

## 2019-09-18 DIAGNOSIS — I48.91: ICD-10-CM

## 2019-09-18 DIAGNOSIS — J43.9: ICD-10-CM

## 2019-09-18 DIAGNOSIS — Z87.891: ICD-10-CM

## 2019-09-18 DIAGNOSIS — Z88.0: ICD-10-CM

## 2019-09-18 DIAGNOSIS — Z88.2: ICD-10-CM

## 2019-09-18 DIAGNOSIS — I10: ICD-10-CM

## 2019-09-18 DIAGNOSIS — Z95.1: ICD-10-CM

## 2019-09-18 DIAGNOSIS — E86.0: ICD-10-CM

## 2019-09-18 DIAGNOSIS — Z79.51: ICD-10-CM

## 2019-09-18 DIAGNOSIS — Z80.52: ICD-10-CM

## 2019-09-18 LAB
ALBUMIN SERPL-MCNC: 3.2 GM/DL (ref 3.2–4.5)
ALP SERPL-CCNC: 77 U/L (ref 40–136)
ALT SERPL-CCNC: 11 U/L (ref 0–55)
APTT BLD: 78 SEC (ref 24–35)
APTT PPP: YELLOW S
BACTERIA #/AREA URNS HPF: NEGATIVE /HPF
BASOPHILS # BLD AUTO: 0 10^3/UL (ref 0–0.1)
BASOPHILS NFR BLD AUTO: 0 % (ref 0–10)
BILIRUB SERPL-MCNC: 0.6 MG/DL (ref 0.1–1)
BILIRUB UR QL STRIP: NEGATIVE
BUN/CREAT SERPL: 14
CALCIUM SERPL-MCNC: 8.3 MG/DL (ref 8.5–10.1)
CHLORIDE SERPL-SCNC: 99 MMOL/L (ref 98–107)
CO2 SERPL-SCNC: 27 MMOL/L (ref 21–32)
CREAT SERPL-MCNC: 1.27 MG/DL (ref 0.6–1.3)
EOSINOPHIL # BLD AUTO: 0.1 10^3/UL (ref 0–0.3)
EOSINOPHIL NFR BLD AUTO: 1 % (ref 0–10)
ERYTHROCYTE [DISTWIDTH] IN BLOOD BY AUTOMATED COUNT: 15.8 % (ref 10–14.5)
FIBRINOGEN PPP-MCNC: CLEAR MG/DL
GFR SERPLBLD BASED ON 1.73 SQ M-ARVRAT: 54 ML/MIN
GLUCOSE SERPL-MCNC: 168 MG/DL (ref 70–105)
GLUCOSE UR STRIP-MCNC: NEGATIVE MG/DL
HCT VFR BLD CALC: 31 % (ref 40–54)
HGB BLD-MCNC: 10.2 G/DL (ref 13.3–17.7)
INR PPP: 3 (ref 0.8–1.4)
KETONES UR QL STRIP: NEGATIVE
LEUKOCYTE ESTERASE UR QL STRIP: NEGATIVE
LYMPHOCYTES # BLD AUTO: 2 X 10^3 (ref 1–4)
LYMPHOCYTES NFR BLD AUTO: 20 % (ref 12–44)
MANUAL DIFFERENTIAL PERFORMED BLD QL: NO
MCH RBC QN AUTO: 30 PG (ref 25–34)
MCHC RBC AUTO-ENTMCNC: 33 G/DL (ref 32–36)
MCV RBC AUTO: 93 FL (ref 80–99)
MONOCYTES # BLD AUTO: 1 X 10^3 (ref 0–1)
MONOCYTES NFR BLD AUTO: 10 % (ref 0–12)
NEUTROPHILS # BLD AUTO: 6.8 X 10^3 (ref 1.8–7.8)
NEUTROPHILS NFR BLD AUTO: 69 % (ref 42–75)
NITRITE UR QL STRIP: NEGATIVE
PH UR STRIP: 5 [PH] (ref 5–9)
PLATELET # BLD: 321 10^3/UL (ref 130–400)
PMV BLD AUTO: 9.4 FL (ref 7.4–10.4)
POTASSIUM SERPL-SCNC: 3.6 MMOL/L (ref 3.6–5)
PROT SERPL-MCNC: 6.7 GM/DL (ref 6.4–8.2)
PROT UR QL STRIP: NEGATIVE
PROTHROMBIN TIME: 32.3 SEC (ref 12.2–14.7)
RBC #/AREA URNS HPF: (no result) /HPF
SODIUM SERPL-SCNC: 134 MMOL/L (ref 135–145)
SP GR UR STRIP: 1.01 (ref 1.02–1.02)
UROBILINOGEN UR-MCNC: NORMAL MG/DL
WBC # BLD AUTO: 9.9 10^3/UL (ref 4.3–11)
WBC #/AREA URNS HPF: (no result) /HPF

## 2019-09-18 PROCEDURE — 87088 URINE BACTERIA CULTURE: CPT

## 2019-09-18 PROCEDURE — 81000 URINALYSIS NONAUTO W/SCOPE: CPT

## 2019-09-18 PROCEDURE — 96360 HYDRATION IV INFUSION INIT: CPT

## 2019-09-18 PROCEDURE — 36415 COLL VENOUS BLD VENIPUNCTURE: CPT

## 2019-09-18 PROCEDURE — 87070 CULTURE OTHR SPECIMN AEROBIC: CPT

## 2019-09-18 PROCEDURE — 85025 COMPLETE CBC W/AUTO DIFF WBC: CPT

## 2019-09-18 PROCEDURE — 83605 ASSAY OF LACTIC ACID: CPT

## 2019-09-18 PROCEDURE — 87040 BLOOD CULTURE FOR BACTERIA: CPT

## 2019-09-18 PROCEDURE — 71045 X-RAY EXAM CHEST 1 VIEW: CPT

## 2019-09-18 PROCEDURE — 94640 AIRWAY INHALATION TREATMENT: CPT

## 2019-09-18 PROCEDURE — 87205 SMEAR GRAM STAIN: CPT

## 2019-09-18 PROCEDURE — 85610 PROTHROMBIN TIME: CPT

## 2019-09-18 PROCEDURE — 96361 HYDRATE IV INFUSION ADD-ON: CPT

## 2019-09-18 PROCEDURE — 85730 THROMBOPLASTIN TIME PARTIAL: CPT

## 2019-09-18 PROCEDURE — 80053 COMPREHEN METABOLIC PANEL: CPT

## 2019-09-18 NOTE — NUR
-------------------------------------------------------------------------------

           *** Note undone in ED - 09/18/19 at 1507 by KOURTNEY ***            

-------------------------------------------------------------------------------

PT MOVED TO FAST TRACK ROOM.  AGAIN NOTIFIED OF BUSY ER WITH POSSIBLE LONG WAIT 
TIME.

## 2019-09-18 NOTE — ED GENERAL
General


Chief Complaint:  Respiratory Problems


Stated Complaint:  WEAKNESS;SOA


Source of Information:  Patient


Exam Limitations:  No Limitations





History of Present Illness


Date Seen by Provider:  Sep 18, 2019


Time Seen by Provider:  13:52


Initial Comments


Here with report of shortness of air and weakness over the last 1-2 days. He is 

also having problems with dizziness. Reports fever at home. Seen by his doctor 

today and sent here for further evaluation. Arrives and does have fever. Does 

have significant long history. Has been coughing recently and is apparently 

coughing up green phlegm.


Timing/Duration:  2-3 Days, Getting Worse


Severity:  Moderate


Associated Systoms:  Cough, Fever/Chills, Nausea/Vomiting, Shortness of Air, 

Weakness





Allergies and Home Medications


Allergies


Coded Allergies:  


     morphine (Verified  Allergy, Mild, Pt has received Lortab in the past w/o 

issue, 19)


     Sulfa (Sulfonamide Antibiotics) (Verified  Allergy, Unknown, 19)


     penicillin G (Verified  Allergy, Unknown, 19)


     levofloxacin (Verified  Adverse Reaction, Unknown, 19)





Home Medications


Cefdinir 300 Mg Capsule, 300 MG PO BID


   Prescribed by: COSTA FINN on 19 1240


Diltiazem HCl 90 Mg Tab, 90 MG PO DAILY


   Prescribed by: VINCENT ROGERS on 18 1435


Fluticasone/Vilanterol 1 Each Blst.w.dev, 1 PUFF IH DAILY, (Reported)


Gabapentin 100 Mg Capsule, 100 MG PO 0700,1200, (Reported)


Gabapentin 100 Mg Capsule, 200-300 MG PO HS, (Reported)


   TAKES 2-3 OF A (100 MG) CAPSULE / DEPENDING ON PAIN LEVEL 


Insulin NPH Human Isophane 100 Unit/1 Ml Vial, 5 UNITS SQ BID, (Reported)


   USES ALONG WITH NOVOLIN R 


Insulin Regular, Human 1,000 Units/10 Ml Soln, 4 UNITS SQ BID, (Reported)


   USES ALONG WITH NOVOLIN N 


Montelukast Sodium 10 Mg Tablet, 10 MG PO HS, (Reported)


Pantoprazole Sodium 40 Mg Tablet.dr, 40 MG PO DAILY, (Reported)


Potassium Chloride 10 Meq Tab.er.prt, 10 MEQ PO Q48H, (Reported)


Simvastatin 20 Mg Tablet, 20 MG PO HS, (Reported)


Warfarin Sodium 5 Mg Tablet, 5 MG PO 1800, (Reported)





Patient Home Medication List


Home Medication List Reviewed:  Yes





Review of Systems


Review of Systems


Constitutional:  chills, fever


EENTM:  no symptoms reported


Respiratory:  no symptoms reported


Cardiovascular:  no symptoms reported


Gastrointestinal:  No diarrhea; nausea


Genitourinary:  no symptoms reported


Musculoskeletal:  No back pain; muscle weakness


Skin:  no symptoms reported


Psychiatric/Neurological:  No Symptoms Reported; Denies Headache; Weakness


Hematologic/Lymphatic:  No Symptoms Reported





All Other Systems Reviewed


Negative Unless Noted:  Yes





Past Medical-Social-Family Hx


Past Med/Social Hx:  Reviewed Nursing Past Med/Soc Hx


Patient Social History


Alcohol Use:  Denies Use


Recreational Drug Use:  No


Smoking Status:  Former Smoker


Type Used:  Cigarettes


Former Smoker, Quit:  1985


2nd Hand Smoke Exposure:  No


Recent Hopitalizations:  No





Immunizations Up To Date


Tetanus Booster (TDap):  More than 5yrs


PED Vaccines UTD:  No


Date of Pneumonia Vaccine:  Oct 3, 2016


Date of Influenza Vaccine:  Sep 4, 2017





Seasonal Allergies


Seasonal Allergies:  No





Past Medical History


Surgeries:  Yes (1/2 right lung removed)


Cardiac, CABG, Coronary Stent, Eye Surgery, Joint Replacement, Lobectomy, 

Orthopedic, Prostatectomy, Tonsillectomy


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 )


Pneumonia, Sleep Apnea, COPD, Emphysema


Currently Using CPAP:  No


Currently Using BIPAP:  No


Cardiac:  Yes (stents)


Atrial Fibrillation, Coronary Artery Disease, High Cholesterol, Hypertension, 

Peripheral Vascular


Neurological:  Yes


Stroke


Reproductive Disorders:  No


Sexually Transmitted Disease:  No


HIV/AIDS:  No


Genitourinary:  Yes


Benign Prostatic Hyperpl, Bladder Infection


Gastrointestinal:  Yes


Gastrointestinal Bleed


Musculoskeletal:  Yes


Arthritis


Endocrine:  Yes


Diabetes, Insulin dep


HEENT:  Yes


Cataract


Loss of Vision:  Denies


Hearing Impairment:  Hard of Hearing


Cancer:  No


Psychosocial:  No


Integumentary:  No


Blood Disorders:  No


Adverse Reaction/Blood Tranf:  No





Family Medical History


Reviewed Nursing Family Hx





BLADD


  09 BROTHER


BLADD


  09 BROTHER


Cancer


  03 FATHER (THROAT)


  09 BROTHER


Dementia


  03 MOTHER


FH: bladder cancer


FH: bladder cancer


Family history: Diabetes mellitus


  03 MOTHER


Family history: Thyroid disorder


  03 MOTHER


Infertile


  03 MOTHER (X5 MISCARRIAGES)


Myocardial infarction


  09 BROTHER





No Family History of:


  Abdominal aortic aneurysm


  Hoquiam's disease


  Alcoholism


  Aphasia


  Cancer of colon


  Cataract


  Chest pain


  Congenital heart disease


  Congestive heart failure


  Cystic fibrosis


  Dysphagia


  Family history: Allergy


  Family history: Alzheimer's disease


  Family history: Arthritis


  Family history: Asthma


  Family history: Breast disease


  Family history: Cardiovascular disease


  Family history: Coronary thrombosis


  Family history: Gastrointestinal disease


  Family history: Glaucoma


  Family history: Hypertension


  Family history: Osteoporosis


  Headache


  Hearing loss


  Heart disease


  Hereditary disease


  History of - anemia


  History of - disorder


  History of - respiratory disease


  History of drug abuse


  Human immunodeficiency virus (HIV) seropositivity


  Hypercholesterolemia


  Kidney disease


  Malignant neoplasm of lung


  Parkinson's disease


  Prostate cancer


  Psychotic disorder


  Seizure disorder


  Stroke


  Tuberculosis


  Visual impairment


Heart Disease, Cancer, Stroke, Other Conditions/Hx





Physical Exam-Suspected Sepsis


Physical Exam


Vital Signs





Vital Signs - First Documented








 19





 13:45 14:39


 


Temp 38.1 


 


Pulse 73 


 


Resp 12 


 


B/P (MAP) 114/56 (75) 


 


Pulse Ox 94 


 


O2 Delivery Room Air 


 


O2 Flow Rate  2.00





Capillary Refill :


Height, Weight, BMI


Height: 5'9.00"


Weight: 156lbs. 5.0oz. [id number]; 27.9 BMI


Method:Stated


General Appearance:  No Apparent Distress, WD/WN


HEENT:  PERRL/EOMI, Pharynx Normal


Neck:  Non Tender, Supple


Respiratory:  Decreased Breath Sounds, Expiration, Wheezing


Cardiovascular:  Regular Rate, Rhythm, Systolic Murmur


Gastrointestinal:  Non Tender, Soft


Back:  Normal Inspection, No CVA Tenderness, No Vertebral Tenderness


Extremity:  Normal Range of Motion, Non Tender


Neurologic/Psychiatric:  Alert, Oriented x3


Skin:  normal color, warm/dry





Focused Exam


Lactate Level


19 14:00: Lactic Acid Level 0.84





Lactic Acid Level





Laboratory Tests








Test


 19


14:00


 


Lactic Acid Level


 0.84 MMOL/L


(0.50-2.00)











Progress/Results/Core Measures


Suspected Sepsis


SIRS


Temperature: 


Pulse:  


Respiratory Rate: 


 


Laboratory Tests


19 14:00: White Blood Count 9.9


Blood Pressure  / 


Mean: 


 


19 14:00: Lactic Acid Level 0.84


Laboratory Tests


19 14:00: 


Creatinine 1.27, INR Comment 3.0H, Platelet Count 321, Total Bilirubin 0.6








Results/Orders


Lab Results





Laboratory Tests








Test


 19


14:00 19


16:29 Range/Units


 


 


White Blood Count


 9.9 


 


 4.3-11.0


10^3/uL


 


Red Blood Count


 3.39 L


 


 4.35-5.85


10^6/uL


 


Hemoglobin 10.2 L  13.3-17.7  G/DL


 


Hematocrit 31 L  40-54  %


 


Mean Corpuscular Volume 93   80-99  FL


 


Mean Corpuscular Hemoglobin 30   25-34  PG


 


Mean Corpuscular Hemoglobin


Concent 33 


 


 32-36  G/DL





 


Red Cell Distribution Width 15.8 H  10.0-14.5  %


 


Platelet Count


 321 


 


 130-400


10^3/uL


 


Mean Platelet Volume 9.4   7.4-10.4  FL


 


Neutrophils (%) (Auto) 69   42-75  %


 


Lymphocytes (%) (Auto) 20   12-44  %


 


Monocytes (%) (Auto) 10   0-12  %


 


Eosinophils (%) (Auto) 1   0-10  %


 


Basophils (%) (Auto) 0   0-10  %


 


Neutrophils # (Auto) 6.8   1.8-7.8  X 10^3


 


Lymphocytes # (Auto) 2.0   1.0-4.0  X 10^3


 


Monocytes # (Auto) 1.0   0.0-1.0  X 10^3


 


Eosinophils # (Auto)


 0.1 


 


 0.0-0.3


10^3/uL


 


Basophils # (Auto)


 0.0 


 


 0.0-0.1


10^3/uL


 


Prothrombin Time 32.3 H  12.2-14.7  SEC


 


INR Comment 3.0 H  0.8-1.4  


 


Activated Partial


Thromboplast Time 78 H


 


 24-35  SEC





 


Sodium Level 134 L  135-145  MMOL/L


 


Potassium Level 3.6   3.6-5.0  MMOL/L


 


Chloride Level 99     MMOL/L


 


Carbon Dioxide Level 27   21-32  MMOL/L


 


Anion Gap 8   5-14  MMOL/L


 


Blood Urea Nitrogen 18   7-18  MG/DL


 


Creatinine


 1.27 


 


 0.60-1.30


MG/DL


 


Estimat Glomerular Filtration


Rate 54 


 


  





 


BUN/Creatinine Ratio 14    


 


Glucose Level 168 H    MG/DL


 


Lactic Acid Level


 0.84 


 


 0.50-2.00


MMOL/L


 


Calcium Level 8.3 L  8.5-10.1  MG/DL


 


Corrected Calcium 8.9   8.5-10.1  MG/DL


 


Total Bilirubin 0.6   0.1-1.0  MG/DL


 


Aspartate Amino Transf


(AST/SGOT) 17 


 


 5-34  U/L





 


Alanine Aminotransferase


(ALT/SGPT) 11 


 


 0-55  U/L





 


Alkaline Phosphatase 77     U/L


 


Total Protein 6.7   6.4-8.2  GM/DL


 


Albumin 3.2   3.2-4.5  GM/DL


 


Urine Color  YELLOW   


 


Urine Clarity  CLEAR   


 


Urine pH  5  5-9  


 


Urine Specific Gravity  1.010 L 1.016-1.022  


 


Urine Protein  NEGATIVE  NEGATIVE  


 


Urine Glucose (UA)  NEGATIVE  NEGATIVE  


 


Urine Ketones  NEGATIVE  NEGATIVE  


 


Urine Nitrite  NEGATIVE  NEGATIVE  


 


Urine Bilirubin  NEGATIVE  NEGATIVE  


 


Urine Urobilinogen  NORMAL  NORMAL  MG/DL


 


Urine Leukocyte Esterase  NEGATIVE  NEGATIVE  


 


Urine RBC (Auto)  4+ H NEGATIVE  


 


Urine RBC  2-5 H  /HPF


 


Urine WBC  NONE   /HPF


 


Urine Crystals  NONE   /LPF


 


Urine Bacteria  NEGATIVE   /HPF


 


Urine Casts  NONE   /LPF


 


Urine Mucus  NEGATIVE   /LPF


 


Urine Culture Indicated


 


 CULTURE


PENDING  











My Orders





Orders - ADRIANO ARAGON MD


Cbc With Automated Diff (19 14:01)


Comprehensive Metabolic Panel (19 14:01)


Blood Culture (19 14:01)


Sputum Culture (19 14:01)


Urinalysis (19 14:01)


Urine Culture (19 14:01)


Protime With Inr (19 14:01)


Partial Thromboplastin Time (19 14:01)


Chest 1 View, Ap/Pa Only (19 14:01)


Acetaminophen  Tablet (Tylenol  Tablet) (19 14:15)


Ed Iv/Invasive Line Start (19 14:01)


Vital Signs Adult Sepsis Patie Q15M (19 14:01)


O2 (19 14:01)


Remove Rings In Anticipation O (19 14:01)


Lactic Acid Analyzer (19 14:01)


Ns Iv 1000 Ml (Sodium Chloride 0.9%) (19 14:01)


Svn Small Volume Nebulizer (19 14:42)


Albuterol/Ipra Inhalation Soln (Duoneb I (19 16:02)


Ed Iv/Invasive Line Start (19 16:15)


Ns Iv 500 Ml (Sodium Chloride 0.9%) (19 16:15)


Prednisone Tablet (Deltasone Tablet) (19 17:15)


Cefdinir Capsule (Omnicef Capsule) (19 17:15)





Medications Given in ED





Current Medications








 Medications  Dose


 Ordered  Sig/Bhavin


 Route  Start Time


 Stop Time Status Last Admin


Dose Admin


 


 Acetaminophen  1,000 mg  ONCE  PRN


 PO  19 14:15


 19 14:17 DC 19 14:15


1,000 MG


 


 Albuterol/


 Ipratropium  3 ml  STK-MED ONCE


 .ROUTE  19 16:02


 19 16:11 DC 19 16:13


3 ML


 


 Sodium Chloride  500 ml @ 0


 mls/hr  Q0M ONCE


 IV  19 16:15


 19 16:16 DC 19 16:20


500 MLS/HR


 


 Sodium Chloride  1,000 ml @ 


 0 mls/hr  Q0M ONCE


 IV  19 14:01


 19 14:03 DC 19 14:15


1,000 MLS/HR








Vital Signs/I&O











 19





 13:45 14:39 16:13


 


Temp 38.1  


 


Pulse 73  


 


Resp 12  


 


B/P (MAP) 114/56 (75)  


 


Pulse Ox 94 95 97


 


O2 Delivery Room Air Nasal Cannula 


 


O2 Flow Rate  2.00 2.00





Capillary Refill :


Progress Note :  


Progress Note


Seen and evaluated. IV, labs, blood cultures and lactic acid ordered. 

Acetaminophen 1000 mg by mouth. Normal saline 500 mL bolus ordered. Monitor 

patient. Repeat normal saline 500 mL bolus. 1715: No acute findings and patient 

is actually doing better. He did get a breathing treatment which helped. He does

have those at home as well. No findings of pneumonia and no laboratory indicatio

n of significant infection. I did discuss the case with Dr. Connelly. He I both

agree that initiating outpatient Omnicef as well as prednisone for a few days is

reasonable given patient's significant lung disease and Dr. Connelly will see 

him in the office tomorrow. Discharged home with return precautions. Patient 

verbalize understanding instructions and agreement with plan. He was able to 

walk without difficulty and has no dizziness currently. Omnicef and prednisone 

ordered for here but will be DC'd and patient will initiate that as outpatient 

per his request.





Diagnostic Imaging





   Diagonstic Imaging:  Xray


   Plain Films/CT/US/NM/MRI:  chest


Comments


                 [facility].


                                [place], [place]





NAME:   NESHA PANCHAL


MED REC#:   [id number]


ACCOUNT#:   [id number]


PT STATUS:   REG ER


:   1940


PHYSICIAN:   ADRIANO ARAGON MD


ADMIT DATE:   19/ER


                                   ***Draft***


Date of Exam:19





CHEST 1 VIEW, AP/PA ONLY








INDICATION: Shortness of air.





TIME OF EXAM: 02:20 p.m.





COMPARISON: Correlation is made with prior chest from 2019.





FINDINGS: Heart size is stable. Interstitial fibrotic changes in


both lungs persist and appear similar to prior exam. There is


monitoring device overlying the left lung base. No effusion or


pneumothorax is seen.





IMPRESSION: Chronic interstitial fibrotic changes, similar to


examination from 2019.





  Dictated on workstation # [id number]








Dict:   19 1435


Trans:   19 1439


[facility] [id number]





Interpreted by:     SUKHJINDER GUERRERO MD


Electronically signed by:





Departure


Impression





   Primary Impression:  


   Acute bronchitis


   Qualified Codes:  J20.9 - Acute bronchitis, unspecified


   Additional Impression:  


   Dehydration


Disposition:  01 HOME, SELF-CARE


Condition:  Improved





Departure-Patient Inst.


Decision time for Depature:  17:21


Referrals:  


TOR CONNELLY DO (PCP/Family)


Primary Care Physician


Patient Instructions:  Dehydration, Adult (DC), Acute Bronchitis, Adult (DC)





Add. Discharge Instructions:  


All discharge instructions reviewed with patient and/or family. Voiced 

understanding.





Take medications as directed. Drink adequate amount of fluids. You may use your 

albuterol nebulizer one dose every 4-6 hours as needed for wheezing or cough. 

Follow-up with Dr. Connelly tomorrow for recheck. Return for worse pain, fever,

vomiting, weakness, breathing problems or other concerns as needed.


Scripts


Prednisone (Prednisone) 20 Mg Tab


40 MG PO DAILY, #6 TAB 0 Refills


   Prov: ADRIANO ARAGON MD         19 


Cefdinir (Cefdinir) 300 Mg Capsule


300 MG PO BID, #14 CAP 0 Refills


   Prov: ADRIANO ARAGON MD         19











ADRIANO ARAGON MD          Sep 18, 2019 14:22

## 2019-09-18 NOTE — DIAGNOSTIC IMAGING REPORT
INDICATION: Shortness of air.



TIME OF EXAM: 02:20 p.m.



COMPARISON: Correlation is made with prior chest from 08/30/2019.



FINDINGS: Heart size is stable. Interstitial fibrotic changes in

both lungs persist and appear similar to prior exam. There is

monitoring device overlying the left lung base. No effusion or

pneumothorax is seen.



IMPRESSION: Chronic interstitial fibrotic changes, similar to

examination from 08/30/2019.



Dictated by: 



  Dictated on workstation # [id number]

## 2019-10-22 ENCOUNTER — HOSPITAL ENCOUNTER (OUTPATIENT)
Dept: HOSPITAL 75 - RAD | Age: 79
End: 2019-10-22
Attending: FAMILY MEDICINE
Payer: MEDICARE

## 2019-10-22 DIAGNOSIS — R91.8: Primary | ICD-10-CM

## 2019-10-22 DIAGNOSIS — R59.0: ICD-10-CM

## 2019-11-12 ENCOUNTER — HOSPITAL ENCOUNTER (EMERGENCY)
Dept: HOSPITAL 75 - ER | Age: 79
Discharge: HOME | End: 2019-11-12
Payer: MEDICARE

## 2019-11-12 VITALS — SYSTOLIC BLOOD PRESSURE: 111 MMHG | DIASTOLIC BLOOD PRESSURE: 89 MMHG

## 2019-11-12 VITALS — BODY MASS INDEX: 23.54 KG/M2 | WEIGHT: 158.95 LBS | HEIGHT: 68.9 IN

## 2019-11-12 DIAGNOSIS — I25.10: ICD-10-CM

## 2019-11-12 DIAGNOSIS — Z86.73: ICD-10-CM

## 2019-11-12 DIAGNOSIS — E11.9: ICD-10-CM

## 2019-11-12 DIAGNOSIS — Z87.891: ICD-10-CM

## 2019-11-12 DIAGNOSIS — R40.2362: ICD-10-CM

## 2019-11-12 DIAGNOSIS — Z80.2: ICD-10-CM

## 2019-11-12 DIAGNOSIS — I48.91: ICD-10-CM

## 2019-11-12 DIAGNOSIS — Z90.89: ICD-10-CM

## 2019-11-12 DIAGNOSIS — Z79.51: ICD-10-CM

## 2019-11-12 DIAGNOSIS — Z88.1: ICD-10-CM

## 2019-11-12 DIAGNOSIS — Z79.01: ICD-10-CM

## 2019-11-12 DIAGNOSIS — Z88.5: ICD-10-CM
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
## 2019-11-12 DIAGNOSIS — R40.2142: ICD-10-CM

## 2019-11-12 DIAGNOSIS — S79.911A: Primary | ICD-10-CM

## 2019-11-12 DIAGNOSIS — Z95.5: ICD-10-CM

## 2019-11-12 DIAGNOSIS — Z79.4: ICD-10-CM

## 2019-11-12 DIAGNOSIS — R40.2252: ICD-10-CM

## 2019-11-12 DIAGNOSIS — Z95.1: ICD-10-CM

## 2019-11-12 DIAGNOSIS — Z88.2: ICD-10-CM

## 2019-11-12 DIAGNOSIS — E78.00: ICD-10-CM

## 2019-11-12 DIAGNOSIS — Z80.52: ICD-10-CM

## 2019-11-12 DIAGNOSIS — J43.9: ICD-10-CM

## 2019-11-12 DIAGNOSIS — I10: ICD-10-CM

## 2019-11-12 DIAGNOSIS — Z88.0: ICD-10-CM
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
## 2019-11-12 PROCEDURE — 70450 CT HEAD/BRAIN W/O DYE: CPT

## 2019-11-12 PROCEDURE — 71045 X-RAY EXAM CHEST 1 VIEW: CPT

## 2019-11-12 NOTE — DIAGNOSTIC IMAGING REPORT
INDICATION: Fall.



TIME OF EXAM: 3:48 p.m.



Comparison is made with prior chest from 10/09/2019.



FINDINGS: Cardiac monitoring device overlies the left chest.

Surgical clips in the left upper lobe medially are noted. Right

hemidiaphragm is chronically elevated. Previously seen bibasilar

infiltrates have improved. Mid and upper lung fields remain

clear. There is no pneumothorax. No effusion is seen.



IMPRESSION: Improved aeration of both lung bases when compared to

the examination from one month earlier.



Dictated by: 



  Dictated on workstation # [id number]

## 2019-11-12 NOTE — DIAGNOSTIC IMAGING REPORT
PROCEDURE: CT head without contrast.



TECHNIQUE: Multiple contiguous axial images were obtained through

the brain without the use of intravenous contrast. Auto Exposure

Controls were utilized during the CT exam to meet ALARA standards

for radiation dose reduction. 



INDICATION:  Weakness. 



COMPARISON: None.



FINDINGS: Advanced generalized cerebral and cerebellar

parenchymal volume loss. No CT evidence of an acute territorial

infarction. No intracranial hemorrhage, mass effect,

hydrocephalus or extra-axial fluid collections. The visualized

paranasal sinuses and mastoids are clear.



IMPRESSION: 

1. No acute intracranial CT findings.

2. Advanced generalized parenchymal volume loss.



Dictated by: 



  Dictated on workstation # [id number]

## 2019-11-12 NOTE — DIAGNOSTIC IMAGING REPORT
INDICATION: Fall with right hip pain.



AP pelvis and AP and oblique views of the right hip are obtained.



FINDINGS: No fracture or acute bony abnormality is seen. A

right-sided iliac stent is noted over the right hemipelvis.

Vascular calcifications are noted.



IMPRESSION:



No acute abnormality of right hip.



Dictated by: 



  Dictated on workstation # [id number]

## 2019-11-12 NOTE — ED FALL/INJURY
General


Chief Complaint:  Trauma-Non Activation


Stated Complaint:  FALL/R HIP PAIN


Nursing Triage Note:  


fell in dr giron's office, unable to get up on your own, right leg and hip pain


Source:  patient


Exam Limitations:  no limitations





History of Present Illness


Date Seen by Provider:  Nov 12, 2019


Time Seen by Provider:  14:53


Initial Comments


Here with right hip pain after falling at his pulmonologist office. He was in 

the exam room and went to stand up and fell over onto his right side. States he 

landed on his right hip and the back. Denies hitting his head or loss of 

consciousness. He was unable to get up due to pain. He was transported down here

after he was found approximately 15 minutes later per the patient. He is on 

blood thinners. Denies breathing problems or back pain. Denies other injury.


Occurred:  just prior to arrival (approximately 30 minutes ago)


Severity:  moderate


Injuries/Pain Location:  pelvis


Context:  unknown


Loss of Consciousness:  no loss of consciousness


Modifying Factors:  Improves With Immobilization; Worse With Movement


Associated Symptoms (Fall):  No Abdominal Pain, No Chest Pain, No Confusion, No 

Headache, No Muscle Spasms, No Nausea/Vomiting, No Shortness of Air





Allergies and Home Medications


Allergies


Coded Allergies:  


     morphine (Verified  Allergy, Mild, Pt has received Lortab in the past w/o 

issue, 2/11/19)


     Sulfa (Sulfonamide Antibiotics) (Verified  Allergy, Unknown, 2/11/19)


     penicillin G (Verified  Allergy, Unknown, Pt has received Cefepime & 

Meropenem in the past, 10/2/19)


     levofloxacin (Verified  Adverse Reaction, Unknown, 2/11/19)





Home Medications


Azithromycin 500 Mg Tablet, 500 MG PO DAILY


   Prescribed by: MEGAN MAYORGA on 10/9/19 1012


Docusate Sodium 100 Mg Capsule, 100 MG PO DAILY PRN for CONSTIPATION-1ST LINE, 

(Reported)


Ferrous Sulfate 325 Mg Tablet, 325 MG PO DAILY, (Reported)


Fluticasone/Vilanterol 1 Each Blst.w.dev, 1 PUFF IH DAILY, (Reported)


Furosemide 40 Mg Tablet, 40 MG PO DAILY, (Reported)


Gabapentin 100 Mg Capsule, 100 MG PO DAILY, (Reported)


Gabapentin 100 Mg Capsule, 200 MG PO HS, (Reported)


   TAKES 2 (100 MG) CAPSULES 


Hydrocodone Bit/Acetaminophen 1 Tab Tab, 1 TAB PO TID PRN for PAIN-MODERATE


   Prescribed by: TOR CONNELLY on 10/9/19 0744


Insulin NPH Human Isophane 100 Unit/1 Ml Vial, 5 UNITS SQ BID, (Reported)


   USES ALONG WITH NOVOLIN R 


Insulin Regular, Human 1,000 Units/10 Ml Soln, 4 UNITS SQ BID, (Reported)


   USES ALONG WITH NOVOLIN N 


Ipratropium/Albuterol Sulfate 3 Ml Ampul.neb, 3 ML NEB TID PRN for SHORTNESS OF 

BREATH, (Reported)


Loratadine 10 Mg Tablet, 10 MG PO DAILY, (Reported)


Metoprolol Tartrate 25 Mg Tablet, 25 MG PO BID, (Reported)


   LAST FILLED #180 6-24-19 


Montelukast Sodium 10 Mg Tablet, 10 MG PO HS, (Reported)


Pantoprazole Sodium 40 Mg Tablet.dr, 40 MG PO DAILY, (Reported)


Potassium Chloride 10 Meq Tab.er.prt, 10 MEQ PO Q48H, (Reported)


Simvastatin 20 Mg Tablet, 20 MG PO HS, (Reported)


Warfarin Sodium 5 Mg Tablet, 5 MG PO 1800, (Reported)





Patient Home Medication List


Home Medication List Reviewed:  Yes





Review of Systems


Review of Systems


Constitutional:  see HPI; No chills, No fever


Eyes:  No Symptoms Reported


Ears, Nose, Mouth, Throat:  no symptoms reported


Respiratory:  no symptoms reported


Cardiovascular:  no symptoms reported


Gastrointestinal:  No abdominal pain, No nausea, No vomiting


Genitourinary:  no symptoms reported


Musculoskeletal:  see HPI; No back pain; joint pain, muscle pain, muscle 

stiffness


Skin:  No change in color, No lesions


Psychiatric/Neurological:  Denies Headache, Denies Weakness





All Other Systems Reviewed


Negative Unless Noted:  Yes





Past Medical-Social-Family Hx


Past Med/Social Hx:  Reviewed Nursing Past Med/Soc Hx


Patient Social History


Alcohol Use:  Denies Use


Recreational Drug Use:  No


Smoking Status:  Former Smoker


Type Used:  Cigarettes


Former Smoker, Quit:  Apr 30, 1985


2nd Hand Smoke Exposure:  No


Recent Foreign Travel:  No


Contact w/Someone Who Travel:  No


Recent Infectious Disease Expo:  No


Recent Hopitalizations:  No


Physical Abuse:  No


Sexual Abuse:  No


Mistreated:  No


Fear:  No





Immunizations Up To Date


Tetanus Booster (TDap):  More than 5yrs


PED Vaccines UTD:  No


Date of Pneumonia Vaccine:  Oct 2, 2018


Date of Influenza Vaccine:  Sep 18, 2019





Seasonal Allergies


Seasonal Allergies:  No





Past Medical History


Surgeries:  Yes (1/2 right lung removed)


Cardiac, CABG, Coronary Stent, Eye Surgery, Joint Replacement, Lobectomy, 

Orthopedic, Prostatectomy, Tonsillectomy


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 )


Pneumonia, Sleep Apnea, COPD, Emphysema


Currently Using CPAP:  No


Currently Using BIPAP:  No


Cardiac:  Yes (stents)


Atrial Fibrillation, Coronary Artery Disease, High Cholesterol, Hypertension, 

Peripheral Vascular


Neurological:  Yes


Stroke


Reproductive Disorders:  No


Sexually Transmitted Disease:  No


HIV/AIDS:  No


Genitourinary:  Yes


Benign Prostatic Hyperpl, Bladder Infection


Gastrointestinal:  Yes


Gastrointestinal Bleed


Musculoskeletal:  Yes


Arthritis


Endocrine:  Yes


Diabetes, Insulin dep


HEENT:  Yes


Cataract


Loss of Vision:  Denies


Hearing Impairment:  Hard of Hearing


Cancer:  No


Psychosocial:  No


Integumentary:  No


Blood Disorders:  No


Adverse Reaction/Blood Tranf:  No





Family Medical History


Reviewed Nursing Family Hx





BLADD


  09 BROTHER


BLADD


  09 BROTHER


Cancer


  03 FATHER (THROAT)


  09 BROTHER


Dementia


  03 MOTHER


FH: bladder cancer


FH: bladder cancer


Family history: Diabetes mellitus


  03 MOTHER


Family history: Thyroid disorder


  03 MOTHER


Infertile


  03 MOTHER (X5 MISCARRIAGES)


Myocardial infarction


  09 BROTHER





No Family History of:


  Abdominal aortic aneurysm


  Boston's disease


  Alcoholism


  Aphasia


  Cancer of colon


  Cataract


  Chest pain


  Congenital heart disease


  Congestive heart failure


  Cystic fibrosis


  Dysphagia


  Family history: Allergy


  Family history: Alzheimer's disease


  Family history: Arthritis


  Family history: Asthma


  Family history: Breast disease


  Family history: Cardiovascular disease


  Family history: Coronary thrombosis


  Family history: Gastrointestinal disease


  Family history: Glaucoma


  Family history: Hypertension


  Family history: Osteoporosis


  Headache


  Hearing loss


  Heart disease


  Hereditary disease


  History of - anemia


  History of - disorder


  History of - respiratory disease


  History of drug abuse


  Human immunodeficiency virus (HIV) seropositivity


  Hypercholesterolemia


  Kidney disease


  Malignant neoplasm of lung


  Parkinson's disease


  Prostate cancer


  Psychotic disorder


  Seizure disorder


  Stroke


  Tuberculosis


  Visual impairment


Heart Disease, Cancer, Stroke, Other Conditions/Hx





Physical Exam


Vital Signs





Vital Signs - First Documented








 11/12/19





 14:53


 


Temp 36.7


 


Pulse 63


 


Resp 18


 


B/P (MAP) 101/55 (70)





Capillary Refill : Less Than 3 Seconds


Height, Weight, BMI


Height: 5'9.00"


Weight: 156lbs. 5.0oz. [id number]; 23.00 BMI


Method:Stated


General Appearance:  WD/WN, no apparent distress


HEENT:  PERRL/EOMI, pharynx normal


Neck:  full range of motion, supple


Cardiovascular:  regular rate, rhythm, no murmur


Respiratory:  lungs clear, normal breath sounds


Gastrointestinal:  non tender, soft


Back:  normal inspection, no CVA tenderness, no vertebral tenderness


Extremities:  pelvis stable; No swelling; other (tender over the area of the 

right hip)


Neurologic/Psychiatric:  alert, oriented x 3, abnormal gait (unable to stand due

to pain in the right hip)


Skin:  normal color, warm/dry; No ecchymosis





Estela Coma Score


Best Eye Response:  (4) Open Spontaneously


Best Verbal Response:  (5) Oriented


Best Motor Response:  (6) Obeys Commands





Progress/Results/Core Measures


Results/Orders


My Orders





Orders - ADRIANO ARAGON MD


Chest 1 View, Ap/Pa Only (11/12/19 15:19)


Pelvis With Right Hip 2-3views (11/12/19 15:19)


Ct Head Wo (11/12/19 15:19)


Ed Iv/Invasive Line Start (11/12/19 15:19)


Ketorolac Injection (Toradol Injection) (11/12/19 17:15)





Medications Given in ED





Current Medications








 Medications  Dose


 Ordered  Sig/Bhavin


 Route  Start Time


 Stop Time Status Last Admin


Dose Admin


 


 Ketorolac


 Tromethamine  15 mg  ONCE  ONCE


 IVP  11/12/19 17:15


 11/12/19 17:16 DC 11/12/19 17:12


15 MG








Vital Signs/I&O











 11/12/19 11/12/19





 14:53 17:12


 


Temp 36.7 36.7


 


Pulse 63 


 


Resp 18 


 


B/P (MAP) 101/55 (70) 














Blood Pressure Mean:                    70


POS








Progress


Progress Note :  


Progress Note


Seen and evaluated. CT of the head ordered due to history of anticoagulation and

fall. X-ray chest as he may have landed on his right chest wall and x-ray of the

pelvis and right hip due to pain. He declined pain medicine initially. IV will 

be established in case he needs pain medicine for x-rays. Monitor patient. 1733:

Patient was able to stand with much less difficulty. Still has pain. No findings

of fractures and all radiology negative. Patient has high rise stool at home as 

well as a walker. He will continue his home pain medication therapy. Discharged 

home with return precautions. Patient verbalize understanding instructions and 

agreement with plan. I will send a copy of the chart to Dr. Connelly.





Departure


Impression





   Primary Impression:  


   Injury of right hip


   Qualified Codes:  S79.911A - Unspecified injury of right hip, initial 

   encounter


Disposition:  01 HOME, SELF-CARE


Condition:  Stable





Departure-Patient Inst.


Decision time for Depature:  17:35


Referrals:  


TOR CONNELLY DO (PCP/Family)


Primary Care Physician


Patient Instructions:  Hip Pain (DC)





Add. Discharge Instructions:  


All discharge instructions reviewed with patient and/or family. Voiced 

understanding.





Continue your Fabien's pain meds as you do currently for your back pain. You may 

also take 2 over-the-counter ibuprofen every 6 hours as needed for pain. Drink 

plenty of fluids. You may use heat or ice over the area of concern. It might be 

better if he start with ice packs 20 minutes per hour as needed and then switch 

to heat later but do whatever feels better. Follow-up with your doctor later 

this week for recheck and further evaluation if not improved as she may need 

repeat x-ray. Return for worse pain, weakness, numbness, difficulty with going 

to the bathroom or other concerns as needed.





Copy


Copies To 2:   TOR CONNELLY TIMOTHY D MD          Nov 12, 2019 15:27


POS

## 2019-11-19 ENCOUNTER — HOSPITAL ENCOUNTER (INPATIENT)
Dept: HOSPITAL 75 - 4TH | Age: 79
LOS: 2 days | Discharge: NURSING, REHAB OR LONG-TERM CARE | DRG: 482 | End: 2019-11-21
Attending: ORTHOPAEDIC SURGERY | Admitting: ORTHOPAEDIC SURGERY
Payer: MEDICARE

## 2019-11-19 VITALS — DIASTOLIC BLOOD PRESSURE: 56 MMHG | SYSTOLIC BLOOD PRESSURE: 114 MMHG

## 2019-11-19 VITALS — SYSTOLIC BLOOD PRESSURE: 104 MMHG | DIASTOLIC BLOOD PRESSURE: 63 MMHG

## 2019-11-19 VITALS — DIASTOLIC BLOOD PRESSURE: 54 MMHG | SYSTOLIC BLOOD PRESSURE: 101 MMHG

## 2019-11-19 VITALS — SYSTOLIC BLOOD PRESSURE: 99 MMHG | DIASTOLIC BLOOD PRESSURE: 78 MMHG

## 2019-11-19 VITALS — DIASTOLIC BLOOD PRESSURE: 74 MMHG | SYSTOLIC BLOOD PRESSURE: 105 MMHG

## 2019-11-19 VITALS — SYSTOLIC BLOOD PRESSURE: 107 MMHG | DIASTOLIC BLOOD PRESSURE: 57 MMHG

## 2019-11-19 VITALS — SYSTOLIC BLOOD PRESSURE: 99 MMHG | DIASTOLIC BLOOD PRESSURE: 50 MMHG

## 2019-11-19 VITALS — HEIGHT: 69.02 IN | WEIGHT: 166.67 LBS | BODY MASS INDEX: 24.69 KG/M2

## 2019-11-19 VITALS — SYSTOLIC BLOOD PRESSURE: 93 MMHG | DIASTOLIC BLOOD PRESSURE: 61 MMHG

## 2019-11-19 VITALS — DIASTOLIC BLOOD PRESSURE: 61 MMHG | SYSTOLIC BLOOD PRESSURE: 89 MMHG

## 2019-11-19 DIAGNOSIS — Z86.73: ICD-10-CM

## 2019-11-19 DIAGNOSIS — Z87.891: ICD-10-CM

## 2019-11-19 DIAGNOSIS — Z79.4: ICD-10-CM

## 2019-11-19 DIAGNOSIS — E11.9: ICD-10-CM

## 2019-11-19 DIAGNOSIS — Z95.1: ICD-10-CM

## 2019-11-19 DIAGNOSIS — I48.0: ICD-10-CM

## 2019-11-19 DIAGNOSIS — E78.00: ICD-10-CM

## 2019-11-19 DIAGNOSIS — I25.10: ICD-10-CM

## 2019-11-19 DIAGNOSIS — S72.144A: Primary | ICD-10-CM

## 2019-11-19 DIAGNOSIS — Z95.5: ICD-10-CM

## 2019-11-19 DIAGNOSIS — M19.91: ICD-10-CM

## 2019-11-19 DIAGNOSIS — J43.9: ICD-10-CM

## 2019-11-19 DIAGNOSIS — Z90.79: ICD-10-CM

## 2019-11-19 DIAGNOSIS — Z79.01: ICD-10-CM

## 2019-11-19 DIAGNOSIS — N40.0: ICD-10-CM

## 2019-11-19 DIAGNOSIS — Z90.2: ICD-10-CM

## 2019-11-19 DIAGNOSIS — D64.9: ICD-10-CM

## 2019-11-19 DIAGNOSIS — I10: ICD-10-CM

## 2019-11-19 LAB
APTT BLD: 42 SEC (ref 24–35)
ERYTHROCYTE [DISTWIDTH] IN BLOOD BY AUTOMATED COUNT: 15.4 % (ref 10–14.5)
HCT VFR BLD CALC: 30 % (ref 40–54)
HGB BLD-MCNC: 9.5 G/DL (ref 13.3–17.7)
INR PPP: 1.2 (ref 0.8–1.4)
MCH RBC QN AUTO: 30 PG (ref 25–34)
MCHC RBC AUTO-ENTMCNC: 32 G/DL (ref 32–36)
MCV RBC AUTO: 93 FL (ref 80–99)
PLATELET # BLD: 283 10^3/UL (ref 130–400)
PMV BLD AUTO: 8.9 FL (ref 7.4–10.4)
PROTHROMBIN TIME: 15.9 SEC (ref 12.2–14.7)
WBC # BLD AUTO: 6.8 10^3/UL (ref 4.3–11)

## 2019-11-19 PROCEDURE — 80048 BASIC METABOLIC PNL TOTAL CA: CPT

## 2019-11-19 PROCEDURE — 94664 DEMO&/EVAL PT USE INHALER: CPT

## 2019-11-19 PROCEDURE — 94640 AIRWAY INHALATION TREATMENT: CPT

## 2019-11-19 PROCEDURE — 85027 COMPLETE CBC AUTOMATED: CPT

## 2019-11-19 PROCEDURE — 85610 PROTHROMBIN TIME: CPT

## 2019-11-19 PROCEDURE — 0QH636Z INSERTION OF INTRAMEDULLARY INTERNAL FIXATION DEVICE INTO RIGHT UPPER FEMUR, PERCUTANEOUS APPROACH: ICD-10-PCS | Performed by: ORTHOPAEDIC SURGERY

## 2019-11-19 PROCEDURE — 36415 COLL VENOUS BLD VENIPUNCTURE: CPT

## 2019-11-19 PROCEDURE — 85730 THROMBOPLASTIN TIME PARTIAL: CPT

## 2019-11-19 PROCEDURE — 82962 GLUCOSE BLOOD TEST: CPT

## 2019-11-19 RX ADMIN — ENOXAPARIN SODIUM SCH MG: 100 INJECTION SUBCUTANEOUS at 20:26

## 2019-11-19 RX ADMIN — CLINDAMYCIN PHOSPHATE SCH MLS/HR: 900 INJECTION, SOLUTION INTRAVENOUS at 23:07

## 2019-11-19 RX ADMIN — GABAPENTIN SCH MG: 100 CAPSULE ORAL at 21:15

## 2019-11-19 RX ADMIN — KETOROLAC TROMETHAMINE SCH MG: 15 INJECTION, SOLUTION INTRAMUSCULAR; INTRAVENOUS at 19:54

## 2019-11-19 RX ADMIN — KETOROLAC TROMETHAMINE SCH MG: 15 INJECTION, SOLUTION INTRAMUSCULAR; INTRAVENOUS at 23:07

## 2019-11-19 RX ADMIN — ENOXAPARIN SODIUM SCH MG: 100 INJECTION SUBCUTANEOUS at 19:52

## 2019-11-19 RX ADMIN — SODIUM CHLORIDE SCH MLS/HR: 900 INJECTION, SOLUTION INTRAVENOUS at 20:00

## 2019-11-19 RX ADMIN — DOCUSATE SODIUM AND SENNOSIDES SCH EA: 8.6; 5 TABLET, FILM COATED ORAL at 21:15

## 2019-11-19 RX ADMIN — ACETAMINOPHEN SCH MG: 500 TABLET ORAL at 23:08

## 2019-11-19 RX ADMIN — ACETAMINOPHEN SCH MG: 500 TABLET ORAL at 19:55

## 2019-11-19 NOTE — NUR
RECEIVED PT FROM OR REPORT GIVEN BY REEMA MATA RN. PT IS AWAKE AND ALERT AND ORIENTED. PT 
IS STILL NUMB FROM HIPS DOWN FROM SURGERY.

## 2019-11-19 NOTE — DIAGNOSTIC IMAGING REPORT
INDICATION: Right hip surgery.



FINDINGS: Fluoroscopy was provided during right hip surgery. 89

seconds of fluoroscopic time was utilized. Three images were

obtained demonstrating intramedullary iyla and compression screw

transfixing right hip.



IMPRESSION: Fluoroscopy for right hip surgery.



Dictated by: 



  Dictated on workstation # [id number]

## 2019-11-19 NOTE — NUR
WENT OVER THE EXT MED HX WITH THE PATIENT AND HE VERIFIED HOW HE TAKES EACH MED. 



HE HAS BEEN OFF HIS WARFARIN FOR THE PAST 6 DAYS IN PREPARATION FOR SURGERY. IT WAS FILLED 
TO TAKE 5MG DAILY EXCEPT ON MONDAYS TAKE 7.5MG - HE STATES HE ONLY TAKES 5MG EVERY DAY WHEN 
HE IS TAKING IT.



HIS LASIX WAS FILLED TO TAKE BID HOWEVER HE ONLY TAKES IT ONCE DAILY. 



HIS GABAPENTIN WAS FILLED TO TAKE 1 AM, 1 NOON, AND 3 HS - HE ONLY TAKES 1 AM AND 2 HS.



HE STATES HE USES 4 UNITS OF NOVOLIN N BID AND 5 UNITS OF HUMULIN R BID. 



HE STILL RECEIVES SAMPLES OF HIS BREO FROM DR. MUNOZ'S OFFICE.



HE TAKES COLACE AS NEEDED AND IRON DAILY OTC.

## 2019-11-19 NOTE — NUR
NESHA PANCHAL admitted to room 425-1, with an admitting diagnosis of L HIP FX, on 11/19/19 
from DR. GUERRA, accompanied by FAMILY.NESHA PANCHAL introduced to surroundings, call 
light, bed controls, phone, TV, temperature control, lights, meal times, smoking policy, 
visitor policy, side rail policy, bathrooms and showers.   NESHA PANCHAL verbalizes 
understanding that [facility] is not responsible for the loss or damage to any personal 
effects or valuables that are kept in the patients posession during their hospitalization.  
The following Patient Care Plans were discussed with the PATIENT: Discharge Planning, 
DIET,PROCEDURES, and PAIN. NESHA PANCHAL verbalizes understanding of Interdisciplinary 
Patient Education. Patient and/or family were informed about the Rapid Response Team and its 
purpose.

## 2019-11-19 NOTE — HISTORY & PHYSICAL-SURGICAL
HPO-Surgical


History of Present Illness


Chief Complaint:  


fall, right hip pain


Diagnosis/Surgical Indication:  nondisplaced right hip fracture


Procedure:  


intramedullary nailing right hip fracture


Date of Surgery:  Nov 19, 2019


Weight (Pounds):  156


Weight (Ounces):  5.0


Height (Feet):  5


Height (Inches):  9.00





Allergies and Home Medications


Allergies


Coded Allergies:  


     morphine (Verified  Allergy, Mild, Pt has received Lortab in the past w/o 

issue, 2/11/19)


     Sulfa (Sulfonamide Antibiotics) (Verified  Allergy, Unknown, 2/11/19)


     penicillin G (Verified  Allergy, Unknown, Pt has received Cefepime & 

Meropenem in the past, 10/2/19)


     levofloxacin (Verified  Adverse Reaction, Unknown, 2/11/19)





Home Medications


Docusate Sodium 100 Mg Capsule, 100 MG PO DAILY PRN for CONSTIPATION-1ST LINE, 

(Reported)


Ferrous Sulfate 325 Mg Tablet, 325 MG PO DAILY, (Reported)


Fluticasone/Vilanterol 1 Each Blst.w.dev, 1 PUFF IH DAILY, (Reported)


Furosemide 40 Mg Tablet, 40 MG PO DAILY, (Reported)


Gabapentin 100 Mg Capsule, 100 MG PO DAILY, (Reported)


Gabapentin 100 Mg Capsule, 200 MG PO HS, (Reported)


   TAKES 2 (100 MG) CAPSULES 


Insulin NPH Human Isophane 100 Unit/1 Ml Vial, 4 UNITS SQ BID, (Reported)


   USES ALONG WITH NOVOLIN R 


Insulin Regular, Human 1,000 Units/10 Ml Soln, 5 UNITS SQ BID, (Reported)


   USES ALONG WITH NOVOLIN N 


Ipratropium/Albuterol Sulfate 3 Ml Ampul.neb, 3 ML NEB TID PRN for SHORTNESS OF 

BREATH, (Reported)


Loratadine 10 Mg Tablet, 10 MG PO DAILY, (Reported)


Metoprolol Tartrate 25 Mg Tablet, 25 MG PO BID, (Reported)


Montelukast Sodium 10 Mg Tablet, 10 MG PO HS, (Reported)


Pantoprazole Sodium 40 Mg Tablet.dr, 40 MG PO DAILY, (Reported)


Potassium Chloride 10 Meq Tab.er.prt, 10 MEQ PO Q48H, (Reported)


Simvastatin 20 Mg Tablet, 20 MG PO HS, (Reported)


Warfarin Sodium 5 Mg Tablet, 5 MG PO 1800, (Reported)





Patient Home Medication List


Home Medication List Reviewed:  Yes





Past Medical-Social-Family Hx


Patient Social History


Alcohol Use:  Denies Use


Recreational Drug Use:  No


Former Smoker, Quit:  Apr 30, 1985


Type Used:  Cigarettes


2nd Hand Smoke Exposure:  No


Physical Abuse Screen:  No


Sexual Abuse:  No


Recent Foreign Travel:  No


Contact w/other who traveled:  No


Recent Hopitalizations:  Yes


Recent Infectious Disease Expo:  No





Immunizations Up To Date


Tetanus Booster (TDap):  More than 5yrs


Pediatric:  No


Date of Pneumonia Vaccine:  Oct 2, 2018


Date of Influenza Vaccine:  Sep 18, 2019





Seasonal Allergies


Seasonal Allergies:  No





Surgeries


Yes (1/2 right lung removed)


Cardiac, CABG, Coronary Stent, Eye Surgery, Joint Replacement, Lobectomy, 

Orthopedic, Prostatectomy, Tonsillectomy





Respiratory


Yes (1/2 OF RIGHT LUNG REMOVED FOR BENIGN DISEASE, PER PT. )


COPD, Emphysema, Pneumonia


Currently Using CPAP:  No


Currently Using BIPAP:  No





Cardiovascular


Yes (stents)


Atrial Fibrillation, Coronary Artery Disease, High Cholesterol, Hypertension, 

Peripheral Vascular





Neurological


Yes


Stroke





Reproductive System


Hx Reproductive Disorders:  No


Sexually Transmitted Disease:  No


HIV/AIDS:  No





Genitourinary


Yes


Benign Prostatic Hyperpl, Bladder Infection





Gastrointestinal


Yes


Gastrointestinal Bleed





Musculoskeletal


Yes


Arthritis





Endocrine


History of Endocrine Disorders:  Yes


Endocrine Disorders:  Diabetes, Insulin dep





HEENT


History of HEENT Disorders:  Yes


HEENT Disorders:  Cataract


Loss of Vision:  Denies


Hearing Impairment:  Hard of Hearing





Cancer


No





Psychosocial


History of Psychiatric Problem:  No





Integumentary


History of Skin or Integumenta:  No





Blood Transfusions


History of Blood Disorders:  No


Adverse Reaction to a Blood Tr:  No





Family Medical History


Significant Family History:  Heart Disease, Cancer, Stroke, Other Conditions/Hx


Family Hx:  


BLADD


  09 BROTHER


BLADD


  09 BROTHER


Cancer


  03 FATHER (THROAT)


  09 BROTHER


Dementia


  03 MOTHER


FH: bladder cancer


FH: bladder cancer


Family history: Diabetes mellitus


  03 MOTHER


Family history: Thyroid disorder


  03 MOTHER


Infertile


  03 MOTHER (X5 MISCARRIAGES)


Myocardial infarction


  09 BROTHER





No Family History of:


  Abdominal aortic aneurysm


  [place]'s disease


  Alcoholism


  Aphasia


  Cancer of colon


  Cataract


  Chest pain


  Congenital heart disease


  Congestive heart failure


  Cystic fibrosis


  Dysphagia


  Family history: Allergy


  Family history: Alzheimer's disease


  Family history: Arthritis


  Family history: Asthma


  Family history: Breast disease


  Family history: Cardiovascular disease


  Family history: Coronary thrombosis


  Family history: Gastrointestinal disease


  Family history: Glaucoma


  Family history: Hypertension


  Family history: Osteoporosis


  Headache


  Hearing loss


  Heart disease


  Hereditary disease


  History of - anemia


  History of - disorder


  History of - respiratory disease


  History of drug abuse


  Human immunodeficiency virus (HIV) seropositivity


  Hypercholesterolemia


  Kidney disease


  Malignant neoplasm of lung


  Parkinson's disease


  Prostate cancer


  Psychotic disorder


  Seizure disorder


  Stroke


  Tuberculosis


  Visual impairment





Exam


Vital Signs





Vital Signs








 11/19/19 11/19/19





 12:00 12:44


 


Temp 37.0 


 


Pulse 66 


 


Resp 20 


 


B/P (MAP) 107/57 


 


Pulse Ox  97


 


O2 Delivery  Room Air





Capillary Refill : Less Than 3 Seconds


Labs





Laboratory Tests








Test


 11/19/19


14:45 11/19/19


15:25 Range/Units


 


 


White Blood Count


 6.8 


 


 4.3-11.0


10^3/uL


 


Red Blood Count


 3.22 L


 


 4.35-5.85


10^6/uL


 


Hemoglobin 9.5 L  13.3-17.7  G/DL


 


Hematocrit 30 L  40-54  %


 


Mean Corpuscular Volume 93   80-99  FL


 


Mean Corpuscular Hemoglobin 30   25-34  PG


 


Mean Corpuscular Hemoglobin


Concent 32 


 


 32-36  G/DL





 


Red Cell Distribution Width 15.4 H  10.0-14.5  %


 


Platelet Count


 283 


 


 130-400


10^3/uL


 


Mean Platelet Volume 8.9   7.4-10.4  FL


 


Prothrombin Time 15.9 H  12.2-14.7  SEC


 


INR Comment 1.2   0.8-1.4  


 


Activated Partial


Thromboplast Time 42 H


 


 24-35  SEC





 


Glucometer  137 H   MG/DL








General Appearance:  Oriented X3


HEENT:  PERRLA


Respiratory:  Clear to Auscultation, Other (deminished bilaterally)


Cardiovascular:  Regular Rate


Abdominal:  Normal Bowel Sounds, Soft


Extremities:  No Clubbing, Other (tenderness right hip, pain with internnal 

external hip rotation)


Skin:  No Rashes


Neuro:  Normal Tone


Psych/Mental Status:  Mental Status NL





Assessment/Plan


Assessment and Plan


A: nondiplaced intertrochanteric right hip fracture





P: intramedullary nailing right hip fracture





Admission Diagnosis


nondisplaced, traumatic intertrochanteric right hip fracture


fall


Admission Status:  Inpatient Order (span 2 midnights)











MICHELE MORFIN             Nov 19, 2019 15:37


POS

## 2019-11-20 VITALS — SYSTOLIC BLOOD PRESSURE: 102 MMHG | DIASTOLIC BLOOD PRESSURE: 58 MMHG

## 2019-11-20 VITALS — DIASTOLIC BLOOD PRESSURE: 54 MMHG | SYSTOLIC BLOOD PRESSURE: 92 MMHG

## 2019-11-20 VITALS — DIASTOLIC BLOOD PRESSURE: 59 MMHG | SYSTOLIC BLOOD PRESSURE: 109 MMHG

## 2019-11-20 VITALS — SYSTOLIC BLOOD PRESSURE: 90 MMHG | DIASTOLIC BLOOD PRESSURE: 52 MMHG

## 2019-11-20 VITALS — SYSTOLIC BLOOD PRESSURE: 108 MMHG | DIASTOLIC BLOOD PRESSURE: 54 MMHG

## 2019-11-20 VITALS — DIASTOLIC BLOOD PRESSURE: 60 MMHG | SYSTOLIC BLOOD PRESSURE: 104 MMHG

## 2019-11-20 VITALS — DIASTOLIC BLOOD PRESSURE: 56 MMHG | SYSTOLIC BLOOD PRESSURE: 102 MMHG

## 2019-11-20 VITALS — SYSTOLIC BLOOD PRESSURE: 101 MMHG | DIASTOLIC BLOOD PRESSURE: 62 MMHG

## 2019-11-20 VITALS — DIASTOLIC BLOOD PRESSURE: 54 MMHG | SYSTOLIC BLOOD PRESSURE: 97 MMHG

## 2019-11-20 VITALS — DIASTOLIC BLOOD PRESSURE: 71 MMHG | SYSTOLIC BLOOD PRESSURE: 117 MMHG

## 2019-11-20 LAB
BUN/CREAT SERPL: 18
CALCIUM SERPL-MCNC: 7.7 MG/DL (ref 8.5–10.1)
CHLORIDE SERPL-SCNC: 104 MMOL/L (ref 98–107)
CO2 SERPL-SCNC: 29 MMOL/L (ref 21–32)
CREAT SERPL-MCNC: 0.77 MG/DL (ref 0.6–1.3)
ERYTHROCYTE [DISTWIDTH] IN BLOOD BY AUTOMATED COUNT: 15.7 % (ref 10–14.5)
GFR SERPLBLD BASED ON 1.73 SQ M-ARVRAT: > 60 ML/MIN
GLUCOSE SERPL-MCNC: 181 MG/DL (ref 70–105)
HCT VFR BLD CALC: 25 % (ref 40–54)
HGB BLD-MCNC: 8 G/DL (ref 13.3–17.7)
INR PPP: 1.3 (ref 0.8–1.4)
MCH RBC QN AUTO: 30 PG (ref 25–34)
MCHC RBC AUTO-ENTMCNC: 32 G/DL (ref 32–36)
MCV RBC AUTO: 94 FL (ref 80–99)
PLATELET # BLD: 226 10^3/UL (ref 130–400)
PMV BLD AUTO: 8.5 FL (ref 7.4–10.4)
POTASSIUM SERPL-SCNC: 3.6 MMOL/L (ref 3.6–5)
PROTHROMBIN TIME: 17.1 SEC (ref 12.2–14.7)
SODIUM SERPL-SCNC: 140 MMOL/L (ref 135–145)
WBC # BLD AUTO: 5.4 10^3/UL (ref 4.3–11)

## 2019-11-20 RX ADMIN — DOCUSATE SODIUM AND SENNOSIDES SCH EA: 8.6; 5 TABLET, FILM COATED ORAL at 21:09

## 2019-11-20 RX ADMIN — INSULIN ASPART SCH UNIT: 100 INJECTION, SOLUTION INTRAVENOUS; SUBCUTANEOUS at 09:22

## 2019-11-20 RX ADMIN — DOCUSATE SODIUM SCH MG: 100 CAPSULE ORAL at 09:19

## 2019-11-20 RX ADMIN — INSULIN ASPART SCH UNIT: 100 INJECTION, SOLUTION INTRAVENOUS; SUBCUTANEOUS at 16:46

## 2019-11-20 RX ADMIN — IPRATROPIUM BROMIDE AND ALBUTEROL SULFATE SCH ML: .5; 3 SOLUTION RESPIRATORY (INHALATION) at 13:45

## 2019-11-20 RX ADMIN — INSULIN ASPART SCH UNIT: 100 INJECTION, SOLUTION INTRAVENOUS; SUBCUTANEOUS at 21:09

## 2019-11-20 RX ADMIN — PANTOPRAZOLE SODIUM SCH MG: 40 TABLET, DELAYED RELEASE ORAL at 09:19

## 2019-11-20 RX ADMIN — GABAPENTIN SCH MG: 100 CAPSULE ORAL at 06:13

## 2019-11-20 RX ADMIN — CLINDAMYCIN PHOSPHATE SCH MLS/HR: 900 INJECTION, SOLUTION INTRAVENOUS at 06:13

## 2019-11-20 RX ADMIN — GABAPENTIN SCH MG: 100 CAPSULE ORAL at 16:03

## 2019-11-20 RX ADMIN — METOPROLOL TARTRATE SCH MG: 25 TABLET, FILM COATED ORAL at 21:00

## 2019-11-20 RX ADMIN — INSULIN ASPART SCH UNIT: 100 INJECTION, SOLUTION INTRAVENOUS; SUBCUTANEOUS at 13:10

## 2019-11-20 RX ADMIN — IPRATROPIUM BROMIDE AND ALBUTEROL SULFATE SCH ML: .5; 3 SOLUTION RESPIRATORY (INHALATION) at 12:05

## 2019-11-20 RX ADMIN — KETOROLAC TROMETHAMINE SCH MG: 15 INJECTION, SOLUTION INTRAMUSCULAR; INTRAVENOUS at 13:18

## 2019-11-20 RX ADMIN — GABAPENTIN SCH MG: 100 CAPSULE ORAL at 21:08

## 2019-11-20 RX ADMIN — ACETAMINOPHEN SCH MG: 500 TABLET ORAL at 13:18

## 2019-11-20 RX ADMIN — KETOROLAC TROMETHAMINE SCH MG: 15 INJECTION, SOLUTION INTRAMUSCULAR; INTRAVENOUS at 23:25

## 2019-11-20 RX ADMIN — ACETAMINOPHEN SCH MG: 500 TABLET ORAL at 23:24

## 2019-11-20 RX ADMIN — ACETAMINOPHEN SCH MG: 500 TABLET ORAL at 06:13

## 2019-11-20 RX ADMIN — CLINDAMYCIN PHOSPHATE SCH MLS/HR: 900 INJECTION, SOLUTION INTRAVENOUS at 13:19

## 2019-11-20 RX ADMIN — IPRATROPIUM BROMIDE AND ALBUTEROL SULFATE SCH ML: .5; 3 SOLUTION RESPIRATORY (INHALATION) at 22:04

## 2019-11-20 RX ADMIN — KETOROLAC TROMETHAMINE SCH MG: 15 INJECTION, SOLUTION INTRAMUSCULAR; INTRAVENOUS at 18:39

## 2019-11-20 RX ADMIN — SODIUM CHLORIDE SCH MLS/HR: 900 INJECTION, SOLUTION INTRAVENOUS at 09:23

## 2019-11-20 RX ADMIN — SODIUM CHLORIDE SCH MLS/HR: 900 INJECTION, SOLUTION INTRAVENOUS at 03:27

## 2019-11-20 RX ADMIN — METOPROLOL TARTRATE SCH MG: 25 TABLET, FILM COATED ORAL at 09:19

## 2019-11-20 RX ADMIN — KETOROLAC TROMETHAMINE SCH MG: 15 INJECTION, SOLUTION INTRAMUSCULAR; INTRAVENOUS at 06:12

## 2019-11-20 RX ADMIN — SODIUM CHLORIDE SCH MLS/HR: 900 INJECTION, SOLUTION INTRAVENOUS at 23:24

## 2019-11-20 RX ADMIN — ACETAMINOPHEN SCH MG: 500 TABLET ORAL at 18:39

## 2019-11-20 NOTE — PROGRESS NOTE
Subjective


Date Seen by a Provider:  2019


Time Seen by a Provider:  12:18


Subjective/Events-last exam


Awake and alert  Pain controlled


Review of Systems


Musculoskeletal:  other (Mild right hip discomfort), neck pain, shoulder pain, 

arm pain, back pain, hand pain, leg pain, foot pain





Objective


Exam





Vital Signs








  Date Time  Temp Pulse Resp B/P (MAP) Pulse Ox O2 Delivery O2 Flow Rate FiO2


 


19 08:00      Room Air  


 


19 08:00 36.3 86 20 109/59 (76) 94 Room Air  


 


19 04:00 36.4 73 18 117/71 (86) 95 Room Air  


 


19 00:00 36.7 68 18 101/62 (75) 95 Room Air  


 


19 20:00      Nasal Cannula 2.00 


 


19 19:38      Room Air  


 


19 19:05 36.3 84 18 114/56 (75) 94 Room Air  


 


19 18:25      Room Air  


 


19 18:25 36.4  18 104/63 (77) 94 Room Air  


 


19 18:20   18 101/54 (70) 95 Room Air  


 


19 18:16      Room Air  


 


19 18:10   18 99/50 (66) 93 Room Air  


 


19 18:07      Room Air  


 


19 18:00   18 99/78 (85) 97 Nasal Cannula 2 


 


19 18:00      Nasal Cannula 2 


 


19 17:50   18 105/74 (84) 95 Nasal Cannula 2 


 


19 17:45      Nasal Cannula 2 


 


19 17:40   18 89/61 (70) 94 Nasal Cannula 2 


 


19 17:38 36.3  18 93/61 (72) 96 Nasal Cannula 2 


 


19 17:38      Nasal Cannula 2 


 


19 12:44     97 Room Air  














I & O 


 


 19





 07:00


 


Intake Total 670 ml


 


Output Total 2400 ml


 


Balance -1730 ml





Capillary Refill : Less Than 3 Seconds


General Appearance:  No Apparent Distress


Other comments


Dressing dry right hip





Results


Lab


Laboratory Tests


19 14:45: 


White Blood Count 6.8, Red Blood Count 3.22L, Hemoglobin 9.5L, Hematocrit 30L, 

Mean Corpuscular Volume 93, Mean Corpuscular Hemoglobin 30, Mean Corpuscular 

Hemoglobin Concent 32, Red Cell Distribution Width 15.4H, Platelet Count 283, 

Mean Platelet Volume 8.9, Prothrombin Time 15.9H, INR Comment 1.2, Activated Par

tial Thromboplast Time 42H


19 15:25: Glucometer 137H


19 07:10: 


White Blood Count 5.4, Red Blood Count 2.67L, Hemoglobin 8.0L, Hematocrit 25L, 

Mean Corpuscular Volume 94, Mean Corpuscular Hemoglobin 30, Mean Corpuscular 

Hemoglobin Concent 32, Red Cell Distribution Width 15.7H, Platelet Count 226, 

Mean Platelet Volume 8.5, Prothrombin Time 17.1H, INR Comment 1.3, Sodium Level 

140, Potassium Level 3.6, Chloride Level 104, Carbon Dioxide Level 29, Anion Gap

7, Blood Urea Nitrogen 14, Creatinine 0.77, Estimat Glomerular Filtration Rate >

60, BUN/Creatinine Ratio 18, Glucose Level 181H, Calcium Level 7.7L


19 08:53: Glucometer 281H





Assessment/Plan


Assessment/Plan


Assess & Plan/Chief Complaint


Continue Physical therapy  Discharge planning      Status post IM nailing 

Interrtrochanteric fx right hip


Final Diagnosis


Nondisplaced Intertrochanteric fracture right hip





Clinical Quality Measures


DVT/VTE Risk/Contraindication:


Risk Factor Score Per Nursin


RFS Level Per Nursing on Admit:  4+=Very High











MARLI GUERRA DO           2019 12:22


POS

## 2019-11-20 NOTE — OCCUPATIONAL THERAPY EVAL
OT Evaluation-General/PLF


Medical Diagnosis


Admission Date


Nov 19, 2019 at 11:09


Medical Diagnosis:  s/p intramedullary nailing R hip fx


Onset Date:  Nov 19, 2019





Therapy Diagnosis


Therapy Diagnosis:  impaired ADLs and functional mobility





Height/Weight


Height (Feet):  5


Height (Inches):  9.00


Weight (Pounds):  156


Weight (Ounces):  5.0





Precautions


Precautions/Isolations:  Fall Prevention, Standard Precautions


Safety Interventions:  None





Weight Bear Status


Weight Bearing Restriction:  Touch Toe Bearing


Location Restriction:  R LE





Referral


Physician:  Latoya


Referral Reason:  Activity Tolerance, Self Care, Evaluation/Treatment, 

Strengthening/ROM





Medical History


Pertinent Medical History:  Atrial Fib, Arthritis, CABG, CAD, COPD, CVA, DM


Current History


Pt underwent surgery on 11/19/19 due to R hip fx. s/p intramedullary nailing R 

hip


Reviewed History:  Yes





Social History


Home:  [facility]


Current Living Status:  Spouse (pt reports his wife is blind)





ADL-Prior Level of Function


SCALE: Activities may be completed with or without assistive devices.





6-Indepedent-patient completes the activity by him/herself with no assistance 

from a helper.


5-Set-up or Clean-up Assistance-helper sets up or cleans up; patient completes 

activity. [place] assists only prior to or  


    following the activity.


4-Supervision or Touching Assistance-helper provides verbal cues and/or touching

/steadying and/or contact guard assistance as patient completes activity. 

Assistance may be provided   


    throughout the activity or intermittently.


3-Partial/Moderate Assistance-helper does LESS THAN HALF the effort. [place] 

lifts, holds or supports trunk or limbs, but provides less than half the effort.


2-Substantial/Maximal Assistance-helper does MORE THAN HALF the effort. [place] 

lifts or holds trunk or limbs and provides more than half the effort.


8-Uurdpkzgj-qfvxef does ALL the effort. Patient does none of the effort to 

complete the activity. Or, the assistance of 2 or more helpers is required for 

the patient to complete the  


    activity.


If activity was not attempted, code reason:


7-Patient Refused.


9-Not Applicable-not attempted and the patient did not perform the activity 

before the current illness, exacerbation or injury.


10-Not Attempted due to Environmental Limitations-(lack of equipment, weather 

restraints, etc.).


88-Not Attempted due to Medical Conditions or Safety Concerns.


ADL PLOF Comments


When asked about PLOF, pt indicated he was able to do all ADL tasks without 

help. When OT attempted to confirm that he was independent with these tasks, pt 

replied "well let me think about it,I'm trying to remember".


Self Care:  Needed Some Help


Functional Cognition:  Unknown


DME/Equipment Comments


walker





OT Current Status


Subjective


Pt on toilet with nursing aide at start of session, agreeable to OT evaluation 

on this date.Pt did not report any pain during tx.





Mental Status/Objective


Patient Orientation:  Person, Place, Time, Situation


Attachments:  IV





Current


Glasses/Contacts:  Yes


Hearing Aids:  No


Dentures/Partials:  Yes


Hand Dominance:  Right


Upper Extremity ROM


WFL, pt able to flex BUE to approximately 160 degrees


Upper Extremity Coordination


intact


Upper Extremity Sensation


no deficits reported


Upper Extremity Strength


3+/5





ADL-Treatment


Eating (QC):  7


Oral Hygiene (QC):  7


Shower/Bathe Self (QC):  7


Upper Body Dressing (QC):  7


Lower Body Dressing (QC):  7


On/Off Footwear (QC):  1 (Pt reported he has nursing aide assist with 

donning/doffing socks during spongebath.)


Toileting Hygiene (QC):  4 (CGA during stand for hygiene, pt did not have a 

brief on, no clothing management complete on this date)


Toilet Transfer (QC):  4 (CGA during sit to stand from toilet)





Other Treatments


Pt seated on toilet at start of OT session. He had just finished sponge bath 

with nursing aide. He completed toileting then ambulated to the recliner using 

FWW with Min A, with OT guiding IV pole. Pt provided PLOF and home set up, 

easily distracting himself with stories of past jobs, places and people he has 

met. OT had difficulty redirecting pt to session. Post OT tx, pt upright in 

recliner, call light in reach and all needs met.





Education


OT Patient Education:  Correct positioning, Energy conservation, Modified ADL 

techniques, Progress toward Goal/Update tx plan, Purpose of tx/functional 

activities, Reviewed precautions, Transfer techniques


Teaching Recipient:  Patient


Teaching Methods:  Demonstration, Discussion


Response to Teaching:  Verbalize Understanding, Reinforcement Needed





OT Short Term Goals


Short Term Goals


Time Frame:  Nov 27, 2019


Shower/bathe self:  3


Upper body dressing:  3


Lower body dressing:  3





OT Long Term Goals


Long Term Goals


Time Frame:  Dec 4, 2019


Eating (QC):  6


Oral Hygiene (QC):  6


Toileting Hygiene (QC):  6


Shower/Bathe Self (QC):  5


Upper Body Dressing (QC):  6


Lower Body Dressing (QC):  5


On/Off Footwear (QC):  5


Additional Goals:  1-Demonstrate ADL Tasks, 2-Verbalize Understanding, 3-

ImproveStrength/Hema


1=Demonstrate adherence to instructed precautions during ADL tasks.


2=Patient will verbalize/demonstrate understanding of assistive 

devices/modifications for ADL.


3=Patient will improve strength/tolerance for activity to enable patient to 

perform ADL's.





OT Education/Plan


Problem List/Assessment


Assessment:  Decreased Activ Tolerance, Decreased UE Strength, Impaired Funct 

Balance, Impaired I ADL's, Impaired Self-Care Skills





Discharge Recommendations


Plan/Recommendations:  Continue POC





Treatment Plan/Plan of Care


Treatment,Training & Education:  Yes


Patient would benefit from OT for education, treatment and training to promote 

independence in ADL's, mobility, safety and/or upper extremity function for 

ADL's.


Plan of Care:  ADL Retraining, Caregiver Training, Functional Mobility, UE Funct

Exercise/Act


Treatment Duration:  Dec 4, 2019


Frequency:  5 times per week


Estimated Hrs Per Day:  .25 hour per day


Rehab Potential:  Fair





Time/GCodes


Start Time:  09:45


Stop Time:  10:15


Total Time Billed (hr/min):  30


Billed Treatment Time


1, EVL (20), ADL (10)











STAR DOWELL OT          Nov 20, 2019 11:07


POS

## 2019-11-20 NOTE — ANESTHESIA-REGIONAL POST-OP
Regional


Patient Condition


Mental Status:  Alert, Oriented x3


Circulation:  Same as Pre-Op


Headache:  Absent


Sensation:  Full Recovery


Motor Block:  Absent





Post Op Complications


Complications


None





Follow Up Care/Instructions


Patient Instructions


None needed.





Anesthesia/Patient Condition


Patient is doing well, no complaints, stable vital signs, no apparent adverse 

anesthesia problems.   


No complications reported per nursing.


D/C [place] [facility]:  KIERAN Lawton CRNA           Nov 20, 2019 10:05


POS

## 2019-11-20 NOTE — PHYSICAL THERAPY DAILY NOTE
PT Daily Note-Current


Subjective


Patient agrees to PT at this time; agrees to move to chair after ambulation. 

Patient reports significant R hip pain with bed mobility/transfers.





Pain





   Numeric Pain Scale:  6


   Location:  Right


   Location Body Site:  Hip


   Pain Description:  Acute





Mental Status


Patient Orientation:  Normal For Age


Attachments:  IV





Transfers


SCALE: Activities may be completed with or without assistive devices.





6-Indepedent-patient completes the activity by him/herself with no assistance 

from a helper.


5-Set-up or Clean-up Assistance-helper sets up or cleans up; patient completes 

activity. [place] assists only prior to or  


    following the activity.


4-Supervision or Touching Assistance-helper provides verbal cues and/or 

touching/steadying and/or contact guard assistance as patient completes 

activity. Assistance may be provided   


    throughout the activity or intermittently.


3-Partial/Moderate Assistance-helper does LESS THAN HALF the effort. [place] 

lifts, holds or supports trunk or limbs, but provides less than half the effort.


2-Substantial/Maximal Assistance-helper does MORE THAN HALF the effort. [place] 

lifts or holds trunk or limbs and provides more than half the effort.


9-Yvpxstuea-vbocax does ALL the effort. Patient does none of the effort to 

complete the activity. Or, the assistance of 2 or more helpers is required for 

the patient to complete the  


    activity.


If activity was not attempted, code reason:


7-Patient Refused.


9-Not Applicable-not attempted and the patient did not perform the activity 

before the current illness, exacerbation or injury.


10-Not Attempted due to Environmental Limitations-(lack of equipment, weather 

restraints, etc.).


88-Not Attempted due to Medical Conditions or Safety Concerns.


Roll Left & Right (QC):  3


Lying to Sitting/Side of Bed(Q:  3


Sit to Stand (QC):  4


Mod assist with bed mobility to move RLE and help sit up; CGA STS with elevated 

bed height





Weight Bearing


Right Lower Extremity:  Right


Touch Toe Bearing


Left Lower Extremity:  Left


Full Weight Bearing





Gait Training


Does the Patient Walk?:  Yes


Distance:  50'


Walk 10 feet (QC):  4


Walk 50 ft with 2 Turns(QC):  4


Gait Assistive Device:  FWW


CGA; antalgic gait, ambulates onto flat foot; weight bearing through UE to 

maintain TTWB





Exercises


Supine Ex:  Ankle pumps, Glut sets, Heel Slides, Hip abd/add


Supine Reps:  10


Seated Therapy Exercises:  Long arc quads


Seated Reps:  10





Assessment


Patient required moderate assistance to perform bed mobility to move RLE to EOB 

and help sit up upper body. Patient able to stand from elevated bed height CGA 

with FWW. Patient ambulated TTWB 50' with UE weight bearing through walker. 

Patient returned to room and seated in recliner. Performed LE exercises in re

cliner independently, demonstrating fair strength. Patient seated in recliner 

with legs elevated at conclusion of treatment.





PT Long Term Goals


Long Term Goals


PT Long Term Goals Time Frame:  Nov 27, 2019


Roll Left & Right (QC):  6


Sit to Lying (QC):  6


Lying-Sitting on Side/Bed(QC):  6


Sit to Stand (QC):  5 (SBA)


Chair/Bed-to-Chair Xfer(QC):  4


Toilet Transfer (QC):  4


Car Transfer (QC):  9


Does the Patient Walk:  Yes


Walk 10 feet (QC):  4


Walk 50ft with 2 Turns (QC):  4


Walk 150 ft (QC):  4


Walking 10ft on Uneven Surface:  9


1 Step (curb) (QC):  4


4 Steps (QC):  4


12 Steps (QC):  9


Picking up an Object (QC):  9


Does the Pt use WC or Scooter?:  Yes


Type:  Manual


Type:  Manual





PT Plan


Treatment/Plan


Treatment Plan:  Continue Plan of Care


Treatment Plan:  Bed Mobility, Education, Functional Activity Hema, Functional 

Strength, Gait, Safety, Therapeutic Exercise, Transfers


Treatment Duration:  Nov 27, 2019


Frequency:  11 times per week


Estimated Hrs Per Day:  .25 hour per day


Patient and/or Family Agrees t:  Yes





Safety Risks/Education


Patient Education:  Gait Training, Transfer Techniques, Correct Positioning, 

Disease Process, Safety Issues


Teaching Recipient:  Patient


Teaching Methods:  Demonstration, Discussion


Response to Teaching:  Verbalize Understanding, Return Demonstration, 

Reinforcement Needed





Time/GCodes


Time In:  1307


Time Out:  1321


Total Billed Treatment Time:  14


Total Billed Treatment


1 visit


EX 14min











YA AARON PTA             Nov 20, 2019 14:35


POS

## 2019-11-20 NOTE — CONSULTATION
HPI


History of Present Illness:


HPI/Chief Complaint


CC: Right Hip Fracture





HPI: Patient seemed slightly confused with the timing of the events, but stated 

that he had fallen while getting up in a chair at 1300, and once more at 1700 on

Monday. He received an x-ray after the first fall and no acute abnormalities 

were noted. Patient said the he fell on is right hip both times. Had surgery on 

Tuesday at 1600. Today, He describes his pain as sore, travels down his leg, and

with movement rates it as a 6/10.


Source:  patient


Exam Limitations:  no limitations


Date Seen


19


Attending Physician


Cesar Domingo DO


PCP


Jay Holley DO


Referring Physician





Date of Admission


2019 at 11:09





Home Medications & Allergies


Home Medications


Reviewed patient Home Medication Reconciliation performed by pharmacy medication

reconciliations technician and/or nursing.


Patients Allergies have been reviewed.





Allergies





Allergies


Coded Allergies


  morphine (Verified Allergy, Severe, Pt has received Lortab in the past w/o 

issue, 19)


  Sulfa (Sulfonamide Antibiotics) (Verified Allergy, Unknown, 19)


  penicillin G (Verified Allergy, Unknown, Pt has received Cefepime & Meropenem 

in the past, 10/2/19)


  levofloxacin (Verified Adverse Reaction, Unknown, 19)





Past Medical-Social-Family Hx


Past Med/Social Hx:  Reviewed Nursing Past Med/Soc Hx


Patient Social History


Marrital Status:  


Alcohol Use:  Denies Use


Recreational Drug Use:  No


Smoking Status:  Former Smoker


Former Smoker, Quit:  1985


Type Used:  Cigarettes


2nd Hand Smoke Exposure:  No


Physical Abuse Screen:  No


Sexual Abuse:  No


Recent Foreign Travel:  No


Contact w/other who traveled:  No


Recent Hopitalizations:  Yes


Recent Infectious Disease Expo:  No





Immunizations Up To Date


Tetanus Booster (TDap):  More than 5yrs


Pediatric:  No


Date of Pneumonia Vaccine:  Oct 2, 2018


Date of Influenza Vaccine:  Sep 18, 2019





Seasonal Allergies


Seasonal Allergies:  No





Past Medical History


Surgeries:  Cardiac, CABG, Coronary Stent, Eye Surgery, Joint Replacement, 

Lobectomy, Orthopedic, Prostatectomy, Tonsillectomy


Respiratory:  COPD, Emphysema, Pneumonia


Currently Using CPAP:  No


Currently Using BIPAP:  No


Cardiac:  Atrial Fibrillation, Coronary Artery Disease, High Cholesterol, 

Hypertension, Peripheral Vascular


Neurological:  Stroke


Reproductive:  No


Sexually Transmitted Disease:  No


HIV/AIDS:  No


Genitourinary:  Benign Prostatic Hyperpl, Bladder Infection


Gastrointestinal:  Gastrointestinal Bleed


Musculoskeletal:  Arthritis


Endocrine:  Diabetes, Insulin dep


HEENT:  Cataract


Loss of Vision:  Denies


Hearing Impairment:  Hard of Hearing


History of Blood Disorders:  No


Adverse Reaction to Blood [place]:  No





Family History





BLADD


  09 BROTHER


BLADD


  09 BROTHER


Cancer


  03 FATHER (THROAT)


  09 BROTHER


Dementia


  03 MOTHER


FH: bladder cancer


FH: bladder cancer


Family history: Diabetes mellitus


  03 MOTHER


Family history: Thyroid disorder


  03 MOTHER


Infertile


  03 MOTHER (X5 MISCARRIAGES)


Myocardial infarction


  09 BROTHER





No Family History of:


  Abdominal aortic aneurysm


  Shayne's disease


  Alcoholism


  Aphasia


  Cancer of colon


  Cataract


  Chest pain


  Congenital heart disease


  Congestive heart failure


  Cystic fibrosis


  Dysphagia


  Family history: Allergy


  Family history: Alzheimer's disease


  Family history: Arthritis


  Family history: Asthma


  Family history: Breast disease


  Family history: Cardiovascular disease


  Family history: Coronary thrombosis


  Family history: Gastrointestinal disease


  Family history: Glaucoma


  Family history: Hypertension


  Family history: Osteoporosis


  Headache


  Hearing loss


  Heart disease


  Hereditary disease


  History of - anemia


  History of - disorder


  History of - respiratory disease


  History of drug abuse


  Human immunodeficiency virus (HIV) seropositivity


  Hypercholesterolemia


  Kidney disease


  Malignant neoplasm of lung


  Parkinson's disease


  Prostate cancer


  Psychotic disorder


  Seizure disorder


  Stroke


  Tuberculosis


  Visual impairment


Heart Disease, Cancer, Stroke, Other Conditions/Hx





Review of Systems


Constitutional:  No chills, No fever


EENTM:  No ear pain, No blurred vision


Respiratory:  No cough, No short of breath


Cardiovascular:  No chest pain, No palpitations


Gastrointestinal:  No abdominal pain, No nausea, No vomiting


Genitourinary:  no symptoms reported


Musculoskeletal:  joint pain (Right Hip)


Skin:  no symptoms reported


Psychiatric/Neurological:  Denies Headache, Denies Numbness





Physical Exam


Physical Exam


Vital Signs





Vital Signs - First Documented








 19





 11:20


 


Temp 37.0


 


Pulse 66


 


Resp 20


 


B/P (MAP) 107/57 (74)


 


Pulse Ox 97


 


O2 Delivery Room Air





Capillary Refill : Less Than 3 Seconds


Height, Weight, BMI


Height: 5'9.00"


Weight: 156lbs. 5.0oz. 70.020026bi; 22.38 BMI


Method:Stated


General Appearance:  No Apparent Distress, WD/WN


Eyes:  Bilateral Eye Normal Inspection, Bilateral Eye PERRL, Bilateral Eye EOMI


HEENT:  PERRL/EOMI


Neck:  Full Range of Motion, Normal Inspection, Non Tender


Respiratory:  Chest Non Tender, Lungs Clear, Normal Breath Sounds, No Accessory 

Muscle Use, No Respiratory Distress


Cardiovascular:  Regular Rate, Rhythm, No Edema, Normal Peripheral Pulses


Neurologic/Psychiatric:  Alert, Oriented x3, Normal Mood/Affect


Skin:  Normal Color, Warm/Dry





Results


Results/Procedures


Labs


Laboratory Tests


19 14:45








19 07:10








Patient resulted labs reviewed.





Active Scripts


Active


Reported


Metoprolol Tartrate 25 Mg Tablet 25 Mg PO BID


Colace (Docusate Sodium) 100 Mg Capsule 100 Mg PO DAILY PRN


Iprat-Albut 0.5-3(2.5) mg/3 ml (Ipratropium/Albuterol Sulfate) 3 Ml Ampul.neb 3 

Ml NEB TID PRN


Iron (Ferrous Sulfate) 325 Mg Tablet 325 Mg PO DAILY


Furosemide 40 Mg Tablet 40 Mg PO DAILY


Warfarin Sodium 5 Mg Tablet 5 Mg PO 1800


Loratadine 10 Mg Tablet 10 Mg PO DAILY


Potassium Chloride 10 Meq Tab.er.prt 10 Meq PO Q48H


Pantoprazole Sodium 40 Mg Tablet.dr 40 Mg PO DAILY


Gabapentin 100 Mg Capsule 200 Mg PO HS


     TAKES 2 (100 MG) CAPSULES


Novolin N (Insulin NPH Human Isophane) 100 Unit/1 Ml Vial 4 Units SQ BID


     USES ALONG WITH NOVOLIN R


Humulin R (Insulin Regular, Human) 1,000 Units/10 Ml Soln 5 Units SQ BID


     USES ALONG WITH NOVOLIN N


Gabapentin 100 Mg Capsule 100 Mg PO DAILY


Montelukast Sodium 10 Mg Tablet 10 Mg PO HS


Breo Ellipta 100-25 Mcg INH (Fluticasone/Vilanterol) 1 Each Blst.w.dev 1 Puff IH

DAILY


Simvastatin 20 Mg Tablet 20 Mg PO HS





Assessment/Plan


Assessment and Plan


Assess & Plan/Chief Complaint


Right hip fracture.


Anemia.


COPD.


Coronary artery disease.


Diabetes.





Clinical Quality Measures


Admission Status


Admission Status:  Inpatient Order (span 2 midnights)


Reason for Inpatient Admission:  


Right Hip Fracture





DVT/VTE Risk/Contraindication:


Risk Factor Score Per Nursin


RFS Level Per Nursing on Admit:  4+=Very High





Supervisory-Addendum Brief


Verification & Attestation


Participated in pt care:  other (ADEN Noonan with Dr. Holley)


Personally performed:  other (ADEN Noonan with Dr. Holley)


Care discussed with:  other (ADEN Noonan with Dr. Holley)


Procedures:  n/a


MSIII Yoel Noonan with Dr. Leydi NOONAN,[facility]    2019 09:02


POS

## 2019-11-20 NOTE — NUR
Dr Peres notified of pt's psych eval. Pt ok to dc home per [facility]

## 2019-11-20 NOTE — NUR
IRF Evaluation 



Order received to evaluate patient for the ARU. Chart review complete and findings discussed 
with Dr. Peres - patient accepted. Anticipate admission, 11/21. Will continue to follow. 



Thank you for this referral.

## 2019-11-20 NOTE — CONSULTATION
History of Present Illness


History of Present Illness


Patient Consulted On(catalina/time)


19


 07:55


Time Seen by Provider:  07:55


History of Present Illness


Patient seen by Dr. Patino in hospital.


Patient decided to get up from chair and turned around and fell down and hurts 

right hip.


Patient had x-ray done in hospital.


Patient went to see orthopedic and MRI ordered.


Patient had nondisplaced intertrochanteric right hip fracture.


Patient on warfarin, patient has history of COPD, patient of diabetic.


Patient has history of atrial fibrillation





Allergies and Home Medications


Allergies


Coded Allergies:  


     morphine (Verified  Allergy, Severe, Pt has received Lortab in the past w/o

issue, 19)


     Sulfa (Sulfonamide Antibiotics) (Verified  Allergy, Unknown, 19)


     penicillin G (Verified  Allergy, Unknown, Pt has received Cefepime & 

Meropenem in the past, 10/2/19)


     levofloxacin (Verified  Adverse Reaction, Unknown, 19)





Home Medications


Docusate Sodium 100 Mg Capsule, 100 MG PO DAILY PRN for CONSTIPATION-1ST LINE, 

(Reported)


Ferrous Sulfate 325 Mg Tablet, 325 MG PO DAILY, (Reported)


Fluticasone/Vilanterol 1 Each Blst.w.dev, 1 PUFF IH DAILY, (Reported)


Furosemide 40 Mg Tablet, 40 MG PO DAILY, (Reported)


Gabapentin 100 Mg Capsule, 100 MG PO DAILY, (Reported)


Gabapentin 100 Mg Capsule, 200 MG PO HS, (Reported)


   TAKES 2 (100 MG) CAPSULES 


Insulin NPH Human Isophane 100 Unit/1 Ml Vial, 4 UNITS SQ BID, (Reported)


   USES ALONG WITH NOVOLIN R 


Insulin Regular, Human 1,000 Units/10 Ml Soln, 5 UNITS SQ BID, (Reported)


   USES ALONG WITH NOVOLIN N 


Ipratropium/Albuterol Sulfate 3 Ml Ampul.neb, 3 ML NEB TID PRN for SHORTNESS OF 

BREATH, (Reported)


Loratadine 10 Mg Tablet, 10 MG PO DAILY, (Reported)


Metoprolol Tartrate 25 Mg Tablet, 25 MG PO BID, (Reported)


Montelukast Sodium 10 Mg Tablet, 10 MG PO HS, (Reported)


Pantoprazole Sodium 40 Mg Tablet.dr, 40 MG PO DAILY, (Reported)


Potassium Chloride 10 Meq Tab.er.prt, 10 MEQ PO Q48H, (Reported)


Simvastatin 20 Mg Tablet, 20 MG PO HS, (Reported)


Warfarin Sodium 5 Mg Tablet, 5 MG PO 1800, (Reported)





Patient Home Medication List


Home Medication List Reviewed:  Yes





Past Medical-Social-Family Hx


Past Med/Social Hx:  Reviewed Nursing Past Med/Soc Hx


Patient Social History


Alcohol Use:  Denies Use


Recreational Drug Use:  No


Type Used:  Cigarettes


Former Smoker, Quit:  1985


2nd Hand Smoke Exposure:  No


Recent Foreign Travel:  No


Contact w/Someone Who Travel:  No


Recent Infectious Disease Expo:  No


Recent Hopitalizations:  Yes





Immunizations Up To Date


Tetanus Booster (TDap):  More than 5yrs


PED Vaccines UTD:  No


Date of Pneumonia Vaccine:  Oct 2, 2018


Date of Influenza Vaccine:  Sep 18, 2019





Seasonal Allergies


Seasonal Allergies:  No





Past Medical History


Surgeries:  Yes (1/2 right lung removed)


Cardiac, CABG, Coronary Stent, Eye Surgery, Joint Replacement, Lobectomy, 

Orthopedic, Prostatectomy, Tonsillectomy


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 )


Pneumonia, Sleep Apnea, COPD, Emphysema


Currently Using CPAP:  No


Currently Using BIPAP:  No


Cardiac:  Yes (stents)


Atrial Fibrillation, Coronary Artery Disease, High Cholesterol, Hypertension, 

Peripheral Vascular


Neurological:  Yes


Stroke


Reproductive Disorders:  No


Sexually Transmitted Disease:  No


HIV/AIDS:  No


Genitourinary:  Yes


Benign Prostatic Hyperpl, Bladder Infection


Gastrointestinal:  Yes


Gastrointestinal Bleed


Musculoskeletal:  Yes


Arthritis


Endocrine:  Yes


Diabetes, Insulin dep


HEENT:  Yes


Cataract


Loss of Vision:  Denies


Hearing Impairment:  Hard of Hearing


Cancer:  No


Psychosocial:  No


Integumentary:  No


Blood Disorders:  No


Adverse Reaction/Blood Tranf:  No





Family Medical History





BLADD


  09 BROTHER


BLADD


  09 BROTHER


Cancer


  03 FATHER (THROAT)


  09 BROTHER


Dementia


  03 MOTHER


FH: bladder cancer


FH: bladder cancer


Family history: Diabetes mellitus


  03 MOTHER


Family history: Thyroid disorder


  03 MOTHER


Infertile


  03 MOTHER (X5 MISCARRIAGES)


Myocardial infarction


  09 BROTHER





No Family History of:


  Abdominal aortic aneurysm


  [place]'s disease


  Alcoholism


  Aphasia


  Cancer of colon


  Cataract


  Chest pain


  Congenital heart disease


  Congestive heart failure


  Cystic fibrosis


  Dysphagia


  Family history: Allergy


  Family history: Alzheimer's disease


  Family history: Arthritis


  Family history: Asthma


  Family history: Breast disease


  Family history: Cardiovascular disease


  Family history: Coronary thrombosis


  Family history: Gastrointestinal disease


  Family history: Glaucoma


  Family history: Hypertension


  Family history: Osteoporosis


  Headache


  Hearing loss


  Heart disease


  Hereditary disease


  History of - anemia


  History of - disorder


  History of - respiratory disease


  History of drug abuse


  Human immunodeficiency virus (HIV) seropositivity


  Hypercholesterolemia


  Kidney disease


  Malignant neoplasm of lung


  Parkinson's disease


  Prostate cancer


  Psychotic disorder


  Seizure disorder


  Stroke


  Tuberculosis


  Visual impairment


Heart Disease, Cancer, Stroke, Other Conditions/Hx





Review of Systems-General


Constitutional:  weakness


EENTM:  no symptoms reported


Respiratory:  no symptoms reported


Cardiovascular:  no symptoms reported


Gastrointestinal:  no symptoms reported


Genitourinary:  no symptoms reported





Physical Exam-General Problems


Physical Exam


Vital Signs





Vital Signs - First Documented








 19





 11:20


 


Temp 37.0


 


Pulse 66


 


Resp 20


 


B/P (MAP) 107/57 (74)


 


Pulse Ox 97


 


O2 Delivery Room Air





Capillary Refill : Less Than 3 Seconds


General Appearance:  WD/WN, no apparent distress, thin


Eyes:  Bilateral Eye Normal Inspection


HEENT:  normal ENT inspection


Neck:  full range of motion


Respiratory:  no respiratory distress, decreased breath sounds


Cardiovascular:  regular rate, rhythm


Gastrointestinal:  non tender, soft





Assessment/Plan


Assessment/Plan


Admission Diagnosis/Plan


Right hip fracture.


Anemia.


COPD.


Coronary artery disease.


Diabetes.


Admission Status:  Inpatient Order (span 2 midnights)


Reason for Inpatient Admission:  


Right hip fracture.


COPD.


Diabetes.


CAD.





Clinical Quality Measures


DVT/VTE Risk/Contraindication:


Risk Factor Score Per Nursin


RFS Level Per Nursing on Admit:  4+=Very High











TOR CONNELLY DO         2019 08:00


POS

## 2019-11-20 NOTE — NUR
CM DISCHARGE PLANNING: SS referral received for discharge planning. Visited with Guanaco about 
discharge planning et goals for his care. He reports that he wants to get home as quickly as 
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 He does have family support et they are 
with his wife at this time, but his ultimate goal is to get back to their regular routine. 
He is willing to participate in intensive therapies to accomplish this goal. Gave Guanaco 
choices of inpatient rehabilitation facilities et he elected our Inpatient Rehabilitation 
Unit here at [facility]. He reports that he would like to stay here and then get home to his wife. 
I f/u with Dr. Holley et he gave an order to have IRF evaluate for acceptance. Telephone 
order placed et primary care nurse Femi diamond.

## 2019-11-20 NOTE — OPERATIVE REPORT
DATE OF SERVICE:  11/19/2019



PREOPERATIVE DIAGNOSIS:

Nondisplaced intertrochanteric fracture of right hip.



POSTOPERATIVE DIAGNOSIS:

Nondisplaced intertrochanteric fracture of right hip.



PROCEDURE:

Intramedullary nailing intertrochanteric fracture of right hip.



SURGEON:

Cesar Guerra DO



FIRST ASSISTANT:

ROBBIE Gillis.



SURGICAL FIRST ASSISTANT DUTIES:

Mark Klein, surgical first assistant was utilized throughout the entire

procedure for patient positioning, soft tissue retraction, assistance in

placement of internal fixation device, wound closure, dressing application and

the patient transfer.



ANESTHESIA:

Spinal.



COMPLICATIONS:

None.



ESTIMATED BLOOD LOSS:

250 mL.



OPERATIVE TIME:

Please see anesthesia report.



INDICATIONS AND FINDINGS:

The patient is an 79-year-old male, who had slipped a week ago while in the

doctor's office.  The patient was evaluated in the emergency room with x-rays of

his pelvis at that time because of complaints of right hip pain.  He also has a

history of low back pain.  X-rays were obtained.  No evidence of fracture was

identified.  He was advised that if his symptoms continued that he should notify

his family physician.  The patient went home that night, he fell again.  The

patient states that his pain in his right hip continued.  He was actually being

evaluated for a spinal injection and continued to complain of persistent pain. 

An additional plain film x-ray was obtained.  A slight radiolucency in the

region of his intertrochanteric area was identified.  An MRI evaluation of his

right hip was obtained revealing a nondisplaced intertrochanteric fracture.  The

patient was taken to surgery on today's date where the fracture was repaired

utilizing the Synthes trochanteric fixation nail system with 11 mm x 170 mm nail

with a 95 mm fenestrated helical blade with a 38 mm locking screw.



PROCEDURE IN DETAIL:

The patient was transported to the operating room where subarachnoid block was

administered.  The patient was then placed on the fracture table.  Left lower

extremity was draped out of the operating field.  The right lower extremity was

placed in the traction apparatus.  The C-arm was used to visualize the proximal

femur.  A slight radiolucency through the level of the greater tuberosity was

identified.  His bony architecture otherwise remained intact.  A ChloraPrep and

sterile drape of the right hip and right lower extremity was performed.  A

longitudinal incision was made proximal to the greater trochanter.  Incision was

deepened with electrocautery exposing the tip of the greater trochanter.  A

guide pin was placed up to the greater trochanter into the proximal femur and

this was verified fluoroscopically in the AP and lateral plane.



This guide pin was then overdrilled.  The 11 mm TFN was then inserted into the

proximal femur using the outrigger _____.  The proposed incision site on the

lateral aspect of the thigh was marked opened with a scalpel through the

iliotibial band with blunt dissection performed.  The drill guide was advanced

to the lateral cortex of the femur.  An additional guide pin was placed through

the drill guide through the lateral cortex of the femur, through the

trochanteric fixation nail and into the femoral head.  The position of this

guidepin was verified fluoroscopically in the AP and lateral plane.  The outer

cortex was then overdrilled.  Over the guide pin, the femur was drilled to

within 1 cm of the subchondral bone in the medial aspect of the femoral head

with additional drill bit.



The helical blade was then impacted into position.  The compression screw was

then tightened through the trochanteric fixation nail.  A drill guide was then

used to indent the skin on just the distal portion of the thigh.  This was

opened with a scalpel and under direct visualization, the femoral shaft was

drilled through the trochanteric fixation nail distally.  A 38 mm locking screw

was then placed through the lateral cortex of the femur across the fixation nail

and through the medial cortex.



The wounds were irrigated extensively with normal saline solution.  The

iliotibial band was closed with interrupted #1 Vicryl suture.  The IT band at

the proximal aspect of the wound was closed with a running suture of #1 Vicryl. 

Subcutaneous tissues were closed with 0 and 2-0 Vicryl suture.  The skin was

closed with stainless steel staples and Adaptic Neosporin bulky dressing was

placed about the right hip.  The patient was awakened and was transported to

postop recovery with anesthesia personnel present in satisfactory condition.





Job ID: [id number]

DocumentID: [id number]

Dictated Date:  11/20/2019 09:52:05

Transcription Date: 11/20/2019 11:42:59

Dictated By: CESAR GUERRA DO

## 2019-11-20 NOTE — PHYSICAL THERAPY EVALUATION
PT Evaluation-General


Medical Diagnosis


Admission Date


Nov 19, 2019 at 11:09


Medical Diagnosis:  S/P R hip FX


Onset Date:  Nov 19, 2019





Therapy Diagnosis


Therapy Diagnosis:  abn gait, impaired strength, balance, ROM, activity romeo





Height/Weight


Height (Feet):  5


Height (Inches):  9.00


Weight (Pounds):  156


Weight (Ounces):  5.0





Precautions


Precautions/Isolations:  Fall Prevention, Standard Precautions





Weight Bear Status


Right Lower Extremity:  Right


Touch Toe Bearing


Left Lower Extremity:  Left


Full Weight Bearing





Referral


Physician:  Mark Klein


Reason for Referral:  Evaluation/Treatment, Strengthening





Medical History


Pertinent Medical History:  Atrial Fib, Arthritis, CABG, CAD, COPD, CVA, DM


Reviewed History:  Yes





Social History


Home:  Single Level


Current Living Status:  Spouse


Entry Into Home:  Stairs With Railing


PT Steps Into Home:  4


pt lives in [facility] trailer with wife who is blind. pt is care giver.





Prior


Prior Level of Function


SCALE: Activities may be completed with or without assistive devices.





6-Indepedent-patient completes the activity by him/herself with no assistance 

from a helper.


5-Set-up or Clean-up Assistance-helper sets up or cleans up; patient completes 

activity. [place] assists only prior to or  


    following the activity.


4-Supervision or Touching Assistance-helper provides verbal cues and/or 

touching/steadying and/or contact guard assistance as patient completes 

activity. Assistance may be provided   


    throughout the activity or intermittently.


3-Partial/Moderate Assistance-helper does LESS THAN HALF the effort. [place] 

lifts, holds or supports trunk or limbs, but provides less than half the effort.


2-Substantial/Maximal Assistance-helper does MORE THAN HALF the effort. [place] 

lifts or holds trunk or limbs and provides more than half the effort.


1-Geymohjyd-djsghv does ALL the effort. Patient does none of the effort to 

complete the activity. Or, the assistance of 2 or more helpers is required for 

the patient to complete the  


    activity.


If activity was not attempted, code reason:


7-Patient Refused.


9-Not Applicable-not attempted and the patient did not perform the activity 

before the current illness, exacerbation or injury.


10-Not Attempted due to Environmental Limitations-(lack of equipment, weather 

restraints, etc.).


88-Not Attempted due to Medical Conditions or Safety Concerns.


Bed Mobility:  6


Transfers (B,C,W/C):  6


Gait:  6


Stairs:  6


Indoor Mobility (Ambulation):  Independent


Stairs:  Independent





PT Evaluation-Current


Subjective


pt in recliner pre-tx agrees to therapy and has unrated pain in the R hip.





Pt in recliner post-tx with call light, room phone, tray table, and all needs 

met at this time.





Pt/Family Goals


Get back home to his wife.





Objective


Patient Orientation:  Person, Place, Time, Situation


Attachments:  IV





ROM/Strength


Strength Lower Extremities


L hip flexion 4-/5. L knee extension 3+/5. L knee flexion not tested secondary 

to pain





RLE not tested secondary to post surgical status





Integumentary/Posture


Integumentary


see nursing notes





Sensory


Vision:  Functional


Hearing:  Functional


Sensation Right Lower Extremit:  Intact


Sensation Left Lower Extremity:  Intact





Transfers


Roll Left to Right (QC):  9


Sit to Lying (QC):  9


Lying to Sitting/Side of Bed(Q:  9


Sit to Stand (QC):  4 (CGA)


Chair/Bed-to-Chair Xfer(QC):  4


Car Transfer (QC):  9





Gait


Does the Patient Walk?:  Yes


Mode of Locomotion:  Walk


Anticipated Mode of Locomotion:  Walk


Walk 10 feet (QC):  4 (CGA)


Walk 50 ft with 2 Turns(QC):  88


Walk 150 ft (QC):  88


Walking 10ft/uneven surface-QC:  88


Distance:  30'


Gait Assistive Device:  FWW


Comments/Gait Description


pt appears and reports to maintain TTWB on the RLE and every 2/3 steps pt keeps 

the RLE off the floor all together for a step. Pt hops well with B/L UE and LLE 

at a decreased velocity.





Wheelchair Training


Does the Pt Use a Wheelchair?:  No


Wheel 50 ft with 2 turns (QC):  9


Wheel 150 ft (QC):  9


Type of Wheelchair:  Manual





Stairs


1 Step (curb) (QC):  88


4 Steps (QC):  88


12 Steps (QC):  88





Balance


Sitting Static:  Normal


Sitting Dynamic:  Good


Standing Static:  Good


 Standing Dynamic:  Fair


Picking up an Object (QC):  9





Treatment


pt performed transfer training, skilled ambulation training, education, and 

functional LE strengthening exercises (seated 10 reps: LAQ, marching, heel 

raises.)





Assessment/Needs


pt is able to ambulate and maintain TTWB. Pt began to slow down and fatigue in 

the UE's with ambulation which limited his distance. pt had one bout of dizzines

s with sit to stand that went away in 10 seconds.


Rehab Potential:  Fair





PT Long Term Goals


Long Term Goals


PT Long Term Goals Time Frame:  Nov 27, 2019


Roll Left & Right (QC):  6


Sit to Lying (QC):  6


Lying-Sitting on Side/Bed(QC):  6


Sit to Stand (QC):  5 (SBA)


Chair/Bed-to-Chair Xfer(QC):  4


Toilet Transfer (QC):  4


Car Transfer (QC):  9


Does the Patient Walk:  Yes


Walk 10 feet (QC):  4


Walk 50ft with 2 Turns (QC):  4


Walk 150 ft (QC):  4


Walking 10ft on Uneven Surface:  9


1 Step (curb) (QC):  4


4 Steps (QC):  4


12 Steps (QC):  9


Picking up an Object (QC):  9


Does the Pt use WC or Scooter?:  Yes


Type:  Manual


Type:  Manual


pt will walk 200' indep with FWW





PT Plan


Problem List


Problem List:  Activity Tolerance, Functional Strength, Safety, Balance, Gait, 

Transfer, Bed Mobility, ROM





Treatment/Plan


Treatment Plan:  Continue Plan of Care


Treatment Plan:  Bed Mobility, Education, Functional Activity Hema, Functional 

Strength, Gait, Safety, Therapeutic Exercise, Transfers


Treatment Duration:  Nov 27, 2019


Frequency:  11 times per week


Estimated Hrs Per Day:  .25 hour per day


Patient and/or Family Agrees t:  Yes





Safety Risks/Education


Patient Education:  Gait Training, Transfer Techniques, Correct Positioning, 

Safety Issues


Teaching Recipient:  Patient


Teaching Methods:  Demonstration, Discussion


Response to Teaching:  Return Demonstration, Reinforcement Needed





Discharge Recommendations


Plan


pt will perform functional LE strengthening exercises, skilled ambulation 

training, bed mobility training, transfer training, and education.





Time/GCodes


Time In:  1030


Time Out:  1044


Total Billed Treatment Time:  14


Total Billed Treatment


1 visit


ANGELIA ROBERTSON PT                Nov 20, 2019 10:55


POS

## 2019-11-21 ENCOUNTER — HOSPITAL ENCOUNTER (INPATIENT)
Dept: HOSPITAL 75 - IRF | Age: 79
LOS: 15 days | Discharge: HOME HEALTH | DRG: 559 | End: 2019-12-06
Attending: INTERNAL MEDICINE | Admitting: INTERNAL MEDICINE
Payer: MEDICARE

## 2019-11-21 VITALS — DIASTOLIC BLOOD PRESSURE: 50 MMHG | SYSTOLIC BLOOD PRESSURE: 97 MMHG

## 2019-11-21 VITALS — DIASTOLIC BLOOD PRESSURE: 69 MMHG | SYSTOLIC BLOOD PRESSURE: 111 MMHG

## 2019-11-21 VITALS — DIASTOLIC BLOOD PRESSURE: 77 MMHG | SYSTOLIC BLOOD PRESSURE: 113 MMHG

## 2019-11-21 VITALS — DIASTOLIC BLOOD PRESSURE: 60 MMHG | SYSTOLIC BLOOD PRESSURE: 120 MMHG

## 2019-11-21 VITALS — HEIGHT: 69.02 IN | WEIGHT: 150.13 LBS | BODY MASS INDEX: 22.24 KG/M2

## 2019-11-21 DIAGNOSIS — I10: ICD-10-CM

## 2019-11-21 DIAGNOSIS — N40.0: ICD-10-CM

## 2019-11-21 DIAGNOSIS — E11.9: ICD-10-CM

## 2019-11-21 DIAGNOSIS — S72.91XD: Primary | ICD-10-CM

## 2019-11-21 DIAGNOSIS — I25.10: ICD-10-CM

## 2019-11-21 DIAGNOSIS — Z95.1: ICD-10-CM
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
## 2019-11-21 DIAGNOSIS — Z87.891: ICD-10-CM

## 2019-11-21 DIAGNOSIS — R53.81: ICD-10-CM

## 2019-11-21 DIAGNOSIS — Z95.5: ICD-10-CM

## 2019-11-21 DIAGNOSIS — Z90.79: ICD-10-CM

## 2019-11-21 DIAGNOSIS — I48.0: ICD-10-CM

## 2019-11-21 DIAGNOSIS — J15.1: ICD-10-CM
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
## 2019-11-21 DIAGNOSIS — Z79.4: ICD-10-CM

## 2019-11-21 DIAGNOSIS — E78.00: ICD-10-CM

## 2019-11-21 DIAGNOSIS — J43.9: ICD-10-CM

## 2019-11-21 LAB
BUN/CREAT SERPL: 18
CALCIUM SERPL-MCNC: 7.7 MG/DL (ref 8.5–10.1)
CHLORIDE SERPL-SCNC: 108 MMOL/L (ref 98–107)
CO2 SERPL-SCNC: 25 MMOL/L (ref 21–32)
CREAT SERPL-MCNC: 0.78 MG/DL (ref 0.6–1.3)
ERYTHROCYTE [DISTWIDTH] IN BLOOD BY AUTOMATED COUNT: 16.1 % (ref 10–14.5)
GFR SERPLBLD BASED ON 1.73 SQ M-ARVRAT: > 60 ML/MIN
GLUCOSE SERPL-MCNC: 132 MG/DL (ref 70–105)
HCT VFR BLD CALC: 25 % (ref 40–54)
HGB BLD-MCNC: 8.1 G/DL (ref 13.3–17.7)
INR PPP: 1.4 (ref 0.8–1.4)
MCH RBC QN AUTO: 30 PG (ref 25–34)
MCHC RBC AUTO-ENTMCNC: 32 G/DL (ref 32–36)
MCV RBC AUTO: 95 FL (ref 80–99)
PLATELET # BLD: 209 10^3/UL (ref 130–400)
PMV BLD AUTO: 9.1 FL (ref 7.4–10.4)
POTASSIUM SERPL-SCNC: 3.9 MMOL/L (ref 3.6–5)
PROTHROMBIN TIME: 17.3 SEC (ref 12.2–14.7)
SODIUM SERPL-SCNC: 142 MMOL/L (ref 135–145)
WBC # BLD AUTO: 5.2 10^3/UL (ref 4.3–11)

## 2019-11-21 PROCEDURE — 87184 SC STD DISK METHOD PER PLATE: CPT

## 2019-11-21 PROCEDURE — 83735 ASSAY OF MAGNESIUM: CPT

## 2019-11-21 PROCEDURE — 81000 URINALYSIS NONAUTO W/SCOPE: CPT

## 2019-11-21 PROCEDURE — 84100 ASSAY OF PHOSPHORUS: CPT

## 2019-11-21 PROCEDURE — 80053 COMPREHEN METABOLIC PANEL: CPT

## 2019-11-21 PROCEDURE — 87040 BLOOD CULTURE FOR BACTERIA: CPT

## 2019-11-21 PROCEDURE — 87070 CULTURE OTHR SPECIMN AEROBIC: CPT

## 2019-11-21 PROCEDURE — 85027 COMPLETE CBC AUTOMATED: CPT

## 2019-11-21 PROCEDURE — 94640 AIRWAY INHALATION TREATMENT: CPT

## 2019-11-21 PROCEDURE — 85025 COMPLETE CBC W/AUTO DIFF WBC: CPT

## 2019-11-21 PROCEDURE — 82962 GLUCOSE BLOOD TEST: CPT

## 2019-11-21 PROCEDURE — 87077 CULTURE AEROBIC IDENTIFY: CPT

## 2019-11-21 PROCEDURE — 71046 X-RAY EXAM CHEST 2 VIEWS: CPT

## 2019-11-21 PROCEDURE — 94760 N-INVAS EAR/PLS OXIMETRY 1: CPT

## 2019-11-21 PROCEDURE — 80048 BASIC METABOLIC PNL TOTAL CA: CPT

## 2019-11-21 PROCEDURE — 87205 SMEAR GRAM STAIN: CPT

## 2019-11-21 PROCEDURE — 36415 COLL VENOUS BLD VENIPUNCTURE: CPT

## 2019-11-21 PROCEDURE — 94664 DEMO&/EVAL PT USE INHALER: CPT

## 2019-11-21 PROCEDURE — 85610 PROTHROMBIN TIME: CPT

## 2019-11-21 PROCEDURE — 83605 ASSAY OF LACTIC ACID: CPT

## 2019-11-21 RX ADMIN — ACETAMINOPHEN SCH MG: 500 TABLET ORAL at 06:21

## 2019-11-21 RX ADMIN — DOCUSATE SODIUM AND SENNOSIDES SCH EA: 8.6; 5 TABLET, FILM COATED ORAL at 20:43

## 2019-11-21 RX ADMIN — WARFARIN SODIUM SCH MG: 5 TABLET ORAL at 17:11

## 2019-11-21 RX ADMIN — DOCUSATE SODIUM AND SENNOSIDES SCH EA: 8.6; 5 TABLET, FILM COATED ORAL at 20:47

## 2019-11-21 RX ADMIN — GABAPENTIN SCH MG: 100 CAPSULE ORAL at 13:56

## 2019-11-21 RX ADMIN — INSULIN ASPART SCH UNIT: 100 INJECTION, SOLUTION INTRAVENOUS; SUBCUTANEOUS at 06:34

## 2019-11-21 RX ADMIN — IPRATROPIUM BROMIDE AND ALBUTEROL SULFATE SCH ML: .5; 3 SOLUTION RESPIRATORY (INHALATION) at 15:29

## 2019-11-21 RX ADMIN — KETOROLAC TROMETHAMINE SCH MG: 15 INJECTION, SOLUTION INTRAMUSCULAR; INTRAVENOUS at 06:21

## 2019-11-21 RX ADMIN — INSULIN ASPART SCH UNIT: 100 INJECTION, SOLUTION INTRAVENOUS; SUBCUTANEOUS at 11:20

## 2019-11-21 RX ADMIN — INSULIN ASPART SCH UNIT: 100 INJECTION, SOLUTION INTRAVENOUS; SUBCUTANEOUS at 16:07

## 2019-11-21 RX ADMIN — METOPROLOL TARTRATE SCH MG: 25 TABLET, FILM COATED ORAL at 09:04

## 2019-11-21 RX ADMIN — IPRATROPIUM BROMIDE AND ALBUTEROL SULFATE SCH ML: .5; 3 SOLUTION RESPIRATORY (INHALATION) at 07:17

## 2019-11-21 RX ADMIN — GABAPENTIN SCH MG: 100 CAPSULE ORAL at 06:20

## 2019-11-21 RX ADMIN — IPRATROPIUM BROMIDE AND ALBUTEROL SULFATE SCH ML: .5; 3 SOLUTION RESPIRATORY (INHALATION) at 20:21

## 2019-11-21 RX ADMIN — METOPROLOL TARTRATE SCH MG: 25 TABLET, FILM COATED ORAL at 20:47

## 2019-11-21 RX ADMIN — DOCUSATE SODIUM SCH MG: 100 CAPSULE ORAL at 09:04

## 2019-11-21 RX ADMIN — FERROUS SULFATE TAB 325 MG (65 MG ELEMENTAL FE) SCH MG: 325 (65 FE) TAB at 20:47

## 2019-11-21 RX ADMIN — POLYETHYLENE GLYCOL (3350) SCH GM: 17 POWDER, FOR SOLUTION ORAL at 20:48

## 2019-11-21 RX ADMIN — ENOXAPARIN SODIUM SCH MG: 100 INJECTION SUBCUTANEOUS at 20:49

## 2019-11-21 RX ADMIN — PANTOPRAZOLE SODIUM SCH MG: 40 TABLET, DELAYED RELEASE ORAL at 09:04

## 2019-11-21 RX ADMIN — MONTELUKAST SCH MG: 10 TABLET, FILM COATED ORAL at 20:47

## 2019-11-21 RX ADMIN — GABAPENTIN SCH MG: 100 CAPSULE ORAL at 20:47

## 2019-11-21 RX ADMIN — INSULIN ASPART SCH UNIT: 100 INJECTION, SOLUTION INTRAVENOUS; SUBCUTANEOUS at 20:43

## 2019-11-21 NOTE — NUR
NESHA PANCHAL admitted to room 225, with an admitting diagnosis of RIGHT HIP FRACTURE, on 
11/21/19 from FOURTH FLOOR via WHEELCHAIR, accompanied by THERAPY. NESHA PANCHAL 
introduced to surroundings, call light, bed controls, phone, TV, temperature control, 
lights, meal times, smoking policy, visitor policy, side rail policy, bathrooms and showers. 
 Patient Rights given to patient in the handbook. NESHA PANCHAL verbalizes understanding 
that [facility] is not responsible for the loss or damage to any personal effects or 
valuables that are kept in the patient's possession during their hospitalization. The 
following Patient Care Plans were discussed with the PATIENT: Discharge Planning, PAIN, 
IMPAIRED MOBILITY, HIGH RISK: INFECTION, HIGH RISK: IMPAIRED SKIN INTEGRITY, and KNOWLEDGE 
DEFICIT. NESHA PANCHAL verbalizes understanding of Interdisciplinary Patient Education. 
Patient received Patient Rights Booklet, which includes Privacy Act Statement and Data 
Collection Information Summary.

## 2019-11-21 NOTE — NUR
IRF 



Met with patient to discuss details specific to rehabilitation program. Patient agreeable to 
required therapy regimen and admission. RN notified.

## 2019-11-21 NOTE — OCCUPATIONAL THER DAILY NOTE
OT Current Status-Daily Note


Subjective


Pt laying in bed at start of session, stating his lunch had not arrived yet. OT 

notified nursing. Pt agreeable to OT tx with focus on ADLs. He did not report 

any pain throughout tx.





Mental Status/Objective


Attachments:  IV





ADL-Treatment


Therapy Code Descriptions/Definitions 





Functional [place] Measure:


0=Not Assessed/NA        4=Minimal Assistance


1=Total Assistance        5=Supervision or Setup


2=Maximal Assistance  6=Modified [place]


3=Moderate Assistance 7=Complete IndependenceSCALE: Activities may be completed 

with or without assistive devices.





6-Indepedent-patient completes the activity by him/herself with no assistance 

from a helper.


5-Set-up or Clean-up Assistance-helper sets up or cleans up; patient completes 

activity. [place] assists only prior to or  


    following the activity.


4-Supervision or Touching Assistance-helper provides verbal cues and/or 

touching/steadying and/or contact guard assistance as patient completes 

activity. Assistance may be provided   


    throughout the activity or intermittently.


3-Partial/Moderate Assistance-helper does LESS THAN HALF the effort. [place] 

lifts, holds or supports trunk or limbs, but provides less than half the effort.


2-Substantial/Maximal Assistance-helper does MORE THAN HALF the effort. [place] 

lifts or holds trunk or limbs and provides more than half the effort.


4-Xmmjsvsir-rxwzot does ALL the effort. Patient does none of the effort to 

complete the activity. Or, the assistance of 2 or more helpers is required for 

the patient to complete the  


    activity.


If activity was not attempted, code reason:


7-Patient Refused.


9-Not Applicable-not attempted and the patient did not perform the activity 

before the current illness, exacerbation or injury.


10-Not Attempted due to Environmental Limitations-(lack of equipment, weather 

restraints, etc.).


88-Not Attempted due to Medical Conditions or Safety Concerns.


Eating (QC):  6 (per pt report)


Bathing Location:  L Arm, R Arm, L Upper Leg, R Upper Leg, Chest, Abdomen, 

Buttocks, Perineal Area


Shower/Bathe Self (QC):  3 (SPONGE BATH: Pt required CGA during stand for 

washing buttocks and perineal area. Pt required assist washing BLE lower legs 

and feet. )


Upper Body Dressing (QC):  5 (set up only)


Lower Body Dressing (QC):  2 (Pt able to manage clothing up/down off of hips. 

Required assistance with threading BLE into pants and underpants. )


Toileting Hygiene (QC):  4 (CGA during mangement of clothing. Pt able to manage 

clothing up/down and complete hygiene.)


Toilet Transfer (QC):  3 (BSC over toilet. Pt able to sit with CGA, required min

A to stand from toilet. )


Footwear QC: 1 (Pt required total assist to jade/doff socks.)





Other Treatment


Pt laying in bed at start of session, transferred to [facility] using FWW. Pt then 

taken to bathroom where he transferred to [facility] over toilet. Pt completed 

toileting, sponge bath, and dressing. Pt transferred back to / where he 

completed shaving and hair brushing at sink. Pt reported he would like to sit in

the recliner for when his lunch came. Pt transferred to recliner with chair 

alarm. Pt required mod cues throughout transfers in order to maintain 

weightbearing status (TTWB RLE). Post OT session, pt seated in recliner, chair a

larm on, call light and tray table in reach and all needs met.





Education


OT Patient Education:  Correct positioning, Energy conservation, Modified ADL 

techniques, Progress toward Goal/Update tx plan, Purpose of tx/functional 

activities, Reviewed precautions, Transfer techniques


Teaching Recipient:  Patient


Teaching Methods:  Discussion


Response to Teaching:  Verbalize Understanding, Reinforcement Needed





OT Short Term Goals


Short Term Goals


Time Frame:  Dec 6, 2019


Oral hygiene:  5


Toileting hygiene:  3


Shower/bathe self:  3


Upper body dressin


Lower body dressing:  3


Putting on/taking off footwear:  3





OT Long Term Goals


Long Term Goals


Time Frame:  Dec 20, 2019


Eating (QC):  6


Oral Hygiene (QC):  6


Toileting Hygiene (QC):  6


Shower/Bathe Self (QC):  6


Upper Body Dressing (QC):  6


Lower Body Dressing (QC):  6


On/Off Footwear (QC):  6


Additional Goals:  1-Demonstrate ADL Tasks, 2-Verbalize Understanding, 3-Improv

eStrength/Hema


1=Demonstrate adherence to instructed precautions during ADL tasks.


2=Patient will verbalize/demonstrate understanding of assistive 

devices/modifications for ADL.


3=Patient will improve strength/tolerance for activity to enable patient to p

erform ADL's.





OT Education/Plan


Problem List/Assessment


Assessment:  Decreased Activ Tolerance, Decreased UE Strength, Impaired Funct 

Balance, Impaired I ADL's, Impaired Self-Care Skills





Discharge Recommendations


Plan/Recommendations:  Continue POC





Treatment Plan/Plan of Care


Treatment,Training & Education:  Yes


Patient would benefit from OT for education, treatment and training to promote 

independence in ADL's, mobility, safety and/or upper extremity function for 

ADL's.


Plan of Care:  ADL Retraining, Functional Mobility, Group Exercise/Act as Ind, 

UE Funct Exercise/Act


Treatment Duration:  Dec 20, 2019


Frequency:  At least 5 of 7 days/Wk (IRF)


Estimated Hrs Per Day:  1.5 hours per day


Agreement:  Yes


Rehab Potential:  Guarded





Time/GCodes


Start Time:  12:50


Stop Time:  13:40


Total Time Billed (hr/min):  50


Billed Treatment Time


1, ADL 3 (50mins)











STAR DOWELL OT          2019 13:52


POS

## 2019-11-21 NOTE — PROGRESS NOTE
Subjective


Time Seen by a Provider:  07:52


Subjective/Events-last exam


Patient feeling better today.


Patient states last Monday he fell 2 times.


Patient fell at hospital at [facility] and also that night at home.


Patient had a call neighbor pick him up.


Patient ready to go down to second floor for rehabilitation


Okay with me for patient to go down to rehabilitation on second floor





Objective


Exam





Vital Signs








  Date Time  Temp Pulse Resp B/P (MAP) Pulse Ox O2 Delivery O2 Flow Rate FiO2


 


19 07:24     90 Room Air  


 


19 04:00 36.2 90 20 111/69 (83) 94 Nasal Cannula 2.00 


 


19 23:35 36.8 86 20 104/60 (75) 93 Nasal Cannula 2.00 


 


19 22:04     95 Nasal Cannula 2.00 


 


19 22:02    92/56 (68)    


 


19 22:02    102/54 (70)    


 


19 20:53    102/58 (73)    


 


19 20:40    90/52 (65)    


 


19 20:00      Room Air  


 


19 19:59 36.4 70 20 92/54 (67) 97 Nasal Cannula 2.00 


 


19 16:50 36.8 73 20 97/54 (68) 93 Room Air  


 


19 13:45     96 Room Air  


 


19 12:00 36.0 65 16 108/54 (72) 98 Room Air  


 


19 08:00      Room Air  


 


19 08:00 36.3 86 20 109/59 (76) 94 Room Air  














I & O 


 


 19





 07:00


 


Intake Total 2300 ml


 


Output Total 900 ml


 


Balance 1400 ml





Capillary Refill : Less Than 3 Seconds


General Appearance:  No Apparent Distress, Thin


HEENT:  Normal ENT Inspection


Neck:  Full Range of Motion, Normal Inspection


Respiratory:  No Accessory Muscle Use, No Respiratory Distress, Decreased Breath

Sounds


Cardiovascular:  Regular Rate, Rhythm


Gastrointestinal:  non tender, soft





Results


Lab


Laboratory Tests


19 05:35








Laboratory Tests


19 08:53: Glucometer 281H


19 12:39: Glucometer 153H


19 16:24: Glucometer 216H


19 20:45: Glucometer 187H


19 05:35: 


White Blood Count 5.2, Red Blood Count 2.67L, Hemoglobin 8.1L, Hematocrit 25L, 

Mean Corpuscular Volume 95, Mean Corpuscular Hemoglobin 30, Mean Corpuscular 

Hemoglobin Concent 32, Red Cell Distribution Width 16.1H, Platelet Count 209, 

Mean Platelet Volume 9.1, Prothrombin Time 17.3H, INR Comment 1.4, Sodium Level 

142, Potassium Level 3.9, Chloride Level 108H, Carbon Dioxide Level 25, Anion 

Gap 9, Blood Urea Nitrogen 14, Creatinine 0.78, Estimat Glomerular Filtration 

Rate > 60, BUN/Creatinine Ratio 18, Glucose Level 132H, Calcium Level 7.7L





Assessment/Plan


Assessment/Plan


Assess & Plan/Chief Complaint


Right hip fracture.


Anemia.


COPD.


Coronary artery disease.


Diabetes..


.


19.


Right hip fracture.


Anemia.


COPD.


Coronary artery disease.


Diabetes





Clinical Quality Measures


DVT/VTE Risk/Contraindication:


Risk Factor Score Per Nursin


RFS Level Per Nursing on Admit:  4+=Very High











TOR CONNELLY DO         2019 07:54


POS

## 2019-11-21 NOTE — PHYSICAL THERAPY EVALUATION
PT Evaluation-General


Medical Diagnosis


Admission Date


2019 at 09:45


Medical Diagnosis:  right femur fx


Onset Date:  2019





Therapy Diagnosis


Therapy Diagnosis:  abnormal gait





Height/Weight


Height (Feet):  5


Height (Inches):  9.00


Weight (Pounds):  156


Weight (Ounces):  5.0





Precautions


Precautions/Isolations:  Fall Prevention, Standard Precautions





Weight Bear Status


Right Lower Extremity:  Right


Touch Toe Bearing


Left Lower Extremity:  Left


Full Weight Bearing





Referral


Physician:  Larisa


Reason for Referral:  Evaluation/Treatment





Medical History


Pertinent Medical History:  Atrial Fib, Arthritis, CABG, CAD, COPD, CVA, DM


Current History


Pt fell while putting his coat on and sustained a right femur fx; post IM nail 

and is TTWB


Reviewed History:  Yes





Social History


Home:  Single Level


Current Living Status:  Spouse (vision impaired; he provides care for her)


Entry Into Home:  Stairs With Railing


PT Steps Into Home:  4





Prior


Prior Level of Function


SCALE: Activities may be completed with or without assistive devices.





6-Indepedent-patient completes the activity by him/herself with no assistance 

from a helper.


5-Set-up or Clean-up Assistance-helper sets up or cleans up; patient completes 

activity. [place] assists only prior to or  


    following the activity.


4-Supervision or Touching Assistance-helper provides verbal cues and/or 

touching/steadying and/or contact guard assistance as patient completes 

activity. Assistance may be provided   


    throughout the activity or intermittently.


3-Partial/Moderate Assistance-helper does LESS THAN HALF the effort. [place] 

lifts, holds or supports trunk or limbs, but provides less than half the effort.


2-Substantial/Maximal Assistance-helper does MORE THAN HALF the effort. [place] 

lifts or holds trunk or limbs and provides more than half the effort.


0-Vhjqelwkb-oeicxg does ALL the effort. Patient does none of the effort to 

complete the activity. Or, the assistance of 2 or more helpers is required for 

the patient to complete the  


    activity.


If activity was not attempted, code reason:


7-Patient Refused.


9-Not Applicable-not attempted and the patient did not perform the activity 

before the current illness, exacerbation or injury.


10-Not Attempted due to Environmental Limitations-(lack of equipment, weather 

restraints, etc.).


88-Not Attempted due to Medical Conditions or Safety Concerns.


Bed Mobility:  6


Transfers (B,C,W/C):  6


Gait:  6


Indoor Mobility (Ambulation):  Independent


Stairs:  Independent


Prior Devices Use:  Walker


Prior Device Use:  FWW; cane and 4WW


pt still drives and ambulates community distances.





PT Evaluation-Current


Subjective


Pt agreeable to PT.  Reports he would rather be working with therapy as just 

sitting in his room.





Pain





   Numeric Pain Scale:  3


   Location:  Right


   Location Body Site:  Hip


   Pain Description:  Ache (sore)





Pt/Family Goals


His goal is to return home at a mod indep level.





Objective


Patient Orientation:  Person, Place, Time, Situation





ROM/Strength


ROM Lower Extremities


WFL


Strength Lower Extremities


Left LE strength is grossly 4/5; right LE strength is grossly 3/5.





Integumentary/Posture


Integumentary


intact


Bowel Incontinence:  No


Bladder Incontinence:  No


Posture


normal and symmetrical





Neuromuscular


(Tone, Coordination, Reflexes)


intact and without noted functional deficits.





Sensory


Vision:  Wears Glasses


Hearing:  Functional


Hand Dominance:  Right


Sensation Right Lower Extremit:  Intact


Sensation Left Lower Extremity:  Intact





Transfers


Roll Left to Right (QC):  4


Sit to Lying (QC):  3 (min assist with right LE)


Lying to Sitting/Side of Bed(Q:  3 (min assist with right LE)


Sit to Stand (QC):  3 (min assist to come to a stand; he tends to push back 

some; skilled cues for hand placement and WB management. )


Chair/Bed-to-Chair Xfer(QC):  3


Car Transfer (QC):  3 (assist with right LE)


Toilet transfer is a 3 on QC score.





Gait


Does the Patient Walk?:  Yes


Mode of Locomotion:  Walk


Anticipated Mode of Locomotion:  Walk


Walk 10 feet (QC):  3 (min assist for safety with heavy cues for TTWB status. )


Walk 50 ft with 2 Turns(QC):  3


Walk 150 ft (QC):  88


Walking 10ft/uneven surface-QC:  3


Gait Assistive Device:  FWW


Comments/Gait Description


Pt walks x 50 ft x 5 reps with FWW, with TTWB with min assist and cues for TTWB 

status.





Wheelchair Training


Does the Pt Use a Wheelchair?:  No


Wheel 50 ft with 2 turns (QC):  9


Wheel 150 ft (QC):  9


Type of Wheelchair:  Manual





Stairs


1 Step (curb) (QC):  88 (pt unsafe to attempt due to TTWB status)


4 Steps (QC):  88


12 Steps (QC):  88





Balance


Sitting Static:  Good


Sitting Dynamic:  Good


Standing Static:  Fair


 Standing Dynamic:  Fair


Picking up an Object (QC):  88





Treatment


B LE ther ex 15 for AP, QS, HS, SAQ and hip abduction; nu step x 12 minutes all 

to promote LE strength and functional act romeo to improve gait and transfers; 

right LE off to the side on the NU step to maintain TTWB status.   


functional gait and transfer training.  Education on ARU expectations and 

safety; spent time educating on TTWB status.  Pt verbalized understanding.





Assessment/Needs


Post fall that resulted in a aright hip fracture.  It has been repaired with a 

IM nail.   He is TTWB.  He presents with functional strength and balance 

deficits as well as decresed functional activity tolerance that limit his 

ability to perform bed mobility, transfers and ambulate.  He will benefit from 

skilled PT to promote strength and mobility to allow him to return home at a mod

indep level.


Rehab Potential:  Good





PT Short Term Goals


Short Term Goals


Time Frame:  2019


Sit to lyin


Lying to sitting on side of be:  5


Walk 10 feet:  5


Walk 50 feet with two turns:  5


Walk 150 feet:  5


4 steps:  4





PT Long Term Goals


Long Term Goals


PT Long Term Goals Time Frame:  Dec 5, 2019


Roll Left & Right (QC):  6


Sit to Lying (QC):  6


Lying-Sitting on Side/Bed(QC):  6


Sit to Stand (QC):  6


Chair/Bed-to-Chair Xfer(QC):  6


Toilet Transfer (QC):  6


Car Transfer (QC):  6


Does the Patient Walk:  Yes


Walk 10 feet (QC):  6


Walk 50ft with 2 Turns (QC):  6


Walk 150 ft (QC):  6


Walking 10ft on Uneven Surface:  6


1 Step (curb) (QC):  6


4 Steps (QC):  5


12 Steps (QC):  9


Picking up an Object (QC):  88


Does the Pt use WC or Scooter?:  No


Type:  N/A


Type:  N/A





PT Plan


Problem List


Problem List:  Activity Tolerance, Functional Strength, Safety, Balance, Gait, 

Transfer, Bed Mobility





Treatment/Plan


Treatment Plan:  Continue Plan of Care


Treatment Plan:  Bed Mobility, Education, Functional Activity Hema, Functional 

Strength, Group Therapy, Gait, Safety, Therapeutic Exercise, Transfers


Treatment Duration:  Dec 5, 2019


Frequency:  At least 5 of 7 days/Wk (IRF)


Estimated Hrs Per Day:  1.5 hours per day


Patient and/or Family Agrees t:  Yes





Safety Risks/Education


Patient Education:  Gait Training, Transfer Techniques, Reviewed Precautions


Teaching Recipient:  Patient


Teaching Methods:  Demonstration, Discussion


Response to Teaching:  Reinforcement Needed





Time/GCodes


Time In:  945


Time Out:  1100


Total Billed Treatment Time:  75


Total Billed Treatment


visit


EVM 15


EX 30


GT 30











VIANEY FRANCO PT             2019 11:41


POS

## 2019-11-21 NOTE — NUR
REVIEWED MED REC AS IT WAS REPORTED UPON ADMISSION TO 4TH FLOOR.



NOTE WHEN THE PATIENT WAS DISCHARGED TO REHAB THE FOLLOWING CHANGES WERE MADE THAT ARE NOT 
CURRENTLY REFLECTED ON THE MED REC:



START TAKING:

HYDROCODONE 10-325MG Q4H PRN

## 2019-11-21 NOTE — OCCUPATIONAL THERAPY EVAL
OT Evaluation-General/PLF


Medical Diagnosis


Admission Date


2019 at 09:45


Medical Diagnosis:  Right femur fracture


Onset Date:  2019





Therapy Diagnosis


Therapy Diagnosis:  impaired ADLs and functional mobility





Height/Weight


Height (Feet):  5


Height (Inches):  9.00


Weight (Pounds):  156


Weight (Ounces):  5.0





Precautions


Precautions/Isolations:  Fall Prevention, Standard Precautions


Safety Interventions:  Bed Exit Alarm





Weight Bear Status


Weight Bearing Restriction:  Touch Toe Bearing


Location Restriction:  R LE





Referral


Physician:  Larisa


Referral Reason:  Activity Tolerance, Self Care, Evaluation/Treatment, 

Strengthening/ROM





Medical History


Pertinent Medical History:  Atrial Fib, Arthritis, CABG, CAD, COPD, CVA, DM


Current History


Pt fell causing him to fx hip. Pt is s/p IM nail.


Reviewed History:  Yes





Social History


Home:  Single Level


Current Living Status:  Spouse (pt reports his wife has a visual impairment )


Entry Into Home:  Stairs With Railing (rails on both sides)


 Steps Into Home:  4





ADL-Prior Level of Function


SCALE: Activities may be completed with or without assistive devices.





6-Indepedent-patient completes the activity by him/herself with no assistance 

from a helper.


5-Set-up or Clean-up Assistance-helper sets up or cleans up; patient completes 

activity. [place] assists only prior to or  


    following the activity.


4-Supervision or Touching Assistance-helper provides verbal cues and/or 

touching/steadying and/or contact guard assistance as patient completes 

activity. Assistance may be provided   


    throughout the activity or intermittently.


3-Partial/Moderate Assistance-helper does LESS THAN HALF the effort. [place] 

lifts, holds or supports trunk or limbs, but provides less than half the effort.


2-Substantial/Maximal Assistance-helper does MORE THAN HALF the effort. [place] 

lifts or holds trunk or limbs and provides more than half the effort.


9-Affsjojsb-hlzkuy does ALL the effort. Patient does none of the effort to co

mplete the activity. Or, the assistance of 2 or more helpers is required for the

patient to complete the  


    activity.


If activity was not attempted, code reason:


7-Patient Refused.


9-Not Applicable-not attempted and the patient did not perform the activity 

before the current illness, exacerbation or injury.


10-Not Attempted due to Environmental Limitations-(lack of equipment, weather 

restraints, etc.).


88-Not Attempted due to Medical Conditions or Safety Concerns.


ADL PLOF Comments


Pt reports he was independent with all ADLs prior to hospitalization. He states 

he had difficulty maneuvering the walker around the trailer.


Self Care:  Independent


Functional Cognition:  Independent


DME/Equipment:  Bath Bench, Shower


DME/Equipment Comments


walker


Occupation:  retired


Drive Self:  Yes





OT Current Status


Subjective


Pt laying in bed at start of tx, agreeable to OT evaluation/tx. Pt denies pain 

during session. Pt easily distracted throughout session, telling stories about 

his past life. OT able to redirect pt to task.





Pain





   Numeric Pain Scale:  0-No Pain





Mental Status/Objective


Patient Orientation:  Person, Place, Time, Situation


Attachments:  IV





Current


Glasses/Contacts:  Yes


Hearing Aids:  No


Dentures/Partials:  Yes


Hand Dominance:  Right


Upper Extremity ROM


WFL


Upper Extremity Coordination


WFL finger to nose test and thumb opposition to each finger.


Upper Extremity Sensation


pt denies changes in sensation


Upper Extremity Strength


grossly 4/5 MMT BUE





ADL-Treatment


Eating (QC):  7


Oral Hygiene (QC):  4 (Pt required set up assist with min verbal cues for 

sequencing of task. Pt completed oral hygiene at bed level. )


Shower/Bathe Self (QC):  7


Upper Body Dressing (QC):  7


Lower Body Dressing (QC):  7


On/Off Footwear (QC):  7


Toileting Hygiene (QC):  7


Toilet Transfer (QC):  7





Other Treatments


Pt laying in bed throughout session. Pt provided information about PLOF and home

set up. Pt completed oral hygiene at bed level. Post OT session, pt laying in 

bed call light in reach, bed alarm on, and all needs met.





Education


OT Patient Education:  Correct positioning, Energy conservation, Modified ADL 

techniques, Progress toward Goal/Update tx plan, Purpose of tx/functional 

activities


Teaching Recipient:  Patient


Teaching Methods:  Discussion


Response to Teaching:  Verbalize Understanding





OT Short Term Goals


Short Term Goals


Time Frame:  Dec 6, 2019


Oral hygiene:  5


Toileting hygiene:  3


Shower/bathe self:  3


Upper body dressin


Lower body dressing:  3


Putting on/taking off footwear:  3





OT Long Term Goals


Long Term Goals


Time Frame:  Dec 20, 2019


Eating (QC):  6


Oral Hygiene (QC):  6


Toileting Hygiene (QC):  6


Shower/Bathe Self (QC):  6


Upper Body Dressing (QC):  6


Lower Body Dressing (QC):  6


On/Off Footwear (QC):  6


Additional Goals:  1-Demonstrate ADL Tasks, 2-Verbalize Understanding, 3-

ImproveStrength/Hema


1=Demonstrate adherence to instructed precautions during ADL tasks.


2=Patient will verbalize/demonstrate understanding of assistive 

devices/modifications for ADL.


3=Patient will improve strength/tolerance for activity to enable patient to 

perform ADL's.





OT Education/Plan


Problem List/Assessment


Assessment:  Decreased Activ Tolerance, Decreased UE Strength, Impaired Funct 

Balance, Impaired I ADL's, Impaired Self-Care Skills





Discharge Recommendations


Plan/Recommendations:  Continue POC





Treatment Plan/Plan of Care


Treatment,Training & Education:  Yes


Patient would benefit from OT for education, treatment and training to promote i

ndependence in ADL's, mobility, safety and/or upper extremity function for 

ADL's.


Plan of Care:  ADL Retraining, Functional Mobility, Group Exercise/Act as Ind, 

UE Funct Exercise/Act


Treatment Duration:  Dec 20, 2019


Frequency:  At least 5 of 7 days/Wk (IRF)


Estimated Hrs Per Day:  1.5 hours per day


Agreement:  Yes


Rehab Potential:  Guarded





Time/GCodes


Start Time:  11:25


Stop Time:  12:05


Total Time Billed (hr/min):  30


Billed Treatment Time


1, EVM (20mins), ADL (10mins)











STAR DOWELL OT          2019 12:04


POS

## 2019-11-21 NOTE — NUR
RD ASSESSMENT 



PMHx: COPD; CAD; HTN; DM; hypercholesterolemia; afib; stroke



PT INTERACTION: Pt was awake and pleasant during consult for MST score. Pt states current 
appetite is okay, and reports it hasn't been as good as it once was. Pt states following a 
regular diet at home and many of his meals are provided by [employer]. Pt states no 
issues chewing/swallowing food, and that he currently has no bottom teeth. 



Pt states no recent issues with n/v/c/d at this time. Note no BM has been recorded and pt 
currently on bowel regimen of colace qd; senna HS; miralax BID; bisacodyl PRN; per chart 
review. 



Pt states no recent wt changes. Note unable to determine recent wt hx, per chart review. 



Pt states current DM management is pretty good and that his blood glucose levels are 
typically around 130. 



Given pt's appetite and wt hx, pt does not meet criteria for malnutrition per [facility]EN 
guidelines. 



ABNORMAL NUTRITION-RELATED LAB VALUES: Cl 108 (H); glu 132 (H); Ca 7.7 (L) 



Est. kcal needs: 3823-9434 kcal | 25-30 kcal/kg 

Est. Pro needs:  76-88 g Pro | 1.0-1.2 g Pro/kg



PES STATEMENT: Inadequate oral intake (NI-2.1) related to loss of appetite as evidenced by 
pt interview | pt avg PO intake 50-75% x2d



INTERVENTION:  

Continue with current diet order of Regular diet. 

Pt may benefit from switching diet to CHO restricted diet, if blood glucose levels remain 
elevated. 

Pt may benefit from nutrition supplementation if PO intake declines. 

Will continue to follow and reassess as pt needs and status change. 



MONITOR/EVALUATE:  

PO Intake; Plan of Care; Hydration Status; Weight Status; Lab Values 





ADA Light, MS, RD, LD

## 2019-11-21 NOTE — NUR
Contacted by Pastor Vasyl at [facility]. pt is a member nd visits pt 
regularly.

## 2019-11-21 NOTE — ST COGNITIVE LINGUISTIC EVAL
Speech Evaluation-General


Medical Diagnosis


Right femur fracture


Onset Date:  Nov 21, 2019





Therapy Diagnosis


Therapy Diagnosis:  Cogitive-communication





Referral


Referring Physician:  Dr. Peres





Medical History


Pertinent Medical History:  Atrial Fib, Arthritis, CABG, CAD, COPD, CVA, DM


Reviewed History:  Yes





Social History


Current Living Status:  Spouse





Speech PLF-Current Status


Prior Level of Function





Patient lived at home with his wife. He was independent for most of his


daily needs.





Subjective


Patient was pleasant and cooperative with the cognitive assessment.





Language Eval: Auditory


Comprehends Simple Yes/No Ques:  Functional


Indent/Objects Multiple Fields:  Functional


Ident/Pics in Multiple Fields:  Functional


Follows 1-Step Commands:  Functional


Follows Complex Directions:  Mild


Follows General Conversations:  Functional





Language Eval: Verbal Language


Completes Spontaneous Greeting:  Functional


Produces Auto, Serial Info:  Functional


Imitates Simple Words/Phrases:  Functional


Word Finding:  Mild


Requests Basic Needs:  Functional


States Basic Personal Info:  Functional


Expresses Complex Ideas:  Mild





Objective Cognitive Domain


Attention:  WNL


Memory:  Mild


Problem Solving:  Mild


Executive Functions:  Mild


Visuospatial Skills:  WNL


Composite Severity Rating:  WNL


Clock Drawing Severity Rating:  Mild





Objective


Formal/Standardized Tests


[facility] Status ([facility])


Results


23/30, Mild Neurocognitive Disorder range of function


Oral Motor/Speech Production


Within Normal Limits


Impression


The patient is a pleasant 79 year old male who was admitted to the ARU s/p right

femur fracture. The patient was given the SLUMS at bedside with results of 23/30

obtained. The patient exhibits deficits with memory and problem solving. The 

patient will receive skilled ST for improving cognitive function.





Speech Patient Assess


Expression of Ideas/Wants:  Exhibits (3)


Understanding Verbal Content:  Usually Understands (3)


Brief Interview-Mental Status:  Yes


Repetition of Three Words:  Three (3)


Temporal Orientation: Year:  Correct (3)


Temporal Orientation: Month:  Accurate within 5 days(2)


Temporal Orientation: Day:  Correct (1)


Recall : Wear to say "Sock":  Yes,after cueing (1)


Recall : Color:  Yes, after cueing (1)


Recall : Bed:  No, could not recall (0)


Memory/Recall Ability:  Current season, That he or she is in a hsp/hsp unit





Speech Short Term Goals


Short Term Goals


Short Term Goals


1) The patient will complete memory tasks related to his daily needs at 90% or 

greater.


2) The patient will complete problem solving tasks related to his daily needs at

90% or greater.


3) The patient will complete safety awareness tasks related to his daily needs 

at 90% or greater.





Speech Long Term Goals


Long Term Goals


The patient will improve cognitive-communication necessary for safety and daily 

living tasks with minimal assist.





Speech-Plan


Patient/Family Goals


Patient/Family Goals:  


The patient plans on returning to his home post rehab.





Treatment Plan


Speech Therapy Treatment Plan:  Continue Plan of Care


Patient will receive skilled ST services for improving cognitive function.


Treatment Duration:  Nov 27, 2019


Frequency:  5 times per week


Estimated Hrs Per Day:  .5 hour per day


Rehab Potential:  Guarded


Barriers to Learning:  


Patient has cognitive deficits.


Pt/Family Agrees to Plan:  Yes





Safety Risks/Education


Teaching Recipient:  Patient


Teaching Methods:  Discussion


Response to Teaching:  Verbalize Understanding


Education Topics Provided:  


Safety within his room and communication of his wants/needs.





Time


Speech Therapy Time In:  11:00


Speech Therapy Time Out:  11:25


Total Billed Time:  25


Billed Treatment Time


1, SPSNDCOMP


YOEL Bolaños            Nov 21, 2019 11:53


POS

## 2019-11-21 NOTE — PROGRESS NOTE
Subjective


Date Seen by a Provider:  2019


Time Seen by a Provider:  07:24


Subjective/Events-last exam


pod 2 s/p IM nailing of right IT hip fracture, no complaints





Objective


Exam





Vital Signs








  Date Time  Temp Pulse Resp B/P (MAP) Pulse Ox O2 Delivery O2 Flow Rate FiO2


 


19 04:00 36.2 90 20 111/69 (83) 94 Nasal Cannula 2.00 


 


19 23:35 36.8 86 20 104/60 (75) 93 Nasal Cannula 2.00 


 


19 22:04     95 Nasal Cannula 2.00 


 


19 22:02    92/56 (68)    


 


19 22:02    102/54 (70)    


 


19 20:53    102/58 (73)    


 


19 20:40    90/52 (65)    


 


19 20:00      Room Air  


 


19 19:59 36.4 70 20 92/54 (67) 97 Nasal Cannula 2.00 


 


19 16:50 36.8 73 20 97/54 (68) 93 Room Air  


 


19 13:45     96 Room Air  


 


19 12:00 36.0 65 16 108/54 (72) 98 Room Air  


 


19 08:00      Room Air  


 


19 08:00 36.3 86 20 109/59 (76) 94 Room Air  














I & O 


 


 19





 07:00


 


Intake Total 2300 ml


 


Output Total 900 ml


 


Balance 1400 ml





Capillary Refill : Less Than 3 Seconds


General Appearance:  No Apparent Distress


Extremity:  Normal Capillary Refill, No Calf Tenderness, No Pedal Edema


Neurologic/Psychiatric:  Alert, Oriented x3, No Motor/Sensory Deficits


Skin:  Normal Color, Warm/Dry (dressing right hip CDI)





Results


Lab


Laboratory Tests


19 08:53: Glucometer 281H


19 12:39: Glucometer 153H


19 16:24: Glucometer 216H


19 20:45: Glucometer 187H


19 05:35: 


White Blood Count 5.2, Red Blood Count 2.67L, Hemoglobin 8.1L, Hematocrit 25L, 

Mean Corpuscular Volume 95, Mean Corpuscular Hemoglobin 30, Mean Corpuscular 

Hemoglobin Concent 32, Red Cell Distribution Width 16.1H, Platelet Count 209, 

Mean Platelet Volume 9.1, Sodium Level 142, Potassium Level 3.9, Chloride Level 

108H, Carbon Dioxide Level 25, Anion Gap 9, Blood Urea Nitrogen 14, Creatinine 

0.78, Estimat Glomerular Filtration Rate > 60, BUN/Creatinine Ratio 18, Glucose 

Level 132H, Calcium Level 7.7L





Assessment/Plan


Assessment/Plan


Assess & Plan/Chief Complaint


A: s/p IM nailing right IT hip fracture





P: Continue current treatment, ortho signing off at this point, from ortho 

standpoint he may be discharged to skilled or IRF when released by medicine 

service





Clinical Quality Measures


Admission Status


Admission Dx


nondisplaced, traumatic intertrochanteric right hip fracture


fall





DVT/VTE Risk/Contraindication:


Risk Factor Score Per Nursin


RFS Level Per Nursing on Admit:  4+=Very High











MICHELE MORFIN             2019 07:25


POS

## 2019-11-21 NOTE — DISCHARGE INST-SKILLED NURSING
Discharge Inst-Skilled NF


Reconcile Patient Problems


Problems Reviewed?:  Yes





Patient Instructions


Patient Problems:  


s/p intramedullary nailing of right hip fracture


nondisplaced, traumatic intertrochanteric right hip fracture


debility


falls


Goal:  


improvement with ADLs


Patient Instructions:  


touch toe WB only on RLE with walker and staff for assist


f/u with Dr. Domingo in 3 weeks for xrays


Continue Ice to incision TID for 30 minutes


daily island dressing changes if wound is draining


staples removed on 11/28/19 and apply steri strips





Consult/Follow Up/Orders


Follow Up Appt.:  


3 weeks


Skilled NF Admit to:  


Certification (SNF)


I certify that SNF services are required to be given on an inpatient basis 

because of the above named patient's need for skilled nursing care on a 

continuing basis for the conditions(s) for which he/she was receiving inpatient 

hospital services prior to his/her transfer to the SNF.


Skilled Nursing Facility Order:  Nursing Services, Physical Therapy-Evaluate & 

Treat


Oxygen Delivery Method:  Room Air





New & Resume Previous Orders


New & Resume Previous Orders


daily physical therapy to teach assistive devices, transfers and increased 

ambulation


nursing to remove staples and apply steri strips on 11/28/19


Michele Morfin 


Nov 21, 2019 


07:28











MICHELE MORFIN             Nov 21, 2019 07:31


POS

## 2019-11-21 NOTE — PM&R POST ADMISSION ASSESSMENT
PM&R HP


Date of Visit:  Nov 21, 2019


Time of Visit:  10:00


History of Present Illness


CC: Right femur fracture repair with debility





HPI: This is an 79yoWM clinic patient of Dr Holley known to me from prior 

hospital stay for severe COPD and bronchitis and PNA who presents to the IRF 

following a right femur fracture repair sustained in a fall during a visit with 

Dr Patino at [facility] a few days before. Apparently he sustained the fall in the 

office visit and was sent to ER and they did not identify any fracture so he 

went home and the pain increased and he then reported to a scheduled epidural 

spine injection by Dr Rios who pursued the source of his pain with an MRI which

revealed the fracture so he was sent to [facility] and Dr Domingo performed an 

uncomplicated right femur fracture repair. Currently he is feeling much better 

and wants to go home soon to take care of very ill wife. BM regimen will be 

maintained and focused therapy will be initiated in order to return home.





Past Medical-Social-Family Hx


Past Med/Social Hx:  Reviewed Nursing Past Med/Soc Hx, Reviewed and Corrections 

made


Patient Social History


Marrital Status:  


Employed/Student:  retired


Smoking Status:  Former Smoker


Former Smoker, Quit:  Apr 30, 1985


Type Used:  Cigarettes


2nd Hand Smoke Exposure:  No


Recent Foreign Travel:  No


Contact w/other who traveled:  No


Recent Hopitalizations:  Yes


Recent Infectious Disease Expo:  No





Immunizations Up To Date


Tetanus Booster (TDap):  More than 5yrs


Pediatric:  No


Date of Pneumonia Vaccine:  Oct 2, 2018


Date of Influenza Vaccine:  Sep 18, 2019





Seasonal Allergies


Seasonal Allergies:  No





Past Medical History


Surgeries:  Cardiac, CABG, Coronary Stent, Eye Surgery, Joint Replacement, 

Lobectomy, Orthopedic, Prostatectomy, Tonsillectomy


Respiratory:  COPD, Emphysema, Pneumonia


Currently Using CPAP:  No


Currently Using BIPAP:  No


Cardiac:  Atrial Fibrillation, Coronary Artery Disease, High Cholesterol, 

Hypertension, Peripheral Vascular


Neurological:  Stroke


Reproductive:  No


Sexually Transmitted Disease:  No


HIV/AIDS:  No


Genitourinary:  Benign Prostatic Hyperpl, Bladder Infection


Gastrointestinal:  Gastrointestinal Bleed


Musculoskeletal:  Arthritis


Endocrine:  Diabetes, Insulin dep


HEENT:  Cataract


Loss of Vision:  Denies


Hearing Impairment:  Hard of Hearing


History of Blood Disorders:  No


Adverse Reaction to Blood Tapia:  No





Family History





BLADD


  09 BROTHER


BLADD


  09 BROTHER


Cancer


  03 FATHER (THROAT)


  09 BROTHER


Dementia


  03 MOTHER


FH: bladder cancer


FH: bladder cancer


Family history: Diabetes mellitus


  03 MOTHER


Family history: Thyroid disorder


  03 MOTHER


Infertile


  03 MOTHER (X5 MISCARRIAGES)


Myocardial infarction


  09 BROTHER





No Family History of:


  Abdominal aortic aneurysm


  [place]'s disease


  Alcoholism


  Aphasia


  Cancer of colon


  Cataract


  Chest pain


  Congenital heart disease


  Congestive heart failure


  Cystic fibrosis


  Dysphagia


  Family history: Allergy


  Family history: Alzheimer's disease


  Family history: Arthritis


  Family history: Asthma


  Family history: Breast disease


  Family history: Cardiovascular disease


  Family history: Coronary thrombosis


  Family history: Gastrointestinal disease


  Family history: Glaucoma


  Family history: Hypertension


  Family history: Osteoporosis


  Headache


  Hearing loss


  Heart disease


  Hereditary disease


  History of - anemia


  History of - disorder


  History of - respiratory disease


  History of drug abuse


  Human immunodeficiency virus (HIV) seropositivity


  Hypercholesterolemia


  Kidney disease


  Malignant neoplasm of lung


  Parkinson's disease


  Prostate cancer


  Psychotic disorder


  Seizure disorder


  Stroke


  Tuberculosis


  Visual impairment


Heart Disease, Cancer, Stroke, Other Conditions/Hx





PM&R Allergy/Meds/Data Review


Allergies


Coded Allergies:  


     morphine (Verified  Allergy, Severe, Pt has received Lortab in the past w/o

issue, 11/20/19)


     Sulfa (Sulfonamide Antibiotics) (Verified  Allergy, Unknown, 2/11/19)


     penicillin G (Verified  Allergy, Unknown, Pt has received Cefepime & 

Meropenem in the past, 10/2/19)


     levofloxacin (Verified  Adverse Reaction, Unknown, 2/11/19)





Home Medications


Scheduled


Ferrous Sulfate (Iron), 325 MG PO DAILY, (Reported)


Fluticasone/Vilanterol (Breo Ellipta 100-25 Mcg INH), 1 PUFF IH DAILY, 

(Reported)


Furosemide (Furosemide), 40 MG PO DAILY, (Reported)


Gabapentin (Gabapentin), 100 MG PO DAILY, (Reported)


Gabapentin (Gabapentin), 200 MG PO HS, (Reported)


Insulin NPH Human Isophane (Novolin N), 4 UNITS SQ BID, (Reported)


Insulin Regular, Human (Humulin R), 5 UNITS SQ BID, (Reported)


Loratadine (Loratadine), 10 MG PO DAILY, (Reported)


Metoprolol Tartrate (Metoprolol Tartrate), 25 MG PO BID, (Reported)


Montelukast Sodium (Montelukast Sodium), 10 MG PO HS, (Reported)


Pantoprazole Sodium (Pantoprazole Sodium), 40 MG PO DAILY, (Reported)


Potassium Chloride (Potassium Chloride), 10 MEQ PO Q48H, (Reported)


Simvastatin (Simvastatin), 20 MG PO HS, (Reported)


Warfarin Sodium (Warfarin Sodium), 5 MG PO 1800, (Reported)





Scheduled PRN


Docusate Sodium (Colace), 100 MG PO DAILY PRN for CONSTIPATION-1ST LINE, 

(Reported)


Ipratropium/Albuterol Sulfate (Iprat-Albut 0.5-3(2.5) mg/3 ml), 3 ML NEB TID PRN

for SHORTNESS OF BREATH, (Reported)





Discontinued Medications


Azithromycin (Zithromax Tri-Bernard), 500 MG PO DAILY


   Discontinued Reason: No Longer Taking


Hydrocodone Bit/Acetaminophen (Hydrocodone/Acetaminophen 5/325mg Tablet), 1 TAB 

PO TID PRN for PAIN-MODERATE


   Discontinued Reason: No Longer Taking





Current Medications


Current Medications


Reviewed





Review of Systems


Constitutional:  see HPI, malaise, weakness


Respiratory:  cough, dyspnea on exertion


Musculoskeletal:  joint pain (right hip)





Physical Exam


Physical Exam


Vital Signs





Vital Signs - First Documented








 11/21/19





 08:00


 


Temp 37.1


 


Pulse 98


 


Resp 22


 


B/P (MAP) 97/50 (66)


 


Pulse Ox 93


 


O2 Delivery Room Air





Capillary Refill :


Height, Weight, BMI


Height: 5'9.00"


Weight: 156lbs. 5.0oz. [id number]; 22.38 BMI


Method:Stated


General Appearance:  No Apparent Distress, WD/WN, Chronically ill


Eyes:  Bilateral Eye Normal Inspection, Bilateral Eye PERRL


HEENT:  PERRL/EOMI, Normal ENT Inspection, Pharynx Normal


Neck:  Full Range of Motion, Normal Inspection, Non Tender, Supple, Carotid 

Bruit


Respiratory:  Chest Non Tender, Lungs Clear, No Accessory Muscle Use, No 

Respiratory Distress, Decreased Breath Sounds


Cardiovascular:  Regular Rate, Rhythm, No Edema, No Gallop, No JVD, No Murmur, 

Normal Peripheral Pulses


Gastrointestinal:  Normal Bowel Sounds, No Organomegaly, No Pulsatile Mass, Non 

Tender, Soft


Back:  Normal Inspection, No CVA Tenderness, No Vertebral Tenderness


Extremity:  Normal Capillary Refill, Normal Inspection, Normal Range of Motion 

(right leg limited ROM due to pain), Non Tender, No Calf Tenderness, No Pedal 

Edema


Neurologic/Psychiatric:  Alert, Oriented x3, No Motor/Sensory Deficits, Normal 

Mood/Affect


Skin:  Normal Color, Warm/Dry


Lymphatic:  No Adenopathy





PM&R Medical Assessment & Plan


REHAB/MEDICAL ASSESSMENT AND PLAN:





REHAB IMPAIRMENT GROUP: 


Right femur fracture





ETIOLOGIC DIAGNOSIS: 


Right femur fracture





The comorbidities that impact the patients function and/or functional outcome 

by: Severe COPD with recurrent PNA, CAD





REHAB PLAN:


The patient is being admitted to our comprehensive inpatient rehabilitation 

facility and can tolerate the intensity of service consisting of at least:


      180 minutes of therapy a day, 5 out of 7 days a week 


    


Rehab treatment will consist of:   PT/OT will focus on helping patient regain 

function in order to return home





The patient/family has a good understanding of our discharge process and will 

benefit from an interdisciplinary inpatient rehabilitation program. The patient 

has potential to make improvement and is in need of at least two of the 

following multidisciplinary therapies including but not limited to physical, 

occupational, speech, and prosthetics and orthotics.  Additionally the patient 

will need services from respiratory, nutritional services, wound care, 

psychology, etc. (Customize this to each patient). Given the patients complex 

condition and risk of further medical complications, rehabilitation services 

cannot be safely or effectively provided at a lower level of care such as a 

skilled nursing facility.





BARRIERS TO DISCHARGE:


Ailing wife can't help much, severity of COPD will need close monitoring





ESTIMATED LOS:


7 days





DISPOSITION:


Home





RELEVANT CHANGES SINCE PREADMISSION SCREENING: 


I have compared the patients medical and functional status at the time of the 

preadmission screening and there are: 


      no changes


        





PROGNOSIS:


Good





REHABILITATION GOALS:  


1. PT/OT will focus on regaining function in order to be able to return home








All the above goals were reviewed with the patient and he/she is in agreement.


By signing this document, I acknowledge that I have personally performed a full 

physical examination on this patient within 24 hours of admission to this 

inpatient rehabilitation facility and have determined the patient to be able to 

tolerate the above course of treatment at an intensive level for a reasonable 

period of time. I will be completing a detailed individualized Plan of Care for 

this patient by day #4 of the patients stay based upon the Preadmission Screen,

the Post-Admission Evaluation, and the therapy evaluations.


Admission Dx/Comorbidities:  


(1) Closed right femoral fracture


ICD Codes:  S72.91XA - Unspecified fracture of right femur, initial encounter 

for closed fracture


(2) Debility


Status:  Acute


ICD Codes:  R53.81 - Other malaise


(3) Paroxysmal atrial fibrillation


Status:  Chronic


(4) Dyspnea


Status:  Acute


ICD Codes:  R06.00 - Dyspnea, unspecified


(5) COPD (chronic obstructive pulmonary disease)


Status:  Chronic


ICD Codes:  J44.9 - Chronic obstructive pulmonary disease, unspecified











ALEKS WILSON DO                Nov 21, 2019 10:27


POS

## 2019-11-22 VITALS — DIASTOLIC BLOOD PRESSURE: 65 MMHG | SYSTOLIC BLOOD PRESSURE: 124 MMHG

## 2019-11-22 VITALS — SYSTOLIC BLOOD PRESSURE: 94 MMHG | DIASTOLIC BLOOD PRESSURE: 60 MMHG

## 2019-11-22 VITALS — SYSTOLIC BLOOD PRESSURE: 95 MMHG | DIASTOLIC BLOOD PRESSURE: 60 MMHG

## 2019-11-22 LAB
ALBUMIN SERPL-MCNC: 2.8 GM/DL (ref 3.2–4.5)
ALP SERPL-CCNC: 71 U/L (ref 40–136)
ALT SERPL-CCNC: 11 U/L (ref 0–55)
APTT PPP: YELLOW S
BACTERIA #/AREA URNS HPF: NEGATIVE /HPF
BASOPHILS # BLD AUTO: 0 10^3/UL (ref 0–0.1)
BASOPHILS NFR BLD AUTO: 0 % (ref 0–10)
BILIRUB SERPL-MCNC: 0.4 MG/DL (ref 0.1–1)
BILIRUB UR QL STRIP: NEGATIVE
BUN/CREAT SERPL: 15
CALCIUM SERPL-MCNC: 8.1 MG/DL (ref 8.5–10.1)
CHLORIDE SERPL-SCNC: 106 MMOL/L (ref 98–107)
CO2 SERPL-SCNC: 24 MMOL/L (ref 21–32)
CREAT SERPL-MCNC: 0.79 MG/DL (ref 0.6–1.3)
EOSINOPHIL # BLD AUTO: 0.3 10^3/UL (ref 0–0.3)
EOSINOPHIL NFR BLD AUTO: 3 % (ref 0–10)
ERYTHROCYTE [DISTWIDTH] IN BLOOD BY AUTOMATED COUNT: 16.6 % (ref 10–14.5)
FIBRINOGEN PPP-MCNC: CLEAR MG/DL
GFR SERPLBLD BASED ON 1.73 SQ M-ARVRAT: > 60 ML/MIN
GLUCOSE SERPL-MCNC: 137 MG/DL (ref 70–105)
GLUCOSE UR STRIP-MCNC: NEGATIVE MG/DL
HCT VFR BLD CALC: 28 % (ref 40–54)
HGB BLD-MCNC: 8.9 G/DL (ref 13.3–17.7)
INR PPP: 1.1 (ref 0.8–1.4)
KETONES UR QL STRIP: NEGATIVE
LEUKOCYTE ESTERASE UR QL STRIP: NEGATIVE
LYMPHOCYTES # BLD AUTO: 1.3 X 10^3 (ref 1–4)
LYMPHOCYTES NFR BLD AUTO: 11 % (ref 12–44)
MANUAL DIFFERENTIAL PERFORMED BLD QL: NO
MCH RBC QN AUTO: 29 PG (ref 25–34)
MCHC RBC AUTO-ENTMCNC: 32 G/DL (ref 32–36)
MCV RBC AUTO: 93 FL (ref 80–99)
MONOCYTES # BLD AUTO: 0.7 X 10^3 (ref 0–1)
MONOCYTES NFR BLD AUTO: 6 % (ref 0–12)
NEUTROPHILS # BLD AUTO: 9.3 X 10^3 (ref 1.8–7.8)
NEUTROPHILS NFR BLD AUTO: 80 % (ref 42–75)
NITRITE UR QL STRIP: NEGATIVE
PH UR STRIP: 7 [PH] (ref 5–9)
PLATELET # BLD: 274 10^3/UL (ref 130–400)
PMV BLD AUTO: 8.9 FL (ref 7.4–10.4)
POTASSIUM SERPL-SCNC: 4.5 MMOL/L (ref 3.6–5)
PROT SERPL-MCNC: 5.7 GM/DL (ref 6.4–8.2)
PROT UR QL STRIP: NEGATIVE
PROTHROMBIN TIME: 14.7 SEC (ref 12.2–14.7)
RBC #/AREA URNS HPF: (no result) /HPF
SODIUM SERPL-SCNC: 139 MMOL/L (ref 135–145)
SP GR UR STRIP: 1.01 (ref 1.02–1.02)
SQUAMOUS #/AREA URNS HPF: (no result) /HPF
WBC # BLD AUTO: 11.6 10^3/UL (ref 4.3–11)
WBC #/AREA URNS HPF: (no result) /HPF

## 2019-11-22 RX ADMIN — MONTELUKAST SCH MG: 10 TABLET, FILM COATED ORAL at 20:25

## 2019-11-22 RX ADMIN — ACETAMINOPHEN PRN MG: 500 TABLET ORAL at 05:39

## 2019-11-22 RX ADMIN — INSULIN ASPART SCH UNIT: 100 INJECTION, SOLUTION INTRAVENOUS; SUBCUTANEOUS at 12:10

## 2019-11-22 RX ADMIN — DOCUSATE SODIUM AND SENNOSIDES SCH EA: 8.6; 5 TABLET, FILM COATED ORAL at 20:25

## 2019-11-22 RX ADMIN — FERROUS SULFATE TAB 325 MG (65 MG ELEMENTAL FE) SCH MG: 325 (65 FE) TAB at 09:13

## 2019-11-22 RX ADMIN — POLYETHYLENE GLYCOL (3350) SCH GM: 17 POWDER, FOR SOLUTION ORAL at 20:26

## 2019-11-22 RX ADMIN — ENOXAPARIN SODIUM SCH MG: 100 INJECTION SUBCUTANEOUS at 20:24

## 2019-11-22 RX ADMIN — SODIUM CHLORIDE SCH MLS/HR: 900 INJECTION INTRAVENOUS at 23:30

## 2019-11-22 RX ADMIN — SODIUM CHLORIDE SCH MLS/HR: 900 INJECTION INTRAVENOUS at 12:43

## 2019-11-22 RX ADMIN — GABAPENTIN SCH MG: 100 CAPSULE ORAL at 21:12

## 2019-11-22 RX ADMIN — INSULIN ASPART SCH UNIT: 100 INJECTION, SOLUTION INTRAVENOUS; SUBCUTANEOUS at 15:40

## 2019-11-22 RX ADMIN — SODIUM CHLORIDE SCH MLS/HR: 900 INJECTION INTRAVENOUS at 14:34

## 2019-11-22 RX ADMIN — PANTOPRAZOLE SODIUM SCH MG: 40 TABLET, DELAYED RELEASE ORAL at 09:13

## 2019-11-22 RX ADMIN — FERROUS SULFATE TAB 325 MG (65 MG ELEMENTAL FE) SCH MG: 325 (65 FE) TAB at 20:24

## 2019-11-22 RX ADMIN — GABAPENTIN SCH MG: 100 CAPSULE ORAL at 05:35

## 2019-11-22 RX ADMIN — INSULIN ASPART SCH UNIT: 100 INJECTION, SOLUTION INTRAVENOUS; SUBCUTANEOUS at 20:28

## 2019-11-22 RX ADMIN — INSULIN ASPART SCH UNIT: 100 INJECTION, SOLUTION INTRAVENOUS; SUBCUTANEOUS at 06:42

## 2019-11-22 RX ADMIN — DOCUSATE SODIUM AND SENNOSIDES SCH EA: 8.6; 5 TABLET, FILM COATED ORAL at 12:21

## 2019-11-22 RX ADMIN — POLYETHYLENE GLYCOL (3350) SCH GM: 17 POWDER, FOR SOLUTION ORAL at 12:22

## 2019-11-22 RX ADMIN — IPRATROPIUM BROMIDE AND ALBUTEROL SULFATE SCH ML: .5; 3 SOLUTION RESPIRATORY (INHALATION) at 13:00

## 2019-11-22 RX ADMIN — WARFARIN SODIUM SCH MG: 5 TABLET ORAL at 20:23

## 2019-11-22 RX ADMIN — METOPROLOL TARTRATE SCH MG: 25 TABLET, FILM COATED ORAL at 09:36

## 2019-11-22 RX ADMIN — SODIUM CHLORIDE SCH MLS/HR: 900 INJECTION INTRAVENOUS at 18:30

## 2019-11-22 RX ADMIN — METOPROLOL TARTRATE SCH MG: 25 TABLET, FILM COATED ORAL at 20:32

## 2019-11-22 RX ADMIN — DOCUSATE SODIUM AND SENNOSIDES SCH EA: 8.6; 5 TABLET, FILM COATED ORAL at 20:24

## 2019-11-22 RX ADMIN — ACETAMINOPHEN PRN MG: 500 TABLET ORAL at 18:07

## 2019-11-22 RX ADMIN — DOCUSATE SODIUM SCH MG: 100 CAPSULE ORAL at 12:21

## 2019-11-22 RX ADMIN — IPRATROPIUM BROMIDE AND ALBUTEROL SULFATE SCH ML: .5; 3 SOLUTION RESPIRATORY (INHALATION) at 18:48

## 2019-11-22 RX ADMIN — GABAPENTIN SCH MG: 100 CAPSULE ORAL at 14:59

## 2019-11-22 RX ADMIN — IPRATROPIUM BROMIDE AND ALBUTEROL SULFATE SCH ML: .5; 3 SOLUTION RESPIRATORY (INHALATION) at 14:56

## 2019-11-22 NOTE — SPEECH THERAPY DAILY NOTE
Speech Daily Progress Note


Subjective


Date Seen by Provider:  Nov 22, 2019


Time Seen by Provider:  00:30


Patient was resting in his recliner "trying to get warm" when I entered his 

room.





Objective


Patient completed memory tasks at 80% with 20% verbal cues





Assessment


Assessment Current Status:  Good Progress





Treatment Plan


Continue Plan of Care





Speech Short Term Goals


Short Term Goals


Short Term Goals


1) The patient will complete memory tasks related to his daily needs at 90% or 

greater.


2) The patient will complete problem solving tasks related to his daily needs at

90% or greater.


3) The patient will complete safety awareness tasks related to his daily needs 

at 90% or greater.





Speech Long Term Goals


Long Term Goals


The patient will improve cognitive-communication necessary for safety and daily 

living tasks with minimal assist.





Speech-Plan


Patient/Family Goals


Patient/Family Goals:  


Patient plans on returning home with his wife post rehab.





Treatment Plan


Speech Therapy Treatment Plan:  Continue Plan of Care


Patient participated well with skilled therapy today.


Treatment Duration:  Nov 27, 2019


Frequency:  5 times per week


Estimated Hrs Per Day:  .5 hour per day


Rehab Potential:  Guarded


Barriers to Learning:  


Patient has cognitive deficits


Pt/Family Agrees to Plan:  Yes





Safety Risks/Education


Teaching Recipient:  Patient


Teaching Methods:  Discussion


Response to Teaching:  Verbalize Understanding


Education Topics Provided:  


Continued safety within his room.





Time


Speech Therapy Time In:  13:00


Speech Therapy Time Out:  13:30


Total Billed Time:  30


Billed Treatment Time


[address]ASHLYN BETHANIA ST            Nov 22, 2019 14:10


POS

## 2019-11-22 NOTE — DIAGNOSTIC IMAGING REPORT
INDICATION:  Fever.



PA and lateral chest



FINDINGS/

IMPRESSION:  There is a loop recorder projecting over the left

lower chest. There is some interstitial infiltrate at both lung

bases. Bilateral basilar interstitial infiltrates with slightly

more alveolar consolidation at the left medial lung base compared

to exam dated 11/12/2019.



Dictated by: 



  Dictated on workstation # [id number]

## 2019-11-22 NOTE — NUR
Call placed to Pharmacy & notified that pt's scheduled Coumadin dose was not in [facility], 
unable to obtain. They will fill.

## 2019-11-22 NOTE — PHYSICIAN QUERY CLARIFICATION
PQ-Further Specificity


Admission/Discharge


Admission Date: Nov 19, 2019 at 11:09 


Discharge Date:  Nov 21, 2019 at 09:33





The medical record reflects the following clinical scenario:





History/Risk Factors:


Nondiplaced intertrochanteric fx, DM, CAD, HTN





Clinical Findings:


atrial fibrillation





Treatment:


Warfarin





Question:  Can you further specify atrial fibrillation per the clinical 

indicators above? 


Please document a response in the Progress Notes or Discharge Summary.





1. chronic atrial fibrillation





2. persistent atrial fibrillation





3. paroxysmal atrial fibrillation





4. Other, with explanation of the clinical findings.





5. Clinically undetermined, no explanation for the clinical findings.





PHYSICIAN RESPONSE


Can you specify per above:  Other, explanation/clinical finding


Explanation/Clinical Findings


paroxysmal atrial fibrillation


Please remember a lack of response to the above will prompt a phone page by 

CDI/Coding staff. 





In responding to this query, please exercise your independent professional 

judgment.  The purpose of this communication is to more accurately reflect the 

complexity of your patients condition. The fact that a question is asked does 

not imply that any particular answer is desired or expected.  





Thank you for your timely response to this clarification.      


   


Requestors name: Fernando   





Phone # [phone]








THIS PHYSICIAN QUERY FORM IS A PERMANENT PART OF THE MEDICAL RECORD











FERNANDO BRANCH                 Nov 22, 2019 09:21


TOR ALCANTAR DO      Nov 22, 2019 11:44


POS

## 2019-11-22 NOTE — NUR
Temp 39.0  Dr. Peres notified and new orders received for lactic acid, CXR and UA in 
addition to CBC and CMP already ordered for this am.  Tylenol 1000mg po given at this time.

## 2019-11-22 NOTE — OCCUPATIONAL THER DAILY NOTE
OT Current Status-Daily Note


Subjective


Pt seen in recliner chair, states 6/10 sharp/shooting pain in R thigh to knee. 

Pt states pain intermittent though no pattern of pain. Pt agreeable to OT tx 

session.





Mental Status/Objective


Patient Orientation:  Person, Place, Situation, Normal For Age





ADL-Treatment


Therapy Code Descriptions/Definitions 





Functional [place] Measure:


0=Not Assessed/NA        4=Minimal Assistance


1=Total Assistance        5=Supervision or Setup


2=Maximal Assistance  6=Modified [place]


3=Moderate Assistance 7=Complete IndependenceSCALE: Activities may be completed 

with or without assistive devices.





6-Indepedent-patient completes the activity by him/herself with no assistance 

from a helper.


5-Set-up or Clean-up Assistance-helper sets up or cleans up; patient completes 

activity. [place] assists only prior to or  


    following the activity.


4-Supervision or Touching Assistance-helper provides verbal cues and/or 

touching/steadying and/or contact guard assistance as patient completes 

activity. Assistance may be provided   


    throughout the activity or intermittently.


3-Partial/Moderate Assistance-helper does LESS THAN HALF the effort. [place] 

lifts, holds or supports trunk or limbs, but provides less than half the effort.


2-Substantial/Maximal Assistance-helper does MORE THAN HALF the effort. [place] 

lifts or holds trunk or limbs and provides more than half the effort.


9-Bcdbgookw-pvkzjp does ALL the effort. Patient does none of the effort to 

complete the activity. Or, the assistance of 2 or more helpers is required for 

the patient to complete the  


    activity.


If activity was not attempted, code reason:


7-Patient Refused.


9-Not Applicable-not attempted and the patient did not perform the activity 

before the current illness, exacerbation or injury.


10-Not Attempted due to Environmental Limitations-(lack of equipment, weather 

restraints, etc.).


88-Not Attempted due to Medical Conditions or Safety Concerns.


Eating (QC):  6


Lower Body Dressing (QC):  4 (Pt introduced to sock aide, dressing stick and 

reacher. Pt completes pant doffing with CGA in stance and sock doffing sitting 

with dressing stick.)


On/ off footwear: 4: Pt completes sock donning with sock aide with intermittent 

cues for positioning





Other Treatment


Pt able to state TTWB status to RLE. Pt completes sit to stand with noticable 

pressure to R LE. Pt returns to sit, introduction of AE/ LB dressing tasks in 

chair. Pt reminded of TTWB status when pulling up/down pants. Pt states he isn't

able to tell whether he places pressure into foot. Pt's DO and phlebotomy come 

in during session. Pt states dizziness while blood drawn with AROM exercises in 

between draws.  Pt educated on diaphragmatic breathing techniques, completes 

with maximal cues for correct positioning and moving diaphragm. Pt completes 5 

minutes of minimal resistance at arm bike with increased SOB, completes UE 

exercises focused on triceps, biceps, and shoulder flexion (3 sets of 15 with 2#

weight bilaterally).  Pt takes increased time to complete activities, requires 

redirection to task throughout session due to conversation/ talkative. Pt 

returns to recliner chair with call light in reach, all needs met.





Education


OT Patient Education:  Correct positioning, Exercise program, Home exercise 

program, Modified ADL techniques, Purpose of tx/functional activities, Reviewed 

precautions, Safety issues, Transfer techniques, Use of adapted equipment


Teaching Recipient:  Patient


Teaching Methods:  Demonstration, Discussion


Response to Teaching:  Verbalize Understanding, Return Demonstration





OT Short Term Goals


Short Term Goals


Time Frame:  Dec 6, 2019


Oral hygiene:  5


Toileting hygiene:  3


Shower/bathe self:  3


Upper body dressin


Lower body dressing:  3 (met)


Putting on/taking off footwear:  3





OT Long Term Goals


Long Term Goals


Time Frame:  Dec 20, 2019


Eating (QC):  6


Oral Hygiene (QC):  6


Toileting Hygiene (QC):  6


Shower/Bathe Self (QC):  6


Upper Body Dressing (QC):  6


Lower Body Dressing (QC):  6


On/Off Footwear (QC):  6


Additional Goals:  1-Demonstrate ADL Tasks, 2-Verbalize Understanding, 3-

ImproveStrength/Hema


1=Demonstrate adherence to instructed precautions during ADL tasks.


2=Patient will verbalize/demonstrate understanding of assistive 

devices/modifications for ADL.


3=Patient will improve strength/tolerance for activity to enable patient to 

perform ADL's.





OT Education/Plan


Problem List/Assessment


Assessment:  Decreased Activ Tolerance, Decreased UE Strength, Impaired Funct 

Balance, Impaired I ADL's, Impaired Self-Care Skills





Discharge Recommendations


Plan/Recommendations:  Continue POC


Equpiment Recommendations-D/C:  Hip Kit





Treatment Plan/Plan of Care


Treatment,Training & Education:  Yes


Patient would benefit from OT for education, treatment and training to promote 

independence in ADL's, mobility, safety and/or upper extremity function for 

ADL's.


Plan of Care:  ADL Retraining, Functional Mobility, Group Exercise/Act as Ind, 

UE Funct Exercise/Act


Treatment Duration:  Dec 20, 2019


Frequency:  At least 5 of 7 days/Wk (IRF)


Estimated Hrs Per Day:  1.5 hours per day


Agreement:  Yes


Rehab Potential:  Guarded





Time/GCodes


Start Time:  09:30


Stop Time:  10:45


Total Time Billed (hr/min):  75


Billed Treatment Time


1, ADL 3 (45), EX 2 (30)= 75











NARAYAN PITT OTR                2019 10:58


POS

## 2019-11-22 NOTE — NUR
Initial assessment completed with patient this a.m.  He was admitted to ARU for traumatic 
intertrochanteric right hip fracture, post op.



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 
caregivers along with their daughter Aurea Moulton who resides next door.  Spouse debility 
stems from eye disease/blindness in addition to cardiac and other comorbidities.



Patient was IADL within his normal limits prior to fall with injury.  Patient known to 
writer from multiple intermittent hospitalizations, generally pulmonary related.  His PCP is 
Dr. Jay Holley.



DME:  Patient has FWW and home O2 for night use through [facility] [place].  Will explore 
therapy recommendations, anticipate stool riser and hip kit.



MEALS:  Patient and spouse get meals delivered through [facility].  



HHC:  Patient understands HHC with therapy will likely be recommended.  He has history with 
[facility] agency and, after review of agencies in his service area, he indicates his preference 
to again be [facility].



Weekly Rehab Team Conference was discussed, patient indicated understanding.  He indicates 
he has family coming home for Thanksgiving and was hoping to be able to be home by then.



Patient has 3 children, 2 daughters (Aurea next door), and his son is in [place], [place].



Aurea Moulton

[phone]



Follow for discharge planning and post hospital needs.  Patient motivated to return home as 
soon as able, family network/supports appear stable for assistance.

## 2019-11-22 NOTE — CONSULTATION
HPI


History of Present Illness:


HPI/Chief Complaint


CC: Hip Fracture





HPI: Patient fell twice Monday receiving a hip fracture and had surgery.  

Patient was transferred to in-patient rehab yesterday. Patient does have pain 

when standing and rates it as a 9/10 otherwise, his pain is a 3/10. Pain does 

travel down his right leg. Rehab has been going well he states, and is sore from

rehab.


Source:  patient


Date Seen


19


Attending Physician


Natacha ePres DO


PCP


Jay Holley DO


Referring Physician





Date of Admission


2019 at 09:45





Home Medications & Allergies


Home Medications


Reviewed patient Home Medication Reconciliation performed by pharmacy medication

reconciliations technician and/or nursing.


Patients Allergies have been reviewed.





Allergies





Allergies


Coded Allergies


  morphine (Verified Allergy, Severe, Pt has received Lortab in the past w/o 

issue, 19)


  Sulfa (Sulfonamide Antibiotics) (Verified Allergy, Unknown, 19)


  penicillin G (Verified Allergy, Unknown, Pt has received Cefepime & Meropenem 

in the past, 10/2/19)


  levofloxacin (Verified Adverse Reaction, Unknown, 19)








Past Medical-Social-Family Hx


Past Med/Social Hx:  Reviewed Nursing Past Med/Soc Hx, Reviewed and Corrections 

made


Patient Social History


Marrital Status:  


Employed/Student:  retired


Recreational Drug Use:  No


Smoking Status:  Former Smoker


Former Smoker, Quit:  1985


Type Used:  Cigarettes


2nd Hand Smoke Exposure:  No


Recent Foreign Travel:  No


Contact w/other who traveled:  No


Recent Hopitalizations:  Yes


Recent Infectious Disease Expo:  No





Immunizations Up To Date


Tetanus Booster (TDap):  More than 5yrs


Pediatric:  No


Date of Pneumonia Vaccine:  Oct 2, 2018


Date of Influenza Vaccine:  Sep 18, 2019





Seasonal Allergies


Seasonal Allergies:  No





Past Medical History


Surgeries:  Cardiac, CABG, Coronary Stent, Eye Surgery, Joint Replacement, 

Lobectomy, Orthopedic, Prostatectomy, Tonsillectomy


Respiratory:  COPD, Emphysema, Pneumonia


Currently Using CPAP:  No


Currently Using BIPAP:  No


Cardiac:  Atrial Fibrillation, Coronary Artery Disease, High Cholesterol, 

Hypertension, Peripheral Vascular


Neurological:  Stroke


Reproductive:  No


Sexually Transmitted Disease:  No


HIV/AIDS:  No


Genitourinary:  Benign Prostatic Hyperpl, Bladder Infection


Gastrointestinal:  Gastrointestinal Bleed


Musculoskeletal:  Arthritis


Endocrine:  Diabetes, Insulin dep


HEENT:  Cataract


Loss of Vision:  Denies


Hearing Impairment:  Hard of Hearing


History of Blood Disorders:  No


Adverse Reaction to Blood [place]:  No





Family History





BLADD


  09 BROTHER


BLADD


  09 BROTHER


Cancer


  03 FATHER (THROAT)


  09 BROTHER


Dementia


  03 MOTHER


FH: bladder cancer


FH: bladder cancer


Family history: Diabetes mellitus


  03 MOTHER


Family history: Thyroid disorder


  03 MOTHER


Infertile


  03 MOTHER (X5 MISCARRIAGES)


Myocardial infarction


  09 BROTHER





No Family History of:


  Abdominal aortic aneurysm


  Lamb's disease


  Alcoholism


  Aphasia


  Cancer of colon


  Cataract


  Chest pain


  Congenital heart disease


  Congestive heart failure


  Cystic fibrosis


  Dysphagia


  Family history: Allergy


  Family history: Alzheimer's disease


  Family history: Arthritis


  Family history: Asthma


  Family history: Breast disease


  Family history: Cardiovascular disease


  Family history: Coronary thrombosis


  Family history: Gastrointestinal disease


  Family history: Glaucoma


  Family history: Hypertension


  Family history: Osteoporosis


  Headache


  Hearing loss


  Heart disease


  Hereditary disease


  History of - anemia


  History of - disorder


  History of - respiratory disease


  History of drug abuse


  Human immunodeficiency virus (HIV) seropositivity


  Hypercholesterolemia


  Kidney disease


  Malignant neoplasm of lung


  Parkinson's disease


  Prostate cancer


  Psychotic disorder


  Seizure disorder


  Stroke


  Tuberculosis


  Visual impairment


Heart Disease, Cancer, Stroke, Other Conditions/Hx





Review of Systems


Constitutional:  chills; No fever, No weakness


EENTM:  No blurred vision, No double vision, No eye pain


Respiratory:  No cough, No short of breath


Cardiovascular:  No chest pain, No edema


Gastrointestinal:  No abdominal pain, No nausea, No vomiting


Genitourinary:  no symptoms reported


Musculoskeletal:  other (Right hip pain)


Psychiatric/Neurological:  Denies Headache, Denies Numbness, Denies Tingling





Physical Exam


Physical Exam


Vital Signs





Vital Signs - First Documented








 19





 08:00 20:21


 


Temp 37.1 


 


Pulse 98 


 


Resp 22 


 


B/P (MAP) 97/50 (66) 


 


Pulse Ox 93 


 


O2 Delivery Room Air 


 


O2 Flow Rate  2.00





Capillary Refill : Less Than 3 Seconds


Height, Weight, BMI


Height: 5'9.00"


Weight: 156lbs. 5.0oz. 70.328276ke; 23.91 BMI


Method:Stated


General Appearance:  No Apparent Distress, WD/WN


Eyes:  Bilateral Eye Normal Inspection, Bilateral Eye PERRL


HEENT:  PERRL/EOMI, Normal ENT Inspection, Pharynx Normal


Neck:  Full Range of Motion, Normal Inspection, Non Tender


Respiratory:  Chest Non Tender, Lungs Clear, No Accessory Muscle Use, No 

Respiratory Distress, Decreased Breath Sounds


Cardiovascular:  Regular Rate, Rhythm, No Edema, Normal Peripheral Pulses 

(Radial 2/4 bilaterally)


Gastrointestinal:  Non Tender, Soft


Extremity:  Normal Inspection, Non Tender, No Calf Tenderness, No Pedal Edema


Neurologic/Psychiatric:  Alert, Oriented x3, Normal Mood/Affect


Skin:  Normal Color, Warm/Dry





Results


Results/Procedures


Labs


Laboratory Tests


19 05:30








Patient resulted labs reviewed.





Assessment/Plan


Assessment and Plan


Assess & Plan/Chief Complaint


Right Hip Fracture


Fall risk


Pneumonia





Continue PT


Monitor CBC for infection


Chest - ray to follow possible pneumonia/infection





Clinical Quality Measures


DVT/VTE Risk/Contraindication:


Risk Factor Score Per Nursin


RFS Level Per Nursing on Admit:  4+=Very High





Supervisory-Addendum Brief


Verification & Attestation


Participated in pt care:  other (ADEN Noonan with Dr. Holley)


Personally performed:  other (ADEN Noonan with Dr. Holley)


Care discussed with:  other (ADEN Noonan with Dr. Holley)


Procedures:  n/a


ADEN Noonan with Dr. Leydi NOONAN[facility]    2019 09:53


POS
History of Present Illness


History of Present Illness


Patient Consulted On(catalina/time)


19


 08:31


Time Seen by Provider:  08:31


History of Present Illness


Patient had a hip fracture.


Patient had surgery.


Patient in rehabilitation.


Patient running an elevated temperature yesterday.





Allergies and Home Medications


Allergies


Coded Allergies:  


     morphine (Verified  Allergy, Severe, Pt has received Lortab in the past w/o

issue, 19)


     Sulfa (Sulfonamide Antibiotics) (Verified  Allergy, Unknown, 19)


     penicillin G (Verified  Allergy, Unknown, Pt has received Cefepime & 

Meropenem in the past, 10/2/19)


     levofloxacin (Verified  Adverse Reaction, Unknown, 19)





Home Medications


Docusate Sodium 100 Mg Capsule, 100 MG PO DAILY PRN for CONSTIPATION-1ST LINE, 

(Reported)


Ferrous Sulfate 325 Mg Tablet, 325 MG PO DAILY, (Reported)


Fluticasone/Vilanterol 1 Each Blst.w.dev, 1 PUFF IH DAILY, (Reported)


Furosemide 40 Mg Tablet, 40 MG PO DAILY, (Reported)


Gabapentin 100 Mg Capsule, 100 MG PO DAILY, (Reported)


Gabapentin 100 Mg Capsule, 200 MG PO HS, (Reported)


   TAKES 2 (100 MG) CAPSULES 


Insulin NPH Human Isophane 100 Unit/1 Ml Vial, 4 UNITS SQ BID, (Reported)


   USES ALONG WITH NOVOLIN R 


Insulin Regular, Human 1,000 Units/10 Ml Soln, 5 UNITS SQ BID, (Reported)


   USES ALONG WITH NOVOLIN N 


Ipratropium/Albuterol Sulfate 3 Ml Ampul.neb, 3 ML NEB TID PRN for SHORTNESS OF 

BREATH, (Reported)


Loratadine 10 Mg Tablet, 10 MG PO DAILY, (Reported)


Metoprolol Tartrate 25 Mg Tablet, 25 MG PO BID, (Reported)


Montelukast Sodium 10 Mg Tablet, 10 MG PO HS, (Reported)


Pantoprazole Sodium 40 Mg Tablet.dr, 40 MG PO DAILY, (Reported)


Potassium Chloride 10 Meq Tab.er.prt, 10 MEQ PO Q48H, (Reported)


Simvastatin 20 Mg Tablet, 20 MG PO HS, (Reported)


Warfarin Sodium 5 Mg Tablet, 5 MG PO 1800, (Reported)





Patient Home Medication List


Home Medication List Reviewed:  Yes





Past Medical-Social-Family Hx


Past Med/Social Hx:  Reviewed Nursing Past Med/Soc Hx, Reviewed and Corrections 

made


Patient Social History


Smoking Status:  Former Smoker


Type Used:  Cigarettes


Former Smoker, Quit:  1985


2nd Hand Smoke Exposure:  No


Recent Foreign Travel:  No


Contact w/Someone Who Travel:  No


Recent Infectious Disease Expo:  No


Recent Hopitalizations:  Yes





Immunizations Up To Date


Tetanus Booster (TDap):  More than 5yrs


PED Vaccines UTD:  No


Date of Pneumonia Vaccine:  Oct 2, 2018


Date of Influenza Vaccine:  Sep 18, 2019





Seasonal Allergies


Seasonal Allergies:  No





Past Medical History


Surgeries:  Yes (1/2 right lung removed, right hip)


Cardiac, CABG, Coronary Stent, Eye Surgery, Joint Replacement, Lobectomy, 

Orthopedic, Prostatectomy, Tonsillectomy


Respiratory:  Yes (1/2 OF RIGHT LUNG REMOVED FOR BENIGN DISEASE )


Pneumonia, Sleep Apnea, COPD, Emphysema


Currently Using CPAP:  No


Currently Using BIPAP:  No


Cardiac:  Yes (stents)


Atrial Fibrillation, Coronary Artery Disease, High Cholesterol, Hypertension, 

Peripheral Vascular


Neurological:  Yes


Stroke


Reproductive Disorders:  No


Sexually Transmitted Disease:  No


HIV/AIDS:  No


Genitourinary:  Yes


Benign Prostatic Hyperpl, Bladder Infection


Gastrointestinal:  Yes


Gastrointestinal Bleed


Musculoskeletal:  Yes


Arthritis


Endocrine:  Yes


Diabetes, Insulin dep


HEENT:  Yes


Cataract


Loss of Vision:  Denies


Hearing Impairment:  Hard of Hearing


Cancer:  Yes (Throat)


Psychosocial:  No


Integumentary:  No


Blood Disorders:  No


Adverse Reaction/Blood Tranf:  No





Family Medical History





BLADD


  09 BROTHER


BLADD


  09 BROTHER


Cancer


  03 FATHER (THROAT)


  09 BROTHER


Dementia


  03 MOTHER


FH: bladder cancer


FH: bladder cancer


Family history: Diabetes mellitus


  03 MOTHER


Family history: Thyroid disorder


  03 MOTHER


Infertile


  03 MOTHER (X5 MISCARRIAGES)


Myocardial infarction


  09 BROTHER





No Family History of:


  Abdominal aortic aneurysm


  Shayne's disease


  Alcoholism


  Aphasia


  Cancer of colon


  Cataract


  Chest pain


  Congenital heart disease


  Congestive heart failure


  Cystic fibrosis


  Dysphagia


  Family history: Allergy


  Family history: Alzheimer's disease


  Family history: Arthritis


  Family history: Asthma


  Family history: Breast disease


  Family history: Cardiovascular disease


  Family history: Coronary thrombosis


  Family history: Gastrointestinal disease


  Family history: Glaucoma


  Family history: Hypertension


  Family history: Osteoporosis


  Headache


  Hearing loss


  Heart disease


  Hereditary disease


  History of - anemia


  History of - disorder


  History of - respiratory disease


  History of drug abuse


  Human immunodeficiency virus (HIV) seropositivity


  Hypercholesterolemia


  Kidney disease


  Malignant neoplasm of lung


  Parkinson's disease


  Prostate cancer


  Psychotic disorder


  Seizure disorder


  Stroke


  Tuberculosis


  Visual impairment


Heart Disease, Cancer, Stroke, Other Conditions/Hx





Review of Systems-General


Constitutional:  fever


EENTM:  no symptoms reported


Respiratory:  no symptoms reported


Cardiovascular:  no symptoms reported


Gastrointestinal:  no symptoms reported


Genitourinary:  no symptoms reported





Physical Exam-General Problems


Physical Exam


Vital Signs





Vital Signs - First Documented








 19





 08:00 20:21


 


Temp 37.1 


 


Pulse 98 


 


Resp 22 


 


B/P (MAP) 97/50 (66) 


 


Pulse Ox 93 


 


O2 Delivery Room Air 


 


O2 Flow Rate  2.00





Capillary Refill : Less Than 3 Seconds


General Appearance:  no apparent distress, thin


Eyes:  Bilateral Eye Normal Inspection


HEENT:  normal ENT inspection


Neck:  full range of motion


Respiratory:  no respiratory distress, no accessory muscle use, decreased breath

sounds


Cardiovascular:  no murmur


Gastrointestinal:  non tender, soft





Assessment/Plan


Assessment/Plan


Admission Diagnosis/Plan


Hip fracture.


COPD.


Atrial fibrillation.


Diabetes.


Febrile.


Leukocytosis


Reason for Inpatient Admission:  


Rehabilitation for hip fracture





Clinical Quality Measures


DVT/VTE Risk/Contraindication:


Risk Factor Score Per Nursin


RFS Level Per Nursing on Admit:  4+=Very High











TOR CONNELLY DO         2019 08:34


POS
Patient

## 2019-11-22 NOTE — PHYSICAL THERAPY DAILY NOTE
PT Daily Note-Current


Subjective


Pt. states he is really tired and just wants to get to bed after this Rx.  Pt. 

agrees to wear a shoe on LLE to attempt to alleviate some wt bearing on RLE.  

"Those shoes cost $900.00"





Pain





   Location:  No Pain Reported





Appearance


looks very tired.





Mental Status


Patient Orientation:  Normal For Age





Transfers


SCALE: Activities may be completed with or without assistive devices.





6-Indepedent-patient completes the activity by him/herself with no assistance 

from a helper.


5-Set-up or Clean-up Assistance-helper sets up or cleans up; patient completes 

activity. [place] assists only prior to or  


    following the activity.


4-Supervision or Touching Assistance-helper provides verbal cues and/or t

ouching/steadying and/or contact guard assistance as patient completes activity.

Assistance may be provided   


    throughout the activity or intermittently.


3-Partial/Moderate Assistance-helper does LESS THAN HALF the effort. [place] 

lifts, holds or supports trunk or limbs, but provides less than half the effort.


2-Substantial/Maximal Assistance-helper does MORE THAN HALF the effort. [place] 

lifts or holds trunk or limbs and provides more than half the effort.


9-Vkjmcbblr-qykows does ALL the effort. Patient does none of the effort to 

complete the activity. Or, the assistance of 2 or more helpers is required for 

the patient to complete the  


    activity.


If activity was not attempted, code reason:


7-Patient Refused.


9-Not Applicable-not attempted and the patient did not perform the activity 

before the current illness, exacerbation or injury.


10-Not Attempted due to Environmental Limitations-(lack of equipment, weather 

restraints, etc.).


88-Not Attempted due to Medical Conditions or Safety Concerns.


in out chair and bed CGA. sit to stand required much effort for pt. this aft bhanu

when cued to concentrate on not wt bearing as much on RLE





Weight Bearing


Right Lower Extremity:  Right


Touch Toe Bearing


Left Lower Extremity:  Left


Full Weight Bearing





Gait Training


Does the Patient Walk?:  Yes


Walk 10 feet (QC):  4


Walk 50 ft with 2 Turns(QC):  4


Gait Persons Needed:  1


Gait Assistive Device:  FWW


gait with Left shoe only on to attempt to reduce chances of wt bearing on RLE. 

Pt. still appears to be wt bearing a little more that TDWB





Exercises


Seated Therapy Exercises:  Biceps, Sit to stand, Long arc quads, Hip abd/add


Seated Reps:  10





Treatments


in bed after Rx with bell and phone and extra blanket





Assessment


Current Status:  Good Progress





PT Short Term Goals


Short Term Goals


Time Frame:  2019


Sit to lyin


Lying to sitting on side of be:  5


Walk 10 feet:  5


Walk 50 feet with two turns:  5


Walk 150 feet:  5


4 steps:  4





PT Long Term Goals


Long Term Goals


PT Long Term Goals Time Frame:  Dec 5, 2019


Roll Left & Right (QC):  6


Sit to Lying (QC):  6


Lying-Sitting on Side/Bed(QC):  6


Sit to Stand (QC):  6


Chair/Bed-to-Chair Xfer(QC):  6


Toilet Transfer (QC):  6


Car Transfer (QC):  6


Does the Patient Walk:  Yes


Walk 10 feet (QC):  6


Walk 50ft with 2 Turns (QC):  6


Walk 150 ft (QC):  6


Walking 10ft on Uneven Surface:  6


1 Step (curb) (QC):  6


4 Steps (QC):  5


12 Steps (QC):  9


Picking up an Object (QC):  88


Does the Pt use WC or Scooter?:  No


Type:  N/A


Type:  N/A





PT Plan


Treatment/Plan


Treatment Plan:  Continue Plan of Care


Treatment Plan:  Bed Mobility, Education, Functional Activity Hema, Functional 

Strength, Group Therapy, Gait, Safety, Therapeutic Exercise, Transfers


Treatment Duration:  Dec 5, 2019


Frequency:  At least 5 of 7 days/Wk (IRF)


Estimated Hrs Per Day:  1.5 hours per day


Patient and/or Family Agrees t:  Yes





Safety Risks/Education


Patient Education:  Gait Training, Transfer Techniques, Correct Positioning, 

Disease Process


Teaching Recipient:  Patient


Teaching Methods:  Demonstration, Discussion


Response to Teaching:  Verbalize Understanding, Return Demonstration, 

Reinforcement Needed





Time/GCodes


Time In:  1355


Time Out:  1410


Total Billed Treatment Time:  15


Total Billed Treatment


1,GT15m











YA AARON PTA             2019 14:11


POS

## 2019-11-22 NOTE — PULMONARY CONSULTATION
History of Present Illness


History of Present Illness


Date Seen by Provider:  Jan 22, 2020


Time Seen by Provider:  13:19


Date of Admission





History of Present Illness


80yo with hx of COPD presented to acute rehab as direct admit for rehabilitation

after recent hip fracture. I am consulted for pulmonary management.





Allergies and Home Medications


Allergies


Coded Allergies:  


     morphine (Verified  Allergy, Severe, Pt has received Lortab in the past w/o

issue, 11/20/19)


     Sulfa (Sulfonamide Antibiotics) (Verified  Allergy, Unknown, 2/11/19)


     penicillin G (Verified  Allergy, Unknown, Pt has received Cefepime & 

Meropenem in the past, 10/2/19)


     levofloxacin (Verified  Adverse Reaction, Unknown, 2/11/19)





Home Medications


Citalopram Hydrobromide 20 Mg Tablet, 20 MG PO DAILY


   Prescribed by: ALEKS WILSON on 12/5/19 2232


Docusate Sodium 100 Mg Capsule, 100 MG PO DAILY PRN for CONSTIPATION-1ST LINE, 

(Reported)


Ferrous Sulfate 325 Mg Tablet, 325 MG PO DAILY, (Reported)


Fluticasone/Vilanterol 1 Each Blst.w.dev, 1 PUFF IH DAILY, (Reported)


Gabapentin 100 Mg Capsule, 100 MG PO DAILY, (Reported)


Hydrocodone Bit/Acetaminophen 1 Tab Tab, 1 TAB PO Q4H PRN for PAIN-MODERATE (5-

7)


   Prescribed by: ALEKS WILSON on 12/5/19 2232


Insulin NPH Human Isophane 100 Unit/1 Ml Vial, 4 UNITS SQ BID, (Reported)


   USES ALONG WITH NOVOLIN R 


Insulin Regular, Human 1,000 Units/10 Ml Soln, 5 UNITS SQ BID, (Reported)


   USES ALONG WITH NOVOLIN N 


Ipratropium/Albuterol Sulfate 3 Ml Ampul.neb, 3 ML NEB TID PRN for SHORTNESS OF 

BREATH, (Reported)


Loratadine 10 Mg Tablet, 10 MG PO DAILY, (Reported)


Metoprolol Tartrate 25 Mg Tablet, 25 MG PO BID, (Reported)


Montelukast Sodium 10 Mg Tablet, 10 MG PO HS, (Reported)


Pantoprazole Sodium 40 Mg Tablet.dr, 40 MG PO DAILY, (Reported)


Simvastatin 20 Mg Tablet, 20 MG PO HS, (Reported)


Warfarin Sodium 5 Mg Tablet, 5 MG PO 1800, (Reported)





Past Medical-Social-Family Hx


Past Med/Social Hx:  Reviewed Nursing Past Med/Soc Hx, Reviewed and Corrections 

made


Patient Social History


Smoking Status:  Former Smoker


Type Used:  Cigarettes


Former Smoker, Quit:  Apr 30, 1985


2nd Hand Smoke Exposure:  No


Recent Foreign Travel:  No


Contact w/Someone Who Travel:  No


Recent Infectious Disease Expo:  No


Recent Hopitalizations:  Yes





Immunizations Up To Date


Tetanus Booster (TDap):  More than 5yrs


PED Vaccines UTD:  No


Date of Pneumonia Vaccine:  Oct 2, 2018


Date of Influenza Vaccine:  Sep 18, 2019





Seasonal Allergies


Seasonal Allergies:  No





Past Medical History


Surgeries:  Yes (1/2 right lung removed, right hip)


Cardiac, CABG, Coronary Stent, Eye Surgery, Joint Replacement, Lobectomy, 

Orthopedic, Prostatectomy, Tonsillectomy


Respiratory:  Yes (1/2 OF RIGHT LUNG REMOVED FOR BENIGN DISEASE )


Pneumonia, Sleep Apnea, COPD, Emphysema


Currently Using CPAP:  No


Currently Using BIPAP:  No


Cardiac:  Yes (stents)


Atrial Fibrillation, Coronary Artery Disease, High Cholesterol, Hypertension, 

Peripheral Vascular


Neurological:  Yes


Stroke


Reproductive Disorders:  No


Sexually Transmitted Disease:  No


HIV/AIDS:  No


Genitourinary:  Yes


Benign Prostatic Hyperpl, Bladder Infection


Gastrointestinal:  Yes


Gastrointestinal Bleed


Musculoskeletal:  Yes


Arthritis


Endocrine:  Yes


Diabetes, Insulin dep


HEENT:  Yes


Cataract


Loss of Vision:  Denies


Hearing Impairment:  Hard of Hearing


Cancer:  Yes (Throat)


Psychosocial:  No


Integumentary:  No


Blood Disorders:  No


Adverse Reaction/Blood Tranf:  No





Family Medical History





BLADD


  09 BROTHER


BLADD


  09 BROTHER


Cancer


  03 FATHER (THROAT)


  09 BROTHER


Dementia


  03 MOTHER


FH: bladder cancer


FH: bladder cancer


Family history: Diabetes mellitus


  03 MOTHER


Family history: Thyroid disorder


  03 MOTHER


Infertile


  03 MOTHER (X5 MISCARRIAGES)


Myocardial infarction


  09 BROTHER





No Family History of:


  Abdominal aortic aneurysm


  Shayne's disease


  Alcoholism


  Aphasia


  Cancer of colon


  Cataract


  Chest pain


  Congenital heart disease


  Congestive heart failure


  Cystic fibrosis


  Dysphagia


  Family history: Allergy


  Family history: Alzheimer's disease


  Family history: Arthritis


  Family history: Asthma


  Family history: Breast disease


  Family history: Cardiovascular disease


  Family history: Coronary thrombosis


  Family history: Gastrointestinal disease


  Family history: Glaucoma


  Family history: Hypertension


  Family history: Osteoporosis


  Headache


  Hearing loss


  Heart disease


  Hereditary disease


  History of - anemia


  History of - disorder


  History of - respiratory disease


  History of drug abuse


  Human immunodeficiency virus (HIV) seropositivity


  Hypercholesterolemia


  Kidney disease


  Malignant neoplasm of lung


  Parkinson's disease


  Prostate cancer


  Psychotic disorder


  Seizure disorder


  Stroke


  Tuberculosis


  Visual impairment


Heart Disease, Cancer, Stroke, Other Conditions/Hx





Review of Systems


Time Seen by Provider:  13:21





Sepsis Event


Evaluation


Height, Weight, BMI


Height: 5'9.00"


Weight: 156lbs. 5.0oz. 70.031626nr; 23.91 BMI


Method:Stated





Exam


Exam





Vital Signs








  Date Time  Temp Pulse Resp B/P (MAP) Pulse Ox O2 Delivery O2 Flow Rate FiO2


 


11/22/19 08:30 37.4       


 


11/22/19 06:41 38.8       


 


11/22/19 05:55 39.0 98 20 124/65 (84) 96 Room Air  


 


11/21/19 21:29      Nasal Cannula 2.00 


 


11/21/19 20:21     94 Nasal Cannula 2.00 


 


11/21/19 16:32 36.8 75 18 120/60 (80) 92 Room Air  


 


11/21/19 15:30     92 Room Air  


 


11/21/19 11:48     96 Room Air  


 


11/21/19 11:45 36.6 95 18 113/77 96 Room Air  


 


11/21/19 09:59 36.6 95 18 113/77 (89) 96 Room Air  














I & O 


 


 11/22/19





 07:00


 


Intake Total 1150 ml


 


Output Total 675 ml


 


Balance 475 ml








Height & Weight


Height: 5'9.00"


Weight: 156lbs. 5.0oz. 70.152002sl; 23.91 BMI


Method:Stated


General Appearance:  No Apparent Distress, WD/WN, Chronically ill


HEENT:  PERRL/EOMI, Normal ENT Inspection, Pharynx Normal


Neck:  Full Range of Motion, Normal Inspection, Non Tender, Supple, Carotid 

Bruit


Respiratory:  Chest Non Tender, Lungs Clear, No Accessory Muscle Use, No 

Respiratory Distress, Decreased Breath Sounds


Cardiovascular:  Regular Rate, Rhythm, No Edema, No Gallop, No JVD, No Murmur, 

Normal Peripheral Pulses


Gastrointestinal:  non tender, soft


Extremity:  Normal Capillary Refill, Normal Inspection, Normal Range of Motion 

(right leg limited ROM due to pain), Non Tender, No Calf Tenderness, No Pedal 

Edema


Neurologic/Psychiatric:  Alert, Oriented x3, No Motor/Sensory Deficits, Normal 

Mood/Affect


Skin:  Normal Color, Warm/Dry


Lymphatic:  No Adenopathy





Results


Lab


Laboratory Tests


11/22/19 05:30











Assessment/Plan


Assessment/Plan


 Right femur fracture s/p fall while in my office 


Leukocytosis with fever


   -Start Cefepime and pan culture 


Severe COPD 


   -DuoNeb 


   -02 

















SMITH MUNOZ DO              Nov 22, 2019 09:44

## 2019-11-22 NOTE — PHYSICAL THERAPY DAILY NOTE
PT Daily Note-Current


Subjective


Pt. asleep in recliner upon entering, comments he is tired and cold.  Agrees to 

Rx.





Pain





   Location:  No Pain Reported





Appearance


weak and frail





Mental Status


Patient Orientation:  Normal For Age





Transfers


SCALE: Activities may be completed with or without assistive devices.





6-Indepedent-patient completes the activity by him/herself with no assistance 

from a helper.


5-Set-up or Clean-up Assistance-helper sets up or cleans up; patient completes 

activity. [place] assists only prior to or  


    following the activity.


4-Supervision or Touching Assistance-helper provides verbal cues and/or 

touching/steadying and/or contact guard assistance as patient completes 

activity. Assistance may be provided   


    throughout the activity or intermittently.


3-Partial/Moderate Assistance-helper does LESS THAN HALF the effort. [place] 

lifts, holds or supports trunk or limbs, but provides less than half the effort.


2-Substantial/Maximal Assistance-helper does MORE THAN HALF the effort. [place] 

lifts or holds trunk or limbs and provides more than half the effort.


0-Zrrgnwgxi-zcjdja does ALL the effort. Patient does none of the effort to 

complete the activity. Or, the assistance of 2 or more helpers is required for 

the patient to complete the  


    activity.


If activity was not attempted, code reason:


7-Patient Refused.


9-Not Applicable-not attempted and the patient did not perform the activity 

before the current illness, exacerbation or injury.


10-Not Attempted due to Environmental Limitations-(lack of equipment, weather 

restraints, etc.).


88-Not Attempted due to Medical Conditions or Safety Concerns.


Roll Left & Right (QC):  5


Sit to Lying (QC):  5


Lying to Sitting/Side of Bed(Q:  5


Sit to Stand (QC):  5


Toilet Transfer (QC):  5





Weight Bearing


Right Lower Extremity:  Right


Touch Toe Bearing


Left Lower Extremity:  Left


Full Weight Bearing





Gait Training


Does the Patient Walk?:  Yes


Walk 10 feet (QC):  5


Walk 50 ft with 2 Turns(QC):  5


Gait Persons Needed:  1


Gait Assistive Device:  FWW


pt. fatigues and appears to put too much wt on LE. pt. instructed to sit and w/c

as he fatigues and possibly breaking wt bearing prec





Wheelchair Training


Does the Pt Use a Wheelchair?:  Yes


Wheel 50 ft with 2 turns (QC):  5


Type of Wheelchair:  Manual


needs skilled instruction for efficient turns and safety





Exercises


Supine Ex:  Ankle pumps, Quad Set, Glut sets, Heel Slides, Short Arc Quads, 

Scooting, Straight leg raise, Hip abd/add


Supine Reps:  15


Seated Therapy Exercises:  Ankle pumps, Sit to stand, Long arc quads, Hip 

abd/add


Seated Reps:  15





Treatments


toileted SBA





Assessment


Current Status:  Good Progress





PT Short Term Goals


Short Term Goals


Time Frame:  2019


Sit to lyin


Lying to sitting on side of be:  5


Walk 10 feet:  5


Walk 50 feet with two turns:  5


Walk 150 feet:  5


4 steps:  4





PT Long Term Goals


Long Term Goals


PT Long Term Goals Time Frame:  Dec 5, 2019


Roll Left & Right (QC):  6


Sit to Lying (QC):  6


Lying-Sitting on Side/Bed(QC):  6


Sit to Stand (QC):  6


Chair/Bed-to-Chair Xfer(QC):  6


Toilet Transfer (QC):  6


Car Transfer (QC):  6


Does the Patient Walk:  Yes


Walk 10 feet (QC):  6


Walk 50ft with 2 Turns (QC):  6


Walk 150 ft (QC):  6


Walking 10ft on Uneven Surface:  6


1 Step (curb) (QC):  6


4 Steps (QC):  5


12 Steps (QC):  9


Picking up an Object (QC):  88


Does the Pt use WC or Scooter?:  No


Type:  N/A


Type:  N/A





PT Plan


Treatment/Plan


Treatment Plan:  Continue Plan of Care


Treatment Plan:  Bed Mobility, Education, Functional Activity Hema, Functional 

Strength, Group Therapy, Gait, Safety, Therapeutic Exercise, Transfers


Treatment Duration:  Dec 5, 2019


Frequency:  At least 5 of 7 days/Wk (IRF)


Estimated Hrs Per Day:  1.5 hours per day


Patient and/or Family Agrees t:  Yes





Safety Risks/Education


Patient Education:  Gait Training, Transfer Techniques, Correct Positioning, W/C

Management, Disease Process, Safety Issues


Teaching Recipient:  Patient


Teaching Methods:  Demonstration, Discussion


Response to Teaching:  Verbalize Understanding, Return Demonstration, 

Reinforcement Needed





Time/GCodes


Time In:  1100


Time Out:  1200


Total Billed Treatment Time:  60


Total Billed Treatment


1,GT25m,EX35m











YA AARON PTA             2019 12:01


POS

## 2019-11-23 VITALS — DIASTOLIC BLOOD PRESSURE: 58 MMHG | SYSTOLIC BLOOD PRESSURE: 98 MMHG

## 2019-11-23 VITALS — SYSTOLIC BLOOD PRESSURE: 99 MMHG | DIASTOLIC BLOOD PRESSURE: 57 MMHG

## 2019-11-23 VITALS — DIASTOLIC BLOOD PRESSURE: 55 MMHG | SYSTOLIC BLOOD PRESSURE: 90 MMHG

## 2019-11-23 LAB
BASOPHILS # BLD AUTO: 0 10^3/UL (ref 0–0.1)
BASOPHILS NFR BLD AUTO: 0 % (ref 0–10)
BUN/CREAT SERPL: 18
CALCIUM SERPL-MCNC: 8.2 MG/DL (ref 8.5–10.1)
CHLORIDE SERPL-SCNC: 106 MMOL/L (ref 98–107)
CO2 SERPL-SCNC: 24 MMOL/L (ref 21–32)
CREAT SERPL-MCNC: 0.78 MG/DL (ref 0.6–1.3)
EOSINOPHIL # BLD AUTO: 0.3 10^3/UL (ref 0–0.3)
EOSINOPHIL NFR BLD AUTO: 3 % (ref 0–10)
ERYTHROCYTE [DISTWIDTH] IN BLOOD BY AUTOMATED COUNT: 16.6 % (ref 10–14.5)
GFR SERPLBLD BASED ON 1.73 SQ M-ARVRAT: > 60 ML/MIN
GLUCOSE SERPL-MCNC: 113 MG/DL (ref 70–105)
HCT VFR BLD CALC: 27 % (ref 40–54)
HGB BLD-MCNC: 8.6 G/DL (ref 13.3–17.7)
INR PPP: 1.2 (ref 0.8–1.4)
LYMPHOCYTES # BLD AUTO: 1.7 X 10^3 (ref 1–4)
LYMPHOCYTES NFR BLD AUTO: 21 % (ref 12–44)
MANUAL DIFFERENTIAL PERFORMED BLD QL: NO
MCH RBC QN AUTO: 30 PG (ref 25–34)
MCHC RBC AUTO-ENTMCNC: 32 G/DL (ref 32–36)
MCV RBC AUTO: 94 FL (ref 80–99)
MONOCYTES # BLD AUTO: 0.7 X 10^3 (ref 0–1)
MONOCYTES NFR BLD AUTO: 8 % (ref 0–12)
NEUTROPHILS # BLD AUTO: 5.6 X 10^3 (ref 1.8–7.8)
NEUTROPHILS NFR BLD AUTO: 68 % (ref 42–75)
PLATELET # BLD: 242 10^3/UL (ref 130–400)
PMV BLD AUTO: 8.9 FL (ref 7.4–10.4)
POTASSIUM SERPL-SCNC: 4.3 MMOL/L (ref 3.6–5)
PROTHROMBIN TIME: 15.9 SEC (ref 12.2–14.7)
SODIUM SERPL-SCNC: 141 MMOL/L (ref 135–145)
WBC # BLD AUTO: 8.2 10^3/UL (ref 4.3–11)

## 2019-11-23 RX ADMIN — PANTOPRAZOLE SODIUM SCH MG: 40 TABLET, DELAYED RELEASE ORAL at 08:22

## 2019-11-23 RX ADMIN — SODIUM CHLORIDE SCH MLS/HR: 900 INJECTION INTRAVENOUS at 13:20

## 2019-11-23 RX ADMIN — IPRATROPIUM BROMIDE AND ALBUTEROL SULFATE SCH ML: .5; 3 SOLUTION RESPIRATORY (INHALATION) at 07:20

## 2019-11-23 RX ADMIN — DOCUSATE SODIUM AND SENNOSIDES SCH EA: 8.6; 5 TABLET, FILM COATED ORAL at 08:27

## 2019-11-23 RX ADMIN — DOCUSATE SODIUM AND SENNOSIDES SCH EA: 8.6; 5 TABLET, FILM COATED ORAL at 20:39

## 2019-11-23 RX ADMIN — SODIUM CHLORIDE SCH MLS/HR: 900 INJECTION INTRAVENOUS at 18:30

## 2019-11-23 RX ADMIN — INSULIN ASPART SCH UNIT: 100 INJECTION, SOLUTION INTRAVENOUS; SUBCUTANEOUS at 11:16

## 2019-11-23 RX ADMIN — FERROUS SULFATE TAB 325 MG (65 MG ELEMENTAL FE) SCH MG: 325 (65 FE) TAB at 20:39

## 2019-11-23 RX ADMIN — MONTELUKAST SCH MG: 10 TABLET, FILM COATED ORAL at 20:39

## 2019-11-23 RX ADMIN — ACETAMINOPHEN PRN MG: 500 TABLET ORAL at 18:32

## 2019-11-23 RX ADMIN — POLYETHYLENE GLYCOL (3350) SCH GM: 17 POWDER, FOR SOLUTION ORAL at 20:39

## 2019-11-23 RX ADMIN — INSULIN ASPART SCH UNIT: 100 INJECTION, SOLUTION INTRAVENOUS; SUBCUTANEOUS at 06:05

## 2019-11-23 RX ADMIN — GABAPENTIN SCH MG: 100 CAPSULE ORAL at 14:40

## 2019-11-23 RX ADMIN — IPRATROPIUM BROMIDE AND ALBUTEROL SULFATE SCH ML: .5; 3 SOLUTION RESPIRATORY (INHALATION) at 19:27

## 2019-11-23 RX ADMIN — DOCUSATE SODIUM SCH MG: 100 CAPSULE ORAL at 08:26

## 2019-11-23 RX ADMIN — IPRATROPIUM BROMIDE AND ALBUTEROL SULFATE SCH ML: .5; 3 SOLUTION RESPIRATORY (INHALATION) at 12:40

## 2019-11-23 RX ADMIN — INSULIN ASPART SCH UNIT: 100 INJECTION, SOLUTION INTRAVENOUS; SUBCUTANEOUS at 16:00

## 2019-11-23 RX ADMIN — FERROUS SULFATE TAB 325 MG (65 MG ELEMENTAL FE) SCH MG: 325 (65 FE) TAB at 08:22

## 2019-11-23 RX ADMIN — GABAPENTIN SCH MG: 100 CAPSULE ORAL at 06:05

## 2019-11-23 RX ADMIN — METOPROLOL TARTRATE SCH MG: 25 TABLET, FILM COATED ORAL at 20:39

## 2019-11-23 RX ADMIN — SODIUM CHLORIDE SCH MLS/HR: 900 INJECTION INTRAVENOUS at 06:05

## 2019-11-23 RX ADMIN — METOPROLOL TARTRATE SCH MG: 25 TABLET, FILM COATED ORAL at 08:57

## 2019-11-23 RX ADMIN — POLYETHYLENE GLYCOL (3350) SCH GM: 17 POWDER, FOR SOLUTION ORAL at 08:26

## 2019-11-23 RX ADMIN — DOCUSATE SODIUM AND SENNOSIDES SCH EA: 8.6; 5 TABLET, FILM COATED ORAL at 20:49

## 2019-11-23 RX ADMIN — IPRATROPIUM BROMIDE AND ALBUTEROL SULFATE SCH ML: .5; 3 SOLUTION RESPIRATORY (INHALATION) at 14:58

## 2019-11-23 RX ADMIN — GABAPENTIN SCH MG: 100 CAPSULE ORAL at 21:42

## 2019-11-23 RX ADMIN — INSULIN ASPART SCH UNIT: 100 INJECTION, SOLUTION INTRAVENOUS; SUBCUTANEOUS at 20:46

## 2019-11-23 RX ADMIN — WARFARIN SODIUM SCH MG: 5 TABLET ORAL at 18:30

## 2019-11-23 RX ADMIN — ENOXAPARIN SODIUM SCH MG: 100 INJECTION SUBCUTANEOUS at 20:39

## 2019-11-23 NOTE — PM&R PROGRESS NOTE
Subjective


HPI/CC On Admission


Date Seen by Provider:  Nov 23, 2019


Time Seen by Provider:  10:30


CC: Hip Fracture





HPI: Patient fell twice Monday receiving a hip fracture and had surgery.  

Patient was transferred to in-patient rehab yesterday. Patient does have pain 

when standing and rates it as a 9/10 otherwise, his pain is a 3/10. Pain does 

travel down his right leg. Rehab has been going well he states, and is sore from

rehab.


Subjective/Events-last exam


Bed alarm placed at night


Cefepime maintained per Dr. Patino for pneumonia on chest x-ray


IS will be maintained


NebulizertreatmentsQID duo Neb will be maintained


Pt will be watched closely


No fever noted


Blood pressure a little bit low 104/60


Sputum culture obtained sent to lab per Dr. Patino's orders


Conferred with RN


Reviewed therapy notes


Checked meds and labs





Review of Systems


General:  Fatigue


Pulmonary:  Dyspnea, Cough


Musculoskeletal:  leg pain





Focused Exam


Lactate Level


11/22/19 05:50: Lactic Acid Level 0.70








Objective


Exam


Vital Signs





Vital Signs








  Date Time  Temp Pulse Resp B/P (MAP) Pulse Ox O2 Delivery O2 Flow Rate FiO2


 


11/23/19 09:00      Nasal Cannula 2.00 


 


11/23/19 07:21     90   


 


11/23/19 06:00 37.0 57 18 99/57 (71)    





Capillary Refill : Less Than 3 Seconds


General Appearance:  No Apparent Distress, WD/WN


HEENT:  PERRL/EOMI, Normal ENT Inspection, Pharynx Normal


Neck:  Full Range of Motion, Normal Inspection, Non Tender


Respiratory:  Chest Non Tender, No Accessory Muscle Use, No Respiratory 

Distress, Crackles, Decreased Breath Sounds, Wheezing


Cardiovascular:  Regular Rate, Rhythm, No Edema, No Gallop, No JVD, No Murmur, 

Normal Peripheral Pulses (Radial 2/4 bilaterally)


Gastrointestinal:  Normal Bowel Sounds, No Organomegaly, No Pulsatile Mass, Non 

Tender, Soft


Extremity:  Normal Capillary Refill, Normal Inspection, Normal Range of Motion 

(except right leg), Non Tender, No Calf Tenderness, No Pedal Edema


Neurologic/Psychiatric:  Alert, Oriented x3, No Motor/Sensory Deficits, Normal 

Mood/Affect, CNs II-XII Norm as Tested


Skin:  Normal Color, Warm/Dry





Results/Procedures


Lab


Laboratory Tests


11/23/19 04:53








Patient resulted labs reviewed.





FIM


Transfers


Therapy Code Descriptions/Definitions 





Functional [place] Measure:


0=Not Assessed/NA        4=Minimal Assistance


1=Total Assistance        5=Supervision or Setup


2=Maximal Assistance  6=Modified [place]


3=Moderate Assistance 7=Complete IndependenceSCALE: Activities may be completed 

with or without assistive devices.





6-Indepedent-patient completes the activity by him/herself with no assistance 

from a helper.


5-Set-up or Clean-up Assistance-helper sets up or cleans up; patient completes 

activity. [place] assists only prior to or  


    following the activity.


4-Supervision or Touching Assistance-helper provides verbal cues and/or touch

ing/steadying and/or contact guard assistance as patient completes activity. 

Assistance may be provided   


    throughout the activity or intermittently.


3-Partial/Moderate Assistance-helper does LESS THAN HALF the effort. [place] 

lifts, holds or supports trunk or limbs, but provides less than half the effort.


2-Substantial/Maximal Assistance-helper does MORE THAN HALF the effort. [place] 

lifts or holds trunk or limbs and provides more than half the effort.


0-Ljebezmjs-quiwda does ALL the effort. Patient does none of the effort to 

complete the activity. Or, the assistance of 2 or more helpers is required for 

the patient to complete the  


    activity.


If activity was not attempted, code reason:


7-Patient Refused.


9-Not Applicable-not attempted and the patient did not perform the activity bef

ore the current illness, exacerbation or injury.


10-Not Attempted due to Environmental Limitations-(lack of equipment, weather 

restraints, etc.).


88-Not Attempted due to Medical Conditions or Safety Concerns.


Roll Left to Right (QC):  4


Sit to Lying (QC):  3


Sit to Stand (QC):  3


Chair/Bed-to-Chair Xfer(QC):  4


Car Transfer (QC):  3 (assist with right LE)





Gait Training


Does the Patient Walk?:  Yes


Distance:  20, 16, 16


Walk 10 feet (QC):  4


Walk 50 ft with 2 Turns(QC):  4


Walk 150 ft (QC):  88


Walking 10ft/uneven surface-QC:  3


Gait Persons Needed:  1


Gait Assistive Device:  FWW





Wheelchair Training


Does the Pt Use a Wheelchair?:  Yes


Wheel 50 ft with 2 turns (QC):  5


Wheel 150 ft (QC):  9


Type of Wheelchair:  Manual





Stair Training


1 Step (curb) (QC):  88 (pt unsafe to attempt due to TTWB status)


4 Steps (QC):  88


12 Steps (QC):  88





Balance


Picking up an Object (QC):  88





ADL-Treatment


Eating (QC):  6


Oral Hygiene (QC):  4 (Pt required set up assist with min verbal cues for 

sequencing of task. Pt completed oral hygiene at bed level. )


Bathing Location:  L Arm, R Arm, L Upper Leg, R Upper Leg, Chest, Abdomen, 

Buttocks, Perineal Area


Shower/Bathe Self (QC):  3 (SPONGE BATH: Pt required CGA during stand for 

washing buttocks and perineal area. Pt required assist washing BLE lower legs 

and feet. )


Upper Body Dressing (QC):  5 (set up only)


Lower Body Dressing (QC):  4 (Pt introduced to sock aide, dressing stick and 

reacher. Pt completes pant doffing with CGA in stance and sock doffing sitting 

with dressing stick.)


On/Off Footwear (QC):  7


Toileting Hygiene (QC):  4 (CGA during mangement of clothing. Pt able to manage 

clothing up/down and complete hygiene.)


Toilet Transfer (QC):  3 (BSC over toilet. Pt able to sit with CGA, required min

A to stand from toilet. )





Assessment/Plan


Assessment and Plan


Assess & Plan/Chief Complaint


Assessment:


Right femur fracture s/p fall in Dr Patino's office


Pneumonia placed on Cefepime per DR Patino


Severe COPD with recurrent pneumonia in the past





Plan:


IRF protocol


Cefepime


Nebs


O2


Dr Patino appreciated





(1) Closed right femoral fracture


(2) Debility


Status:  Acute


(3) Paroxysmal atrial fibrillation


Status:  Chronic


(4) Dyspnea


Status:  Acute


(5) COPD (chronic obstructive pulmonary disease)


Status:  Chronic


(6) Pneumonia


Status:  Acute











ALEKS WILSON DO                Nov 23, 2019 12:28


POS

## 2019-11-23 NOTE — PHYSICAL THERAPY DAILY NOTE
PT Daily Note-Current


Subjective


Pt agreeable to PT session. States he didn't sleep much last night, was up a lot

going to the bathroom





Pain





   Numeric Pain Scale:  5-Moderate Pain


   Location:  Right


   Location Body Site:  Hip





Appearance


Pt in bed awake and alert upon arrival. At end of session, pt in bed with call 

light, phone and bedside table within reach





Mental Status


Patient Orientation:  Person, Place, Time, Eyes Open, Situation





Transfers


SCALE: Activities may be completed with or without assistive devices.





6-Indepedent-patient completes the activity by him/herself with no assistance 

from a helper.


5-Set-up or Clean-up Assistance-helper sets up or cleans up; patient completes 

activity. [place] assists only prior to or  


    following the activity.


4-Supervision or Touching Assistance-helper provides verbal cues and/or 

touching/steadying and/or contact guard assistance as patient completes 

activity. Assistance may be provided   


    throughout the activity or intermittently.


3-Partial/Moderate Assistance-helper does LESS THAN HALF the effort. [place] 

lifts, holds or supports trunk or limbs, but provides less than half the effort.


2-Substantial/Maximal Assistance-helper does MORE THAN HALF the effort. [place] 

lifts or holds trunk or limbs and provides more than half the effort.


2-Polmlhuuv-heefuy does ALL the effort. Patient does none of the effort to 

complete the activity. Or, the assistance of 2 or more helpers is required for 

the patient to complete the  


    activity.


If activity was not attempted, code reason:


7-Patient Refused.


9-Not Applicable-not attempted and the patient did not perform the activity 

before the current illness, exacerbation or injury.


10-Not Attempted due to Environmental Limitations-(lack of equipment, weather 

restraints, etc.).


88-Not Attempted due to Medical Conditions or Safety Concerns.


Roll Left & Right (QC):  4


Sit to Lying (QC):  3


Lying to Sitting/Side of Bed(Q:  3


Sit to Stand (QC):  3


Chair/Bed-to-Chair Xfer(QC):  4


Pt non compliant with TTWB RLE during all sit to and from stand transfers. min A

required for sit to and from stand transfers and with RLE in and out of bed. Pt 

does try but unsuccessful at this time. Pt with uncontrolled descent with stand 

to sit





Weight Bearing


Right Lower Extremity:  Right


Touch Toe Bearing


Left Lower Extremity:  Left


Full Weight Bearing





Gait Training


Does the Patient Walk?:  Yes


Distance:  20, 16, 16


Walk 10 feet (QC):  4


Gait Persons Needed:  1


Gait Assistive Device:  FWW


20 ft with FWW, non compliant with TTWB RLE. 16 ft x2 in // bars with pt able to

follow instruction for hopping and NWB RLE after practice. Pt then able to 

perform a stand pivot transfer with NWB RLE





Treatments


education, safety, WB status, bed mobility, transfers, gait, functional 

mobility, activity tolerance





Assessment


Current Status:  Good Progress


improved compliance with WB status working in // bars for NWB RLE, pt does b

ecome SOA and fatigued very easily and quickly





PT Short Term Goals


Short Term Goals


Time Frame:  2019


Sit to lyin


Lying to sitting on side of be:  5


Walk 10 feet:  5


Walk 50 feet with two turns:  5


Walk 150 feet:  5


4 steps:  4





PT Long Term Goals


Long Term Goals


PT Long Term Goals Time Frame:  Dec 5, 2019


Roll Left & Right (QC):  6


Sit to Lying (QC):  6


Lying-Sitting on Side/Bed(QC):  6


Sit to Stand (QC):  6


Chair/Bed-to-Chair Xfer(QC):  6


Toilet Transfer (QC):  6


Car Transfer (QC):  6


Does the Patient Walk:  Yes


Walk 10 feet (QC):  6


Walk 50ft with 2 Turns (QC):  6


Walk 150 ft (QC):  6


Walking 10ft on Uneven Surface:  6


1 Step (curb) (QC):  6


4 Steps (QC):  5


12 Steps (QC):  9


Picking up an Object (QC):  88


Does the Pt use WC or Scooter?:  No


Type:  N/A


Type:  N/A





PT Plan


Treatment/Plan


Treatment Plan:  Continue Plan of Care


Treatment Plan:  Bed Mobility, Education, Functional Activity Hema, Functional 

Strength, Group Therapy, Gait, Safety, Therapeutic Exercise, Transfers


Treatment Duration:  Dec 5, 2019


Frequency:  At least 5 of 7 days/Wk (IRF)


Estimated Hrs Per Day:  1.5 hours per day


Patient and/or Family Agrees t:  Yes





Safety Risks/Education


Patient Education:  Gait Training, Transfer Techniques, Reviewed Precautions, 

Safety Issues


Teaching Recipient:  Patient


Teaching Methods:  Demonstration, Discussion


Response to Teaching:  Verbalize Understanding, Return Demonstration





Time/GCodes


Time In:  903


Time Out:  932


Total Billed Treatment Time:  29


Total Billed Treatment


1 visit, GT x29 min











LAWRENCE BUCHANAN PTA            2019 09:41


POS

## 2019-11-24 VITALS — SYSTOLIC BLOOD PRESSURE: 106 MMHG | DIASTOLIC BLOOD PRESSURE: 65 MMHG

## 2019-11-24 VITALS — SYSTOLIC BLOOD PRESSURE: 116 MMHG | DIASTOLIC BLOOD PRESSURE: 53 MMHG

## 2019-11-24 RX ADMIN — FERROUS SULFATE TAB 325 MG (65 MG ELEMENTAL FE) SCH MG: 325 (65 FE) TAB at 20:15

## 2019-11-24 RX ADMIN — SODIUM CHLORIDE SCH MLS/HR: 900 INJECTION INTRAVENOUS at 00:02

## 2019-11-24 RX ADMIN — INSULIN ASPART SCH UNIT: 100 INJECTION, SOLUTION INTRAVENOUS; SUBCUTANEOUS at 17:04

## 2019-11-24 RX ADMIN — IPRATROPIUM BROMIDE AND ALBUTEROL SULFATE SCH ML: .5; 3 SOLUTION RESPIRATORY (INHALATION) at 10:54

## 2019-11-24 RX ADMIN — GABAPENTIN SCH MG: 100 CAPSULE ORAL at 13:41

## 2019-11-24 RX ADMIN — IPRATROPIUM BROMIDE AND ALBUTEROL SULFATE SCH ML: .5; 3 SOLUTION RESPIRATORY (INHALATION) at 15:02

## 2019-11-24 RX ADMIN — FERROUS SULFATE TAB 325 MG (65 MG ELEMENTAL FE) SCH MG: 325 (65 FE) TAB at 08:21

## 2019-11-24 RX ADMIN — DOCUSATE SODIUM SCH MG: 100 CAPSULE ORAL at 08:21

## 2019-11-24 RX ADMIN — DOCUSATE SODIUM AND SENNOSIDES SCH EA: 8.6; 5 TABLET, FILM COATED ORAL at 20:15

## 2019-11-24 RX ADMIN — INSULIN ASPART SCH UNIT: 100 INJECTION, SOLUTION INTRAVENOUS; SUBCUTANEOUS at 05:38

## 2019-11-24 RX ADMIN — ENOXAPARIN SODIUM SCH MG: 100 INJECTION SUBCUTANEOUS at 20:14

## 2019-11-24 RX ADMIN — SODIUM CHLORIDE SCH MLS/HR: 900 INJECTION INTRAVENOUS at 23:55

## 2019-11-24 RX ADMIN — DOCUSATE SODIUM AND SENNOSIDES SCH EA: 8.6; 5 TABLET, FILM COATED ORAL at 08:21

## 2019-11-24 RX ADMIN — POLYETHYLENE GLYCOL (3350) SCH GM: 17 POWDER, FOR SOLUTION ORAL at 08:22

## 2019-11-24 RX ADMIN — METOPROLOL TARTRATE SCH MG: 25 TABLET, FILM COATED ORAL at 08:21

## 2019-11-24 RX ADMIN — GABAPENTIN SCH MG: 100 CAPSULE ORAL at 21:44

## 2019-11-24 RX ADMIN — INSULIN ASPART SCH UNIT: 100 INJECTION, SOLUTION INTRAVENOUS; SUBCUTANEOUS at 21:38

## 2019-11-24 RX ADMIN — GABAPENTIN SCH MG: 100 CAPSULE ORAL at 06:08

## 2019-11-24 RX ADMIN — MONTELUKAST SCH MG: 10 TABLET, FILM COATED ORAL at 20:15

## 2019-11-24 RX ADMIN — DOCUSATE SODIUM AND SENNOSIDES SCH EA: 8.6; 5 TABLET, FILM COATED ORAL at 20:16

## 2019-11-24 RX ADMIN — SODIUM CHLORIDE SCH MLS/HR: 900 INJECTION INTRAVENOUS at 06:08

## 2019-11-24 RX ADMIN — METOPROLOL TARTRATE SCH MG: 25 TABLET, FILM COATED ORAL at 20:15

## 2019-11-24 RX ADMIN — IPRATROPIUM BROMIDE AND ALBUTEROL SULFATE SCH ML: .5; 3 SOLUTION RESPIRATORY (INHALATION) at 09:43

## 2019-11-24 RX ADMIN — INSULIN ASPART SCH UNIT: 100 INJECTION, SOLUTION INTRAVENOUS; SUBCUTANEOUS at 11:15

## 2019-11-24 RX ADMIN — WARFARIN SODIUM SCH MG: 5 TABLET ORAL at 17:03

## 2019-11-24 RX ADMIN — IPRATROPIUM BROMIDE AND ALBUTEROL SULFATE SCH ML: .5; 3 SOLUTION RESPIRATORY (INHALATION) at 19:00

## 2019-11-24 RX ADMIN — PANTOPRAZOLE SODIUM SCH MG: 40 TABLET, DELAYED RELEASE ORAL at 08:21

## 2019-11-24 RX ADMIN — POLYETHYLENE GLYCOL (3350) SCH GM: 17 POWDER, FOR SOLUTION ORAL at 20:15

## 2019-11-24 RX ADMIN — SODIUM CHLORIDE SCH MLS/HR: 900 INJECTION INTRAVENOUS at 12:06

## 2019-11-24 RX ADMIN — SODIUM CHLORIDE SCH MLS/HR: 900 INJECTION INTRAVENOUS at 17:03

## 2019-11-24 NOTE — PM&R PROGRESS NOTE
Subjective


HPI/CC On Admission


Date Seen by Provider:  Nov 24, 2019


Time Seen by Provider:  11:45


CC: Hip Fracture





HPI: Patient fell twice Monday receiving a hip fracture and had surgery.  BRANDON tony was transferred to in-patient rehab yesterday. Patient does have pain 

when standing and rates it as a 9/10 otherwise, his pain is a 3/10. Pain does 

travel down his right leg. Rehab has been going well he states, and is sore from

rehab.


Subjective/Events-last exam


Bed alarm placed at night and he seems to be improved fall risk


Cefepime maintained per Dr. Patino for pneumonia on chest x-ray


IS will be maintained and he is using that diligently


NebulizertreatmentsQID duo Neb will be maintained


Pt will be watched closelyfor decompensation


No fever noted


Blood pressure a little bit low 104/60 so holding meds


Sputum culture obtained sent to lab per Dr. Patino's orders, await results


Conferred with RN


Reviewed therapy notes


Checked meds and labs





Review of Systems


General:  Fatigue


Pulmonary:  Dyspnea, Cough





Focused Exam


Lactate Level


11/22/19 05:50: Lactic Acid Level 0.70








Objective


Exam


Vital Signs





Vital Signs








  Date Time  Temp Pulse Resp B/P (MAP) Pulse Ox O2 Delivery O2 Flow Rate FiO2


 


11/24/19 15:02     93 Room Air  


 


11/24/19 09:00       2.00 


 


11/24/19 05:50 37.4 66 20 106/65 (79)    





Capillary Refill : Less Than 3 Seconds


General Appearance:  No Apparent Distress, WD/WN


HEENT:  PERRL/EOMI, Normal ENT Inspection, Pharynx Normal


Neck:  Full Range of Motion, Normal Inspection, Non Tender


Respiratory:  Chest Non Tender, No Accessory Muscle Use, No Respiratory 

Distress, Crackles, Decreased Breath Sounds, Wheezing


Cardiovascular:  Regular Rate, Rhythm, No Edema, No Gallop, No JVD, No Murmur, 

Normal Peripheral Pulses (Radial 2/4 bilaterally)


Gastrointestinal:  Normal Bowel Sounds, No Organomegaly, No Pulsatile Mass, Non 

Tender, Soft


Extremity:  Normal Capillary Refill, Normal Inspection, Normal Range of Motion 

(except right leg), Non Tender, No Calf Tenderness, No Pedal Edema


Neurologic/Psychiatric:  Alert, Oriented x3, No Motor/Sensory Deficits, Normal 

Mood/Affect, CNs II-XII Norm as Tested


Skin:  Normal Color, Warm/Dry





Results/Procedures


Lab


Patient resulted labs reviewed.





FIM


Transfers


Therapy Code Descriptions/Definitions 





Functional [place] Measure:


0=Not Assessed/NA        4=Minimal Assistance


1=Total Assistance        5=Supervision or Setup


2=Maximal Assistance  6=Modified [place]


3=Moderate Assistance 7=Complete IndependenceSCALE: Activities may be completed 

with or without assistive devices.





6-Indepedent-patient completes the activity by him/herself with no assistance 

from a helper.


5-Set-up or Clean-up Assistance-helper sets up or cleans up; patient completes 

activity. [place] assists only prior to or  


    following the activity.


4-Supervision or Touching Assistance-helper provides verbal cues and/or 

touching/steadying and/or contact guard assistance as patient completes 

activity. Assistance may be provided   


    throughout the activity or intermittently.


3-Partial/Moderate Assistance-helper does LESS THAN HALF the effort. [place] 

lifts, holds or supports trunk or limbs, but provides less than half the effort.


2-Substantial/Maximal Assistance-helper does MORE THAN HALF the effort. [place] 

lifts or holds trunk or limbs and provides more than half the effort.


3-Kpwfmamkf-dxquog does ALL the effort. Patient does none of the effort to 

complete the activity. Or, the assistance of 2 or more helpers is required for 

the patient to complete the  


    activity.


If activity was not attempted, code reason:


7-Patient Refused.


9-Not Applicable-not attempted and the patient did not perform the activity 

before the current illness, exacerbation or injury.


10-Not Attempted due to Environmental Limitations-(lack of equipment, weather 

restraints, etc.).


88-Not Attempted due to Medical Conditions or Safety Concerns.


Roll Left to Right (QC):  4


Sit to Lying (QC):  3


Sit to Stand (QC):  3


Chair/Bed-to-Chair Xfer(QC):  4


Car Transfer (QC):  3 (assist with right LE)





Gait Training


Does the Patient Walk?:  Yes


Distance:  20, 16, 16


Walk 10 feet (QC):  4


Walk 50 ft with 2 Turns(QC):  4


Walk 150 ft (QC):  88


Walking 10ft/uneven surface-QC:  3


Gait Persons Needed:  1


Gait Assistive Device:  FWW





Wheelchair Training


Does the Pt Use a Wheelchair?:  Yes


Wheel 50 ft with 2 turns (QC):  5


Wheel 150 ft (QC):  9


Type of Wheelchair:  Manual





Stair Training


1 Step (curb) (QC):  88 (pt unsafe to attempt due to TTWB status)


4 Steps (QC):  88


12 Steps (QC):  88





Balance


Picking up an Object (QC):  88





ADL-Treatment


Eating (QC):  6


Oral Hygiene (QC):  4 (Pt required set up assist with min verbal cues for 

sequencing of task. Pt completed oral hygiene at bed level. )


Bathing Location:  L Arm, R Arm, L Upper Leg, R Upper Leg, Chest, Abdomen, 

Buttocks, Perineal Area


Shower/Bathe Self (QC):  3 (SPONGE BATH: Pt required CGA during stand for 

washing buttocks and perineal area. Pt required assist washing BLE lower legs 

and feet. )


Upper Body Dressing (QC):  5 (set up only)


Lower Body Dressing (QC):  4 (Pt introduced to sock aide, dressing stick and 

reacher. Pt completes pant doffing with CGA in stance and sock doffing sitting 

with dressing stick.)


On/Off Footwear (QC):  7


Toileting Hygiene (QC):  4 (CGA during mangement of clothing. Pt able to manage 

clothing up/down and complete hygiene.)


Toilet Transfer (QC):  3 (BSC over toilet. Pt able to sit with CGA, required min

A to stand from toilet. )





Assessment/Plan


Assessment and Plan


Assess & Plan/Chief Complaint


Assessment:


Right femur fracture s/p fall in Dr Patino's office


Pneumonia placed on Cefepime per DR Patino


Severe COPD with recurrent pneumonia in the past





Plan:


IRF protocol


Cefepime


Nebs


O2


Dr Patino appreciated


Check labs in am





(1) Closed right femoral fracture


(2) Debility


Status:  Acute


(3) Paroxysmal atrial fibrillation


Status:  Chronic


(4) Dyspnea


Status:  Acute


(5) COPD (chronic obstructive pulmonary disease)


Status:  Chronic


(6) Pneumonia


Status:  Acute











ALEKS WILSON DO                Nov 24, 2019 11:48


POS

## 2019-11-25 VITALS — DIASTOLIC BLOOD PRESSURE: 60 MMHG | SYSTOLIC BLOOD PRESSURE: 100 MMHG

## 2019-11-25 VITALS — SYSTOLIC BLOOD PRESSURE: 104 MMHG | DIASTOLIC BLOOD PRESSURE: 67 MMHG

## 2019-11-25 VITALS — DIASTOLIC BLOOD PRESSURE: 60 MMHG | SYSTOLIC BLOOD PRESSURE: 95 MMHG

## 2019-11-25 VITALS — DIASTOLIC BLOOD PRESSURE: 63 MMHG | SYSTOLIC BLOOD PRESSURE: 105 MMHG

## 2019-11-25 LAB
ALBUMIN SERPL-MCNC: 3.1 GM/DL (ref 3.2–4.5)
ALP SERPL-CCNC: 79 U/L (ref 40–136)
ALT SERPL-CCNC: 23 U/L (ref 0–55)
BASOPHILS # BLD AUTO: 0 10^3/UL (ref 0–0.1)
BASOPHILS NFR BLD AUTO: 0 % (ref 0–10)
BILIRUB SERPL-MCNC: 0.4 MG/DL (ref 0.1–1)
BUN/CREAT SERPL: 16
CALCIUM SERPL-MCNC: 8.6 MG/DL (ref 8.5–10.1)
CHLORIDE SERPL-SCNC: 104 MMOL/L (ref 98–107)
CO2 SERPL-SCNC: 26 MMOL/L (ref 21–32)
CREAT SERPL-MCNC: 0.83 MG/DL (ref 0.6–1.3)
EOSINOPHIL # BLD AUTO: 0.4 10^3/UL (ref 0–0.3)
EOSINOPHIL NFR BLD AUTO: 5 % (ref 0–10)
ERYTHROCYTE [DISTWIDTH] IN BLOOD BY AUTOMATED COUNT: 16.6 % (ref 10–14.5)
GFR SERPLBLD BASED ON 1.73 SQ M-ARVRAT: > 60 ML/MIN
GLUCOSE SERPL-MCNC: 124 MG/DL (ref 70–105)
HCT VFR BLD CALC: 29 % (ref 40–54)
HGB BLD-MCNC: 9.2 G/DL (ref 13.3–17.7)
LYMPHOCYTES # BLD AUTO: 2.2 X 10^3 (ref 1–4)
LYMPHOCYTES NFR BLD AUTO: 24 % (ref 12–44)
MANUAL DIFFERENTIAL PERFORMED BLD QL: NO
MCH RBC QN AUTO: 30 PG (ref 25–34)
MCHC RBC AUTO-ENTMCNC: 31 G/DL (ref 32–36)
MCV RBC AUTO: 95 FL (ref 80–99)
MONOCYTES # BLD AUTO: 0.7 X 10^3 (ref 0–1)
MONOCYTES NFR BLD AUTO: 7 % (ref 0–12)
NEUTROPHILS # BLD AUTO: 6 X 10^3 (ref 1.8–7.8)
NEUTROPHILS NFR BLD AUTO: 64 % (ref 42–75)
PLATELET # BLD: 294 10^3/UL (ref 130–400)
PMV BLD AUTO: 9.1 FL (ref 7.4–10.4)
POTASSIUM SERPL-SCNC: 4.2 MMOL/L (ref 3.6–5)
PROT SERPL-MCNC: 6.3 GM/DL (ref 6.4–8.2)
SODIUM SERPL-SCNC: 140 MMOL/L (ref 135–145)
WBC # BLD AUTO: 9.4 10^3/UL (ref 4.3–11)

## 2019-11-25 RX ADMIN — GABAPENTIN SCH MG: 100 CAPSULE ORAL at 21:22

## 2019-11-25 RX ADMIN — INSULIN ASPART SCH UNIT: 100 INJECTION, SOLUTION INTRAVENOUS; SUBCUTANEOUS at 15:44

## 2019-11-25 RX ADMIN — GABAPENTIN SCH MG: 100 CAPSULE ORAL at 04:51

## 2019-11-25 RX ADMIN — FERROUS SULFATE TAB 325 MG (65 MG ELEMENTAL FE) SCH MG: 325 (65 FE) TAB at 20:22

## 2019-11-25 RX ADMIN — INSULIN ASPART SCH UNIT: 100 INJECTION, SOLUTION INTRAVENOUS; SUBCUTANEOUS at 12:27

## 2019-11-25 RX ADMIN — GABAPENTIN SCH MG: 100 CAPSULE ORAL at 13:35

## 2019-11-25 RX ADMIN — SODIUM CHLORIDE SCH MLS/HR: 900 INJECTION INTRAVENOUS at 05:00

## 2019-11-25 RX ADMIN — IPRATROPIUM BROMIDE AND ALBUTEROL SULFATE SCH ML: .5; 3 SOLUTION RESPIRATORY (INHALATION) at 19:54

## 2019-11-25 RX ADMIN — DOCUSATE SODIUM AND SENNOSIDES SCH EA: 8.6; 5 TABLET, FILM COATED ORAL at 20:22

## 2019-11-25 RX ADMIN — Medication SCH ML: at 21:22

## 2019-11-25 RX ADMIN — METOPROLOL TARTRATE SCH MG: 25 TABLET, FILM COATED ORAL at 11:06

## 2019-11-25 RX ADMIN — INSULIN ASPART SCH UNIT: 100 INJECTION, SOLUTION INTRAVENOUS; SUBCUTANEOUS at 20:24

## 2019-11-25 RX ADMIN — METOPROLOL TARTRATE SCH MG: 25 TABLET, FILM COATED ORAL at 20:23

## 2019-11-25 RX ADMIN — POLYETHYLENE GLYCOL (3350) SCH GM: 17 POWDER, FOR SOLUTION ORAL at 20:29

## 2019-11-25 RX ADMIN — Medication PRN ML: at 18:27

## 2019-11-25 RX ADMIN — ACETAMINOPHEN PRN MG: 500 TABLET ORAL at 08:26

## 2019-11-25 RX ADMIN — SODIUM CHLORIDE SCH MLS/HR: 900 INJECTION INTRAVENOUS at 23:28

## 2019-11-25 RX ADMIN — IPRATROPIUM BROMIDE AND ALBUTEROL SULFATE SCH ML: .5; 3 SOLUTION RESPIRATORY (INHALATION) at 08:30

## 2019-11-25 RX ADMIN — SODIUM CHLORIDE SCH MLS/HR: 900 INJECTION INTRAVENOUS at 12:27

## 2019-11-25 RX ADMIN — DOCUSATE SODIUM AND SENNOSIDES SCH EA: 8.6; 5 TABLET, FILM COATED ORAL at 20:23

## 2019-11-25 RX ADMIN — IPRATROPIUM BROMIDE AND ALBUTEROL SULFATE SCH ML: .5; 3 SOLUTION RESPIRATORY (INHALATION) at 12:03

## 2019-11-25 RX ADMIN — DOCUSATE SODIUM AND SENNOSIDES SCH EA: 8.6; 5 TABLET, FILM COATED ORAL at 08:39

## 2019-11-25 RX ADMIN — PANTOPRAZOLE SODIUM SCH MG: 40 TABLET, DELAYED RELEASE ORAL at 08:26

## 2019-11-25 RX ADMIN — ENOXAPARIN SODIUM SCH MG: 100 INJECTION SUBCUTANEOUS at 20:23

## 2019-11-25 RX ADMIN — DOCUSATE SODIUM SCH MG: 100 CAPSULE ORAL at 08:28

## 2019-11-25 RX ADMIN — FERROUS SULFATE TAB 325 MG (65 MG ELEMENTAL FE) SCH MG: 325 (65 FE) TAB at 08:26

## 2019-11-25 RX ADMIN — POLYETHYLENE GLYCOL (3350) SCH GM: 17 POWDER, FOR SOLUTION ORAL at 20:23

## 2019-11-25 RX ADMIN — POLYETHYLENE GLYCOL (3350) SCH GM: 17 POWDER, FOR SOLUTION ORAL at 08:27

## 2019-11-25 RX ADMIN — MONTELUKAST SCH MG: 10 TABLET, FILM COATED ORAL at 20:22

## 2019-11-25 RX ADMIN — WARFARIN SODIUM SCH MG: 5 TABLET ORAL at 18:26

## 2019-11-25 RX ADMIN — SODIUM CHLORIDE SCH MLS/HR: 900 INJECTION INTRAVENOUS at 18:27

## 2019-11-25 RX ADMIN — INSULIN ASPART SCH UNIT: 100 INJECTION, SOLUTION INTRAVENOUS; SUBCUTANEOUS at 06:12

## 2019-11-25 NOTE — PM&R PROGRESS NOTE
Subjective


HPI/CC On Admission


Date Seen by Provider:  Nov 25, 2019


Time Seen by Provider:  08:15


CC: Hip Fracture





HPI: Patient fell twice Monday receiving a hip fracture and had surgery.  

Patient was transferred to in-patient rehab yesterday. Patient does have pain 

when standing and rates it as a 9/10 otherwise, his pain is a 3/10. Pain does 

travel down his right leg. Rehab has been going well he states, and is sore from

rehab.


Subjective/Events-last exam


BP low 95/60 


Minimal pain unless he starts moving his leg 


Taking Tylenol only for the leg 


Cefepime maintained per Dr. Patino for the pneumonia 


Labs remain stable and will await his plan to transition over to PO antibiotics 

but sputum culture returned and revealed Pseudomonas so will maintain Cefepime 

until 14 days started 11/22/19


Conferred with RN


Reviewed therapy notes


Checked meds and labs





Review of Systems


General:  Fatigue


Pulmonary:  Dyspnea, Cough





Objective


Exam


Vital Signs





Vital Signs








  Date Time  Temp Pulse Resp B/P (MAP) Pulse Ox O2 Delivery O2 Flow Rate FiO2


 


11/25/19 19:54     90 Nasal Cannula 2.00 


 


11/25/19 15:18 36.8 66 14 100/60 (73)    





Capillary Refill : Less Than 3 Seconds


General Appearance:  No Apparent Distress, WD/WN


HEENT:  PERRL/EOMI, Normal ENT Inspection, Pharynx Normal


Neck:  Full Range of Motion, Normal Inspection, Non Tender


Respiratory:  Chest Non Tender, No Accessory Muscle Use, No Respiratory 

Distress, Crackles, Decreased Breath Sounds, Wheezing


Cardiovascular:  Regular Rate, Rhythm, No Edema, No Gallop, No JVD, No Murmur, 

Normal Peripheral Pulses (Radial 2/4 bilaterally)


Gastrointestinal:  Normal Bowel Sounds, No Organomegaly, No Pulsatile Mass, Non 

Tender, Soft


Extremity:  Normal Capillary Refill, Normal Inspection, Normal Range of Motion 

(except right leg), Non Tender, No Calf Tenderness, No Pedal Edema


Neurologic/Psychiatric:  Alert, Oriented x3, No Motor/Sensory Deficits, Normal 

Mood/Affect, CNs II-XII Norm as Tested


Skin:  Normal Color, Warm/Dry





Results/Procedures


Lab


Laboratory Tests


11/25/19 04:45








Patient resulted labs reviewed.





FIM


Transfers


Therapy Code Descriptions/Definitions 





Functional [place] Measure:


0=Not Assessed/NA        4=Minimal Assistance


1=Total Assistance        5=Supervision or Setup


2=Maximal Assistance  6=Modified [place]


3=Moderate Assistance 7=Complete IndependenceSCALE: Activities may be completed 

with or without assistive devices.





6-Indepedent-patient completes the activity by him/herself with no assistance 

from a helper.


5-Set-up or Clean-up Assistance-helper sets up or cleans up; patient completes 

activity. [place] assists only prior to or  


    following the activity.


4-Supervision or Touching Assistance-helper provides verbal cues and/or 

touching/steadying and/or contact guard assistance as patient completes 

activity. Assistance may be provided   


    throughout the activity or intermittently.


3-Partial/Moderate Assistance-helper does LESS THAN HALF the effort. [place] 

lifts, holds or supports trunk or limbs, but provides less than half the effort.


2-Substantial/Maximal Assistance-helper does MORE THAN HALF the effort. [place] 

lifts or holds trunk or limbs and provides more than half the effort.


8-Lezulllqg-ijxudq does ALL the effort. Patient does none of the effort to 

complete the activity. Or, the assistance of 2 or more helpers is required for 

the patient to complete the  


    activity.


If activity was not attempted, code reason:


7-Patient Refused.


9-Not Applicable-not attempted and the patient did not perform the activity 

before the current illness, exacerbation or injury.


10-Not Attempted due to Environmental Limitations-(lack of equipment, weather 

restraints, etc.).


88-Not Attempted due to Medical Conditions or Safety Concerns.


Roll Left to Right (QC):  4


Sit to Lying (QC):  3


Sit to Stand (QC):  3


Chair/Bed-to-Chair Xfer(QC):  4


Car Transfer (QC):  3 (assist with right LE)





Gait Training


Does the Patient Walk?:  Yes


Distance:  20, 16, 16


Walk 10 feet (QC):  4


Walk 50 ft with 2 Turns(QC):  4


Walk 150 ft (QC):  88


Walking 10ft/uneven surface-QC:  3


Gait Persons Needed:  1


Gait Assistive Device:  FWW





Wheelchair Training


Does the Pt Use a Wheelchair?:  Yes


Wheel 50 ft with 2 turns (QC):  5


Wheel 150 ft (QC):  9


Type of Wheelchair:  Manual





Stair Training


1 Step (curb) (QC):  88 (pt unsafe to attempt due to TTWB status)


4 Steps (QC):  88


12 Steps (QC):  88





Balance


Picking up an Object (QC):  88





ADL-Treatment


Eating (QC):  6


Oral Hygiene (QC):  4 (Pt required set up assist with min verbal cues for 

sequencing of task. Pt completed oral hygiene at bed level. )


Bathing Location:  L Arm, R Arm, L Upper Leg, R Upper Leg, Chest, Abdomen, 

Buttocks, Perineal Area


Shower/Bathe Self (QC):  3 (SPONGE BATH: Pt required CGA during stand for 

washing buttocks and perineal area. Pt required assist washing BLE lower legs 

and feet. )


Upper Body Dressing (QC):  5 (set up only)


Lower Body Dressing (QC):  4 (Pt introduced to sock aide, dressing stick and 

reacher. Pt completes pant doffing with CGA in stance and sock doffing sitting 

with dressing stick.)


On/Off Footwear (QC):  7


Toileting Hygiene (QC):  4 (CGA during mangement of clothing. Pt able to manage 

clothing up/down and complete hygiene.)


Toilet Transfer (QC):  3 (BSC over toilet. Pt able to sit with CGA, required min

A to stand from toilet. )





Assessment/Plan


Assessment and Plan


Assess & Plan/Chief Complaint


Assessment:


Right femur fracture s/p fall in Dr Patino's office


Pneumonia placed on Cefepime per DR Patino Pseudomonas on Cx started abx 

11/22/19


Severe COPD with recurrent pneumonia in the past





Plan:


IRF protocol


Cefepime for 14 days total


Nebs


O2


Dr Patino appreciated


Checked labs





(1) Closed right femoral fracture


(2) Debility


Status:  Acute


(3) Paroxysmal atrial fibrillation


Status:  Chronic


(4) Dyspnea


Status:  Acute


(5) COPD (chronic obstructive pulmonary disease)


Status:  Chronic


(6) Pneumonia


Status:  Acute











ALEKS WILSON DO                Nov 25, 2019 09:44


POS

## 2019-11-25 NOTE — SPEECH THERAPY DAILY NOTE
Speech Daily Progress Note


Subjective


Date Seen by Provider:  Nov 25, 2019


Time Seen by Provider:  00:30


Patient was resting in his bed following breakfast when I entered his room.





Objective


Patient completed a series of safety awareness cards with 85% given 10% 

repetitions/cues.





Assessment


Assessment Current Status:  Good Progress





Treatment Plan


Continue Plan of Care





Speech Short Term Goals


Short Term Goals


Short Term Goals


1) The patient will complete memory tasks related to his daily needs at 90% or 

greater.


2) The patient will complete problem solving tasks related to his daily needs at

90% or greater.


3) The patient will complete safety awareness tasks related to his daily needs 

at 90% or greater.





Speech Long Term Goals


Long Term Goals


The patient will improve cognitive-communication necessary for safety and daily 

living tasks with minimal assist.





Speech-Plan


Patient/Family Goals


Patient/Family Goals:  


Patient plans on returning home post rehab.





Treatment Plan


Speech Therapy Treatment Plan:  Continue Plan of Care


Patient is progressing toward meeting ST goals.


Treatment Duration:  Nov 27, 2019


Frequency:  5 times per week


Estimated Hrs Per Day:  .5 hour per day


Rehab Potential:  Guarded


Barriers to Learning:  


Patient has cognitive deficits.


Pt/Family Agrees to Plan:  Yes





Safety Risks/Education


Teaching Recipient:  Patient


Teaching Methods:  Demonstration, Discussion


Response to Teaching:  Verbalize Understanding, Return Demonstration


Education Topics Provided:  


Continued safety and communication of wants/needs.





Time


Speech Therapy Time In:  08:30


Speech Therapy Time Out:  09:00


Total Billed Time:  30


Billed Treatment Time


[address]            Nov 25, 2019 09:07


POS

## 2019-11-25 NOTE — NUR
TIMELINE NOTE BELOW:

11/25/19 AT 2230: THIS RN ENTERED PT'S ROOM TO HELP PT TO BATHROOM. THIS RN NOTICED THAT PT 
HAD PULLED IV OUT OF RT FOREARM.

11/25/19 AT 2300: THIS RN ATTEMPTED IV IN PT'S LT FOREARM WITH UNSUCCESSFUL ATTEMPT. THIS RN 
HAD SUCCESSFUL ATTEMPT IN PT'S LT AC WITH 22G IV CATHETER.

## 2019-11-25 NOTE — NUR
DR. CONNELLY HERE TO SEE PATIENT. CXR ORDERED FOR TODAY. DR. MUNOZ NOTIFIED THAT WBC 
NORMAL TODAY AND DR. WILSON'S REQUEST IF CEFEMINE SHOULD BE CONTINUED.

## 2019-11-25 NOTE — OCCUPATIONAL THER DAILY NOTE
OT Current Status-Daily Note


Subjective


Pt seen supine in bed, pt states he would like to do activities on this date. Pt

states no pain, then begins bed mobility and grimaces and verbalizes pain in R 

hip.





Mental Status/Objective


Patient Orientation:  Normal For Age


Attachments:  Oxygen (2L)





ADL-Treatment


Therapy Code Descriptions/Definitions 





Functional [place] Measure:


0=Not Assessed/NA        4=Minimal Assistance


1=Total Assistance        5=Supervision or Setup


2=Maximal Assistance  6=Modified [place]


3=Moderate Assistance 7=Complete IndependenceSCALE: Activities may be completed 

with or without assistive devices.





6-Indepedent-patient completes the activity by him/herself with no assistance f

rom a helper.


5-Set-up or Clean-up Assistance-helper sets up or cleans up; patient completes 

activity. [place] assists only prior to or  


    following the activity.


4-Supervision or Touching Assistance-helper provides verbal cues and/or 

touching/steadying and/or contact guard assistance as patient completes 

activity. Assistance may be provided   


    throughout the activity or intermittently.


3-Partial/Moderate Assistance-helper does LESS THAN HALF the effort. [place] 

lifts, holds or supports trunk or limbs, but provides less than half the effort.


2-Substantial/Maximal Assistance-helper does MORE THAN HALF the effort. [place] 

lifts or holds trunk or limbs and provides more than half the effort.


6-Ppztqrzzt-btubdc does ALL the effort. Patient does none of the effort to 

complete the activity. Or, the assistance of 2 or more helpers is required for 

the patient to complete the  


    activity.


If activity was not attempted, code reason:


7-Patient Refused.


9-Not Applicable-not attempted and the patient did not perform the activity 

before the current illness, exacerbation or injury.


10-Not Attempted due to Environmental Limitations-(lack of equipment, weather 

restraints, etc.).


88-Not Attempted due to Medical Conditions or Safety Concerns.


Eating (QC):  6


Oral Hygiene (QC):  4 (SBA at sink)


Shower/Bathe Self (QC):  6 (Pt completes with IND (after s/u for bandaging and 

IV coverage which will not be compelted at home). Pt does not stand to wash 

bottom but completes all in seated position)


Upper Body Dressing (QC):  6


Lower Body Dressing (QC):  4 (CGA in stance to pull over hips, pt requires 

intermittent assist during task.)


QC: on/ off footwear: 4, intermittent assist during sock don/ doffing with use 

of dressing stick and sock aide.





Other Treatment


Pt completes bed mob with min A due to pain, sit to stand with FWW with CGA. Pt 

completes ambulation to shower, shower transfer with CGA. Pt's IV wrapped in 

bag/ taped and bandage covered with tape to avoid moisture. Pt completes shower 

IND, dresses in shower. Pt completes sock donning/ doffing in chair with cues. 

Pt completes UE theraband exercises (2 sets of 10 of 2 exercises) with rest 

breaks in between. Pt educated on breathing techniques and sitting up throughout

day due to pneumonia. Pt expresses he has never known to sit up through day. Pt 

left with call light in reach, all needs met.





Education


OT Patient Education:  Correct positioning, Energy conservation, Exercise 

program, Home exercise program, Modified ADL techniques, Purpose of tx/funct

ional activities, Reviewed precautions, Safety issues, Use of adapted equipment


Teaching Recipient:  Patient


Teaching Methods:  Demonstration, Discussion


Response to Teaching:  Verbalize Understanding, Return Demonstration





OT Short Term Goals


Short Term Goals


Time Frame:  Dec 6, 2019


Oral hygiene:  5


Toileting hygiene:  3


Shower/bathe self:  3


Upper body dressin


Lower body dressing:  3 (met)


Putting on/taking off footwear:  3





OT Long Term Goals


Long Term Goals


Time Frame:  Dec 20, 2019


Eating (QC):  6 (met)


Oral Hygiene (QC):  6


Toileting Hygiene (QC):  6


Shower/Bathe Self (QC):  6 (met)


Upper Body Dressing (QC):  6 (met)


Lower Body Dressing (QC):  6


On/Off Footwear (QC):  6


Additional Goals:  1-Demonstrate ADL Tasks, 2-Verbalize Understanding, 3-

ImproveStrength/Hema


1=Demonstrate adherence to instructed precautions during ADL tasks.


2=Patient will verbalize/demonstrate understanding of assistive 

devices/modifications for ADL.


3=Patient will improve strength/tolerance for activity to enable patient to 

perform ADL's.





OT Education/Plan


Problem List/Assessment


Assessment:  Decreased Activ Tolerance, Decreased UE Strength, Impaired Funct 

Balance, Impaired I ADL's, Impaired Self-Care Skills





Discharge Recommendations


Plan/Recommendations:  Continue POC





Treatment Plan/Plan of Care


Treatment,Training & Education:  Yes


Patient would benefit from OT for education, treatment and training to promote 

independence in ADL's, mobility, safety and/or upper extremity function for 

ADL's.


Plan of Care:  ADL Retraining, Functional Mobility, Group Exercise/Act as Ind, 

UE Funct Exercise/Act


Treatment Duration:  Dec 20, 2019


Frequency:  At least 5 of 7 days/Wk (IRF)


Estimated Hrs Per Day:  1.5 hours per day


Agreement:  Yes


Rehab Potential:  Guarded





Time/GCodes


Start Time:  09:30


Stop Time:  10:45


Total Time Billed (hr/min):  75


Billed Treatment Time


1, ADL 4(60), EX (15)= 75











NARAYAN PITT OTR                2019 12:01


POS

## 2019-11-25 NOTE — DIAGNOSTIC IMAGING REPORT
INDICATION: 

Pneumonia followup.



EXAMINATION:

PA and lateral chest.



FINDINGS:

There is a loop recorder projecting over the left lower chest.

There are emphysematous changes in the lungs. There is some

interstitial prominence in both lung bases which has improved

since 11/22/2019.



IMPRESSION: 

COPD. Improving basilar interstitial infiltrates.



Dictated by: 



  Dictated on workstation # [id number]

## 2019-11-25 NOTE — PHYSICAL THERAPY DAILY NOTE
PT Daily Note-Current


Subjective


Agreeable to PT.  No complaints.





Mental Status


Patient Orientation:  Person, Place, Time, Situation





Transfers


SCALE: Activities may be completed with or without assistive devices.





6-Indepedent-patient completes the activity by him/herself with no assistance 

from a helper.


5-Set-up or Clean-up Assistance-helper sets up or cleans up; patient completes 

activity. [place] assists only prior to or  


    following the activity.


4-Supervision or Touching Assistance-helper provides verbal cues and/or 

touching/steadying and/or contact guard assistance as patient completes 

activity. Assistance may be provided   


    throughout the activity or intermittently.


3-Partial/Moderate Assistance-helper does LESS THAN HALF the effort. [place] 

lifts, holds or supports trunk or limbs, but provides less than half the effort.


2-Substantial/Maximal Assistance-helper does MORE THAN HALF the effort. [place] 

lifts or holds trunk or limbs and provides more than half the effort.


3-Smpmxxfqe-unnuzm does ALL the effort. Patient does none of the effort to 

complete the activity. Or, the assistance of 2 or more helpers is required for 

the patient to complete the  


    activity.


If activity was not attempted, code reason:


7-Patient Refused.


9-Not Applicable-not attempted and the patient did not perform the activity 

before the current illness, exacerbation or injury.


10-Not Attempted due to Environmental Limitations-(lack of equipment, weather 

restraints, etc.).


88-Not Attempted due to Medical Conditions or Safety Concerns.


Sit to Lying (QC):  4


Lying to Sitting/Side of Bed(Q:  4


Sit to Stand (QC):  5


correct sequencing with transfers.





Weight Bearing


Right Lower Extremity:  Right


Touch Toe Bearing


Left Lower Extremity:  Left


Full Weight Bearing





Gait Training


Does the Patient Walk?:  Yes


Gait Assistive Device:  FWW


Pt ambulated x 75 ft x 5 reps with FWW, TTWB right with SBA.





Stair Training


 Stair Training: Handrails/:  2 handrails


4 Steps (QC):  4 (CGA with skilled cues for sequencing and safety)





Exercises


Supine Ex:  Ankle pumps, Quad Set, Glut sets, Heel Slides, Short Arc Quads, Hip 

abd/add


Supine Reps:  15 (LE strength for transfer and gait progression.)


Seated Therapy Exercises:  Ankle pumps, Long arc quads, Hamstring Curls


Seated Reps:  15





Assessment


Current Status:  Good Progress


Pt tolerated treatment well.  Progressing distance with gait and transfers are 

improving.  Pt's oxygen sats had been low thismorning, but monitored throughout 

treatment and without oxygen, sats were >90% throughout.  Nursing notified.





PT Short Term Goals


Short Term Goals


Time Frame:  2019


Sit to lyin


Lying to sitting on side of be:  5


Walk 10 feet:  5


Walk 50 feet with two turns:  5


Walk 150 feet:  5


4 steps:  4





PT Long Term Goals


Long Term Goals


PT Long Term Goals Time Frame:  Dec 5, 2019


Roll Left & Right (QC):  6


Sit to Lying (QC):  6


Lying-Sitting on Side/Bed(QC):  6


Sit to Stand (QC):  6


Chair/Bed-to-Chair Xfer(QC):  6


Toilet Transfer (QC):  6


Car Transfer (QC):  6


Does the Patient Walk:  Yes


Walk 10 feet (QC):  6


Walk 50ft with 2 Turns (QC):  6


Walk 150 ft (QC):  6


Walking 10ft on Uneven Surface:  6


1 Step (curb) (QC):  6


4 Steps (QC):  5


12 Steps (QC):  9


Picking up an Object (QC):  88


Does the Pt use WC or Scooter?:  No


Type:  N/A


Type:  N/A





PT Plan


Problem List


Problem List:  Activity Tolerance, Functional Strength, Safety





Treatment/Plan


Treatment Plan:  Continue Plan of Care


Treatment Plan:  Bed Mobility, Education, Functional Activity Hema, Functional 

Strength, Group Therapy, Gait, Safety, Therapeutic Exercise, Transfers


Treatment Duration:  Dec 5, 2019


Frequency:  At least 5 of 7 days/Wk (IRF)


Estimated Hrs Per Day:  1.5 hours per day


Patient and/or Family Agrees t:  Yes





Safety Risks/Education


Patient Education:  Transfer Techniques, Steps


Teaching Recipient:  Patient


Teaching Methods:  Demonstration, Discussion


Response to Teaching:  Reinforcement Needed





Discharge Recommendations


Therapy Discharge Recommendati:  Post Acute PT





Time/GCodes


Time In:  1100


Time Out:  1200


Total Billed Treatment Time:  60


Total Billed Treatment


visit


FA 30


EX 30











VIANEY FRANCO PT             2019 12:06


POS

## 2019-11-25 NOTE — PULMONARY PROGRESS NOTE
Subjective


Time Seen by a Provider:  15:45


Subjective/Events-last exam


PT appears to be doing better.





Sepsis Event


Evaluation


Height, Weight, BMI


Height: 5'9.00"


Weight: 156lbs. 5.0oz. 70.779787br; 23.91 BMI


Method:Stated





Exam


Exam





Vital Signs








  Date Time  Temp Pulse Resp B/P (MAP) Pulse Ox O2 Delivery O2 Flow Rate FiO2


 


11/25/19 15:18 36.8 66 14 100/60 (73) 99 Nasal Cannula 2.00 


 


11/25/19 12:03     90 Room Air  


 


11/25/19 09:00      Nasal Cannula 2.00 


 


11/25/19 08:35     95 Nasal Cannula 2.00 


 


11/25/19 08:30     86 Room Air  


 


11/25/19 05:40 36.9 88 18 105/63 (77) 93 Nasal Cannula 2.00 


 


11/24/19 20:09      Nasal Cannula 2.00 


 


11/24/19 17:20 37.8 89 18 116/53 (74) 96   














I & O 


 


 11/25/19





 07:00


 


Intake Total 820 ml


 


Output Total 950 ml


 


Balance -130 ml








Height & Weight


Height: 5'9.00"


Weight: 156lbs. 5.0oz. 70.446855ue; 23.91 BMI


Method:Stated


General Appearance:  No Apparent Distress, WD/WN


HEENT:  PERRL/EOMI, Normal ENT Inspection, Pharynx Normal


Neck:  Full Range of Motion, Normal Inspection, Non Tender


Respiratory:  Chest Non Tender, No Accessory Muscle Use, No Respiratory 

Distress, Crackles, Decreased Breath Sounds, Wheezing


Cardiovascular:  Regular Rate, Rhythm, No Edema, No Gallop, No JVD, No Murmur, 

Normal Peripheral Pulses (Radial 2/4 bilaterally)


Gastrointestinal:  non tender, soft


Extremity:  Normal Capillary Refill, Normal Inspection, Normal Range of Motion 

(except right leg), Non Tender, No Calf Tenderness, No Pedal Edema


Neurologic/Psychiatric:  Alert, Oriented x3, No Motor/Sensory Deficits, Normal 

Mood/Affect, CNs II-XII Norm as Tested


Skin:  Normal Color, Warm/Dry





Results


Lab


Laboratory Tests


11/25/19 04:45











Assessment/Plan


Assessment/Plan


Right femur fracture s/p fall while in my office 


Pneumonia with pseudomonus 


   -Will treat with Cefepime x 10-14 days started on 11/22. 


   -CXR shows improvement. 


Severe COPD 


   -DuoNeb 


   -02











SMITH MUNOZ DO              Nov 25, 2019 15:45


POS

## 2019-11-25 NOTE — NUR
HAD ASSISTED UP TO BATHROOM, BUT THEN PATIENT DID NOT CALL NURSE TO HELP HIM BACK TO BED. 
WAS FOUND SITTING ON SIDE OF BED. INFORMED NEEDS TO CALL FOR HELP WHENEVER GETTING UP AND 
VOICES UNDERSTANDING.

## 2019-11-25 NOTE — PHYSICAL THERAPY DAILY NOTE
PT Daily Note-Current


Subjective


Agrees to PT.  Reports he feels tired today.





Transfers


SCALE: Activities may be completed with or without assistive devices.





6-Indepedent-patient completes the activity by him/herself with no assistance 

from a helper.


5-Set-up or Clean-up Assistance-helper sets up or cleans up; patient completes 

activity. [place] assists only prior to or  


    following the activity.


4-Supervision or Touching Assistance-helper provides verbal cues and/or 

touching/steadying and/or contact guard assistance as patient completes 

activity. Assistance may be provided   


    throughout the activity or intermittently.


3-Partial/Moderate Assistance-helper does LESS THAN HALF the effort. [place] 

lifts, holds or supports trunk or limbs, but provides less than half the effort.


2-Substantial/Maximal Assistance-helper does MORE THAN HALF the effort. [place] 

lifts or holds trunk or limbs and provides more than half the effort.


9-[place]-ugfreo does ALL the effort. Patient does none of the effort to 

complete the activity. Or, the assistance of 2 or more helpers is required for 

the patient to complete the  


    activity.


If activity was not attempted, code reason:


7-Patient Refused.


9-Not Applicable-not attempted and the patient did not perform the activity 

before the current illness, exacerbation or injury.


10-Not Attempted due to Environmental Limitations-(lack of equipment, weather 

restraints, etc.).


88-Not Attempted due to Medical Conditions or Safety Concerns.


Sit to Lying (QC):  5


Lying to Sitting/Side of Bed(Q:  5


Sit to Stand (QC):  5





Weight Bearing


Right Lower Extremity:  Right


Touch Toe Bearing


Left Lower Extremity:  Left


Full Weight Bearing





Gait Training


Gait Assistive Device:  FWW


Pt ambulated x 120 ft x 2 with FWW with SBA; skilled cues for safety and TTWB 

status.





Treatments


Pt in bed post treatment with needs met.  Oxygen in situ.





Assessment


Current Status:  Good Progress





PT Short Term Goals


Short Term Goals


Time Frame:  2019


Sit to lyin (met)


Lying to sitting on side of be:  5 (met)


Walk 10 feet:  5


Walk 50 feet with two turns:  5


Walk 150 feet:  5


4 steps:  4





PT Long Term Goals


Long Term Goals


PT Long Term Goals Time Frame:  Dec 5, 2019


Roll Left & Right (QC):  6


Sit to Lying (QC):  6


Lying-Sitting on Side/Bed(QC):  6


Sit to Stand (QC):  6


Chair/Bed-to-Chair Xfer(QC):  6


Toilet Transfer (QC):  6


Car Transfer (QC):  6


Does the Patient Walk:  Yes


Walk 10 feet (QC):  6


Walk 50ft with 2 Turns (QC):  6


Walk 150 ft (QC):  6


Walking 10ft on Uneven Surface:  6


1 Step (curb) (QC):  6


4 Steps (QC):  5


12 Steps (QC):  9


Picking up an Object (QC):  88


Does the Pt use WC or Scooter?:  No


Type:  N/A


Type:  N/A





PT Plan


Problem List


Problem List:  Activity Tolerance, Functional Strength, Safety, Balance, Gait, 

Transfer, Bed Mobility





Treatment/Plan


Treatment Plan:  Continue Plan of Care


Treatment Plan:  Bed Mobility, Education, Functional Activity Hema, Functional 

Strength, Group Therapy, Gait, Safety, Therapeutic Exercise, Transfers


Treatment Duration:  Dec 5, 2019


Frequency:  At least 5 of 7 days/Wk (IRF)


Estimated Hrs Per Day:  1.5 hours per day


Patient and/or Family Agrees t:  Yes





Time/GCodes


Time In:  1249


Time Out:  1315


Total Billed Treatment Time:  26


Total Billed Treatment


visit


GT 26











VIANEY FRANCO PT             2019 13:16


POS

## 2019-11-26 VITALS — DIASTOLIC BLOOD PRESSURE: 69 MMHG | SYSTOLIC BLOOD PRESSURE: 119 MMHG

## 2019-11-26 VITALS — SYSTOLIC BLOOD PRESSURE: 93 MMHG | DIASTOLIC BLOOD PRESSURE: 62 MMHG

## 2019-11-26 VITALS — DIASTOLIC BLOOD PRESSURE: 60 MMHG | SYSTOLIC BLOOD PRESSURE: 101 MMHG

## 2019-11-26 VITALS — DIASTOLIC BLOOD PRESSURE: 53 MMHG | SYSTOLIC BLOOD PRESSURE: 107 MMHG

## 2019-11-26 VITALS — SYSTOLIC BLOOD PRESSURE: 108 MMHG | DIASTOLIC BLOOD PRESSURE: 71 MMHG

## 2019-11-26 LAB
INR PPP: 1.4 (ref 0.8–1.4)
PROTHROMBIN TIME: 18.1 SEC (ref 12.2–14.7)

## 2019-11-26 RX ADMIN — Medication SCH ML: at 21:39

## 2019-11-26 RX ADMIN — GABAPENTIN SCH MG: 100 CAPSULE ORAL at 21:39

## 2019-11-26 RX ADMIN — SODIUM CHLORIDE SCH MLS/HR: 900 INJECTION INTRAVENOUS at 11:32

## 2019-11-26 RX ADMIN — INSULIN ASPART SCH UNIT: 100 INJECTION, SOLUTION INTRAVENOUS; SUBCUTANEOUS at 05:25

## 2019-11-26 RX ADMIN — SODIUM CHLORIDE SCH MLS/HR: 900 INJECTION INTRAVENOUS at 22:55

## 2019-11-26 RX ADMIN — DOCUSATE SODIUM AND SENNOSIDES SCH EA: 8.6; 5 TABLET, FILM COATED ORAL at 20:15

## 2019-11-26 RX ADMIN — POLYETHYLENE GLYCOL (3350) SCH GM: 17 POWDER, FOR SOLUTION ORAL at 10:32

## 2019-11-26 RX ADMIN — Medication SCH ML: at 05:29

## 2019-11-26 RX ADMIN — FERROUS SULFATE TAB 325 MG (65 MG ELEMENTAL FE) SCH MG: 325 (65 FE) TAB at 09:32

## 2019-11-26 RX ADMIN — DOCUSATE SODIUM SCH MG: 100 CAPSULE ORAL at 10:32

## 2019-11-26 RX ADMIN — INSULIN ASPART SCH UNIT: 100 INJECTION, SOLUTION INTRAVENOUS; SUBCUTANEOUS at 15:46

## 2019-11-26 RX ADMIN — METOPROLOL TARTRATE SCH MG: 25 TABLET, FILM COATED ORAL at 20:15

## 2019-11-26 RX ADMIN — SODIUM CHLORIDE SCH MLS/HR: 900 INJECTION INTRAVENOUS at 05:29

## 2019-11-26 RX ADMIN — FERROUS SULFATE TAB 325 MG (65 MG ELEMENTAL FE) SCH MG: 325 (65 FE) TAB at 20:14

## 2019-11-26 RX ADMIN — WARFARIN SODIUM SCH MG: 5 TABLET ORAL at 17:15

## 2019-11-26 RX ADMIN — INSULIN ASPART SCH UNIT: 100 INJECTION, SOLUTION INTRAVENOUS; SUBCUTANEOUS at 20:14

## 2019-11-26 RX ADMIN — PANTOPRAZOLE SODIUM SCH MG: 40 TABLET, DELAYED RELEASE ORAL at 09:32

## 2019-11-26 RX ADMIN — GABAPENTIN SCH MG: 100 CAPSULE ORAL at 05:28

## 2019-11-26 RX ADMIN — ACETAMINOPHEN PRN MG: 500 TABLET ORAL at 10:41

## 2019-11-26 RX ADMIN — MONTELUKAST SCH MG: 10 TABLET, FILM COATED ORAL at 20:15

## 2019-11-26 RX ADMIN — GABAPENTIN SCH MG: 100 CAPSULE ORAL at 13:44

## 2019-11-26 RX ADMIN — Medication SCH ML: at 11:38

## 2019-11-26 RX ADMIN — IPRATROPIUM BROMIDE AND ALBUTEROL SULFATE SCH ML: .5; 3 SOLUTION RESPIRATORY (INHALATION) at 14:29

## 2019-11-26 RX ADMIN — IPRATROPIUM BROMIDE AND ALBUTEROL SULFATE SCH ML: .5; 3 SOLUTION RESPIRATORY (INHALATION) at 11:04

## 2019-11-26 RX ADMIN — IPRATROPIUM BROMIDE AND ALBUTEROL SULFATE SCH ML: .5; 3 SOLUTION RESPIRATORY (INHALATION) at 19:01

## 2019-11-26 RX ADMIN — INSULIN ASPART SCH UNIT: 100 INJECTION, SOLUTION INTRAVENOUS; SUBCUTANEOUS at 11:32

## 2019-11-26 RX ADMIN — SODIUM CHLORIDE SCH MLS/HR: 900 INJECTION INTRAVENOUS at 17:16

## 2019-11-26 RX ADMIN — METOPROLOL TARTRATE SCH MG: 25 TABLET, FILM COATED ORAL at 09:32

## 2019-11-26 RX ADMIN — ENOXAPARIN SODIUM SCH MG: 100 INJECTION SUBCUTANEOUS at 20:14

## 2019-11-26 RX ADMIN — POLYETHYLENE GLYCOL (3350) SCH GM: 17 POWDER, FOR SOLUTION ORAL at 20:15

## 2019-11-26 RX ADMIN — DOCUSATE SODIUM AND SENNOSIDES SCH EA: 8.6; 5 TABLET, FILM COATED ORAL at 09:33

## 2019-11-26 RX ADMIN — IPRATROPIUM BROMIDE AND ALBUTEROL SULFATE SCH ML: .5; 3 SOLUTION RESPIRATORY (INHALATION) at 07:44

## 2019-11-26 NOTE — PROGRESS NOTE
Subjective


Time Seen by a Provider:  08:35


Subjective/Events-last exam


Impression resting comfortably this morning.


INR still low.


Sputum cultures Pseudomonas on cefepime.


Chest x-ray shows improvement.


Fractured hip area





Objective


Exam





Vital Signs








  Date Time  Temp Pulse Resp B/P (MAP) Pulse Ox O2 Delivery O2 Flow Rate FiO2


 


19 08:04  89 20 119/69 (86) 98 Nasal Cannula 2.00 


 


19 07:45     90 Nasal Cannula 2.00 


 


19 06:06 37.6 110 22 108/71 (83) 92 Nasal Cannula 2.00 


 


19 21:00      Nasal Cannula 2.00 


 


19 19:54     90 Nasal Cannula 2.00 


 


19 17:24      Nasal Cannula 2.00 


 


19 15:18 36.8 66 14 100/60 (73) 99 Nasal Cannula 2.00 


 


19 12:03     90 Room Air  


 


19 11:00  87  104/67 (79) 97 Nasal Cannula 2.00 


 


19 09:00      Nasal Cannula 2.00 


 


19 08:35     95 Nasal Cannula 2.00 














I & O 


 


 19





 07:00


 


Intake Total 2030 ml


 


Output Total 925 ml


 


Balance 1105 ml





Capillary Refill : Less Than 3 Seconds


General Appearance:  No Apparent Distress, Thin


HEENT:  Normal ENT Inspection


Neck:  Full Range of Motion, Normal Inspection


Respiratory:  No Accessory Muscle Use, No Respiratory Distress, Decreased Breath

Sounds


Cardiovascular:  Irregularly Irregular


Gastrointestinal:  non tender, soft





Results


Lab


Laboratory Tests


19 10:59: Glucometer 224H


19 15:16: Glucometer 123H


19 20:17: Glucometer 217H


19 05:15: 


Prothrombin Time 18.1H, INR Comment 1.4


19 05:24: Glucometer 156H





Microbiology


19 Blood Culture - Preliminary, Resulted


           No growth


19 Gram Stain - Final, Resulted


           


19 Sputum Culture - Preliminary, Resulted


           Usual upper respiratory [place]


           Probable Pseudomonas





Assessment/Plan


Assessment/Plan


Assess & Plan/Chief Complaint


Hip fracture.


COPD.


Atrial fibrillation.


Diabetes.


Febrile.


Leukocytosis.


.


19.


Hip fracture.


COPD.


Atrial fibrillation.


Diabetes.


Sputum culture pseudomonas.


INR low.





Clinical Quality Measures


DVT/VTE Risk/Contraindication:


Risk Factor Score Per Nursin


RFS Level Per Nursing on Admit:  4+=Very High











TOR CONNELLY DO         2019 08:36


POS

## 2019-11-26 NOTE — PM&R PROGRESS NOTE
Subjective


HPI/CC On Admission


Date Seen by Provider:  Nov 26, 2019


Time Seen by Provider:  08:30


CC: Hip Fracture





HPI: Patient fell twice Monday receiving a hip fracture and had surgery.  BRANDON tony was transferred to in-patient rehab yesterday. Patient does have pain 

when standing and rates it as a 9/10 otherwise, his pain is a 3/10. Pain does 

travel down his right leg. Rehab has been going well he states, and is sore from

rehab.


Subjective/Events-last exam


Minimal pain unless he starts moving his leg 


Taking Tylenol only for the leg does not require a narcotic anymore


Cefepime maintained per Dr. Patino for the pneumonia and that antibiotic as 

tolerated well


Sputum culture returned and revealed Pseudomonas so will maintain Cefepime until

14 days started 11/22/19


Conferred with RN


Reviewed therapy notes


Checked meds and labs





Review of Systems


General:  Fatigue


Pulmonary:  Dyspnea, Cough





Objective


Exam


Vital Signs





Vital Signs








  Date Time  Temp Pulse Resp B/P (MAP) Pulse Ox O2 Delivery O2 Flow Rate FiO2


 


11/26/19 08:04  89 20 119/69 (86) 98 Nasal Cannula 2.00 


 


11/26/19 06:06 37.6       





Capillary Refill : Less Than 3 Seconds


General Appearance:  No Apparent Distress, WD/WN, Chronically ill, Thin


HEENT:  PERRL/EOMI, Normal ENT Inspection, Pharynx Normal


Neck:  Full Range of Motion, Normal Inspection


Respiratory:  Chest Non Tender, No Accessory Muscle Use, No Respiratory 

Distress, Crackles, Decreased Breath Sounds, Wheezing


Cardiovascular:  No Gallop, No JVD, No Murmur, Normal Peripheral Pulses, 

Irregularly Irregular


Gastrointestinal:  Normal Bowel Sounds, No Organomegaly, No Pulsatile Mass, Non 

Tender, Soft


Extremity:  Normal Capillary Refill, Normal Inspection, Normal Range of Motion 

(except right leg), Non Tender, No Calf Tenderness, No Pedal Edema


Neurologic/Psychiatric:  Alert, Oriented x3, No Motor/Sensory Deficits, Normal 

Mood/Affect, CNs II-XII Norm as Tested


Skin:  Normal Color, Warm/Dry





Results/Procedures


Lab


Patient resulted labs reviewed.





FIM


Transfers


Therapy Code Descriptions/Definitions 





Functional [place] Measure:


0=Not Assessed/NA        4=Minimal Assistance


1=Total Assistance        5=Supervision or Setup


2=Maximal Assistance  6=Modified [place]


3=Moderate Assistance 7=Complete IndependenceSCALE: Activities may be completed 

with or without assistive devices.





6-Indepedent-patient completes the activity by him/herself with no assistance 

from a helper.


5-Set-up or Clean-up Assistance-helper sets up or cleans up; patient completes 

activity. [place] assists only prior to or  


    following the activity.


4-Supervision or Touching Assistance-helper provides verbal cues and/or 

touching/steadying and/or contact guard assistance as patient completes 

activity. Assistance may be provided   


    throughout the activity or intermittently.


3-Partial/Moderate Assistance-helper does LESS THAN HALF the effort. [place] 

lifts, holds or supports trunk or limbs, but provides less than half the effort.


2-Substantial/Maximal Assistance-helper does MORE THAN HALF the effort. [place] 

lifts or holds trunk or limbs and provides more than half the effort.


4-Kycnrgttn-yymnkh does ALL the effort. Patient does none of the effort to co

mplete the activity. Or, the assistance of 2 or more helpers is required for the

patient to complete the  


    activity.


If activity was not attempted, code reason:


7-Patient Refused.


9-Not Applicable-not attempted and the patient did not perform the activity 

before the current illness, exacerbation or injury.


10-Not Attempted due to Environmental Limitations-(lack of equipment, weather 

restraints, etc.).


88-Not Attempted due to Medical Conditions or Safety Concerns.


Roll Left to Right (QC):  4


Sit to Lying (QC):  5


Sit to Stand (QC):  5


Chair/Bed-to-Chair Xfer(QC):  4


Car Transfer (QC):  3 (assist with right LE)





Gait Training


Does the Patient Walk?:  Yes


Distance:  20, 16, 16


Walk 10 feet (QC):  4


Walk 50 ft with 2 Turns(QC):  4


Walk 150 ft (QC):  88


Walking 10ft/uneven surface-QC:  3


Gait Persons Needed:  1


Gait Assistive Device:  FWW





Wheelchair Training


Does the Pt Use a Wheelchair?:  Yes


Wheel 50 ft with 2 turns (QC):  5


Wheel 150 ft (QC):  9


Type of Wheelchair:  Manual





Stair Training


 Stair Training: Handrails/:  2 handrails


1 Step (curb) (QC):  88 (pt unsafe to attempt due to TTWB status)


4 Steps (QC):  4 (CGA with skilled cues for sequencing and safety)


12 Steps (QC):  88





Balance


Picking up an Object (QC):  88





ADL-Treatment


Eating (QC):  6


Oral Hygiene (QC):  4 (SBA at sink)


Bathing Location:  L Arm, R Arm, L Upper Leg, R Upper Leg, Chest, Abdomen, 

Buttocks, Perineal Area


Shower/Bathe Self (QC):  6 (Pt completes with IND (after s/u for bandaging and 

IV coverage which will not be compelted at home). Pt does not stand to wash 

bottom but completes all in seated position)


Upper Body Dressing (QC):  6


Lower Body Dressing (QC):  4 (CGA in stance to pull over hips, pt requires 

intermittent assist during task.)


On/Off Footwear (QC):  7


Toileting Hygiene (QC):  4 (CGA during mangement of clothing. Pt able to manage 

clothing up/down and complete hygiene.)


Toilet Transfer (QC):  3 (BSC over toilet. Pt able to sit with CGA, required min

A to stand from toilet. )





Assessment/Plan


Assessment and Plan


Assess & Plan/Chief Complaint


Assessment:


Right femur fracture s/p fall in Dr Patino's office


Pneumonia placed on Cefepime per DR Patino Pseudomonas on Cx started abx 

11/22/19


Severe COPD with recurrent pneumonia in the past





Plan:


IRF protocol


Cefepime for 14 days total


Nebs


O2


Dr Patino appreciated


Checked labs





(1) Closed right femoral fracture


(2) Debility


Status:  Acute


(3) Paroxysmal atrial fibrillation


Status:  Chronic


(4) Dyspnea


Status:  Acute


(5) COPD (chronic obstructive pulmonary disease)


Status:  Chronic


(6) Pneumonia


Status:  Acute











ALEKS WILSON DO                Nov 26, 2019 09:46


POS

## 2019-11-26 NOTE — OCCUPATIONAL THER DAILY NOTE
OT Current Status-Daily Note


Subjective


Pt seen post-PT session in recliner chair. Pt agreeable to OT tx session, states

minimal pain while seated.





Mental Status/Objective


Patient Orientation:  Normal For Age





ADL-Treatment


Therapy Code Descriptions/Definitions 





Functional [place] Measure:


0=Not Assessed/NA        4=Minimal Assistance


1=Total Assistance        5=Supervision or Setup


2=Maximal Assistance  6=Modified [place]


3=Moderate Assistance 7=Complete IndependenceSCALE: Activities may be completed 

with or without assistive devices.





6-Indepedent-patient completes the activity by him/herself with no assistance 

from a helper.


5-Set-up or Clean-up Assistance-helper sets up or cleans up; patient completes 

activity. [place] assists only prior to or  


    following the activity.


4-Supervision or Touching Assistance-helper provides verbal cues and/or 

touching/steadying and/or contact guard assistance as patient completes 

activity. Assistance may be provided   


    throughout the activity or intermittently.


3-Partial/Moderate Assistance-helper does LESS THAN HALF the effort. [place] 

lifts, holds or supports trunk or limbs, but provides less than half the effort.


2-Substantial/Maximal Assistance-helper does MORE THAN HALF the effort. [place] 

lifts or holds trunk or limbs and provides more than half the effort.


3-Fvlzkcgxg-hxtbep does ALL the effort. Patient does none of the effort to 

complete the activity. Or, the assistance of 2 or more helpers is required for 

the patient to complete the  


    activity.


If activity was not attempted, code reason:


7-Patient Refused.


9-Not Applicable-not attempted and the patient did not perform the activity 

before the current illness, exacerbation or injury.


10-Not Attempted due to Environmental Limitations-(lack of equipment, weather 

restraints, etc.).


88-Not Attempted due to Medical Conditions or Safety Concerns.


Eating (QC):  6


Oral Hygiene (QC):  7


Lower Body Dressing (QC):  4 (CGA in stance to pull over hips, pt completes 

donning with reacher.)





Other Treatment


Pt completes UE theraband exercises to address weakness/ endurance; pt complete 

shoulder flexion, shoulder abduction, shoulder int/ ext rotation (5 sets of 5 

reps) with breaks in between. Pt educated on diaphragmatic breathing, pt able to

complete 2 seconds inhale/ exhale with competence, unable to complete longer 

breaths. Pt folds laundry seated in chair with good motor control, redirection 

needed for task attention. Pt completes LB dressing in chair/ standing at W. 

Pt educated on home safety and energy conservation within the home. Pt states he

already completes most energy conservation tasks, but nods in agreement. Pt left

with nursing staff end of session, call light in reach, all needs met.





Education


OT Patient Education:  Correct positioning, Energy conservation, Exercise 

program, Home exercise program, Modified ADL techniques, Safety issues, Use of 

adapted equipment


Teaching Recipient:  Patient


Teaching Methods:  Demonstration, Handout, Discussion


Response to Teaching:  Verbalize Understanding, Return Demonstration





OT Short Term Goals


Short Term Goals


Time Frame:  Dec 6, 2019


Oral hygiene:  5


Toileting hygiene:  3


Shower/bathe self:  3


Upper body dressin


Lower body dressing:  3 (met)


Putting on/taking off footwear:  3





OT Long Term Goals


Long Term Goals


Time Frame:  Dec 20, 2019


Eating (QC):  6 (met)


Oral Hygiene (QC):  6


Toileting Hygiene (QC):  6


Shower/Bathe Self (QC):  6 (met)


Upper Body Dressing (QC):  6 (met)


Lower Body Dressing (QC):  6


On/Off Footwear (QC):  6


Additional Goals:  1-Demonstrate ADL Tasks, 2-Verbalize Understanding, 

3-ImproveStrength/Hema


1=Demonstrate adherence to instructed precautions during ADL tasks.


2=Patient will verbalize/demonstrate understanding of assistive 

devices/modifications for ADL.


3=Patient will improve strength/tolerance for activity to enable patient to 

perform ADL's.





OT Education/Plan


Problem List/Assessment


Assessment:  Decreased Activ Tolerance, Decreased UE Strength, Impaired Funct 

Balance, Impaired I ADL's, Impaired Self-Care Skills





Discharge Recommendations


Plan/Recommendations:  Continue POC





Treatment Plan/Plan of Care


Treatment,Training & Education:  Yes


Patient would benefit from OT for education, treatment and training to promote 

independence in ADL's, mobility, safety and/or upper extremity function for 

ADL's.


Plan of Care:  ADL Retraining, Functional Mobility, Group Exercise/Act as Ind, 

UE Funct Exercise/Act


Treatment Duration:  Dec 20, 2019


Frequency:  At least 5 of 7 days/Wk (IRF)


Estimated Hrs Per Day:  1.5 hours per day


Agreement:  Yes


Rehab Potential:  Guarded





Time/GCodes


Start Time:  10:00


Stop Time:  10:45


Total Time Billed (hr/min):  45


Billed Treatment Time


1, ADL (15), EX 2 (30)= 45











NARAYAN PITT OTR                2019 10:49


POS

## 2019-11-26 NOTE — PHYSICAL THERAPY DAILY NOTE
PT Daily Note-Current


Subjective


Pt agreeeable to PT session.





Pain





   Numeric Pain Scale:  5-Moderate Pain


   Location:  Right


   Location Body Site:  Hip


   Comment:  pain only with activities/mvmnt, no pain sitting at rest





Appearance


Pt sitting up in recliner awake and alert upon arrival. Pt requesting and 

assisted to bathroom. At end of session, pt sitting up in recliner with call 

light, phone and bedside table within reach.





Mental Status


Patient Orientation:  Person, Place, Situation





Transfers


SCALE: Activities may be completed with or without assistive devices.





6-Indepedent-patient completes the activity by him/herself with no assistance 

from a helper.


5-Set-up or Clean-up Assistance-helper sets up or cleans up; patient completes 

activity. [place] assists only prior to or  


    following the activity.


4-Supervision or Touching Assistance-helper provides verbal cues and/or 

touching/steadying and/or contact guard assistance as patient completes 

activity. Assistance may be provided   


    throughout the activity or intermittently.


3-Partial/Moderate Assistance-helper does LESS THAN HALF the effort. [place] 

lifts, holds or supports trunk or limbs, but provides less than half the effort.


2-Substantial/Maximal Assistance-helper does MORE THAN HALF the effort. [place] 

lifts or holds trunk or limbs and provides more than half the effort.


0-Pjckgaehf-ssjoin does ALL the effort. Patient does none of the effort to 

complete the activity. Or, the assistance of 2 or more helpers is required for 

the patient to complete the  


    activity.


If activity was not attempted, code reason:


7-Patient Refused.


9-Not Applicable-not attempted and the patient did not perform the activity 

before the current illness, exacerbation or injury.


10-Not Attempted due to Environmental Limitations-(lack of equipment, weather 

restraints, etc.).


88-Not Attempted due to Medical Conditions or Safety Concerns.


Sit to Stand (QC):  4


Toilet Transfer (QC):  4


following TTWB ~50% of time, otherwise with pt report of feeling that he is 

putting less than half his wt on it





Weight Bearing


Right Lower Extremity:  Right


Touch Toe Bearing


Left Lower Extremity:  Left


Full Weight Bearing





Gait Training


Does the Patient Walk?:  Yes


Distance:  100 x2


Walk 10 feet (QC):  4


Walk 50 ft with 2 Turns(QC):  4


Gait Persons Needed:  1


Gait Assistive Device:  FWW


very slow pace, unable to continually maintain RLE TTWB, requires constant verb 

inst for keep wt off of it, improved after practicing NWB in // bars for 25 ft





Exercises


Standing:  Hip Abduction (RLE), Hamstring curls (RLE), 3 way Ex=Flex, Abd, Ext 

(RLE), Mini squats (LLE with NWB RLE)


Standing Reps:  20 (requiring several sitting rest breaks)





Treatments


education, safety, transfers, gait, strength, balance, functional mobility, 

activity tolerance





Assessment


Current Status:  Fair Progress





PT Short Term Goals


Short Term Goals


Time Frame:  2019


Sit to lyin (met)


Lying to sitting on side of be:  5 (met)


Walk 10 feet:  5


Walk 50 feet with two turns:  5


Walk 150 feet:  5


4 steps:  4





PT Long Term Goals


Long Term Goals


PT Long Term Goals Time Frame:  Dec 5, 2019


Roll Left & Right (QC):  6


Sit to Lying (QC):  6


Lying-Sitting on Side/Bed(QC):  6


Sit to Stand (QC):  6


Chair/Bed-to-Chair Xfer(QC):  6


Toilet Transfer (QC):  6


Car Transfer (QC):  6


Does the Patient Walk:  Yes


Walk 10 feet (QC):  6


Walk 50ft with 2 Turns (QC):  6


Walk 150 ft (QC):  6


Walking 10ft on Uneven Surface:  6


1 Step (curb) (QC):  6


4 Steps (QC):  5


12 Steps (QC):  9


Picking up an Object (QC):  88


Does the Pt use WC or Scooter?:  No


Type:  N/A


Type:  N/A





PT Plan


Treatment/Plan


Treatment Plan:  Continue Plan of Care


Treatment Plan:  Bed Mobility, Education, Functional Activity Hema, Functional 

Strength, Group Therapy, Gait, Safety, Therapeutic Exercise, Transfers


Treatment Duration:  Dec 5, 2019


Frequency:  At least 5 of 7 days/Wk (IRF)


Estimated Hrs Per Day:  1.5 hours per day


Patient and/or Family Agrees t:  Yes





Safety Risks/Education


Patient Education:  Gait Training, Transfer Techniques, Safety Issues


Teaching Recipient:  Patient


Teaching Methods:  Demonstration, Discussion


Response to Teaching:  Verbalize Understanding, Return Demonstration, Reinforc

ement Needed





Time/GCodes


Time In:  900


Time Out:  1000


Total Billed Treatment Time:  60


Total Billed Treatment


1 visit, GT x30 min, FA x15 min, EX x15 min











LAWRENCE BUCHANAN PTA            2019 13:01


POS

## 2019-11-26 NOTE — SPEECH THERAPY DAILY NOTE
Speech Daily Progress Note


Subjective


Date Seen by Provider:  Nov 26, 2019


Time Seen by Provider:  00:30


Patient was eating breakfast when I entered his room.





Objective


Patient demonstrated safety awareness while eating at 80% with 20% verbal cues.





Assessment


Assessment Current Status:  Good Progress





Treatment Plan


Continue Plan of Care





Speech Short Term Goals


Short Term Goals


Short Term Goals


1) The patient will complete memory tasks related to his daily needs at 90% or 

greater.


2) The patient will complete problem solving tasks related to his daily needs at

90% or greater.


3) The patient will complete safety awareness tasks related to his daily needs 

at 90% or greater.





Speech Long Term Goals


Long Term Goals


The patient will improve cognitive-communication necessary for safety and daily 

living tasks with minimal assist.





Speech-Plan


Patient/Family Goals


Patient/Family Goals:  


Patient plans on returning to his home with his wife upon discharge from


rehab.





Treatment Plan


Speech Therapy Treatment Plan:  Continue Plan of Care


Patient is making progress with skilled therapy.


Treatment Duration:  Nov 27, 2019


Frequency:  5 times per week


Estimated Hrs Per Day:  .5 hour per day


Rehab Potential:  Guarded


Barriers to Learning:  


Patient has cognitive deficits.


Pt/Family Agrees to Plan:  Yes





Safety Risks/Education


Teaching Recipient:  Patient


Teaching Methods:  Demonstration, Discussion


Response to Teaching:  Verbalize Understanding, Return Demonstration


Education Topics Provided:  


Continued safety when he returns home.





Time


Speech Therapy Time In:  08:30


Speech Therapy Time Out:  09:00


Total Billed Time:  30


Billed Treatment Time


[address]            Nov 26, 2019 10:18


POS

## 2019-11-26 NOTE — OCCUPATIONAL THER DAILY NOTE
OT Current Status-Daily Note


Subjective


Pt seen sitting up in bed, food placed in front of pt. Pt states he slept about 

"half the night" due to "getting up and waiting on people," pt states he 

urinated on the floor/ his pants last night and is frustrated. Pt does not c/o 

pain while sitting, but is slouching. Pt denies need for readjustment, agreeable

to OT tx session.





Mental Status/Objective


Patient Orientation:  Normal For Age





ADL-Treatment


Therapy Code Descriptions/Definitions 





Functional [place] Measure:


0=Not Assessed/NA        4=Minimal Assistance


1=Total Assistance        5=Supervision or Setup


2=Maximal Assistance  6=Modified [place]


3=Moderate Assistance 7=Complete IndependenceSCALE: Activities may be completed 

with or without assistive devices.





6-Indepedent-patient completes the activity by him/herself with no assistance 

from a helper.


5-Set-up or Clean-up Assistance-helper sets up or cleans up; patient completes 

activity. [place] assists only prior to or  


    following the activity.


4-Supervision or Touching Assistance-helper provides verbal cues and/or 

touching/steadying and/or contact guard assistance as patient completes 

activity. Assistance may be provided   


    throughout the activity or intermittently.


3-Partial/Moderate Assistance-helper does LESS THAN HALF the effort. [place] 

lifts, holds or supports trunk or limbs, but provides less than half the effort.


2-Substantial/Maximal Assistance-helper does MORE THAN HALF the effort. [place] 

lifts or holds trunk or limbs and provides more than half the effort.


6-Gsezulztl-ocnnmy does ALL the effort. Patient does none of the effort to 

complete the activity. Or, the assistance of 2 or more helpers is required for 

the patient to complete the  


    activity.


If activity was not attempted, code reason:


7-Patient Refused.


9-Not Applicable-not attempted and the patient did not perform the activity 

before the current illness, exacerbation or injury.


10-Not Attempted due to Environmental Limitations-(lack of equipment, weather 

restraints, etc.).


88-Not Attempted due to Medical Conditions or Safety Concerns.


Eating (QC):  6





Other Treatment


Pt completes eating breakfast in bed, OT educates pt on home safety and energy 

conservation tasks while eating. Pt nods and converses. Pt agrees to sit up in 

chair during session, bed mob IND with pain noted while moving. Pt sit to stand 

with CGA and moderate pain expressed in hip. Pt sits in recliner chair, sets up 

to eat rest of breakfast. Pt left in recliner chair, all needs met, call light 

in reach.





Education


OT Patient Education:  Correct positioning, Energy conservation, Purpose of 

tx/functional activities, Transfer techniques


Teaching Recipient:  Patient


Teaching Methods:  Demonstration, Handout


Response to Teaching:  Verbalize Understanding





OT Short Term Goals


Short Term Goals


Time Frame:  Dec 6, 2019


Oral hygiene:  5


Toileting hygiene:  3


Shower/bathe self:  3


Upper body dressin


Lower body dressing:  3 (met)


Putting on/taking off footwear:  3





OT Long Term Goals


Long Term Goals


Time Frame:  Dec 20, 2019


Eating (QC):  6 (met)


Oral Hygiene (QC):  6


Toileting Hygiene (QC):  6


Shower/Bathe Self (QC):  6 (met)


Upper Body Dressing (QC):  6 (met)


Lower Body Dressing (QC):  6


On/Off Footwear (QC):  6


Additional Goals:  1-Demonstrate ADL Tasks, 2-Verbalize Understanding, 3-

ImproveStrength/Hema


1=Demonstrate adherence to instructed precautions during ADL tasks.


2=Patient will verbalize/demonstrate understanding of assistive 

devices/modifications for ADL.


3=Patient will improve strength/tolerance for activity to enable patient to 

perform ADL's.





OT Education/Plan


Problem List/Assessment


Assessment:  Decreased Activ Tolerance, Decreased UE Strength, Dependent 

Transfers, Impaired Funct Balance, Impaired I ADL's, Impaired Self-Care Skills





Discharge Recommendations


Plan/Recommendations:  Continue POC


Equpiment Recommendations-D/C:  Hip Kit





Treatment Plan/Plan of Care


Treatment,Training & Education:  Yes


Patient would benefit from OT for education, treatment and training to promote 

independence in ADL's, mobility, safety and/or upper extremity function for 

ADL's.


Plan of Care:  ADL Retraining, Functional Mobility, Group Exercise/Act as Ind, 

UE Funct Exercise/Act


Treatment Duration:  Dec 20, 2019


Frequency:  At least 5 of 7 days/Wk (IRF)


Estimated Hrs Per Day:  1.5 hours per day


Agreement:  Yes


Rehab Potential:  Guarded





Time/GCodes


Start Time:  08:00


Stop Time:  08:30


Total Time Billed (hr/min):  30


Billed Treatment Time


1, ADL (15), FA (15)= 30











NARAYAN PITT OTR                2019 09:17


POS

## 2019-11-26 NOTE — PHYSICAL THERAPY DAILY NOTE
PT Daily Note-Current


Subjective


Pt agreeable to PT session





Pain





   Numeric Pain Scale:  0-No Pain


   Comment:  no pain sitting at rest





Appearance


Pt sitting up in recliner asleep but easily aroused upon arrival. At end of 

session, pt requesting and assisted to bed, pt supine in bed with call light, 

phone and bedside table within reach, bed alarm activated.





Mental Status


Patient Orientation:  Person, Place, Time, Eyes Open, Situation


Attachments:  Saline Lock, Oxygen (2L O2/NC at night and PRN throughout day)





Transfers


SCALE: Activities may be completed with or without assistive devices.





6-Indepedent-patient completes the activity by him/herself with no assistance 

from a helper.


5-Set-up or Clean-up Assistance-helper sets up or cleans up; patient completes 

activity. [place] assists only prior to or  


    following the activity.


4-Supervision or Touching Assistance-helper provides verbal cues and/or 

touching/steadying and/or contact guard assistance as patient completes 

activity. Assistance may be provided   


    throughout the activity or intermittently.


3-Partial/Moderate Assistance-helper does LESS THAN HALF the effort. [place] 

lifts, holds or supports trunk or limbs, but provides less than half the effort.


2-Substantial/Maximal Assistance-helper does MORE THAN HALF the effort. [place] 

lifts or holds trunk or limbs and provides more than half the effort.


7-Ezcephtqz-hsitrk does ALL the effort. Patient does none of the effort to compl

ete the activity. Or, the assistance of 2 or more helpers is required for the 

patient to complete the  


    activity.


If activity was not attempted, code reason:


7-Patient Refused.


9-Not Applicable-not attempted and the patient did not perform the activity 

before the current illness, exacerbation or injury.


10-Not Attempted due to Environmental Limitations-(lack of equipment, weather 

restraints, etc.).


88-Not Attempted due to Medical Conditions or Safety Concerns.


Sit to Lying (QC):  4 (SBA)


Sit to Stand (QC):  4 (SBA with skilled verb inst to be compliant with WB 

status)


requiring skilled verb inst ~50% of time to follow RLE TTWB





Weight Bearing


Right Lower Extremity:  Right


Touch Toe Bearing


Left Lower Extremity:  Left


Full Weight Bearing





Gait Training


Does the Patient Walk?:  Yes


Distance:  95 x2


Walk 50 ft with 2 Turns(QC):  4


Gait Persons Needed:  1


Gait Assistive Device:  FWW


TTWB RLE, pt non compliant without constant instruction





Exercises


Seated Therapy Exercises:  Ankle pumps (50), Long arc quads (20 x2), Chair 

press-ups (20, skilled verb inst for technique, WB RLE precaution and controlled

descent), Hip flexion (20 x2, requires skilled inst for technique and performing

slow and controlled), Hip abd/add (2 x20)





Treatments


education, safety, transfers, gait, strength, balance, bed mobility, activity 

tolerance, functional mobility





Assessment


Current Status:  Fair Progress





PT Short Term Goals


Short Term Goals


Time Frame:  2019


Sit to lyin (met)


Lying to sitting on side of be:  5 (met)


Walk 10 feet:  5


Walk 50 feet with two turns:  5


Walk 150 feet:  5


4 steps:  4





PT Long Term Goals


Long Term Goals


PT Long Term Goals Time Frame:  Dec 5, 2019


Roll Left & Right (QC):  6


Sit to Lying (QC):  6


Lying-Sitting on Side/Bed(QC):  6


Sit to Stand (QC):  6


Chair/Bed-to-Chair Xfer(QC):  6


Toilet Transfer (QC):  6


Car Transfer (QC):  6


Does the Patient Walk:  Yes


Walk 10 feet (QC):  6


Walk 50ft with 2 Turns (QC):  6


Walk 150 ft (QC):  6


Walking 10ft on Uneven Surface:  6


1 Step (curb) (QC):  6


4 Steps (QC):  5


12 Steps (QC):  9


Picking up an Object (QC):  88


Does the Pt use WC or Scooter?:  No


Type:  N/A


Type:  N/A





PT Plan


Treatment/Plan


Treatment Plan:  Continue Plan of Care


Treatment Plan:  Bed Mobility, Education, Functional Activity Hema, Functional 

Strength, Group Therapy, Gait, Safety, Therapeutic Exercise, Transfers


Treatment Duration:  Dec 5, 2019


Frequency:  At least 5 of 7 days/Wk (IRF)


Estimated Hrs Per Day:  1.5 hours per day


Patient and/or Family Agrees t:  Yes





Safety Risks/Education


Patient Education:  Gait Training, Transfer Techniques, Safety Issues


Teaching Recipient:  Patient


Teaching Methods:  Demonstration, Discussion


Response to Teaching:  Verbalize Understanding, Return Demonstration, 

Reinforcement Needed





Time/GCodes


Time In:  1330


Time Out:  1400


Total Billed Treatment Time:  30


Total Billed Treatment


1 visit, GT x15 min, EX x15 min











CHANEL,LAWRENCE PTA            2019 13:40


POS

## 2019-11-27 VITALS — SYSTOLIC BLOOD PRESSURE: 107 MMHG | DIASTOLIC BLOOD PRESSURE: 67 MMHG

## 2019-11-27 VITALS — DIASTOLIC BLOOD PRESSURE: 64 MMHG | SYSTOLIC BLOOD PRESSURE: 108 MMHG

## 2019-11-27 VITALS — DIASTOLIC BLOOD PRESSURE: 62 MMHG | SYSTOLIC BLOOD PRESSURE: 101 MMHG

## 2019-11-27 VITALS — SYSTOLIC BLOOD PRESSURE: 94 MMHG | DIASTOLIC BLOOD PRESSURE: 57 MMHG

## 2019-11-27 LAB
BASOPHILS # BLD AUTO: 0 10^3/UL (ref 0–0.1)
BASOPHILS NFR BLD AUTO: 0 % (ref 0–10)
EOSINOPHIL # BLD AUTO: 0.3 10^3/UL (ref 0–0.3)
EOSINOPHIL NFR BLD AUTO: 5 % (ref 0–10)
ERYTHROCYTE [DISTWIDTH] IN BLOOD BY AUTOMATED COUNT: 16.2 % (ref 10–14.5)
HCT VFR BLD CALC: 26 % (ref 40–54)
HGB BLD-MCNC: 8.1 G/DL (ref 13.3–17.7)
INR PPP: 1.7 (ref 0.8–1.4)
LYMPHOCYTES # BLD AUTO: 1.4 X 10^3 (ref 1–4)
LYMPHOCYTES NFR BLD AUTO: 21 % (ref 12–44)
MANUAL DIFFERENTIAL PERFORMED BLD QL: NO
MCH RBC QN AUTO: 30 PG (ref 25–34)
MCHC RBC AUTO-ENTMCNC: 32 G/DL (ref 32–36)
MCV RBC AUTO: 95 FL (ref 80–99)
MONOCYTES # BLD AUTO: 0.6 X 10^3 (ref 0–1)
MONOCYTES NFR BLD AUTO: 9 % (ref 0–12)
NEUTROPHILS # BLD AUTO: 4.5 X 10^3 (ref 1.8–7.8)
NEUTROPHILS NFR BLD AUTO: 65 % (ref 42–75)
PLATELET # BLD: 272 10^3/UL (ref 130–400)
PMV BLD AUTO: 9 FL (ref 7.4–10.4)
PROTHROMBIN TIME: 20.9 SEC (ref 12.2–14.7)
WBC # BLD AUTO: 6.9 10^3/UL (ref 4.3–11)

## 2019-11-27 RX ADMIN — METOPROLOL TARTRATE SCH MG: 25 TABLET, FILM COATED ORAL at 20:54

## 2019-11-27 RX ADMIN — WARFARIN SODIUM SCH MG: 5 TABLET ORAL at 17:23

## 2019-11-27 RX ADMIN — POLYETHYLENE GLYCOL (3350) SCH GM: 17 POWDER, FOR SOLUTION ORAL at 20:54

## 2019-11-27 RX ADMIN — DOCUSATE SODIUM AND SENNOSIDES SCH EA: 8.6; 5 TABLET, FILM COATED ORAL at 20:54

## 2019-11-27 RX ADMIN — SODIUM CHLORIDE SCH MLS/HR: 900 INJECTION INTRAVENOUS at 11:43

## 2019-11-27 RX ADMIN — Medication SCH ML: at 05:24

## 2019-11-27 RX ADMIN — INSULIN ASPART SCH UNIT: 100 INJECTION, SOLUTION INTRAVENOUS; SUBCUTANEOUS at 05:24

## 2019-11-27 RX ADMIN — INSULIN ASPART SCH UNIT: 100 INJECTION, SOLUTION INTRAVENOUS; SUBCUTANEOUS at 15:58

## 2019-11-27 RX ADMIN — DOCUSATE SODIUM AND SENNOSIDES SCH EA: 8.6; 5 TABLET, FILM COATED ORAL at 20:58

## 2019-11-27 RX ADMIN — Medication PRN ML: at 23:01

## 2019-11-27 RX ADMIN — FERROUS SULFATE TAB 325 MG (65 MG ELEMENTAL FE) SCH MG: 325 (65 FE) TAB at 20:58

## 2019-11-27 RX ADMIN — SODIUM CHLORIDE SCH MLS/HR: 900 INJECTION INTRAVENOUS at 17:24

## 2019-11-27 RX ADMIN — GABAPENTIN SCH MG: 100 CAPSULE ORAL at 23:00

## 2019-11-27 RX ADMIN — SODIUM CHLORIDE SCH MLS/HR: 900 INJECTION INTRAVENOUS at 23:00

## 2019-11-27 RX ADMIN — INSULIN ASPART SCH UNIT: 100 INJECTION, SOLUTION INTRAVENOUS; SUBCUTANEOUS at 11:43

## 2019-11-27 RX ADMIN — INSULIN ASPART SCH UNIT: 100 INJECTION, SOLUTION INTRAVENOUS; SUBCUTANEOUS at 20:58

## 2019-11-27 RX ADMIN — SODIUM CHLORIDE SCH MLS/HR: 900 INJECTION INTRAVENOUS at 05:20

## 2019-11-27 RX ADMIN — ACETAMINOPHEN PRN MG: 500 TABLET ORAL at 10:15

## 2019-11-27 RX ADMIN — IPRATROPIUM BROMIDE AND ALBUTEROL SULFATE SCH ML: .5; 3 SOLUTION RESPIRATORY (INHALATION) at 16:30

## 2019-11-27 RX ADMIN — PANTOPRAZOLE SODIUM SCH MG: 40 TABLET, DELAYED RELEASE ORAL at 09:03

## 2019-11-27 RX ADMIN — GABAPENTIN SCH MG: 100 CAPSULE ORAL at 05:20

## 2019-11-27 RX ADMIN — IPRATROPIUM BROMIDE AND ALBUTEROL SULFATE SCH ML: .5; 3 SOLUTION RESPIRATORY (INHALATION) at 07:21

## 2019-11-27 RX ADMIN — DOCUSATE SODIUM AND SENNOSIDES SCH EA: 8.6; 5 TABLET, FILM COATED ORAL at 10:53

## 2019-11-27 RX ADMIN — MONTELUKAST SCH MG: 10 TABLET, FILM COATED ORAL at 20:58

## 2019-11-27 RX ADMIN — FERROUS SULFATE TAB 325 MG (65 MG ELEMENTAL FE) SCH MG: 325 (65 FE) TAB at 09:03

## 2019-11-27 RX ADMIN — ACETAMINOPHEN PRN MG: 500 TABLET ORAL at 20:29

## 2019-11-27 RX ADMIN — METOPROLOL TARTRATE SCH MG: 25 TABLET, FILM COATED ORAL at 10:53

## 2019-11-27 RX ADMIN — ENOXAPARIN SODIUM SCH MG: 100 INJECTION SUBCUTANEOUS at 19:29

## 2019-11-27 RX ADMIN — GABAPENTIN SCH MG: 100 CAPSULE ORAL at 15:01

## 2019-11-27 RX ADMIN — Medication SCH ML: at 11:44

## 2019-11-27 RX ADMIN — Medication SCH ML: at 20:58

## 2019-11-27 RX ADMIN — POLYETHYLENE GLYCOL (3350) SCH GM: 17 POWDER, FOR SOLUTION ORAL at 10:53

## 2019-11-27 RX ADMIN — DOCUSATE SODIUM SCH MG: 100 CAPSULE ORAL at 10:53

## 2019-11-27 NOTE — PHYSICAL THERAPY DAILY NOTE
PT Daily Note-Current


Subjective


Pt agreeable to PT session. States his R hip doesn't hurt as long as he is 

sitting or lying still, occasionally increases greatly with some activities. 

States his L hip also pains him occasionally as well as his L knee which he 

shattered when he was younger.





Pain





   Numeric Pain Scale:  0-No Pain


   Location:  Right


   Location Body Site:  Hip


   Comment:  increases to 9/10 with some mvmnts and activities





Appearance


Pt sitting up in recliner upon arrival, awake and alert. At end of session, pt 

requesting and assisted to bed, call light, phone and bedside table within reach





Mental Status


Patient Orientation:  Person, Place, Time, Eyes Open, Situation


Attachments:  Saline Lock





Transfers


SCALE: Activities may be completed with or without assistive devices.





6-Indepedent-patient completes the activity by him/herself with no assistance 

from a helper.


5-Set-up or Clean-up Assistance-helper sets up or cleans up; patient completes 

activity. [place] assists only prior to or  


    following the activity.


4-Supervision or Touching Assistance-helper provides verbal cues and/or 

touching/steadying and/or contact guard assistance as patient completes 

activity. Assistance may be provided   


    throughout the activity or intermittently.


3-Partial/Moderate Assistance-helper does LESS THAN HALF the effort. [place] 

lifts, holds or supports trunk or limbs, but provides less than half the effort.


2-Substantial/Maximal Assistance-helper does MORE THAN HALF the effort. [place] 

lifts or holds trunk or limbs and provides more than half the effort.


6-Afhvkkzzn-xfffck does ALL the effort. Patient does none of the effort to 

complete the activity. Or, the assistance of 2 or more helpers is required for 

the patient to complete the  


    activity.


If activity was not attempted, code reason:


7-Patient Refused.


9-Not Applicable-not attempted and the patient did not perform the activity 

before the current illness, exacerbation or injury.


10-Not Attempted due to Environmental Limitations-(lack of equipment, weather 

restraints, etc.).


88-Not Attempted due to Medical Conditions or Safety Concerns.


Sit to Lying (QC):  4


Sit to Stand (QC):  4


Pt requiring skilled verb inst for controlled descent into chair ~50% of time, 

pt demo good hand placement





Weight Bearing


Right Lower Extremity:  Right


Touch Toe Bearing


Left Lower Extremity:  Left


Full Weight Bearing





Gait Training


Does the Patient Walk?:  Yes


Distance:  60, 80, 60, 80


Walk 10 feet (QC):  4


Walk 50 ft with 2 Turns(QC):  4


Gait Persons Needed:  1


Gait Assistive Device:  FWW


TTWB RLE, inconstantly compliant, requires skilled verb inst to decrease WB 

through RLE at times, hopping with increased soreness in UE's, increased fat

igue, increased SOA





Exercises


Seated Therapy Exercises:  Ankle pumps (50), Long arc quads (25), Chair press-

ups (x7 with fatigue and decreasing controlled descent into chair), Hip flexion 

(25), Hip abd/add (with knees extended x25)


Standing:  Hamstring curls (RLE only x15), 3 way Ex=Flex, Abd, Ext (RLE only 

with knee ext x15 each), Marching (RLE only x15), Retro gait (fwd and retro gait

in // bars 25 ft each with NWB RLE)





Treatments


education, safety, transfers, review precautions, gait, balance, strength, 

activity tolerance, functional mobility, bed mobility





Assessment


Current Status:  Good Progress


improving with compliance of TTWB RLE but does continue to require mod skilled 

verb inst. Decreasing O2 sats during amb only but quickly improving to high 

90's%





PT Short Term Goals


Short Term Goals


Time Frame:  2019


Sit to lyin (met)


Lying to sitting on side of be:  5 (met)


Walk 10 feet:  5


Walk 50 feet with two turns:  5


Walk 150 feet:  5


4 steps:  4





PT Long Term Goals


Long Term Goals


PT Long Term Goals Time Frame:  Dec 5, 2019


Roll Left & Right (QC):  6


Sit to Lying (QC):  6


Lying-Sitting on Side/Bed(QC):  6


Sit to Stand (QC):  6


Chair/Bed-to-Chair Xfer(QC):  6


Toilet Transfer (QC):  6


Car Transfer (QC):  6


Does the Patient Walk:  Yes


Walk 10 feet (QC):  6


Walk 50ft with 2 Turns (QC):  6


Walk 150 ft (QC):  6


Walking 10ft on Uneven Surface:  6


1 Step (curb) (QC):  6


4 Steps (QC):  5


12 Steps (QC):  9


Picking up an Object (QC):  88


Does the Pt use WC or Scooter?:  No


Type:  N/A


Type:  N/A





PT Plan


Treatment/Plan


Treatment Plan:  Continue Plan of Care


Treatment Plan:  Bed Mobility, Education, Functional Activity Hema, Functional 

Strength, Group Therapy, Gait, Safety, Therapeutic Exercise, Transfers


Treatment Duration:  Dec 5, 2019


Frequency:  At least 5 of 7 days/Wk (IRF)


Estimated Hrs Per Day:  1.5 hours per day


Patient and/or Family Agrees t:  Yes





Safety Risks/Education


Patient Education:  Gait Training, Transfer Techniques, Safety Issues


Teaching Recipient:  Patient


Teaching Methods:  Demonstration, Discussion


Response to Teaching:  Verbalize Understanding, Return Demonstration, 

Reinforcement Needed





Time/GCodes


Time In:  1000


Time Out:  1100


Total Billed Treatment Time:  60


Total Billed Treatment


1 visit, EX x30 min, GT x30 min











LAWRENCE BUCHANAN PTA            2019 11:14


POS

## 2019-11-27 NOTE — PULMONARY PROGRESS NOTE
Subjective


Time Seen by a Provider:  09:11


Subjective/Events-last exam


No complications noted.





Sepsis Event


Evaluation


Height, Weight, BMI


Height: 5'9.00"


Weight: 156lbs. 5.0oz. 70.820472wb; 23.91 BMI


Method:Stated





Exam


Exam





Vital Signs








  Date Time  Temp Pulse Resp B/P (MAP) Pulse Ox O2 Delivery O2 Flow Rate FiO2


 


11/27/19 08:03      Nasal Cannula 2.00 


 


11/27/19 07:21     92 Nasal Cannula 2.00 


 


11/27/19 05:38 37.8 100 18 108/64 (79) 93 Nasal Cannula 2.00 


 


11/26/19 21:00      Nasal Cannula 2.00 


 


11/26/19 20:16    107/53 (71)    


 


11/26/19 19:02     92 Nasal Cannula 2.00 


 


11/26/19 18:00 36.6 64 14 101/60 (74) 97 Nasal Cannula 2.00 


 


11/26/19 15:33 36.6 64 14 101/60 (74) 97 Nasal Cannula 2.00 


 


11/26/19 14:29     96 Nasal Cannula 2.00 


 


11/26/19 14:00 36.4 65 18 93/62 (72) 97 Nasal Cannula 2.00 


 


11/26/19 11:04     90 Nasal Cannula 2.00 














I & O 


 


 11/27/19





 07:00


 


Intake Total 1640 ml


 


Output Total 2000 ml


 


Balance -360 ml








Height & Weight


Height: 5'9.00"


Weight: 156lbs. 5.0oz. 70.492912md; 23.91 BMI


Method:Stated


General Appearance:  No Apparent Distress, Thin


HEENT:  Normal ENT Inspection


Neck:  Normal Inspection


Respiratory:  No Accessory Muscle Use, No Respiratory Distress, Decreased Breath

Sounds


Cardiovascular:  Regular Rate, Rhythm, Tachycardia


Gastrointestinal:  non tender, soft


Extremity:  Normal Capillary Refill, Normal Inspection, Normal Range of Motion 

(except right leg), Non Tender, No Calf Tenderness, No Pedal Edema


Neurologic/Psychiatric:  Alert, Oriented x3, No Motor/Sensory Deficits, Normal 

Mood/Affect, CNs II-XII Norm as Tested


Skin:  Normal Color, Warm/Dry





Results


Lab


Laboratory Tests


11/27/19 05:25











Assessment/Plan


Assessment/Plan


Right femur fracture s/p fall while in my office 


Pneumonia with pseudomonus 


   -Will treat with Cefepime x 10-14 days started on 11/22. 


   -CXR shows improvement. 


Severe COPD 


   -DuoNeb 


   -02











SMITH MUNOZ DO              Nov 27, 2019 09:11


POS

## 2019-11-27 NOTE — NUR
RD ASSESSMENT 



PMHx: COPD; CAD; HTN; DM; hypercholesterolemia; afib; stroke; hip fracture



PT INTERACTION: Pt was awake and pleasant during nutrition follow-up. Pt states he has been 
not been eating well. Note pt avg PO intake of 72% x5d, per chart review. Pt states no 
recent issues with n/v/c/d since last assessment. Note last BM was 11/16 and pt currently on 
bowel regimen of colace qd; senna HS; and miralax BID, per chart review. 



ABNORMAL NUTRITION-RELATED LAB VALUES

LOW:  Pro 6.3; alb 3.1

HIGH: glu 124



Est. kcal needs: 6143-6519 kcal | 25-30 kcal/kg  

Est. Pro needs:  76-88 g Pro | 1.0-1.2 g Pro/kg



PES STATEMENT: Inadequate oral intake (NI-2.1) related to loss of appetite as evidenced by 
pt interview 



INTERVENTION:  

Continue with current diet order of Regular diet. 

Pt may benefit from nutrition supplementation if PO intake declines. 

Will continue to follow and reassess as pt needs and status change. 



MONITOR/EVALUATE:  

PO Intake; Plan of Care; Hydration Status; Weight Status; Lab Values 





ADA Light, MS, RD, LD

## 2019-11-27 NOTE — PULMONARY PROGRESS NOTE
Subjective


Date Seen by a Provider:  Nov 26, 2019


Time Seen by a Provider:  09:10 (late note)


Subjective/Events-last exam


NO complications noted.





Sepsis Event


Evaluation


Height, Weight, BMI


Height: 5'9.00"


Weight: 156lbs. 5.0oz. 70.681102sp; 23.91 BMI


Method:Stated





Exam


Exam





Vital Signs








  Date Time  Temp Pulse Resp B/P (MAP) Pulse Ox O2 Delivery O2 Flow Rate FiO2


 


11/27/19 08:03      Nasal Cannula 2.00 


 


11/27/19 07:21     92 Nasal Cannula 2.00 


 


11/27/19 05:38 37.8 100 18 108/64 (79) 93 Nasal Cannula 2.00 


 


11/26/19 21:00      Nasal Cannula 2.00 


 


11/26/19 20:16    107/53 (71)    


 


11/26/19 19:02     92 Nasal Cannula 2.00 


 


11/26/19 18:00 36.6 64 14 101/60 (74) 97 Nasal Cannula 2.00 


 


11/26/19 15:33 36.6 64 14 101/60 (74) 97 Nasal Cannula 2.00 


 


11/26/19 14:29     96 Nasal Cannula 2.00 


 


11/26/19 14:00 36.4 65 18 93/62 (72) 97 Nasal Cannula 2.00 


 


11/26/19 11:04     90 Nasal Cannula 2.00 














I & O 


 


 11/27/19





 07:00


 


Intake Total 1640 ml


 


Output Total 2000 ml


 


Balance -360 ml








Height & Weight


Height: 5'9.00"


Weight: 156lbs. 5.0oz. 70.267938jl; 23.91 BMI


Method:Stated


General Appearance:  No Apparent Distress, Thin


HEENT:  Normal ENT Inspection


Neck:  Normal Inspection


Respiratory:  No Accessory Muscle Use, No Respiratory Distress, Decreased Breath

Sounds


Cardiovascular:  Regular Rate, Rhythm, Tachycardia


Gastrointestinal:  non tender, soft


Extremity:  Normal Capillary Refill, Normal Inspection, Normal Range of Motion 

(except right leg), Non Tender, No Calf Tenderness, No Pedal Edema


Neurologic/Psychiatric:  Alert, Oriented x3, No Motor/Sensory Deficits, Normal 

Mood/Affect, CNs II-XII Norm as Tested


Skin:  Normal Color, Warm/Dry





Results


Lab


Laboratory Tests


11/27/19 05:25











Assessment/Plan


Assessment/Plan


Right femur fracture s/p fall while in my office 


Pneumonia with pseudomonus 


   - Cefepime x 10-14 days started on 11/22. 


   -CXR shows improvement. 


Severe COPD 


   -DuoNeb 


   -02











SMITH MUNOZ DO              Nov 27, 2019 09:11


POS

## 2019-11-27 NOTE — PM&R PROGRESS NOTE
Subjective


HPI/CC On Admission


Date Seen by Provider:  Nov 27, 2019


Time Seen by Provider:  08:45


CC: Hip Fracture





HPI: Patient fell twice Monday receiving a hip fracture and had surgery.  

Patient was transferred to in-patient rehab yesterday. Patient does have pain 

when standing and rates it as a 9/10 otherwise, his pain is a 3/10. Pain does 

travel down his right leg. Rehab has been going well he states, and is sore from

rehab.


Subjective/Events-last exam


Pt had a pretty good day 


Had some confusion last night 


Still has bed alarm on 


INR 1.7 


Maintain on Lovenox bridge 


He didn't try to get out of bed last night 


Confusion is noted at times 


No pain is reported and only takes Tylenol 


Cefepime tolerated for pseudomonas pneumonia


Conferred with RN


Reviewed therapy notes


Checked meds and labs





Review of Systems


Pulmonary:  Dyspnea, Cough


Musculoskeletal:  leg pain





Objective


Exam


Vital Signs





Vital Signs








  Date Time  Temp Pulse Resp B/P (MAP) Pulse Ox O2 Delivery O2 Flow Rate FiO2


 


11/27/19 17:43     90 Room Air 2.00 


 


11/27/19 17:34 37.1 90 18 107/67 (80)    





Capillary Refill : Less Than 3 Seconds


General Appearance:  No Apparent Distress, WD/WN, Chronically ill, Thin


HEENT:  PERRL/EOMI, Normal ENT Inspection, Pharynx Normal


Neck:  Full Range of Motion, Normal Inspection, Non Tender, Supple


Respiratory:  Chest Non Tender, No Accessory Muscle Use, No Respiratory 

Distress, Crackles, Decreased Breath Sounds, Wheezing


Cardiovascular:  Regular Rate, Rhythm, No Edema, No Gallop, No JVD, No Murmur, 

Normal Peripheral Pulses, Tachycardia


Gastrointestinal:  Normal Bowel Sounds, No Organomegaly, No Pulsatile Mass, Non 

Tender, Soft


Extremity:  Normal Capillary Refill, Normal Inspection, Normal Range of Motion 

(except right leg), Non Tender, No Calf Tenderness, No Pedal Edema


Neurologic/Psychiatric:  Alert, Oriented x3, No Motor/Sensory Deficits, Normal 

Mood/Affect, CNs II-XII Norm as Tested


Skin:  Normal Color, Warm/Dry





Results/Procedures


Lab


Laboratory Tests


11/27/19 05:25








Patient resulted labs reviewed.





FIM


Transfers


Therapy Code Descriptions/Definitions 





Functional [place] Measure:


0=Not Assessed/NA        4=Minimal Assistance


1=Total Assistance        5=Supervision or Setup


2=Maximal Assistance  6=Modified [place]


3=Moderate Assistance 7=Complete IndependenceSCALE: Activities may be completed 

with or without assistive devices.





6-Indepedent-patient completes the activity by him/herself with no assistance 

from a helper.


5-Set-up or Clean-up Assistance-helper sets up or cleans up; patient completes 

activity. [place] assists only prior to or  


    following the activity.


4-Supervision or Touching Assistance-helper provides verbal cues and/or 

touching/steadying and/or contact guard assistance as patient completes 

activity. Assistance may be provided   


    throughout the activity or intermittently.


3-Partial/Moderate Assistance-helper does LESS THAN HALF the effort. [place] 

lifts, holds or supports trunk or limbs, but provides less than half the effort.


2-Substantial/Maximal Assistance-helper does MORE THAN HALF the effort. [place] 

lifts or holds trunk or limbs and provides more than half the effort.


4-Msumhqgtj-amopcy does ALL the effort. Patient does none of the effort to 

complete the activity. Or, the assistance of 2 or more helpers is required for 

the patient to complete the  


    activity.


If activity was not attempted, code reason:


7-Patient Refused.


9-Not Applicable-not attempted and the patient did not perform the activity 

before the current illness, exacerbation or injury.


10-Not Attempted due to Environmental Limitations-(lack of equipment, weather 

restraints, etc.).


88-Not Attempted due to Medical Conditions or Safety Concerns.


Roll Left to Right (QC):  4


Sit to Lying (QC):  4 (SBA)


Sit to Stand (QC):  4


Chair/Bed-to-Chair Xfer(QC):  4


Car Transfer (QC):  3 (assist with right LE)





Gait Training


Does the Patient Walk?:  Yes


Distance:  95 x2


Walk 10 feet (QC):  4


Walk 50 ft with 2 Turns(QC):  4


Walk 150 ft (QC):  88


Walking 10ft/uneven surface-QC:  3


Gait Persons Needed:  1


Gait Assistive Device:  FWW





Wheelchair Training


Does the Pt Use a Wheelchair?:  Yes


Wheel 50 ft with 2 turns (QC):  5


Wheel 150 ft (QC):  9


Type of Wheelchair:  Manual





Stair Training


 Stair Training: Handrails/:  2 handrails


1 Step (curb) (QC):  88 (pt unsafe to attempt due to TTWB status)


4 Steps (QC):  4 (CGA with skilled cues for sequencing and safety)


12 Steps (QC):  88





Balance


Picking up an Object (QC):  88





ADL-Treatment


Eating (QC):  6


Oral Hygiene (QC):  7


Bathing Location:  L Arm, R Arm, L Upper Leg, R Upper Leg, Chest, Abdomen, 

Buttocks, Perineal Area


Shower/Bathe Self (QC):  6 (Pt completes with IND (after s/u for bandaging and 

IV coverage which will not be compelted at home). Pt does not stand to wash 

bottom but completes all in seated position)


Upper Body Dressing (QC):  6


Lower Body Dressing (QC):  4 (CGA in stance to pull over hips, pt completes 

donning with reacher.)


On/Off Footwear (QC):  7


Toileting Hygiene (QC):  4 (CGA during mangement of clothing. Pt able to manage 

clothing up/down and complete hygiene.)


Toilet Transfer (QC):  3 (BSC over toilet. Pt able to sit with CGA, required min

A to stand from toilet. )





Assessment/Plan


Assessment and Plan


Assess & Plan/Chief Complaint


Assessment:


Right femur fracture s/p fall in Dr Patino's office


Pneumonia placed on Cefepime per DR Patino Pseudomonas on Cx started abx 

11/22/19


Severe COPD with recurrent pneumonia in the past





Plan:


IRF protocol


Cefepime for 14 days total


Nebs


O2


Dr Patino appreciated


Monitor crackles


Monitor INR





(1) Closed right femoral fracture


(2) Debility


Status:  Acute


(3) Paroxysmal atrial fibrillation


Status:  Chronic


(4) Dyspnea


Status:  Acute


(5) COPD (chronic obstructive pulmonary disease)


Status:  Chronic


(6) Pneumonia


Status:  Acute











ALEKS WILSON DO                Nov 27, 2019 08:41


POS

## 2019-11-27 NOTE — PHYSICAL THERAPY DAILY NOTE
PT Daily Note-Current


Subjective


Pt agreeable to PT session. States feeling all right just laying in bed, got 

most of his lunch eaten, didn't taste too bad and was able to chew and swallow 

most of it





Pain





   Numeric Pain Scale:  0-No Pain


   Comment:  no pain at rest, increases in R hip "butt" with activity and most 

movements





Appearance


Pt supine in bed visiting with friend upon arrival. Pt requesting and assisted 

to bed at end of session, call light, phone and bedside table within reach





Mental Status


Patient Orientation:  Person, Place, Time, Eyes Open, Situation


Attachments:  [place]





Transfers


SCALE: Activities may be completed with or without assistive devices.





6-Indepedent-patient completes the activity by him/herself with no assistance 

from a helper.


5-Set-up or Clean-up Assistance-helper sets up or cleans up; patient completes 

activity. [place] assists only prior to or  


    following the activity.


4-Supervision or Touching Assistance-helper provides verbal cues and/or 

touching/steadying and/or contact guard assistance as patient completes activit

y. Assistance may be provided   


    throughout the activity or intermittently.


3-Partial/Moderate Assistance-helper does LESS THAN HALF the effort. [place] 

lifts, holds or supports trunk or limbs, but provides less than half the effort.


2-Substantial/Maximal Assistance-helper does MORE THAN HALF the effort. [place] 

lifts or holds trunk or limbs and provides more than half the effort.


5-Dkwdmrgpy-offapm does ALL the effort. Patient does none of the effort to 

complete the activity. Or, the assistance of 2 or more helpers is required for 

the patient to complete the  


    activity.


If activity was not attempted, code reason:


7-Patient Refused.


9-Not Applicable-not attempted and the patient did not perform the activity 

before the current illness, exacerbation or injury.


10-Not Attempted due to Environmental Limitations-(lack of equipment, weather 

restraints, etc.).


88-Not Attempted due to Medical Conditions or Safety Concerns.


Sit to Lying (QC):  4 (SBA)


Lying to Sitting/Side of Bed(Q:  4 (SBA)


Sit to Stand (QC):  4 (SBA)


Pt improving some with compliance of TTWB RLE during transfers although does 

increase wt through RLE when fatigued. Working on and requiring verb inst at 

times to control descent into chair from standing





Weight Bearing


Right Lower Extremity:  Right


Touch Toe Bearing


Left Lower Extremity:  Left


Full Weight Bearing





Gait Training


Does the Patient Walk?:  Yes


Distance:  100, 60, 80


Walk 10 feet (QC):  4 (SBA)


Walk 50 ft with 2 Turns(QC):  4 (CGA to SBA)


Gait Persons Needed:  1


Gait Assistive Device:  FWW


TTWB RLE, 50% of time compliant continuous skilled verb inst and encouragement 

but increases WB with UE's become fatigued stating he knows he is putting wt on 

it





Treatments


bed mobility, transfers, gait, education, safety, activity tolerance, functional

mobility, strength





Assessment


Current Status:  Good Progress


continues to improve with following TTWB RLE but is not compliant when UE's 

become fatigued after short distances





PT Short Term Goals


Short Term Goals


Time Frame:  2019


Sit to lyin (met)


Lying to sitting on side of be:  5 (met)


Walk 10 feet:  5


Walk 50 feet with two turns:  5


Walk 150 feet:  5


4 steps:  4





PT Long Term Goals


Long Term Goals


PT Long Term Goals Time Frame:  Dec 5, 2019


Roll Left & Right (QC):  6


Sit to Lying (QC):  6


Lying-Sitting on Side/Bed(QC):  6


Sit to Stand (QC):  6


Chair/Bed-to-Chair Xfer(QC):  6


Toilet Transfer (QC):  6


Car Transfer (QC):  6


Does the Patient Walk:  Yes


Walk 10 feet (QC):  6


Walk 50ft with 2 Turns (QC):  6


Walk 150 ft (QC):  6


Walking 10ft on Uneven Surface:  6


1 Step (curb) (QC):  6


4 Steps (QC):  5


12 Steps (QC):  9


Picking up an Object (QC):  88


Does the Pt use WC or Scooter?:  No


Type:  N/A


Type:  N/A





PT Plan


Treatment/Plan


Treatment Plan:  Continue Plan of Care


Treatment Plan:  Bed Mobility, Education, Functional Activity Hema, Functional 

Strength, Group Therapy, Gait, Safety, Therapeutic Exercise, Transfers


Treatment Duration:  Dec 5, 2019


Frequency:  At least 5 of 7 days/Wk (IRF)


Estimated Hrs Per Day:  1.5 hours per day


Patient and/or Family Agrees t:  Yes





Safety Risks/Education


Patient Education:  Gait Training, Transfer Techniques, Safety Issues


Teaching Recipient:  Patient


Teaching Methods:  Demonstration, Discussion


Response to Teaching:  Verbalize Understanding, Return Demonstration, 

Reinforcement Needed





Time/GCodes


Time In:  1255


Time Out:  1335


Total Billed Treatment Time:  40


Total Billed Treatment


1 visit, GT x25 min, GA x15 min











LAWRENCE BUCHANAN PTA            2019 13:00


POS

## 2019-11-27 NOTE — OCCUPATIONAL THER DAILY NOTE
OT Current Status-Daily Note


Subjective


Pt sitting in chair, agrees to treatment. Pt reports pain in right LE, but does 

not rate.





Mental Status/Objective


Attachments:  Oxygen





ADL-Treatment


Pt donned pants while seated in chair. Used reacher to start pants over feet. 

Sit to stand with CGA for balance during pant hike. Gait to restroom with FWW, 

cues for WB status. Pt shaved, combed hair, and washed face with set up and in

creased time. Transfer back to chair with FWW. Pt fatigues with activity and 

requires occasional rests breaks during activity.


Therapy Code Descriptions/Definitions 





Functional [place] Measure:


0=Not Assessed/NA        4=Minimal Assistance


1=Total Assistance        5=Supervision or Setup


2=Maximal Assistance  6=Modified [place]


3=Moderate Assistance 7=Complete IndependenceSCALE: Activities may be completed 

with or without assistive devices.





6-Indepedent-patient completes the activity by him/herself with no assistance 

from a helper.


5-Set-up or Clean-up Assistance-helper sets up or cleans up; patient completes 

activity. [place] assists only prior to or  


    following the activity.


4-Supervision or Touching Assistance-helper provides verbal cues and/or 

touching/steadying and/or contact guard assistance as patient completes 

activity. Assistance may be provided   


    throughout the activity or intermittently.


3-Partial/Moderate Assistance-helper does LESS THAN HALF the effort. [place] 

lifts, holds or supports trunk or limbs, but provides less than half the effort.


2-Substantial/Maximal Assistance-helper does MORE THAN HALF the effort. [place] 

lifts or holds trunk or limbs and provides more than half the effort.


8-Tkyaiqpgw-sdtebk does ALL the effort. Patient does none of the effort to 

complete the activity. Or, the assistance of 2 or more helpers is required for 

the patient to complete the  


    activity.


If activity was not attempted, code reason:


7-Patient Refused.


9-Not Applicable-not attempted and the patient did not perform the activity 

before the current illness, exacerbation or injury.


10-Not Attempted due to Environmental Limitations-(lack of equipment, weather 

restraints, etc.).


88-Not Attempted due to Medical Conditions or Safety Concerns.


Lower Body Dressing (QC):  4





Other Treatment


To therapy gym via w/c. Arm bike x5 minutes to increase overall strength and 

activity tolerance needed for functional task completion. Pt completed task with

minimal resistance and slow pace. Bilateral hand  exercises with moderate 

resistance therapy foam to increase  strength. Pt performed bilateral UE 

exercises to increase strength needed for ADLs and transfers. Pt performed four 

exercises x10 reps with red theraband. Rest breaks taken between exercises. 

Skilled instruction required for proper exercise technique. Pt sitting in chair 

with needs met after session, chair alarm in place.





OT Short Term Goals


Short Term Goals


Time Frame:  Dec 6, 2019


Oral hygiene:  5


Toileting hygiene:  3


Shower/bathe self:  3


Upper body dressin


Lower body dressing:  3 (met)


Putting on/taking off footwear:  3





OT Long Term Goals


Long Term Goals


Time Frame:  Dec 20, 2019


Eating (QC):  6 (met)


Oral Hygiene (QC):  6


Toileting Hygiene (QC):  6


Shower/Bathe Self (QC):  6 (met)


Upper Body Dressing (QC):  6 (met)


Lower Body Dressing (QC):  6


On/Off Footwear (QC):  6


Additional Goals:  1-Demonstrate ADL Tasks, 2-Verbalize Understanding, 3-

ImproveStrength/Hema


1=Demonstrate adherence to instructed precautions during ADL tasks.


2=Patient will verbalize/demonstrate understanding of assistive 

devices/modifications for ADL.


3=Patient will improve strength/tolerance for activity to enable patient to 

perform ADL's.





OT Education/Plan


Discharge Recommendations


Plan/Recommendations:  Continue POC





Treatment Plan/Plan of Care


Patient would benefit from OT for education, treatment and training to promote 

independence in ADL's, mobility, safety and/or upper extremity function for 

ADL's.


Plan of Care:  ADL Retraining, Functional Mobility, Group Exercise/Act as Ind, 

UE Funct Exercise/Act


Treatment Duration:  Dec 20, 2019


Frequency:  At least 5 of 7 days/Wk (IRF)


Estimated Hrs Per Day:  1.5 hours per day


Agreement:  Yes


Rehab Potential:  Guarded





Time/GCodes


Start Time:  08:00


Stop Time:  09:15


Total Time Billed (hr/min):  75


Billed Treatment Time


1 visit, ADLx2(35minutes), EXx3(40minutes)











GURJIT LOPEZ OT            2019 09:35


POS

## 2019-11-27 NOTE — SPEECH THERAPY DAILY NOTE
Speech Daily Progress Note


Subjective


Date Seen by Provider:  Nov 27, 2019


Time Seen by Provider:  00:30


Patient was resting in his recliner when I entered his room.





Objective


Patient completed a series of problem solving scenarios with 85% given decreased

cues.





Assessment


Assessment Current Status:  Good Progress





Treatment Plan


Continue Plan of Care





Speech Short Term Goals


Short Term Goals


Short Term Goals


1) The patient will complete memory tasks related to his daily needs at 90% or 

greater.


2) The patient will complete problem solving tasks related to his daily needs at

90% or greater.


3) The patient will complete safety awareness tasks related to his daily needs 

at 90% or greater.





Speech Long Term Goals


Long Term Goals


The patient will improve cognitive-communication necessary for safety and daily 

living tasks with minimal assist.





Speech-Plan


Patient/Family Goals


Patient/Family Goals:  


Patient plans on returning home with his wife post rehab.





Treatment Plan


Speech Therapy Treatment Plan:  Continue Plan of Care


Patient is progressing on ST goals.


Treatment Duration:  Nov 29, 2019


Frequency:  5 times per week


Estimated Hrs Per Day:  .5 hour per day


Rehab Potential:  Guarded


Barriers to Learning:  


Patient has cognitive deficits.


Pt/Family Agrees to Plan:  Yes





Safety Risks/Education


Teaching Recipient:  Patient


Teaching Methods:  Demonstration, Discussion


Response to Teaching:  Verbalize Understanding, Return Demonstration


Education Topics Provided:  


Continued safety within his room.





Time


Speech Therapy Time In:  09:30


Speech Therapy Time Out:  10:00


Total Billed Time:  30


Billed Treatment Time


[address]ASHLYN [address]            Nov 27, 2019 10:31


POS

## 2019-11-27 NOTE — PROGRESS NOTE
Subjective


Time Seen by a Provider:  08:26


Subjective/Events-last exam


INR 1.7.


Patient feeling better.


Patient has no complaints.





Objective


Exam





Vital Signs








  Date Time  Temp Pulse Resp B/P (MAP) Pulse Ox O2 Delivery O2 Flow Rate FiO2


 


19 08:03      Nasal Cannula 2.00 


 


19 07:21     92 Nasal Cannula 2.00 


 


19 05:38 37.8 100 18 108/64 (79) 93 Nasal Cannula 2.00 


 


19 21:00      Nasal Cannula 2.00 


 


19 20:16    107/53 (71)    


 


19 19:02     92 Nasal Cannula 2.00 


 


19 18:00 36.6 64 14 101/60 (74) 97 Nasal Cannula 2.00 


 


19 15:33 36.6 64 14 101/60 (74) 97 Nasal Cannula 2.00 


 


19 14:29     96 Nasal Cannula 2.00 


 


19 14:00 36.4 65 18 93/62 (72) 97 Nasal Cannula 2.00 


 


19 11:04     90 Nasal Cannula 2.00 


 


19 08:28      Nasal Cannula 2.00 














I & O 


 


 19





 07:00


 


Intake Total 1640 ml


 


Output Total 2000 ml


 


Balance -360 ml





Capillary Refill : Less Than 3 Seconds


General Appearance:  No Apparent Distress, Thin


HEENT:  Normal ENT Inspection


Neck:  Normal Inspection


Respiratory:  No Accessory Muscle Use, No Respiratory Distress, Decreased Breath

Sounds


Cardiovascular:  Regular Rate, Rhythm, Tachycardia


Gastrointestinal:  non tender, soft





Results


Lab


Laboratory Tests


19 05:25








Laboratory Tests


19 11:21: Glucometer 279H


19 15:32: Glucometer 152H


19 20:10: Glucometer 246H


19 05:22: Glucometer 139H


19 05:25: 


White Blood Count 6.9, Red Blood Count 2.70L, Hemoglobin 8.1L, Hematocrit 26L, 

Mean Corpuscular Volume 95, Mean Corpuscular Hemoglobin 30, Mean Corpuscular 

Hemoglobin Concent 32, Red Cell Distribution Width 16.2H, Platelet Count 272, 

Mean Platelet Volume 9.0, Neutrophils (%) (Auto) 65, Lymphocytes (%) (Auto) 21, 

Monocytes (%) (Auto) 9, Eosinophils (%) (Auto) 5, Basophils (%) (Auto) 0, 

Neutrophils # (Auto) 4.5, Lymphocytes # (Auto) 1.4, Monocytes # (Auto) 0.6, 

Eosinophils # (Auto) 0.3, Basophils # (Auto) 0.0, Prothrombin Time 20.9H, INR 

Comment 1.7H





Microbiology


19 Blood Culture - Preliminary, Resulted


           No growth


19 Gram Stain - Final, Resulted


           


19 Sputum Culture - Preliminary, Resulted


           Usual upper respiratory hay[place]


           Probable Pseudomonas





Assessment/Plan


Assessment/Plan


Assess & Plan/Chief Complaint


Hip fracture.


COPD.


Atrial fibrillation.


Diabetes.


Febrile.


Leukocytosis.


.


19.


Hip fracture.


COPD.


Atrial fibrillation.


Diabetes.


Sputum culture pseudomonas.


INR low..


.


Limits/27 6/19 hip fracture.


COPD.


Sinus rhythm today.


Diabetes.


Sputum culture pseudomonas





Clinical Quality Measures


DVT/VTE Risk/Contraindication:


Risk Factor Score Per Nursin


RFS Level Per Nursing on Admit:  4+=Very High











TOR CONNELLY DO         2019 08:27


POS

## 2019-11-28 VITALS — SYSTOLIC BLOOD PRESSURE: 104 MMHG | DIASTOLIC BLOOD PRESSURE: 64 MMHG

## 2019-11-28 VITALS — SYSTOLIC BLOOD PRESSURE: 97 MMHG | DIASTOLIC BLOOD PRESSURE: 61 MMHG

## 2019-11-28 VITALS — DIASTOLIC BLOOD PRESSURE: 58 MMHG | SYSTOLIC BLOOD PRESSURE: 109 MMHG

## 2019-11-28 VITALS — SYSTOLIC BLOOD PRESSURE: 112 MMHG | DIASTOLIC BLOOD PRESSURE: 61 MMHG

## 2019-11-28 LAB
INR PPP: 1.9 (ref 0.8–1.4)
PROTHROMBIN TIME: 22.8 SEC (ref 12.2–14.7)

## 2019-11-28 RX ADMIN — SODIUM CHLORIDE SCH MLS/HR: 900 INJECTION INTRAVENOUS at 06:02

## 2019-11-28 RX ADMIN — INSULIN ASPART SCH UNIT: 100 INJECTION, SOLUTION INTRAVENOUS; SUBCUTANEOUS at 16:34

## 2019-11-28 RX ADMIN — PANTOPRAZOLE SODIUM SCH MG: 40 TABLET, DELAYED RELEASE ORAL at 09:38

## 2019-11-28 RX ADMIN — FERROUS SULFATE TAB 325 MG (65 MG ELEMENTAL FE) SCH MG: 325 (65 FE) TAB at 09:38

## 2019-11-28 RX ADMIN — GABAPENTIN SCH MG: 100 CAPSULE ORAL at 14:49

## 2019-11-28 RX ADMIN — IPRATROPIUM BROMIDE AND ALBUTEROL SULFATE SCH ML: .5; 3 SOLUTION RESPIRATORY (INHALATION) at 15:07

## 2019-11-28 RX ADMIN — WARFARIN SODIUM SCH MG: 5 TABLET ORAL at 17:45

## 2019-11-28 RX ADMIN — Medication SCH ML: at 06:02

## 2019-11-28 RX ADMIN — METOPROLOL TARTRATE SCH MG: 25 TABLET, FILM COATED ORAL at 20:40

## 2019-11-28 RX ADMIN — POLYETHYLENE GLYCOL (3350) SCH GM: 17 POWDER, FOR SOLUTION ORAL at 09:28

## 2019-11-28 RX ADMIN — MONTELUKAST SCH MG: 10 TABLET, FILM COATED ORAL at 20:37

## 2019-11-28 RX ADMIN — METOPROLOL TARTRATE SCH MG: 25 TABLET, FILM COATED ORAL at 09:55

## 2019-11-28 RX ADMIN — IPRATROPIUM BROMIDE AND ALBUTEROL SULFATE SCH ML: .5; 3 SOLUTION RESPIRATORY (INHALATION) at 11:15

## 2019-11-28 RX ADMIN — SODIUM CHLORIDE SCH MLS/HR: 900 INJECTION INTRAVENOUS at 12:04

## 2019-11-28 RX ADMIN — GABAPENTIN SCH MG: 100 CAPSULE ORAL at 23:07

## 2019-11-28 RX ADMIN — Medication SCH ML: at 12:04

## 2019-11-28 RX ADMIN — INSULIN ASPART SCH UNIT: 100 INJECTION, SOLUTION INTRAVENOUS; SUBCUTANEOUS at 11:13

## 2019-11-28 RX ADMIN — FERROUS SULFATE TAB 325 MG (65 MG ELEMENTAL FE) SCH MG: 325 (65 FE) TAB at 20:37

## 2019-11-28 RX ADMIN — DOCUSATE SODIUM AND SENNOSIDES SCH EA: 8.6; 5 TABLET, FILM COATED ORAL at 20:37

## 2019-11-28 RX ADMIN — Medication SCH ML: at 23:07

## 2019-11-28 RX ADMIN — POLYETHYLENE GLYCOL (3350) SCH GM: 17 POWDER, FOR SOLUTION ORAL at 20:40

## 2019-11-28 RX ADMIN — GABAPENTIN SCH MG: 100 CAPSULE ORAL at 06:02

## 2019-11-28 RX ADMIN — DOCUSATE SODIUM AND SENNOSIDES SCH EA: 8.6; 5 TABLET, FILM COATED ORAL at 09:41

## 2019-11-28 RX ADMIN — INSULIN ASPART SCH UNIT: 100 INJECTION, SOLUTION INTRAVENOUS; SUBCUTANEOUS at 20:52

## 2019-11-28 RX ADMIN — IPRATROPIUM BROMIDE AND ALBUTEROL SULFATE SCH ML: .5; 3 SOLUTION RESPIRATORY (INHALATION) at 07:31

## 2019-11-28 RX ADMIN — SODIUM CHLORIDE SCH MLS/HR: 900 INJECTION INTRAVENOUS at 23:06

## 2019-11-28 RX ADMIN — ENOXAPARIN SODIUM SCH MG: 100 INJECTION SUBCUTANEOUS at 20:39

## 2019-11-28 RX ADMIN — IPRATROPIUM BROMIDE AND ALBUTEROL SULFATE SCH ML: .5; 3 SOLUTION RESPIRATORY (INHALATION) at 19:27

## 2019-11-28 RX ADMIN — INSULIN ASPART SCH UNIT: 100 INJECTION, SOLUTION INTRAVENOUS; SUBCUTANEOUS at 05:51

## 2019-11-28 RX ADMIN — DOCUSATE SODIUM SCH MG: 100 CAPSULE ORAL at 09:38

## 2019-11-28 RX ADMIN — SODIUM CHLORIDE SCH MLS/HR: 900 INJECTION INTRAVENOUS at 17:46

## 2019-11-28 NOTE — PM&R PROGRESS NOTE
Subjective


HPI/CC On Admission


Date Seen by Provider:  Nov 28, 2019


Time Seen by Provider:  09:30


CC: Hip Fracture





HPI: Patient fell twice Monday receiving a hip fracture and had surgery.  BRANDON tony was transferred to in-patient rehab yesterday. Patient does have pain 

when standing and rates it as a 9/10 otherwise, his pain is a 3/10. Pain does 

travel down his right leg. Rehab has been going well he states, and is sore from

rehab.


Subjective/Events-last exam


Pt had a pretty good day yesterday


Had some confusion last night and need family to be aware of this for safety 

especially at night


Still has bed alarm on during night time


INR 1.9


Maintained on Lovenox bridge 


No pain is reported and only takes Tylenol 


Cefepime tolerated for pseudomonas pneumonia


Conferred with RN


Reviewed therapy notes


Checked meds and labs





Review of Systems


General:  Fatigue


Pulmonary:  Dyspnea, Cough


Musculoskeletal:  leg pain





Objective


Exam


Vital Signs





Vital Signs








  Date Time  Temp Pulse Resp B/P (MAP) Pulse Ox O2 Delivery O2 Flow Rate FiO2


 


11/28/19 17:57 37.1 89 18 112/61 (78) 98 Nasal Cannula 2.00 





Capillary Refill : Less Than 3 Seconds


General Appearance:  No Apparent Distress, WD/WN, Chronically ill, Thin


HEENT:  PERRL/EOMI, Normal ENT Inspection, Pharynx Normal


Neck:  Full Range of Motion, Normal Inspection, Non Tender, Supple


Respiratory:  Chest Non Tender, No Accessory Muscle Use, No Respiratory 

Distress, Crackles, Decreased Breath Sounds, Wheezing


Cardiovascular:  Regular Rate, Rhythm, No Edema, No Gallop, No JVD, No Murmur, 

Normal Peripheral Pulses, Tachycardia


Gastrointestinal:  Normal Bowel Sounds, No Organomegaly, No Pulsatile Mass, Non 

Tender, Soft


Extremity:  Normal Capillary Refill, Normal Inspection, Normal Range of Motion 

(except right leg), Non Tender, No Calf Tenderness, No Pedal Edema


Neurologic/Psychiatric:  Alert, Oriented x3, No Motor/Sensory Deficits, Normal 

Mood/Affect, CNs II-XII Norm as Tested


Skin:  Normal Color, Warm/Dry





Results/Procedures


Lab


Patient resulted labs reviewed.





FIM


Transfers


Therapy Code Descriptions/Definitions 





Functional [place] Measure:


0=Not Assessed/NA        4=Minimal Assistance


1=Total Assistance        5=Supervision or Setup


2=Maximal Assistance  6=Modified [place]


3=Moderate Assistance 7=Complete IndependenceSCALE: Activities may be completed 

with or without assistive devices.





6-Indepedent-patient completes the activity by him/herself with no assistance 

from a helper.


5-Set-up or Clean-up Assistance-helper sets up or cleans up; patient completes 

activity. [place] assists only prior to or  


    following the activity.


4-Supervision or Touching Assistance-helper provides verbal cues and/or 

touching/steadying and/or contact guard assistance as patient completes 

activity. Assistance may be provided   


    throughout the activity or intermittently.


3-Partial/Moderate Assistance-helper does LESS THAN HALF the effort. [place] 

lifts, holds or supports trunk or limbs, but provides less than half the effort.


2-Substantial/Maximal Assistance-helper does MORE THAN HALF the effort. [place] 

lifts or holds trunk or limbs and provides more than half the effort.


4-Gypniateq-dsecty does ALL the effort. Patient does none of the effort to 

complete the activity. Or, the assistance of 2 or more helpers is required for 

the patient to complete the  


    activity.


If activity was not attempted, code reason:


7-Patient Refused.


9-Not Applicable-not attempted and the patient did not perform the activity 

before the current illness, exacerbation or injury.


10-Not Attempted due to Environmental Limitations-(lack of equipment, weather 

restraints, etc.).


88-Not Attempted due to Medical Conditions or Safety Concerns.


Roll Left to Right (QC):  4


Sit to Lying (QC):  5


Sit to Stand (QC):  4


Chair/Bed-to-Chair Xfer(QC):  4


Car Transfer (QC):  3 (assist with right LE)





Gait Training


Does the Patient Walk?:  Yes


Distance:  x 30 ft, x 70ft, x 50 ft


Walk 10 feet (QC):  4


Walk 50 ft with 2 Turns(QC):  4 (CGA to SBA)


Walk 150 ft (QC):  88


Walking 10ft/uneven surface-QC:  3


Gait Persons Needed:  1


Gait Assistive Device:  FWW





Wheelchair Training


Does the Pt Use a Wheelchair?:  Yes


Wheel 50 ft with 2 turns (QC):  5


Wheel 150 ft (QC):  9


Type of Wheelchair:  Manual





Stair Training


 Stair Training: Handrails/:  2 handrails


1 Step (curb) (QC):  88 (pt unsafe to attempt due to TTWB status)


4 Steps (QC):  4 (CGA with skilled cues for sequencing and safety)


12 Steps (QC):  88





Balance


Picking up an Object (QC):  88





ADL-Treatment


Eating (QC):  6


Oral Hygiene (QC):  7


Bathing Location:  L Arm, R Arm, L Upper Leg, R Upper Leg, Chest, Abdomen, 

Buttocks, Perineal Area


Shower/Bathe Self (QC):  4


Upper Body Dressing (QC):  5


Lower Body Dressing (QC):  3


On/Off Footwear (QC):  7


Toileting Hygiene (QC):  4 (CGA)


Toilet Transfer (QC):  4 (CGA)





Assessment/Plan


Assessment and Plan


Assess & Plan/Chief Complaint


Assessment:


Right femur fracture s/p fall in Dr Patino's office


Pneumonia placed on Cefepime per DR Patino Pseudomonas on Cx started abx 

11/22/19


Severe COPD with recurrent pneumonia in the past


Warfarin maintenance





Plan:


IRF protocol


Cefepime for 14 days total


Nebs


O2


Dr Patino appreciated


Monitor crackles


Monitor INR for coumadin treatment





(1) Closed right femoral fracture


(2) Debility


Status:  Acute


(3) Paroxysmal atrial fibrillation


Status:  Chronic


(4) Dyspnea


Status:  Acute


(5) COPD (chronic obstructive pulmonary disease)


Status:  Chronic


(6) Pneumonia


Status:  Acute











ALEKS WILSON DO                Nov 28, 2019 10:39


POS

## 2019-11-28 NOTE — NUR
16 STAPLES REMOVED FROM RIGHT HIP INCISION PER ORDERS. STERI STRIPS APPLIED. INCISION IS 
WELL APPROXIMATED. NO DRAINAGE OR REDNESS NOTED.

## 2019-11-28 NOTE — OCCUPATIONAL THER DAILY NOTE
OT Current Status-Daily Note


Subjective


Pt in bed, agrees to treatment. Pt denies pain at rest, but reports 8/10 pain in

right LE with movement.





ADL-Treatment


Pt finishing breakfast when therapist arrives. Pt feeding self without assist. 

Supine to sit with min assist. Declined shower, but agrees sponge bath. Doff 

shirt without assist. Pt bathed upper body with set up. Don shirt with set up. 

Pt doffed boxer shorts with CGA for balance. Pt able to wash bilateral upper 

legs. CGA for balance while washing franck area and buttocks. Pt donned boxers and

pants with min assist. Don socks wiht min assist using sock aid. Gait to 

restroom with FWW. Assist to manage O2 tubing. Pt requires cues for WB status. 

Transfer to [facility] over toilet with CGA. Pt able to complete toileting hygiene and 

clothing management with CGA. Wash hands and comb hair at sink with CGA. 

Increased time for ADL tasks. Occasional rest breaks taken.


Therapy Code Descriptions/Definitions 





Functional [place] Measure:


0=Not Assessed/NA        4=Minimal Assistance


1=Total Assistance        5=Supervision or Setup


2=Maximal Assistance  6=Modified [place]


3=Moderate Assistance 7=Complete IndependenceSCALE: Activities may be completed 

with or without assistive devices.





6-Indepedent-patient completes the activity by him/herself with no assistance 

from a helper.


5-Set-up or Clean-up Assistance-helper sets up or cleans up; patient completes 

activity. [place] assists only prior to or  


    following the activity.


4-Supervision or Touching Assistance-helper provides verbal cues and/or 

touching/steadying and/or contact guard assistance as patient completes 

activity. Assistance may be provided   


    throughout the activity or intermittently.


3-Partial/Moderate Assistance-helper does LESS THAN HALF the effort. [place] 

lifts, holds or supports trunk or limbs, but provides less than half the effort.


2-Substantial/Maximal Assistance-helper does MORE THAN HALF the effort. [place] 

lifts or holds trunk or limbs and provides more than half the effort.


8-Ifyenxlhc-qjtuqj does ALL the effort. Patient does none of the effort to 

complete the activity. Or, the assistance of 2 or more helpers is required for 

the patient to complete the  


    activity.


If activity was not attempted, code reason:


7-Patient Refused.


9-Not Applicable-not attempted and the patient did not perform the activity 

before the current illness, exacerbation or injury.


10-Not Attempted due to Environmental Limitations-(lack of equipment, weather 

restraints, etc.).


88-Not Attempted due to Medical Conditions or Safety Concerns.


Eating (QC):  6


Shower/Bathe Self (QC):  4


Upper Body Dressing (QC):  5


Lower Body Dressing (QC):  3


Toileting Hygiene (QC):  4 (CGA)


Toilet Transfer (QC):  4 (CGA)





Other Treatment


To therapy gym via w/c. Arm bike x8 minutes to increase overall strength and 

activity tolerance needed for functional task completion. Pt performed task with

minimal resistance and slow pace. No rest breaks needed. Resistance peg activity

with bilateral UE to increase activity tolerance and coordination/manipulation 

skills. Increased time for task completion. Pt performed bilateral UE exercises 

to increase strength needed for ADLs and transfers. Pt completed three exercises

x10 reps with 1# dowel ilya. Rest breaks between exercises. Pt sitting in therapy

gym with PT present for treatment after session.





OT Short Term Goals


Short Term Goals


Time Frame:  Dec 6, 2019


Oral hygiene:  5


Toileting hygiene:  3


Shower/bathe self:  3


Upper body dressin


Lower body dressing:  3 (met)


Putting on/taking off footwear:  3





OT Long Term Goals


Long Term Goals


Time Frame:  Dec 20, 2019


Eating (QC):  6 (met)


Oral Hygiene (QC):  6


Toileting Hygiene (QC):  6


Shower/Bathe Self (QC):  6 (met)


Upper Body Dressing (QC):  6 (met)


Lower Body Dressing (QC):  6


On/Off Footwear (QC):  6


Additional Goals:  1-Demonstrate ADL Tasks, 2-Verbalize Understanding, 3-

ImproveStrength/Hema


1=Demonstrate adherence to instructed precautions during ADL tasks.


2=Patient will verbalize/demonstrate understanding of assistive devices

/modifications for ADL.


3=Patient will improve strength/tolerance for activity to enable patient to 

perform ADL's.





OT Education/Plan


Discharge Recommendations


Plan/Recommendations:  Continue POC





Treatment Plan/Plan of Care


Patient would benefit from OT for education, treatment and training to promote 

independence in ADL's, mobility, safety and/or upper extremity function for 

ADL's.


Plan of Care:  ADL Retraining, Functional Mobility, Group Exercise/Act as Ind, 

UE Funct Exercise/Act


Treatment Duration:  Dec 20, 2019


Frequency:  At least 5 of 7 days/Wk (IRF)


Estimated Hrs Per Day:  1.5 hours per day


Agreement:  Yes


Rehab Potential:  Guarded





Time/GCodes


Start Time:  06:50


Stop Time:  08:20


Total Time Billed (hr/min):  09


Billed Treatment Time


1 visit, ADLx4(60minutes), EXx2(30minutes)











GURJIT LOPEZ OT            2019 08:06


POS

## 2019-11-28 NOTE — NUR
DR. WILSON TO FLOOR. INFORMED OF TRENDING LOW BP. ORDERS TO HOLD LOPRESSOR FOR SBP LESS THAN 
120. 

-------------------------------------------------------------------------------

Addendum: 11/28/19 at 1836 by KRIS MOLINA RN

-------------------------------------------------------------------------------

ALSO INFORMED OF EXPIRATORY COARSE BREATH SOUNDS.

## 2019-11-29 VITALS — DIASTOLIC BLOOD PRESSURE: 62 MMHG | SYSTOLIC BLOOD PRESSURE: 102 MMHG

## 2019-11-29 VITALS — DIASTOLIC BLOOD PRESSURE: 55 MMHG | SYSTOLIC BLOOD PRESSURE: 103 MMHG

## 2019-11-29 RX ADMIN — SODIUM CHLORIDE SCH MLS/HR: 900 INJECTION INTRAVENOUS at 18:03

## 2019-11-29 RX ADMIN — POLYETHYLENE GLYCOL (3350) SCH GM: 17 POWDER, FOR SOLUTION ORAL at 10:18

## 2019-11-29 RX ADMIN — ENOXAPARIN SODIUM SCH MG: 100 INJECTION SUBCUTANEOUS at 21:01

## 2019-11-29 RX ADMIN — IPRATROPIUM BROMIDE AND ALBUTEROL SULFATE SCH ML: .5; 3 SOLUTION RESPIRATORY (INHALATION) at 19:55

## 2019-11-29 RX ADMIN — DOCUSATE SODIUM SCH MG: 100 CAPSULE ORAL at 08:50

## 2019-11-29 RX ADMIN — Medication SCH ML: at 12:40

## 2019-11-29 RX ADMIN — METOPROLOL TARTRATE SCH MG: 25 TABLET, FILM COATED ORAL at 21:04

## 2019-11-29 RX ADMIN — METOPROLOL TARTRATE SCH MG: 25 TABLET, FILM COATED ORAL at 08:52

## 2019-11-29 RX ADMIN — GABAPENTIN SCH MG: 100 CAPSULE ORAL at 21:01

## 2019-11-29 RX ADMIN — IPRATROPIUM BROMIDE AND ALBUTEROL SULFATE SCH ML: .5; 3 SOLUTION RESPIRATORY (INHALATION) at 06:49

## 2019-11-29 RX ADMIN — Medication SCH ML: at 05:34

## 2019-11-29 RX ADMIN — SODIUM CHLORIDE SCH MLS/HR: 900 INJECTION INTRAVENOUS at 12:40

## 2019-11-29 RX ADMIN — IPRATROPIUM BROMIDE AND ALBUTEROL SULFATE SCH ML: .5; 3 SOLUTION RESPIRATORY (INHALATION) at 11:04

## 2019-11-29 RX ADMIN — DOCUSATE SODIUM AND SENNOSIDES SCH EA: 8.6; 5 TABLET, FILM COATED ORAL at 21:07

## 2019-11-29 RX ADMIN — GABAPENTIN SCH MG: 100 CAPSULE ORAL at 05:34

## 2019-11-29 RX ADMIN — INSULIN ASPART SCH UNIT: 100 INJECTION, SOLUTION INTRAVENOUS; SUBCUTANEOUS at 20:47

## 2019-11-29 RX ADMIN — INSULIN ASPART SCH UNIT: 100 INJECTION, SOLUTION INTRAVENOUS; SUBCUTANEOUS at 16:36

## 2019-11-29 RX ADMIN — GABAPENTIN SCH MG: 100 CAPSULE ORAL at 15:56

## 2019-11-29 RX ADMIN — ACETAMINOPHEN PRN MG: 500 TABLET ORAL at 08:50

## 2019-11-29 RX ADMIN — DOCUSATE SODIUM AND SENNOSIDES SCH EA: 8.6; 5 TABLET, FILM COATED ORAL at 08:49

## 2019-11-29 RX ADMIN — WARFARIN SODIUM SCH MG: 5 TABLET ORAL at 18:02

## 2019-11-29 RX ADMIN — INSULIN ASPART SCH UNIT: 100 INJECTION, SOLUTION INTRAVENOUS; SUBCUTANEOUS at 11:13

## 2019-11-29 RX ADMIN — DOCUSATE SODIUM AND SENNOSIDES SCH EA: 8.6; 5 TABLET, FILM COATED ORAL at 21:02

## 2019-11-29 RX ADMIN — FERROUS SULFATE TAB 325 MG (65 MG ELEMENTAL FE) SCH MG: 325 (65 FE) TAB at 21:01

## 2019-11-29 RX ADMIN — INSULIN ASPART SCH UNIT: 100 INJECTION, SOLUTION INTRAVENOUS; SUBCUTANEOUS at 05:43

## 2019-11-29 RX ADMIN — FERROUS SULFATE TAB 325 MG (65 MG ELEMENTAL FE) SCH MG: 325 (65 FE) TAB at 08:49

## 2019-11-29 RX ADMIN — PANTOPRAZOLE SODIUM SCH MG: 40 TABLET, DELAYED RELEASE ORAL at 08:49

## 2019-11-29 RX ADMIN — SODIUM CHLORIDE SCH MLS/HR: 900 INJECTION INTRAVENOUS at 05:34

## 2019-11-29 RX ADMIN — POLYETHYLENE GLYCOL (3350) SCH GM: 17 POWDER, FOR SOLUTION ORAL at 21:07

## 2019-11-29 RX ADMIN — IPRATROPIUM BROMIDE AND ALBUTEROL SULFATE SCH ML: .5; 3 SOLUTION RESPIRATORY (INHALATION) at 14:21

## 2019-11-29 RX ADMIN — MONTELUKAST SCH MG: 10 TABLET, FILM COATED ORAL at 21:01

## 2019-11-29 NOTE — PHYSICAL THERAPY DAILY NOTE
PT Daily Note-Current


Subjective


Pt agreeable, no pain rating given.





Mental Status


Patient Orientation:  Person, Place, Time, Situation





Transfers


SCALE: Activities may be completed with or without assistive devices.





6-Indepedent-patient completes the activity by him/herself with no assistance 

from a helper.


5-Set-up or Clean-up Assistance-helper sets up or cleans up; patient completes 

activity. [place] assists only prior to or  


    following the activity.


4-Supervision or Touching Assistance-helper provides verbal cues and/or touching

/steadying and/or contact guard assistance as patient completes activity. 

Assistance may be provided   


    throughout the activity or intermittently.


3-Partial/Moderate Assistance-helper does LESS THAN HALF the effort. [place] 

lifts, holds or supports trunk or limbs, but provides less than half the effort.


2-Substantial/Maximal Assistance-helper does MORE THAN HALF the effort. [place] 

lifts or holds trunk or limbs and provides more than half the effort.


9-Sbqqwkqjn-[place]onia does ALL the effort. Patient does none of the effort to 

complete the activity. Or, the assistance of 2 or more helpers is required for 

the patient to complete the  


    activity.


If activity was not attempted, code reason:


7-Patient Refused.


9-Not Applicable-not attempted and the patient did not perform the activity 

before the current illness, exacerbation or injury.


10-Not Attempted due to Environmental Limitations-(lack of equipment, weather 

restraints, etc.).


88-Not Attempted due to Medical Conditions or Safety Concerns.


Roll Left & Right (QC):  6


Sit to Lying (QC):  6


Lying to Sitting/Side of Bed(Q:  6


Sit to Stand (QC):  4





Weight Bearing


Right Lower Extremity:  Right


Touch Toe Bearing


Left Lower Extremity:  Left


Full Weight Bearing





Gait Training


Does the Patient Walk?:  Yes


Distance:  50' x 3, 70'x 1


Walk 10 feet (QC):  4


Walk 50 ft with 2 Turns(QC):  4


Gait Persons Needed:  1


Gait Assistive Device:  FWW


Consistent skilled VCS to maintain TTWB on (R). Occasional VCS for safety when 

approaching chair.





Wheelchair Training


Does the Pt Use a Wheelchair?:  No





Exercises


Seated Therapy Exercises:  Ankle pumps, Long arc quads, Hip flexion, Hamstring 

Curls (RTB), Hip abd/add


Seated Reps:  15


NuStep Minutes:  20


 NuStep Workload:  4





Treatments


Gait training with TTWB, safety with transfers, and LE functional strengthening 

and functional activity tolerance. Returned to bed with O2 in situ, bed alarm 

activated.





Assessment


Current Status:  Good Progress


Pt tolerated well. VCS to maintain TTWB, VCS for safety.





PT Short Term Goals


Short Term Goals


Time Frame:  2019


Sit to lyin (met)


Lying to sitting on side of be:  5 (met)


Walk 10 feet:  5


Walk 50 feet with two turns:  5


Walk 150 feet:  5


4 steps:  4





PT Long Term Goals


Long Term Goals


PT Long Term Goals Time Frame:  Dec 5, 2019


Roll Left & Right (QC):  6


Sit to Lying (QC):  6


Lying-Sitting on Side/Bed(QC):  6


Sit to Stand (QC):  6


Chair/Bed-to-Chair Xfer(QC):  6


Toilet Transfer (QC):  6


Car Transfer (QC):  6


Does the Patient Walk:  Yes


Walk 10 feet (QC):  6


Walk 50ft with 2 Turns (QC):  6


Walk 150 ft (QC):  6


Walking 10ft on Uneven Surface:  6


1 Step (curb) (QC):  6


4 Steps (QC):  5


12 Steps (QC):  9


Picking up an Object (QC):  88


Does the Pt use WC or Scooter?:  No


Type:  N/A


Type:  N/A





PT Plan


Problem List


Problem List:  Activity Tolerance, Functional Strength, Safety, Balance, Gait, 

Transfer, ROM





Treatment/Plan


Treatment Plan:  Continue Plan of Care


Treatment Plan:  Bed Mobility, Education, Functional Activity Hema, Functional 

Strength, Group Therapy, Gait, Safety, Therapeutic Exercise, Transfers


Treatment Duration:  Dec 5, 2019


Frequency:  At least 5 of 7 days/Wk (IRF)


Estimated Hrs Per Day:  1.5 hours per day


Patient and/or Family Agrees t:  Yes





Safety Risks/Education


Patient Education:  Gait Training, Safety Issues


Teaching Recipient:  Patient


Teaching Methods:  Discussion


Response to Teaching:  Reinforcement Needed





Time/GCodes


Time In:  1001


Time Out:  1101


Total Billed Treatment Time:  60


Total Billed Treatment


1, EX x 37', GT x 23'











EL KWON DPCLAY              2019 10:58


POS

## 2019-11-29 NOTE — SPEECH THERAPY DAILY NOTE
Speech Daily Progress Note


Subjective


Date Seen by Provider:  Nov 29, 2019


Time Seen by Provider:  00:30


Patient was resting in his bed after just finishing his breathing treatment.





Objective


Patient completed memory tasks related to his daily needs at 80% with 20% cues 

and/or repetitions.





Assessment


Assessment Current Status:  Good Progress





Treatment Plan


Continue Plan of Care





Speech Short Term Goals


Short Term Goals


Short Term Goals


1) The patient will complete memory tasks related to his daily needs at 90% or 

greater.


2) The patient will complete problem solving tasks related to his daily needs at

90% or greater.


3) The patient will complete safety awareness tasks related to his daily needs 

at 90% or greater.





Speech Long Term Goals


Long Term Goals


The patient will improve cognitive-communication necessary for safety and daily 

living tasks with minimal assist.





Speech-Plan


Patient/Family Goals


Patient/Family Goals:  


Patient plans on returning home with his wife upon rehab discharge.





Treatment Plan


Speech Therapy Treatment Plan:  Continue Plan of Care


Patient is progressing toward meeting ST goals.


Treatment Duration:  Dec 6, 2019


Frequency:  5 times per week


Estimated Hrs Per Day:  .5 hour per day


Rehab Potential:  Guarded


Barriers to Learning:  


Patient has cognitive deficits.


Pt/Family Agrees to Plan:  Yes





Safety Risks/Education


Teaching Recipient:  Patient


Teaching Methods:  Demonstration, Discussion


Response to Teaching:  Verbalize Understanding, Return Demonstration


Education Topics Provided:  


Continued safety within his room.





Time


Speech Therapy Time In:  11:00


Speech Therapy Time Out:  11:30


Total Billed Time:  30


Billed Treatment Time


[address]ASHLYN [address]            Nov 29, 2019 12:45


POS

## 2019-11-29 NOTE — OCCUPATIONAL THER DAILY NOTE
OT Current Status-Daily Note


Subjective


Pt seen in recliner chair, agreeable to OT tx session. Pt states 5/10 pain, 

nursing notified.





Mental Status/Objective


Patient Orientation:  Normal For Age


Attachments:  Oxygen (2L)





ADL-Treatment


Therapy Code Descriptions/Definitions 





Functional [place] Measure:


0=Not Assessed/NA        4=Minimal Assistance


1=Total Assistance        5=Supervision or Setup


2=Maximal Assistance  6=Modified [place]


3=Moderate Assistance 7=Complete IndependenceSCALE: Activities may be completed 

with or without assistive devices.





6-Indepedent-patient completes the activity by him/herself with no assistance 

from a helper.


5-Set-up or Clean-up Assistance-helper sets up or cleans up; patient completes 

activity. [place] assists only prior to or  


    following the activity.


4-Supervision or Touching Assistance-helper provides verbal cues and/or 

touching/steadying and/or contact guard assistance as patient completes 

activity. Assistance may be provided   


    throughout the activity or intermittently.


3-Partial/Moderate Assistance-helper does LESS THAN HALF the effort. [place] 

lifts, holds or supports trunk or limbs, but provides less than half the effort.


2-Substantial/Maximal Assistance-helper does MORE THAN HALF the effort. [place] 

lifts or holds trunk or limbs and provides more than half the effort.


9-Yfbgtfncs-zbieaz does ALL the effort. Patient does none of the effort to 

complete the activity. Or, the assistance of 2 or more helpers is required for 

the patient to complete the  


    activity.


If activity was not attempted, code reason:


7-Patient Refused.


9-Not Applicable-not attempted and the patient did not perform the activity 

before the current illness, exacerbation or injury.


10-Not Attempted due to Environmental Limitations-(lack of equipment, weather 

restraints, etc.).


88-Not Attempted due to Medical Conditions or Safety Concerns.


Eating (QC):  6


Oral Hygiene (QC):  7


Shower/Bathe Self (QC):  4 (SBA during stance in shower. Pt completes all body 

parts with IND, denies need to reach feet as water rinses feet. Able to dry all 

areas.)


Upper Body Dressing (QC):  6


Lower Body Dressing (QC):  4 (Pt completes doffing/ donning LB dressing with 

SUP.)


Toileting Hygiene (QC):  7 (denies need for bathroom.)


Toilet Transfer (QC):  7


QC: on/off footwear: 4 intermittent assist for sock aide use





Other Treatment


Pt states pt's son will be coming by at some point within the morning. Pt 

educated on ARU expectations and possibility of OT tx session adaptation to 

accommodate visit. Pt completes showering task on shower bench and denies need 

for 02 in shower, educated on benefits but pt denies. Pt completes ADLs in 

bathroom, 02 applied post-showering. Pt c/o 5/10 pain, requests to lay down. Pt 

bed mob SBA, completes UE theraband exercises with moderate cues for 

positioning, resistance, and speed. Pt completes shoulder abduction, 

ext/internal rotation, forward flexion and scaption exercises 1 set of 10 reps 

bilaterally. Pt bed mob to EOB with increased time. Pt completes sit to stand 

with SBA, walks with FWW to sink to complete shaving task with pt's personal 

items. Pt completes with SUP with cues to maintain TTWB of RLE. Pt returns to 

recliner chair to practice don/ doffing socks with use of dressing stick and 

sock aide. Pt left in recliner chair, chair alarm on, call light in reach, 02 

on, all needs met.





Education


OT Patient Education:  Correct positioning, Exercise program, Home exercise 

program, Modified ADL techniques, Purpose of tx/functional activities, Reviewed 

precautions, Safety issues, Transfer techniques, Use of adapted equipment


Teaching Recipient:  Patient


Teaching Methods:  Demonstration, Discussion


Response to Teaching:  Verbalize Understanding, Return Demonstration





OT Short Term Goals


Short Term Goals


Time Frame:  Dec 6, 2019


Oral hygiene:  5


Toileting hygiene:  3


Shower/bathe self:  3


Upper body dressin


Lower body dressing:  3 (met)


Putting on/taking off footwear:  3





OT Long Term Goals


Long Term Goals


Time Frame:  Dec 20, 2019


Eating (QC):  6 (met)


Oral Hygiene (QC):  6


Toileting Hygiene (QC):  6


Shower/Bathe Self (QC):  6 (met)


Upper Body Dressing (QC):  6 (met)


Lower Body Dressing (QC):  6


On/Off Footwear (QC):  6


Additional Goals:  1-Demonstrate ADL Tasks, 2-Verbalize Understanding, 3-

ImproveStrength/Hema


1=Demonstrate adherence to instructed precautions during ADL tasks.


2=Patient will verbalize/demonstrate understanding of assistive 

devices/modifications for ADL.


3=Patient will improve strength/tolerance for activity to enable patient to 

perform ADL's.





OT Education/Plan


Problem List/Assessment


Assessment:  Decreased Activ Tolerance, Decreased UE Strength, Dependent 

Transfers, Impaired Funct Balance, Impaired I ADL's, Impaired Self-Care Skills





Discharge Recommendations


Plan/Recommendations:  Continue POC





Treatment Plan/Plan of Care


Treatment,Training & Education:  Yes


Patient would benefit from OT for education, treatment and training to promote 

independence in ADL's, mobility, safety and/or upper extremity function for 

ADL's.


Plan of Care:  ADL Retraining, Functional Mobility, Group Exercise/Act as Ind, 

UE Funct Exercise/Act


Treatment Duration:  Dec 20, 2019


Frequency:  At least 5 of 7 days/Wk (IRF)


Estimated Hrs Per Day:  1.5 hours per day


Agreement:  Yes


Rehab Potential:  Guarded





Time/GCodes


Start Time:  08:00


Stop Time:  09:30


Total Time Billed (hr/min):  90


Billed Treatment Time


1, ADL 4(60), EX 2 (30)= 90











NARAYAN PITT OTR                2019 09:27


POS

## 2019-11-29 NOTE — NUR
Reviewed weekly rehab team conference summary with patient, he is in agreement for target 
discharge of 12/4/19 unless he improves to the point of leaving earlier.



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 
pay caregiver options.  Patient states they are living on approx $2,000 monthly and he does 
not believe they could pay OOP for caregiver.



His daughter resides next door, she does work until 6 p.m.  He has a son in [place].  His son 
is here from [facility] right now and is helping while here.



Patient to discuss with children about additional assistance upon his return home while 
recuperation continues.



DME:  Will see if O2 is required continuous and update provider with new orders as 
appropriate.

## 2019-11-29 NOTE — NUR
BED EXIT ALARM SOUNDING AND PATIENT TRYING TO GET OUT OF BED WITHOUT CALLING FOR HELP. 
ASSISTED UP TO BATHROOM AND BED EXIT ALARM TURNED BACK ON.

## 2019-11-29 NOTE — PM&R PROGRESS NOTE
Subjective


HPI/CC On Admission


Date Seen by Provider:  Nov 29, 2019


Time Seen by Provider:  10:30


CC: Hip Fracture





HPI: Patient fell twice Monday receiving a hip fracture and had surgery.  BRANDON tony was transferred to in-patient rehab yesterday. Patient does have pain 

when standing and rates it as a 9/10 otherwise, his pain is a 3/10. Pain does 

travel down his right leg. Rehab has been going well he states, and is sore from

rehab.


Subjective/Events-last exam


Pt had a pretty good night last night


Has some confusion at night and need family to be aware of this for safety 

especially at night


Still has bed alarm on during night time


No pain is reported and only takes Tylenol 


Cefepime tolerated for pseudomonas pneumonia and last dose is next week


Lovenox still on board so will check labs with INR tomorrow to DC Lovenox if INR

therapeutic


Conferred with RN


Reviewed therapy notes


Checked meds and labs





Review of Systems


General:  Fatigue


Pulmonary:  Dyspnea


Musculoskeletal:  leg pain


Neurological:  Confusion





Objective


Exam


Vital Signs





Vital Signs








  Date Time  Temp Pulse Resp B/P (MAP) Pulse Ox O2 Delivery O2 Flow Rate FiO2


 


11/29/19 14:22     94 Nasal Cannula 2.00 


 


11/29/19 05:51 38.1 92 20 102/62 (75)    





Capillary Refill : Less Than 3 Seconds


General Appearance:  No Apparent Distress, WD/WN, Chronically ill, Thin


HEENT:  PERRL/EOMI, Normal ENT Inspection, Pharynx Normal


Neck:  Full Range of Motion, Normal Inspection, Non Tender, Supple


Respiratory:  Chest Non Tender, No Accessory Muscle Use, No Respiratory 

Distress, Crackles, Decreased Breath Sounds, Wheezing


Cardiovascular:  Regular Rate, Rhythm, No Edema, No Gallop, No JVD, No Murmur, 

Normal Peripheral Pulses, Tachycardia


Gastrointestinal:  Normal Bowel Sounds, No Organomegaly, No Pulsatile Mass, Non 

Tender, Soft


Extremity:  Normal Capillary Refill, Normal Inspection, Normal Range of Motion 

(except right leg), Non Tender, No Calf Tenderness, No Pedal Edema


Neurologic/Psychiatric:  Alert, Oriented x3, No Motor/Sensory Deficits, Normal 

Mood/Affect, CNs II-XII Norm as Tested


Skin:  Normal Color, Warm/Dry





Results/Procedures


Lab


Patient resulted labs reviewed.





FIM


Transfers


Therapy Code Descriptions/Definitions 





Functional [place] Measure:


0=Not Assessed/NA        4=Minimal Assistance


1=Total Assistance        5=Supervision or Setup


2=Maximal Assistance  6=Modified [place]


3=Moderate Assistance 7=Complete IndependenceSCALE: Activities may be completed 

with or without assistive devices.





6-Indepedent-patient completes the activity by him/herself with no assistance 

from a helper.


5-Set-up or Clean-up Assistance-helper sets up or cleans up; patient completes 

activity. [place] assists only prior to or  


    following the activity.


4-Supervision or Touching Assistance-helper provides verbal cues and/or 

touching/steadying and/or contact guard assistance as patient completes 

activity. Assistance may be provided   


    throughout the activity or intermittently.


3-Partial/Moderate Assistance-helper does LESS THAN HALF the effort. [place] 

lifts, holds or supports trunk or limbs, but provides less than half the effort.


2-Substantial/Maximal Assistance-helper does MORE THAN HALF the effort. [place] 

lifts or holds trunk or limbs and provides more than half the effort.


8-[place]-vtmvql does ALL the effort. Patient does none of the effort to 

complete the activity. Or, the assistance of 2 or more helpers is required for 

the patient to complete the  


    activity.


If activity was not attempted, code reason:


7-Patient Refused.


9-Not Applicable-not attempted and the patient did not perform the activity 

before the current illness, exacerbation or injury.


10-Not Attempted due to Environmental Limitations-(lack of equipment, weather 

restraints, etc.).


88-Not Attempted due to Medical Conditions or Safety Concerns.


Roll Left to Right (QC):  6


Sit to Lying (QC):  6


Sit to Stand (QC):  4


Chair/Bed-to-Chair Xfer(QC):  4


Car Transfer (QC):  3 (assist with right LE)





Gait Training


Does the Patient Walk?:  Yes


Distance:  50' x 3, 70'x 1


Walk 10 feet (QC):  4


Walk 50 ft with 2 Turns(QC):  4


Walk 150 ft (QC):  88


Walking 10ft/uneven surface-QC:  3


Gait Persons Needed:  1


Gait Assistive Device:  FWW





Wheelchair Training


Does the Pt Use a Wheelchair?:  No


Wheel 50 ft with 2 turns (QC):  5


Wheel 150 ft (QC):  9


Type of Wheelchair:  Manual





Stair Training


 Stair Training: Handrails/:  2 handrails


1 Step (curb) (QC):  88 (pt unsafe to attempt due to TTWB status)


4 Steps (QC):  4 (CGA with skilled cues for sequencing and safety)


12 Steps (QC):  88





Balance


Picking up an Object (QC):  88





ADL-Treatment


Eating (QC):  6


Oral Hygiene (QC):  7


Bathing Location:  L Arm, R Arm, L Upper Leg, R Upper Leg, Chest, Abdomen, 

Buttocks, Perineal Area


Shower/Bathe Self (QC):  4


Upper Body Dressing (QC):  6


Lower Body Dressing (QC):  4


On/Off Footwear (QC):  7


Toileting Hygiene (QC):  7


Toilet Transfer (QC):  7





Assessment/Plan


Assessment and Plan


Assess & Plan/Chief Complaint


Assessment:


Right femur fracture s/p fall in Dr Patino's office


Pneumonia placed on Cefepime per DR Patino Pseudomonas on Cx started abx 

11/22/19


Severe COPD with recurrent pneumonia in the past


Warfarin maintenance





Plan:


IRF protocol


Cefepime for 14 days total


Nebs


O2


Dr Patino appreciated


Monitor crackles


Monitor INR for coumadin treatment





(1) Closed right femoral fracture


(2) Debility


Status:  Acute


(3) Paroxysmal atrial fibrillation


Status:  Chronic


(4) Dyspnea


Status:  Acute


(5) COPD (chronic obstructive pulmonary disease)


Status:  Chronic


(6) Pneumonia


Status:  Acute











ALEKS WILSON DO                Nov 29, 2019 12:06


POS

## 2019-11-30 VITALS — SYSTOLIC BLOOD PRESSURE: 107 MMHG | DIASTOLIC BLOOD PRESSURE: 67 MMHG

## 2019-11-30 VITALS — DIASTOLIC BLOOD PRESSURE: 71 MMHG | SYSTOLIC BLOOD PRESSURE: 114 MMHG

## 2019-11-30 VITALS — SYSTOLIC BLOOD PRESSURE: 109 MMHG | DIASTOLIC BLOOD PRESSURE: 61 MMHG

## 2019-11-30 VITALS — SYSTOLIC BLOOD PRESSURE: 117 MMHG | DIASTOLIC BLOOD PRESSURE: 54 MMHG

## 2019-11-30 LAB
ALBUMIN SERPL-MCNC: 3 GM/DL (ref 3.2–4.5)
ALP SERPL-CCNC: 75 U/L (ref 40–136)
ALT SERPL-CCNC: 23 U/L (ref 0–55)
BASOPHILS # BLD AUTO: 0 10^3/UL (ref 0–0.1)
BASOPHILS NFR BLD AUTO: 0 % (ref 0–10)
BILIRUB SERPL-MCNC: 0.3 MG/DL (ref 0.1–1)
BUN/CREAT SERPL: 16
CALCIUM SERPL-MCNC: 8.3 MG/DL (ref 8.5–10.1)
CHLORIDE SERPL-SCNC: 105 MMOL/L (ref 98–107)
CO2 SERPL-SCNC: 25 MMOL/L (ref 21–32)
CREAT SERPL-MCNC: 0.79 MG/DL (ref 0.6–1.3)
EOSINOPHIL # BLD AUTO: 0.4 10^3/UL (ref 0–0.3)
EOSINOPHIL NFR BLD AUTO: 6 % (ref 0–10)
ERYTHROCYTE [DISTWIDTH] IN BLOOD BY AUTOMATED COUNT: 16.6 % (ref 10–14.5)
GFR SERPLBLD BASED ON 1.73 SQ M-ARVRAT: > 60 ML/MIN
GLUCOSE SERPL-MCNC: 115 MG/DL (ref 70–105)
HCT VFR BLD CALC: 26 % (ref 40–54)
HGB BLD-MCNC: 8.2 G/DL (ref 13.3–17.7)
INR PPP: 2.3 (ref 0.8–1.4)
LYMPHOCYTES # BLD AUTO: 1.6 X 10^3 (ref 1–4)
LYMPHOCYTES NFR BLD AUTO: 28 % (ref 12–44)
MANUAL DIFFERENTIAL PERFORMED BLD QL: NO
MCH RBC QN AUTO: 29 PG (ref 25–34)
MCHC RBC AUTO-ENTMCNC: 31 G/DL (ref 32–36)
MCV RBC AUTO: 94 FL (ref 80–99)
MONOCYTES # BLD AUTO: 0.5 X 10^3 (ref 0–1)
MONOCYTES NFR BLD AUTO: 9 % (ref 0–12)
NEUTROPHILS # BLD AUTO: 3.3 X 10^3 (ref 1.8–7.8)
NEUTROPHILS NFR BLD AUTO: 57 % (ref 42–75)
PLATELET # BLD: 331 10^3/UL (ref 130–400)
PMV BLD AUTO: 8.3 FL (ref 7.4–10.4)
POTASSIUM SERPL-SCNC: 4.5 MMOL/L (ref 3.6–5)
PROT SERPL-MCNC: 6.1 GM/DL (ref 6.4–8.2)
PROTHROMBIN TIME: 26.4 SEC (ref 12.2–14.7)
SODIUM SERPL-SCNC: 137 MMOL/L (ref 135–145)
WBC # BLD AUTO: 5.8 10^3/UL (ref 4.3–11)

## 2019-11-30 RX ADMIN — GABAPENTIN SCH MG: 100 CAPSULE ORAL at 21:37

## 2019-11-30 RX ADMIN — FERROUS SULFATE TAB 325 MG (65 MG ELEMENTAL FE) SCH MG: 325 (65 FE) TAB at 08:56

## 2019-11-30 RX ADMIN — IPRATROPIUM BROMIDE AND ALBUTEROL SULFATE SCH ML: .5; 3 SOLUTION RESPIRATORY (INHALATION) at 14:42

## 2019-11-30 RX ADMIN — SODIUM CHLORIDE SCH MLS/HR: 900 INJECTION INTRAVENOUS at 23:00

## 2019-11-30 RX ADMIN — SODIUM CHLORIDE SCH MLS/HR: 900 INJECTION INTRAVENOUS at 00:24

## 2019-11-30 RX ADMIN — PANTOPRAZOLE SODIUM SCH MG: 40 TABLET, DELAYED RELEASE ORAL at 08:56

## 2019-11-30 RX ADMIN — FLUTICASONE PROPIONATE AND SALMETEROL XINAFOATE SCH PUFF: 115; 21 AEROSOL, METERED RESPIRATORY (INHALATION) at 10:49

## 2019-11-30 RX ADMIN — DOCUSATE SODIUM SCH MG: 100 CAPSULE ORAL at 08:56

## 2019-11-30 RX ADMIN — INSULIN ASPART SCH UNIT: 100 INJECTION, SOLUTION INTRAVENOUS; SUBCUTANEOUS at 20:34

## 2019-11-30 RX ADMIN — SODIUM CHLORIDE SCH MLS/HR: 900 INJECTION INTRAVENOUS at 05:46

## 2019-11-30 RX ADMIN — POLYETHYLENE GLYCOL (3350) SCH GM: 17 POWDER, FOR SOLUTION ORAL at 20:34

## 2019-11-30 RX ADMIN — DOCUSATE SODIUM AND SENNOSIDES SCH EA: 8.6; 5 TABLET, FILM COATED ORAL at 20:34

## 2019-11-30 RX ADMIN — INSULIN ASPART SCH UNIT: 100 INJECTION, SOLUTION INTRAVENOUS; SUBCUTANEOUS at 17:15

## 2019-11-30 RX ADMIN — Medication SCH ML: at 00:24

## 2019-11-30 RX ADMIN — Medication SCH ML: at 22:47

## 2019-11-30 RX ADMIN — IPRATROPIUM BROMIDE AND ALBUTEROL SULFATE SCH ML: .5; 3 SOLUTION RESPIRATORY (INHALATION) at 10:43

## 2019-11-30 RX ADMIN — METOPROLOL TARTRATE SCH MG: 25 TABLET, FILM COATED ORAL at 20:34

## 2019-11-30 RX ADMIN — Medication SCH ML: at 15:13

## 2019-11-30 RX ADMIN — POLYETHYLENE GLYCOL (3350) SCH GM: 17 POWDER, FOR SOLUTION ORAL at 08:56

## 2019-11-30 RX ADMIN — FERROUS SULFATE TAB 325 MG (65 MG ELEMENTAL FE) SCH MG: 325 (65 FE) TAB at 20:33

## 2019-11-30 RX ADMIN — DOCUSATE SODIUM AND SENNOSIDES SCH EA: 8.6; 5 TABLET, FILM COATED ORAL at 08:57

## 2019-11-30 RX ADMIN — INSULIN ASPART SCH UNIT: 100 INJECTION, SOLUTION INTRAVENOUS; SUBCUTANEOUS at 10:50

## 2019-11-30 RX ADMIN — INSULIN ASPART SCH UNIT: 100 INJECTION, SOLUTION INTRAVENOUS; SUBCUTANEOUS at 05:56

## 2019-11-30 RX ADMIN — MONTELUKAST SCH MG: 10 TABLET, FILM COATED ORAL at 20:33

## 2019-11-30 RX ADMIN — Medication SCH ML: at 05:45

## 2019-11-30 RX ADMIN — GABAPENTIN SCH MG: 100 CAPSULE ORAL at 14:59

## 2019-11-30 RX ADMIN — IPRATROPIUM BROMIDE AND ALBUTEROL SULFATE SCH ML: .5; 3 SOLUTION RESPIRATORY (INHALATION) at 07:25

## 2019-11-30 RX ADMIN — DOCUSATE SODIUM AND SENNOSIDES SCH EA: 8.6; 5 TABLET, FILM COATED ORAL at 20:33

## 2019-11-30 RX ADMIN — SODIUM CHLORIDE SCH MLS/HR: 900 INJECTION INTRAVENOUS at 18:41

## 2019-11-30 RX ADMIN — SODIUM CHLORIDE SCH MLS/HR: 900 INJECTION INTRAVENOUS at 12:15

## 2019-11-30 RX ADMIN — WARFARIN SODIUM SCH MG: 5 TABLET ORAL at 18:41

## 2019-11-30 RX ADMIN — GABAPENTIN SCH MG: 100 CAPSULE ORAL at 05:46

## 2019-11-30 RX ADMIN — METOPROLOL TARTRATE SCH MG: 25 TABLET, FILM COATED ORAL at 08:56

## 2019-11-30 NOTE — NUR
DR. WILSON TO FLOOR. INR 2.3. ORDERS TO DC LOVENOX. INFORMED THAT PATIENT WAS SLIGHTLY 
DROWSY THIS AM AFTER RECEIVING LORTAB LAST NIGHT. ORDERS TO DECREASE LORTAB TO 1/2 TAB.

## 2019-11-30 NOTE — PHYSICAL THERAPY DAILY NOTE
PT Daily Note-Current


Subjective


Pt agreeable to PT session but stating he's not feeling well at all. States he 

had a hard time urinating this morning and feeling poorly all over. States he 

has been dizzy in the head.





Pain





   Numeric Pain Scale:  0-No Pain





Appearance


Pt supine in bed awake and alert upon arrival. At end of session, pt sitting up 

in w/c visiting with family in room. Call light, phone and bedside table within 

reach





Mental Status


Patient Orientation:  Person, Place, Time, Eyes Open, Situation


Attachments:  Oxygen (2L)





Transfers


SCALE: Activities may be completed with or without assistive devices.





6-Indepedent-patient completes the activity by him/herself with no assistance 

from a helper.


5-Set-up or Clean-up Assistance-helper sets up or cleans up; patient completes 

activity. [place] assists only prior to or  


    following the activity.


4-Supervision or Touching Assistance-helper provides verbal cues and/or 

touching/steadying and/or contact guard assistance as patient completes 

activity. Assistance may be provided   


    throughout the activity or intermittently.


3-Partial/Moderate Assistance-helper does LESS THAN HALF the effort. [place] 

lifts, holds or supports trunk or limbs, but provides less than half the effort.


2-Substantial/Maximal Assistance-helper does MORE THAN HALF the effort. [place] 

lifts or holds trunk or limbs and provides more than half the effort.


3-Tdewbfsbd-xrlauq does ALL the effort. Patient does none of the effort to com

plete the activity. Or, the assistance of 2 or more helpers is required for the 

patient to complete the  


    activity.


If activity was not attempted, code reason:


7-Patient Refused.


9-Not Applicable-not attempted and the patient did not perform the activity 

before the current illness, exacerbation or injury.


10-Not Attempted due to Environmental Limitations-(lack of equipment, weather 

restraints, etc.).


88-Not Attempted due to Medical Conditions or Safety Concerns.


Lying to Sitting/Side of Bed(Q:  5


Sit to Stand (QC):  4 (verb inst for TTWB )





Weight Bearing


Right Lower Extremity:  Right


Touch Toe Bearing


Left Lower Extremity:  Left


Full Weight Bearing





Gait Training


Does the Patient Walk?:  Yes


Distance:  100 x2


Walk 10 feet (QC):  4


Walk 50 ft with 2 Turns(QC):  4


Gait Persons Needed:  1


Gait Assistive Device:  FWW


Verb inst for compliance of TTWB





Treatments


education, safety, hip precautions, bed mobility, transfers, gait, activity 

tolerance, functional mobility





Assessment


Current Status:  Fair Progress





PT Short Term Goals


Short Term Goals


Time Frame:  2019


Sit to lyin (met)


Lying to sitting on side of be:  5 (met)


Walk 10 feet:  5


Walk 50 feet with two turns:  5


Walk 150 feet:  5


4 steps:  4





PT Long Term Goals


Long Term Goals


PT Long Term Goals Time Frame:  Dec 5, 2019


Roll Left & Right (QC):  6


Sit to Lying (QC):  6


Lying-Sitting on Side/Bed(QC):  6


Sit to Stand (QC):  6


Chair/Bed-to-Chair Xfer(QC):  6


Toilet Transfer (QC):  6


Car Transfer (QC):  6


Does the Patient Walk:  Yes


Walk 10 feet (QC):  6


Walk 50ft with 2 Turns (QC):  6


Walk 150 ft (QC):  6


Walking 10ft on Uneven Surface:  6


1 Step (curb) (QC):  6


4 Steps (QC):  5


12 Steps (QC):  9


Picking up an Object (QC):  88


Does the Pt use WC or Scooter?:  No


Type:  N/A


Type:  N/A





PT Plan


Treatment/Plan


Treatment Plan:  Continue Plan of Care


Treatment Plan:  Bed Mobility, Education, Functional Activity Hema, Functional 

Strength, Group Therapy, Gait, Safety, Therapeutic Exercise, Transfers


Treatment Duration:  Dec 5, 2019


Frequency:  At least 5 of 7 days/Wk (IRF)


Estimated Hrs Per Day:  1.5 hours per day


Patient and/or Family Agrees t:  Yes





Safety Risks/Education


Patient Education:  Gait Training, Transfer Techniques, Reviewed Precautions, 

Safety Issues


Teaching Recipient:  Patient


Teaching Methods:  Demonstration, Discussion


Response to Teaching:  Verbalize Understanding, Return Demonstration, 

Reinforcement Needed





Time/GCodes


Time In:  1019


Time Out:  1037


Total Billed Treatment Time:  18


Total Billed Treatment


1 visit, GT x1 unit











LAWRENCE BUCHANAN PTA            2019 10:23


POS

## 2019-11-30 NOTE — PM&R PROGRESS NOTE
Shortness of breath, from [facility] Subjective


HPI/CC On Admission


Date Seen by Provider:  Nov 30, 2019


Time Seen by Provider:  11:20


CC: Hip Fracture





HPI: Patient fell twice Monday receiving a hip fracture and had surgery.  

Patient was transferred to in-patient rehab yesterday. Patient does have pain 

when standing and rates it as a 9/10 otherwise, his pain is a 3/10. Pain does 

travel down his right leg. Rehab has been going well he states, and is sore from

rehab.


Subjective/Events-last exam


Pt had a pretty good night last night


Has some confusion at night and need family to be aware of this for safety 

especially at night


Wife at bedside today and they have been  for 62 years, she is blind


No pain is reported and only takes Tylenol but was given to Hydrocodone last 

night which made him very drowsy so changed that to 1/2 pill if he requires that

again 


Cefepime tolerated for pseudomonas pneumonia and last dose is next week


Lovenox DC since INR 2.3


Conferred with RN


Reviewed therapy notes


Checked meds and labs





Review of Systems


General:  Fatigue


Pulmonary:  Dyspnea, Cough





Objective


Exam


Vital Signs





Vital Signs








  Date Time  Temp Pulse Resp B/P (MAP) Pulse Ox O2 Delivery O2 Flow Rate FiO2


 


11/30/19 14:42     92 Room Air  


 


11/30/19 09:03       2.00 


 


11/30/19 08:54 37.2 70 18 109/61 (77)    





Capillary Refill : Less Than 3 Seconds


General Appearance:  No Apparent Distress, WD/WN, Chronically ill, Thin


HEENT:  PERRL/EOMI, Normal ENT Inspection, Pharynx Normal


Neck:  Full Range of Motion, Normal Inspection, Non Tender, Supple


Respiratory:  Chest Non Tender, No Accessory Muscle Use, No Respiratory 

Distress, Crackles, Decreased Breath Sounds, Wheezing


Cardiovascular:  Regular Rate, Rhythm, No Edema, No Gallop, No JVD, No Murmur, 

Normal Peripheral Pulses, Tachycardia


Gastrointestinal:  Normal Bowel Sounds, No Organomegaly, No Pulsatile Mass, Non 

Tender, Soft


Extremity:  Normal Capillary Refill, Normal Inspection, Normal Range of Motion 

(except right leg), Non Tender, No Calf Tenderness, No Pedal Edema


Neurologic/Psychiatric:  Alert, Oriented x3, No Motor/Sensory Deficits, Normal 

Mood/Affect, CNs II-XII Norm as Tested


Skin:  Normal Color, Warm/Dry





Results/Procedures


Lab


Laboratory Tests


11/30/19 05:35








Patient resulted labs reviewed.





FIM


Transfers


Therapy Code Descriptions/Definitions 





Functional [place] Measure:


0=Not Assessed/NA        4=Minimal Assistance


1=Total Assistance        5=Supervision or Setup


2=Maximal Assistance  6=Modified [place]


3=Moderate Assistance 7=Complete IndependenceSCALE: Activities may be completed 

with or without assistive devices.





6-Indepedent-patient completes the activity by him/herself with no assistance 

from a helper.


5-Set-up or Clean-up Assistance-helper sets up or cleans up; patient completes 

activity. [place] assists only prior to or  


    following the activity.


4-Supervision or Touching Assistance-helper provides verbal cues and/or 

touching/steadying and/or contact guard assistance as patient completes ac

tivity. Assistance may be provided   


    throughout the activity or intermittently.


3-Partial/Moderate Assistance-helper does LESS THAN HALF the effort. [place] 

lifts, holds or supports trunk or limbs, but provides less than half the effort.


2-Substantial/Maximal Assistance-helper does MORE THAN HALF the effort. [place] 

lifts or holds trunk or limbs and provides more than half the effort.


3-Iqnppqaud-kcgnbs does ALL the effort. Patient does none of the effort to 

complete the activity. Or, the assistance of 2 or more helpers is required for 

the patient to complete the  


    activity.


If activity was not attempted, code reason:


7-Patient Refused.


9-Not Applicable-not attempted and the patient did not perform the activity 

before the current illness, exacerbation or injury.


10-Not Attempted due to Environmental Limitations-(lack of equipment, weather 

restraints, etc.).


88-Not Attempted due to Medical Conditions or Safety Concerns.


Roll Left to Right (QC):  6


Sit to Lying (QC):  6


Sit to Stand (QC):  4


Chair/Bed-to-Chair Xfer(QC):  4


Car Transfer (QC):  3 (assist with right LE)





Gait Training


Does the Patient Walk?:  Yes


Distance:  50' x 3, 70'x 1


Walk 10 feet (QC):  4


Walk 50 ft with 2 Turns(QC):  4


Walk 150 ft (QC):  88


Walking 10ft/uneven surface-QC:  3


Gait Persons Needed:  1


Gait Assistive Device:  FWW





Wheelchair Training


Does the Pt Use a Wheelchair?:  No


Wheel 50 ft with 2 turns (QC):  5


Wheel 150 ft (QC):  9


Type of Wheelchair:  Manual





Stair Training


 Stair Training: Handrails/:  2 handrails


1 Step (curb) (QC):  88 (pt unsafe to attempt due to TTWB status)


4 Steps (QC):  4 (CGA with skilled cues for sequencing and safety)


12 Steps (QC):  88





Balance


Picking up an Object (QC):  88





ADL-Treatment


Eating (QC):  6


Oral Hygiene (QC):  7


Bathing Location:  L Arm, R Arm, L Upper Leg, R Upper Leg, Chest, Abdomen, 

Buttocks, Perineal Area


Shower/Bathe Self (QC):  4


Upper Body Dressing (QC):  6


Lower Body Dressing (QC):  4


On/Off Footwear (QC):  7


Toileting Hygiene (QC):  7


Toilet Transfer (QC):  7





Assessment/Plan


Assessment and Plan


Assess & Plan/Chief Complaint


Assessment:


Right femur fracture s/p fall in Dr Patino's office


Pneumonia placed on Cefepime per DR Patino Pseudomonas on Cx started abx 

11/22/19


Severe COPD with recurrent pneumonia in the past


Warfarin maintenance





Plan:


IRF protocol


Cefepime for 14 days total


Nebs


O2


Dr Patino appreciated


Monitor crackles


Monitor INR for coumadin treatment





(1) Closed right femoral fracture


(2) Debility


Status:  Acute


(3) Paroxysmal atrial fibrillation


Status:  Chronic


(4) Dyspnea


Status:  Acute


(5) COPD (chronic obstructive pulmonary disease)


Status:  Chronic


(6) Pneumonia


Status:  Acute











ALEKS WILSON DO                Nov 30, 2019 12:31


POS

## 2019-12-01 VITALS — SYSTOLIC BLOOD PRESSURE: 125 MMHG | DIASTOLIC BLOOD PRESSURE: 72 MMHG

## 2019-12-01 VITALS — DIASTOLIC BLOOD PRESSURE: 68 MMHG | SYSTOLIC BLOOD PRESSURE: 115 MMHG

## 2019-12-01 VITALS — SYSTOLIC BLOOD PRESSURE: 106 MMHG | DIASTOLIC BLOOD PRESSURE: 63 MMHG

## 2019-12-01 VITALS — SYSTOLIC BLOOD PRESSURE: 108 MMHG | DIASTOLIC BLOOD PRESSURE: 66 MMHG

## 2019-12-01 LAB
APTT PPP: YELLOW S
BACTERIA #/AREA URNS HPF: NEGATIVE /HPF
BILIRUB UR QL STRIP: NEGATIVE
BUN/CREAT SERPL: 14
CALCIUM SERPL-MCNC: 8.1 MG/DL (ref 8.5–10.1)
CHLORIDE SERPL-SCNC: 99 MMOL/L (ref 98–107)
CO2 SERPL-SCNC: 26 MMOL/L (ref 21–32)
CREAT SERPL-MCNC: 0.93 MG/DL (ref 0.6–1.3)
ERYTHROCYTE [DISTWIDTH] IN BLOOD BY AUTOMATED COUNT: 16.3 % (ref 10–14.5)
FIBRINOGEN PPP-MCNC: CLEAR MG/DL
GFR SERPLBLD BASED ON 1.73 SQ M-ARVRAT: > 60 ML/MIN
GLUCOSE SERPL-MCNC: 262 MG/DL (ref 70–105)
GLUCOSE UR STRIP-MCNC: NEGATIVE MG/DL
HCT VFR BLD CALC: 27 % (ref 40–54)
HGB BLD-MCNC: 8.6 G/DL (ref 13.3–17.7)
HYALINE CASTS #/AREA URNS LPF: (no result) /LPF
KETONES UR QL STRIP: NEGATIVE
LEUKOCYTE ESTERASE UR QL STRIP: NEGATIVE
MAGNESIUM SERPL-MCNC: 1.9 MG/DL (ref 1.6–2.4)
MCH RBC QN AUTO: 30 PG (ref 25–34)
MCHC RBC AUTO-ENTMCNC: 32 G/DL (ref 32–36)
MCV RBC AUTO: 95 FL (ref 80–99)
NITRITE UR QL STRIP: NEGATIVE
PH UR STRIP: 6 [PH] (ref 5–9)
PHOSPHATE SERPL-MCNC: 2.6 MG/DL (ref 2.3–4.7)
PLATELET # BLD: 315 10^3/UL (ref 130–400)
PMV BLD AUTO: 8.3 FL (ref 7.4–10.4)
POTASSIUM SERPL-SCNC: 4.8 MMOL/L (ref 3.6–5)
PROT UR QL STRIP: (no result)
RBC #/AREA URNS HPF: (no result) /HPF
SODIUM SERPL-SCNC: 133 MMOL/L (ref 135–145)
SP GR UR STRIP: 1.02 (ref 1.02–1.02)
SQUAMOUS #/AREA URNS HPF: (no result) /HPF
WBC # BLD AUTO: 8.6 10^3/UL (ref 4.3–11)
WBC #/AREA URNS HPF: (no result) /HPF

## 2019-12-01 RX ADMIN — METOPROLOL TARTRATE SCH MG: 25 TABLET, FILM COATED ORAL at 20:35

## 2019-12-01 RX ADMIN — FERROUS SULFATE TAB 325 MG (65 MG ELEMENTAL FE) SCH MG: 325 (65 FE) TAB at 08:26

## 2019-12-01 RX ADMIN — FLUTICASONE PROPIONATE AND SALMETEROL XINAFOATE SCH PUFF: 115; 21 AEROSOL, METERED RESPIRATORY (INHALATION) at 01:32

## 2019-12-01 RX ADMIN — GABAPENTIN SCH MG: 100 CAPSULE ORAL at 13:14

## 2019-12-01 RX ADMIN — INSULIN ASPART SCH UNIT: 100 INJECTION, SOLUTION INTRAVENOUS; SUBCUTANEOUS at 20:34

## 2019-12-01 RX ADMIN — SODIUM CHLORIDE SCH MLS/HR: 900 INJECTION INTRAVENOUS at 05:24

## 2019-12-01 RX ADMIN — POLYETHYLENE GLYCOL (3350) SCH GM: 17 POWDER, FOR SOLUTION ORAL at 08:27

## 2019-12-01 RX ADMIN — IPRATROPIUM BROMIDE AND ALBUTEROL SULFATE SCH ML: .5; 3 SOLUTION RESPIRATORY (INHALATION) at 12:12

## 2019-12-01 RX ADMIN — GABAPENTIN SCH MG: 100 CAPSULE ORAL at 05:24

## 2019-12-01 RX ADMIN — DOCUSATE SODIUM SCH MG: 100 CAPSULE ORAL at 08:26

## 2019-12-01 RX ADMIN — IPRATROPIUM BROMIDE AND ALBUTEROL SULFATE SCH ML: .5; 3 SOLUTION RESPIRATORY (INHALATION) at 15:41

## 2019-12-01 RX ADMIN — SODIUM CHLORIDE SCH MLS/HR: 900 INJECTION INTRAVENOUS at 23:02

## 2019-12-01 RX ADMIN — MONTELUKAST SCH MG: 10 TABLET, FILM COATED ORAL at 20:43

## 2019-12-01 RX ADMIN — DOCUSATE SODIUM AND SENNOSIDES SCH EA: 8.6; 5 TABLET, FILM COATED ORAL at 20:43

## 2019-12-01 RX ADMIN — FERROUS SULFATE TAB 325 MG (65 MG ELEMENTAL FE) SCH MG: 325 (65 FE) TAB at 20:43

## 2019-12-01 RX ADMIN — Medication SCH ML: at 11:46

## 2019-12-01 RX ADMIN — DOCUSATE SODIUM AND SENNOSIDES SCH EA: 8.6; 5 TABLET, FILM COATED ORAL at 08:27

## 2019-12-01 RX ADMIN — Medication SCH ML: at 23:02

## 2019-12-01 RX ADMIN — METOPROLOL TARTRATE SCH MG: 25 TABLET, FILM COATED ORAL at 08:26

## 2019-12-01 RX ADMIN — WARFARIN SODIUM SCH MG: 5 TABLET ORAL at 17:12

## 2019-12-01 RX ADMIN — GABAPENTIN SCH MG: 100 CAPSULE ORAL at 21:14

## 2019-12-01 RX ADMIN — IPRATROPIUM BROMIDE AND ALBUTEROL SULFATE SCH ML: .5; 3 SOLUTION RESPIRATORY (INHALATION) at 01:32

## 2019-12-01 RX ADMIN — PANTOPRAZOLE SODIUM SCH MG: 40 TABLET, DELAYED RELEASE ORAL at 08:26

## 2019-12-01 RX ADMIN — INSULIN ASPART SCH UNIT: 100 INJECTION, SOLUTION INTRAVENOUS; SUBCUTANEOUS at 06:12

## 2019-12-01 RX ADMIN — SODIUM CHLORIDE SCH MLS/HR: 900 INJECTION INTRAVENOUS at 17:12

## 2019-12-01 RX ADMIN — SODIUM CHLORIDE SCH MLS/HR: 900 INJECTION INTRAVENOUS at 11:46

## 2019-12-01 RX ADMIN — POLYETHYLENE GLYCOL (3350) SCH GM: 17 POWDER, FOR SOLUTION ORAL at 20:43

## 2019-12-01 RX ADMIN — INSULIN ASPART SCH UNIT: 100 INJECTION, SOLUTION INTRAVENOUS; SUBCUTANEOUS at 15:59

## 2019-12-01 RX ADMIN — IPRATROPIUM BROMIDE AND ALBUTEROL SULFATE SCH ML: .5; 3 SOLUTION RESPIRATORY (INHALATION) at 08:46

## 2019-12-01 RX ADMIN — Medication SCH ML: at 06:17

## 2019-12-01 RX ADMIN — FLUTICASONE PROPIONATE AND SALMETEROL XINAFOATE SCH PUFF: 115; 21 AEROSOL, METERED RESPIRATORY (INHALATION) at 08:52

## 2019-12-01 RX ADMIN — INSULIN ASPART SCH UNIT: 100 INJECTION, SOLUTION INTRAVENOUS; SUBCUTANEOUS at 11:56

## 2019-12-01 NOTE — PM&R PROGRESS NOTE
Subjective


HPI/CC On Admission


Date Seen by Provider:  Dec 1, 2019


Time Seen by Provider:  11:00


CC: Hip Fracture





HPI: Patient fell twice Monday receiving a hip fracture and had surgery.  

Patient was transferred to in-patient rehab yesterday. Patient does have pain 

when standing and rates it as a 9/10 otherwise, his pain is a 3/10. Pain does 

travel down his right leg. Rehab has been going well he states, and is sore from

rehab.


Subjective/Events-last exam


Pt had a pretty good night last night


Has some confusion this morning


Low grade fever continues to be concerning so UA ordered by Dr Patino and it was

negative


May need repeat bronchoscopy?


Cefepime tolerated for pseudomonas pneumonia 


Lovenox DC since INR 2.3


Conferred with RN


Reviewed therapy notes


Checked meds and labs





Review of Systems


General:  Fatigue


Pulmonary:  Dyspnea


Musculoskeletal:  leg pain





Objective


Exam


Vital Signs





Vital Signs








  Date Time  Temp Pulse Resp B/P (MAP) Pulse Ox O2 Delivery O2 Flow Rate FiO2


 


12/1/19 15:41     92 Nasal Cannula 2.00 


 


12/1/19 08:38 38.0 92 18 106/63 (77)    





Capillary Refill : Less Than 3 Seconds


General Appearance:  No Apparent Distress, WD/WN, Chronically ill, Thin


HEENT:  PERRL/EOMI, Normal ENT Inspection, Pharynx Normal


Neck:  Full Range of Motion, Normal Inspection, Non Tender, Supple


Respiratory:  Chest Non Tender, No Accessory Muscle Use, No Respiratory 

Distress, Crackles, Decreased Breath Sounds, Wheezing


Cardiovascular:  Regular Rate, Rhythm, No Edema, No Gallop, No JVD, No Murmur, 

Normal Peripheral Pulses, Tachycardia


Gastrointestinal:  Normal Bowel Sounds, No Organomegaly, No Pulsatile Mass, Non 

Tender, Soft


Extremity:  Normal Capillary Refill, Normal Inspection, Normal Range of Motion 

(except right leg), Non Tender, No Calf Tenderness, No Pedal Edema


Neurologic/Psychiatric:  Alert, Oriented x3, No Motor/Sensory Deficits, Normal 

Mood/Affect, CNs II-XII Norm as Tested


Skin:  Normal Color, Warm/Dry





Results/Procedures


Lab


Laboratory Tests


12/1/19 09:41








Patient resulted labs reviewed.





FIM


Transfers


Therapy Code Descriptions/Definitions 





Functional [place] Measure:


0=Not Assessed/NA        4=Minimal Assistance


1=Total Assistance        5=Supervision or Setup


2=Maximal Assistance  6=Modified [place]


3=Moderate Assistance 7=Complete IndependenceSCALE: Activities may be completed 

with or without assistive devices.





6-Indepedent-patient completes the activity by him/herself with no assistance 

from a helper.


5-Set-up or Clean-up Assistance-helper sets up or cleans up; patient completes 

activity. [place] assists only prior to or  


    following the activity.


4-Supervision or Touching Assistance-helper provides verbal cues and/or 

touching/steadying and/or contact guard assistance as patient completes 

activity. Assistance may be provided   


    throughout the activity or intermittently.


3-Partial/Moderate Assistance-helper does LESS THAN HALF the effort. [place] 

lifts, holds or supports trunk or limbs, but provides less than half the effort.


2-Substantial/Maximal Assistance-helper does MORE THAN HALF the effort. [place] 

lifts or holds trunk or limbs and provides more than half the effort.


6-Dfuom[place]han-zmuwhc does ALL the effort. Patient does none of the effort to 

complete the activity. Or, the assistance of 2 or more helpers is required for 

the patient to complete the  


    activity.


If activity was not attempted, code reason:


7-Patient Refused.


9-Not Applicable-not attempted and the patient did not perform the activity 

before the current illness, exacerbation or injury.


10-Not Attempted due to Environmental Limitations-(lack of equipment, weather 

restraints, etc.).


88-Not Attempted due to Medical Conditions or Safety Concerns.


Roll Left to Right (QC):  6


Sit to Lying (QC):  6


Sit to Stand (QC):  4 (verb inst for TTWB )


Chair/Bed-to-Chair Xfer(QC):  4


Car Transfer (QC):  3 (assist with right LE)





Gait Training


Does the Patient Walk?:  Yes


Distance:  100 x2


Walk 10 feet (QC):  4


Walk 50 ft with 2 Turns(QC):  4


Walk 150 ft (QC):  88


Walking 10ft/uneven surface-QC:  3


Gait Persons Needed:  1


Gait Assistive Device:  FWW





Wheelchair Training


Does the Pt Use a Wheelchair?:  No


Wheel 50 ft with 2 turns (QC):  5


Wheel 150 ft (QC):  9


Type of Wheelchair:  Manual





Stair Training


 Stair Training: Handrails/:  2 handrails


1 Step (curb) (QC):  88 (pt unsafe to attempt due to TTWB status)


4 Steps (QC):  4 (CGA with skilled cues for sequencing and safety)


12 Steps (QC):  88





Balance


Picking up an Object (QC):  88





ADL-Treatment


Eating (QC):  6


Oral Hygiene (QC):  7


Bathing Location:  L Arm, R Arm, L Upper Leg, R Upper Leg, Chest, Abdomen, 

Buttocks, Perineal Area


Shower/Bathe Self (QC):  4


Upper Body Dressing (QC):  6


Lower Body Dressing (QC):  4


On/Off Footwear (QC):  7


Toileting Hygiene (QC):  7


Toilet Transfer (QC):  7





Assessment/Plan


Assessment and Plan


Assess & Plan/Chief Complaint


Assessment:


Right femur fracture s/p fall in Dr Patino's office


Pneumonia placed on Cefepime per DR Patino Pseudomonas on Cx started abx 

11/22/19


Severe COPD with recurrent pneumonia in the past


Warfarin maintenance


Low grade fever





Plan:


IRF protocol


Cefepime for 14 days total


Nebs


O2


Dr Patino appreciated


Monitor crackles


Monitor INR for coumadin treatment


Check labs in morning


Bronch?





(1) Closed right femoral fracture


(2) Debility


Status:  Acute


(3) Paroxysmal atrial fibrillation


Status:  Chronic


(4) Dyspnea


Status:  Acute


(5) COPD (chronic obstructive pulmonary disease)


Status:  Chronic


(6) Pneumonia


Status:  Acute











ALEKS WILOSN DO                 Dec 1, 2019 12:25


POS

## 2019-12-01 NOTE — PULMONARY PROGRESS NOTE
Subjective


Time Seen by a Provider:  13:23





Sepsis Event


Evaluation


Height, Weight, BMI


Height: 5'9.00"


Weight: 156lbs. 5.0oz. 70.466200od; 23.91 BMI


Method:Stated





Exam


Exam





Vital Signs








  Date Time  Temp Pulse Resp B/P (MAP) Pulse Ox O2 Delivery O2 Flow Rate FiO2


 


12/1/19 08:52     95 Nasal Cannula 2.00 


 


12/1/19 08:47     93 Nasal Cannula 2.00 


 


12/1/19 08:38 38.0 92 18 106/63 (77) 94 Nasal Cannula 2.00 


 


12/1/19 06:00 37.8 83 18 125/72 (89) 91 Nasal Cannula 2.00 


 


11/30/19 21:50      Nasal Cannula 2.00 


 


11/30/19 20:14  70  114/71 (85)    


 


11/30/19 17:22 37.7 86 18 117/54 (75) 97 Nasal Cannula 2.00 


 


11/30/19 14:42     92 Room Air  


 


11/30/19 10:49     90 Room Air  


 


11/30/19 10:43     90 Room Air  














I & O 


 


 12/1/19





 07:00


 


Intake Total 1110 ml


 


Output Total 1400 ml


 


Balance -290 ml








Height & Weight


Height: 5'9.00"


Weight: 156lbs. 5.0oz. 70.225892wo; 23.91 BMI


Method:Stated


General Appearance:  No Apparent Distress, WD/WN, Chronically ill, Thin


HEENT:  PERRL/EOMI, Normal ENT Inspection, Pharynx Normal


Neck:  Full Range of Motion, Normal Inspection, Non Tender, Supple


Respiratory:  Chest Non Tender, No Accessory Muscle Use, No Respiratory 

Distress, Crackles, Decreased Breath Sounds, Wheezing


Cardiovascular:  Regular Rate, Rhythm, No Edema, No Gallop, No JVD, No Murmur, 

Normal Peripheral Pulses, Tachycardia


Gastrointestinal:  non tender, soft


Extremity:  Normal Capillary Refill, Normal Inspection, Normal Range of Motion 

(except right leg), Non Tender, No Calf Tenderness, No Pedal Edema


Neurologic/Psychiatric:  Alert, Oriented x3, No Motor/Sensory Deficits, Normal 

Mood/Affect, CNs II-XII Norm as Tested


Skin:  Normal Color, Warm/Dry





Results


Lab


Laboratory Tests


11/30/19 05:35











Assessment/Plan


Assessment/Plan


Right femur fracture s/p fall while in my office 


Pneumonia with pseudomonus 


   -Will treat with Cefepime x 14 days started on 11/22. 


   -CXR shows improvement. 


Severe COPD 


   -DuoNeb 


   -02











SMITH MUNOZ DO               Dec 1, 2019 09:33

## 2019-12-01 NOTE — NUR
DR. MUNOZ TO FLOOR. INFORMED OF LOW GRADE FEVER- 38.0 (100.4) AND MILD CONFUSION THIS AM 
WHICH HAS SINCE RESOLVED.

## 2019-12-02 VITALS — DIASTOLIC BLOOD PRESSURE: 71 MMHG | SYSTOLIC BLOOD PRESSURE: 113 MMHG

## 2019-12-02 VITALS — DIASTOLIC BLOOD PRESSURE: 52 MMHG | SYSTOLIC BLOOD PRESSURE: 97 MMHG

## 2019-12-02 VITALS — DIASTOLIC BLOOD PRESSURE: 62 MMHG | SYSTOLIC BLOOD PRESSURE: 108 MMHG

## 2019-12-02 VITALS — DIASTOLIC BLOOD PRESSURE: 72 MMHG | SYSTOLIC BLOOD PRESSURE: 124 MMHG

## 2019-12-02 LAB
ALBUMIN SERPL-MCNC: 3 GM/DL (ref 3.2–4.5)
ALP SERPL-CCNC: 76 U/L (ref 40–136)
ALT SERPL-CCNC: 23 U/L (ref 0–55)
BASOPHILS # BLD AUTO: 0 10^3/UL (ref 0–0.1)
BASOPHILS NFR BLD AUTO: 0 % (ref 0–10)
BILIRUB SERPL-MCNC: 0.4 MG/DL (ref 0.1–1)
BUN/CREAT SERPL: 16
CALCIUM SERPL-MCNC: 8.4 MG/DL (ref 8.5–10.1)
CHLORIDE SERPL-SCNC: 102 MMOL/L (ref 98–107)
CO2 SERPL-SCNC: 27 MMOL/L (ref 21–32)
CREAT SERPL-MCNC: 0.83 MG/DL (ref 0.6–1.3)
EOSINOPHIL # BLD AUTO: 0.2 10^3/UL (ref 0–0.3)
EOSINOPHIL NFR BLD AUTO: 3 % (ref 0–10)
ERYTHROCYTE [DISTWIDTH] IN BLOOD BY AUTOMATED COUNT: 16.5 % (ref 10–14.5)
GFR SERPLBLD BASED ON 1.73 SQ M-ARVRAT: > 60 ML/MIN
GLUCOSE SERPL-MCNC: 128 MG/DL (ref 70–105)
HCT VFR BLD CALC: 27 % (ref 40–54)
HGB BLD-MCNC: 8.3 G/DL (ref 13.3–17.7)
INR PPP: 2.7 (ref 0.8–1.4)
LYMPHOCYTES # BLD AUTO: 1.2 X 10^3 (ref 1–4)
LYMPHOCYTES NFR BLD AUTO: 17 % (ref 12–44)
MANUAL DIFFERENTIAL PERFORMED BLD QL: NO
MCH RBC QN AUTO: 30 PG (ref 25–34)
MCHC RBC AUTO-ENTMCNC: 31 G/DL (ref 32–36)
MCV RBC AUTO: 95 FL (ref 80–99)
MONOCYTES # BLD AUTO: 0.7 X 10^3 (ref 0–1)
MONOCYTES NFR BLD AUTO: 9 % (ref 0–12)
NEUTROPHILS # BLD AUTO: 4.9 X 10^3 (ref 1.8–7.8)
NEUTROPHILS NFR BLD AUTO: 70 % (ref 42–75)
PLATELET # BLD: 322 10^3/UL (ref 130–400)
PMV BLD AUTO: 8.8 FL (ref 7.4–10.4)
POTASSIUM SERPL-SCNC: 4.5 MMOL/L (ref 3.6–5)
PROT SERPL-MCNC: 6.2 GM/DL (ref 6.4–8.2)
PROTHROMBIN TIME: 29.6 SEC (ref 12.2–14.7)
SODIUM SERPL-SCNC: 136 MMOL/L (ref 135–145)
WBC # BLD AUTO: 7 10^3/UL (ref 4.3–11)

## 2019-12-02 RX ADMIN — Medication SCH ML: at 05:37

## 2019-12-02 RX ADMIN — GABAPENTIN SCH MG: 100 CAPSULE ORAL at 22:22

## 2019-12-02 RX ADMIN — Medication SCH ML: at 08:18

## 2019-12-02 RX ADMIN — INSULIN ASPART SCH UNIT: 100 INJECTION, SOLUTION INTRAVENOUS; SUBCUTANEOUS at 21:28

## 2019-12-02 RX ADMIN — INSULIN ASPART SCH UNIT: 100 INJECTION, SOLUTION INTRAVENOUS; SUBCUTANEOUS at 11:53

## 2019-12-02 RX ADMIN — GABAPENTIN SCH MG: 100 CAPSULE ORAL at 05:37

## 2019-12-02 RX ADMIN — Medication SCH ML: at 22:23

## 2019-12-02 RX ADMIN — DOCUSATE SODIUM AND SENNOSIDES SCH EA: 8.6; 5 TABLET, FILM COATED ORAL at 21:31

## 2019-12-02 RX ADMIN — IPRATROPIUM BROMIDE AND ALBUTEROL SULFATE SCH ML: .5; 3 SOLUTION RESPIRATORY (INHALATION) at 12:05

## 2019-12-02 RX ADMIN — FLUTICASONE PROPIONATE AND SALMETEROL XINAFOATE SCH PUFF: 115; 21 AEROSOL, METERED RESPIRATORY (INHALATION) at 20:25

## 2019-12-02 RX ADMIN — IPRATROPIUM BROMIDE AND ALBUTEROL SULFATE SCH ML: .5; 3 SOLUTION RESPIRATORY (INHALATION) at 15:50

## 2019-12-02 RX ADMIN — IPRATROPIUM BROMIDE AND ALBUTEROL SULFATE SCH ML: .5; 3 SOLUTION RESPIRATORY (INHALATION) at 01:31

## 2019-12-02 RX ADMIN — FERROUS SULFATE TAB 325 MG (65 MG ELEMENTAL FE) SCH MG: 325 (65 FE) TAB at 21:24

## 2019-12-02 RX ADMIN — METOPROLOL TARTRATE SCH MG: 25 TABLET, FILM COATED ORAL at 08:16

## 2019-12-02 RX ADMIN — SODIUM CHLORIDE SCH MLS/HR: 900 INJECTION INTRAVENOUS at 05:38

## 2019-12-02 RX ADMIN — POLYETHYLENE GLYCOL (3350) SCH GM: 17 POWDER, FOR SOLUTION ORAL at 08:17

## 2019-12-02 RX ADMIN — MONTELUKAST SCH MG: 10 TABLET, FILM COATED ORAL at 21:28

## 2019-12-02 RX ADMIN — IPRATROPIUM BROMIDE AND ALBUTEROL SULFATE SCH ML: .5; 3 SOLUTION RESPIRATORY (INHALATION) at 08:19

## 2019-12-02 RX ADMIN — PANTOPRAZOLE SODIUM SCH MG: 40 TABLET, DELAYED RELEASE ORAL at 08:15

## 2019-12-02 RX ADMIN — INSULIN ASPART SCH UNIT: 100 INJECTION, SOLUTION INTRAVENOUS; SUBCUTANEOUS at 05:38

## 2019-12-02 RX ADMIN — INSULIN ASPART SCH UNIT: 100 INJECTION, SOLUTION INTRAVENOUS; SUBCUTANEOUS at 15:53

## 2019-12-02 RX ADMIN — POLYETHYLENE GLYCOL (3350) SCH GM: 17 POWDER, FOR SOLUTION ORAL at 21:32

## 2019-12-02 RX ADMIN — FLUTICASONE PROPIONATE AND SALMETEROL XINAFOATE SCH PUFF: 115; 21 AEROSOL, METERED RESPIRATORY (INHALATION) at 08:21

## 2019-12-02 RX ADMIN — IPRATROPIUM BROMIDE AND ALBUTEROL SULFATE SCH ML: .5; 3 SOLUTION RESPIRATORY (INHALATION) at 20:24

## 2019-12-02 RX ADMIN — GABAPENTIN SCH MG: 100 CAPSULE ORAL at 13:32

## 2019-12-02 RX ADMIN — DOCUSATE SODIUM AND SENNOSIDES SCH EA: 8.6; 5 TABLET, FILM COATED ORAL at 08:15

## 2019-12-02 RX ADMIN — WARFARIN SODIUM SCH MG: 5 TABLET ORAL at 17:27

## 2019-12-02 RX ADMIN — FERROUS SULFATE TAB 325 MG (65 MG ELEMENTAL FE) SCH MG: 325 (65 FE) TAB at 11:53

## 2019-12-02 RX ADMIN — DOCUSATE SODIUM SCH MG: 100 CAPSULE ORAL at 08:15

## 2019-12-02 RX ADMIN — FLUTICASONE PROPIONATE AND SALMETEROL XINAFOATE SCH PUFF: 115; 21 AEROSOL, METERED RESPIRATORY (INHALATION) at 01:31

## 2019-12-02 RX ADMIN — METOPROLOL TARTRATE SCH MG: 25 TABLET, FILM COATED ORAL at 21:32

## 2019-12-02 RX ADMIN — DOCUSATE SODIUM AND SENNOSIDES SCH EA: 8.6; 5 TABLET, FILM COATED ORAL at 21:32

## 2019-12-02 NOTE — PHYSICAL THERAPY DAILY NOTE
PT Daily Note-Current


Subjective


Pt. in bed and declines to get up in to chair stating he is tired and just got 

in to bed





Pain





   Location:  No Pain Reported





Mental Status


Patient Orientation:  Normal For Age





Transfers


SCALE: Activities may be completed with or without assistive devices.





6-Indepedent-patient completes the activity by him/herself with no assistance f

rom a helper.


5-Set-up or Clean-up Assistance-helper sets up or cleans up; patient completes 

activity. [place] assists only prior to or  


    following the activity.


4-Supervision or Touching Assistance-helper provides verbal cues and/or 

touching/steadying and/or contact guard assistance as patient completes 

activity. Assistance may be provided   


    throughout the activity or intermittently.


3-Partial/Moderate Assistance-helper does LESS THAN HALF the effort. [place] 

lifts, holds or supports trunk or limbs, but provides less than half the effort.


2-Substantial/Maximal Assistance-helper does MORE THAN HALF the effort. [place] 

lifts or holds trunk or limbs and provides more than half the effort.


2-Mwcixtgtb-kpnvuc does ALL the effort. Patient does none of the effort to 

complete the activity. Or, the assistance of 2 or more helpers is required for 

the patient to complete the  


    activity.


If activity was not attempted, code reason:


7-Patient Refused.


9-Not Applicable-not attempted and the patient did not perform the activity 

before the current illness, exacerbation or injury.


10-Not Attempted due to Environmental Limitations-(lack of equipment, weather 

restraints, etc.).


88-Not Attempted due to Medical Conditions or Safety Concerns.


Roll Left & Right (QC):  5





Weight Bearing


Right Lower Extremity:  Right


Touch Toe Bearing


Left Lower Extremity:  Left


Full Weight Bearing





Exercises


Supine Ex:  Bridging, Ankle pumps, Quad Set, Rolling, Glut sets, Heel Slides, 

Short Arc Quads, Scooting, Straight leg raise, Hip abd/add


Supine Reps:  12 (x2)





Assessment


Current Status:  Good Progress





PT Short Term Goals


Short Term Goals


Time Frame:  2019


Sit to lyin (met)


Lying to sitting on side of be:  5 (met)


Walk 10 feet:  5


Walk 50 feet with two turns:  5


Walk 150 feet:  5


4 steps:  4





PT Long Term Goals


Long Term Goals


PT Long Term Goals Time Frame:  Dec 5, 2019


Roll Left & Right (QC):  6


Sit to Lying (QC):  6


Lying-Sitting on Side/Bed(QC):  6


Sit to Stand (QC):  6


Chair/Bed-to-Chair Xfer(QC):  6


Toilet Transfer (QC):  6


Car Transfer (QC):  6


Does the Patient Walk:  Yes


Walk 10 feet (QC):  6


Walk 50ft with 2 Turns (QC):  6


Walk 150 ft (QC):  6


Walking 10ft on Uneven Surface:  6


1 Step (curb) (QC):  6


4 Steps (QC):  5


12 Steps (QC):  9


Picking up an Object (QC):  88


Does the Pt use WC or Scooter?:  No


Type:  N/A


Type:  N/A





PT Plan


Treatment/Plan


Treatment Plan:  Continue Plan of Care


Treatment Plan:  Bed Mobility, Education, Functional Activity Hema, Functional 

Strength, Group Therapy, Gait, Safety, Therapeutic Exercise, Transfers


Treatment Duration:  Dec 5, 2019


Frequency:  At least 5 of 7 days/Wk (IRF)


Estimated Hrs Per Day:  1.5 hours per day


Patient and/or Family Agrees t:  Yes





Safety Risks/Education


Patient Education:  Correct Positioning, Disease Process, Safety Issues


Teaching Recipient:  Patient


Teaching Methods:  Demonstration, Discussion


Response to Teaching:  Verbalize Understanding, Return Demonstration, 

Reinforcement Needed





Time/GCodes


Time In:  1415


Time Out:  1430


Total Billed Treatment Time:  15


Total Billed Treatment


1,EX15m











YA AARON PTA              Dec 2, 2019 14:34


POS

## 2019-12-02 NOTE — SPEECH THERAPY DAILY NOTE
Speech Daily Progress Note


Subjective


Date Seen by Provider:  Dec 2, 2019


Time Seen by Provider:  00:30


Patient was resting in his recliner while watching tv when I entered his room.





Objective


Patient completed safety awareness tasks presented verbally as scenarios he may 

encounter with 80% given 20% cuing.





Assessment


Assessment Current Status:  Good Progress





Treatment Plan


Continue Plan of Care





Speech Short Term Goals


Short Term Goals


Short Term Goals


1) The patient will complete memory tasks related to his daily needs at 90% or 

greater.


2) The patient will complete problem solving tasks related to his daily needs at

90% or greater.


3) The patient will complete safety awareness tasks related to his daily needs 

at 90% or greater.





Speech Long Term Goals


Long Term Goals


The patient will improve cognitive-communication necessary for safety and daily 

living tasks with minimal assist.





Speech-Plan


Patient/Family Goals


Patient/Family Goals:  


Patient continues to plan on returning home with his spouse upon rehab


discharge.





Treatment Plan


Speech Therapy Treatment Plan:  Continue Plan of Care


Patient continues to progress toward meeting ST goals.


Treatment Duration:  Dec 6, 2019


Frequency:  5 times per week


Estimated Hrs Per Day:  .5 hour per day


Rehab Potential:  Guarded


Barriers to Learning:  


Patient has cognitive deficits.


Pt/Family Agrees to Plan:  Yes





Safety Risks/Education


Teaching Recipient:  Patient


Teaching Methods:  Demonstration, Discussion


Response to Teaching:  Verbalize Understanding, Return Demonstration


Education Topics Provided:  


Continued safety within his room.





Time


Speech Therapy Time In:  11:30


Speech Therapy Time Out:  12:00


Total Billed Time:  30


Billed Treatment Time


1, YOEL Gómez             Dec 2, 2019 12:56


POS

## 2019-12-02 NOTE — PM&R PROGRESS NOTE
Subjective


HPI/CC On Admission


Date Seen by Provider:  Dec 2, 2019


Time Seen by Provider:  08:30


CC: Hip Fracture





HPI: Patient fell twice Monday receiving a hip fracture and had surgery.  

Patient was transferred to in-patient rehab yesterday. Patient does have pain 

when standing and rates it as a 9/10 otherwise, his pain is a 3/10. Pain does 

travel down his right leg. Rehab has been going well he states, and is sore from

rehab.


Subjective/Events-last exam


Hgb 8.3 


INR 2.7 


Oxygen on room air was 88% but brought up to 96% on two liters 


Temp of 37 


Conferred with Dr. Patino may need a bronchoscopy 


Cefepime for pseudomonas pneumonitis


Conferred with RN


Reviewed therapy notes


Checked meds and labs





Review of Systems


General:  Fatigue


Pulmonary:  Dyspnea, Cough





Objective


Exam


Vital Signs





Vital Signs








  Date Time  Temp Pulse Resp B/P (MAP) Pulse Ox O2 Delivery O2 Flow Rate FiO2


 


12/2/19 21:30      Nasal Cannula 2.00 


 


12/2/19 21:20  94 18 113/71 (85) 92   


 


12/2/19 18:32 36.8       





Capillary Refill : Less Than 3 Seconds


General Appearance:  No Apparent Distress, WD/WN, Chronically ill, Thin


HEENT:  PERRL/EOMI, Normal ENT Inspection, Pharynx Normal


Neck:  Full Range of Motion, Normal Inspection, Non Tender, Supple


Respiratory:  Chest Non Tender, No Accessory Muscle Use, No Respiratory 

Distress, Crackles, Decreased Breath Sounds, Wheezing


Cardiovascular:  No Edema, No Gallop, No JVD, No Murmur, Normal Peripheral 

Pulses, Irregularly Irregular


Gastrointestinal:  Normal Bowel Sounds, No Organomegaly, No Pulsatile Mass, Non 

Tender, Soft


Extremity:  Normal Capillary Refill, Normal Inspection, Normal Range of Motion 

(except right leg), Non Tender, No Calf Tenderness, No Pedal Edema


Neurologic/Psychiatric:  Alert, Oriented x3, No Motor/Sensory Deficits, Normal 

Mood/Affect, CNs II-XII Norm as Tested


Skin:  Normal Color, Warm/Dry





Results/Procedures


Lab


Laboratory Tests


12/2/19 06:00








Patient resulted labs reviewed.





FIM


Transfers


Therapy Code Descriptions/Definitions 





Functional [place] Measure:


0=Not Assessed/NA        4=Minimal Assistance


1=Total Assistance        5=Supervision or Setup


2=Maximal Assistance  6=Modified [place]


3=Moderate Assistance 7=Complete IndependenceSCALE: Activities may be completed 

with or without assistive devices.





6-Indepedent-patient completes the activity by him/herself with no assistance 

from a helper.


5-Set-up or Clean-up Assistance-helper sets up or cleans up; patient completes 

activity. [place] assists only prior to or  


    following the activity.


4-Supervision or Touching Assistance-helper provides verbal cues and/or 

touching/steadying and/or contact guard assistance as patient completes act

ivity. Assistance may be provided   


    throughout the activity or intermittently.


3-Partial/Moderate Assistance-helper does LESS THAN HALF the effort. [place] 

lifts, holds or supports trunk or limbs, but provides less than half the effort.


2-Substantial/Maximal Assistance-helper does MORE THAN HALF the effort. [place] 

lifts or holds trunk or limbs and provides more than half the effort.


8-Qddbooihk-mmfjyn does ALL the effort. Patient does none of the effort to 

complete the activity. Or, the assistance of 2 or more helpers is required for 

the patient to complete the  


    activity.


If activity was not attempted, code reason:


7-Patient Refused.


9-Not Applicable-not attempted and the patient did not perform the activity 

before the current illness, exacerbation or injury.


10-Not Attempted due to Environmental Limitations-(lack of equipment, weather 

restraints, etc.).


88-Not Attempted due to Medical Conditions or Safety Concerns.


Roll Left to Right (QC):  6


Sit to Lying (QC):  6


Sit to Stand (QC):  4 (verb inst for TTWB )


Chair/Bed-to-Chair Xfer(QC):  4


Car Transfer (QC):  3 (assist with right LE)





Gait Training


Does the Patient Walk?:  Yes


Distance:  100 x2


Walk 10 feet (QC):  4


Walk 50 ft with 2 Turns(QC):  4


Walk 150 ft (QC):  88


Walking 10ft/uneven surface-QC:  3


Gait Persons Needed:  1


Gait Assistive Device:  FWW





Wheelchair Training


Does the Pt Use a Wheelchair?:  No


Wheel 50 ft with 2 turns (QC):  5


Wheel 150 ft (QC):  9


Type of Wheelchair:  Manual





Stair Training


 Stair Training: Handrails/:  2 handrails


1 Step (curb) (QC):  88 (pt unsafe to attempt due to TTWB status)


4 Steps (QC):  4 (CGA with skilled cues for sequencing and safety)


12 Steps (QC):  88





Balance


Picking up an Object (QC):  88





ADL-Treatment


Eating (QC):  6


Oral Hygiene (QC):  7


Bathing Location:  L Arm, R Arm, L Upper Leg, R Upper Leg, Chest, Abdomen, 

Buttocks, Perineal Area


Shower/Bathe Self (QC):  4


Upper Body Dressing (QC):  6


Lower Body Dressing (QC):  4


On/Off Footwear (QC):  7


Toileting Hygiene (QC):  7


Toilet Transfer (QC):  7





Assessment/Plan


Assessment and Plan


Assess & Plan/Chief Complaint


Assessment:


Right femur fracture s/p fall in Dr Patino's office


Pneumonia placed on Cefepime per Dr Patino Pseudomonas on Cx started abx 11/22/1

9


Severe COPD with recurrent pneumonia in the past


Warfarin maintenance


Low grade fever





Plan:


IRF protocol


Cefepime for 14 days total


Nebs


O2


Dr Patino appreciated


Monitor crackles


Monitor INR for coumadin treatment


Bronch?





(1) Closed right femoral fracture


(2) Debility


Status:  Acute


(3) Paroxysmal atrial fibrillation


Status:  Chronic


(4) Dyspnea


Status:  Acute


(5) COPD (chronic obstructive pulmonary disease)


Status:  Chronic


(6) Pneumonia


Status:  Acute











ALEKS WILSON DO                 Dec 2, 2019 09:48


POS

## 2019-12-02 NOTE — PROGRESS NOTE
Subjective


Time Seen by a Provider:  08:38


Subjective/Events-last exam


Patient improving.


Patient happy the way things are going.


Hip fracture.


Atrial fibrillation





Objective


Exam





Vital Signs








  Date Time  Temp Pulse Resp B/P (MAP) Pulse Ox O2 Delivery O2 Flow Rate FiO2


 


19 08:19     94 Nasal Cannula 2.00 


 


19 08:07 37.0 76 22 97/52 (67) 96 Nasal Cannula 2.00 


 


19 08:05     88 Room Air  


 


19 05:06 37.5 82 18 124/72 (89) 96 Nasal Cannula 2.00 


 


19 21:02      Nasal Cannula 2.00 


 


19 20:35  52  108/66 (80)    


 


19 18:11 38.2 89 18 115/68 (84) 95 Nasal Cannula 2.00 


 


19 15:41     92 Nasal Cannula 2.00 


 


19 12:11     92 Room Air  


 


19 09:59      Nasal Cannula 2.00 


 


19 08:52     95 Nasal Cannula 2.00 


 


19 08:47     93 Nasal Cannula 2.00 














I & O 


 


 19





 07:00


 


Intake Total 1630 ml


 


Output Total 1150 ml


 


Balance 480 ml





Capillary Refill : Less Than 3 Seconds


General Appearance:  No Apparent Distress, Thin


HEENT:  Normal ENT Inspection


Neck:  Full Range of Motion, Normal Inspection


Respiratory:  No Accessory Muscle Use, No Respiratory Distress, Decreased Breath

Sounds


Cardiovascular:  Irregularly Irregular





Results


Lab


Laboratory Tests


19 09:41








19 06:00








Laboratory Tests


19 09:41: 


White Blood Count 8.6, Red Blood Count 2.85L, Hemoglobin 8.6L, Hematocrit 27L, 

Mean Corpuscular Volume 95, Mean Corpuscular Hemoglobin 30, Mean Corpuscular He

moglobin Concent 32, Red Cell Distribution Width 16.3H, Platelet Count 315, Mean

Platelet Volume 8.3, Sodium Level 133L, Potassium Level 4.8, Chloride Level 99, 

Carbon Dioxide Level 26, Anion Gap 8, Blood Urea Nitrogen 13, Creatinine 0.93, 

Estimat Glomerular Filtration Rate > 60, BUN/Creatinine Ratio 14, Glucose Level 

262H, Calcium Level 8.1L, Phosphorus Level 2.6, Magnesium Level 1.9


19 10:41: Glucometer 260H


19 11:05: 


Urine Color YELLOW, Urine Clarity CLEAR, Urine pH 6.0, Urine Specific Gravity 

1.025H, Urine Protein TRACE, Urine Glucose (UA) NEGATIVE, Urine Ketones 

NEGATIVE, Urine Nitrite NEGATIVE, Urine Bilirubin NEGATIVE, Urine Urobilinogen 

0.2, Urine Leukocyte Esterase NEGATIVE, Urine RBC (Auto) 1+H, Urine RBC NONE, 

Urine WBC NONE, Urine Squamous Epithelial Cells RARE, Urine Crystals NONE, Urine

Bacteria NEGATIVE, Urine Casts PRESENT, Urine Hyaline Casts 5-10H, Urine Mucus 

SMALLH, Urine Culture Indicated NO


19 15:32: Glucometer 177H


19 20:13: Glucometer 192H


19 04:27: Glucometer 134H


19 06:00: 


White Blood Count 7.0, Red Blood Count 2.81L, Hemoglobin 8.3L, Hematocrit 27L, 

Mean Corpuscular Volume 95, Mean Corpuscular Hemoglobin 30, Mean Corpuscular 

Hemoglobin Concent 31L, Red Cell Distribution Width 16.5H, Platelet Count 322, 

Mean Platelet Volume 8.8, Neutrophils (%) (Auto) 70, Lymphocytes (%) (Auto) 17, 

Monocytes (%) (Auto) 9, Eosinophils (%) (Auto) 3, Basophils (%) (Auto) 0, 

Neutrophils # (Auto) 4.9, Lymphocytes # (Auto) 1.2, Monocytes # (Auto) 0.7, 

Eosinophils # (Auto) 0.2, Basophils # (Auto) 0.0, Prothrombin Time 29.6H, INR 

Comment 2.7H, Sodium Level 136, Potassium Level 4.5, Chloride Level 102, Carbon 

Dioxide Level 27, Anion Gap 7, Blood Urea Nitrogen 13, Creatinine 0.83, Estimat 

Glomerular Filtration Rate > 60, BUN/Creatinine Ratio 16, Glucose Level 128H, 

Calcium Level 8.4L, Corrected Calcium 9.2, Total Bilirubin 0.4, Aspartate Amino 

Transf (AST/SGOT) 20, Alanine Aminotransferase (ALT/SGPT) 23, Alkaline 

Phosphatase 76, Total Protein 6.2L, Albumin 3.0L





Microbiology


19 Blood Culture - Final, Complete


           No growth


19 Gram Stain - Final, Resulted


           


19 Sputum Culture - Preliminary, Resulted


           Usual upper respiratory [place]


           Pseudomonas aeruginosa





Assessment/Plan


Assessment/Plan


Assess & Plan/Chief Complaint


Hip fracture.


COPD.


Atrial fibrillation.


Diabetes.


Febrile.


Leukocytosis.


.


19.


Hip fracture.


COPD.


Atrial fibrillation.


Diabetes.


Sputum culture pseudomonas.


INR low..


.


Limits/27 6/19 hip fracture.


COPD.


Sinus rhythm today.


Diabetes.


Sputum culture pseudomonas.


.


19.


Hip fracture.


COPD.


Atrial fibrillation today.


Diabetes.


Sputum culture pseudomonas





Clinical Quality Measures


DVT/VTE Risk/Contraindication:


Risk Factor Score Per Nursin


RFS Level Per Nursing on Admit:  4+=Very High











TOR CONNELLY DO          Dec 2, 2019 08:40


POS

## 2019-12-02 NOTE — OCCUPATIONAL THER DAILY NOTE
OT Current Status-Daily Note


Subjective


Pt seen in recliner chair, states minimal pain in R elbow/ shoulder. Pt educated

on modified UE theraband exercises due to pain. Pt agreeable to OT tx session.





Mental Status/Objective


Patient Orientation:  Normal For Age


Attachments:  Oxygen





ADL-Treatment


Therapy Code Descriptions/Definitions 





Functional [place] Measure:


0=Not Assessed/NA        4=Minimal Assistance


1=Total Assistance        5=Supervision or Setup


2=Maximal Assistance  6=Modified [place]


3=Moderate Assistance 7=Complete IndependenceSCALE: Activities may be completed 

with or without assistive devices.





6-Indepedent-patient completes the activity by him/herself with no assistance 

from a helper.


5-Set-up or Clean-up Assistance-helper sets up or cleans up; patient completes 

activity. [place] assists only prior to or  


    following the activity.


4-Supervision or Touching Assistance-helper provides verbal cues and/or 

touching/steadying and/or contact guard assistance as patient completes 

activity. Assistance may be provided   


    throughout the activity or intermittently.


3-Partial/Moderate Assistance-helper does LESS THAN HALF the effort. [place] 

lifts, holds or supports trunk or limbs, but provides less than half the effort.


2-Substantial/Maximal Assistance-helper does MORE THAN HALF the effort. [place] 

lifts or holds trunk or limbs and provides more than half the effort.


7-Jaqmkwgwd-ktbbjl does ALL the effort. Patient does none of the effort to c

omplete the activity. Or, the assistance of 2 or more helpers is required for 

the patient to complete the  


    activity.


If activity was not attempted, code reason:


7-Patient Refused.


9-Not Applicable-not attempted and the patient did not perform the activity 

before the current illness, exacerbation or injury.


10-Not Attempted due to Environmental Limitations-(lack of equipment, weather 

restraints, etc.).


88-Not Attempted due to Medical Conditions or Safety Concerns.


Eating (QC):  6





Other Treatment


Pt agreeable to OT tx session. Pt denies need/ desire for ADLs, but desires 

exercises in room. Pt completes standing tolerance exercises with cues to 

maintain TTWB on R LE. Pt completes reaching in different planes (1# wrist 

weights donned) with one hand on FWW in stance and completes large clothes pin 

donning onto peg board. Pt completes exercises, requires sit after 7.5 minutes, 

completes an additional 12 minutes of standing tolerance during activity. Pt 

completes cognitive activity during exercise with moderate cues for remembering 

activities' purpose/ goal. Pt's wife enters mid-session. Pt returns to sit in 

recliner chair to complete UE theraband exercises with 1# wrist weights with 

cues for positioning and modifications for comfort. Pt completes 2 sets of 10 

reps per side with rest breaks in between exercise. Pt's wife c/o pt unable to 

complete IADL of paying bills due to decreased hand writing due to arthritis, pt

given built up handle to place on pencil/ pen for increased IND within IADLs at 

home. Pt completes fine motor activity with yellow theraputty, utilizing pincers

takes all beads from putty and returns them in place. Pt left with wife, pt in 

recliner chair, all needs met, safety measures met, call light in reach.





Education


OT Patient Education:  Correct positioning, Exercise program, Home exercise 

program, Progress toward Goal/Update tx plan, Reviewed precautions, Safety 

issues, Use of adapted equipment


Teaching Recipient:  Patient, Significant Other


Teaching Methods:  Demonstration, Discussion


Response to Teaching:  Verbalize Understanding, Return Demonstration





OT Short Term Goals


Short Term Goals


Time Frame:  Dec 6, 2019


Oral hygiene:  5


Toileting hygiene:  3


Shower/bathe self:  3


Upper body dressin


Lower body dressing:  3 (met)


Putting on/taking off footwear:  3





OT Long Term Goals


Long Term Goals


Time Frame:  Dec 20, 2019


Eating (QC):  6 (met)


Oral Hygiene (QC):  6


Toileting Hygiene (QC):  6


Shower/Bathe Self (QC):  6 (met)


Upper Body Dressing (QC):  6 (met)


Lower Body Dressing (QC):  6


On/Off Footwear (QC):  6


Additional Goals:  1-Demonstrate ADL Tasks, 2-Verbalize Understanding, 3-

ImproveStrength/Hema


1=Demonstrate adherence to instructed precautions during ADL tasks.


2=Patient will verbalize/demonstrate understanding of assistive shanice

anjali/modifications for ADL.


3=Patient will improve strength/tolerance for activity to enable patient to 

perform ADL's.





OT Education/Plan


Problem List/Assessment


Assessment:  Decreased Activ Tolerance, Decreased UE Strength, Impaired Funct 

Balance, Impaired I ADL's, Impaired Self-Care Skills





Discharge Recommendations


Plan/Recommendations:  Continue POC





Treatment Plan/Plan of Care


Treatment,Training & Education:  Yes


Patient would benefit from OT for education, treatment and training to promote 

independence in ADL's, mobility, safety and/or upper extremity function for 

ADL's.


Plan of Care:  ADL Retraining, Functional Mobility, Group Exercise/Act as Ind, 

UE Funct Exercise/Act


Treatment Duration:  Dec 20, 2019


Frequency:  At least 5 of 7 days/Wk (IRF)


Estimated Hrs Per Day:  1.5 hours per day


Agreement:  Yes


Rehab Potential:  Guarded





Time/GCodes


Start Time:  13:00


Stop Time:  14:00


Total Time Billed (hr/min):  60


Billed Treatment Time


1, FA (15), EX (45)= 60











NARAYAN PITT OTR                 Dec 2, 2019 14:16


POS

## 2019-12-02 NOTE — NUR
Unable to obtain Iron tab from [facility]. 

-------------------------------------------------------------------------------

Addendum: 12/02/19 at 0959 by VARGAS GLEZ RN

-------------------------------------------------------------------------------

Notified Pharmacy that Iron tab was not in the [facility] when checked twice

## 2019-12-02 NOTE — PULMONARY PROGRESS NOTE
Subjective


Time Seen by a Provider:  13:24





Sepsis Event


Evaluation


Height, Weight, BMI


Height: 5'9.00"


Weight: 156lbs. 5.0oz. 70.317386qn; 23.91 BMI


Method:Stated





Exam


Exam





Vital Signs








  Date Time  Temp Pulse Resp B/P (MAP) Pulse Ox O2 Delivery O2 Flow Rate FiO2


 


12/2/19 15:50     93 Nasal Cannula 2.00 


 


12/2/19 15:42 36.7 77 14 108/62 (77) 91 Nasal Cannula 2.00 


 


12/2/19 12:05     93 Nasal Cannula 2.00 


 


12/2/19 08:19     94 Nasal Cannula 2.00 


 


12/2/19 08:07 37.0 76 22 97/52 (67) 96 Nasal Cannula 2.00 


 


12/2/19 08:05     88 Room Air  


 


12/2/19 05:06 37.5 82 18 124/72 (89) 96 Nasal Cannula 2.00 


 


12/1/19 21:02      Nasal Cannula 2.00 


 


12/1/19 20:35  52  108/66 (80)    


 


12/1/19 18:11 38.2 89 18 115/68 (84) 95 Nasal Cannula 2.00 














I & O 


 


 12/2/19





 07:00


 


Intake Total 1630 ml


 


Output Total 1150 ml


 


Balance 480 ml








Height & Weight


Height: 5'9.00"


Weight: 156lbs. 5.0oz. 70.516412tw; 23.91 BMI


Method:Stated


General Appearance:  No Apparent Distress, Thin


HEENT:  Normal ENT Inspection


Neck:  Full Range of Motion, Normal Inspection


Respiratory:  No Accessory Muscle Use, No Respiratory Distress, Decreased Breath

Sounds


Cardiovascular:  Irregularly Irregular


Gastrointestinal:  non tender, soft


Extremity:  Normal Capillary Refill, Normal Inspection, Normal Range of Motion 

(except right leg), Non Tender, No Calf Tenderness, No Pedal Edema


Neurologic/Psychiatric:  Alert, Oriented x3, No Motor/Sensory Deficits, Normal 

Mood/Affect, CNs II-XII Norm as Tested


Skin:  Normal Color, Warm/Dry





Results


Lab


Laboratory Tests


12/1/19 09:41








12/2/19 06:00











Assessment/Plan


Assessment/Plan


Right femur fracture s/p fall while in my office 


Pneumonia with pseudomonus 


   -Will treat with Cefepime x 14 days started on 11/22. 


   -CXR shows improvement. 


Severe COPD 


   -DuoNeb 


   -02











SMITH MUNOZ DO               Dec 2, 2019 17:27
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
## 2019-12-02 NOTE — OCCUPATIONAL THER DAILY NOTE
OT Current Status-Daily Note


Subjective


Pt seen in bed, pt denies pain while in supine, states mild pain while moving. 

Pt agreeable to OT tx session.





Mental Status/Objective


Patient Orientation:  Normal For Age


Attachments:  Oxygen





ADL-Treatment


Therapy Code Descriptions/Definitions 





Functional Crane Measure:


0=Not Assessed/NA        4=Minimal Assistance


1=Total Assistance        5=Supervision or Setup


2=Maximal Assistance  6=Modified Crane


3=Moderate Assistance 7=Complete IndependenceSCALE: Activities may be completed 

with or without assistive devices.





6-Indepedent-patient completes the activity by him/herself with no assistance 

from a helper.


5-Set-up or Clean-up Assistance-helper sets up or cleans up; patient completes 

activity. [place] assists only prior to or  


    following the activity.


4-Supervision or Touching Assistance-helper provides verbal cues and/or 

touching/steadying and/or contact guard assistance as patient completes activit

y. Assistance may be provided   


    throughout the activity or intermittently.


3-Partial/Moderate Assistance-helper does LESS THAN HALF the effort. [place] 

lifts, holds or supports trunk or limbs, but provides less than half the effort.


2-Substantial/Maximal Assistance-helper does MORE THAN HALF the effort. [place] 

lifts or holds trunk or limbs and provides more than half the effort.


1-Lxuprqzul-jaggue does ALL the effort. Patient does none of the effort to 

complete the activity. Or, the assistance of 2 or more helpers is required for 

the patient to complete the  


    activity.


If activity was not attempted, code reason:


7-Patient Refused.


9-Not Applicable-not attempted and the patient did not perform the activity 

before the current illness, exacerbation or injury.


10-Not Attempted due to Environmental Limitations-(lack of equipment, weather 

restraints, etc.).


88-Not Attempted due to Medical Conditions or Safety Concerns.


Eating (QC):  6





Other Treatment


Pt bed mob with IND with encouragement. Pt sit to stand to FWW with SUP, 

ambulates and sits in recliner chair with SBA. Pt completes UE theraband 

exercises with cues for positioning, repetitions, and tension. Pt completes 1 

set of 15 repetitions bilaterally, focusing on back flies, biceps, triceps, and 

internal/ external shoulder rotation. No SOB noted, pt talking throughout reps. 

Pt remains in recliner end of session, call light in reach, all needs met.





Education


OT Patient Education:  Correct positioning, Exercise program, Home exercise 

program, Purpose of tx/functional activities


Teaching Recipient:  Patient


Teaching Methods:  Demonstration, Discussion


Response to Teaching:  Verbalize Understanding, Return Demonstration





OT Short Term Goals


Short Term Goals


Time Frame:  Dec 6, 2019


Oral hygiene:  5


Toileting hygiene:  3


Shower/bathe self:  3


Upper body dressin


Lower body dressing:  3 (met)


Putting on/taking off footwear:  3





OT Long Term Goals


Long Term Goals


Time Frame:  Dec 20, 2019


Eating (QC):  6 (met)


Oral Hygiene (QC):  6


Toileting Hygiene (QC):  6


Shower/Bathe Self (QC):  6 (met)


Upper Body Dressing (QC):  6 (met)


Lower Body Dressing (QC):  6


On/Off Footwear (QC):  6


Additional Goals:  1-Demonstrate ADL Tasks, 2-Verbalize Understanding, 3-

ImproveStrength/Hema


1=Demonstrate adherence to instructed precautions during ADL tasks.


2=Patient will verbalize/demonstrate understanding of assistive 

devices/modifications for ADL.


3=Patient will improve strength/tolerance for activity to enable patient to 

perform ADL's.





OT Education/Plan


Problem List/Assessment


Assessment:  Decreased Activ Tolerance, Decreased UE Strength, Impaired Funct 

Balance, Impaired I ADL's, Impaired Self-Care Skills





Discharge Recommendations


Plan/Recommendations:  Continue POC





Treatment Plan/Plan of Care


Treatment,Training & Education:  Yes


Patient would benefit from OT for education, treatment and training to promote 

independence in ADL's, mobility, safety and/or upper extremity function for 

ADL's.


Plan of Care:  ADL Retraining, Functional Mobility, Group Exercise/Act as Ind, U

E Funct Exercise/Act


Treatment Duration:  Dec 20, 2019


Frequency:  At least 5 of 7 days/Wk (IRF)


Estimated Hrs Per Day:  1.5 hours per day


Agreement:  Yes


Rehab Potential:  Guarded





Time/GCodes


Start Time:  11:00


Stop Time:  11:30


Total Time Billed (hr/min):  30


Billed Treatment Time


1, EX 2 (30)











NARAYAN PITT OTR                 Dec 2, 2019 11:32


POS

## 2019-12-03 VITALS — DIASTOLIC BLOOD PRESSURE: 65 MMHG | SYSTOLIC BLOOD PRESSURE: 120 MMHG

## 2019-12-03 VITALS — SYSTOLIC BLOOD PRESSURE: 108 MMHG | DIASTOLIC BLOOD PRESSURE: 65 MMHG

## 2019-12-03 RX ADMIN — DOCUSATE SODIUM AND SENNOSIDES SCH EA: 8.6; 5 TABLET, FILM COATED ORAL at 09:30

## 2019-12-03 RX ADMIN — INSULIN ASPART SCH UNIT: 100 INJECTION, SOLUTION INTRAVENOUS; SUBCUTANEOUS at 06:00

## 2019-12-03 RX ADMIN — Medication SCH ML: at 06:20

## 2019-12-03 RX ADMIN — GABAPENTIN SCH MG: 100 CAPSULE ORAL at 22:01

## 2019-12-03 RX ADMIN — MONTELUKAST SCH MG: 10 TABLET, FILM COATED ORAL at 22:01

## 2019-12-03 RX ADMIN — Medication SCH ML: at 15:04

## 2019-12-03 RX ADMIN — METOPROLOL TARTRATE SCH MG: 25 TABLET, FILM COATED ORAL at 22:01

## 2019-12-03 RX ADMIN — IPRATROPIUM BROMIDE AND ALBUTEROL SULFATE SCH ML: .5; 3 SOLUTION RESPIRATORY (INHALATION) at 10:50

## 2019-12-03 RX ADMIN — IPRATROPIUM BROMIDE AND ALBUTEROL SULFATE SCH ML: .5; 3 SOLUTION RESPIRATORY (INHALATION) at 21:01

## 2019-12-03 RX ADMIN — GABAPENTIN SCH MG: 100 CAPSULE ORAL at 06:20

## 2019-12-03 RX ADMIN — FERROUS SULFATE TAB 325 MG (65 MG ELEMENTAL FE) SCH MG: 325 (65 FE) TAB at 22:01

## 2019-12-03 RX ADMIN — INSULIN ASPART SCH UNIT: 100 INJECTION, SOLUTION INTRAVENOUS; SUBCUTANEOUS at 22:01

## 2019-12-03 RX ADMIN — FLUTICASONE PROPIONATE AND SALMETEROL XINAFOATE SCH PUFF: 115; 21 AEROSOL, METERED RESPIRATORY (INHALATION) at 21:03

## 2019-12-03 RX ADMIN — POLYETHYLENE GLYCOL (3350) SCH GM: 17 POWDER, FOR SOLUTION ORAL at 09:30

## 2019-12-03 RX ADMIN — FLUTICASONE PROPIONATE AND SALMETEROL XINAFOATE SCH PUFF: 115; 21 AEROSOL, METERED RESPIRATORY (INHALATION) at 07:41

## 2019-12-03 RX ADMIN — GABAPENTIN SCH MG: 100 CAPSULE ORAL at 15:04

## 2019-12-03 RX ADMIN — DOCUSATE SODIUM SCH MG: 100 CAPSULE ORAL at 09:23

## 2019-12-03 RX ADMIN — POLYETHYLENE GLYCOL (3350) SCH GM: 17 POWDER, FOR SOLUTION ORAL at 21:51

## 2019-12-03 RX ADMIN — INSULIN ASPART SCH UNIT: 100 INJECTION, SOLUTION INTRAVENOUS; SUBCUTANEOUS at 16:25

## 2019-12-03 RX ADMIN — PANTOPRAZOLE SODIUM SCH MG: 40 TABLET, DELAYED RELEASE ORAL at 09:24

## 2019-12-03 RX ADMIN — IPRATROPIUM BROMIDE AND ALBUTEROL SULFATE SCH ML: .5; 3 SOLUTION RESPIRATORY (INHALATION) at 07:41

## 2019-12-03 RX ADMIN — IPRATROPIUM BROMIDE AND ALBUTEROL SULFATE SCH ML: .5; 3 SOLUTION RESPIRATORY (INHALATION) at 16:19

## 2019-12-03 RX ADMIN — FERROUS SULFATE TAB 325 MG (65 MG ELEMENTAL FE) SCH MG: 325 (65 FE) TAB at 09:23

## 2019-12-03 RX ADMIN — WARFARIN SODIUM SCH MG: 5 TABLET ORAL at 17:14

## 2019-12-03 RX ADMIN — DOCUSATE SODIUM AND SENNOSIDES SCH EA: 8.6; 5 TABLET, FILM COATED ORAL at 21:51

## 2019-12-03 RX ADMIN — INSULIN ASPART SCH UNIT: 100 INJECTION, SOLUTION INTRAVENOUS; SUBCUTANEOUS at 11:36

## 2019-12-03 RX ADMIN — Medication SCH ML: at 22:02

## 2019-12-03 RX ADMIN — METOPROLOL TARTRATE SCH MG: 25 TABLET, FILM COATED ORAL at 09:30

## 2019-12-03 NOTE — DIAGNOSTIC IMAGING REPORT
INDICATION: Pneumonia and shortness of breath.



PA and lateral chest obtained at 11:11 a.m. and is compared to

11/25/2019.



FINDINGS: Heart is normal in size. Interstitial infiltrate versus

fibrosis in both lung bases has not changed appreciably compared

to the previous study. There is underlying biapical emphysematous

change. There is no significant pleural fluid. There is no

pneumothorax.



IMPRESSION:



No change in bibasilar interstitial infiltrate versus fibrosis

when compared to the previous study. There is no pleural fluid or

pneumothorax.



Dictated by: 



  Dictated on workstation # [id number]

## 2019-12-03 NOTE — NUR
DR. CONNELLY UPDATED ON PATIENT'S COMPLAINT OF CHEST AND BACK PAIN WHICH JUST STARTED AND 
TEMP. 100.6. PRODUCTIVE COUGH OF CREAMY SPUTUM. NO ORDERS AT THIS TIME.

## 2019-12-03 NOTE — SPEECH THERAPY DAILY NOTE
Speech Daily Progress Note


Subjective


Date Seen by Provider:  Dec 3, 2019


Time Seen by Provider:  00:30


Patient was resting in his chair watching television following his PT session. 

The patient has been placed on precautions due to an identified bacteria. 

Patient is wearing a mask now.





Objective


Patient completed memory tasks at the intermediate level with 80% accuracy given

20% cues.





Assessment


Assessment Current Status:  Good Progress





Treatment Plan


Continue Plan of Care





Speech Short Term Goals


Short Term Goals


Short Term Goals


1) The patient will complete memory tasks related to his daily needs at 90% or 

greater.


2) The patient will complete problem solving tasks related to his daily needs at

90% or greater.


3) The patient will complete safety awareness tasks related to his daily needs 

at 90% or greater.





Speech Long Term Goals


Long Term Goals


The patient will improve cognitive-communication necessary for safety and daily 

living tasks with minimal assist.





Speech-Plan


Patient/Family Goals


Patient/Family Goals:  


Patient plans on returning home with his wife post rehab.





Treatment Plan


Speech Therapy Treatment Plan:  Continue Plan of Care


Patient is progressing toward meeting ST goals.


Treatment Duration:  Dec 6, 2019


Frequency:  5 times per week


Estimated Hrs Per Day:  .5 hour per day


Rehab Potential:  Guarded


Barriers to Learning:  


Patient has mild cognitive deficits.


Pt/Family Agrees to Plan:  Yes





Safety Risks/Education


Teaching Recipient:  Patient


Teaching Methods:  Demonstration, Discussion


Response to Teaching:  Verbalize Understanding, Return Demonstration


Education Topics Provided:  


Continued safety within his room.





Time


Speech Therapy Time In:  10:00


Speech Therapy Time Out:  10:30


Total Billed Time:  30


Billed Treatment Time


1, SLYOEL Dozier             Dec 3, 2019 10:57


POS

## 2019-12-03 NOTE — PROGRESS NOTE
Subjective


Time Seen by a Provider:  08:38


Subjective/Events-last exam


Patient feeling better.


Patient had a good meal this morning.


Patient learning to put on his pants..


INR 2.7.


Patient received last dose of IV antibiotics yesterday





Objective


Exam





Vital Signs








  Date Time  Temp Pulse Resp B/P (MAP) Pulse Ox O2 Delivery O2 Flow Rate FiO2


 


12/3/19 07:41     90 Nasal Cannula 2.00 


 


12/3/19 05:59 37.6 83 18 108/65 (79) 94 Nasal Cannula 2.00 


 


19 21:30      Nasal Cannula 2.00 


 


19 21:20  94 18 113/71 (85) 92 Nasal Cannula 2.00 


 


19 20:26     92 Nasal Cannula 2.00 


 


19 18:32 36.8 71 20  97 Nasal Cannula 2.00 


 


19 15:50     93 Nasal Cannula 2.00 


 


19 15:42 36.7 77 14 108/62 (77) 91 Nasal Cannula 2.00 


 


19 12:30 37.1       


 


19 12:05     93 Nasal Cannula 2.00 














I & O 


 


 12/3/19





 07:00


 


Intake Total 1500 ml


 


Output Total 1310 ml


 


Balance 190 ml





Capillary Refill : Less Than 3 Seconds


General Appearance:  No Apparent Distress, Thin


HEENT:  Normal ENT Inspection


Neck:  Normal Inspection


Respiratory:  No Accessory Muscle Use, No Respiratory Distress, Decreased Breath

Sounds


Cardiovascular:  Regular Rate, Rhythm, No Murmur


Gastrointestinal:  non tender, soft





Results


Lab


Laboratory Tests


19 10:57: Glucometer 209H


19 15:41: Glucometer 197H


19 20:39: Glucometer 223H


12/3/19 05:39: Glucometer 142H





Microbiology


19 Blood Culture - Final, Complete


           No growth


19 Gram Stain - Final, Complete


           


19 Sputum Culture - Final, Complete


           Usual upper respiratory haydee


           Pseudomonas aeruginosa


           Pseudomonas aeruginosa#2





Assessment/Plan


Assessment/Plan


Assess & Plan/Chief Complaint


Hip fracture.


COPD.


Atrial fibrillation.


Diabetes.


Febrile.


Leukocytosis.


.


19.


Hip fracture.


COPD.


Atrial fibrillation.


Diabetes.


Sputum culture pseudomonas.


INR low..


.


Limits/27 6/19 hip fracture.


COPD.


Sinus rhythm today.


Diabetes.


Sputum culture pseudomonas.


.


19.


Hip fracture.


COPD.


Atrial fibrillation today.


Diabetes.


Sputum culture pseudomonas.


.


12/3/19.


Right hip fracture.


COPD.


Proximal atrial fibrillation.


Diabetes.


Pneumonia with pseudomonas.





Clinical Quality Measures


DVT/VTE Risk/Contraindication:


Risk Factor Score Per Nursin


RFS Level Per Nursing on Admit:  4+=Very High











TOR CONNELLY DO          Dec 3, 2019 08:41


POS

## 2019-12-03 NOTE — PHYSICAL THERAPY DAILY NOTE
PT Daily Note-Current


Subjective


Pt agreeable to PT session. States he wants to go home but not quite sure if he 

is ready. States he only has help early in the morning and late at night 

although lives with his wife.





Pain





   Numeric Pain Scale:  0-No Pain





Appearance


Upon arrival, pt sitting up in recliner awake and alert. Nurse arrived during tx

session, states pt is now on droplet precautions and pt is to wear mask when out

of room, nurse placed mask on pt. At end of session, pt sitting up in recliner 

with alarm activated, call light, phone and  bedside table within reach.





Mental Status


Patient Orientation:  Person, Place, Time, Eyes Open, Situation


Attachments:  Oxygen (2 L)





Transfers


SCALE: Activities may be completed with or without assistive devices.





6-Indepedent-patient completes the activity by him/herself with no assistance 

from a helper.


5-Set-up or Clean-up Assistance-helper sets up or cleans up; patient completes 

activity. [place] assists only prior to or  


    following the activity.


4-Supervision or Touching Assistance-helper provides verbal cues and/or 

touching/steadying and/or contact guard assistance as patient completes 

activity. Assistance may be provided   


    throughout the activity or intermittently.


3-Partial/Moderate Assistance-helper does LESS THAN HALF the effort. [place] 

lifts, holds or supports trunk or limbs, but provides less than half the effort.


2-Substantial/Maximal Assistance-helper does MORE THAN HALF the effort. [place] l

ifts or holds trunk or limbs and provides more than half the effort.


9-Cpdvedccr-rqxdhn does ALL the effort. Patient does none of the effort to 

complete the activity. Or, the assistance of 2 or more helpers is required for 

the patient to complete the  


    activity.


If activity was not attempted, code reason:


7-Patient Refused.


9-Not Applicable-not attempted and the patient did not perform the activity 

before the current illness, exacerbation or injury.


10-Not Attempted due to Environmental Limitations-(lack of equipment, weather 

restraints, etc.).


88-Not Attempted due to Medical Conditions or Safety Concerns.


Roll Left & Right (QC):  6 (slow and painful bhanu to R)


Sit to Lying (QC):  6


Lying to Sitting/Side of Bed(Q:  6


Sit to Stand (QC):  6


Chair/Bed-to-Chair Xfer(QC):  6


Toilet Transfer (QC):  6


Car Transfer (QC):  4 (pt requiring skilled verb inst for technique)


pt requiring verb inst for technique with car transfer for safety and following 

precautions





Weight Bearing


Right Lower Extremity:  Right


Touch Toe Bearing


Left Lower Extremity:  Left


Full Weight Bearing





Gait Training


Does the Patient Walk?:  Yes


Distance:  150, 30, 60, 80


Walk 10 feet (QC):  6


Walk 50 ft with 2 Turns(QC):  6


Walk 150 ft (QC):  4


Walking 10ft/uneven surface-QC:  4


Gait Persons Needed:  1


Gait Assistive Device:  FWW


Pt requiring occasional verb inst to maintain TTWB RLE bhanu with fatigue, slow 

steady pace, step to pattern, no LOB or unsteadiness





Wheelchair Training


Does the Pt Use a Wheelchair?:  No





Stair Training


 Stair Training: Handrails/:  2 handrails


#of Steps:  4


1 Step (curb) (QC):  4


4 Steps (QC):  4


12 Steps (QC):  9


Stairs:  Pattern:  Step to


pt requiring verb inst for safety and sequencing to maintain TTWB RLE, pt unable

to retain information throughout tx session therefor re instruction is required





Balance


Picking up an Object (QC):  88





Treatments


education, safety, activity tolerance, bed mobility, gait, transfers, curb, 

stairs, uneven surfaces, balance, strength





Assessment


Current Status:  Good Progress, Fair Progress


fatigues with all activities, SOA, continues to require inst for TTWB and 

correct sequencing bhanu on stairs, curb and gait with increased fatigue.





PT Short Term Goals


Short Term Goals


Time Frame:  2019


Sit to lyin (met)


Lying to sitting on side of be:  5 (met)


Walk 10 feet:  5


Walk 50 feet with two turns:  5


Walk 150 feet:  5


4 steps:  4





PT Long Term Goals


Long Term Goals


PT Long Term Goals Time Frame:  Dec 5, 2019


Roll Left & Right (QC):  6


Sit to Lying (QC):  6


Lying-Sitting on Side/Bed(QC):  6


Sit to Stand (QC):  6


Chair/Bed-to-Chair Xfer(QC):  6


Toilet Transfer (QC):  6


Car Transfer (QC):  6


Does the Patient Walk:  Yes


Walk 10 feet (QC):  6


Walk 50ft with 2 Turns (QC):  6


Walk 150 ft (QC):  6


Walking 10ft on Uneven Surface:  6


1 Step (curb) (QC):  6


4 Steps (QC):  5


12 Steps (QC):  9


Picking up an Object (QC):  88


Does the Pt use WC or Scooter?:  No


Type:  N/A


Type:  N/A





PT Plan


Treatment/Plan


Treatment Plan:  Continue Plan of Care


Treatment Plan:  Bed Mobility, Education, Functional Activity Hema, Functional 

Strength, Group Therapy, Gait, Safety, Therapeutic Exercise, Transfers


Treatment Duration:  Dec 5, 2019


Frequency:  At least 5 of 7 days/Wk (IRF)


Estimated Hrs Per Day:  1.5 hours per day


Patient and/or Family Agrees t:  Yes





Safety Risks/Education


Patient Education:  Gait Training, Transfer Techniques, Steps, Reviewed 

Precautions, Disease Process, Safety Issues


Teaching Recipient:  Patient


Teaching Methods:  Demonstration, Discussion


Response to Teaching:  Verbalize Understanding, Return Demonstration, 

Reinforcement Needed





Discharge Recommendations


Therapy Discharge Recommendati:  Home & Family





Time/GCodes


Time In:  900


Time Out:  1000


Total Billed Treatment Time:  60


Total Billed Treatment


1 visit, GT x30 min, FA x30 min











LAWRENCE BUCHANAN PTA             Dec 3, 2019 09:06


POS

## 2019-12-03 NOTE — THERAPY GROUP DAILY NOTE
Therapy Daily Group Note


Patient Education Topic


Home Safety, Fall Prevention, Exercises





Exercises


LE Seated Exercise, Sit to/from Stand, Walking, Stretching, Gross Motor





Session


Ratio (pt:therapist):  4:1


Goal of Session:  [employer] Conservation Tech., Home Safety Strategies, Memory 

Strategies, UE/LE Strengthing, Safety with Transfers





To improved overall endurance, socialization strategies, and home safety


strategies.


Goal Met for this Session:  Yes


Pt Benefit of Group:  Contributions to Others, F/U Use of Strategies @Home, 

Increased Functional Safety, Increased Functional Strength, Recognition of 

Peers, Socialization





Other/Notes


Pt. participated in group therapy session to work on socialization, memory, LE 

strength, as well as core strength.  Pt. ambulated to therapy gym with walker 

and SBA.  Participated in socialization task with saying his name, where he was 

from, and answering question regarding [place] song from memory.  Pt. then 

participated in LE ROM exercises, as well as core strength exercises seated.  

Pt. educated on importance of these exercises for ambulation and endurance str

ategies.  Pt. then participated in home safety education, with therapist 

speaking about each room in the house, and how to implement safety in the home. 

Therapist demonstrated safe floor transfer as well.  Pt. verbalized 

understanding.  Ended session with "name that tune" on [place] songs.  Pt. 

able to participate with other pt.s and engage in conversations regarding 

[place] memories.  Pt. ambulated back to room with SBA.  All needs met.


Start Time:  13:00


Stop Time:  14:10


Total Billed Treatment Time:  70


Total Billed Treatment


1, GRP











RIZWAN MURPHY OT            Dec 3, 2019 14:34


POS

## 2019-12-03 NOTE — PM&R PROGRESS NOTE
Subjective


HPI/CC On Admission


Date Seen by Provider:  Dec 3, 2019


Time Seen by Provider:  08:45


CC: Hip Fracture





HPI: Patient fell twice Monday receiving a hip fracture and had surgery.  Joshua ferguson was transferred to in-patient rehab yesterday. Patient does have pain when

standing and rates it as a 9/10 otherwise, his pain is a 3/10. Pain does travel 

down his right leg. Rehab has been going well he states, and is sore from rehab.


Subjective/Events-last exam


Low grade fever of 99.5 continues. 


Holding Lopressor due to low BP. 


Coccyx is sore. 


CBC and INR will be checked tomorrow. 


DC Cefepime yesterday. 


No pain is reported. 


Tylenol given. 


Conferred with RN


Reviewed therapy notes


Checked meds and labs





Review of Systems


General:  Fatigue


Musculoskeletal:  leg pain





Objective


Exam


Vital Signs





Vital Signs








  Date Time  Temp Pulse Resp B/P (MAP) Pulse Ox O2 Delivery O2 Flow Rate FiO2


 


12/3/19 17:41 38.1 79 18 120/65 (83) 93 Nasal Cannula 2.00 





Capillary Refill : Less Than 3 Seconds


General Appearance:  No Apparent Distress, WD/WN, Chronically ill, Thin


HEENT:  PERRL/EOMI, Normal ENT Inspection, Pharynx Normal


Neck:  Full Range of Motion, Normal Inspection, Non Tender, Supple


Respiratory:  Chest Non Tender, No Accessory Muscle Use, No Respiratory 

Distress, Crackles, Decreased Breath Sounds, Wheezing


Cardiovascular:  No Edema, No Gallop, No JVD, No Murmur, Normal Peripheral 

Pulses, Irregularly Irregular


Gastrointestinal:  Normal Bowel Sounds, No Organomegaly, No Pulsatile Mass, Non 

Tender, Soft


Extremity:  Normal Capillary Refill, Normal Inspection, Normal Range of Motion 

(except right leg), Non Tender, No Calf Tenderness, No Pedal Edema


Neurologic/Psychiatric:  Alert, Oriented x3, No Motor/Sensory Deficits, Normal 

Mood/Affect, CNs II-XII Norm as Tested


Skin:  Normal Color, Warm/Dry





Results/Procedures


Lab


Patient resulted labs reviewed.





FIM


Transfers


Therapy Code Descriptions/Definitions 





Functional [place] Measure:


0=Not Assessed/NA        4=Minimal Assistance


1=Total Assistance        5=Supervision or Setup


2=Maximal Assistance  6=Modified [place]


3=Moderate Assistance 7=Complete IndependenceSCALE: Activities may be completed 

with or without assistive devices.





6-Indepedent-patient completes the activity by him/herself with no assistance 

from a helper.


5-Set-up or Clean-up Assistance-helper sets up or cleans up; patient completes 

activity. [place] assists only prior to or  


    following the activity.


4-Supervision or Touching Assistance-helper provides verbal cues and/or 

touching/steadying and/or contact guard assistance as patient completes 

activity. Assistance may be provided   


    throughout the activity or intermittently.


3-Partial/Moderate Assistance-helper does LESS THAN HALF the effort. [place] 

lifts, holds or supports trunk or limbs, but provides less than half the effort.


2-Substantial/Maximal Assistance-helper does MORE THAN HALF the effort. [place] 

lifts or holds trunk or limbs and provides more than half the effort.


9-Qilqlalkq-twycuu does ALL the effort. Patient does none of the effort to 

complete the activity. Or, the assistance of 2 or more helpers is required for 

the patient to complete the  


    activity.


If activity was not attempted, code reason:


7-Patient Refused.


9-Not Applicable-not attempted and the patient did not perform the activity 

before the current illness, exacerbation or injury.


10-Not Attempted due to Environmental Limitations-(lack of equipment, weather 

restraints, etc.).


88-Not Attempted due to Medical Conditions or Safety Concerns.


Roll Left to Right (QC):  5


Sit to Lying (QC):  6


Sit to Stand (QC):  6


Chair/Bed-to-Chair Xfer(QC):  6


Car Transfer (QC):  3 (assist with right LE)





Gait Training


Does the Patient Walk?:  Yes


Distance:  100 x2


Walk 10 feet (QC):  5


Walk 50 ft with 2 Turns(QC):  5


Walk 150 ft (QC):  5


Walking 10ft/uneven surface-QC:  3


Gait Persons Needed:  1


Gait Assistive Device:  FWW





Wheelchair Training


Does the Pt Use a Wheelchair?:  No


Wheel 50 ft with 2 turns (QC):  5


Wheel 150 ft (QC):  9


Type of Wheelchair:  Manual





Stair Training


 Stair Training: Handrails/:  2 handrails


1 Step (curb) (QC):  88 (pt unsafe to attempt due to TTWB status)


4 Steps (QC):  4 (CGA with skilled cues for sequencing and safety)


12 Steps (QC):  88





Balance


Picking up an Object (QC):  88





ADL-Treatment


Eating (QC):  6


Oral Hygiene (QC):  7


Bathing Location:  L Arm, R Arm, L Upper Leg, R Upper Leg, Chest, Abdomen, 

Buttocks, Perineal Area


Shower/Bathe Self (QC):  4


Upper Body Dressing (QC):  6


Lower Body Dressing (QC):  4


On/Off Footwear (QC):  7


Toileting Hygiene (QC):  7


Toilet Transfer (QC):  7





Assessment/Plan


Assessment and Plan


Assess & Plan/Chief Complaint


Assessment:


Right femur fracture s/p fall in Dr Patino's office


Pneumonia placed on Cefepime per Dr Patino Pseudomonas on Cx started abx 

11/22/19 and DC 12/2/19


Severe COPD with recurrent pneumonia in the past


Warfarin maintenance


Low grade fever





Plan:


IRF protocol


Cefepime for 14 days total now DC


Nebs


O2


Dr Patino appreciated


Monitor crackles


Monitor INR for coumadin treatment


Bronch?





(1) Closed right femoral fracture


(2) Debility


Status:  Acute


(3) Paroxysmal atrial fibrillation


Status:  Chronic


(4) Dyspnea


Status:  Acute


(5) COPD (chronic obstructive pulmonary disease)


Status:  Chronic


(6) Pneumonia


Status:  Acute











ALEKS WILSON DO                 Dec 3, 2019 08:37


POS

## 2019-12-04 VITALS — SYSTOLIC BLOOD PRESSURE: 109 MMHG | DIASTOLIC BLOOD PRESSURE: 60 MMHG

## 2019-12-04 VITALS — SYSTOLIC BLOOD PRESSURE: 90 MMHG | DIASTOLIC BLOOD PRESSURE: 59 MMHG

## 2019-12-04 VITALS — SYSTOLIC BLOOD PRESSURE: 100 MMHG | DIASTOLIC BLOOD PRESSURE: 72 MMHG

## 2019-12-04 LAB
ERYTHROCYTE [DISTWIDTH] IN BLOOD BY AUTOMATED COUNT: 16.2 % (ref 10–14.5)
HCT VFR BLD CALC: 26 % (ref 40–54)
HGB BLD-MCNC: 8.1 G/DL (ref 13.3–17.7)
INR PPP: 2.9 (ref 0.8–1.4)
MCH RBC QN AUTO: 29 PG (ref 25–34)
MCHC RBC AUTO-ENTMCNC: 31 G/DL (ref 32–36)
MCV RBC AUTO: 94 FL (ref 80–99)
PLATELET # BLD: 332 10^3/UL (ref 130–400)
PMV BLD AUTO: 8.6 FL (ref 7.4–10.4)
PROTHROMBIN TIME: 31.3 SEC (ref 12.2–14.7)
WBC # BLD AUTO: 6.8 10^3/UL (ref 4.3–11)

## 2019-12-04 RX ADMIN — IPRATROPIUM BROMIDE AND ALBUTEROL SULFATE SCH ML: .5; 3 SOLUTION RESPIRATORY (INHALATION) at 16:24

## 2019-12-04 RX ADMIN — WARFARIN SODIUM SCH MG: 5 TABLET ORAL at 17:34

## 2019-12-04 RX ADMIN — FERROUS SULFATE TAB 325 MG (65 MG ELEMENTAL FE) SCH MG: 325 (65 FE) TAB at 20:36

## 2019-12-04 RX ADMIN — IPRATROPIUM BROMIDE AND ALBUTEROL SULFATE SCH ML: .5; 3 SOLUTION RESPIRATORY (INHALATION) at 10:51

## 2019-12-04 RX ADMIN — GABAPENTIN SCH MG: 100 CAPSULE ORAL at 21:02

## 2019-12-04 RX ADMIN — Medication SCH ML: at 20:36

## 2019-12-04 RX ADMIN — DOCUSATE SODIUM AND SENNOSIDES SCH EA: 8.6; 5 TABLET, FILM COATED ORAL at 09:50

## 2019-12-04 RX ADMIN — POLYETHYLENE GLYCOL (3350) SCH GM: 17 POWDER, FOR SOLUTION ORAL at 20:36

## 2019-12-04 RX ADMIN — FLUTICASONE PROPIONATE AND SALMETEROL XINAFOATE SCH PUFF: 115; 21 AEROSOL, METERED RESPIRATORY (INHALATION) at 07:24

## 2019-12-04 RX ADMIN — INSULIN ASPART SCH UNIT: 100 INJECTION, SOLUTION INTRAVENOUS; SUBCUTANEOUS at 17:34

## 2019-12-04 RX ADMIN — INSULIN ASPART SCH UNIT: 100 INJECTION, SOLUTION INTRAVENOUS; SUBCUTANEOUS at 11:34

## 2019-12-04 RX ADMIN — Medication SCH ML: at 14:53

## 2019-12-04 RX ADMIN — METOPROLOL TARTRATE SCH MG: 25 TABLET, FILM COATED ORAL at 09:50

## 2019-12-04 RX ADMIN — MONTELUKAST SCH MG: 10 TABLET, FILM COATED ORAL at 20:36

## 2019-12-04 RX ADMIN — IPRATROPIUM BROMIDE AND ALBUTEROL SULFATE SCH ML: .5; 3 SOLUTION RESPIRATORY (INHALATION) at 07:24

## 2019-12-04 RX ADMIN — METOPROLOL TARTRATE SCH MG: 25 TABLET, FILM COATED ORAL at 20:34

## 2019-12-04 RX ADMIN — IPRATROPIUM BROMIDE AND ALBUTEROL SULFATE SCH ML: .5; 3 SOLUTION RESPIRATORY (INHALATION) at 18:29

## 2019-12-04 RX ADMIN — PANTOPRAZOLE SODIUM SCH MG: 40 TABLET, DELAYED RELEASE ORAL at 09:50

## 2019-12-04 RX ADMIN — POLYETHYLENE GLYCOL (3350) SCH GM: 17 POWDER, FOR SOLUTION ORAL at 09:00

## 2019-12-04 RX ADMIN — INSULIN ASPART SCH UNIT: 100 INJECTION, SOLUTION INTRAVENOUS; SUBCUTANEOUS at 06:16

## 2019-12-04 RX ADMIN — DOCUSATE SODIUM SCH MG: 100 CAPSULE ORAL at 09:50

## 2019-12-04 RX ADMIN — GABAPENTIN SCH MG: 100 CAPSULE ORAL at 14:52

## 2019-12-04 RX ADMIN — GABAPENTIN SCH MG: 100 CAPSULE ORAL at 06:16

## 2019-12-04 RX ADMIN — INSULIN ASPART SCH UNIT: 100 INJECTION, SOLUTION INTRAVENOUS; SUBCUTANEOUS at 20:41

## 2019-12-04 RX ADMIN — FERROUS SULFATE TAB 325 MG (65 MG ELEMENTAL FE) SCH MG: 325 (65 FE) TAB at 09:50

## 2019-12-04 RX ADMIN — Medication SCH ML: at 06:16

## 2019-12-04 RX ADMIN — DOCUSATE SODIUM AND SENNOSIDES SCH EA: 8.6; 5 TABLET, FILM COATED ORAL at 20:35

## 2019-12-04 NOTE — PM&R PROGRESS NOTE
Subjective


HPI/CC On Admission


Date Seen by Provider:  Dec 4, 2019


Time Seen by Provider:  09:00


CC: Hip Fracture





HPI: Patient fell twice Monday receiving a hip fracture and had surgery.  

Patient was transferred to in-patient rehab yesterday. Patient does have pain 

when standing and rates it as a 9/10 otherwise, his pain is a 3/10. Pain does 

travel down his right leg. Rehab has been going well he states, and is sore from

rehab.


Subjective/Events-last exam


DC planned for Friday 


Has his 61rd anniversary on Saturday 


Home O2 evaluation will be placed tomorrow 


Hgb 8.1 


INR 2.9 


Temperature 37 


WBC count is normal


Conferred with RN


Reviewed therapy notes


Checked meds and labs





Review of Systems


General:  Fatigue


Pulmonary:  Cough


Musculoskeletal:  leg pain





Objective


Exam


Vital Signs





Vital Signs








  Date Time  Temp Pulse Resp B/P (MAP) Pulse Ox O2 Delivery O2 Flow Rate FiO2


 


12/4/19 20:33  70  90/59 (69)    


 


12/4/19 17:32 37.4  18  95 Nasal Cannula 2.00 





Capillary Refill : Less Than 3 Seconds


General Appearance:  No Apparent Distress, WD/WN, Chronically ill, Thin


HEENT:  PERRL/EOMI, Normal ENT Inspection, Pharynx Normal


Neck:  Full Range of Motion, Normal Inspection


Respiratory:  Chest Non Tender, No Accessory Muscle Use, No Respiratory 

Distress, Crackles, Decreased Breath Sounds, Wheezing


Cardiovascular:  No Edema, Irregularly Irregular


Gastrointestinal:  Normal Bowel Sounds, No Organomegaly, No Pulsatile Mass, Non 

Tender, Soft


Extremity:  Normal Capillary Refill, Normal Inspection, Normal Range of Motion 

(except right leg), Non Tender, No Calf Tenderness, No Pedal Edema


Neurologic/Psychiatric:  Alert, Oriented x3, No Motor/Sensory Deficits, Normal 

Mood/Affect, CNs II-XII Norm as Tested


Skin:  Normal Color, Warm/Dry





Results/Procedures


Lab


Laboratory Tests


12/4/19 05:20








Patient resulted labs reviewed.





FIM


Transfers


Therapy Code Descriptions/Definitions 





Functional [place] Measure:


0=Not Assessed/NA        4=Minimal Assistance


1=Total Assistance        5=Supervision or Setup


2=Maximal Assistance  6=Modified [place]


3=Moderate Assistance 7=Complete IndependenceSCALE: Activities may be completed 

with or without assistive devices.





6-Indepedent-patient completes the activity by him/herself with no assistance 

from a helper.


5-Set-up or Clean-up Assistance-helper sets up or cleans up; patient completes 

activity. [place] assists only prior to or  


    following the activity.


4-Supervision or Touching Assistance-helper provides verbal cues and/or 

touching/steadying and/or contact guard assistance as patient completes 

activity. Assistance may be provided   


    throughout the activity or intermittently.


3-Partial/Moderate Assistance-helper does LESS THAN HALF the effort. [place] 

lifts, holds or supports trunk or limbs, but provides less than half the effort.


2-Substantial/Maximal Assistance-helper does MORE THAN HALF the effort. [place] 

lifts or holds trunk or limbs and provides more than half the effort.


7-Xyhgxnfwd-bjxydm does ALL the effort. Patient does none of the effort to 

complete the activity. Or, the assistance of 2 or more helpers is required for 

the patient to complete the  


    activity.


If activity was not attempted, code reason:


7-Patient Refused.


9-Not Applicable-not attempted and the patient did not perform the activity 

before the current illness, exacerbation or injury.


10-Not Attempted due to Environmental Limitations-(lack of equipment, weather 

restraints, etc.).


88-Not Attempted due to Medical Conditions or Safety Concerns.


Roll Left to Right (QC):  6 (slow and painful bhanu to R)


Sit to Lying (QC):  6


Sit to Stand (QC):  6


Chair/Bed-to-Chair Xfer(QC):  6


Car Transfer (QC):  4 (pt requiring skilled verb inst for technique)





Gait Training


Does the Patient Walk?:  Yes


Distance:  150, 30, 60, 80


Walk 10 feet (QC):  6


Walk 50 ft with 2 Turns(QC):  6


Walk 150 ft (QC):  4


Walking 10ft/uneven surface-QC:  4


Gait Persons Needed:  1


Gait Assistive Device:  FWW





Wheelchair Training


Does the Pt Use a Wheelchair?:  No


Wheel 50 ft with 2 turns (QC):  5


Wheel 150 ft (QC):  9





Stair Training


 Stair Training: Handrails/:  2 handrails


#of Steps:  4


1 Step (curb) (QC):  4


4 Steps (QC):  4


12 Steps (QC):  9


Stairs:  Pattern:  Step to





Balance


Picking up an Object (QC):  88





ADL-Treatment


Eating (QC):  6


Oral Hygiene (QC):  6 (Pt states fatigue post-shave, completes oral hygiene with

IND while seated in chair in front of sink. Pt states walker at home has seat to

sit on in front of bathroom sink.)


Bathing Location:  L Arm, R Arm, L Upper Leg, R Upper Leg, Chest, Abdomen, 

Buttocks, Perineal Area


Shower/Bathe Self (QC):  4 (SBA in stance )


Upper Body Dressing (QC):  6


Lower Body Dressing (QC):  4 (SBA in stance; pt completes doffing with dressing 

stick, donning with IND.)


On/Off Footwear (QC):  7


Toileting Hygiene (QC):  4 (SBA in stance during bottom hygiene)


Toilet Transfer (QC):  4 (CGA during transfer on/off standard toilet as pt has s

tandard toilet at home. Pt expresses pain while seated, pt states he has commode

"somewhere" at the house and needs to find it.)





Assessment/Plan


Assessment and Plan


Assess & Plan/Chief Complaint


Assessment:


Right femur fracture s/p fall in Dr Patino's office


Pneumonia placed on Cefepime per Dr Patino Pseudomonas on Cx started abx 

11/22/19 and DC 12/2/19


Severe COPD with recurrent pneumonia in the past


Warfarin maintenance


Low grade fever





Plan:


IRF protocol


Cefepime for 14 days total now DC


Nebs


O2


Dr Patino appreciated


Monitor crackles


Monitor INR for coumadin treatment


DC planned for Friday





(1) Closed right femoral fracture


(2) Debility


Status:  Acute


(3) Paroxysmal atrial fibrillation


Status:  Chronic


(4) Dyspnea


Status:  Acute


(5) COPD (chronic obstructive pulmonary disease)


Status:  Chronic


(6) Pneumonia


Status:  Acute











ALEKS WILSON DO                 Dec 4, 2019 09:23


POS

## 2019-12-04 NOTE — OCCUPATIONAL THER DAILY NOTE
OT Current Status-Daily Note


Subjective


No c/o of pain.





Appearance


Pt in the bathroom using urinal when OT entered the room.  Speech therapist in 

room.  Pt agrees to work with therapy.





Mental Status/Objective


Patient Orientation:  Person, Place





ADL-Treatment


Therapy Code Descriptions/Definitions 





Functional [place] Measure:


0=Not Assessed/NA        4=Minimal Assistance


1=Total Assistance        5=Supervision or Setup


2=Maximal Assistance  6=Modified [place]


3=Moderate Assistance 7=Complete IndependenceSCALE: Activities may be completed 

with or without assistive devices.





6-Indepedent-patient completes the activity by him/herself with no assistance 

from a helper.


5-Set-up or Clean-up Assistance-helper sets up or cleans up; patient completes 

activity. [place] assists only prior to or  


    following the activity.


4-Supervision or Touching Assistance-helper provides verbal cues and/or 

touching/steadying and/or contact guard assistance as patient completes 

activity. Assistance may be provided   


    throughout the activity or intermittently.


3-Partial/Moderate Assistance-helper does LESS THAN HALF the effort. [place] 

lifts, holds or supports trunk or limbs, but provides less than half the effort.


2-Substantial/Maximal Assistance-helper does MORE THAN HALF the effort. [place] 

lifts or holds trunk or limbs and provides more than half the effort.


0-Hviumoxnz-yenzce does ALL the effort. Patient does none of the effort to 

complete the activity. Or, the assistance of 2 or more helpers is required for 

the patient to complete the  


    activity.


If activity was not attempted, code reason:


7-Patient Refused.


9-Not Applicable-not attempted and the patient did not perform the activity 

before the current illness, exacerbation or injury.


10-Not Attempted due to Environmental Limitations-(lack of equipment, weather 

restraints, etc.).


88-Not Attempted due to Medical Conditions or Safety Concerns.





Other Treatment


OT took over from Speech therapy.  Pt ambulated with walker to therapy gym 

requiring CGA for safety.  Pt completed 10 minutes on armbike exercise with mod 

resistance to increase activity tolerance for functional tasks.  Pt donned 1 Ib 

wrist weights bilaterally and completed fine motor strengthening exercise using 

therapy pegs to improve overall strength for independence in daily tasks.  Pt 

required cues to remember precautions.  Pt ambulated back to his room with 

walker and CGA.  Pt transferred to bed with walker and CGA.  Pt laying in bed at

end of session.  Call light/phone in reach.  All needs met in the room.





Education


OT Patient Education:  Correct positioning, Exercise program, Progress toward 

Goal/Update tx plan, Purpose of tx/functional activities, Reviewed precautions, 

Rehab process, Safety issues, Transfer techniques


Teaching Recipient:  Patient


Teaching Methods:  Demonstration, Discussion


Response to Teaching:  Verbalize Understanding, Return Demonstration





OT Short Term Goals


Short Term Goals


Time Frame:  Dec 6, 2019


Oral hygiene:  5


Toileting hygiene:  3


Shower/bathe self:  3


Upper body dressin


Lower body dressing:  3 (met)


Putting on/taking off footwear:  3





OT Long Term Goals


Long Term Goals


Time Frame:  Dec 20, 2019


Eating (QC):  6 (met)


Oral Hygiene (QC):  6


Toileting Hygiene (QC):  6


Shower/Bathe Self (QC):  6 (met)


Upper Body Dressing (QC):  6 (met)


Lower Body Dressing (QC):  6


On/Off Footwear (QC):  6 (met)


Additional Goals:  1-Demonstrate ADL Tasks, 2-Verbalize Understanding, 3-

ImproveStrength/Hema


1=Demonstrate adherence to instructed precautions during ADL tasks.


2=Patient will verbalize/demonstrate understanding of assistive 

devices/modifications for ADL.


3=Patient will improve strength/tolerance for activity to enable patient to 

perform ADL's.





OT Education/Plan


Problem List/Assessment


Assessment:  Decreased Activ Tolerance, Decreased UE Strength, Impaired I ADL's,

Impaired Self-Care Skills





Discharge Recommendations


Plan/Recommendations:  Continue POC


Therapy Discharge Recommendati:  Post Acute OT





Treatment Plan/Plan of Care


Treatment,Training & Education:  Yes


Patient would benefit from OT for education, treatment and training to promote 

independence in ADL's, mobility, safety and/or upper extremity function for 

ADL's.


Plan of Care:  ADL Retraining, Functional Mobility, Group Exercise/Act as Ind, 

UE Funct Exercise/Act


Treatment Duration:  Dec 20, 2019


Frequency:  At least 5 of 7 days/Wk (IRF)


Estimated Hrs Per Day:  1.5 hours per day


Agreement:  Yes


Rehab Potential:  Good





Time/GCodes


Start Time:  10:30


Stop Time:  11:15


Total Time Billed (hr/min):  45


Billed Treatment Time


1, ADL x1 (10 Min)


EX x2 (35 Min)











RIZWAN MURPHY OT            Dec 4, 2019 12:37


POS

## 2019-12-04 NOTE — PHYSICAL THERAPY DAILY NOTE
PT Daily Note-Current


Subjective


Pt agreeable to PT  Reports he thought he might go home today, but turns out he 

isn't.  Hoping to get home before Saturday.





Mental Status


Patient Orientation:  Person, Place, Time, Situation





Transfers


SCALE: Activities may be completed with or without assistive devices.





6-Indepedent-patient completes the activity by him/herself with no assistance 

from a helper.


5-Set-up or Clean-up Assistance-helper sets up or cleans up; patient completes 

activity. [place] assists only prior to or  


    following the activity.


4-Supervision or Touching Assistance-helper provides verbal cues and/or 

touching/steadying and/or contact guard assistance as patient completes 

activity. Assistance may be provided   


    throughout the activity or intermittently.


3-Partial/Moderate Assistance-helper does LESS THAN HALF the effort. [place] 

lifts, holds or supports trunk or limbs, but provides less than half the effort.


2-Substantial/Maximal Assistance-helper does MORE THAN HALF the effort. [place] 

lifts or holds trunk or limbs and provides more than half the effort.


1-Hvyvbryiu-shsgwv does ALL the effort. Patient does none of the effort to 

complete the activity. Or, the assistance of 2 or more helpers is required for 

the patient to complete the  


    activity.


If activity was not attempted, code reason:


7-Patient Refused.


9-Not Applicable-not attempted and the patient did not perform the activity 

before the current illness, exacerbation or injury.


10-Not Attempted due to Environmental Limitations-(lack of equipment, weather 

restraints, etc.).


88-Not Attempted due to Medical Conditions or Safety Concerns.


Roll Left & Right (QC):  6


Sit to Lying (QC):  6


Lying to Sitting/Side of Bed(Q:  6


Sit to Stand (QC):  6


Chair/Bed-to-Chair Xfer(QC):  6


Toilet Transfer (QC):  6





Weight Bearing


Right Lower Extremity:  Right


Touch Toe Bearing


Left Lower Extremity:  Left


Full Weight Bearing





Gait Training


Does the Patient Walk?:  Yes


Distance:  150 ft x 4


Walk 10 feet (QC):  6


Walk 50 ft with 2 Turns(QC):  6


Walk 150 ft (QC):  6


Gait Assistive Device:  FWW


Reminders for TTWB status.  Steady with gait.





Exercises


Seated Therapy Exercises:  Ankle pumps, Sit to stand, Long arc quads, Hip 

flexion, Hamstring Curls


Seated Reps:  15 (LE strength for functional mobility progression.)


NuStep Minutes:  15





Assessment


Current Status:  Good Progress


Pt is progressing well and functional mobility progressing.





PT Short Term Goals


Short Term Goals


Time Frame:  2019


Sit to lyin (met)


Lying to sitting on side of be:  5 (met)


Walk 10 feet:  5


Walk 50 feet with two turns:  5


Walk 150 feet:  5


4 steps:  4





PT Long Term Goals


Long Term Goals


PT Long Term Goals Time Frame:  Dec 5, 2019


Roll Left & Right (QC):  6 (met)


Sit to Lying (QC):  6 (met)


Lying-Sitting on Side/Bed(QC):  6 (met)


Sit to Stand (QC):  6 (net)


Chair/Bed-to-Chair Xfer(QC):  6 (met)


Toilet Transfer (QC):  6 (met)


Car Transfer (QC):  6


Does the Patient Walk:  Yes


Walk 10 feet (QC):  6 (met)


Walk 50ft with 2 Turns (QC):  6 (met)


Walk 150 ft (QC):  6 (met)


Walking 10ft on Uneven Surface:  6


1 Step (curb) (QC):  6


4 Steps (QC):  5


12 Steps (QC):  9


Picking up an Object (QC):  88


Does the Pt use WC or Scooter?:  No


Type:  N/A


Type:  N/A





PT Plan


Problem List


Problem List:  Activity Tolerance, Functional Strength, Safety, Balance, Gait, 

Transfer





Treatment/Plan


Treatment Plan:  Continue Plan of Care


Treatment Plan:  Bed Mobility, Education, Functional Activity Hema, Functional 

Strength, Group Therapy, Gait, Safety, Therapeutic Exercise, Transfers


Treatment Duration:  Dec 5, 2019


Frequency:  At least 5 of 7 days/Wk (IRF)


Estimated Hrs Per Day:  1.5 hours per day


Patient and/or Family Agrees t:  Yes





Safety Risks/Education


Patient Education:  Safety Issues


Teaching Recipient:  Patient


Teaching Methods:  Discussion


Response to Teaching:  Verbalize Understanding





Time/GCodes


Time In:  845


Time Out:  947


Total Billed Treatment Time:  62


Total Billed Treatment


visit


EX 30


GT 15


FA 17











VIANEY FRANCO PT              Dec 4, 2019 10:29


POS

## 2019-12-04 NOTE — SPEECH THERAPY DAILY NOTE
Speech Daily Progress Note


Subjective


Date Seen by Provider:  Dec 4, 2019


Time Seen by Provider:  00:30


Patient resting in his bed following PT session. Patient states he wants to go 

home before Saturday which is his anniversary.





Objective


Patient demo recall of safety awareness while transferring to the bathroom with 

90% given minimal cues.





Assessment


Assessment Current Status:  Good Progress





Speech Short Term Goals


Short Term Goals


Short Term Goals


1) The patient will complete memory tasks related to his daily needs at 90% or 

greater.


2) The patient will complete problem solving tasks related to his daily needs at

90% or greater.


3) The patient will complete safety awareness tasks related to his daily needs 

at 90% or greater.





Speech Long Term Goals


Long Term Goals


The patient will improve cognitive-communication necessary for safety and daily 

living tasks with minimal assist.





Speech-Plan


Patient/Family Goals


Patient/Family Goals:  


Patient plans on returning home with his wife upon rehab discharge.





Treatment Plan


Speech Therapy Treatment Plan:  Continue Plan of Care


Patient is progressing toward ST goals.


Treatment Duration:  Dec 6, 2019


Frequency:  5 times per week


Estimated Hrs Per Day:  .5 hour per day


Rehab Potential:  Guarded


Barriers to Learning:  


Patient has cognitive deficits.


Pt/Family Agrees to Plan:  Yes





Safety Risks/Education


Teaching Recipient:  Patient


Teaching Methods:  Demonstration, Discussion


Response to Teaching:  Verbalize Understanding, Return Demonstration


Education Topics Provided:  


Continued safety within his room.





Time


Speech Therapy Time In:  10:00


Speech Therapy Time Out:  10:30


Total Billed Time:  30


Billed Treatment Time


[address]AS[address]             Dec 4, 2019 11:53


POS

## 2019-12-04 NOTE — THERAPY GROUP DAILY NOTE
Therapy Daily Group Note


Patient Education Topic


Other List Below (Benefits of exercise)





Exercises


LE Seated Exercise, UE Exercise





Session


Ratio (pt:therapist):  4:1


Goal of Session:  Other (list) (Understanding and demonstrating benefits of a 

variety of physical activity as you age)


Goal Met for this Session:  Yes


Pt Benefit of Group:  Contributions to Others, Increased Functional Strength, 

Socialization





Other/Notes


Patient participated in group PT/OT session. Patient ambulated to/from session 

with assistance. Patient socialized and shared some during session. Participated

in some exercise demonstrations within personal limits. Pt. to room with assist 

after group with bell at hand and needs met


Start Time:  13:00


Stop Time:  14:15


Total Billed Treatment Time:  75


Total Billed Treatment


1 GRP 75min











YA AARON PTA              Dec 4, 2019 14:49


POS

## 2019-12-04 NOTE — PROGRESS NOTE
Subjective


Time Seen by a Provider:  08:24


Subjective/Events-last exam


Patient doing better.


Still in need of physical therapy.


Chest x-ray yesterday not much better.





Objective


Exam





Vital Signs








  Date Time  Temp Pulse Resp B/P (MAP) Pulse Ox O2 Delivery O2 Flow Rate FiO2


 


19 07:24     86 Nasal Cannula 2.00 


 


19 06:19 37.7 62 18 100/72 (81) 95 Nasal Cannula 2.00 


 


12/3/19 21:00      Nasal Cannula 2.00 


 


12/3/19 17:41 38.1 79 18 120/65 (83) 93 Nasal Cannula 2.00 


 


12/3/19 10:50     96 Nasal Cannula 2.00 


 


12/3/19 09:00      Nasal Cannula 2.00 














I & O 


 


 19





 07:00


 


Intake Total 1300 ml


 


Output Total 600 ml


 


Balance 700 ml





Capillary Refill : Less Than 3 Seconds


General Appearance:  No Apparent Distress, Thin


HEENT:  Normal ENT Inspection


Neck:  Full Range of Motion, Normal Inspection


Respiratory:  No Respiratory Distress, Decreased Breath Sounds


Cardiovascular:  Irregularly Irregular


Gastrointestinal:  non tender, soft





Results


Lab


Laboratory Tests


19 05:20








Laboratory Tests


12/3/19 10:48: Glucometer 290H


12/3/19 15:43: Glucometer 181H


12/3/19 20:53: Glucometer 226H


19 05:20: 


White Blood Count 6.8, Red Blood Count 2.77L, Hemoglobin 8.1L, Hematocrit 26L, 

Mean Corpuscular Volume 94, Mean Corpuscular Hemoglobin 29, Mean Corpuscular 

Hemoglobin Concent 31L, Red Cell Distribution Width 16.2H, Platelet Count 332, 

Mean Platelet Volume 8.6, Prothrombin Time 31.3H, INR Comment 2.9H


19 06:08: Glucometer 142H





Microbiology


19 Blood Culture - Final, Complete


           No growth


19 Gram Stain - Final, Complete


           


19 Sputum Culture - Final, Complete


           Usual upper respiratory haydee


           Pseudomonas aeruginosa


           Pseudomonas aeruginosa#2





Assessment/Plan


Assessment/Plan


Assess & Plan/Chief Complaint


Hip fracture.


COPD.


Atrial fibrillation.


Diabetes.


Febrile.


Leukocytosis.


.


19.


Hip fracture.


COPD.


Atrial fibrillation.


Diabetes.


Sputum culture pseudomonas.


INR low..


.


Limits/27 6/19 hip fracture.


COPD.


Sinus rhythm today.


Diabetes.


Sputum culture pseudomonas.


.


19.


Hip fracture.


COPD.


Atrial fibrillation today.


Diabetes.


Sputum culture pseudomonas.


.


12/3/19.


Right hip fracture.


COPD.


Proximal atrial fibrillation.


Diabetes.


Pneumonia with pseudomonas..


.


19.


Right hip fracture.


COPD.


Atrial fibrillation.


Diabetes.


Pneumonia with pseudomonas





Clinical Quality Measures


DVT/VTE Risk/Contraindication:


Risk Factor Score Per Nursin


RFS Level Per Nursing on Admit:  4+=Very High











TOR CONNELLY DO          Dec 4, 2019 08:25


POS

## 2019-12-05 VITALS — SYSTOLIC BLOOD PRESSURE: 99 MMHG | DIASTOLIC BLOOD PRESSURE: 62 MMHG

## 2019-12-05 VITALS — SYSTOLIC BLOOD PRESSURE: 114 MMHG | DIASTOLIC BLOOD PRESSURE: 72 MMHG

## 2019-12-05 VITALS — DIASTOLIC BLOOD PRESSURE: 63 MMHG | SYSTOLIC BLOOD PRESSURE: 95 MMHG

## 2019-12-05 VITALS — SYSTOLIC BLOOD PRESSURE: 102 MMHG | DIASTOLIC BLOOD PRESSURE: 58 MMHG

## 2019-12-05 RX ADMIN — IPRATROPIUM BROMIDE AND ALBUTEROL SULFATE SCH ML: .5; 3 SOLUTION RESPIRATORY (INHALATION) at 14:43

## 2019-12-05 RX ADMIN — Medication SCH ML: at 05:29

## 2019-12-05 RX ADMIN — POLYETHYLENE GLYCOL (3350) SCH GM: 17 POWDER, FOR SOLUTION ORAL at 21:05

## 2019-12-05 RX ADMIN — FERROUS SULFATE TAB 325 MG (65 MG ELEMENTAL FE) SCH MG: 325 (65 FE) TAB at 21:12

## 2019-12-05 RX ADMIN — Medication SCH ML: at 14:36

## 2019-12-05 RX ADMIN — INSULIN ASPART SCH UNIT: 100 INJECTION, SOLUTION INTRAVENOUS; SUBCUTANEOUS at 05:36

## 2019-12-05 RX ADMIN — GABAPENTIN SCH MG: 100 CAPSULE ORAL at 21:11

## 2019-12-05 RX ADMIN — WARFARIN SODIUM SCH MG: 5 TABLET ORAL at 17:50

## 2019-12-05 RX ADMIN — FLUTICASONE PROPIONATE AND SALMETEROL XINAFOATE SCH PUFF: 115; 21 AEROSOL, METERED RESPIRATORY (INHALATION) at 18:37

## 2019-12-05 RX ADMIN — INSULIN ASPART SCH UNIT: 100 INJECTION, SOLUTION INTRAVENOUS; SUBCUTANEOUS at 21:05

## 2019-12-05 RX ADMIN — FERROUS SULFATE TAB 325 MG (65 MG ELEMENTAL FE) SCH MG: 325 (65 FE) TAB at 10:02

## 2019-12-05 RX ADMIN — GABAPENTIN SCH MG: 100 CAPSULE ORAL at 05:29

## 2019-12-05 RX ADMIN — GABAPENTIN SCH MG: 100 CAPSULE ORAL at 14:38

## 2019-12-05 RX ADMIN — DOCUSATE SODIUM AND SENNOSIDES SCH EA: 8.6; 5 TABLET, FILM COATED ORAL at 21:05

## 2019-12-05 RX ADMIN — MONTELUKAST SCH MG: 10 TABLET, FILM COATED ORAL at 21:12

## 2019-12-05 RX ADMIN — DOCUSATE SODIUM AND SENNOSIDES SCH EA: 8.6; 5 TABLET, FILM COATED ORAL at 12:13

## 2019-12-05 RX ADMIN — IPRATROPIUM BROMIDE AND ALBUTEROL SULFATE SCH ML: .5; 3 SOLUTION RESPIRATORY (INHALATION) at 14:37

## 2019-12-05 RX ADMIN — POLYETHYLENE GLYCOL (3350) SCH GM: 17 POWDER, FOR SOLUTION ORAL at 12:13

## 2019-12-05 RX ADMIN — INSULIN ASPART SCH UNIT: 100 INJECTION, SOLUTION INTRAVENOUS; SUBCUTANEOUS at 16:07

## 2019-12-05 RX ADMIN — Medication SCH ML: at 21:12

## 2019-12-05 RX ADMIN — INSULIN ASPART SCH UNIT: 100 INJECTION, SOLUTION INTRAVENOUS; SUBCUTANEOUS at 11:41

## 2019-12-05 RX ADMIN — DOCUSATE SODIUM SCH MG: 100 CAPSULE ORAL at 10:02

## 2019-12-05 RX ADMIN — FLUTICASONE PROPIONATE AND SALMETEROL XINAFOATE SCH PUFF: 115; 21 AEROSOL, METERED RESPIRATORY (INHALATION) at 08:00

## 2019-12-05 RX ADMIN — METOPROLOL TARTRATE SCH MG: 25 TABLET, FILM COATED ORAL at 11:46

## 2019-12-05 RX ADMIN — IPRATROPIUM BROMIDE AND ALBUTEROL SULFATE SCH ML: .5; 3 SOLUTION RESPIRATORY (INHALATION) at 18:32

## 2019-12-05 RX ADMIN — PANTOPRAZOLE SODIUM SCH MG: 40 TABLET, DELAYED RELEASE ORAL at 10:02

## 2019-12-05 RX ADMIN — METOPROLOL TARTRATE SCH MG: 25 TABLET, FILM COATED ORAL at 22:17

## 2019-12-05 NOTE — PHYSICAL THERAPY DAILY NOTE
PT Daily Note-Current


Subjective


Pt agreeable to PT session. States he knows he is not supposed to put wt on leg,

"but it's just too darn hard"





Pain





   Numeric Pain Scale:  0-No Pain





Appearance


Pt in bed upon arrival. Pt sitting up in recliner at end of session with alarm 

activated, call light, phone and bedside table within reach





Mental Status


Patient Orientation:  Person, Place, Time, Eyes Open, Situation





Transfers


SCALE: Activities may be completed with or without assistive devices.





6-Indepedent-patient completes the activity by him/herself with no assistance 

from a helper.


5-Set-up or Clean-up Assistance-helper sets up or cleans up; patient completes 

activity. [place] assists only prior to or  


    following the activity.


4-Supervision or Touching Assistance-helper provides verbal cues and/or 

touching/steadying and/or contact guard assistance as patient completes 

activity. Assistance may be provided   


    throughout the activity or intermittently.


3-Partial/Moderate Assistance-helper does LESS THAN HALF the effort. [place] 

lifts, holds or supports trunk or limbs, but provides less than half the effort.


2-Substantial/Maximal Assistance-helper does MORE THAN HALF the effort. [place] 

lifts or holds trunk or limbs and provides more than half the effort.


2-Qxfwamxae-pjnkgy does ALL the effort. Patient does none of the effort to 

complete the activity. Or, the assistance of 2 or more helpers is required for 

the patient to complete the  


    activity.


If activity was not attempted, code reason:


7-Patient Refused.


9-Not Applicable-not attempted and the patient did not perform the activity 

before the current illness, exacerbation or injury.


10-Not Attempted due to Environmental Limitations-(lack of equipment, weather 

restraints, etc.).


88-Not Attempted due to Medical Conditions or Safety Concerns.


Lying to Sitting/Side of Bed(Q:  6


Sit to Stand (QC):  4 (verb inst required to maintain TTWB)





Weight Bearing


Right Lower Extremity:  Right


Touch Toe Bearing


Left Lower Extremity:  Left


Full Weight Bearing





Gait Training


Does the Patient Walk?:  Yes


Distance:  80 x2


Walk 10 feet (QC):  4


Walk 50 ft with 2 Turns(QC):  4


Gait Persons Needed:  1


Gait Assistive Device:  FWW


pt non compliant with RLE TTWB at least 50% of the time and requiring constant 

verb inst to maintain WB precautions, no LOB or unsteadiness. O2 sats monitored 

on room air as instructed with SPO2 83% during gait, 94% at rest before and 94% 

after sitting rest break 2 minutes to recover, nsg notified. O2 placed back on 

pt at end of session





Treatments


bed mobility, transfers, gait, review WB precautions, monitor O2 on room air 

during gait, safety, education, functional mobility, activity tolerance





Assessment


Continues to be non compliant with WB precautions during gait and transfers. 

SPO2 on room air during gait decreased to 83%, nsg notified





PT Short Term Goals


Short Term Goals


Time Frame:  2019


Sit to lyin (met)


Lying to sitting on side of be:  5 (met)


Walk 10 feet:  5


Walk 50 feet with two turns:  5


Walk 150 feet:  5


4 steps:  4





PT Long Term Goals


Long Term Goals


PT Long Term Goals Time Frame:  Dec 5, 2019


Roll Left & Right (QC):  6 (met)


Sit to Lying (QC):  6 (met)


Lying-Sitting on Side/Bed(QC):  6 (met)


Sit to Stand (QC):  6 (net)


Chair/Bed-to-Chair Xfer(QC):  6 (met)


Toilet Transfer (QC):  6 (met)


Car Transfer (QC):  6


Does the Patient Walk:  Yes


Walk 10 feet (QC):  6 (met)


Walk 50ft with 2 Turns (QC):  6 (met)


Walk 150 ft (QC):  6 (met)


Walking 10ft on Uneven Surface:  6


1 Step (curb) (QC):  6


4 Steps (QC):  5


12 Steps (QC):  9


Picking up an Object (QC):  88


Does the Pt use WC or Scooter?:  No


Type:  N/A


Type:  N/A





PT Plan


Treatment/Plan


Treatment Plan:  Continue Plan of Care


Treatment Plan:  Bed Mobility, Education, Functional Activity Hema, Functional 

Strength, Group Therapy, Gait, Safety, Therapeutic Exercise, Transfers


Treatment Duration:  Dec 5, 2019


Frequency:  At least 5 of 7 days/Wk (IRF)


Estimated Hrs Per Day:  1.5 hours per day


Patient and/or Family Agrees t:  Yes





Safety Risks/Education


Patient Education:  Gait Training, Transfer Techniques, Reviewed Precautions, 

Disease Process, Safety Issues


Teaching Recipient:  Patient


Teaching Methods:  Demonstration, Discussion


Response to Teaching:  Verbalize Understanding, Return Demonstration, 

Reinforcement Needed





Time/GCodes


Time In:  1307


Time Out:  1324


Total Billed Treatment Time:  17


Total Billed Treatment


1 visit, GT x17 min











LAWRENCE BUCHANAN PTA             Dec 5, 2019 15:16


POS

## 2019-12-05 NOTE — NUR
PATIENT DROPPED TO 86% ON ROOM AIR RECOMMEND 2-3 L AT REST AND 3-4 L DURING EXERCISE

-------------------------------------------------------------------------------

Addendum: 12/05/19 at 1424 by ROBB HERNANDEZ RT

-------------------------------------------------------------------------------

Amended: Links added.

## 2019-12-05 NOTE — SPEECH THERAPY DAILY NOTE
Speech Daily Progress Note


Subjective


Date Seen by Provider:  Dec 5, 2019


Time Seen by Provider:  00:30


Patient was resting in his bed following his PT session. Patient was excited he 

was being discharged before his anniversary.





Objective


Patient completed safety awareness tasks related to his return home with 90% 

given 10% verbal cues and/or repetitions.





Speech Short Term Goals


Short Term Goals


Short Term Goals


1) The patient will complete memory tasks related to his daily needs at 90% or 

greater.


2) The patient will complete problem solving tasks related to his daily needs at

90% or greater.


3) The patient will complete safety awareness tasks related to his daily needs 

at 90% or greater.





Speech Long Term Goals


Long Term Goals


The patient will improve cognitive-communication necessary for safety and daily 

living tasks with minimal assist.





Speech-Plan


Patient/Family Goals


Patient/Family Goals:  


Patient is scheduled to discharge to his home tomorrow.





Treatment Plan


Speech Therapy Treatment Plan:  Discontinue ST, Goals Met


Patient will receive home health services.


Treatment Duration:  Dec 6, 2019


Frequency:  5 times per week


Estimated Hrs Per Day:  .5 hour per day


Rehab Potential:  Good


Barriers to Learning:  


Patient has cognitive deficits, however these have improved as a result of


skilled services.


Pt/Family Agrees to Plan:  Yes





Safety Risks/Education


Teaching Recipient:  Patient


Teaching Methods:  Demonstration, Discussion


Response to Teaching:  Verbalize Understanding, Return Demonstration


Education Topics Provided:  


Continued safety upon his return home.





Time


Speech Therapy Time In:  11:00


Speech Therapy Time Out:  11:30


Total Billed Time:  30


Billed Treatment Time


1, YOEL Gómez             Dec 5, 2019 13:11


POS

## 2019-12-05 NOTE — NUR
RD ASSESSMENT 



PMHx: COPD; CAD; HTN; DM; hypercholesterolemia; afib; stroke; hip fxr



PT INTERACTION: Pt was awake and pleasant during nutrition follow-up. Pt states eating "okay 
sometimes" since last assessment. Note pt avg PO intake of 69% x4d, per chart review. Pt 
states no recent issues with n/v/c/d since last assessment. Note last BM was 12/4, and pt 
currently on bowel regimen of colace qd; senna HS; miralax BID; and bisacodyl PRN, per chart 
review.  



ABNORMAL NUTRITION-RELATED LAB VALUES [TAKEN 12/2]

LOW:  Ca 8.4; Pro 6.2; alb 3.0

HIGH: glu 128



Est. kcal needs: 6826-9339 kcal | 25-30 kcal/kg 

Est. Pro needs:  55-68 g Pro | 0.8-1.0 g Pro/kg



PES STATEMENT: Inadequate oral intake (NI-2.1) related to loss of appetite as evidenced by 
pt interview | avg PO intake 69% x4d



INTERVENTION:  

Continue with current diet order of Regular diet. 

Pt may benefit from nutrition supplementation if PO intake declines. 

Will continue to follow and reassess as pt needs and status change. 



MONITOR/EVALUATE:  

PO Intake; Plan of Care; Hydration Status; Weight Status; Lab Values 





ADA Light, MS, RD, LD

## 2019-12-05 NOTE — PROGRESS NOTE
Subjective


Time Seen by a Provider:  08:17


Subjective/Events-last exam


Patient not having any breathing or heart problems.


Patient needing more physical therapy.


Diabetes okay





Objective


Exam





Vital Signs








  Date Time  Temp Pulse Resp B/P (MAP) Pulse Ox O2 Delivery O2 Flow Rate FiO2


 


19 05:51 38.0 90 20 102/58 (73) 90 Nasal Cannula 2.00 


 


19 21:39      Nasal Cannula 2.00 


 


19 20:33  70  90/59 (69)    


 


19 17:32 37.4 53 18 109/60 (76) 95 Nasal Cannula 2.00 


 


19 09:00      Nasal Cannula 2.00 














I & O 


 


 19





 07:00


 


Intake Total 2020 ml


 


Output Total 500 ml


 


Balance 1520 ml





Capillary Refill : Less Than 3 Seconds


General Appearance:  No Apparent Distress, Thin


HEENT:  Normal ENT Inspection


Neck:  Full Range of Motion, Normal Inspection


Respiratory:  No Accessory Muscle Use, No Respiratory Distress, Decreased Breath

Sounds


Cardiovascular:  Irregularly Irregular


Gastrointestinal:  non tender, soft





Results


Lab


Laboratory Tests


19 10:45: Glucometer 181H


19 15:26: Glucometer 218H


19 20:27: Glucometer 207H


19 05:20: Glucometer 170H





Microbiology


19 Blood Culture - Final, Complete


           No growth


19 Gram Stain - Final, Complete


           


19 Sputum Culture - Final, Complete


           Usual upper respiratory haydee


           Pseudomonas aeruginosa


           Pseudomonas aeruginosa#2





Assessment/Plan


Assessment/Plan


Assess & Plan/Chief Complaint


Hip fracture.


COPD.


Atrial fibrillation.


Diabetes.


Febrile.


Leukocytosis.


.


19.


Hip fracture.


COPD.


Atrial fibrillation.


Diabetes.


Sputum culture pseudomonas.


INR low..


.


Limits/27 6/19 hip fracture.


COPD.


Sinus rhythm today.


Diabetes.


Sputum culture pseudomonas.


.


19.


Hip fracture.


COPD.


Atrial fibrillation today.


Diabetes.


Sputum culture pseudomonas.


.


12/3/19.


Right hip fracture.


COPD.


Proximal atrial fibrillation.


Diabetes.


Pneumonia with pseudomonas..


.


19.


Right hip fracture.


COPD.


Atrial fibrillation.


Diabetes.


Pneumonia with pseudomonas.


.


19.


Right hip fracture.


COPD


Atrial fibrillation.


Diabetes pneumonia due to Pseudomonas





Clinical Quality Measures


DVT/VTE Risk/Contraindication:


Risk Factor Score Per Nursin


RFS Level Per Nursing on Admit:  4+=Very High











TOR CONNELLY DO          Dec 5, 2019 08:18


POS

## 2019-12-05 NOTE — D/C HH FACE TO FACE ORDER
D/C  Face to Face Orders


Reconcile Patient Problems


Problems Reviewed?:  Yes





Instructions for Patient


[facility], [phone]


Patient Instructions/FollowUp:  


Dr Holley 1 week


Physician to follow Patient:  Dr Holley


Discharge Diet for Home:  No Restrictions


Patient Problems:  


Hip fracture right


Severe COPD


Goals for Patient:  


return to independence





Patient Data-Allergies,Ht & Wt


Patient Allergies:  


Coded Allergies:  


     morphine (Verified  Allergy, Severe, Pt has received Lortab in the past w/o

issue, 11/20/19)


     Sulfa (Sulfonamide Antibiotics) (Verified  Allergy, Unknown, 2/11/19)


     penicillin G (Verified  Allergy, Unknown, Pt has received Cefepime & 

Meropenem in the past, 10/2/19)


     levofloxacin (Verified  Adverse Reaction, Unknown, 2/11/19)


Height (Feet):  5


Height (Inches):  9.00


Weight (Pounds):  156


Weight (Ounces):  5.0





Home Health Need/Face to Face


Date of Face to Face:  Dec 5, 2019


Clinical Findings:  Generalized weakness and fatigue, Muscle weakness, Pain with

ambulation, Unsteady gait


I have seen Pt face-to-face:  Yes


Discharged To:  Home


Diagnosis/Conditions:  


Hip fracture right


Severe COPD


Patient is Homebound due to:  [place] fall risk due to instabilty, Muscle 

weakness, Pain w/ambulation


Homebound Status


   Due to the above stated illness, injury or surgical procedure (medical 

condition or diagnosis) and associated clinical findings, the patient is 

homebound because of his/her inability to leave home except with aid of a 

supportive device and/or person AND leaving the home requires a considerable and

taxing effort or is medically contraindicated.


Pt req the following assistanc:  Walker





Home Health Nursing Orders


Home Health Services Order:  Nursing Services, Occupational Ther-Evaluate & 

Treat, Physical Therapy-Evaluate & Treat





Home Health Infusion Therapy


Line Start Date:  Nov 25, 2019


Certify Stmt


I certify that this patient is under my care and that I, a nurse practitioner or

a physician; a assistant working with me, had a face to face encounter that -

meets the physician face to face encounter requirements with this patient as 

dated.











ALEKS WILSON DO                 Dec 5, 2019 22:35


POS

## 2019-12-05 NOTE — PHYSICAL THERAPY DAILY NOTE
PT Daily Note-Current


Subjective


pt in WC pre-tx agrees to therapy and denies pain at this time.





Appearance


pt in bed post-tx with call light, phone, table in reach with all needs met. bed

alarm set and nurse tech entering room at this time.





Mental Status


Patient Orientation:  Person, Confused, Place


Attachments:  Oxygen (2L N/C), IV





Transfers


SCALE: Activities may be completed with or without assistive devices.





6-Indepedent-patient completes the activity by him/herself with no assistance 

from a helper.


5-Set-up or Clean-up Assistance-helper sets up or cleans up; patient completes 

activity. [place] assists only prior to or  


    following the activity.


4-Supervision or Touching Assistance-helper provides verbal cues and/or 

touching/steadying and/or contact guard assistance as patient completes 

activity. Assistance may be provided   


    throughout the activity or intermittently.


3-Partial/Moderate Assistance-helper does LESS THAN HALF the effort. [place] 

lifts, holds or supports trunk or limbs, but provides less than half the effort.


2-Substantial/Maximal Assistance-helper does MORE THAN HALF the effort. [place] 

lifts or holds trunk or limbs and provides more than half the effort.


8-Ctuixczpz-ydlexg does ALL the effort. Patient does none of the effort to 

complete the activity. Or, the assistance of 2 or more helpers is required for 

the patient to complete the  


    activity.


If activity was not attempted, code reason:


7-Patient Refused.


9-Not Applicable-not attempted and the patient did not perform the activity 

before the current illness, exacerbation or injury.


10-Not Attempted due to Environmental Limitations-(lack of equipment, weather 

restraints, etc.).


88-Not Attempted due to Medical Conditions or Safety Concerns.


Roll Left & Right (QC):  6


Sit to Lying (QC):  5


Lying to Sitting/Side of Bed(Q:  5


Sit to Stand (QC):  4 (SBA)


Chair/Bed-to-Chair Xfer(QC):  4 (SBA)


Toilet Transfer (QC):  4 (SBA)


Car Transfer (QC):  4 (CGA)





Weight Bearing


Right Lower Extremity:  Right


Touch Toe Bearing


Left Lower Extremity:  Left


Full Weight Bearing





Gait Training


Does the Patient Walk?:  Yes


Distance:  96',40'X2


Walk 10 feet (QC):  4 (sba)


Walk 50 ft with 2 Turns(QC):  4 (sba)


Walk 150 ft (QC):  88


Walking 10ft/uneven surface-QC:  4 (CGA)


Gait Assistive Device:  FWW


Pt needs constant cuing to maintain TTWB on the RLE. Pt will take 2/3 steps 

maintaining TTWB but then begins to increase WB through the RLE. At this time 

this PT is limiting ambulation secondary to the inability to maintain WB status





Wheelchair Training


Does the Pt Use a Wheelchair?:  Yes


Wheel 50 ft with 2 turns (QC):  5


Wheel 150 ft (QC):  88


Type of Wheelchair:  Manual


pt reports arms fatigue very quickly and that he doesn't feel like he can self 

propel any farther. Pt is very slow with self propulsion and requires multiple 

breaks





Stair Training


 Stair Training: Handrails/:  uses walker


#of Steps:  1


1 Step (curb) (QC):  4 (CGA)


4 Steps (QC):  88


12 Steps (QC):  8


Stairs:  Pattern:  Step to


pt steps with step to gait pattern and is CGA for the curb step but again is not

maintaining TTWB for steps so no more were attempted.





Balance


Picking up an Object (QC):  88





Exercises


Supine Ex:  Ankle pumps, Quad Set, Glut sets, Straight leg raise, Hip abd/add


Supine Reps:  45 (15reps 3 sets)


Seated Therapy Exercises:  Ankle pumps, Long arc quads, Hip flexion, Hamstring 

Curls


Seated Reps:  30 (10reps 3 sets)





Treatments


pt performed transfer training bed mobility training, skilled ambulation 

training, wheelchair training, stair training, and education, and functional LE 

strengthening





Assessment


Current Status:  Fair Progress


pt is not able to maintain TTWB on the LLE which limits his distance to 

ambulate. Pt has difficulty maintaining changes when cues are given. pt at this 

time continues to be a fall risk as he likes to leave his walker when he turns 

to sit or gets the walker too far out ahead of him and puts weight through the 

RLE.





PT Short Term Goals


Short Term Goals


Time Frame:  2019


Sit to lyin (met)


Lying to sitting on side of be:  5 (met)


Walk 10 feet:  5


Walk 50 feet with two turns:  5


Walk 150 feet:  5


4 steps:  4





PT Long Term Goals


Long Term Goals


PT Long Term Goals Time Frame:  Dec 5, 2019


Roll Left & Right (QC):  6 (met)


Sit to Lying (QC):  6 (met)


Lying-Sitting on Side/Bed(QC):  6 (met)


Sit to Stand (QC):  6 (net)


Chair/Bed-to-Chair Xfer(QC):  6 (met)


Toilet Transfer (QC):  6 (met)


Car Transfer (QC):  6


Does the Patient Walk:  Yes


Walk 10 feet (QC):  6 (met)


Walk 50ft with 2 Turns (QC):  6 (met)


Walk 150 ft (QC):  6 (met)


Walking 10ft on Uneven Surface:  6


1 Step (curb) (QC):  6


4 Steps (QC):  5


12 Steps (QC):  9


Picking up an Object (QC):  88


Does the Pt use WC or Scooter?:  No


Type:  N/A


Type:  N/A





PT Plan


Problem List


Problem List:  Activity Tolerance, Functional Strength, Safety, Balance, Gait, 

Transfer, Bed Mobility, ROM





Treatment/Plan


Treatment Plan:  Continue Plan of Care


Treatment Plan:  Bed Mobility, Education, Functional Activity Hema, Functional 

Strength, Group Therapy, Gait, Safety, Therapeutic Exercise, Transfers


Treatment Duration:  Dec 5, 2019


Frequency:  At least 5 of 7 days/Wk (IRF)


Estimated Hrs Per Day:  1.5 hours per day


Patient and/or Family Agrees t:  Yes





Safety Risks/Education


Patient Education:  Gait Training, Transfer Techniques, Steps, Reviewed 

Precautions, Correct Positioning, Safety Issues


Teaching Recipient:  Patient


Teaching Methods:  Demonstration, Discussion


Response to Teaching:  Return Demonstration, Reinforcement Needed





Time/GCodes


Time In:  1000


Time Out:  1100


Total Billed Treatment Time:  60


Total Billed Treatment


1 visit


[facility] 15'


EX 20'


GT 10'


FA 15'











ANGELIA BROWN PT                 Dec 5, 2019 10:55


POS

## 2019-12-05 NOTE — PM&R PROGRESS NOTE
Subjective


HPI/CC On Admission


Date Seen by Provider:  Dec 5, 2019


Time Seen by Provider:  08:15


CC: Hip Fracture





HPI: Patient fell twice Monday receiving a hip fracture and had surgery.  Joshua ferguson was transferred to in-patient rehab yesterday. Patient does have pain when

standing and rates it as a 9/10 otherwise, his pain is a 3/10. Pain does travel 

down his right leg. Rehab has been going well he states, and is sore from rehab.


Subjective/Events-last exam


Cefepime completed. 


Lopressor held because of low BP. 


Discharged planned for tomorrow. 


Home O2 evaluation will be done today. 


Slept good. 


Conferred with RN


Reviewed therapy notes


Checked meds and labs





Review of Systems


General:  Fatigue


Pulmonary:  Dyspnea


Musculoskeletal:  leg pain





Objective


Exam


Vital Signs





Vital Signs








  Date Time  Temp Pulse Resp B/P (MAP) Pulse Ox O2 Delivery O2 Flow Rate FiO2


 


12/5/19 21:10  69  95/63 (74)    


 


12/5/19 20:45      Nasal Cannula 2.00 


 


12/5/19 18:31     98   


 


12/5/19 15:38 36.6  14     





Capillary Refill : Less Than 3 Seconds


General Appearance:  No Apparent Distress, Chronically ill, Thin


HEENT:  PERRL/EOMI, Normal ENT Inspection, Pharynx Normal


Neck:  Full Range of Motion, Normal Inspection, Non Tender, Supple


Respiratory:  No Accessory Muscle Use, No Respiratory Distress, Crackles, 

Decreased Breath Sounds, Wheezing


Cardiovascular:  Regular Rate, Rhythm, No Edema, No Gallop, No JVD, No Murmur, 

Normal Peripheral Pulses, Irregularly Irregular


Gastrointestinal:  Normal Bowel Sounds, No Organomegaly, No Pulsatile Mass, Non 

Tender, Soft


Extremity:  Normal Capillary Refill, Normal Inspection, Normal Range of Motion 

(except right leg), Non Tender, No Calf Tenderness, No Pedal Edema


Neurologic/Psychiatric:  Alert, Oriented x3, No Motor/Sensory Deficits, Normal 

Mood/Affect, CNs II-XII Norm as Tested


Skin:  Normal Color, Warm/Dry





Results/Procedures


Lab


Patient resulted labs reviewed.





FIM


Transfers


Therapy Code Descriptions/Definitions 





Functional [place] Measure:


0=Not Assessed/NA        4=Minimal Assistance


1=Total Assistance        5=Supervision or Setup


2=Maximal Assistance  6=Modified [place]


3=Moderate Assistance 7=Complete IndependenceSCALE: Activities may be completed 

with or without assistive devices.





6-Indepedent-patient completes the activity by him/herself with no assistance 

from a helper.


5-Set-up or Clean-up Assistance-helper sets up or cleans up; patient completes 

activity. [place] assists only prior to or  


    following the activity.


4-Supervision or Touching Assistance-helper provides verbal cues and/or 

touching/steadying and/or contact guard assistance as patient completes 

activity. Assistance may be provided   


    throughout the activity or intermittently.


3-Partial/Moderate Assistance-helper does LESS THAN HALF the effort. [place] 

lifts, holds or supports trunk or limbs, but provides less than half the effort.


2-Substantial/Maximal Assistance-helper does MORE THAN HALF the effort. [place] 

lifts or holds trunk or limbs and provides more than half the effort.


9-Wqbvaspsw-jirrfm does ALL the effort. Patient does none of the effort to 

complete the activity. Or, the assistance of 2 or more helpers is required for 

the patient to complete the  


    activity.


If activity was not attempted, code reason:


7-Patient Refused.


9-Not Applicable-not attempted and the patient did not perform the activity 

before the current illness, exacerbation or injury.


10-Not Attempted due to Environmental Limitations-(lack of equipment, weather 

restraints, etc.).


88-Not Attempted due to Medical Conditions or Safety Concerns.


Roll Left to Right (QC):  6


Sit to Lying (QC):  6


Sit to Stand (QC):  6


Chair/Bed-to-Chair Xfer(QC):  6


Car Transfer (QC):  4 (pt requiring skilled verb inst for technique)





Gait Training


Does the Patient Walk?:  Yes


Distance:  150 ft x 4


Walk 10 feet (QC):  6


Walk 50 ft with 2 Turns(QC):  6


Walk 150 ft (QC):  6


Walking 10ft/uneven surface-QC:  4


Gait Persons Needed:  1


Gait Assistive Device:  FWW





Wheelchair Training


Does the Pt Use a Wheelchair?:  No


Wheel 50 ft with 2 turns (QC):  5


Wheel 150 ft (QC):  9





Stair Training


 Stair Training: Handrails/:  2 handrails


#of Steps:  4


1 Step (curb) (QC):  4


4 Steps (QC):  4


12 Steps (QC):  9


Stairs:  Pattern:  Step to





Balance


Picking up an Object (QC):  88





ADL-Treatment


Eating (QC):  6


Oral Hygiene (QC):  6 (Pt states fatigue post-shave, completes oral hygiene with

IND while seated in chair in front of sink. Pt states walker at home has seat to

sit on in front of bathroom sink.)


Bathing Location:  L Arm, R Arm, L Upper Leg, R Upper Leg, Chest, Abdomen, 

Buttocks, Perineal Area


Shower/Bathe Self (QC):  4 (SBA in stance )


Upper Body Dressing (QC):  6


Lower Body Dressing (QC):  4 (SBA in stance; pt completes doffing with dressing 

stick, donning with IND.)


On/Off Footwear (QC):  7


Toileting Hygiene (QC):  4 (SBA in stance during bottom hygiene)


Toilet Transfer (QC):  4 (CGA during transfer on/off standard toilet as pt has 

standard toilet at home. Pt expresses pain while seated, pt states he has 

commode "somewhere" at the house and needs to find it.)





Assessment/Plan


Assessment and Plan


Assess & Plan/Chief Complaint


Assessment:


Right femur fracture s/p fall in Dr Patino's office


Pneumonia placed on Cefepime per Dr Patino Pseudomonas on Cx started abx 

11/22/19 and DC 12/2/19


Severe COPD with recurrent pneumonia in the past


Warfarin maintenance


Low grade fever





Plan:


IRF protocol


Cefepime for 14 days total now DC


Nebs


O2


Dr Patino appreciated


Monitor crackles


Monitor INR for coumadin treatment


DC planned for Friday





(1) Closed right femoral fracture


(2) Debility


Status:  Acute


(3) Paroxysmal atrial fibrillation


Status:  Chronic


(4) Dyspnea


Status:  Acute


(5) COPD (chronic obstructive pulmonary disease)


Status:  Chronic


(6) Pneumonia


Status:  Acute











ALEKS WILSON DO                 Dec 5, 2019 09:34


POS

## 2019-12-05 NOTE — NUR
Weekly Team Conference



Patient sleeping soundly, updated daughter/caregiver Aurea Moulton of team summary.  She was 
aware of the target discharge date of 12/6/19 and they have been planning for his return 
home.



Aurea will be here tomorrow at noon to pick patient up.



DME:  Home O2 will be upgraded to continuous with patient current provider.  Family to 
pursue dressing stick and sock aid.



HHC:  History with [facility], he was discharged there 11/12/19.  Daughter expressed desire to 
stay with agency if possible, second choice in service area is [facility].



Aurea requested that patient be told whether he can drive because he will likely try to drive 
at first available time.



Complete all discharge planning activities tomorrow.

## 2019-12-05 NOTE — OCCUPATIONAL THER DAILY NOTE
OT Current Status-Daily Note


Subjective


Pt seen in bed, sleeping. Pt woken by verbal and tactile cues. Pt agreeable to 

OT tx session. No pain noted until stance, pain unrated.





Mental Status/Objective


Patient Orientation:  Normal For Age


Attachments:  Oxygen (2L)





ADL-Treatment


Therapy Code Descriptions/Definitions 





Functional [place] Measure:


0=Not Assessed/NA        4=Minimal Assistance


1=Total Assistance        5=Supervision or Setup


2=Maximal Assistance  6=Modified [place]


3=Moderate Assistance 7=Complete IndependenceSCALE: Activities may be completed 

with or without assistive devices.





6-Indepedent-patient completes the activity by him/herself with no assistance 

from a helper.


5-Set-up or Clean-up Assistance-helper sets up or cleans up; patient completes 

activity. [place] assists only prior to or  


    following the activity.


4-Supervision or Touching Assistance-helper provides verbal cues and/or 

touching/steadying and/or contact guard assistance as patient completes 

activity. Assistance may be provided   


    throughout the activity or intermittently.


3-Partial/Moderate Assistance-helper does LESS THAN HALF the effort. [place] 

lifts, holds or supports trunk or limbs, but provides less than half the effort.


2-Substantial/Maximal Assistance-helper does MORE THAN HALF the effort. [place] 

lifts or holds trunk or limbs and provides more than half the effort.


6-Shzmtqdhf-jadnpr does ALL the effort. Patient does none of the effort to 

complete the activity. Or, the assistance of 2 or more helpers is required for 

the patient to complete the  


    activity.


If activity was not attempted, code reason:


7-Patient Refused.


9-Not Applicable-not attempted and the patient did not perform the activity 

before the current illness, exacerbation or injury.


10-Not Attempted due to Environmental Limitations-(lack of equipment, weather 

restraints, etc.).


88-Not Attempted due to Medical Conditions or Safety Concerns.


Eating (QC):  6


Oral Hygiene (QC):  6 (completes while seated in chair in front of sink.)


Shower/Bathe Self (QC):  4 (SUP during sponge bath in recliner chair. Pt 

completes bottom hygiene in stance requiring CGA at FWW level)


Upper Body Dressing (QC):  6


Lower Body Dressing (QC):  4 (CGA in stance to pull over hips.)


Toileting Hygiene (QC):  4 (CGA in stance for bottom hygiene.)


Toilet Transfer (QC):  4 (SUP during transfer to commode. Pt states he has 

commode at home, pt states daughter will find.)


on/ off footwear: 5 s/u- pt requires cues with use of sock aide





Other Treatment


Pt states no pain in LE until standing. Bed mob with IND, requires cues to 

maintain TTWB in sit to stand. Pt completes ADLs in room. Pt pushed to therapy 

gym in w/c, completes 12 minutes of moderate resistance of arm bike with 3 rest 

breaks with skilled cues for breathing and task attention during task. Pt 

returns to room, remains in w/c. Pt and OT address home environment 

modifications including commode and removing rubs/ hazards. Pt states understand

ing and agreement. Call light in reach, all needs met.





Education


OT Patient Education:  Correct positioning, Exercise program, Modified ADL 

techniques, Purpose of tx/functional activities, Reviewed precautions, Safety 

issues, Transfer techniques


Teaching Recipient:  Patient


Teaching Methods:  Demonstration, Discussion


Response to Teaching:  Verbalize Understanding, Return Demonstration, 

Reinforcement Needed





OT Short Term Goals


Short Term Goals


Time Frame:  Dec 6, 2019


Oral hygiene:  5


Toileting hygiene:  3


Shower/bathe self:  3


Upper body dressin


Lower body dressing:  3 (met)


Putting on/taking off footwear:  3





OT Long Term Goals


Long Term Goals


Time Frame:  Dec 20, 2019


Eating (QC):  6 (met)


Oral Hygiene (QC):  6 (met)


Toileting Hygiene (QC):  6


Shower/Bathe Self (QC):  6 (met)


Upper Body Dressing (QC):  6 (met)


Lower Body Dressing (QC):  6


On/Off Footwear (QC):  6 (met)


Additional Goals:  1-Demonstrate ADL Tasks, 2-Verbalize Understanding, 3-

ImproveStrength/Hema


1=Demonstrate adherence to instructed precautions during ADL tasks.


2=Patient will verbalize/demonstrate understanding of assistive 

devices/modifications for ADL.


3=Patient will improve strength/tolerance for activity to enable patient to 

perform ADL's.





OT Education/Plan


Problem List/Assessment


Assessment:  Decreased Activ Tolerance, Decreased UE Strength, Dependent 

Transfers, Impaired Funct Balance, Impaired I ADL's, Impaired Self-Care Skills





Discharge Recommendations


Plan/Recommendations:  Continue POC


Therapy Discharge Recommendati:  Home & Family, Post Acute OT


Equpiment Recommendations-D/C:  Sock Aide, Dressing Stick





Treatment Plan/Plan of Care


Treatment,Training & Education:  Yes


Patient would benefit from OT for education, treatment and training to promote 

independence in ADL's, mobility, safety and/or upper extremity function for 

ADL's.


Plan of Care:  ADL Retraining, Functional Mobility, Group Exercise/Act as Ind, 

UE Funct Exercise/Act


Treatment Duration:  Dec 20, 2019


Frequency:  At least 5 of 7 days/Wk (IRF)


Estimated Hrs Per Day:  1.5 hours per day


Agreement:  Yes


Rehab Potential:  Good





Time/GCodes


Start Time:  08:45


Stop Time:  10:00


Total Time Billed (hr/min):  75


Billed Treatment Time


1, ADL 4 (60), EX (15)= 75











NARAYAN PITT OTR                 Dec 5, 2019 10:04


POS

## 2019-12-06 VITALS — DIASTOLIC BLOOD PRESSURE: 64 MMHG | SYSTOLIC BLOOD PRESSURE: 102 MMHG

## 2019-12-06 VITALS — SYSTOLIC BLOOD PRESSURE: 104 MMHG | DIASTOLIC BLOOD PRESSURE: 62 MMHG

## 2019-12-06 LAB
INR PPP: 3.1 (ref 0.8–1.4)
PROTHROMBIN TIME: 33 SEC (ref 12.2–14.7)

## 2019-12-06 RX ADMIN — INSULIN ASPART SCH UNIT: 100 INJECTION, SOLUTION INTRAVENOUS; SUBCUTANEOUS at 11:00

## 2019-12-06 RX ADMIN — DOCUSATE SODIUM AND SENNOSIDES SCH EA: 8.6; 5 TABLET, FILM COATED ORAL at 10:04

## 2019-12-06 RX ADMIN — GABAPENTIN SCH MG: 100 CAPSULE ORAL at 05:14

## 2019-12-06 RX ADMIN — Medication SCH ML: at 05:17

## 2019-12-06 RX ADMIN — FLUTICASONE PROPIONATE AND SALMETEROL XINAFOATE SCH PUFF: 115; 21 AEROSOL, METERED RESPIRATORY (INHALATION) at 07:53

## 2019-12-06 RX ADMIN — POLYETHYLENE GLYCOL (3350) SCH GM: 17 POWDER, FOR SOLUTION ORAL at 09:00

## 2019-12-06 RX ADMIN — DOCUSATE SODIUM SCH MG: 100 CAPSULE ORAL at 10:03

## 2019-12-06 RX ADMIN — INSULIN ASPART SCH UNIT: 100 INJECTION, SOLUTION INTRAVENOUS; SUBCUTANEOUS at 05:17

## 2019-12-06 RX ADMIN — PANTOPRAZOLE SODIUM SCH MG: 40 TABLET, DELAYED RELEASE ORAL at 10:03

## 2019-12-06 RX ADMIN — METOPROLOL TARTRATE SCH MG: 25 TABLET, FILM COATED ORAL at 10:03

## 2019-12-06 RX ADMIN — FERROUS SULFATE TAB 325 MG (65 MG ELEMENTAL FE) SCH MG: 325 (65 FE) TAB at 10:03

## 2019-12-06 RX ADMIN — IPRATROPIUM BROMIDE AND ALBUTEROL SULFATE SCH ML: .5; 3 SOLUTION RESPIRATORY (INHALATION) at 07:53

## 2019-12-06 RX ADMIN — IPRATROPIUM BROMIDE AND ALBUTEROL SULFATE SCH ML: .5; 3 SOLUTION RESPIRATORY (INHALATION) at 11:27

## 2019-12-06 NOTE — THERAPY TEAM DISCHARGE SUMMARY
Therapy Discharge Summary


Discharge Recommendations


Date of Discharge








Occupational Therapy


Pt admitted with dx of R femur fracture with TTWB status. Pt admits with  min A 

during showering, s/u UB dress, max A LB dress, TD footwear, and min A toilet 

transfer. Pt and OT worked towards higher functional IND within ADLs through UB 

exercise, functional transfers, ADL and AE training. Pt limited by TTWB status 

and pain. Pt d/c home with family with SUP during showering, IND UB dress with 

CGA for LB dressing, SUP during toilet transfers and s/u for footwear. Pt d/c OT

services, HH OT recommended as well as dressing stick and sock aide. Pt states 

he has reacher and commode at home, but daughter needs to locate commode for 

use.


Decreased Activ Tolerance, Decreased UE Strength, Dependent Transfers, Impaired 

Funct Balance, Impaired I ADL's, Impaired Self-Care Skills





PT Long Term Goals


Long Term Goals


PT Long Term Goals Time Frame:  Dec 5, 2019


Roll Left to Right (QC):  6 (met)


Sit to Lying (QC):  6 (met)


Lying-Sitting on Side/Bed(QC):  6 (met)


Sit to Stand (QC):  6 (net)


Chair/Bed-to-Chair Xfer(QC):  6 (met)


Car Transfer (QC):  6


Does the Patient Walk:  Yes


Walk 10 feet (QC):  6 (met)


Walk 10ft-Uneven Surface(QC):  6


Walk 50ft with 2 Turns (QC):  6 (met)


Walk 150 ft (QC):  6 (met)


Does the Pt use WC or Scooter?:  No


1 Step (curb) (QC):  6


4 Steps (QC):  5


12 Steps (QC):  9


Picking up an Object (QC):  88





OT Long Term Goals


Long Term Goals


Time Frame:  Dec 20, 2019


Eating (FIM):  6


Eating (QC):  6 (met)


Oral Hygiene (QC):  6 (met)


Shower/Bathe Self (QC):  6 (met)


Upper Body Dressing (QC):  6 (met)


Lower Body Dressing (QC):  6


On/Off Footwear (QC):  6 (met)


Toileting(FIM):  6


Toileting Hygiene (QC):  6


Toilet/Commode Transfer (QC):  6 (met)


Additional Goals:  1-Demonstrate ADL Tasks, 2-Verbalize Understanding, 3-Improv

eStrength/Hema


1=Demonstrate adherence to instructed precautions during ADL tasks.


2=Patient will verbalize/demonstrate understanding of assistive 

devices/modifications for ADL.


3=Patient will improve strength/tolerance for activity to enable patient to p

erform ADL's.





Speech Long Term Goals


Long Term Goals


The patient will improve cognitive-communication necessary for safety and daily 

living tasks with minimal assist.











NARAYAN PITT OTR                 Dec 6, 2019 10:48


POS

## 2019-12-06 NOTE — DISCHARGE SUMMARY
Diagnosis/Chief Complaint


Date of Admission


2019 at 09:45


Date of Discharge





Discharge Date:  Dec 6, 2019


Discharge Diagnosis


Assessment:


Right femur fracture s/p fall in Dr Patino's office


Pneumonia placed on Cefepime per Dr Patino Pseudomonas on Cx started abx 

19 and DC 19


Severe COPD with recurrent pneumonia in the past


Warfarin maintenance


Low grade fever





Plan:


IRF protocol


Cefepime for 14 days total now DC


Nebs


O2


Dr Patino appreciated


Monitor crackles


Monitor INR for coumadin treatment


DC planned for Friday





(1) Closed right femoral fracture


(2) Debility


Status:  Acute


(3) Paroxysmal atrial fibrillation


Status:  Chronic


(4) Dyspnea


Status:  Acute


(5) COPD (chronic obstructive pulmonary disease)


Status:  Chronic


(6) Pneumonia


Status:  Acute





Discharge Summary


Discharge Physical Examination


Allergies:  


Coded Allergies:  


     morphine (Verified  Allergy, Severe, Pt has received Lortab in the past w/o

issue, 19)


     Sulfa (Sulfonamide Antibiotics) (Verified  Allergy, Unknown, 19)


     penicillin G (Verified  Allergy, Unknown, Pt has received Cefepime & 

Meropenem in the past, 10/2/19)


     levofloxacin (Verified  Adverse Reaction, Unknown, 19)


Vitals & I&Os





Vital Signs








  Date Time  Temp Pulse Resp B/P (MAP) Pulse Ox O2 Delivery O2 Flow Rate FiO2


 


19 16:45 36.5 101 16 102/64 93 Nasal Cannula 2.00 








General Appearance:  Alert, Oriented X3, Cooperative


Respiratory:  Other (crackles)


Neuro:  Normal Gait, Normal Speech, Strength at 5/5 X4 Ext


Psych/Mental Status:  Mental Status NL





Hospital Course


Was the Problem List Reviewed?:  Yes


Hospital Course: Pt had a lengthy hospital course for 16 days. He was diagnosed 

with pseudomonas pneumonitis requirement Cefepime for a full twelve days IV 

antibiotics. Dr. Patino was consulted and monitored closely, along with Dr. Holley his PCP. INR monitored very closely on Coumadin and Lovenox bridge 

until therapeutic. PT deemed meeting criteria for oxygen at home continuously 

and those orders were given along with home health and pt was able to regian 

enough function but he still was in a very weakened state. He was able to be DC 

home with his wife and they will have there 62 anniversary tomorrow.


Labs (last 24 hrs)


Laboratory Tests


19 09:45: Lab Scanned Report Referred Lab Report


19 15:31: Glucometer 151H


19 20:22: Glucometer 194H


19 05:30: 


White Blood Count 11.6H, Red Blood Count 3.02L, Hemoglobin 8.9L, Hematocrit 28L,

Mean Corpuscular Volume 93, Mean Corpuscular Hemoglobin 29, Mean Corpuscular 

Hemoglobin Concent 32, Red Cell Distribution Width 16.6H, Platelet Count 274, 

Mean Platelet Volume 8.9, Neutrophils (%) (Auto) 80H, Lymphocytes (%) (Auto) 11L

, Monocytes (%) (Auto) 6, Eosinophils (%) (Auto) 3, Basophils (%) (Auto) 0, 

Neutrophils # (Auto) 9.3H, Lymphocytes # (Auto) 1.3, Monocytes # (Auto) 0.7, 

Eosinophils # (Auto) 0.3, Basophils # (Auto) 0.0, Prothrombin Time 14.7, INR 

Comment 1.1, Sodium Level 139, Potassium Level 4.5, Chloride Level 106, Carbon 

Dioxide Level 24, Anion Gap 9, Blood Urea Nitrogen 12, Creatinine 0.79, Estimat 

Glomerular Filtration Rate > 60, BUN/Creatinine Ratio 15, Glucose Level 137H, 

Calcium Level 8.1L, Corrected Calcium 9.1, Total Bilirubin 0.4, Aspartate Amino 

Transf (AST/SGOT) 18, Alanine Aminotransferase (ALT/SGPT) 11, Alkaline 

Phosphatase 71, Total Protein 5.7L, Albumin 2.8L


19 05:50: Lactic Acid Level 0.70


19 06:35: 


Urine Color YELLOW, Urine Clarity CLEAR, Urine pH 7.0, Urine Specific Gravity 

1.010L, Urine Protein NEGATIVE, Urine Glucose (UA) NEGATIVE, Urine Ketones 

NEGATIVE, Urine Nitrite NEGATIVE, Urine Bilirubin NEGATIVE, Urine Urobilinogen 

0.2, Urine Leukocyte Esterase NEGATIVE, Urine RBC (Auto) TRACE-I, Urine RBC 0-2,

Urine WBC NONE, Urine Squamous Epithelial Cells RARE, Urine Crystals NONE, Urine

Bacteria NEGATIVE, Urine Casts NONE, Urine Mucus NEGATIVE, Urine Culture 

Indicated NO


19 10:55: Glucometer 215H


19 15:22: Glucometer 179H


19 20:10: Glucometer 242H


19 04:53: 


White Blood Count 8.2, Red Blood Count 2.89L, Hemoglobin 8.6L, Hematocrit 27L, 

Mean Corpuscular Volume 94, Mean Corpuscular Hemoglobin 30, Mean Corpuscular 

Hemoglobin Concent 32, Red Cell Distribution Width 16.6H, Platelet Count 242, 

Mean Platelet Volume 8.9, Neutrophils (%) (Auto) 68, Lymphocytes (%) (Auto) 21, 

Monocytes (%) (Auto) 8, Eosinophils (%) (Auto) 3, Basophils (%) (Auto) 0, 

Neutrophils # (Auto) 5.6, Lymphocytes # (Auto) 1.7, Monocytes # (Auto) 0.7, 

Eosinophils # (Auto) 0.3, Basophils # (Auto) 0.0, Prothrombin Time 15.9H, INR 

Comment 1.2, Sodium Level 141, Potassium Level 4.3, Chloride Level 106, Carbon 

Dioxide Level 24, Anion Gap 11, Blood Urea Nitrogen 14, Creatinine 0.78, Estimat

Glomerular Filtration Rate > 60, BUN/Creatinine Ratio 18, Glucose Level 113H, 

Calcium Level 8.2L


19 10:37: Glucometer 238H


19 15:50: Glucometer 194H


19 20:07: Glucometer 212H


19 05:12: Glucometer 114H


19 11:02: Glucometer 227H


19 16:41: Glucometer 202H


19 20:36: Glucometer 145H


19 04:45: 


White Blood Count 9.4, Red Blood Count 3.11L, Hemoglobin 9.2L, Hematocrit 29L, 

Mean Corpuscular Volume 95, Mean Corpuscular Hemoglobin 30, Mean Corpuscular 

Hemoglobin Concent 31L, Red Cell Distribution Width 16.6H, Platelet Count 294, 

Mean Platelet Volume 9.1, Neutrophils (%) (Auto) 64, Lymphocytes (%) (Auto) 24, 

Monocytes (%) (Auto) 7, Eosinophils (%) (Auto) 5, Basophils (%) (Auto) 0, 

Neutrophils # (Auto) 6.0, Lymphocytes # (Auto) 2.2, Monocytes # (Auto) 0.7, 

Eosinophils # (Auto) 0.4H, Basophils # (Auto) 0.0, Sodium Level 140, Potassium 

Level 4.2, Chloride Level 104, Carbon Dioxide Level 26, Anion Gap 10, Blood Urea

Nitrogen 13, Creatinine 0.83, Estimat Glomerular Filtration Rate > 60, 

BUN/Creatinine Ratio 16, Glucose Level 124H, Calcium Level 8.6, Corrected 

Calcium 9.3, Total Bilirubin 0.4, Aspartate Amino Transf (AST/SGOT) 20, Alanine 

Aminotransferase (ALT/SGPT) 23, Alkaline Phosphatase 79, Total Protein 6.3L, 

Albumin 3.1L


19 10:59: Glucometer 224H


19 15:16: Glucometer 123H


19 20:17: Glucometer 217H


19 05:15: 


Prothrombin Time 18.1H, INR Comment 1.4


19 05:24: Glucometer 156H


19 11:21: Glucometer 279H


19 15:32: Glucometer 152H


19 20:10: Glucometer 246H


19 05:22: Glucometer 139H


19 05:25: 


White Blood Count 6.9, Red Blood Count 2.70L, Hemoglobin 8.1L, Hematocrit 26L, 

Mean Corpuscular Volume 95, Mean Corpuscular Hemoglobin 30, Mean Corpuscular 

Hemoglobin Concent 32, Red Cell Distribution Width 16.2H, Platelet Count 272, Me

an Platelet Volume 9.0, Neutrophils (%) (Auto) 65, Lymphocytes (%) (Auto) 21, 

Monocytes (%) (Auto) 9, Eosinophils (%) (Auto) 5, Basophils (%) (Auto) 0, 

Neutrophils # (Auto) 4.5, Lymphocytes # (Auto) 1.4, Monocytes # (Auto) 0.6, 

Eosinophils # (Auto) 0.3, Basophils # (Auto) 0.0, Prothrombin Time 20.9H, INR 

Comment 1.7H


19 11:01: Glucometer 242H


19 15:46: Glucometer 134H


19 20:16: Glucometer 218H


19 05:35: 


Glucometer 108, Prothrombin Time 22.8H, INR Comment 1.9H


19 11:12: Glucometer 178H


19 16:20: Glucometer 207H


19 20:36: Glucometer 236H


19 05:42: Glucometer 133H


19 11:11: Glucometer 173H


19 15:20: Glucometer 213H


19 20:40: Glucometer 167H


19 05:35: 


White Blood Count 5.8, Red Blood Count 2.80L, Hemoglobin 8.2L, Hematocrit 26L, 

Mean Corpuscular Volume 94, Mean Corpuscular Hemoglobin 29, Mean Corpuscular 

Hemoglobin Concent 31L, Red Cell Distribution Width 16.6H, Platelet Count 331, 

Mean Platelet Volume 8.3, Neutrophils (%) (Auto) 57, Lymphocytes (%) (Auto) 28, 

Monocytes (%) (Auto) 9, Eosinophils (%) (Auto) 6, Basophils (%) (Auto) 0, 

Neutrophils # (Auto) 3.3, Lymphocytes # (Auto) 1.6, Monocytes # (Auto) 0.5, 

Eosinophils # (Auto) 0.4H, Basophils # (Auto) 0.0, Prothrombin Time 26.4H, INR 

Comment 2.3H, Sodium Level 137, Potassium Level 4.5, Chloride Level 105, Carbon 

Dioxide Level 25, Anion Gap 7, Blood Urea Nitrogen 13, Creatinine 0.79, Estimat 

Glomerular Filtration Rate > 60, BUN/Creatinine Ratio 16, Glucose Level 115H, 

Calcium Level 8.3L, Corrected Calcium 9.1, Total Bilirubin 0.3, Aspartate Amino 

Transf (AST/SGOT) 26, Alanine Aminotransferase (ALT/SGPT) 23, Alkaline 

Phosphatase 75, Total Protein 6.1L, Albumin 3.0L


19 05:44: Glucometer 127H


19 10:40: Glucometer 181H


19 15:38: Glucometer 192H


19 20:32: Glucometer 179H


19 05:33: Glucometer 153H


19 09:41: 


White Blood Count 8.6, Red Blood Count 2.85L, Hemoglobin 8.6L, Hematocrit 27L, 

Mean Corpuscular Volume 95, Mean Corpuscular Hemoglobin 30, Mean Corpuscular 

Hemoglobin Concent 32, Red Cell Distribution Width 16.3H, Platelet Count 315, 

Mean Platelet Volume 8.3, Sodium Level 133L, Potassium Level 4.8, Chloride Level

99, Carbon Dioxide Level 26, Anion Gap 8, Blood Urea Nitrogen 13, Creatinine 

0.93, Estimat Glomerular Filtration Rate > 60, BUN/Creatinine Ratio 14, Glucose 

Level 262H, Calcium Level 8.1L, Phosphorus Level 2.6, Magnesium Level 1.9


19 10:41: Glucometer 260H


19 11:05: 


Urine Color YELLOW, Urine Clarity CLEAR, Urine pH 6.0, Urine Specific Gravity 

1.025H, Urine Protein TRACE, Urine Glucose (UA) NEGATIVE, Urine Ketones 

NEGATIVE, Urine Nitrite NEGATIVE, Urine Bilirubin NEGATIVE, Urine Urobilinogen 

0.2, Urine Leukocyte Esterase NEGATIVE, Urine RBC (Auto) 1+H, Urine RBC NONE, 

Urine WBC NONE, Urine Squamous Epithelial Cells RARE, Urine Crystals NONE, Urine

Bacteria NEGATIVE, Urine Casts PRESENT, Urine Hyaline Casts 5-10H, Urine Mucus 

SMALLH, Urine Culture Indicated NO


19 15:32: Glucometer 177H


19 20:13: Glucometer 192H


19 04:27: Glucometer 134H


19 06:00: 


White Blood Count 7.0, Red Blood Count 2.81L, Hemoglobin 8.3L, Hematocrit 27L, 

Mean Corpuscular Volume 95, Mean Corpuscular Hemoglobin 30, Mean Corpuscular 

Hemoglobin Concent 31L, Red Cell Distribution Width 16.5H, Platelet Count 322, 

Mean Platelet Volume 8.8, Neutrophils (%) (Auto) 70, Lymphocytes (%) (Auto) 17, 

Monocytes (%) (Auto) 9, Eosinophils (%) (Auto) 3, Basophils (%) (Auto) 0, 

Neutrophils # (Auto) 4.9, Lymphocytes # (Auto) 1.2, Monocytes # (Auto) 0.7, 

Eosinophils # (Auto) 0.2, Basophils # (Auto) 0.0, Prothrombin Time 29.6H, INR 

Comment 2.7H, Sodium Level 136, Potassium Level 4.5, Chloride Level 102, Carbon 

Dioxide Level 27, Anion Gap 7, Blood Urea Nitrogen 13, Creatinine 0.83, Estimat 

Glomerular Filtration Rate > 60, BUN/Creatinine Ratio 16, Glucose Level 128H, 

Calcium Level 8.4L, Corrected Calcium 9.2, Total Bilirubin 0.4, Aspartate Amino 

Transf (AST/SGOT) 20, Alanine Aminotransferase (ALT/SGPT) 23, Alkaline 

Phosphatase 76, Total Protein 6.2L, Albumin 3.0L


19 10:57: Glucometer 209H


19 15:41: Glucometer 197H


19 20:39: Glucometer 223H


12/3/19 05:39: Glucometer 142H


12/3/19 10:48: Glucometer 290H


12/3/19 15:43: Glucometer 181H


12/3/19 20:53: Glucometer 226H


19 05:20: 


White Blood Count 6.8, Red Blood Count 2.77L, Hemoglobin 8.1L, Hematocrit 26L, 

Mean Corpuscular Volume 94, Mean Corpuscular Hemoglobin 29, Mean Corpuscular H

emoglobin Concent 31L, Red Cell Distribution Width 16.2H, Platelet Count 332, 

Mean Platelet Volume 8.6, Prothrombin Time 31.3H, INR Comment 2.9H


19 06:08: Glucometer 142H


19 10:45: Glucometer 181H


19 15:26: Glucometer 218H


19 20:27: Glucometer 207H


19 05:20: Glucometer 170H


19 10:51: Glucometer 206H


19 15:36: Glucometer 125H


19 20:48: Glucometer 187H


19 05:01: Glucometer 141H


19 07:17: 


Prothrombin Time 33.0H, INR Comment 3.1H


19 11:00: Glucometer 305H





Microbiology


19 Blood Culture - Final, Complete


           No growth


19 Gram Stain - Final, Complete


           


19 Sputum Culture - Final, Complete


           Usual upper respiratory haydee


           Pseudomonas aeruginosa


           Pseudomonas aeruginosa#2





Pending Labs





Microbiology








 Date/Time


Source Procedure


Growth Status





 


 19 10:45


Peripheral Left Wrist Blood Culture - Final


No growth Complete


 


 19 10:35


Peripheral Lt Ac Blood Culture - Final


No growth Complete


 


 19 10:25


Peripheral Rt Ac Blood Culture - Final


No growth Complete





 19 07:21


Sputum Expectorated Gram Stain - Final Complete


 


 19 07:21 Sputum Culture - Final


Usual upper respiratory haydee


Pseudomonas aeruginosa


Pseudomonas aeruginosa#2 Complete





Laboratory Tests


19 09:45: Lab Scanned Report Referred Lab Report


19 15:31: Glucometer 151


19 20:22: Glucometer 194


19 05:30: 


White Blood Count 11.6, Red Blood Count 3.02, Hemoglobin 8.9, Hematocrit 28, 

Mean Corpuscular Volume 93, Mean Corpuscular Hemoglobin 29, Mean Corpuscular 

Hemoglobin Concent 32, Red Cell Distribution Width 16.6, Platelet Count 274, 

Mean Platelet Volume 8.9, Neutrophils (%) (Auto) 80, Lymphocytes (%) (Auto) 11, 

Monocytes (%) (Auto) 6, Eosinophils (%) (Auto) 3, Basophils (%) (Auto) 0, 

Neutrophils # (Auto) 9.3, Lymphocytes # (Auto) 1.3, Monocytes # (Auto) 0.7, 

Eosinophils # (Auto) 0.3, Basophils # (Auto) 0.0, Prothrombin Time 14.7, INR 

Comment 1.1, Sodium Level 139, Potassium Level 4.5, Chloride Level 106, Carbon 

Dioxide Level 24, Anion Gap 9, Blood Urea Nitrogen 12, Creatinine 0.79, Estimat 

Glomerular Filtration Rate > 60, BUN/Creatinine Ratio 15, Glucose Level 137, 

Calcium Level 8.1, Corrected Calcium 9.1, Total Bilirubin 0.4, Aspartate Amino 

Transf (AST/SGOT) 18, Alanine Aminotransferase (ALT/SGPT) 11, Alkaline 

Phosphatase 71, Total Protein 5.7, Albumin 2.8


19 05:50: Lactic Acid Level 0.70


19 06:35: 


Urine Color YELLOW, Urine Clarity CLEAR, Urine pH 7.0, Urine Specific Gravity 

1.010, Urine Protein NEGATIVE, Urine Glucose (UA) NEGATIVE, Urine Ketones 

NEGATIVE, Urine Nitrite NEGATIVE, Urine Bilirubin NEGATIVE, Urine Urobilinogen 

0.2, Urine Leukocyte Esterase NEGATIVE, Urine RBC (Auto) TRACE-I, Urine RBC 0-2,

Urine WBC NONE, Urine Squamous Epithelial Cells RARE, Urine Crystals NONE, Urine

Bacteria NEGATIVE, Urine Casts NONE, Urine Mucus NEGATIVE, Urine Culture 

Indicated NO


19 10:55: Glucometer 215


19 15:22: Glucometer 179


19 20:10: Glucometer 242


19 04:53: 


White Blood Count 8.2, Red Blood Count 2.89, Hemoglobin 8.6, Hematocrit 27, Mean

Corpuscular Volume 94, Mean Corpuscular Hemoglobin 30, Mean Corpuscular Hemogl

obin Concent 32, Red Cell Distribution Width 16.6, Platelet Count 242, Mean 

Platelet Volume 8.9, Neutrophils (%) (Auto) 68, Lymphocytes (%) (Auto) 21, 

Monocytes (%) (Auto) 8, Eosinophils (%) (Auto) 3, Basophils (%) (Auto) 0, 

Neutrophils # (Auto) 5.6, Lymphocytes # (Auto) 1.7, Monocytes # (Auto) 0.7, 

Eosinophils # (Auto) 0.3, Basophils # (Auto) 0.0, Prothrombin Time 15.9, INR 

Comment 1.2, Sodium Level 141, Potassium Level 4.3, Chloride Level 106, Carbon 

Dioxide Level 24, Anion Gap 11, Blood Urea Nitrogen 14, Creatinine 0.78, Estimat

Glomerular Filtration Rate > 60, BUN/Creatinine Ratio 18, Glucose Level 113, 

Calcium Level 8.2


19 10:37: Glucometer 238


19 15:50: Glucometer 194


19 20:07: Glucometer 212


19 05:12: Glucometer 114


19 11:02: Glucometer 227


19 16:41: Glucometer 202


19 20:36: Glucometer 145


19 04:45: 


White Blood Count 9.4, Red Blood Count 3.11, Hemoglobin 9.2, Hematocrit 29, Mean

Corpuscular Volume 95, Mean Corpuscular Hemoglobin 30, Mean Corpuscular 

Hemoglobin Concent 31, Red Cell Distribution Width 16.6, Platelet Count 294, 

Mean Platelet Volume 9.1, Neutrophils (%) (Auto) 64, Lymphocytes (%) (Auto) 24, 

Monocytes (%) (Auto) 7, Eosinophils (%) (Auto) 5, Basophils (%) (Auto) 0, 

Neutrophils # (Auto) 6.0, Lymphocytes # (Auto) 2.2, Monocytes # (Auto) 0.7, 

Eosinophils # (Auto) 0.4, Basophils # (Auto) 0.0, Sodium Level 140, Potassium 

Level 4.2, Chloride Level 104, Carbon Dioxide Level 26, Anion Gap 10, Blood Urea

Nitrogen 13, Creatinine 0.83, Estimat Glomerular Filtration Rate > 60, 

BUN/Creatinine Ratio 16, Glucose Level 124, Calcium Level 8.6, Corrected Calcium

9.3, Total Bilirubin 0.4, Aspartate Amino Transf (AST/SGOT) 20, Alanine 

Aminotransferase (ALT/SGPT) 23, Alkaline Phosphatase 79, Total Protein 6.3, 

Albumin 3.1


19 10:59: Glucometer 224


19 15:16: Glucometer 123


19 20:17: Glucometer 217


19 05:15: 


Prothrombin Time 18.1, INR Comment 1.4


19 05:24: Glucometer 156


19 11:21: Glucometer 279


19 15:32: Glucometer 152


19 20:10: Glucometer 246


19 05:22: Glucometer 139


19 05:25: 


White Blood Count 6.9, Red Blood Count 2.70, Hemoglobin 8.1, Hematocrit 26, Mean

Corpuscular Volume 95, Mean Corpuscular Hemoglobin 30, Mean Corpuscular 

Hemoglobin Concent 32, Red Cell Distribution Width 16.2, Platelet Count 272, 

Mean Platelet Volume 9.0, Neutrophils (%) (Auto) 65, Lymphocytes (%) (Auto) 21, 

Monocytes (%) (Auto) 9, Eosinophils (%) (Auto) 5, Basophils (%) (Auto) 0, 

Neutrophils # (Auto) 4.5, Lymphocytes # (Auto) 1.4, Monocytes # (Auto) 0.6, 

Eosinophils # (Auto) 0.3, Basophils # (Auto) 0.0, Prothrombin Time 20.9, INR 

Comment 1.7


19 11:01: Glucometer 242


19 15:46: Glucometer 134


19 20:16: Glucometer 218


19 05:35: 


Glucometer 108, Prothrombin Time 22.8, INR Comment 1.9


19 11:12: Glucometer 178


19 16:20: Glucometer 207


19 20:36: Glucometer 236


19 05:42: Glucometer 133


19 11:11: Glucometer 173


19 15:20: Glucometer 213


19 20:40: Glucometer 167


19 05:35: 


White Blood Count 5.8, Red Blood Count 2.80, Hemoglobin 8.2, Hematocrit 26, Mean

Corpuscular Volume 94, Mean Corpuscular Hemoglobin 29, Mean Corpuscular 

Hemoglobin Concent 31, Red Cell Distribution Width 16.6, Platelet Count 331, 

Mean Platelet Volume 8.3, Neutrophils (%) (Auto) 57, Lymphocytes (%) (Auto) 28, 

Monocytes (%) (Auto) 9, Eosinophils (%) (Auto) 6, Basophils (%) (Auto) 0, 

Neutrophils # (Auto) 3.3, Lymphocytes # (Auto) 1.6, Monocytes # (Auto) 0.5, 

Eosinophils # (Auto) 0.4, Basophils # (Auto) 0.0, Prothrombin Time 26.4, INR 

Comment 2.3, Sodium Level 137, Potassium Level 4.5, Chloride Level 105, Carbon 

Dioxide Level 25, Anion Gap 7, Blood Urea Nitrogen 13, Creatinine 0.79, Estimat 

Glomerular Filtration Rate > 60, BUN/Creatinine Ratio 16, Glucose Level 115, 

Calcium Level 8.3, Corrected Calcium 9.1, Total Bilirubin 0.3, Aspartate Amino 

Transf (AST/SGOT) 26, Alanine Aminotransferase (ALT/SGPT) 23, Alkaline 

Phosphatase 75, Total Protein 6.1, Albumin 3.0


19 05:44: Glucometer 127


19 10:40: Glucometer 181


19 15:38: Glucometer 192


19 20:32: Glucometer 179


19 05:33: Glucometer 153


19 09:41: 


White Blood Count 8.6, Red Blood Count 2.85, Hemoglobin 8.6, Hematocrit 27, Mean

Corpuscular Volume 95, Mean Corpuscular Hemoglobin 30, Mean Corpuscular Hemo

globin Concent 32, Red Cell Distribution Width 16.3, Platelet Count 315, Mean 

Platelet Volume 8.3, Sodium Level 133, Potassium Level 4.8, Chloride Level 99, 

Carbon Dioxide Level 26, Anion Gap 8, Blood Urea Nitrogen 13, Creatinine 0.93, 

Estimat Glomerular Filtration Rate > 60, BUN/Creatinine Ratio 14, Glucose Level 

262, Calcium Level 8.1, Phosphorus Level 2.6, Magnesium Level 1.9


19 10:41: Glucometer 260


19 11:05: 


Urine Color YELLOW, Urine Clarity CLEAR, Urine pH 6.0, Urine Specific Gravity 

1.025, Urine Protein TRACE, Urine Glucose (UA) NEGATIVE, Urine Ketones NEGATIVE,

Urine Nitrite NEGATIVE, Urine Bilirubin NEGATIVE, Urine Urobilinogen 0.2, Urine 

Leukocyte Esterase NEGATIVE, Urine RBC (Auto) 1+, Urine RBC NONE, Urine WBC 

NONE, Urine Squamous Epithelial Cells RARE, Urine Crystals NONE, Urine Bacteria 

NEGATIVE, Urine Casts PRESENT, Urine Hyaline Casts 5-10, Urine Mucus SMALL, 

Urine Culture Indicated NO


19 15:32: Glucometer 177


19 20:13: Glucometer 192


19 04:27: Glucometer 134


19 06:00: 


White Blood Count 7.0, Red Blood Count 2.81, Hemoglobin 8.3, Hematocrit 27, Mean

Corpuscular Volume 95, Mean Corpuscular Hemoglobin 30, Mean Corpuscular 

Hemoglobin Concent 31, Red Cell Distribution Width 16.5, Platelet Count 322, 

Mean Platelet Volume 8.8, Neutrophils (%) (Auto) 70, Lymphocytes (%) (Auto) 17, 

Monocytes (%) (Auto) 9, Eosinophils (%) (Auto) 3, Basophils (%) (Auto) 0, 

Neutrophils # (Auto) 4.9, Lymphocytes # (Auto) 1.2, Monocytes # (Auto) 0.7, 

Eosinophils # (Auto) 0.2, Basophils # (Auto) 0.0, Prothrombin Time 29.6, INR 

Comment 2.7, Sodium Level 136, Potassium Level 4.5, Chloride Level 102, Carbon 

Dioxide Level 27, Anion Gap 7, Blood Urea Nitrogen 13, Creatinine 0.83, Estimat 

Glomerular Filtration Rate > 60, BUN/Creatinine Ratio 16, Glucose Level 128, 

Calcium Level 8.4, Corrected Calcium 9.2, Total Bilirubin 0.4, Aspartate Amino 

Transf (AST/SGOT) 20, Alanine Aminotransferase (ALT/SGPT) 23, Alkaline 

Phosphatase 76, Total Protein 6.2, Albumin 3.0


19 10:57: Glucometer 209


19 15:41: Glucometer 197


19 20:39: Glucometer 223


12/3/19 05:39: Glucometer 142


12/3/19 10:48: Glucometer 290


12/3/19 15:43: Glucometer 181


12/3/19 20:53: Glucometer 226


19 05:20: 


White Blood Count 6.8, Red Blood Count 2.77, Hemoglobin 8.1, Hematocrit 26, Mean

Corpuscular Volume 94, Mean Corpuscular Hemoglobin 29, Mean Corpuscular 

Hemoglobin Concent 31, Red Cell Distribution Width 16.2, Platelet Count 332, 

Mean Platelet Volume 8.6, Prothrombin Time 31.3, INR Comment 2.9


19 06:08: Glucometer 142


19 10:45: Glucometer 181


19 15:26: Glucometer 218


19 20:27: Glucometer 207


19 05:20: Glucometer 170


19 10:51: Glucometer 206


19 15:36: Glucometer 125


19 20:48: Glucometer 187


19 05:01: Glucometer 141


19 07:17: 


Prothrombin Time 33.0, INR Comment 3.1


19 11:00: Glucometer 305








Discharge


Home Medications:





Active Scripts


Active


Citalopram HBr (Citalopram Hydrobromide) 20 Mg Tablet 20 Mg PO DAILY


Hydrocodone/Acetaminophen 5/325mg Tablet (Acetaminophen/Hydrocodone Bitart) 1 

Tab Tab 1 Tab PO Q4H PRN


Reported


Metoprolol Tartrate 25 Mg Tablet 25 Mg PO BID


Colace (Docusate Sodium) 100 Mg Capsule 100 Mg PO DAILY PRN


Iprat-Albut 0.5-3(2.5) mg/3 ml (Ipratropium/Albuterol Sulfate) 3 Ml Ampul.neb 3 

Ml NEB TID PRN


Iron (Ferrous Sulfate) 325 Mg Tablet 325 Mg PO DAILY


Warfarin Sodium 5 Mg Tablet 5 Mg PO 1800


Loratadine 10 Mg Tablet 10 Mg PO DAILY


Pantoprazole Sodium 40 Mg Tablet.dr 40 Mg PO DAILY


Novolin N (Insulin NPH Human Isophane) 100 Unit/1 Ml Vial 4 Units SQ BID


     USES ALONG WITH NOVOLIN R


Humulin R (Insulin Regular, Human) 1,000 Units/10 Ml Soln 5 Units SQ BID


     USES ALONG WITH NOVOLIN N


Gabapentin 100 Mg Capsule 100 Mg PO DAILY


Montelukast Sodium 10 Mg Tablet 10 Mg PO HS


Breo Ellipta 100-25 Mcg INH (Fluticasone/Vilanterol) 1 Each Blst.w.dev 1 Puff IH

DAILY


Simvastatin 20 Mg Tablet 20 Mg PO HS





Instructions to patient/family


Please see electronic discharge instructions given to patient.





Diagnosis/Problems


Diagnosis/Problems





(1) Closed right femoral fracture


(2) Debility


Status:  Acute


(3) Paroxysmal atrial fibrillation


Status:  Chronic


(4) Dyspnea


Status:  Acute


(5) COPD (chronic obstructive pulmonary disease)


Status:  Chronic


(6) Pneumonia


Status:  Acute





Clinical Quality Measures


DVT/VTE Risk/Contraindication:


Risk Factor Score Per Nursin


RFS Level Per Nursing on Admit:  4+=Very High











ALEKS WILSON DO                 Dec 6, 2019 12:59


POS

## 2019-12-06 NOTE — THERAPY TEAM DISCHARGE SUMMARY
Therapy Discharge Summary


Discharge Recommendations


Date of Discharge








Occupational Therapy


Decreased Activ Tolerance, Decreased UE Strength, Dependent Transfers, Impaired 

Funct Balance, Impaired I ADL's, Impaired Self-Care Skills





Speech-Language Pathology


Patient was admitted to the ARU s/p hip surgery. The patient scored in the mild 

cognitive disorder range on the SLUMS. Patient was treated by ST for improving 

cognitive function 5x/wk. Patient is discharging to home today with family and 

home health services. Patient has met ST goals. He will discharge from  this 

date.





PT Long Term Goals


Long Term Goals


PT Long Term Goals Time Frame:  Dec 5, 2019


Roll Left to Right (QC):  6 (met)


Sit to Lying (QC):  6 (met)


Lying-Sitting on Side/Bed(QC):  6 (met)


Sit to Stand (QC):  6 (net)


Chair/Bed-to-Chair Xfer(QC):  6 (met)


Car Transfer (QC):  6


Does the Patient Walk:  Yes


Walk 10 feet (QC):  6 (met)


Walk 10ft-Uneven Surface(QC):  6


Walk 50ft with 2 Turns (QC):  6 (met)


Walk 150 ft (QC):  6 (met)


Does the Pt use WC or Scooter?:  No


1 Step (curb) (QC):  6


4 Steps (QC):  5


12 Steps (QC):  9


Picking up an Object (QC):  88





OT Long Term Goals


Long Term Goals


Time Frame:  Dec 20, 2019


Eating (FIM):  6


Eating (QC):  6 (met)


Oral Hygiene (QC):  6 (met)


Shower/Bathe Self (QC):  6 (met)


Upper Body Dressing (QC):  6 (met)


Lower Body Dressing (QC):  6


On/Off Footwear (QC):  6 (met)


Toileting(FIM):  6


Toileting Hygiene (QC):  6


Toilet/Commode Transfer (QC):  6 (met)


Additional Goals:  1-Demonstrate ADL Tasks, 2-Verbalize Understanding, 

3-ImproveStrength/Hema


1=Demonstrate adherence to instructed precautions during ADL tasks.


2=Patient will verbalize/demonstrate understanding of assistive 

devices/modifications for ADL.


3=Patient will improve strength/tolerance for activity to enable patient to 

perform ADL's.





Speech Long Term Goals


Long Term Goals


The patient will improve cognitive-communication necessary for safety and daily 

living tasks with minimal assist.











YOEL RAY             Dec 6, 2019 08:37


POS

## 2019-12-06 NOTE — THERAPY TEAM DISCHARGE SUMMARY
Therapy Discharge Summary


Discharge Recommendations


Date of Discharge








Physical Therapy


Patient came to rehab following a right femur fracture.  Upon evaluation patient

performed bed mobility with min assist, supine <-> sit min assist, sit <-> stand

min assist, transfers min assist, car transfer min assist, ambulates 50' with a 

rolling walker with min assist, no stairs at that time.  Patient has been 

performing bed mobility and transfer training, balance and endurance training, 

functional strengthening, stair training, gait training, and education.  Patient

has made fair progress but has not met any of his long term goals.  Now, patient

performs bed mobility with setup, sit <-> stand and transfers with SBA, car 

transfer CGA, ambulates 96' with a rolling walker with SBA (including 50' with 

at least 2 turns of 90 degrees but needs CGA for 10' over an uneven surface), 

patient can propel a manual wheelchair 50' with SBA and he can go up and down 1 

step using a rolling walker with CGA.  Patient is being discharged from this 

facility today and will be discharged from PT at this time.





Occupational Therapy


Decreased Activ Tolerance, Decreased UE Strength, Dependent Transfers, Impaired 

Funct Balance, Impaired I ADL's, Impaired Self-Care Skills





PT Long Term Goals


Long Term Goals


PT Long Term Goals Time Frame:  Dec 5, 2019


Roll Left to Right (QC):  6 (met)


Sit to Lying (QC):  6 (met)


Lying-Sitting on Side/Bed(QC):  6 (met)


Sit to Stand (QC):  6 (net)


Chair/Bed-to-Chair Xfer(QC):  6 (met)


Car Transfer (QC):  6


Does the Patient Walk:  Yes


Walk 10 feet (QC):  6 (met)


Walk 10ft-Uneven Surface(QC):  6


Walk 50ft with 2 Turns (QC):  6 (met)


Walk 150 ft (QC):  6 (met)


Does the Pt use WC or Scooter?:  No


1 Step (curb) (QC):  6


4 Steps (QC):  5


12 Steps (QC):  9


Picking up an Object (QC):  88





OT Long Term Goals


Long Term Goals


Time Frame:  Dec 20, 2019


Eating (FIM):  6


Eating (QC):  6 (met)


Oral Hygiene (QC):  6 (met)


Shower/Bathe Self (QC):  6 (met)


Upper Body Dressing (QC):  6 (met)


Lower Body Dressing (QC):  6


On/Off Footwear (QC):  6 (met)


Toileting(FIM):  6


Toileting Hygiene (QC):  6


Toilet/Commode Transfer (QC):  6 (met)


Additional Goals:  1-Demonstrate ADL Tasks, 2-Verbalize Understanding, 3-

ImproveStrength/Hema


1=Demonstrate adherence to instructed precautions during ADL tasks.


2=Patient will verbalize/demonstrate understanding of assistive 

devices/modifications for ADL.


3=Patient will improve strength/tolerance for activity to enable patient to 

perform ADL's.





Speech Long Term Goals


Long Term Goals


The patient will improve cognitive-communication necessary for safety and daily 

living tasks with minimal assist.











ANGELIA BROWN PT                 Dec 6, 2019 09:19


POS

## 2019-12-06 NOTE — PROGRESS NOTE
Subjective


Time Seen by a Provider:  08:19


Subjective/Events-last exam


Patient resting comfortably this morning.


No chest pain or shortness of breath.


Breathing better.


Right hip fracture uses walker to get around





Objective


Exam





Vital Signs








  Date Time  Temp Pulse Resp B/P (MAP) Pulse Ox O2 Delivery O2 Flow Rate FiO2


 


19 07:54     98 Nasal Cannula 2.00 


 


19 06:04 37.0 73 16 104/62 (76) 94 Nasal Cannula 2.00 


 


19 21:10  69  95/63 (74)    


 


19 20:45      Nasal Cannula 2.00 


 


19 18:39      Nasal Cannula  


 


19 18:31     98 Nasal Cannula 2.00 


 


19 15:38 36.6 97 14 114/72 (86) 96 Nasal Cannula 2.00 


 


19 14:43     91 Nasal Cannula 2.00 


 


19 14:20     2  91.00 


 


19 09:58 37.0 84 20 99/62 (74) 96 Nasal Cannula 2.00 


 


19 08:24      Nasal Cannula 2.00 














I & O 


 


 19





 07:00


 


Intake Total 1150 ml


 


Output Total 1525 ml


 


Balance -375 ml





Capillary Refill : Less Than 3 Seconds


General Appearance:  No Apparent Distress, WD/WN, Thin


HEENT:  Normal ENT Inspection


Neck:  Full Range of Motion, Normal Inspection, Non Tender


Respiratory:  No Accessory Muscle Use, No Respiratory Distress, Decreased Breath

Sounds


Cardiovascular:  No Murmur


Gastrointestinal:  non tender, soft





Results


Lab


Laboratory Tests


19 10:51: Glucometer 206H


19 15:36: Glucometer 125H


19 20:48: Glucometer 187H


19 05:01: Glucometer 141H


19 07:17: 


Prothrombin Time 33.0H, INR Comment 3.1H





Microbiology


19 Blood Culture - Final, Complete


           No growth


19 Gram Stain - Final, Complete


           


19 Sputum Culture - Final, Complete


           Usual upper respiratory haydee


           Pseudomonas aeruginosa


           Pseudomonas aeruginosa#2





Assessment/Plan


Assessment/Plan


Assess & Plan/Chief Complaint


Hip fracture.


COPD.


Atrial fibrillation.


Diabetes.


Febrile.


Leukocytosis.


.


19.


Hip fracture.


COPD.


Atrial fibrillation.


Diabetes.


Sputum culture pseudomonas.


INR low..


.


Limits/ hip fracture.


COPD.


Sinus rhythm today.


Diabetes.


Sputum culture pseudomonas.


.


19.


Hip fracture.


COPD.


Atrial fibrillation today.


Diabetes.


Sputum culture pseudomonas.


.


12/3/19.


Right hip fracture.


COPD.


Proximal atrial fibrillation.


Diabetes.


Pneumonia with pseudomonas..


.


19.


Right hip fracture.


COPD.


Atrial fibrillation.


Diabetes.


Pneumonia with pseudomonas.


.


19.


Right hip fracture.


COPD


Atrial fibrillation.


Diabetes pneumonia due to Pseudomonas.


.


19 right hip fracture.


COPD.


Atrial fibrillation.


Diabetes.


Pneumonia with pseudomonas





Clinical Quality Measures


DVT/VTE Risk/Contraindication:


Risk Factor Score Per Nursin


RFS Level Per Nursing on Admit:  4+=Very High











TOR CONNELLY DO          Dec 6, 2019 08:21


POS

## 2019-12-06 NOTE — PULMONARY PROGRESS NOTE
Standard Progress Note


Progress Notes


Date Seen by Provider:  Dec 6, 2019


Time Seen by Provider:  07:22


No complications noted.


Assessment & Plan


Right femur fracture s/p fall while in my office 


Pneumonia with pseudomonus 


   -Will treat with Cefepime x 14 days started on 11/22. 


   -CXR shows improvement. 


Severe COPD 


   -DuoNeb 


   -02











SMITH MUNOZ DO               Dec 6, 2019 07:22


POS

## 2019-12-06 NOTE — NUR
IV d/c'd @ 1235 prior to leaving.  catheter intact. tolerates well. area covered with gauze 
et 4X4 dressing.

## 2019-12-06 NOTE — NUR
Patient discharged today as planned, home with his spouse and family monitor and supportive 
care.



HHC:  Completed with [facility] at patient/family request, for RN, PT, OT . 



DME:  Upgraded orders with established provider Marylou Chaney, confirmed receipt with 
Juanita there.  Patient was O2 noc, now continuous 3L and 4L with exertion.



Daughter Aurea and patient both understand he will need portable from home for transport.



IMM2 presented/signed yesterday, patient dressed at this time and ready to leave.



RN updated that daughter will be here at 1200.

## 2020-01-30 ENCOUNTER — HOSPITAL ENCOUNTER (EMERGENCY)
Dept: HOSPITAL 75 - ER | Age: 80
Discharge: HOME | End: 2020-01-30
Payer: MEDICARE

## 2020-01-30 VITALS — HEIGHT: 68.98 IN | BODY MASS INDEX: 23.58 KG/M2 | WEIGHT: 159.17 LBS

## 2020-01-30 VITALS — SYSTOLIC BLOOD PRESSURE: 109 MMHG | DIASTOLIC BLOOD PRESSURE: 62 MMHG

## 2020-01-30 VITALS — SYSTOLIC BLOOD PRESSURE: 118 MMHG | DIASTOLIC BLOOD PRESSURE: 73 MMHG

## 2020-01-30 DIAGNOSIS — Z95.5: ICD-10-CM

## 2020-01-30 DIAGNOSIS — Z85.818: ICD-10-CM

## 2020-01-30 DIAGNOSIS — Z86.73: ICD-10-CM
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
## 2020-01-30 DIAGNOSIS — Z80.52: ICD-10-CM

## 2020-01-30 DIAGNOSIS — Z88.0: ICD-10-CM

## 2020-01-30 DIAGNOSIS — Z95.1: ICD-10-CM

## 2020-01-30 DIAGNOSIS — Z79.01: ICD-10-CM

## 2020-01-30 DIAGNOSIS — Z88.1: ICD-10-CM

## 2020-01-30 DIAGNOSIS — Z88.5: ICD-10-CM

## 2020-01-30 DIAGNOSIS — I25.10: ICD-10-CM

## 2020-01-30 DIAGNOSIS — Z87.891: ICD-10-CM

## 2020-01-30 DIAGNOSIS — S22.41XA: Primary | ICD-10-CM

## 2020-01-30 DIAGNOSIS — Z88.2: ICD-10-CM

## 2020-01-30 DIAGNOSIS — I48.91: ICD-10-CM
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
## 2020-01-30 DIAGNOSIS — I10: ICD-10-CM

## 2020-01-30 DIAGNOSIS — Z79.51: ICD-10-CM

## 2020-01-30 DIAGNOSIS — E11.9: ICD-10-CM

## 2020-01-30 DIAGNOSIS — E78.00: ICD-10-CM

## 2020-01-30 DIAGNOSIS — Z79.4: ICD-10-CM

## 2020-01-30 DIAGNOSIS — J43.9: ICD-10-CM

## 2020-01-30 DIAGNOSIS — Z80.8: ICD-10-CM

## 2020-01-30 LAB
ALBUMIN SERPL-MCNC: 3.1 GM/DL (ref 3.2–4.5)
ALP SERPL-CCNC: 71 U/L (ref 40–136)
ALT SERPL-CCNC: 17 U/L (ref 0–55)
APTT BLD: 74 SEC (ref 24–35)
APTT PPP: YELLOW S
ARTERIAL PATENCY WRIST A: (no result)
BACTERIA #/AREA URNS HPF: NEGATIVE /HPF
BASE EXCESS STD BLDA CALC-SCNC: 4.2 MMOL/L (ref -2.5–2.5)
BASOPHILS # BLD AUTO: 0 10^3/UL (ref 0–0.1)
BASOPHILS NFR BLD AUTO: 1 % (ref 0–10)
BDY SITE: (no result)
BILIRUB SERPL-MCNC: 0.3 MG/DL (ref 0.1–1)
BILIRUB UR QL STRIP: NEGATIVE
BODY TEMPERATURE: 36.4
BUN/CREAT SERPL: 19
CALCIUM SERPL-MCNC: 8.6 MG/DL (ref 8.5–10.1)
CHLORIDE SERPL-SCNC: 100 MMOL/L (ref 98–107)
CO2 BLDA CALC-SCNC: 29.3 MMOL/L (ref 21–31)
CO2 SERPL-SCNC: 25 MMOL/L (ref 21–32)
CREAT SERPL-MCNC: 0.95 MG/DL (ref 0.6–1.3)
EOSINOPHIL # BLD AUTO: 0.3 10^3/UL (ref 0–0.3)
EOSINOPHIL NFR BLD AUTO: 5 % (ref 0–10)
ERYTHROCYTE [DISTWIDTH] IN BLOOD BY AUTOMATED COUNT: 15.7 % (ref 10–14.5)
FIBRINOGEN PPP-MCNC: CLEAR MG/DL
GFR SERPLBLD BASED ON 1.73 SQ M-ARVRAT: > 60 ML/MIN
GLUCOSE SERPL-MCNC: 226 MG/DL (ref 70–105)
GLUCOSE UR STRIP-MCNC: NEGATIVE MG/DL
HCT VFR BLD CALC: 32 % (ref 40–54)
HGB BLD-MCNC: 9.7 G/DL (ref 13.3–17.7)
INHALED O2 FLOW RATE: (no result) L/MIN
INR PPP: 2.8 (ref 0.8–1.4)
KETONES UR QL STRIP: NEGATIVE
LEUKOCYTE ESTERASE UR QL STRIP: NEGATIVE
LYMPHOCYTES # BLD AUTO: 2.1 X 10^3 (ref 1–4)
LYMPHOCYTES NFR BLD AUTO: 36 % (ref 12–44)
MANUAL DIFFERENTIAL PERFORMED BLD QL: NO
MCH RBC QN AUTO: 28 PG (ref 25–34)
MCHC RBC AUTO-ENTMCNC: 31 G/DL (ref 32–36)
MCV RBC AUTO: 92 FL (ref 80–99)
MONOCYTES # BLD AUTO: 0.5 X 10^3 (ref 0–1)
MONOCYTES NFR BLD AUTO: 9 % (ref 0–12)
NEUTROPHILS # BLD AUTO: 2.8 X 10^3 (ref 1.8–7.8)
NEUTROPHILS NFR BLD AUTO: 49 % (ref 42–75)
NITRITE UR QL STRIP: NEGATIVE
PCO2 BLDA: 40 MMHG (ref 35–45)
PH BLDA: 7.45 [PH] (ref 7.37–7.43)
PH UR STRIP: 6.5 [PH] (ref 5–9)
PLATELET # BLD: 387 10^3/UL (ref 130–400)
PMV BLD AUTO: 8.7 FL (ref 7.4–10.4)
PO2 BLDA: 68 MMHG (ref 79–93)
POTASSIUM SERPL-SCNC: 4.4 MMOL/L (ref 3.6–5)
PROT SERPL-MCNC: 7.1 GM/DL (ref 6.4–8.2)
PROT UR QL STRIP: NEGATIVE
PROTHROMBIN TIME: 30.8 SEC (ref 12.2–14.7)
RBC #/AREA URNS HPF: (no result) /HPF
SAO2 % BLDA FROM PO2: 95 % (ref 94–100)
SODIUM SERPL-SCNC: 136 MMOL/L (ref 135–145)
SP GR UR STRIP: 1.01 (ref 1.02–1.02)
SQUAMOUS #/AREA URNS HPF: (no result) /HPF
VENTILATION MODE VENT: NO
WBC # BLD AUTO: 5.7 10^3/UL (ref 4.3–11)
WBC #/AREA URNS HPF: (no result) /HPF

## 2020-01-30 PROCEDURE — 82805 BLOOD GASES W/O2 SATURATION: CPT

## 2020-01-30 PROCEDURE — 71101 X-RAY EXAM UNILAT RIBS/CHEST: CPT

## 2020-01-30 PROCEDURE — 96365 THER/PROPH/DIAG IV INF INIT: CPT

## 2020-01-30 PROCEDURE — 94664 DEMO&/EVAL PT USE INHALER: CPT

## 2020-01-30 PROCEDURE — 96375 TX/PRO/DX INJ NEW DRUG ADDON: CPT

## 2020-01-30 PROCEDURE — 83605 ASSAY OF LACTIC ACID: CPT

## 2020-01-30 PROCEDURE — 72125 CT NECK SPINE W/O DYE: CPT

## 2020-01-30 PROCEDURE — 80053 COMPREHEN METABOLIC PANEL: CPT

## 2020-01-30 PROCEDURE — 85025 COMPLETE CBC W/AUTO DIFF WBC: CPT

## 2020-01-30 PROCEDURE — 87088 URINE BACTERIA CULTURE: CPT

## 2020-01-30 PROCEDURE — 70450 CT HEAD/BRAIN W/O DYE: CPT

## 2020-01-30 PROCEDURE — 87804 INFLUENZA ASSAY W/OPTIC: CPT

## 2020-01-30 PROCEDURE — 81000 URINALYSIS NONAUTO W/SCOPE: CPT

## 2020-01-30 PROCEDURE — 85730 THROMBOPLASTIN TIME PARTIAL: CPT

## 2020-01-30 PROCEDURE — 87040 BLOOD CULTURE FOR BACTERIA: CPT

## 2020-01-30 PROCEDURE — 36415 COLL VENOUS BLD VENIPUNCTURE: CPT

## 2020-01-30 PROCEDURE — 85610 PROTHROMBIN TIME: CPT

## 2020-01-30 PROCEDURE — 94640 AIRWAY INHALATION TREATMENT: CPT

## 2020-01-30 PROCEDURE — 36600 WITHDRAWAL OF ARTERIAL BLOOD: CPT

## 2020-01-30 NOTE — DIAGNOSTIC IMAGING REPORT
CLINICAL INDICATION: Patient fell last week and having neck pain.



EXAM: Head CT without IV contrast. Axial CT scan of the cervical

spine with sagittal and coronal reformations. Auto Exposure

Controls were utilized during the CT exam to meet ALARA standards

for radiation dose reduction. All CT scans use one or more of the

following dose optimizing techniques: automated exposure control,

MA and/or KvP adjustment based on patient size and exam type or

iterative reconstruction.



COMPARISON: Head CT without contrast dated 11/12/2019.



FINDINGS:



Head CT:

There is no evidence of acute cerebral infarct, intracranial

hemorrhage, or gross mass effect. 



Again seen diffuse brain parenchymal volume loss in the frontal

lobe and temporal lobes affected the most. Stable small chronic

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 There is

normal gray-white matter distinction.  There is no significant

midline shift or herniation. 



 There is no evidence of hydrocephalus. The basal cisterns are

unremarkable. The skull, extracranial soft tissue, and orbits are

unremarkable. The paranasal sinuses are unremarkable. Temporal

bones show no significant abnormality.



Cervical spine:

Patient's body habitus causes streak artifact involving the lower

cervical and upper thoracic spine limiting evaluation.



There is no acute cervical spine fracture. There is grade 1

anterolisthesis of C3 on C4, C4 on C5, and C7 on T1. There are

mildly hypertrophic cervical spine vertebral body spurs and facet

arthropathy. There is severe right C3-C4 and C4-C5 neural foramen

narrowing. There is no significant bony central canal narrowing.



The neck soft tissue structures show no significant abnormality.

There is bilateral apical pleural parenchymal thickening/scarring

noted and paraseptal emphysematous changes.



IMPRESSION:



1. There is no evidence of acute intracranial hemorrhage. There

is no skull fracture.



2: There is no acute cervical spine fracture.



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

including grade 1 anterolisthesis of C3 on C4 and C4 on C5.



Dictated by: 



  Dictated on workstation # [id number]

## 2020-01-30 NOTE — DIAGNOSTIC IMAGING REPORT
INDICATION: Fall with right rib pain.



TIME OF EXAM: 9:16 AM



Correlation is made with prior chest from 12/03/2019.



FINDINGS: The heart size is stable. Cardiac monitoring device

overlies the left base. There are surgical clips overlying the

medial left upper chest. Interstitial fibrotic changes are again

noted in both lungs. There is a fracture of the right posterior

9th rib with minimal displacement. There is likely a nondisplaced

fracture of the right posterior 8th rib as well. No pneumothorax

is seen.



IMPRESSION: Chronic pulmonary parenchymal changes, similar to

prior exam. The right-sided posterior approximately 8th and 9th

rib fractures. No pneumothorax is seen.



Dictated by: 



  Dictated on workstation # [id number]

## 2020-01-30 NOTE — ED RESPIRATORY
General


Chief Complaint:  Chest Wall


Stated Complaint:  FALL/RIB PAIN


Source:  patient


Exam Limitations:  no limitations





History of Present Illness


Date Seen by Provider:  2020


Time Seen by Provider:  08:13


Initial Comments


Patient arrives to the ER by private conveyance from home with chief complaint 

of some right rib pain shortness of breath is progressively worse after a fall 4

days ago on Monday. This is the second fall he's had the past 2 weeks. He says 

whenever he turns he typically gets unstable on his feet and if he doesn't have 

his cane with him he will be more prone to fall. Monday he did not have his cane

with him he was just walking through his house and putting something away and 

the bathroom and when he turned to leave he fell and wedged himself between the 

toilet and the bathtub and had to be helped up by EMS. He did not come to be 

checked out at that time. He uses warfarin. He denies striking his head nor loss

of consciousness. Since that time however he's had some significant right rib 

pain worse on movement or deep inspiration or coughing and has been using 

Tylenol with moderate relief. He's had an occasionally productive cough for the 

past couple days. No fevers or chills. No diarrhea or nausea. He does take the 

Breo for COPD and quit smoking in . He is on oxygen every night at baseline.

He is insulin-dependent diabetic.





Allergies and Home Medications


Allergies


Coded Allergies:  


     morphine (Verified  Allergy, Severe, Pt has received Lortab in the past w/o

issue, 19)


     Sulfa (Sulfonamide Antibiotics) (Verified  Allergy, Unknown, 19)


     penicillin G (Verified  Allergy, Unknown, Pt has received Cefepime & 

Meropenem in the past, 10/2/19)


     levofloxacin (Verified  Adverse Reaction, Unknown, 19)





Home Medications


Benzonatate 100 Mg Capsule, 100 MG PO Q6H PRN for COUGH


   Prescribed by: ZACH BAZAN on 20 1105


Citalopram Hydrobromide 20 Mg Tablet, 20 MG PO DAILY


   Prescribed by: ALEKS WILSON on 19


Docusate Sodium 100 Mg Capsule, 100 MG PO DAILY PRN for CONSTIPATION-1ST LINE, 

(Reported)


Ferrous Sulfate 325 Mg Tablet, 325 MG PO DAILY, (Reported)


Fluticasone/Vilanterol 1 Each Blst.w.dev, 1 PUFF IH DAILY, (Reported)


Gabapentin 100 Mg Capsule, 100 MG PO DAILY, (Reported)


Hydrocodone Bit/Acetaminophen 1 Tab Tab, 1 TAB PO Q4H PRN for PAIN-MODERATE (5-

7)


   Prescribed by: ALEKS WILSON on 19 2232


Hydrocodone Bit/Acetaminophen 1 Tab Tab, 1 EACH PO Q4-6HR PRN for PAIN-MODERATE


   Prescribed by: ZACH BAZAN on 20 1105


Insulin NPH Human Isophane 100 Unit/1 Ml Vial, 4 UNITS SQ BID, (Reported)


   USES ALONG WITH NOVOLIN R 


Insulin Regular, Human 1,000 Units/10 Ml Soln, 5 UNITS SQ BID, (Reported)


   USES ALONG WITH NOVOLIN N 


Ipratropium/Albuterol Sulfate 3 Ml Ampul.neb, 3 ML NEB TID PRN for SHORTNESS OF 

BREATH, (Reported)


Loratadine 10 Mg Tablet, 10 MG PO DAILY, (Reported)


Metoprolol Tartrate 25 Mg Tablet, 25 MG PO BID, (Reported)


Montelukast Sodium 10 Mg Tablet, 10 MG PO HS, (Reported)


Pantoprazole Sodium 40 Mg Tablet.dr, 40 MG PO DAILY, (Reported)


Simvastatin 20 Mg Tablet, 20 MG PO HS, (Reported)


Warfarin Sodium 5 Mg Tablet, 5 MG PO 1800, (Reported)





Patient Home Medication List


Home Medication List Reviewed:  Yes





Review of Systems


Review of Systems


Constitutional:  No chills, No diaphoresis


EENTM:  No ear pain, No eye pain, No nose congestion, No throat pain


Respiratory:  cough, phlegm, short of breath; No wheezing


Cardiovascular:  No chest pain, No palpitations


Gastrointestinal:  No abdominal pain, No nausea, No vomiting


Genitourinary:  No discharge, No dysuria


Musculoskeletal:  No back pain, No joint pain


Skin:  No pruritus, No rash


Psychiatric/Neurological:  Denies Headache, Denies Numbness, Denies Paresthesia





All Other Systems Reviewed


Negative Unless Noted:  Yes





Past Medical-Social-Family Hx


Patient Social History


Alcohol Use:  Denies Use


Recreational Drug Use:  No


Smoking Status:  Former Smoker


Type Used:  Cigarettes


Former Smoker, Quit:  1985


2nd Hand Smoke Exposure:  No


Recent Foreign Travel:  No


Contact w/Someone Who Travel:  No


Recent Hopitalizations:  Yes





Immunizations Up To Date


Tetanus Booster (TDap):  More than 5yrs


PED Vaccines UTD:  No


Date of Pneumonia Vaccine:  Oct 2, 2018


Date of Influenza Vaccine:  Sep 18, 2019





Seasonal Allergies


Seasonal Allergies:  No





Past Medical History


Surgeries:  Yes (1/2 right lung removed, right hip)


Cardiac, CABG, Coronary Stent, Eye Surgery, Joint Replacement, Lobectomy, 

Orthopedic, Prostatectomy, Tonsillectomy


Respiratory:  Yes (1/2 OF RIGHT LUNG REMOVED FOR BENIGN DISEASE )


Pneumonia, Sleep Apnea, COPD, Emphysema


Currently Using CPAP:  No


Currently Using BIPAP:  No


Cardiac:  Yes (stents)


Atrial Fibrillation, Coronary Artery Disease, High Cholesterol, Hypertension, 

Peripheral Vascular


Neurological:  Yes


Stroke


Reproductive Disorders:  No


Sexually Transmitted Disease:  No


HIV/AIDS:  No


Genitourinary:  Yes


Benign Prostatic Hyperpl, Bladder Infection


Gastrointestinal:  Yes


Gastrointestinal Bleed


Musculoskeletal:  Yes


Arthritis


Endocrine:  Yes


Diabetes, Insulin dep


HEENT:  Yes


Cataract


Loss of Vision:  Denies


Hearing Impairment:  Hard of Hearing


Cancer:  Yes (Throat)


Psychosocial:  No


Integumentary:  No


Blood Disorders:  No


Adverse Reaction/Blood Tranf:  No





Family Medical History





BLADD


  09 BROTHER


BLADD


  09 BROTHER


Cancer


  03 FATHER (THROAT)


  09 BROTHER


Dementia


  03 MOTHER


FH: bladder cancer


FH: bladder cancer


Family history: Diabetes mellitus


  03 MOTHER


Family history: Thyroid disorder


  03 MOTHER


Infertile


  03 MOTHER (X5 MISCARRIAGES)


Myocardial infarction


  09 BROTHER





No Family History of:


  Abdominal aortic aneurysm


  Burt's disease


  Alcoholism


  Aphasia


  Cancer of colon


  Cataract


  Chest pain


  Congenital heart disease


  Congestive heart failure


  Cystic fibrosis


  Dysphagia


  Family history: Allergy


  Family history: Alzheimer's disease


  Family history: Arthritis


  Family history: Asthma


  Family history: Breast disease


  Family history: Cardiovascular disease


  Family history: Coronary thrombosis


  Family history: Gastrointestinal disease


  Family history: Glaucoma


  Family history: Hypertension


  Family history: Osteoporosis


  Headache


  Hearing loss


  Heart disease


  Hereditary disease


  History of - anemia


  History of - disorder


  History of - respiratory disease


  History of drug abuse


  Human immunodeficiency virus (HIV) seropositivity


  Hypercholesterolemia


  Kidney disease


  Malignant neoplasm of lung


  Parkinson's disease


  Prostate cancer


  Psychotic disorder


  Seizure disorder


  Stroke


  Tuberculosis


  Visual impairment


Heart Disease, Cancer, Stroke, Other Conditions/Hx





Physical Exam





Vital Signs - First Documented








 20





 08:11 09:28


 


Temp 36.4 


 


Pulse 72 


 


Resp 18 


 


B/P (MAP) 95/66 (76) 


 


Pulse Ox 94 


 


O2 Delivery Room Air 


 


O2 Flow Rate  2.00





Capillary Refill :


Height: 5'9.00"


Weight: 156lbs. 5.0oz. 70.997205sk; 23.91 BMI


Method:Stated


General Appearance:  WD/WN, mild distress


Eyes:  Bilateral Eye Normal Inspection, Bilateral Eye PERRL, Bilateral Eye EOMI


HEENT:  PERRL/EOMI; No pharynx normal (oropharynx is dry and erythematous); 

pharyngeal erythema


Neck:  non-tender, full range of motion; No supple; normal inspection


Respiratory:  chest non-tender, respiratory distress (respiratory rate 25-30, 

mild), accessory muscle use (mild.), rhonchi, wheezing (mild)


Cardiovascular:  normal peripheral pulses, regular rate, rhythm, no edema


Gastrointestinal:  non tender, soft


Extremities:  non-tender, normal inspection, normal capillary refill


Neurologic/Psychiatric:  alert, normal mood/affect, oriented x 3


Skin:  normal color, warm/dry





Focused Exam


Lactate Level


20 08:38: Lactic Acid Level 1.26





Lactic Acid Level





Laboratory Tests








Test


 20


08:38


 


Lactic Acid Level


 1.26 MMOL/L


(0.50-2.00)











Progress/Results/Core Measures


Suspected Sepsis


SIRS


Temperature: 


Pulse:  


Respiratory Rate: 


 


Laboratory Tests


20 08:38: White Blood Count 5.7


Blood Pressure  / 


Mean: 


 





20 08:38: Lactic Acid Level 1.26


Laboratory Tests


20 08:38: 


Creatinine 0.95, INR Comment 2.8H, Platelet Count 387, Total Bilirubin 0.3








Results/Orders


Lab Results





Laboratory Tests








Test


 20


08:38 20


08:40 20


10:03 Range/Units


 


 


White Blood Count


 5.7 


 


 


 4.3-11.0


10^3/uL


 


Red Blood Count


 3.41 L


 


 


 4.35-5.85


10^6/uL


 


Hemoglobin 9.7 L   13.3-17.7  G/DL


 


Hematocrit 32 L   40-54  %


 


Mean Corpuscular Volume 92    80-99  FL


 


Mean Corpuscular Hemoglobin 28    25-34  PG


 


Mean Corpuscular Hemoglobin


Concent 31 L


 


 


 32-36  G/DL





 


Red Cell Distribution Width 15.7 H   10.0-14.5  %


 


Platelet Count


 387 


 


 


 130-400


10^3/uL


 


Mean Platelet Volume 8.7    7.4-10.4  FL


 


Neutrophils (%) (Auto) 49    42-75  %


 


Lymphocytes (%) (Auto) 36    12-44  %


 


Monocytes (%) (Auto) 9    0-12  %


 


Eosinophils (%) (Auto) 5    0-10  %


 


Basophils (%) (Auto) 1    0-10  %


 


Neutrophils # (Auto) 2.8    1.8-7.8  X 10^3


 


Lymphocytes # (Auto) 2.1    1.0-4.0  X 10^3


 


Monocytes # (Auto) 0.5    0.0-1.0  X 10^3


 


Eosinophils # (Auto)


 0.3 


 


 


 0.0-0.3


10^3/uL


 


Basophils # (Auto)


 0.0 


 


 


 0.0-0.1


10^3/uL


 


Prothrombin Time 30.8 H   12.2-14.7  SEC


 


INR Comment 2.8 H   0.8-1.4  


 


Activated Partial


Thromboplast Time 74 H


 


 


 24-35  SEC





 


Sodium Level 136    135-145  MMOL/L


 


Potassium Level 4.4    3.6-5.0  MMOL/L


 


Chloride Level 100      MMOL/L


 


Carbon Dioxide Level 25    21-32  MMOL/L


 


Anion Gap 11    5-14  MMOL/L


 


Blood Urea Nitrogen 18    7-18  MG/DL


 


Creatinine


 0.95 


 


 


 0.60-1.30


MG/DL


 


Estimat Glomerular Filtration


Rate > 60 


 


 


  





 


BUN/Creatinine Ratio 19     


 


Glucose Level 226 H     MG/DL


 


Lactic Acid Level


 1.26 


 


 


 0.50-2.00


MMOL/L


 


Calcium Level 8.6    8.5-10.1  MG/DL


 


Corrected Calcium 9.3    8.5-10.1  MG/DL


 


Total Bilirubin 0.3    0.1-1.0  MG/DL


 


Aspartate Amino Transf


(AST/SGOT) 19 


 


 


 5-34  U/L





 


Alanine Aminotransferase


(ALT/SGPT) 17 


 


 


 0-55  U/L





 


Alkaline Phosphatase 71      U/L


 


Total Protein 7.1    6.4-8.2  GM/DL


 


Albumin 3.1 L   3.2-4.5  GM/DL


 


Blood Gas Puncture Site  RT RADIAL    


 


Blood Gas Patient Temperature  36.4    


 


Arterial Blood pH  7.45 H  7.37-7.43  


 


Arterial Blood Partial


Pressure CO2 


 40 


 


 35-45  MMHG





 


Arterial Blood Partial


Pressure O2 


 68 L


 


 79-93  MMHG





 


Arterial Blood HCO3  28 H  23-27  MMOL/L


 


Arterial Blood Total CO2


 


 29.3 


 


 21.0-31.0


MMOL/L


 


Arterial Blood Oxygen


Saturation 


 95 


 


   %





 


Arterial Blood Base Excess


 


 4.2 H


 


 -2.5-2.5


MMOL/L


 


Lm Test  YES-POS    


 


Blood Gas Ventilator Setting  NO    


 


Blood Gas Inspired Oxygen  ROOM AIR    


 


Urine Color   YELLOW   


 


Urine Clarity   CLEAR   


 


Urine pH   6.5  5-9  


 


Urine Specific Gravity   1.010 L 1.016-1.022  


 


Urine Protein   NEGATIVE  NEGATIVE  


 


Urine Glucose (UA)   NEGATIVE  NEGATIVE  


 


Urine Ketones   NEGATIVE  NEGATIVE  


 


Urine Nitrite   NEGATIVE  NEGATIVE  


 


Urine Bilirubin   NEGATIVE  NEGATIVE  


 


Urine Urobilinogen   0.2  < = 1.0  MG/DL


 


Urine Leukocyte Esterase   NEGATIVE  NEGATIVE  


 


Urine RBC (Auto)   TRACE-I  NEGATIVE  


 


Urine RBC   NONE   /HPF


 


Urine WBC   NONE   /HPF


 


Urine Squamous Epithelial


Cells 


 


 NONE 


  /HPF





 


Urine Crystals   NONE   /LPF


 


Urine Bacteria   NEGATIVE   /HPF


 


Urine Casts   NONE   /LPF


 


Urine Mucus   NEGATIVE   /LPF


 


Urine Culture Indicated   NO   








Micro Results





Microbiology


20 Influenza Types A,B Antigen ([place]) - Final, Complete


          





My Orders





Orders - ZACH BAZAN


Cbc With Automated Diff (20 08:24)


Comprehensive Metabolic Panel (20 08:24)


Blood Culture (20 08:24)


Urinalysis (20 08:24)


Urine Culture (20 08:24)


Protime With Inr (20 08:24)


Partial Thromboplastin Time (20 08:24)


Ed Iv/Invasive Line Start (20 08:24)


Ed Iv/Invasive Line Start (20 08:24)


Vital Signs Adult Sepsis Patie Q15M (20 08:24)


O2 (20 08:24)


Remove Rings In Anticipation O (20 08:24)


Lactic Acid Analyzer (20 08:24)


Influenza A And B Antigens (20 08:24)


Ceftriaxone  For Iv Use (Rocephin  For I (20 08:30)


Azithromycin Injection (Zithromax Inject (20 08:30)


Ed Iv/Invasive Line Start (20 08:24)


Ns Iv 1000 Ml (Sodium Chloride 0.9%) (20 08:24)


Ns Iv 1000 Ml (Sodium Chloride 0.9%) (20 08:24)


Albuterol/Ipra Inhalation Soln (Duoneb I (20 08:30)


Svn Small Volume Nebulizer (20 08:24)


Ribs/Unilateral With Chest (20 08:24)


Ct Head/Cervical Spine Wo (20 08:31)


Arterial Blood Gas (20 08:47)





Medications Given in ED





Current Medications








 Medications  Dose


 Ordered  Sig/Bhavin


 Route  Start Time


 Stop Time Status Last Admin


Dose Admin


 


 Albuterol/


 Ipratropium  3 ml  ONCE  ONCE


 INH  20 08:30


 20 08:31 DC 20 08:48


3 ML


 


 Azithromycin 500


 mg/Sodium Chloride  250 ml @ 


 250 mls/hr  ONCE  ONCE


 IV  20 08:30


 20 09:29 DC 20 09:18


250 MLS/HR


 


 Ceftriaxone


 Sodium 1000 mg/


 Sterile Water  10 ml @ 


 200 mls/hr  ONCE  ONCE


 IV  20 08:30


 20 08:32 DC 20 09:01


200 MLS/HR


 


 Sodium Chloride  1,000 ml @ 


 0 mls/hr  Q0M ONCE


 IV  20 08:24


 20 08:29 DC 20 09:01


1,000 MLS/HR








Vital Signs/I&O











 20





 08:11 08:49 09:28 09:30


 


Temp 36.4   


 


Pulse 72   73


 


Resp 18   18


 


B/P (MAP) 95/66 (76)   109/62 (78)


 


Pulse Ox 94 95 95 100


 


O2 Delivery Room Air Room Air Nasal Cannula Nasal Cannula


 


O2 Flow Rate   2.00 2.00


 


    





 20  





 11:10 11:16  


 


Pulse  72  


 


Resp  18  


 


B/P (MAP)  118/73  


 


Pulse Ox 94 96  


 


O2 Delivery Nasal Cannula Room Air  


 


O2 Flow Rate 2.00   





Capillary Refill :


Progress Note :  


   Time:  08:37


Progress Note


We have encouraged the patient to use a 4 post cane or walker since he 

associates his increased fall risk with turning. Concern for atelectasis versus 

pneumonia related to his rapid shallow breathing associated with his right chest

pain after fall. We'll get a chest x-rays look for rib fractures as well as 

pneumonia and check some labs. His heart rate is in the 80s however he is on 

metoprolol. Not having a fever is not as reassuring given his age. Since he is 

demonstrating increased work of breathing plan to get a influenza swab as well 

as septic workup to include cultures with consideration for pneumonia secondary 

to chest wall pain. Since is on the warfarin a CT of the head and neck is a 

reasonable plan looking for subacute subdural hematoma. He says as long as he 

lays still he does not need anything for pain. He is presently rating it as a 0 

out of 10 however he does not look particularly comfortable. DuoNeb and ABG.





Diagnostic Imaging





   Diagonstic Imaging:  Xray


   Plain Films/CT/US/NM/MRI:  chest (2-3 views right ribs)


Comments


                 [facility] [place], [place]





NAME:   NESHA PANCHAL


MED REC#:   [id number]


ACCOUNT#:   [id number]


PT STATUS:   REG ER


:   1940


PHYSICIAN:   ZACH BAZAN MD


ADMIT DATE:   20/ER


                                   ***Draft***


Date of Exam:20





RIBS/UNILATERAL WITH CHEST





INDICATION: Fall with right rib pain.





TIME OF EXAM: 9:16 AM





Correlation is made with prior chest from 2019.





FINDINGS: The heart size is stable. Cardiac monitoring device


overlies the left base. There are surgical clips overlying the


medial left upper chest. Interstitial fibrotic changes are again


noted in both lungs. There is a fracture of the right posterior


9th rib with minimal displacement. There is likely a nondisplaced


fracture of the right posterior 8th rib as well. No pneumothorax


is seen.





IMPRESSION: Chronic pulmonary parenchymal changes, similar to


prior exam. The right-sided posterior approximately 8th and 9th


rib fractures. No pneumothorax is seen.





  Dictated on workstation # [id number]





Dict:   20


Trans:   20


 [id number]





Interpreted by:     SUKHJINDER GUERRERO MD


Electronically signed by:


   Reviewed:  Reviewed by Me








   Diagonstic Imaging:  CT (and IV contrast)


   Plain Films/CT/US/NM/MRI:  c-spine, head


Comments


                 [facility] [place], [place]





NAME:   NESHA PANCHAL


MED REC#:   [id number]


ACCOUNT#:   [id number]


PT STATUS:   REG ER


:   1940


PHYSICIAN:   ZACH BAZAN MD


ADMIT DATE:   20/ER


                                   ***Draft***


Date of Exam:20





CT HEAD/CERVICAL SPINE WO





CLINICAL INDICATION: Patient fell last week and having neck pain.





EXAM: Head CT without IV contrast. Axial CT scan of the cervical


spine with sagittal and coronal reformations. Auto Exposure


Controls were utilized during the CT exam to meet ALARA standards


for radiation dose reduction. All CT scans use one or more of the


following dose optimizing techniques: automated exposure control,


MA and/or KvP adjustment based on patient size and exam type or


iterative reconstruction.





COMPARISON: Head CT without contrast dated 2019.





FINDINGS:





Head CT:


There is no evidence of acute cerebral infarct, intracranial


hemorrhage, or gross mass effect. 





Again seen diffuse brain parenchymal volume loss in the frontal


lobe and temporal lobes affected the most. Stable small chronic


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 There is


normal gray-white matter distinction.  There is no significant


midline shift or herniation. 





 There is no evidence of hydrocephalus. The basal cisterns are


unremarkable. The skull, extracranial soft tissue, and orbits are


unremarkable. The paranasal sinuses are unremarkable. Temporal


bones show no significant abnormality.





Cervical spine:


Patient's body habitus causes streak artifact involving the lower


cervical and upper thoracic spine limiting evaluation.





There is no acute cervical spine fracture. There is grade 1


anterolisthesis of C3 on C4, C4 on C5, and C7 on T1. There are


mildly hypertrophic cervical spine vertebral body spurs and facet


arthropathy. There is severe right C3-C4 and C4-C5 neural foramen


narrowing. There is no significant bony central canal narrowing.





The neck soft tissue structures show no significant abnormality.


There is bilateral apical pleural parenchymal thickening/scarring


noted and paraseptal emphysematous changes.





IMPRESSION:





1. There is no evidence of acute intracranial hemorrhage. There


is no skull fracture.





2: There is no acute cervical spine fracture.





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


including grade 1 anterolisthesis of C3 on C4 and C4 on C5.





  Dictated on workstation # [id number]





Dict:   20


Trans:   20 [id number]


[facility] [id number]





Interpreted by:     ERIBERTO BENAVIDES MD


Electronically signed by:


   Reviewed:  Reviewed by Me





Departure


Impression





   Primary Impression:  


   Fracture of rib


   Qualified Codes:  S22.41XA - Multiple fractures of ribs, right side, initial 

   encounter for closed fracture


   Additional Impression:  


   Fall


   Qualified Codes:  W19.XXXD - Unspecified fall, subsequent encounter


Disposition:   HOME, SELF-CARE


Condition:  Stable





Departure-Patient Inst.


Decision time for Depature:  10:53


Referrals:  


TOR CONNELLY DO (PCP/Family)


Primary Care Physician


Patient Instructions:  Preventing Falls in the Older Adult, Rib Fracture (DC)





Add. Discharge Instructions:  


20 breaths through the incentive spirometer every hour while awake.


If you develop fevers, productive cough or worsening symptoms then please return

to the ER.


Follow-up with your primary care doctor as necessary for pain management.


Hydrocodone one tablet every 6 hours as needed for breakthrough pain. 

Hydrocodone will increase drowsiness and increase your risk of falls. Please use

a walker. 


Hydrocodone will also cause constipation and should be combined with MiraLAX or 

other similar laxative.


You may splint your side with a pillow as well as use heating pads for the pain.


Tessalon Perles 1 capsule every 6 hours as needed for coughing.


All discharge instructions reviewed with patient and/or family. Voiced 

understanding.


Scripts


Hydrocodone Bit/Acetaminophen (Hydrocodone/Acetaminophen 5/325mg Tablet) 1 Tab 

Tab


1 EACH PO Q4-6HR PRN for PAIN-MODERATE MDD 10 for 3 Days, #16 TAB


   Prov: ZACH BAZAN         20 


Benzonatate (Tessalon Perle) 100 Mg Capsule


100 MG PO Q6H PRN for COUGH, #30 CAP 0 Refills


   Prov: ZACH BAZAN         20











ZACH BAZAN                 2020 08:31

## 2020-03-08 ENCOUNTER — HOSPITAL ENCOUNTER (EMERGENCY)
Dept: HOSPITAL 75 - ER | Age: 80
LOS: 1 days | Discharge: HOME | End: 2020-03-09
Payer: MEDICARE

## 2020-03-08 VITALS — HEIGHT: 68.9 IN | BODY MASS INDEX: 23.84 KG/M2 | WEIGHT: 160.94 LBS

## 2020-03-08 DIAGNOSIS — Z88.0: ICD-10-CM

## 2020-03-08 DIAGNOSIS — J44.1: Primary | ICD-10-CM

## 2020-03-08 DIAGNOSIS — I25.10: ICD-10-CM

## 2020-03-08 DIAGNOSIS — E11.9: ICD-10-CM

## 2020-03-08 DIAGNOSIS — Z88.2: ICD-10-CM

## 2020-03-08 DIAGNOSIS — Z95.5: ICD-10-CM

## 2020-03-08 DIAGNOSIS — K21.9: ICD-10-CM

## 2020-03-08 DIAGNOSIS — Z79.01: ICD-10-CM

## 2020-03-08 DIAGNOSIS — Z87.09: ICD-10-CM

## 2020-03-08 DIAGNOSIS — Z88.5: ICD-10-CM

## 2020-03-08 DIAGNOSIS — Z95.1: ICD-10-CM

## 2020-03-08 DIAGNOSIS — Z80.8: ICD-10-CM

## 2020-03-08 DIAGNOSIS — Z88.1: ICD-10-CM

## 2020-03-08 DIAGNOSIS — I10: ICD-10-CM

## 2020-03-08 DIAGNOSIS — R79.1: ICD-10-CM

## 2020-03-08 DIAGNOSIS — I48.91: ICD-10-CM

## 2020-03-08 DIAGNOSIS — Z87.891: ICD-10-CM

## 2020-03-08 DIAGNOSIS — E78.00: ICD-10-CM

## 2020-03-08 DIAGNOSIS — Z80.52: ICD-10-CM

## 2020-03-08 DIAGNOSIS — Z86.73: ICD-10-CM

## 2020-03-08 DIAGNOSIS — Z79.4: ICD-10-CM

## 2020-03-08 LAB
ALBUMIN SERPL-MCNC: 3.1 GM/DL (ref 3.2–4.5)
ALP SERPL-CCNC: 93 U/L (ref 40–136)
ALT SERPL-CCNC: 19 U/L (ref 0–55)
BASOPHILS # BLD AUTO: 0 10^3/UL (ref 0–0.1)
BASOPHILS NFR BLD AUTO: 0 % (ref 0–10)
BILIRUB SERPL-MCNC: 0.5 MG/DL (ref 0.1–1)
BUN/CREAT SERPL: 14
CALCIUM SERPL-MCNC: 8.9 MG/DL (ref 8.5–10.1)
CHLORIDE SERPL-SCNC: 99 MMOL/L (ref 98–107)
CO2 SERPL-SCNC: 26 MMOL/L (ref 21–32)
CREAT SERPL-MCNC: 1.11 MG/DL (ref 0.6–1.3)
EOSINOPHIL # BLD AUTO: 0 10^3/UL (ref 0–0.3)
EOSINOPHIL NFR BLD AUTO: 0 % (ref 0–10)
ERYTHROCYTE [DISTWIDTH] IN BLOOD BY AUTOMATED COUNT: 16.2 % (ref 10–14.5)
GFR SERPLBLD BASED ON 1.73 SQ M-ARVRAT: > 60 ML/MIN
GLUCOSE SERPL-MCNC: 191 MG/DL (ref 70–105)
HCT VFR BLD CALC: 28 % (ref 40–54)
HGB BLD-MCNC: 8.6 G/DL (ref 13.3–17.7)
LYMPHOCYTES # BLD AUTO: 1.9 X 10^3 (ref 1–4)
LYMPHOCYTES NFR BLD AUTO: 15 % (ref 12–44)
MANUAL DIFFERENTIAL PERFORMED BLD QL: NO
MCH RBC QN AUTO: 27 PG (ref 25–34)
MCHC RBC AUTO-ENTMCNC: 31 G/DL (ref 32–36)
MCV RBC AUTO: 88 FL (ref 80–99)
MONOCYTES # BLD AUTO: 1.1 X 10^3 (ref 0–1)
MONOCYTES NFR BLD AUTO: 9 % (ref 0–12)
NEUTROPHILS # BLD AUTO: 9.6 X 10^3 (ref 1.8–7.8)
NEUTROPHILS NFR BLD AUTO: 76 % (ref 42–75)
PLATELET # BLD: 359 10^3/UL (ref 130–400)
PMV BLD AUTO: 8.7 FL (ref 7.4–10.4)
POTASSIUM SERPL-SCNC: 4.6 MMOL/L (ref 3.6–5)
PROT SERPL-MCNC: 7 GM/DL (ref 6.4–8.2)
SODIUM SERPL-SCNC: 137 MMOL/L (ref 135–145)
WBC # BLD AUTO: 12.5 10^3/UL (ref 4.3–11)

## 2020-03-08 PROCEDURE — 85610 PROTHROMBIN TIME: CPT

## 2020-03-08 PROCEDURE — 71046 X-RAY EXAM CHEST 2 VIEWS: CPT

## 2020-03-08 PROCEDURE — 87070 CULTURE OTHR SPECIMN AEROBIC: CPT

## 2020-03-08 PROCEDURE — 36415 COLL VENOUS BLD VENIPUNCTURE: CPT

## 2020-03-08 PROCEDURE — 87077 CULTURE AEROBIC IDENTIFY: CPT

## 2020-03-08 PROCEDURE — 83605 ASSAY OF LACTIC ACID: CPT

## 2020-03-08 PROCEDURE — 87185 SC STD ENZYME DETCJ PER NZM: CPT

## 2020-03-08 PROCEDURE — 87804 INFLUENZA ASSAY W/OPTIC: CPT

## 2020-03-08 PROCEDURE — 87205 SMEAR GRAM STAIN: CPT

## 2020-03-08 PROCEDURE — 86141 C-REACTIVE PROTEIN HS: CPT

## 2020-03-08 PROCEDURE — 80053 COMPREHEN METABOLIC PANEL: CPT

## 2020-03-08 PROCEDURE — 87040 BLOOD CULTURE FOR BACTERIA: CPT

## 2020-03-08 PROCEDURE — 85025 COMPLETE CBC W/AUTO DIFF WBC: CPT

## 2020-03-08 PROCEDURE — 85730 THROMBOPLASTIN TIME PARTIAL: CPT

## 2020-03-08 NOTE — ED GENERAL
General


Chief Complaint:  Cough/Cold/Flu Symptoms


Stated Complaint:  SOB


Nursing Triage Note:  


fever, productive cough, soa x3 days


Nursing Sepsis Screen:  No Definite Risk


Source of Information:  Patient, Family


Exam Limitations:  No Limitations





History of Present Illness


Date Seen by Provider:  Mar 8, 2020


Time Seen by Provider:  22:44


Initial Comments


This 79-year-old gentleman presents to the emergency room with 3-4 days of 

productive cough, subjective fever, and shortness of breath.  He also had 2 days

of diarrhea.  His wife is also here with similar symptoms.  He does have COPD 

and uses oxygen at night.  He has a history of pneumonia as well.  He is 

afebrile at the moment.  Patient admits that he has not been using very mini 

nebulizer treatments as he has been giving them to his wife instead.





Allergies and Home Medications


Allergies


Coded Allergies:  


     morphine (Verified  Allergy, Severe, Pt has received Lortab in the past w/o

issue, 11/20/19)


     Sulfa (Sulfonamide Antibiotics) (Verified  Allergy, Unknown, 2/11/19)


     penicillin G (Verified  Allergy, Unknown, Pt has received Cefepime & 

Meropenem in the past, 10/2/19)


     levofloxacin (Verified  Adverse Reaction, Unknown, 2/11/19)





Home Medications


Albuterol Sulfate 2.5 Mg/3 Ml Vial.neb, 2.5 MG INH Q4H PRN for WHEEZING


   Prescribed by: SHANNEN DELGADO on 3/8/20 2357


Docusate Sodium 100 Mg Capsule, 100 MG PO DAILY, (Reported)


Doxycycline Hyclate 100 Mg Tablet, 100 MG PO BID


   Prescribed by: SHANNEN DELGADO on 3/8/20 2355


Ferrous Sulfate 325 Mg Tablet, 325 MG PO DAILY, (Reported)


Furosemide 40 Mg Tablet, 40 MG PO DAILY, (Reported)


Gabapentin 100 Mg Capsule, 100 MG PO DAILY, (Reported)


Gabapentin 100 Mg Capsule, 200 MG PO HS, (Reported)


Insulin NPH Human Isophane 100 Unit/1 Ml Vial, 4 UNITS SQ BID, (Reported)


   USES ALONG WITH NOVOLIN R 


Insulin Regular, Human 1,000 Units/10 Ml Soln, 5 UNITS SQ BID, (Reported)


   USES ALONG WITH NOVOLIN N 


Ipratropium/Albuterol Sulfate 3 Ml Ampul.neb, 3 ML NEB TID PRN for SHORTNESS OF 

BREATH, (Reported)


Loratadine 10 Mg Tablet, 10 MG PO DAILY, (Reported)


Metoprolol Tartrate 25 Mg Tablet, 25 MG PO BID, (Reported)


Montelukast Sodium 10 Mg Tablet, 10 MG PO HS, (Reported)


Pantoprazole Sodium 40 Mg Tablet.dr, 40 MG PO DAILY, (Reported)


Prednisone 20 Mg Tab, 40 MG PO DAILY


   Prescribed by: SHANNEN DELGADO on 3/8/20 8677


Simvastatin 20 Mg Tablet, 20 MG PO HS, (Reported)


Warfarin Sodium 5 Mg Tablet, 5 MG PO 1800, (Reported)





Patient Home Medication List


Home Medication List Reviewed:  Yes





Review of Systems


Review of Systems


Constitutional:  see HPI


EENTM:  no symptoms reported


Respiratory:  see HPI


Cardiovascular:  no symptoms reported


Gastrointestinal:  see HPI


Genitourinary:  no symptoms reported


Musculoskeletal:  no symptoms reported


Skin:  no symptoms reported


Psychiatric/Neurological:  No Symptoms Reported


Hematologic/Lymphatic:  No Symptoms Reported


Immunological/Allergic:  no symptoms reported





Past Medical-Social-Family Hx


Past Med/Social Hx:  Reviewed and Corrections made


Patient Social History


Alcohol Use:  Denies Use


Recreational Drug Use:  No


Smoking Status:  Former Smoker


Type Used:  Cigarettes


Former Smoker, Quit:  Apr 30, 1985


2nd Hand Smoke Exposure:  No


Recent Foreign Travel:  No


Contact w/Someone Who Travel:  No


Recent Infectious Disease Expo:  No


Recent Hopitalizations:  Yes


Physical Abuse:  No


Sexual Abuse:  No


Mistreated:  No


Fear:  No





Immunizations Up To Date


Tetanus Booster (TDap):  More than 5yrs


PED Vaccines UTD:  No


Date of Pneumonia Vaccine:  Oct 2, 2018


Date of Influenza Vaccine:  Sep 18, 2019





Seasonal Allergies


Seasonal Allergies:  No





Past Medical History


Surgeries:  Yes (right hip, lung)


Cardiac, CABG, Coronary Stent, Eye Surgery, Joint Replacement, Lobectomy, 

Orthopedic, Prostatectomy, Tonsillectomy


Respiratory:  Yes


Pneumonia, Sleep Apnea, COPD (uses oxygen at night), Emphysema


Currently Using CPAP:  No


Currently Using BIPAP:  No


Cardiac:  Yes


Atrial Fibrillation, Coronary Artery Disease, High Cholesterol, Hypertension, 

Peripheral Vascular


Neurological:  Yes


Stroke


Reproductive Disorders:  No


Sexually Transmitted Disease:  No


HIV/AIDS:  No


Genitourinary:  Yes


Benign Prostatic Hyperpl, Prostate Problems, Bladder Infection


Gastrointestinal:  Yes


Gastroesophageal Reflux, Gastrointestinal Bleed


Musculoskeletal:  Yes


Arthritis


Endocrine:  Yes


Diabetes, Insulin dep


HEENT:  Yes


Cataract


Loss of Vision:  Denies


Hearing Impairment:  Hard of Hearing


Cancer:  Yes (Throat)


Psychosocial:  No


Integumentary:  No


Blood Disorders:  No


Adverse Reaction/Blood Tranf:  No





Family Medical History


Reviewed Nursing Family Hx





BLADD


  09 BROTHER


BLADD


  09 BROTHER


Cancer


  03 FATHER (THROAT)


  09 BROTHER


Dementia


  03 MOTHER


FH: bladder cancer


FH: bladder cancer


Family history: Diabetes mellitus


  03 MOTHER


Family history: Thyroid disorder


  03 MOTHER


Infertile


  03 MOTHER (X5 MISCARRIAGES)


Myocardial infarction


  09 BROTHER





No Family History of:


  Abdominal aortic aneurysm


  [place] disease


  Alcoholism


  Aphasia


  Cancer of colon


  Cataract


  Chest pain


  Congenital heart disease


  Congestive heart failure


  Cystic fibrosis


  Dysphagia


  Family history: Allergy


  Family history: Alzheimer's disease


  Family history: Arthritis


  Family history: Asthma


  Family history: Breast disease


  Family history: Cardiovascular disease


  Family history: Coronary thrombosis


  Family history: Gastrointestinal disease


  Family history: Glaucoma


  Family history: Hypertension


  Family history: Osteoporosis


  Headache


  Hearing loss


  Heart disease


  Hereditary disease


  History of - anemia


  History of - disorder


  History of - respiratory disease


  History of drug abuse


  Human immunodeficiency virus (HIV) seropositivity


  Hypercholesterolemia


  Kidney disease


  Malignant neoplasm of lung


  Parkinson's disease


  Prostate cancer


  Psychotic disorder


  Seizure disorder


  Stroke


  Tuberculosis


  Visual impairment


Heart Disease, Cancer, Stroke, Other Conditions/Hx





Physical Exam


Vital Signs





Vital Signs - First Documented








 3/8/20 3/9/20





 22:23 00:30


 


Temp 36.3 


 


Pulse 83 


 


Resp 18 


 


B/P (MAP) 103/79 (87) 


 


Pulse Ox 93 


 


O2 Delivery Room Air 


 


O2 Flow Rate  2.00





Capillary Refill : Less Than 3 Seconds


Height, Weight, BMI


Height: 5'9.00"


Weight: 156lbs. 5.0oz. [id number]; 23.00 BMI


Method:Stated


General Appearance:  No Apparent Distress, WD/WN


HEENT:  PERRL/EOMI, TMs Normal, Normal ENT Inspection, Other (mucus membranes 

somewhat dry)


Neck:  Normal Inspection


Respiratory:  Lungs Clear, Normal Breath Sounds, No Accessory Muscle Use, No 

Respiratory Distress, Other (mild tachypnea and mild dyspnea)


Cardiovascular:  Regular Rate, Rhythm, No Edema, No Murmur, Normal Peripheral 

Pulses


Gastrointestinal:  Non Tender, Soft


Extremity:  Normal Inspection, No Pedal Edema


Neurologic/Psychiatric:  Alert, Oriented x3, No Motor/Sensory Deficits, Normal 

Mood/Affect, CNs II-XII Norm as Tested


Skin:  Normal Color, Warm/Dry





Focused Exam


Lactate Level


3/8/20 23:00: Lactic Acid Level 1.28





Lactic Acid Level








Progress/Results/Core Measures


Suspected Sepsis


Recent Fever Within 48 Hours:  Yes


Infection Criteria Present:  Documented Infection


New/Unexplained  Altered Menta:  No


Sepsis Screen:  No Definite Risk


SIRS


Temperature: 


Pulse: 83 


Respiratory Rate: 18


 


Laboratory Tests


3/8/20 23:00: White Blood Count 12.5H


Blood Pressure 103 /79 


Mean: 87


 


3/8/20 23:00: Lactic Acid Level 1.28


Laboratory Tests


3/8/20 23:00: 


Creatinine 1.11, INR Comment 10.2*H, Platelet Count 359, Total Bilirubin 0.5








Results/Orders


Lab Results





Laboratory Tests








Test


 3/8/20


23:00 Range/Units


 


 


White Blood Count


 12.5 H


 4.3-11.0


10^3/uL


 


Red Blood Count


 3.18 L


 4.35-5.85


10^6/uL


 


Hemoglobin 8.6 L 13.3-17.7  G/DL


 


Hematocrit 28 L 40-54  %


 


Mean Corpuscular Volume 88  80-99  FL


 


Mean Corpuscular Hemoglobin 27  25-34  PG


 


Mean Corpuscular Hemoglobin


Concent 31 L


 32-36  G/DL





 


Red Cell Distribution Width 16.2 H 10.0-14.5  %


 


Platelet Count


 359 


 130-400


10^3/uL


 


Mean Platelet Volume 8.7  7.4-10.4  FL


 


Neutrophils (%) (Auto) 76 H 42-75  %


 


Lymphocytes (%) (Auto) 15  12-44  %


 


Monocytes (%) (Auto) 9  0-12  %


 


Eosinophils (%) (Auto) 0  0-10  %


 


Basophils (%) (Auto) 0  0-10  %


 


Neutrophils # (Auto) 9.6 H 1.8-7.8  X 10^3


 


Lymphocytes # (Auto) 1.9  1.0-4.0  X 10^3


 


Monocytes # (Auto) 1.1 H 0.0-1.0  X 10^3


 


Eosinophils # (Auto)


 0.0 


 0.0-0.3


10^3/uL


 


Basophils # (Auto)


 0.0 


 0.0-0.1


10^3/uL


 


Prothrombin Time 85.5 *H 12.2-14.7  SEC


 


INR Comment 10.2 *H 0.8-1.4  


 


Activated Partial


Thromboplast Time > 200 *H


 24-35  SEC





 


Sodium Level 137  135-145  MMOL/L


 


Potassium Level 4.6  3.6-5.0  MMOL/L


 


Chloride Level 99    MMOL/L


 


Carbon Dioxide Level 26  21-32  MMOL/L


 


Anion Gap 12  5-14  MMOL/L


 


Blood Urea Nitrogen 15  7-18  MG/DL


 


Creatinine


 1.11 


 0.60-1.30


MG/DL


 


Estimat Glomerular Filtration


Rate > 60 


  





 


BUN/Creatinine Ratio 14   


 


Glucose Level 191 H   MG/DL


 


Lactic Acid Level


 1.28 


 0.50-2.00


MMOL/L


 


Calcium Level 8.9  8.5-10.1  MG/DL


 


Corrected Calcium 9.6  8.5-10.1  MG/DL


 


Total Bilirubin 0.5  0.1-1.0  MG/DL


 


Aspartate Amino Transf


(AST/SGOT) 25 


 5-34  U/L





 


Alanine Aminotransferase


(ALT/SGPT) 19 


 0-55  U/L





 


Alkaline Phosphatase 93    U/L


 


C-Reactive Protein High


Sensitivity 27.61 H


 0.00-0.50


MG/DL


 


Total Protein 7.0  6.4-8.2  GM/DL


 


Albumin 3.1 L 3.2-4.5  GM/DL








Micro Results





Microbiology


3/8/20 Influenza Types A,B Antigen ([place]) - Final, Complete


         





My Orders





Orders - SHANNEN PALMER MD


Influenza A And B Antigens (3/8/20 22:45)


Hs C Reactive Protein (3/8/20 22:53)


Chest Pa/Lat (2 View) (3/8/20 22:53)


Cbc With Automated Diff (3/8/20 22:53)


Comprehensive Metabolic Panel (3/8/20 22:53)


Blood Culture (3/8/20 22:53)


Sputum Culture (3/8/20 22:53)


Protime With Inr (3/8/20 22:53)


Partial Thromboplastin Time (3/8/20 22:53)


Ed Iv/Invasive Line Start (3/8/20 22:53)


Ed Iv/Invasive Line Start (3/8/20 22:53)


Vital Signs Adult Sepsis Patie Q15M (3/8/20 22:53)


O2 (3/8/20 22:53)


Remove Rings In Anticipation O (3/8/20 22:53)


Lactic Acid Analyzer (3/8/20 22:53)


Albuterol/Ipra Inhalation Soln (Duoneb I (3/8/20 23:00)


Svn Small Volume Nebulizer (3/8/20 22:53)


Prednisone Tablet (Deltasone Tablet) (3/9/20 00:00)


Doxycycline Hyclate Tablet (Vibramycin T (3/9/20 00:00)


Phytonadione Oral Solution (Mephyton Ora (3/9/20 00:15)





Medications Given in ED





Current Medications








 Medications  Dose


 Ordered  Sig/Bhavin


 Route  Start Time


 Stop Time Status Last Admin


Dose Admin


 


 Albuterol/


 Ipratropium  3 ml  ONCE  ONCE


 INH  3/8/20 23:00


 3/8/20 23:01 DC 3/8/20 23:45


3 ML


 


 Doxycycline


 Hyclate  100 mg  ONCE  ONCE


 PO  3/9/20 00:00


 3/9/20 00:01 DC 3/8/20 23:59


100 MG


 


 Phytonadione  2.5 mg  ONCE  ONCE


 PO  3/9/20 00:15


 3/9/20 00:16 DC 3/9/20 00:12


2.5 MG


 


 Prednisone  40 mg  ONCE  ONCE


 PO  3/9/20 00:00


 3/9/20 00:01 DC 3/8/20 23:59


40 MG








Vital Signs/I&O











 3/8/20 3/8/20 3/8/20 3/9/20





 22:23 22:23 23:45 00:30


 


Temp  36.3  36.5


 


Pulse  83  89


 


Resp  18  18


 


B/P (MAP)  103/79 (87)  102/68 (87)


 


Pulse Ox  93 94 96


 


O2 Delivery Room Air Room Air  Nasal Cannula


 


O2 Flow Rate    2.00





Capillary Refill : Less Than 3 Seconds








Blood Pressure Mean:                    87








Progress Note :  


Progress Note


Patient received a DuoNeb treatment.  The respiratory therapists report his 

respiratory status does not seem much different than his baseline.  Patient was 

given azithromycin and prednisone for treatment of COPD exacerbation.  Influenza

screening was negative.





Diagnostic Imaging





   Diagonstic Imaging:  Xray


   Plain Films/CT/US/NM/MRI:  chest


Comments


Chest x-ray viewed by me.  Report not yet available.  Compared with prior.  No 

acute changes when compared with prior.  Chronic disease noted.





Departure


Impression





   Primary Impression:  


   COPD exacerbation


   Additional Impressions:  


   Productive cough


   Supratherapeutic INR


Disposition:  01 HOME, SELF-CARE


Condition:  Improved





Departure-Patient Inst.


Decision time for Depature:  23:52


Referrals:  


TOR CONNELLY DO (PCP/Family)


Primary Care Physician


Patient Instructions:  Exacerbation of COPD





Add. Discharge Instructions:  


Complete your steroids and antibiotics as prescribed.


Use your albuterol nebulizer treatments up to every 4 hours as needed for 

shortness of breath.


Use your oxygen at night as previously directed and also use during the day if 

feeling short of breath.


Follow-up with your primary care provider soon as possible.  Please call office 

tomorrow for instructions.


Return to the emergency room if you have worsening symptoms.


DO NOT TAKE WARFARIN UNTIL FURTHER INSTRUCTED BY YOUR DOCTOR.


You should have your INR checked multiple times this week.


Return to the emergency room or call 911 if you have any active bleeding.





All discharge instructions reviewed with patient and/or family. Voiced 

understanding.


Scripts


Albuterol Sulfate (Albuterol Sulfate) 2.5 Mg/3 Ml Vial.neb


2.5 MG INH Q4H PRN for WHEEZING, #50 EA 1 Refill


   Prov: SHANNEN PALMER MD         3/8/20 


Prednisone (Prednisone) 20 Mg Tab


40 MG PO DAILY, #8 TAB


   Prov: SHANNEN PALMER MD         3/8/20 


Doxycycline Hyclate (Doxycycline Hyclate) 100 Mg Tablet


100 MG PO BID, #20 TAB 0 Refills


   Prov: SHANNEN PALMER MD         3/8/20





Copy


Copies To 1:   TOR CONNELLY JOSHUA T MD         Mar 8, 2020 23:26

## 2020-03-09 VITALS — SYSTOLIC BLOOD PRESSURE: 102 MMHG | DIASTOLIC BLOOD PRESSURE: 68 MMHG

## 2020-03-09 LAB
APTT BLD: > 200 SEC (ref 24–35)
INR PPP: 10.2 (ref 0.8–1.4)
PROTHROMBIN TIME: 85.5 SEC (ref 12.2–14.7)

## 2020-03-09 NOTE — DIAGNOSTIC IMAGING REPORT
INDICATION: Shortness of air.



COMPARISON: 02/04/2020



FINDINGS: Frontal and lateral radiographic views of the chest

were obtained and show normal cardiac silhouette and pulmonary

vasculature. Lungs continue to show diffuse coarse interstitial

opacities bilaterally with patchy superimposed alveolar opacities

in bilateral mid and lower lung fields. There is minimal

asymmetric elevation of the right hemidiaphragm. Bilateral

posterior costophrenic angles are also blunted, bilaterally.

There is no pneumothorax. Osseous structures show no acute

abnormalities.



IMPRESSION:

1. Stable bilateral mixed interstitial and alveolar airspace

disease, which may be on the basis of chronic lung changes.

2. Blunting of the costophrenic angles likely on the basis of

underlying pleural thickening. Small effusions are not entirely

excluded.



Dictated by: 



  Dictated on workstation # [id number]

## 2020-03-16 ENCOUNTER — HOSPITAL ENCOUNTER (OUTPATIENT)
Dept: HOSPITAL 75 - LAB | Age: 80
End: 2020-03-16
Attending: FAMILY MEDICINE
Payer: MEDICARE

## 2020-03-16 DIAGNOSIS — N28.9: Primary | ICD-10-CM

## 2020-03-16 LAB
BUN/CREAT SERPL: 18
CREAT SERPL-MCNC: 0.93 MG/DL (ref 0.6–1.3)
GFR SERPLBLD BASED ON 1.73 SQ M-ARVRAT: > 60 ML/MIN

## 2020-03-16 PROCEDURE — 84520 ASSAY OF UREA NITROGEN: CPT

## 2020-03-16 PROCEDURE — 36415 COLL VENOUS BLD VENIPUNCTURE: CPT

## 2020-03-16 PROCEDURE — 82565 ASSAY OF CREATININE: CPT

## 2020-03-17 ENCOUNTER — HOSPITAL ENCOUNTER (OUTPATIENT)
Dept: HOSPITAL 75 - RAD | Age: 80
End: 2020-03-17
Attending: FAMILY MEDICINE
Payer: MEDICARE

## 2020-03-17 DIAGNOSIS — N28.9: ICD-10-CM

## 2020-03-17 DIAGNOSIS — M48.56XA: ICD-10-CM

## 2020-03-17 DIAGNOSIS — J98.4: ICD-10-CM

## 2020-03-17 DIAGNOSIS — K57.30: Primary | ICD-10-CM

## 2020-03-17 PROCEDURE — 74177 CT ABD & PELVIS W/CONTRAST: CPT

## 2020-03-17 NOTE — DIAGNOSTIC IMAGING REPORT
PROCEDURE: 

CT abdomen and pelvis with contrast.



TECHNIQUE: 

Multiple contiguous axial images were obtained through the

abdomen and pelvis after administration of intravenous contrast.

Auto Exposure Controls were utilized during the CT exam to meet

ALARA standards for radiation dose reduction. 



INDICATION:  

Right lower quadrant pain.



FINDINGS:

The PET/CT exam performed on 10/22/2019 failed to show any

hypermetabolic activity that would suggest metastatic disease.



The liver, spleen, pancreas, adrenals, kidneys, gallbladder,

aorta, and inferior vena cava show no sign of an acute

abnormality and appear similar to the prior exam. The stomach is

not well-distended and consequently difficult to assess.



There is no pelvic mass or free fluid collection noted. There are

innumerable diverticula in the sigmoid and distal descending

colon. There is no evidence for acute diverticulitis, however.

The appendix is not well visualized but there are no indirect

signs of acute appendicitis.



The uterus is surgically absent. The urinary bladder is grossly

unremarkable.



The bone windows show no evidence for a fracture or for a

destructive lesion. There is a chronic compression fracture of

L3. There is also an orthopedic fixation screw and intramedullary

ilya securing a long-standing intertrochanteric fracture of the

right femur.



The images through the lung bases again show severe degenerative

change, particularly on the right.



IMPRESSION: 

1. There is no evidence for an acute abnormality of the abdomen

or pelvis.



2. There is extensive diverticulosis of the sigmoid colon and

distal descending colon. There is no sign of acute

diverticulitis.



3. There is a long-standing compression fracture of L3.



4. There is severe chronic pulmonary disease involving the right

lung base and to a much lesser extent the left lower lobe.



Dictated by: 



  Dictated on workstation # [id number]

## 2020-06-03 ENCOUNTER — HOSPITAL ENCOUNTER (EMERGENCY)
Dept: HOSPITAL 75 - ER | Age: 80
Discharge: OTHER INSTITUTION | End: 2020-06-03
Payer: MEDICARE

## 2020-06-03 VITALS — DIASTOLIC BLOOD PRESSURE: 71 MMHG | SYSTOLIC BLOOD PRESSURE: 129 MMHG

## 2020-06-03 VITALS — HEIGHT: 69.09 IN | WEIGHT: 147.05 LBS | BODY MASS INDEX: 21.78 KG/M2

## 2020-06-03 DIAGNOSIS — I10: ICD-10-CM

## 2020-06-03 DIAGNOSIS — Z79.01: ICD-10-CM

## 2020-06-03 DIAGNOSIS — I48.91: ICD-10-CM

## 2020-06-03 DIAGNOSIS — Z88.0: ICD-10-CM

## 2020-06-03 DIAGNOSIS — I25.10: ICD-10-CM

## 2020-06-03 DIAGNOSIS — Z88.6: ICD-10-CM

## 2020-06-03 DIAGNOSIS — Z79.4: ICD-10-CM

## 2020-06-03 DIAGNOSIS — E78.00: ICD-10-CM

## 2020-06-03 DIAGNOSIS — J40: Primary | ICD-10-CM

## 2020-06-03 DIAGNOSIS — E11.51: ICD-10-CM

## 2020-06-03 DIAGNOSIS — Z88.2: ICD-10-CM

## 2020-06-03 DIAGNOSIS — K21.9: ICD-10-CM

## 2020-06-03 DIAGNOSIS — Z86.73: ICD-10-CM

## 2020-06-03 DIAGNOSIS — Z88.1: ICD-10-CM

## 2020-06-03 DIAGNOSIS — Z87.891: ICD-10-CM

## 2020-06-03 DIAGNOSIS — Z80.52: ICD-10-CM

## 2020-06-03 DIAGNOSIS — Z95.1: ICD-10-CM

## 2020-06-03 DIAGNOSIS — J43.9: ICD-10-CM

## 2020-06-03 DIAGNOSIS — Z85.819: ICD-10-CM

## 2020-06-03 LAB
ALBUMIN SERPL-MCNC: 3.3 GM/DL (ref 3.2–4.5)
ALP SERPL-CCNC: 87 U/L (ref 40–136)
ALT SERPL-CCNC: 17 U/L (ref 0–55)
BASOPHILS # BLD AUTO: 0 10^3/UL (ref 0–0.1)
BASOPHILS NFR BLD AUTO: 0 % (ref 0–10)
BILIRUB SERPL-MCNC: 0.3 MG/DL (ref 0.1–1)
BUN/CREAT SERPL: 16
CALCIUM SERPL-MCNC: 8.7 MG/DL (ref 8.5–10.1)
CHLORIDE SERPL-SCNC: 101 MMOL/L (ref 98–107)
CO2 SERPL-SCNC: 30 MMOL/L (ref 21–32)
CREAT SERPL-MCNC: 0.95 MG/DL (ref 0.6–1.3)
EOSINOPHIL # BLD AUTO: 0.2 10^3/UL (ref 0–0.3)
EOSINOPHIL NFR BLD AUTO: 4 % (ref 0–10)
ERYTHROCYTE [DISTWIDTH] IN BLOOD BY AUTOMATED COUNT: 17.1 % (ref 10–14.5)
GFR SERPLBLD BASED ON 1.73 SQ M-ARVRAT: > 60 ML/MIN
GLUCOSE SERPL-MCNC: 110 MG/DL (ref 70–105)
HCT VFR BLD CALC: 32 % (ref 40–54)
HGB BLD-MCNC: 10.3 G/DL (ref 13.3–17.7)
LYMPHOCYTES # BLD AUTO: 2.2 X 10^3 (ref 1–4)
LYMPHOCYTES NFR BLD AUTO: 38 % (ref 12–44)
MANUAL DIFFERENTIAL PERFORMED BLD QL: NO
MCH RBC QN AUTO: 29 PG (ref 25–34)
MCHC RBC AUTO-ENTMCNC: 32 G/DL (ref 32–36)
MCV RBC AUTO: 91 FL (ref 80–99)
MONOCYTES # BLD AUTO: 0.6 X 10^3 (ref 0–1)
MONOCYTES NFR BLD AUTO: 10 % (ref 0–12)
NEUTROPHILS # BLD AUTO: 2.8 X 10^3 (ref 1.8–7.8)
NEUTROPHILS NFR BLD AUTO: 48 % (ref 42–75)
PLATELET # BLD: 286 10^3/UL (ref 130–400)
PMV BLD AUTO: 8.5 FL (ref 7.4–10.4)
POTASSIUM SERPL-SCNC: 3.8 MMOL/L (ref 3.6–5)
PROT SERPL-MCNC: 7.1 GM/DL (ref 6.4–8.2)
SODIUM SERPL-SCNC: 139 MMOL/L (ref 135–145)
WBC # BLD AUTO: 6 10^3/UL (ref 4.3–11)

## 2020-06-03 PROCEDURE — 36415 COLL VENOUS BLD VENIPUNCTURE: CPT

## 2020-06-03 PROCEDURE — 83605 ASSAY OF LACTIC ACID: CPT

## 2020-06-03 PROCEDURE — 80053 COMPREHEN METABOLIC PANEL: CPT

## 2020-06-03 PROCEDURE — 71046 X-RAY EXAM CHEST 2 VIEWS: CPT

## 2020-06-03 PROCEDURE — 87040 BLOOD CULTURE FOR BACTERIA: CPT

## 2020-06-03 PROCEDURE — 85025 COMPLETE CBC W/AUTO DIFF WBC: CPT

## 2020-06-03 NOTE — ED COUGH/URI
General


Chief Complaint:  Respiratory Problems


Stated Complaint:  COUGH


Nursing Triage Note:  


PT TO ROOM 06 WITH C/O SOB X4-5 DAYS.


Sepsis Screen:  No Definite Risk


Source:  patient


Exam Limitations:  no limitations





History of Present Illness


Date Seen by Provider:  Paulo 3, 2020


Time Seen by Provider:  11:10


Initial Comments


To ER with complaints of shortness of breath and a cough productive of "green 

shit" for about 1 week.


Timing/Duration:  week, getting worse





Allergies and Home Medications


Allergies


Coded Allergies:  


     morphine (Verified  Allergy, Severe, Pt has received Lortab in the past w/o

issue, 19)


     Sulfa (Sulfonamide Antibiotics) (Verified  Allergy, Unknown, 19)


     penicillin G (Verified  Allergy, Unknown, Pt has received Cefepime & 

Meropenem in the past, 10/2/19)


     levofloxacin (Verified  Adverse Reaction, Unknown, 19)





Home Medications


Albuterol Sulfate 2.5 Mg/3 Ml Vial.neb, 2.5 MG INH Q4H PRN for WHEEZING


   Prescribed by: SHANNEN DELGADO on 3/8/20 2357


Cefuroxime Axetil 250 Mg Tablet, 250 MG PO BID


   Prescribed by: COSTA FINN on 6/3/20 1228


Docusate Sodium 100 Mg Capsule, 100 MG PO DAILY, (Reported)


Doxycycline Hyclate 100 Mg Tablet, 100 MG PO BID


   Prescribed by: SHANNEN DELGADO on 3/8/20 2355


Ferrous Sulfate 325 Mg Tablet, 325 MG PO DAILY, (Reported)


Furosemide 40 Mg Tablet, 40 MG PO DAILY, (Reported)


Gabapentin 100 Mg Capsule, 100 MG PO DAILY, (Reported)


Gabapentin 100 Mg Capsule, 200 MG PO HS, (Reported)


Insulin NPH Human Isophane 100 Unit/1 Ml Vial, 4 UNITS SQ BID, (Reported)


   USES ALONG WITH NOVOLIN R 


Insulin Regular, Human 1,000 Units/10 Ml Soln, 5 UNITS SQ BID, (Reported)


   USES ALONG WITH NOVOLIN N 


Ipratropium/Albuterol Sulfate 3 Ml Ampul.neb, 3 ML NEB TID PRN for SHORTNESS OF 

BREATH, (Reported)


Loratadine 10 Mg Tablet, 10 MG PO DAILY, (Reported)


Metoprolol Tartrate 25 Mg Tablet, 25 MG PO BID, (Reported)


Montelukast Sodium 10 Mg Tablet, 10 MG PO HS, (Reported)


Pantoprazole Sodium 40 Mg Tablet.dr, 40 MG PO DAILY, (Reported)


Prednisone 20 Mg Tab, 40 MG PO DAILY


   Prescribed by: SHANNEN DELGADO on 3/8/20 9755


Prednisone 20 Mg Tab, 40 MG PO DAILY


   Prescribed by: COSTA FINN on 6/3/20 1228


Simvastatin 20 Mg Tablet, 20 MG PO HS, (Reported)


Warfarin Sodium 5 Mg Tablet, 5 MG PO 1800, (Reported)





Patient Home Medication List


Home Medication List Reviewed:  Yes





Review of Systems


Review of Systems


Constitutional:  see HPI; No chills, No fever


Respiratory:  see HPI, cough


Cardiovascular:  no symptoms reported


Genitourinary:  no symptoms reported


Musculoskeletal:  no symptoms reported


Skin:  no symptoms reported


Psychiatric/Neurological:  No Symptoms Reported


Hematologic/Lymphatic:  No Symptoms Reported


Immunological/Allergic:  no symptoms reported





Past Medical-Social-Family Hx


Patient Social History


Alcohol Use:  Denies Use


Recreational Drug Use:  No


Smoking Status:  Former Smoker


Type Used:  Cigarettes


Former Smoker, Quit:  1985


2nd Hand Smoke Exposure:  No


Recent Foreign Travel:  No


Contact w/Someone Who Travel:  No


Recent Infectious Disease Expo:  No


Recent Hopitalizations:  Yes





Immunizations Up To Date


Tetanus Booster (TDap):  More than 5yrs


PED Vaccines UTD:  No


Date of Pneumonia Vaccine:  Oct 2, 2018


Date of Influenza Vaccine:  Sep 18, 2019





Seasonal Allergies


Seasonal Allergies:  No





Past Medical History


Surgeries:  Yes (right hip, lung)


Cardiac, CABG, Coronary Stent, Eye Surgery, Joint Replacement, Lobectomy, Orthop

edic, Prostatectomy, Tonsillectomy


Respiratory:  Yes


Pneumonia, Sleep Apnea, COPD, Emphysema


Currently Using CPAP:  No


Currently Using BIPAP:  No


Cardiac:  Yes


Atrial Fibrillation, Coronary Artery Disease, High Cholesterol, Hypertension, 

Peripheral Vascular


Neurological:  Yes


Stroke


Reproductive Disorders:  No


Sexually Transmitted Disease:  No


HIV/AIDS:  No


Genitourinary:  Yes


Benign Prostatic Hyperpl, Prostate Problems, Bladder Infection


Gastrointestinal:  Yes


Gastroesophageal Reflux, Gastrointestinal Bleed


Musculoskeletal:  Yes


Arthritis


Endocrine:  Yes


Diabetes, Insulin dep


HEENT:  Yes


Cataract


Loss of Vision:  Denies


Hearing Impairment:  Hard of Hearing


Cancer:  Yes (Throat)


Psychosocial:  No


Integumentary:  No


Blood Disorders:  No


Adverse Reaction/Blood Tranf:  No





Family Medical History





BLADD


  09 BROTHER


BLADD


  09 BROTHER


Cancer


  03 FATHER (THROAT)


  09 BROTHER


Dementia


  03 MOTHER


FH: bladder cancer


FH: bladder cancer


Family history: Diabetes mellitus


  03 MOTHER


Family history: Thyroid disorder


  03 MOTHER


Infertile


  03 MOTHER (X5 MISCARRIAGES)


Myocardial infarction


  09 BROTHER





No Family History of:


  Abdominal aortic aneurysm


  [place]ernsey's disease


  Alcoholism


  Aphasia


  Cancer of colon


  Cataract


  Chest pain


  Congenital heart disease


  Congestive heart failure


  Cystic fibrosis


  Dysphagia


  Family history: Allergy


  Family history: Alzheimer's disease


  Family history: Arthritis


  Family history: Asthma


  Family history: Breast disease


  Family history: Cardiovascular disease


  Family history: Coronary thrombosis


  Family history: Gastrointestinal disease


  Family history: Glaucoma


  Family history: Hypertension


  Family history: Osteoporosis


  Headache


  Hearing loss


  Heart disease


  Hereditary disease


  History of - anemia


  History of - disorder


  History of - respiratory disease


  History of drug abuse


  Human immunodeficiency virus (HIV) seropositivity


  Hypercholesterolemia


  Kidney disease


  Malignant neoplasm of lung


  Parkinson's disease


  Prostate cancer


  Psychotic disorder


  Seizure disorder


  Stroke


  Tuberculosis


  Visual impairment


Heart Disease, Cancer, Stroke, Other Conditions/Hx





Physical Exam





Vital Signs - First Documented








 6/3/20





 11:04


 


Temp 36.7


 


Pulse 77


 


Resp 18


 


B/P (MAP) 120/75 (90)


 


O2 Delivery Room Air





Capillary Refill : Less Than 3 Seconds


Height: 5'9.00"


Weight: 156lbs. 5.0oz. [id number]; 21.00 BMI


Method:Stated


General Appearance:  WD/WN, no apparent distress


Eyes:  Bilateral Eye Normal Inspection


HEENT:  PERRL/EOMI


Neck:  non-tender, full range of motion


Respiratory:  no respiratory distress, no accessory muscle use, crackles


Gastrointestinal:  normal bowel sounds, non tender, soft


Neurologic/Psychiatric:  alert, normal mood/affect, oriented x 3


Skin:  normal color, warm/dry





Focused Exam


Lactate Level


6/3/20 11:05: Lactic Acid Level 0.81





Lactic Acid Level





Laboratory Tests








Test


 6/3/20


11:05


 


Lactic Acid Level


 0.81 MMOL/L


(0.50-2.00)











Progress/Results/Core Measures


Suspected Sepsis


Recent Fever Within 48 Hours:  No


Infection Criteria Present:  None


New/Unexplained  Altered Menta:  No


Sepsis Screen:  No Definite Risk


SIRS


Temperature: 


Pulse: 77 


Respiratory Rate: 18


 


Laboratory Tests


6/3/20 11:05: White Blood Count 6.0


Blood Pressure 120 /75 


Mean: 90


 





6/3/20 11:05: Lactic Acid Level 0.81


Laboratory Tests


6/3/20 11:05: 


Creatinine 0.95, Platelet Count 286, Total Bilirubin 0.3








Results/Orders


Lab Results





Laboratory Tests








Test


 6/3/20


11:05 Range/Units


 


 


White Blood Count


 6.0 


 4.3-11.0


10^3/uL


 


Red Blood Count


 3.52 L


 4.35-5.85


10^6/uL


 


Hemoglobin 10.3 L 13.3-17.7  G/DL


 


Hematocrit 32 L 40-54  %


 


Mean Corpuscular Volume 91  80-99  FL


 


Mean Corpuscular Hemoglobin 29  25-34  PG


 


Mean Corpuscular Hemoglobin


Concent 32 


 32-36  G/DL





 


Red Cell Distribution Width 17.1 H 10.0-14.5  %


 


Platelet Count


 286 


 130-400


10^3/uL


 


Mean Platelet Volume 8.5  7.4-10.4  FL


 


Neutrophils (%) (Auto) 48  42-75  %


 


Lymphocytes (%) (Auto) 38  12-44  %


 


Monocytes (%) (Auto) 10  0-12  %


 


Eosinophils (%) (Auto) 4  0-10  %


 


Basophils (%) (Auto) 0  0-10  %


 


Neutrophils # (Auto) 2.8  1.8-7.8  X 10^3


 


Lymphocytes # (Auto) 2.2  1.0-4.0  X 10^3


 


Monocytes # (Auto) 0.6  0.0-1.0  X 10^3


 


Eosinophils # (Auto)


 0.2 


 0.0-0.3


10^3/uL


 


Basophils # (Auto)


 0.0 


 0.0-0.1


10^3/uL


 


Sodium Level 139  135-145  MMOL/L


 


Potassium Level 3.8  3.6-5.0  MMOL/L


 


Chloride Level 101    MMOL/L


 


Carbon Dioxide Level 30  21-32  MMOL/L


 


Anion Gap 8  5-14  MMOL/L


 


Blood Urea Nitrogen 15  7-18  MG/DL


 


Creatinine


 0.95 


 0.60-1.30


MG/DL


 


Estimat Glomerular Filtration


Rate > 60 


  





 


BUN/Creatinine Ratio 16   


 


Glucose Level 110 H   MG/DL


 


Lactic Acid Level


 0.81 


 0.50-2.00


MMOL/L


 


Calcium Level 8.7  8.5-10.1  MG/DL


 


Corrected Calcium 9.3  8.5-10.1  MG/DL


 


Total Bilirubin 0.3  0.1-1.0  MG/DL


 


Aspartate Amino Transf


(AST/SGOT) 21 


 5-34  U/L





 


Alanine Aminotransferase


(ALT/SGPT) 17 


 0-55  U/L





 


Alkaline Phosphatase 87    U/L


 


Total Protein 7.1  6.4-8.2  GM/DL


 


Albumin 3.3  3.2-4.5  GM/DL








My Orders





Orders - COSTA FINN


Cbc With Automated Diff (6/3/20 10:50)


Comprehensive Metabolic Panel (6/3/20 10:50)


Chest Pa/Lat (2 View) (6/3/20 10:50)


Blood Culture (6/3/20 10:50)


Lactic Acid Analyzer (6/3/20 10:50)


Ed Iv/Invasive Line Start (6/3/20 11:15)





Vital Signs/I&O











 6/3/20





 11:04


 


Temp 36.7


 


Pulse 77


 


Resp 18


 


B/P (MAP) 120/75 (90)


 


O2 Delivery Room Air





Capillary Refill : Less Than 3 Seconds








Blood Pressure Mean:                    90











Diagnostic Imaging





   Diagonstic Imaging:  Xray


Comments


NAME:   NESHA PANCHAL


MED REC#:   [id number]


ACCOUNT#:   [id number]


PT STATUS:   REG ER


:   1940


PHYSICIAN:   COSTA FINN


ADMIT DATE:   20/ER


                                   ***Draft***


Date of Exam:20





CHEST PA/LAT (2 VIEW)








INDICATION: Shortness of breath.





Time of exam 11:29 AM





Correlation is made with prior chest from 2020.





Heart size is stable. Cardiac monitoring device overlies the left


heart. Bilateral parenchymal opacities mid and lower lung fields


persist and show no real change. There has been improvement of


patchy opacity in the left upper lobe since prior exam. Elevation


right hemidiaphragm is stable. No significant pleural fluid is


identified. There is no pneumothorax.





IMPRESSION: Chronic bilateral parenchymal opacities, similar to


prior exam. There has been improved aeration to the left upper


lobe when compared with the study 3 months earlier.





  Dictated on workstation # [id number]








Dict:   20 1158


Trans:   20 1200


[facility] [id number]





Interpreted by:     SUKHJINDER GUERRERO MD


Electronically signed by:





Departure


Communication (Admissions)


His oxygen saturation is 97% on room air, no accessory muscle use, no 

respiratory distress. Chest x-ray has an improved appearance from previous, his 

labs are unremarkable. We will discharge to home with diagnosis of bronchitis, 

antibiotic And steroid sent in





Impression





   Primary Impression:  


   Bronchitis


Disposition:   ADMITTED AS INPATIENT


Condition:  Stable





Departure-Patient Inst.


Decision time for Depature:  12:26


Referrals:  


TOR CONNELLY DO (PCP/Family)


Primary Care Physician


Patient Instructions:  Acute Bronchitis





Add. Discharge Instructions:  


1. medication as directed


2. return to er for any concerns








All discharge instructions reviewed with patient and/or family. Voiced 

understanding.


Scripts


Cefuroxime Axetil (Cefuroxime) 250 Mg Tablet


250 MG PO BID, #14 TAB


   Prov: COSTA FINN         6/3/20 


Prednisone (Prednisone) 20 Mg Tab


40 MG PO DAILY, #6 TAB 0 Refills


   Prov: COSTA FINN         6/3/20





Copy


Copies To 1:   TOR CONNELLY PETER J APRN              Paulo 3, 2020 11:15

## 2020-06-03 NOTE — DIAGNOSTIC IMAGING REPORT
INDICATION: Shortness of breath.



Time of exam 11:29 AM



Correlation is made with prior chest from 03/08/2020.



Heart size is stable. Cardiac monitoring device overlies the left

heart. Bilateral parenchymal opacities mid and lower lung fields

persist and show no real change. There has been improvement of

patchy opacity in the left upper lobe since prior exam. Elevation

right hemidiaphragm is stable. No significant pleural fluid is

identified. There is no pneumothorax.



IMPRESSION: Chronic bilateral parenchymal opacities, similar to

prior exam. There has been improved aeration to the left upper

lobe when compared with the study 3 months earlier.



Dictated by: 



  Dictated on workstation # [id number]

## 2020-07-30 NOTE — NUR
The office called stating that they did receive a signed cardiac clearance form and asked why they have to send it again if they already received a signed copy. After speaking with Rayna, I informed the office that the copy they have is signed by Dr Bolanos. If they need cardiac clearance from the cardiologist then they need to send a clean copy   bedside report received from EARNESTINE HYDE, assume care of pt

## 2020-07-31 ENCOUNTER — HOSPITAL ENCOUNTER (OUTPATIENT)
Dept: HOSPITAL 75 - CARD | Age: 80
End: 2020-07-31
Attending: INTERNAL MEDICINE
Payer: MEDICARE

## 2020-07-31 DIAGNOSIS — I10: Primary | ICD-10-CM

## 2020-07-31 DIAGNOSIS — I08.3: ICD-10-CM

## 2020-07-31 DIAGNOSIS — I48.0: ICD-10-CM

## 2020-07-31 DIAGNOSIS — E78.5: ICD-10-CM

## 2020-07-31 DIAGNOSIS — R06.00: ICD-10-CM

## 2020-07-31 PROCEDURE — 93306 TTE W/DOPPLER COMPLETE: CPT

## 2020-09-09 ENCOUNTER — HOSPITAL ENCOUNTER (INPATIENT)
Dept: HOSPITAL 75 - ER | Age: 80
LOS: 2 days | Discharge: HOME HEALTH | DRG: 871 | End: 2020-09-11
Attending: INTERNAL MEDICINE | Admitting: INTERNAL MEDICINE
Payer: MEDICARE

## 2020-09-09 VITALS — SYSTOLIC BLOOD PRESSURE: 115 MMHG | DIASTOLIC BLOOD PRESSURE: 65 MMHG

## 2020-09-09 VITALS — WEIGHT: 165.79 LBS | BODY MASS INDEX: 26.02 KG/M2 | HEIGHT: 66.93 IN

## 2020-09-09 VITALS — DIASTOLIC BLOOD PRESSURE: 57 MMHG | SYSTOLIC BLOOD PRESSURE: 125 MMHG

## 2020-09-09 VITALS — DIASTOLIC BLOOD PRESSURE: 54 MMHG | SYSTOLIC BLOOD PRESSURE: 96 MMHG

## 2020-09-09 VITALS — SYSTOLIC BLOOD PRESSURE: 127 MMHG | DIASTOLIC BLOOD PRESSURE: 86 MMHG

## 2020-09-09 VITALS — SYSTOLIC BLOOD PRESSURE: 120 MMHG | DIASTOLIC BLOOD PRESSURE: 77 MMHG

## 2020-09-09 VITALS — DIASTOLIC BLOOD PRESSURE: 67 MMHG | SYSTOLIC BLOOD PRESSURE: 109 MMHG

## 2020-09-09 VITALS — DIASTOLIC BLOOD PRESSURE: 87 MMHG | SYSTOLIC BLOOD PRESSURE: 115 MMHG

## 2020-09-09 VITALS — DIASTOLIC BLOOD PRESSURE: 50 MMHG | SYSTOLIC BLOOD PRESSURE: 118 MMHG

## 2020-09-09 VITALS — SYSTOLIC BLOOD PRESSURE: 116 MMHG | DIASTOLIC BLOOD PRESSURE: 66 MMHG

## 2020-09-09 VITALS — DIASTOLIC BLOOD PRESSURE: 70 MMHG | SYSTOLIC BLOOD PRESSURE: 118 MMHG

## 2020-09-09 VITALS — DIASTOLIC BLOOD PRESSURE: 67 MMHG | SYSTOLIC BLOOD PRESSURE: 122 MMHG

## 2020-09-09 VITALS — SYSTOLIC BLOOD PRESSURE: 133 MMHG | DIASTOLIC BLOOD PRESSURE: 60 MMHG

## 2020-09-09 VITALS — SYSTOLIC BLOOD PRESSURE: 111 MMHG | DIASTOLIC BLOOD PRESSURE: 52 MMHG

## 2020-09-09 VITALS — DIASTOLIC BLOOD PRESSURE: 44 MMHG | SYSTOLIC BLOOD PRESSURE: 90 MMHG

## 2020-09-09 VITALS — DIASTOLIC BLOOD PRESSURE: 56 MMHG | SYSTOLIC BLOOD PRESSURE: 123 MMHG

## 2020-09-09 VITALS — DIASTOLIC BLOOD PRESSURE: 64 MMHG | SYSTOLIC BLOOD PRESSURE: 114 MMHG

## 2020-09-09 VITALS — DIASTOLIC BLOOD PRESSURE: 62 MMHG | SYSTOLIC BLOOD PRESSURE: 121 MMHG

## 2020-09-09 VITALS — SYSTOLIC BLOOD PRESSURE: 114 MMHG | DIASTOLIC BLOOD PRESSURE: 60 MMHG

## 2020-09-09 VITALS — SYSTOLIC BLOOD PRESSURE: 118 MMHG | DIASTOLIC BLOOD PRESSURE: 63 MMHG

## 2020-09-09 VITALS — SYSTOLIC BLOOD PRESSURE: 100 MMHG | DIASTOLIC BLOOD PRESSURE: 55 MMHG

## 2020-09-09 VITALS — DIASTOLIC BLOOD PRESSURE: 54 MMHG | SYSTOLIC BLOOD PRESSURE: 122 MMHG

## 2020-09-09 VITALS — SYSTOLIC BLOOD PRESSURE: 117 MMHG | DIASTOLIC BLOOD PRESSURE: 77 MMHG

## 2020-09-09 VITALS — DIASTOLIC BLOOD PRESSURE: 64 MMHG | SYSTOLIC BLOOD PRESSURE: 124 MMHG

## 2020-09-09 VITALS — DIASTOLIC BLOOD PRESSURE: 57 MMHG | SYSTOLIC BLOOD PRESSURE: 118 MMHG

## 2020-09-09 DIAGNOSIS — Z85.819: ICD-10-CM

## 2020-09-09 DIAGNOSIS — E78.00: ICD-10-CM

## 2020-09-09 DIAGNOSIS — M19.91: ICD-10-CM

## 2020-09-09 DIAGNOSIS — J96.10: ICD-10-CM

## 2020-09-09 DIAGNOSIS — J43.9: ICD-10-CM

## 2020-09-09 DIAGNOSIS — Z95.1: ICD-10-CM

## 2020-09-09 DIAGNOSIS — Z79.01: ICD-10-CM

## 2020-09-09 DIAGNOSIS — E78.5: ICD-10-CM

## 2020-09-09 DIAGNOSIS — Z79.4: ICD-10-CM

## 2020-09-09 DIAGNOSIS — K21.9: ICD-10-CM

## 2020-09-09 DIAGNOSIS — D64.9: ICD-10-CM

## 2020-09-09 DIAGNOSIS — A41.9: Primary | ICD-10-CM

## 2020-09-09 DIAGNOSIS — I11.0: ICD-10-CM

## 2020-09-09 DIAGNOSIS — R65.21: ICD-10-CM

## 2020-09-09 DIAGNOSIS — J18.9: ICD-10-CM

## 2020-09-09 DIAGNOSIS — E11.51: ICD-10-CM

## 2020-09-09 DIAGNOSIS — I25.10: ICD-10-CM

## 2020-09-09 DIAGNOSIS — I87.2: ICD-10-CM

## 2020-09-09 DIAGNOSIS — Z86.73: ICD-10-CM

## 2020-09-09 DIAGNOSIS — Z96.641: ICD-10-CM

## 2020-09-09 DIAGNOSIS — Z87.891: ICD-10-CM

## 2020-09-09 DIAGNOSIS — Z95.5: ICD-10-CM

## 2020-09-09 DIAGNOSIS — Z99.81: ICD-10-CM

## 2020-09-09 DIAGNOSIS — I48.0: ICD-10-CM

## 2020-09-09 DIAGNOSIS — I50.30: ICD-10-CM

## 2020-09-09 DIAGNOSIS — Z20.828: ICD-10-CM

## 2020-09-09 LAB
ALBUMIN SERPL-MCNC: 3.2 GM/DL (ref 3.2–4.5)
ALP SERPL-CCNC: 74 U/L (ref 40–136)
ALT SERPL-CCNC: 13 U/L (ref 0–55)
APTT BLD: 49 SEC (ref 24–35)
APTT PPP: YELLOW S
BACTERIA #/AREA URNS HPF: NEGATIVE /HPF
BASOPHILS # BLD AUTO: 0 10^3/UL (ref 0–0.1)
BASOPHILS NFR BLD AUTO: 0 % (ref 0–10)
BILIRUB SERPL-MCNC: 0.5 MG/DL (ref 0.1–1)
BILIRUB UR QL STRIP: NEGATIVE
BUN/CREAT SERPL: 21
CALCIUM SERPL-MCNC: 8.1 MG/DL (ref 8.5–10.1)
CHLORIDE SERPL-SCNC: 99 MMOL/L (ref 98–107)
CO2 SERPL-SCNC: 28 MMOL/L (ref 21–32)
CREAT SERPL-MCNC: 1.02 MG/DL (ref 0.6–1.3)
EOSINOPHIL # BLD AUTO: 0 10^3/UL (ref 0–0.3)
EOSINOPHIL NFR BLD AUTO: 0 % (ref 0–10)
FIBRINOGEN PPP-MCNC: CLEAR MG/DL
GFR SERPLBLD BASED ON 1.73 SQ M-ARVRAT: > 60 ML/MIN
GLUCOSE SERPL-MCNC: 131 MG/DL (ref 70–105)
GLUCOSE UR STRIP-MCNC: NEGATIVE MG/DL
HCT VFR BLD CALC: 32 % (ref 40–54)
HGB BLD-MCNC: 10.4 G/DL (ref 13.3–17.7)
INR PPP: 1.1 (ref 0.8–1.4)
KETONES UR QL STRIP: NEGATIVE
LEUKOCYTE ESTERASE UR QL STRIP: NEGATIVE
LYMPHOCYTES # BLD AUTO: 2.3 X 10^3 (ref 1–4)
LYMPHOCYTES NFR BLD AUTO: 17 % (ref 12–44)
MANUAL DIFFERENTIAL PERFORMED BLD QL: NO
MCH RBC QN AUTO: 30 PG (ref 25–34)
MCHC RBC AUTO-ENTMCNC: 33 G/DL (ref 32–36)
MCV RBC AUTO: 93 FL (ref 80–99)
MONOCYTES # BLD AUTO: 1 X 10^3 (ref 0–1)
MONOCYTES NFR BLD AUTO: 7 % (ref 0–12)
NEUTROPHILS # BLD AUTO: 10.4 X 10^3 (ref 1.8–7.8)
NEUTROPHILS NFR BLD AUTO: 76 % (ref 42–75)
NITRITE UR QL STRIP: NEGATIVE
PH UR STRIP: 6.5 [PH] (ref 5–9)
PLATELET # BLD: 289 10^3/UL (ref 130–400)
PMV BLD AUTO: 8.8 FL (ref 7.4–10.4)
POTASSIUM SERPL-SCNC: 3.8 MMOL/L (ref 3.6–5)
PROT SERPL-MCNC: 6.6 GM/DL (ref 6.4–8.2)
PROT UR QL STRIP: (no result)
PROTHROMBIN TIME: 15.1 SEC (ref 12.2–14.7)
RBC #/AREA URNS HPF: (no result) /HPF
SODIUM SERPL-SCNC: 136 MMOL/L (ref 135–145)
SP GR UR STRIP: 1.01 (ref 1.02–1.02)
SQUAMOUS #/AREA URNS HPF: (no result) /HPF
WBC # BLD AUTO: 13.7 10^3/UL (ref 4.3–11)
WBC #/AREA URNS HPF: (no result) /HPF

## 2020-09-09 PROCEDURE — 87040 BLOOD CULTURE FOR BACTERIA: CPT

## 2020-09-09 PROCEDURE — 87635 SARS-COV-2 COVID-19 AMP PRB: CPT

## 2020-09-09 PROCEDURE — 87184 SC STD DISK METHOD PER PLATE: CPT

## 2020-09-09 PROCEDURE — 87804 INFLUENZA ASSAY W/OPTIC: CPT

## 2020-09-09 PROCEDURE — 71045 X-RAY EXAM CHEST 1 VIEW: CPT

## 2020-09-09 PROCEDURE — 83605 ASSAY OF LACTIC ACID: CPT

## 2020-09-09 PROCEDURE — 87205 SMEAR GRAM STAIN: CPT

## 2020-09-09 PROCEDURE — 87077 CULTURE AEROBIC IDENTIFY: CPT

## 2020-09-09 PROCEDURE — 83735 ASSAY OF MAGNESIUM: CPT

## 2020-09-09 PROCEDURE — 80048 BASIC METABOLIC PNL TOTAL CA: CPT

## 2020-09-09 PROCEDURE — 86141 C-REACTIVE PROTEIN HS: CPT

## 2020-09-09 PROCEDURE — 94760 N-INVAS EAR/PLS OXIMETRY 1: CPT

## 2020-09-09 PROCEDURE — 87070 CULTURE OTHR SPECIMN AEROBIC: CPT

## 2020-09-09 PROCEDURE — 83615 LACTATE (LD) (LDH) ENZYME: CPT

## 2020-09-09 PROCEDURE — 02HV33Z INSERTION OF INFUSION DEVICE INTO SUPERIOR VENA CAVA, PERCUTANEOUS APPROACH: ICD-10-PCS | Performed by: SURGERY

## 2020-09-09 PROCEDURE — 82962 GLUCOSE BLOOD TEST: CPT

## 2020-09-09 PROCEDURE — 84145 PROCALCITONIN (PCT): CPT

## 2020-09-09 PROCEDURE — 85730 THROMBOPLASTIN TIME PARTIAL: CPT

## 2020-09-09 PROCEDURE — 85610 PROTHROMBIN TIME: CPT

## 2020-09-09 PROCEDURE — 85025 COMPLETE CBC W/AUTO DIFF WBC: CPT

## 2020-09-09 PROCEDURE — 87081 CULTURE SCREEN ONLY: CPT

## 2020-09-09 PROCEDURE — 84100 ASSAY OF PHOSPHORUS: CPT

## 2020-09-09 PROCEDURE — 36415 COLL VENOUS BLD VENIPUNCTURE: CPT

## 2020-09-09 PROCEDURE — 87088 URINE BACTERIA CULTURE: CPT

## 2020-09-09 PROCEDURE — 80053 COMPREHEN METABOLIC PANEL: CPT

## 2020-09-09 PROCEDURE — 83880 ASSAY OF NATRIURETIC PEPTIDE: CPT

## 2020-09-09 PROCEDURE — 81000 URINALYSIS NONAUTO W/SCOPE: CPT

## 2020-09-09 PROCEDURE — 94640 AIRWAY INHALATION TREATMENT: CPT

## 2020-09-09 PROCEDURE — 93005 ELECTROCARDIOGRAM TRACING: CPT

## 2020-09-09 RX ADMIN — INSULIN ASPART SCH UNIT: 100 INJECTION, SOLUTION INTRAVENOUS; SUBCUTANEOUS at 09:58

## 2020-09-09 RX ADMIN — APIXABAN SCH MG: 2.5 TABLET, FILM COATED ORAL at 09:58

## 2020-09-09 RX ADMIN — INSULIN ASPART SCH UNIT: 100 INJECTION, SOLUTION INTRAVENOUS; SUBCUTANEOUS at 06:51

## 2020-09-09 RX ADMIN — MEROPENEM SCH MLS/HR: 1 INJECTION, POWDER, FOR SOLUTION INTRAVENOUS at 17:15

## 2020-09-09 RX ADMIN — INSULIN ASPART SCH UNIT: 100 INJECTION, SOLUTION INTRAVENOUS; SUBCUTANEOUS at 21:19

## 2020-09-09 RX ADMIN — SODIUM CHLORIDE, SODIUM LACTATE, POTASSIUM CHLORIDE, AND CALCIUM CHLORIDE SCH MLS/HR: 600; 310; 30; 20 INJECTION, SOLUTION INTRAVENOUS at 06:50

## 2020-09-09 RX ADMIN — SODIUM CHLORIDE, SODIUM LACTATE, POTASSIUM CHLORIDE, AND CALCIUM CHLORIDE SCH MLS/HR: 600; 310; 30; 20 INJECTION, SOLUTION INTRAVENOUS at 17:15

## 2020-09-09 RX ADMIN — Medication SCH MLS/HR: at 16:36

## 2020-09-09 RX ADMIN — MEROPENEM SCH MLS/HR: 1 INJECTION, POWDER, FOR SOLUTION INTRAVENOUS at 09:59

## 2020-09-09 RX ADMIN — Medication SCH MLS/HR: at 06:50

## 2020-09-09 RX ADMIN — SODIUM CHLORIDE, SODIUM LACTATE, POTASSIUM CHLORIDE, AND CALCIUM CHLORIDE SCH MLS/HR: 600; 310; 30; 20 INJECTION, SOLUTION INTRAVENOUS at 09:58

## 2020-09-09 RX ADMIN — SODIUM CHLORIDE, SODIUM LACTATE, POTASSIUM CHLORIDE, AND CALCIUM CHLORIDE SCH MLS/HR: 600; 310; 30; 20 INJECTION, SOLUTION INTRAVENOUS at 23:51

## 2020-09-09 RX ADMIN — INSULIN ASPART SCH UNIT: 100 INJECTION, SOLUTION INTRAVENOUS; SUBCUTANEOUS at 15:35

## 2020-09-09 RX ADMIN — VASOPRESSIN SCH MLS/HR: 20 INJECTION INTRAVENOUS at 23:19

## 2020-09-09 RX ADMIN — VASOPRESSIN SCH MLS/HR: 20 INJECTION INTRAVENOUS at 06:50

## 2020-09-09 RX ADMIN — VASOPRESSIN SCH MLS/HR: 20 INJECTION INTRAVENOUS at 13:20

## 2020-09-09 RX ADMIN — APIXABAN SCH MG: 2.5 TABLET, FILM COATED ORAL at 21:19

## 2020-09-09 NOTE — PULMONARY CONSULTATION
History of Present Illness


History of Present Illness


Date Seen by Provider:  Sep 9, 2020


Date of Admission





History of Present Illness


Guanaco Moulton is a 80 year old male seen today due to productive cough and fever

with CXR suggestive of pneumonia.  He presented to the ED after several days of 

productive cough and fever with a history of COPD, normally using 2L nasal 

cannula at home.  He has been hypotensive in the ED, given norepinephrine.  He 

was started on meropenem due to his allergy profile.  Rapid influenza and COVID-

19 tests are negative.





Allergies and Home Medications


Allergies


Coded Allergies:  


     morphine (Verified  Allergy, Severe, Pt has received Lortab in the past w/o

issue, 9/9/20)


     Sulfa (Sulfonamide Antibiotics) (Verified  Allergy, Unknown, 9/9/20)


     penicillin G (Verified  Allergy, Unknown, Pt has received Cefepime & 

Meropenem in the past, 9/9/20)


     levofloxacin (Verified  Adverse Reaction, Unknown, 9/9/20)





Home Medications


Albuterol Sulfate 2.5 Mg/3 Ml Vial.neb, 2.5 MG INH Q4H PRN for WHEEZING


   Prescribed by: SHANNEN DELGADO on 3/8/20 2357


Cefuroxime Axetil 250 Mg Tablet, 250 MG PO BID


   Prescribed by: COSTA FINN on 6/3/20 1228


Docusate Sodium 100 Mg Capsule, 100 MG PO DAILY, (Reported)


Doxycycline Hyclate 100 Mg Tablet, 100 MG PO BID


   Prescribed by: SHANNEN DELGADO on 3/8/20 2355


Ferrous Sulfate 325 Mg Tablet, 325 MG PO DAILY, (Reported)


Furosemide 40 Mg Tablet, 40 MG PO DAILY, (Reported)


Gabapentin 100 Mg Capsule, 100 MG PO DAILY, (Reported)


Gabapentin 100 Mg Capsule, 200 MG PO HS, (Reported)


Insulin NPH Human Isophane 100 Unit/1 Ml Vial, 4 UNITS SQ BID, (Reported)


   USES ALONG WITH NOVOLIN R 


Insulin Regular, Human 1,000 Units/10 Ml Soln, 5 UNITS SQ BID, (Reported)


   USES ALONG WITH NOVOLIN N 


Ipratropium/Albuterol Sulfate 3 Ml Ampul.neb, 3 ML NEB TID PRN for SHORTNESS OF 

BREATH, (Reported)


Loratadine 10 Mg Tablet, 10 MG PO DAILY, (Reported)


Metoprolol Tartrate 25 Mg Tablet, 25 MG PO BID, (Reported)


Montelukast Sodium 10 Mg Tablet, 10 MG PO HS, (Reported)


Pantoprazole Sodium 40 Mg Tablet.dr, 40 MG PO DAILY, (Reported)


Prednisone 20 Mg Tab, 40 MG PO DAILY


   Prescribed by: SHANNEN DELGADO on 3/8/20 2355


Prednisone 20 Mg Tab, 40 MG PO DAILY


   Prescribed by: COSTA FINN on 6/3/20 1228


Simvastatin 20 Mg Tablet, 20 MG PO HS, (Reported)


Warfarin Sodium 5 Mg Tablet, 5 MG PO 1800, (Reported)





Past Medical-Social-Family Hx


Past Med/Social Hx:  Reviewed Nursing Past Med/Soc Hx


Patient Social History


Alcohol Use:  Denies Use


Recreational Drug Use:  No


Type Used:  Cigarettes


Former Smoker, Quit:  Apr 30, 1985


2nd Hand Smoke Exposure:  No


Recent Foreign Travel:  No


Contact w/Someone Who Travel:  No


Recent Infectious Disease Expo:  No


Recent Hopitalizations:  Yes


Physical Abuse:  No


Sexual Abuse:  No


Mistreated:  No


Fear:  No





Immunizations Up To Date


Tetanus Booster (TDap):  More than 5yrs


PED Vaccines UTD:  No


Date of Pneumonia Vaccine:  Oct 2, 2018


Date of Influenza Vaccine:  Sep 18, 2019





Seasonal Allergies


Seasonal Allergies:  No





Past Medical History


Surgeries:  Yes (right hip, lung)


Cardiac, CABG, Coronary Stent, Eye Surgery, Joint Replacement, Lobectomy, 

Orthopedic, Prostatectomy, Tonsillectomy


Respiratory:  Yes


Pneumonia, Sleep Apnea, COPD, Emphysema


Currently Using CPAP:  No


Currently Using BIPAP:  No


Cardiac:  Yes (diastolic heart failure)


Atrial Fibrillation, Coronary Artery Disease, High Cholesterol, Hypertension, 

Peripheral Vascular


Neurological:  Yes


Stroke


Reproductive Disorders:  No


Sexually Transmitted Disease:  No


HIV/AIDS:  No


Genitourinary:  Yes


Benign Prostatic Hyperpl, Prostate Problems, Bladder Infection


Gastrointestinal:  Yes


Gastroesophageal Reflux, Gastrointestinal Bleed


Musculoskeletal:  Yes


Arthritis


Endocrine:  Yes


Diabetes, Insulin dep


HEENT:  Yes


Cataract


Loss of Vision:  Denies


Hearing Impairment:  Hard of Hearing


Cancer:  Yes (Throat)


Psychosocial:  No


Integumentary:  No


Blood Disorders:  No


Adverse Reaction/Blood Tranf:  No





Family Medical History


Reviewed Nursing Family Hx





BLADD


  09 BROTHER


BLADD


  09 BROTHER


Cancer


  03 FATHER (THROAT)


  09 BROTHER


Dementia


  03 MOTHER


FH: bladder cancer


FH: bladder cancer


Family history: Diabetes mellitus


  03 MOTHER


Family history: Thyroid disorder


  03 MOTHER


Infertile


  03 MOTHER (X5 MISCARRIAGES)


Myocardial infarction


  09 BROTHER





No Family History of:


  Abdominal aortic aneurysm


  [place]'s disease


  Alcoholism


  Aphasia


  Cancer of colon


  Cataract


  Chest pain


  Congenital heart disease


  Congestive heart failure


  Cystic fibrosis


  Dysphagia


  Family history: Allergy


  Family history: Alzheimer's disease


  Family history: Arthritis


  Family history: Asthma


  Family history: Breast disease


  Family history: Cardiovascular disease


  Family history: Coronary thrombosis


  Family history: Gastrointestinal disease


  Family history: Glaucoma


  Family history: Hypertension


  Family history: Osteoporosis


  Headache


  Hearing loss


  Heart disease


  Hereditary disease


  History of - anemia


  History of - disorder


  History of - respiratory disease


  History of drug abuse


  Human immunodeficiency virus (HIV) seropositivity


  Hypercholesterolemia


  Kidney disease


  Malignant neoplasm of lung


  Parkinson's disease


  Prostate cancer


  Psychotic disorder


  Seizure disorder


  Stroke


  Tuberculosis


  Visual impairment


Heart Disease, Cancer, Stroke, Other Conditions/Hx





Sepsis Event


Evaluation


Height, Weight, BMI


Height: 5'9.00"


Weight: 156lbs. 5.0oz. 70.828761pe; 25.05 BMI


Method:Stated





Exam


Exam





Vital Signs








  Date Time  Temp Pulse Resp B/P (MAP) Pulse Ox O2 Delivery O2 Flow Rate FiO2


 


9/9/20 06:00  72 30 90/44 (59) 98 Nasal Cannula 2.00 


 


9/9/20 05:58     98 Nasal Cannula 2.00 


 


9/9/20 05:45  96 29 111/52 (71) 91 Nasal Cannula 2.00 


 


9/9/20 05:30 36.4 70 21 117/77 (90) 97 Nasal Cannula 2.00 


 


9/9/20 05:28  63      


 


9/9/20 05:16  65 20 128/66 (56) 100 Nasal Cannula 2.00 


 


9/9/20 03:11  70  83/42    


 


9/9/20 01:45 37.2       


 


9/9/20 01:13 38.3       


 


9/9/20 00:05 38.3 74 20 120/77 (91) 97 Nasal Cannula 2.00 


 


9/9/20 00:05 38.3 74 20 120/77 (91) 97 Nasal Cannula 2.00 


 


9/9/20 00:05     97 Nasal Cannula 2.00 














I & O 


 


 9/9/20





 06:59


 


Intake Total 2170 ml


 


Balance 2170 ml








Height & Weight


Height: 5'9.00"


Weight: 156lbs. 5.0oz. 70.073825kk; 25.05 BMI


Method:Stated


General Appearance:  No Apparent Distress, WD/WN


HEENT:  PERRL/EOMI, Normal ENT Inspection


Neck:  Normal Inspection


Respiratory:  Lungs Clear, Normal Breath Sounds, No Accessory Muscle Use


Cardiovascular:  Regular Rate, Rhythm, No Edema


Capillary Refill:  Less Than 3 Seconds


Extremity:  Normal Inspection, No Pedal Edema


Neurologic/Psychiatric:  Alert, Oriented x3, No Motor/Sensory Deficits, Normal 

Mood/Affect, CNs II-XII Norm as Tested





Results


Lab


Laboratory Tests


9/9/20 00:15











Assessment/Plan


Assessment/Plan


Septic Shock


  - hypotension responsive to norepinephrine


  - continue LR





Pneumonia


  -  Chest x-ray suggestive of increasing infiltrates in both lower lung fields.

 Report not yet available.


  -  Started meropenem


  -  2L NC





DM


 -  continue current insulin management, monitor





COPD


  -  consider nebulizer treatments











HARIKA SCOTT MED STUDENT           Sep 9, 2020 06:26

## 2020-09-09 NOTE — CONSULTATION - SURGERY
History of Present Illness


History of Present Illness


Patient Consulted On(catalina/time)


20


 08:32


Time Seen by Provider:  07:19


History of Present Illness


Surgery asked to consult regarding Septic Shock, need for central access for 

pressor support.





HPI per ED: ARRIVES VIA EMS WHO WAS CALLED OUT BECAUSE YAMIL HAS HAD A FEVER 

AND CONTINUES DESPITE HAVING TYLENOL 1 GRAM. EMS REPORTS 100.0. 18 GUAGE IV 

ESTABLISHED BY 


EMS. PATIENT IS ALERT AND ORIENTED X4, CALL LIGHT IN REACH MONITORING 

MAINTAINED.


This 80-year-old gentleman presents to the emergency room via EMS with a couple 

days of productive cough and fever.  He is alert and oriented.  He has history 

of COPD and normally uses oxygen at 2 L by nasal cannula at home.  He is stable 

on this flow Rate.  He is not in respiratory distress.  Patient denies any known

exposures to COVID-19 but he did attend a wedding on Saturday, .  He 

has nebulizer treatments at home but has not been using them.








When I spoke to pt he was laying comfortably in bed, mild trouble breathing.  He

consented to Central line placement.





Allergies and Home Medications


Allergies


Coded Allergies:  


     morphine (Verified  Allergy, Severe, Pt has received Lortab in the past w/o

issue, 20)


     Sulfa (Sulfonamide Antibiotics) (Verified  Allergy, Unknown, 20)


     penicillin G (Verified  Allergy, Unknown, Pt has received Cefepime & 

Meropenem in the past, 20)


     levofloxacin (Verified  Adverse Reaction, Unknown, 20)





Home Medications


Albuterol Sulfate 2.5 Mg/3 Ml Vial.neb, 2.5 MG INH Q4H PRN for WHEEZING


   Prescribed by: SHANNEN DELGADO on 3/8/20 2357


Cefuroxime Axetil 250 Mg Tablet, 250 MG PO BID


   Prescribed by: COSTA FINN on 6/3/20 1228


Docusate Sodium 100 Mg Capsule, 100 MG PO DAILY, (Reported)


Doxycycline Hyclate 100 Mg Tablet, 100 MG PO BID


   Prescribed by: SHANNEN DELGADO on 3/8/20 2355


Ferrous Sulfate 325 Mg Tablet, 325 MG PO DAILY, (Reported)


Furosemide 40 Mg Tablet, 40 MG PO DAILY, (Reported)


Gabapentin 100 Mg Capsule, 100 MG PO DAILY, (Reported)


Gabapentin 100 Mg Capsule, 200 MG PO HS, (Reported)


Insulin NPH Human Isophane 100 Unit/1 Ml Vial, 4 UNITS SQ BID, (Reported)


   USES ALONG WITH NOVOLIN R 


Insulin Regular, Human 1,000 Units/10 Ml Soln, 5 UNITS SQ BID, (Reported)


   USES ALONG WITH NOVOLIN N 


Ipratropium/Albuterol Sulfate 3 Ml Ampul.neb, 3 ML NEB TID PRN for SHORTNESS OF 

BREATH, (Reported)


Loratadine 10 Mg Tablet, 10 MG PO DAILY, (Reported)


Metoprolol Tartrate 25 Mg Tablet, 25 MG PO BID, (Reported)


Montelukast Sodium 10 Mg Tablet, 10 MG PO HS, (Reported)


Pantoprazole Sodium 40 Mg Tablet.dr, 40 MG PO DAILY, (Reported)


Prednisone 20 Mg Tab, 40 MG PO DAILY


   Prescribed by: SHANNEN DELGADO on 3/8/20 2545


Prednisone 20 Mg Tab, 40 MG PO DAILY


   Prescribed by: COSTA FINN on 6/3/20 1228


Simvastatin 20 Mg Tablet, 20 MG PO HS, (Reported)


Warfarin Sodium 5 Mg Tablet, 5 MG PO 1800, (Reported)





Patient Home Medication List


Home Medication List Reviewed:  Yes





Past Medical-Social-Family Hx


Patient Social History


Alcohol Use:  Denies Use


Recreational Drug Use:  No


Former Smoker, Quit:  1985


Type Used:  Cigarettes


2nd Hand Smoke Exposure:  No


Recent Foreign Travel:  No


Contact w/Someone Who Travel:  No


Recent Infectious Disease Expo:  No


Recent Hopitalizations:  Yes





Immunizations Up To Date


Tetanus Booster (TDap):  More than 5yrs


PED Vaccines UTD:  No


Date of Pneumonia Vaccine:  Oct 2, 2018


Date of Influenza Vaccine:  Sep 18, 2019





Seasonal Allergies


Seasonal Allergies:  No





Surgeries


History of Surgeries:  Yes (right hip, lung)


Surgeries:  Cardiac, CABG, Coronary Stent, Eye Surgery, Joint Replacement, 

Lobectomy, Orthopedic, Prostatectomy, Tonsillectomy





Respiratory


History of Respiratory Disorde:  Yes


Respiratory Disorders:  Pneumonia, Sleep Apnea, COPD, Emphysema





Cardiovascular


History of Cardiac Disorders:  Yes (diastolic heart failure)


Cardiac Disorders:  Atrial Fibrillation, Coronary Artery Disease, High 

Cholesterol, Hypertension, Peripheral Vascular





Neurological


History of Neurological Disord:  Yes


Neurological Disorders:  Stroke





Reproductive System


Hx Reproductive Disorders:  No


Sexually Transmitted Disease:  No


HIV/AIDS:  No





Genitourinary


History of Genitourinary Disor:  Yes


Genitourinary Disorders:  Benign Prostatic Hyperpl, Prostate Problems, Bladder 

Infection





Gastrointestinal


History of Gastrointestinal Di:  Yes


Gastrointestinal Disorders:  Gastroesophageal Reflux, Gastrointestinal Bleed





Musculoskeletal


History of Musculoskeletal Dis:  Yes


Musculoskeletal Disorders:  Arthritis





Endocrine


History of Endocrine Disorders:  Yes


Endocrine Disorders:  Diabetes, Insulin dep





HEENT


History of HEENT Disorders:  Yes


HEENT Disorders:  Cataract


Loss of Vision:  Denies


Hearing Impairment:  Hard of Hearing





Cancer


History of Cancer:  Yes (Throat)





Psychosocial


History of Psychiatric Problem:  No





Integumentary


History of Skin or Integumenta:  No





Blood Transfusions


History of Blood Disorders:  No


Adverse Reaction to a Blood Tr:  No





Family Medical History


Significant Family History:  Heart Disease, Cancer, Stroke, Other Conditions/Hx


Family Medial History:  


BLADD


  09 BROTHER


BLADD


  09 BROTHER


Cancer


  03 FATHER (THROAT)


  09 BROTHER


Dementia


  03 MOTHER


FH: bladder cancer


FH: bladder cancer


Family history: Diabetes mellitus


  03 MOTHER


Family history: Thyroid disorder


  03 MOTHER


Infertile


  03 MOTHER (X5 MISCARRIAGES)


Myocardial infarction


  09 BROTHER





No Family History of:


  Abdominal aortic aneurysm


  Hurley's disease


  Alcoholism


  Aphasia


  Cancer of colon


  Cataract


  Chest pain


  Congenital heart disease


  Congestive heart failure


  Cystic fibrosis


  Dysphagia


  Family history: Allergy


  Family history: Alzheimer's disease


  Family history: Arthritis


  Family history: Asthma


  Family history: Breast disease


  Family history: Cardiovascular disease


  Family history: Coronary thrombosis


  Family history: Gastrointestinal disease


  Family history: Glaucoma


  Family history: Hypertension


  Family history: Osteoporosis


  Headache


  Hearing loss


  Heart disease


  Hereditary disease


  History of - anemia


  History of - disorder


  History of - respiratory disease


  History of drug abuse


  Human immunodeficiency virus (HIV) seropositivity


  Hypercholesterolemia


  Kidney disease


  Malignant neoplasm of lung


  Parkinson's disease


  Prostate cancer


  Psychotic disorder


  Seizure disorder


  Stroke


  Tuberculosis


  Visual impairment





Review of Systems-General


Constitutional:  chills, malaise, weakness


EENTM:  No double vision, No mouth pain, No mouth swelling, No epistaxis


Respiratory:  cough, dyspnea on exertion, short of breath


Cardiovascular:  No chest pain, No palpitations


Gastrointestinal:  No abdominal pain, No nausea, No vomiting


Genitourinary:  No dysuria, No frequency, No hematuria


Musculoskeletal:  joint pain, joint swelling, muscle stiffness


Psychiatric/Neurological:  Denies Anxiety, Denies Depressed, Denies Seizure, 

Denies Tremors





Physical Exam-General Problems


Physical Exam


Vital Signs





Vital Signs - First Documented




















Capillary Refill : Less Than 3 Seconds


General Appearance:  no apparent distress, thin


Eyes:  Bilateral Eye PERRL, Bilateral Eye EOMI


HEENT:  pharynx normal; No scleral icterus (R), No scleral icterus (L)


Neck:  non-tender, supple


Respiratory:  no respiratory distress, no accessory muscle use, decreased breath

sounds (at bases), crackles (at left base)


Cardiovascular:  regular rate, rhythm, no murmur


Gastrointestinal:  normal bowel sounds, non tender, soft, no organomegaly, no 

pulsatile mass


Extremities:  no pedal edema, no calf tenderness, normal capillary refill


Neurologic/Psychiatric:  alert, oriented x 3


Skin:  normal color, warm/dry


Lymphatic:  no adenopathy (neck, axilla or groin)





Data Review


Labs


Laboratory Tests


20 00:15: 


White Blood Count 13.7H, Red Blood Count 3.45L, Hemoglobin 10.4L, Hematocrit 32L

, Mean Corpuscular Volume 93, Mean Corpuscular Hemoglobin 30, Mean Corpuscular 

Hemoglobin Concent 33, Red Cell Distribution Width 16.7H, Platelet Count 289, 

Mean Platelet Volume 8.8, Neutrophils (%) (Auto) 76H, Lymphocytes (%) (Auto) 17,

Monocytes (%) (Auto) 7, Eosinophils (%) (Auto) 0, Basophils (%) (Auto) 0, 

Neutrophils # (Auto) 10.4H, Lymphocytes # (Auto) 2.3, Monocytes # (Auto) 1.0, 

Eosinophils # (Auto) 0.0, Basophils # (Auto) 0.0, Prothrombin Time 15.1H, INR 

Comment 1.1, Activated Partial Thromboplast Time 49H, Sodium Level 136, 

Potassium Level 3.8, Chloride Level 99, Carbon Dioxide Level 28, Anion Gap 9, 

Blood Urea Nitrogen 21H, Creatinine 1.02, Estimat Glomerular Filtration Rate > 

60, BUN/Creatinine Ratio 21, Glucose Level 131H, Lactic Acid Level 0.72, Calcium

Level 8.1L, Corrected Calcium 8.7, Total Bilirubin 0.5, Aspartate Amino Transf 

(AST/SGOT) 20, Alanine Aminotransferase (ALT/SGPT) 13, Alkaline Phosphatase 74, 

Lactate Dehydrogenase 167, C-Reactive Protein High Sensitivity 10.56H, B-Type 

Natriuretic Peptide 141.0H, Total Protein 6.6, Albumin 3.2, Procalcitonin 0.15H,

Coronavirus 2019 ([facility]ZET) Negative


20 02:20: 


Urine Color YELLOW, Urine Clarity CLEAR, Urine pH 6.5, Urine Specific Gravity 

1.010L, Urine Protein TRACEH, Urine Glucose (UA) NEGATIVE, Urine Ketones 

NEGATIVE, Urine Nitrite NEGATIVE, Urine Bilirubin NEGATIVE, Urine Urobilinogen 

0.2, Urine Leukocyte Esterase NEGATIVE, Urine RBC (Auto) 1+H, Urine RBC 5-10H, 

Urine WBC NONE, Urine Squamous Epithelial Cells 2-5, Urine Crystals NONE, Urine 

Bacteria NEGATIVE, Urine Casts NONE, Urine Mucus NEGATIVE, Urine Culture 

Indicated CULTURE PENDING





Microbiology


20 Influenza Types A,B Antigen ([place]) - Final, Complete





Assessment/Plan


Septic shock - source unknown


Pneumonia


Venous insufficiency





Pt needs central line for pressor support, attempted by ER physician and was 

unsuccessful on the right.  CXR shows no PTX, will place central line on left.  

Pt agreeable to procedure, his BP only stays up with the Levophed.





Clinical Quality Measures


DVT/VTE Risk/Contraindication:


Risk Factor Score Per Nursin


RFS Level Per Nursing on Admit:  4+=Very High











VINCENT FLOWERS DO                Sep 9, 2020 08:38

## 2020-09-09 NOTE — HISTORY & PHYSICAL-HOSPITALIST
History of Present Illness


HPI/Chief Complaint


vision is an 80-year-old  male past medical history of COPD who 

presented to the emergency department due to fever and cough.  He reports that 

he's had a cough and fever for the past couple of days.  The cough is 

productive.  He was found to be quite hypotensive. he has chronic respiratory 

failure and uses 2 L per nasal cannula at home. he denies any sick contacts but 

has recently been to a wedding.  This morning he states that he is feeling 

better and he denies any specific complaints other than it is hard to 

differentiate people when they wear masks.


Source:  patient


Date Seen


20


Time Seen by a Provider:  08:51


Attending Physician


Marina Martínez MD


PCP


Jay Holley DO


Referring Physician





Date of Admission


Sep 9, 2020 at 04:23





Home Medications & Allergies


Home Medications


Reviewed patient Home Medication Reconciliation performed by pharmacy medication

reconciliations technician and/or nursing.


Patients Allergies have been reviewed.





Allergies





Allergies


Coded Allergies


  morphine (Verified Allergy, Severe, Pt has received Lortab in the past w/o 

issue, 20)


  Sulfa (Sulfonamide Antibiotics) (Verified Allergy, Unknown, 20)


  penicillin G (Verified Allergy, Unknown, Pt has received Cefepime & Meropenem 

in the past, 20)


  levofloxacin (Verified Adverse Reaction, Unknown, 20)








Past Medical-Social-Family Hx


Past Med/Social Hx:  Reviewed Nursing Past Med/Soc Hx


Patient Social History


Alcohol Use:  Denies Use


Recreational Drug Use:  No


Smoking Status:  Former Smoker


Former Smoker, Quit:  1985


Type Used:  Cigarettes


2nd Hand Smoke Exposure:  No


Recent Foreign Travel:  No


Contact w/other who traveled:  No


Recent Hopitalizations:  Yes


Recent Infectious Disease Expo:  No





Immunizations Up To Date


Tetanus Booster (TDap):  More than 5yrs


Pediatric:  No


Date of Pneumonia Vaccine:  Oct 2, 2018


Date of Influenza Vaccine:  Sep 18, 2019





Seasonal Allergies


Seasonal Allergies:  No





Past Medical History


Surgeries:  Cardiac, CABG, Coronary Stent, Eye Surgery, Joint Replacement, 

Lobectomy, Orthopedic, Prostatectomy, Tonsillectomy


Respiratory:  COPD, Emphysema, Pneumonia


Currently Using CPAP:  No


Currently Using BIPAP:  No


Cardiac:  Atrial Fibrillation, Coronary Artery Disease, High Cholesterol, 

Hypertension, Peripheral Vascular


Neurological:  Stroke


Reproductive:  No


Sexually Transmitted Disease:  No


HIV/AIDS:  No


Genitourinary:  Benign Prostatic Hyperpl, Prostate Problems, Bladder Infection


Gastrointestinal:  Gastroesophageal Reflux, Gastrointestinal Bleed


Musculoskeletal:  Arthritis


Endocrine:  Diabetes, Insulin dep


HEENT:  Cataract


Loss of Vision:  Denies


Hearing Impairment:  Hard of Hearing


History of Blood Disorders:  No


Adverse Reaction to Blood Tapia:  No





Family History


Reviewed Nursing Family Hx





BLADD


  09 BROTHER


BLADD


  09 BROTHER


Cancer


  03 FATHER (THROAT)


  09 BROTHER


Dementia


  03 MOTHER


FH: bladder cancer


FH: bladder cancer


Family history: Diabetes mellitus


  03 MOTHER


Family history: Thyroid disorder


  03 MOTHER


Infertile


  03 MOTHER (X5 MISCARRIAGES)


Myocardial infarction


  09 BROTHER





No Family History of:


  Abdominal aortic aneurysm


  Petty's disease


  Alcoholism


  Aphasia


  Cancer of colon


  Cataract


  Chest pain


  Congenital heart disease


  Congestive heart failure


  Cystic fibrosis


  Dysphagia


  Family history: Allergy


  Family history: Alzheimer's disease


  Family history: Arthritis


  Family history: Asthma


  Family history: Breast disease


  Family history: Cardiovascular disease


  Family history: Coronary thrombosis


  Family history: Gastrointestinal disease


  Family history: Glaucoma


  Family history: Hypertension


  Family history: Osteoporosis


  Headache


  Hearing loss


  Heart disease


  Hereditary disease


  History of - anemia


  History of - disorder


  History of - respiratory disease


  History of drug abuse


  Human immunodeficiency virus (HIV) seropositivity


  Hypercholesterolemia


  Kidney disease


  Malignant neoplasm of lung


  Parkinson's disease


  Prostate cancer


  Psychotic disorder


  Seizure disorder


  Stroke


  Tuberculosis


  Visual impairment


Heart Disease, Cancer, Stroke, Other Conditions/Hx





Review of Systems


Constitutional:  fever


EENTM:  no symptoms reported


Respiratory:  cough, phlegm


Cardiovascular:  No chest pain, No palpitations


Gastrointestinal:  no symptoms reported


Genitourinary:  no symptoms reported


Musculoskeletal:  back pain (chronic)


Skin:  no symptoms reported


Psychiatric/Neurological:  No Symptoms Reported





Physical Exam


Physical Exam


Vital Signs





Vital Signs - First Documented




















Capillary Refill : Less Than 3 Seconds


Height, Weight, BMI


Height: 5'9.00"


Weight: 156lbs. 5.0oz. [id number]; 25.05 BMI


Method:Stated


General Appearance:  No Apparent Distress, Chronically ill


HEENT:  PERRL/EOMI, Moist Mucous Membranes


Respiratory:  Lungs Clear, No Accessory Muscle Use, Other (nasal cannula under 

chin- satting 96%)


Cardiovascular:  Regular Rate, Rhythm, No Murmur


Gastrointestinal:  Normal Bowel Sounds, Non Tender, Soft


Extremity:  Normal Capillary Refill, No Calf Tenderness, No Pedal Edema


Neurologic/Psychiatric:  Alert, Oriented x3, Normal Mood/Affect


Skin:  Normal Color, Warm/Dry





Results


Results/Procedures


Labs


Laboratory Tests


20 00:15








9/10/20 03:10








Patient resulted labs reviewed.





Assessment/Plan


Admission Diagnosis


Septic Shock


Admission Status:  Inpatient Order (span 2 midnights)


Reason for Inpatient Admission:  


see below





Assessment and Plan


Septic Shock


CAP- left


COVID PUI


   Cont Merrem due to allergies (though does have a historyt once of a Merrem 

resistant pseudomonas so will need to follow sensitivities closely)


   central line done by Dr Carcamo this AM


   Await cultures


   Dr Patino consulted, appreciate recs


   COVID pending





IDDMII


    cont SSI





CAD


paroxysmal A-fib


HTN


   Hold home antihypertensives


   Continue Eliquis


   


dvt ppx: Eliquis as above





Diagnosis/Problems


Diagnosis/Problems





(1) Chronic anticoagulation


(2) HLD (hyperlipidemia)


(3) Hypertension


(4) CAD (coronary artery disease)


(5) Anemia


(6) Septic shock


Status:  Acute


(7) Person under investigation for COVID-19


Status:  Acute


(8) COPD (chronic obstructive pulmonary disease)


Status:  Chronic


(9) Left lower lobe pneumonia


Status:  Acute


(10) Paroxysmal atrial fibrillation


Status:  Chronic





Clinical Quality Measures


DVT/VTE Risk/Contraindication:


Risk Factor Score Per Nursin


RFS Level Per Nursing on Admit:  4+=Very High











NICOLETTE HERNÁNDEZ MD               Sep 9, 2020 09:00

## 2020-09-09 NOTE — PROGRESS NOTE-POST OPERATIVE
Post-Operative Progess Note


Surgeon (s)/Assistant (s)


Surgeon


VINCENT FLOWERS DO


Assistant:  none





Pre-Operative Diagnosis


Septic Shock, Venous Insufficiency





Post-Operative Diagnosis





same





Procedure & Operative Findings


Date of Procedure


9/9/20


Procedure Performed/Findings





PROCEDURE:


[Left] internal jugular port placement using ultrasound guidance. 


 


COMPLICATIONS:


None. 


 


INDICATIONS:


The patient is a 80 year old male [with hypotension, septic shock


and venous insufficiency]. Consent was signed on the chart. 


 


PROCEDURE:


The patient was in his bed in the ICU, was prepped and


draped in the sterile fashion. A surgical pause was performed.


Ultrasound was used to locate the internal jugular vein.  Once


located anesthetic was infiltrated above it.  Using an 18 gauge finder


needle with negative inspiration the leftt internal vein was accessed. 


Dark nonpulsatile blood was withdrawn. The wire was inserted. US assured


proper placement. The needle was removed.  A [#11] blade scalpel was used to


make a stab incision at the guidewire. The dilator sheath was then advanced over

 the


wire using the seldinger technique; next the triple lumen catheter was 


inserted using the seldinger technique and the wire was removed. Each lumen


of the catheter was then accessed through locking hub without


difficulty (good flash of blood seen) and then flushed with saline. 


The areas were then washed and dried. Catheter was sutured in place with 


3-0 silk on a Darwin needle; the patient tolerated the procedure


well without complication.


Anesthesia Type


local lidocaine





Estimated Blood Loss


Estimated blood loss (mL):  scant





Specimens/Packing


Specimens Removed


none











VINCENT FLOWERS DO                Sep 9, 2020 08:11

## 2020-09-09 NOTE — OCCUPATIONAL THER DAILY NOTE
OT Current Status-Daily Note


Subjective


Pt seen in bed, finished with breakfast. Pt states moderate pain in R hip when 

mobile, does not state pain when seated. Pt agreeable to OT tx session.





Mental Status/Objective


Patient Orientation:  Normal For Age


Attachments:  Oxygen (2L)





ADL-Treatment


Therapy Code Descriptions/Definitions 





Functional [place] Measure:


0=Not Assessed/NA        4=Minimal Assistance


1=Total Assistance        5=Supervision or Setup


2=Maximal Assistance  6=Modified [place]


3=Moderate Assistance 7=Complete IndependenceSCALE: Activities may be completed 

with or without assistive devices.





6-Indepedent-patient completes the activity by him/herself with no assistance 

from a helper.


5-Set-up or Clean-up Assistance-helper sets up or cleans up; patient completes 

activity. [place] assists only prior to or  


    following the activity.


4-Supervision or Touching Assistance-helper provides verbal cues and/or 

touching/steadying and/or contact guard assistance as patient completes 

activity. Assistance may be provided   


    throughout the activity or intermittently.


3-Partial/Moderate Assistance-helper does LESS THAN HALF the effort. [place] 

lifts, holds or supports trunk or limbs, but provides less than half the effort.


2-Substantial/Maximal Assistance-helper does MORE THAN HALF the effort. [place] 

lifts or holds trunk or limbs and provides more than half the effort.


5-Scfyxvhxz-msbryp does ALL the effort. Patient does none of the effort to compl

ete the activity. Or, the assistance of 2 or more helpers is required for the 

patient to complete the  


    activity.


If activity was not attempted, code reason:


7-Patient Refused.


9-Not Applicable-not attempted and the patient did not perform the activity 

before the current illness, exacerbation or injury.


10-Not Attempted due to Environmental Limitations-(lack of equipment, weather 

restraints, etc.).


88-Not Attempted due to Medical Conditions or Safety Concerns.


Eating (QC):  6


Oral Hygiene (QC):  6 (Pt states fatigue post-shave, completes oral hygiene with

IND while seated in chair in front of sink. Pt states walker at home has seat to

sit on in front of bathroom sink.)


Shower/Bathe Self (QC):  4 (SBA in stance )


Upper Body Dressing (QC):  6


Lower Body Dressing (QC):  4 (SBA in stance; pt completes doffing with dressing 

stick, donning with IND.)


Toileting Hygiene (QC):  4 (SBA in stance during bottom hygiene)


Toilet Transfer (QC):  4 (CGA during transfer on/off standard toilet as pt has 

standard toilet at home. Pt expresses pain while seated, pt states he has 

commode "somewhere" at the house and needs to find it.)


on/off footwear: 6 with sock aide edge of chair.





Other Treatment


Pt agreeable to OT session, bed mob with IND. Pt sit to stand after removal of 

02 to transfer to recliner chair, SBA to FWW. Pt transfers to recliner chair, 

agreeable to sponge bath. Pt's 02 placed on pt post-UB washing/ dressing. Pt 

requires min cues throughout session for attention and problem solving/ 

sequencing of tasks to conserve energy. Pt completes ADLs within room. Pt states

he "won't be doing much at home." Pt states he does not plan to grocery shop or 

drive soon after home d/c; environmental hazards, DME, and AE reviewed. Pt left 

in recliner chair with call light in reach, DO present, all needs met.





Education


OT Patient Education:  Correct positioning, Energy conservation, Exercise 

program, Home exercise program, Modified ADL techniques, Purpose of tx/f

unctional activities, Safety issues, Transfer techniques, Use of adapted 

equipment


Teaching Recipient:  Patient


Teaching Methods:  Demonstration, Discussion


Response to Teaching:  Verbalize Understanding, Return Demonstration





OT Short Term Goals


Short Term Goals


Time Frame:  Dec 6, 2019


Oral hygiene:  5


Toileting hygiene:  3


Shower/bathe self:  3


Upper body dressin


Lower body dressing:  3 (met)


Putting on/taking off footwear:  3





OT Long Term Goals


Long Term Goals


Time Frame:  Dec 20, 2019


Eating (QC):  6 (met)


Oral Hygiene (QC):  6


Toileting Hygiene (QC):  6


Shower/Bathe Self (QC):  6 (met)


Upper Body Dressing (QC):  6 (met)


Lower Body Dressing (QC):  6


On/Off Footwear (QC):  6 (met)


Additional Goals:  1-Demonstrate ADL Tasks, 2-Verbalize Understanding, 3-

ImproveStrength/Hema


1=Demonstrate adherence to instructed precautions during ADL tasks.


2=Patient will verbalize/demonstrate understanding of assistive 

devices/modifications for ADL.


3=Patient will improve strength/tolerance for activity to enable patient to 

perform ADL's.





OT Education/Plan


Problem List/Assessment


Assessment:  Decreased Activ Tolerance, Decreased UE Strength, Impaired Funct 

Balance, Impaired I ADL's, Impaired Self-Care Skills





Discharge Recommendations


Plan/Recommendations:  Continue POC


Therapy Discharge Recommendati:  Home & Family


Equpiment Recommendations-D/C:  Rails on Tub/Shower, Sock Aide, Dressing Stick


Comment


Pt states he has commode and reacher at home.





Treatment Plan/Plan of Care


Treatment,Training & Education:  Yes


Patient would benefit from OT for education, treatment and training to promote 

independence in ADL's, mobility, safety and/or upper extremity function for A

DL's.


Plan of Care:  ADL Retraining, Functional Mobility, Group Exercise/Act as Ind, 

UE Funct Exercise/Act


Treatment Duration:  Dec 20, 2019


Frequency:  At least 5 of 7 days/Wk (IRF)


Estimated Hrs Per Day:  1.5 hours per day


Agreement:  Yes


Rehab Potential:  Guarded





Time/GCodes


Start Time:  08:00


Stop Time:  09:00


Total Time Billed (hr/min):  60


Billed Treatment Time


1, ADL 4 (60)











NARAYAN PITT OTR                 Dec 3, 2019 09:02


POS Yes

## 2020-09-09 NOTE — ED GENERAL
General


Chief Complaint:  Fever-Adult/Adol


Stated Complaint:  FEVER


Nursing Triage Note:  


ARRIVES VIA EMS WHO WAS CALLED OUT BECAUSE YAMIL HAS HAD A FEVER AND CONTINUES




DESPITE HAVING TYLENOL 1 GRAM. EMS REPORTS 100.0. 18 GUAGE IV ESTABLISHED BY 


EMS. PATIENT IS ALERT AND ORIENTED X4, CALL LIGHT IN REACH MONITORING 


MAINTAINED.


Nursing Sepsis Screen:  No Definite Risk


Source of Information:  Patient


Exam Limitations:  No Limitations





History of Present Illness


Date Seen by Provider:  Sep 9, 2020


Time Seen by Provider:  00:01


Initial Comments


This 80-year-old gentleman presents to the emergency room via EMS with a couple 

days of productive cough and fever.  He is alert and oriented.  He has history 

of COPD and normally uses oxygen at 2 L by nasal cannula at home.  He is stable 

on this flow Rate.  He is not in respiratory distress.  Patient denies any known

exposures to COVID-19 but he did attend a wedding on Saturday, September 5.  He 

has nebulizer treatments at home but has not been using them.





Allergies and Home Medications


Allergies


Coded Allergies:  


     morphine (Verified  Allergy, Severe, Pt has received Lortab in the past w/o

issue, 9/9/20)


     Sulfa (Sulfonamide Antibiotics) (Verified  Allergy, Unknown, 9/9/20)


     penicillin G (Verified  Allergy, Unknown, Pt has received Cefepime & 

Meropenem in the past, 9/9/20)


     levofloxacin (Verified  Adverse Reaction, Unknown, 9/9/20)





Home Medications


Albuterol Sulfate 2.5 Mg/3 Ml Vial.neb, 2.5 MG INH Q4H PRN for WHEEZING


   Prescribed by: SHANNEN DELGADO on 3/8/20 2357


Cefuroxime Axetil 250 Mg Tablet, 250 MG PO BID


   Prescribed by: COSTA FINN on 6/3/20 1228


Docusate Sodium 100 Mg Capsule, 100 MG PO DAILY, (Reported)


Doxycycline Hyclate 100 Mg Tablet, 100 MG PO BID


   Prescribed by: SHANNEN DELGADO on 3/8/20 2355


Ferrous Sulfate 325 Mg Tablet, 325 MG PO DAILY, (Reported)


Furosemide 40 Mg Tablet, 40 MG PO DAILY, (Reported)


Gabapentin 100 Mg Capsule, 100 MG PO DAILY, (Reported)


Gabapentin 100 Mg Capsule, 200 MG PO HS, (Reported)


Insulin NPH Human Isophane 100 Unit/1 Ml Vial, 4 UNITS SQ BID, (Reported)


   USES ALONG WITH NOVOLIN R 


Insulin Regular, Human 1,000 Units/10 Ml Soln, 5 UNITS SQ BID, (Reported)


   USES ALONG WITH NOVOLIN N 


Ipratropium/Albuterol Sulfate 3 Ml Ampul.neb, 3 ML NEB TID PRN for SHORTNESS OF 

BREATH, (Reported)


Loratadine 10 Mg Tablet, 10 MG PO DAILY, (Reported)


Metoprolol Tartrate 25 Mg Tablet, 25 MG PO BID, (Reported)


Montelukast Sodium 10 Mg Tablet, 10 MG PO HS, (Reported)


Pantoprazole Sodium 40 Mg Tablet.dr, 40 MG PO DAILY, (Reported)


Prednisone 20 Mg Tab, 40 MG PO DAILY


   Prescribed by: SHANNEN DELGADO on 3/8/20 8305


Prednisone 20 Mg Tab, 40 MG PO DAILY


   Prescribed by: COSTA FINN on 6/3/20 1228


Simvastatin 20 Mg Tablet, 20 MG PO HS, (Reported)


Warfarin Sodium 5 Mg Tablet, 5 MG PO 1800, (Reported)





Patient Home Medication List


Home Medication List Reviewed:  Yes





Review of Systems


Review of Systems


Constitutional:  see HPI


EENTM:  no symptoms reported


Respiratory:  see HPI


Cardiovascular:  no symptoms reported


Gastrointestinal:  no symptoms reported


Genitourinary:  no symptoms reported


Musculoskeletal:  other (chronic neck and sacral pain)


Skin:  no symptoms reported


Psychiatric/Neurological:  No Symptoms Reported


Hematologic/Lymphatic:  No Symptoms Reported


Immunological/Allergic:  no symptoms reported





Past Medical-Social-Family Hx


Past Med/Social Hx:  Reviewed Nursing Past Med/Soc Hx


Patient Social History


Alcohol Use:  Denies Use


Recreational Drug Use:  No


Type Used:  Cigarettes


Former Smoker, Quit:  Apr 30, 1985


2nd Hand Smoke Exposure:  No


Recent Foreign Travel:  No


Contact w/Someone Who Travel:  No


Recent Infectious Disease Expo:  No


Recent Hopitalizations:  Yes


Physical Abuse:  No


Sexual Abuse:  No


Mistreated:  No


Fear:  No





Immunizations Up To Date


Tetanus Booster (TDap):  More than 5yrs


PED Vaccines UTD:  No


Date of Pneumonia Vaccine:  Oct 2, 2018


Date of Influenza Vaccine:  Sep 18, 2019





Seasonal Allergies


Seasonal Allergies:  No





Past Medical History


Surgeries:  Yes (right hip, lung)


Cardiac, CABG, Coronary Stent, Eye Surgery, Joint Replacement, Lobectomy, 

Orthopedic, Prostatectomy, Tonsillectomy


Respiratory:  Yes


Pneumonia, Sleep Apnea, COPD, Emphysema


Currently Using CPAP:  No


Currently Using BIPAP:  No


Cardiac:  Yes (diastolic heart failure)


Atrial Fibrillation, Coronary Artery Disease, High Cholesterol, Hypertension, 

Peripheral Vascular


Neurological:  Yes


Stroke


Reproductive Disorders:  No


Sexually Transmitted Disease:  No


HIV/AIDS:  No


Genitourinary:  Yes


Benign Prostatic Hyperpl, Prostate Problems, Bladder Infection


Gastrointestinal:  Yes


Gastroesophageal Reflux, Gastrointestinal Bleed


Musculoskeletal:  Yes


Arthritis


Endocrine:  Yes


Diabetes, Insulin dep


HEENT:  Yes


Cataract


Loss of Vision:  Denies


Hearing Impairment:  Hard of Hearing


Cancer:  Yes (Throat)


Psychosocial:  No


Integumentary:  No


Blood Disorders:  No


Adverse Reaction/Blood Tranf:  No





Family Medical History


Reviewed Nursing Family Hx





BLADD


  09 BROTHER


BLADD


  09 BROTHER


Cancer


  03 FATHER (THROAT)


  09 BROTHER


Dementia


  03 MOTHER


FH: bladder cancer


FH: bladder cancer


Family history: Diabetes mellitus


  03 MOTHER


Family history: Thyroid disorder


  03 MOTHER


Infertile


  03 MOTHER (X5 MISCARRIAGES)


Myocardial infarction


  09 BROTHER





No Family History of:


  Abdominal aortic aneurysm


  Shayne's disease


  Alcoholism


  Aphasia


  Cancer of colon


  Cataract


  Chest pain


  Congenital heart disease


  Congestive heart failure


  Cystic fibrosis


  Dysphagia


  Family history: Allergy


  Family history: Alzheimer's disease


  Family history: Arthritis


  Family history: Asthma


  Family history: Breast disease


  Family history: Cardiovascular disease


  Family history: Coronary thrombosis


  Family history: Gastrointestinal disease


  Family history: Glaucoma


  Family history: Hypertension


  Family history: Osteoporosis


  Headache


  Hearing loss


  Heart disease


  Hereditary disease


  History of - anemia


  History of - disorder


  History of - respiratory disease


  History of drug abuse


  Human immunodeficiency virus (HIV) seropositivity


  Hypercholesterolemia


  Kidney disease


  Malignant neoplasm of lung


  Parkinson's disease


  Prostate cancer


  Psychotic disorder


  Seizure disorder


  Stroke


  Tuberculosis


  Visual impairment


Heart Disease, Cancer, Stroke, Other Conditions/Hx





Physical Exam-Suspected Sepsis


Physical Exam


Vital Signs





Vital Signs - First Documented




















Capillary Refill : Less Than 3 Seconds








Blood Pressure Mean:                    91








Height, Weight, BMI


Height: 5'9.00"


Weight: 156lbs. 5.0oz. [id number]; 22.00 BMI


Method:Stated


General Appearance:  No Apparent Distress, WD/WN


HEENT:  PERRL/EOMI, Normal ENT Inspection


Neck:  Normal Inspection


Respiratory:  No Accessory Muscle Use, No Respiratory Distress, Crackles (left 

lower lung)


Cardiovascular:  Regular Rate, Rhythm, No Edema, No Murmur, Normal Peripheral 

Pulses


Gastrointestinal:  Normal Bowel Sounds, Non Tender, Soft


Extremity:  Normal Inspection, No Pedal Edema


Neurologic/Psychiatric:  Alert, Oriented x3, No Motor/Sensory Deficits, Normal 

Mood/Affect, CNs II-XII Norm as Tested


Skin:  normal color, warm/dry





Focused Exam


Sepsis Stage:  Septic Shock


Possible Source:  Pulmonary


Lactate Level


9/9/20 00:15: Lactic Acid Level 0.72





Time of Focused Exam:  04:15


Respiratory:  Lungs Clear, Normal Breath Sounds, No Accessory Muscle Use


Cardiovascular:  Regular Rate, Rhythm, No Edema


Capillary Refill:  Less Than 3 Seconds


Skin:  normal color, warm/dry


Lactic Acid Level





Within 3hrs of presentation:  Admin 30ml/kg IBW due to BMI>30, Admin ABX, Blood 

cultures prior to ABX's, Focus exam, Lactate level, Vasopressin therapy





Progress/Results/Core Measures


Suspected Sepsis


Recent Fever Within 48 Hours:  Yes


Infection Criteria Present:  Suspected New Infection


New/Unexplained  Altered Menta:  No


Sepsis Screen:  No Definite Risk


SIRS


Temperature: 


Pulse: 74 


Respiratory Rate: 20


 


Laboratory Tests


9/9/20 00:15: White Blood Count 13.7H


Blood Pressure 120 /77 


Mean: 91


 


9/9/20 00:15: Lactic Acid Level 0.72


Laboratory Tests


9/9/20 00:15: 


Creatinine 1.02, INR Comment 1.1, Platelet Count 289, Total Bilirubin 0.5








Results/Orders


Lab Results





Laboratory Tests








Test


 9/9/20


00:15 9/9/20


02:20 Range/Units


 


 


White Blood Count


 13.7 H


 


 4.3-11.0


10^3/uL


 


Red Blood Count


 3.45 L


 


 4.35-5.85


10^6/uL


 


Hemoglobin 10.4 L  13.3-17.7  G/DL


 


Hematocrit 32 L  40-54  %


 


Mean Corpuscular Volume 93   80-99  FL


 


Mean Corpuscular Hemoglobin 30   25-34  PG


 


Mean Corpuscular Hemoglobin


Concent 33 


 


 32-36  G/DL





 


Red Cell Distribution Width 16.7 H  10.0-14.5  %


 


Platelet Count


 289 


 


 130-400


10^3/uL


 


Mean Platelet Volume 8.8   7.4-10.4  FL


 


Neutrophils (%) (Auto) 76 H  42-75  %


 


Lymphocytes (%) (Auto) 17   12-44  %


 


Monocytes (%) (Auto) 7   0-12  %


 


Eosinophils (%) (Auto) 0   0-10  %


 


Basophils (%) (Auto) 0   0-10  %


 


Neutrophils # (Auto) 10.4 H  1.8-7.8  X 10^3


 


Lymphocytes # (Auto) 2.3   1.0-4.0  X 10^3


 


Monocytes # (Auto) 1.0   0.0-1.0  X 10^3


 


Eosinophils # (Auto)


 0.0 


 


 0.0-0.3


10^3/uL


 


Basophils # (Auto)


 0.0 


 


 0.0-0.1


10^3/uL


 


Prothrombin Time 15.1 H  12.2-14.7  SEC


 


INR Comment 1.1   0.8-1.4  


 


Activated Partial


Thromboplast Time 49 H


 


 24-35  SEC





 


Sodium Level 136   135-145  MMOL/L


 


Potassium Level 3.8   3.6-5.0  MMOL/L


 


Chloride Level 99     MMOL/L


 


Carbon Dioxide Level 28   21-32  MMOL/L


 


Anion Gap 9   5-14  MMOL/L


 


Blood Urea Nitrogen 21 H  7-18  MG/DL


 


Creatinine


 1.02 


 


 0.60-1.30


MG/DL


 


Estimat Glomerular Filtration


Rate > 60 


 


  





 


BUN/Creatinine Ratio 21    


 


Glucose Level 131 H    MG/DL


 


Lactic Acid Level


 0.72 


 


 0.50-2.00


MMOL/L


 


Calcium Level 8.1 L  8.5-10.1  MG/DL


 


Corrected Calcium 8.7   8.5-10.1  MG/DL


 


Total Bilirubin 0.5   0.1-1.0  MG/DL


 


Aspartate Amino Transf


(AST/SGOT) 20 


 


 5-34  U/L





 


Alanine Aminotransferase


(ALT/SGPT) 13 


 


 0-55  U/L





 


Alkaline Phosphatase 74     U/L


 


Lactate Dehydrogenase 167   125-220  U/L


 


C-Reactive Protein High


Sensitivity 10.56 H


 


 0.00-0.50


MG/DL


 


B-Type Natriuretic Peptide 141.0 H  <100.0  PG/ML


 


Total Protein 6.6   6.4-8.2  GM/DL


 


Albumin 3.2   3.2-4.5  GM/DL


 


Procalcitonin 0.15 H  <0.10  NG/ML


 


Coronavirus 2019 (LIZET) Negative   Negative  


 


Urine Color  YELLOW   


 


Urine Clarity  CLEAR   


 


Urine pH  6.5  5-9  


 


Urine Specific Gravity  1.010 L 1.016-1.022  


 


Urine Protein  TRACE H NEGATIVE  


 


Urine Glucose (UA)  NEGATIVE  NEGATIVE  


 


Urine Ketones  NEGATIVE  NEGATIVE  


 


Urine Nitrite  NEGATIVE  NEGATIVE  


 


Urine Bilirubin  NEGATIVE  NEGATIVE  


 


Urine Urobilinogen  0.2  < = 1.0  MG/DL


 


Urine Leukocyte Esterase  NEGATIVE  NEGATIVE  


 


Urine RBC (Auto)  1+ H NEGATIVE  


 


Urine RBC  5-10 H  /HPF


 


Urine WBC  NONE   /HPF


 


Urine Squamous Epithelial


Cells 


 2-5 


  /HPF





 


Urine Crystals  NONE   /LPF


 


Urine Bacteria  NEGATIVE   /HPF


 


Urine Casts  NONE   /LPF


 


Urine Mucus  NEGATIVE   /LPF


 


Urine Culture Indicated


 


 CULTURE


PENDING  











Micro Results





Microbiology


9/9/20 Influenza Types A,B Antigen ([place]) - Final, Complete


         





My Orders





Orders - SHANNEN PALMER MD


Cbc With Automated Diff (9/9/20 00:12)


Comprehensive Metabolic Panel (9/9/20 00:12)


Blood Culture (9/9/20 00:12)


Sputum Culture (9/9/20 00:12)


Urinalysis (9/9/20 00:12)


Urine Culture (9/9/20 00:12)


Protime With Inr (9/9/20 00:12)


Partial Thromboplastin Time (9/9/20 00:12)


Chest 1 View, Ap/Pa Only (9/9/20 00:12)


Ed Iv/Invasive Line Start (9/9/20 00:12)


Ed Iv/Invasive Line Start (9/9/20 00:12)


Vital Signs Adult Sepsis Patie Q15M (9/9/20 00:12)


O2 (9/9/20 00:12)


Remove Rings In Anticipation O (9/9/20 00:12)


Lactic Acid Analyzer (9/9/20 00:12)


Influenza A And B Antigens (9/9/20 00:12)


Covid 19 Inhouse Test (9/9/20 00:12)


Procalcitonin (Pct) (9/9/20 00:16)


Hs C Reactive Protein (9/9/20 00:16)


LDH (9/9/20 00:16)


Acetaminophen  Tablet (Tylenol  Tablet) (9/9/20 01:00)


Meropenem (Merrem 1000 Mg) (9/9/20 01:00)


BNP (9/9/20 00:59)


Coronavirus Sars-Cov-2 So 2019 (9/9/20 01:05)


Ns Iv 1000 Ml (Sodium Chloride 0.9%) (9/9/20 01:25)


Ed Iv/Invasive Line Start (9/9/20 01:59)


Ns Iv 1000 Ml (Sodium Chloride 0.9%) (9/9/20 01:59)


Norepinephrine 4 Mg/250 Ml (Norepinephri (9/9/20 03:15)


Norepinephrine 4 Mg/250 Ml (Norepinephri (9/9/20 02:57)


Chest 1 View, Ap/Pa Only (9/9/20 05:20)


Consult Physician (9/9/20 05:26)


Consult Physician (9/9/20 05:26)





Medications Given in ED





Current Medications








 Medications  Dose


 Ordered  Sig/Bhavin


 Route  Start Time


 Stop Time Status Last Admin


Dose Admin


 


 Acetaminophen  1,000 mg  ONCE  ONCE


 PO  9/9/20 01:00


 9/9/20 01:01 DC 9/9/20 01:13


1,000 MG


 


 Meropenem 1000 mg/


 Sterile Water  20 ml @ 


 240 mls/hr  ONCE  ONCE


 IV  9/9/20 01:00


 9/9/20 01:04 DC 9/9/20 01:13


240 MLS/HR








Vital Signs/I&O











 9/9/20 9/9/20 9/9/20 9/9/20





 00:05 00:05 00:05 01:13


 


Temp  38.3 38.3 38.3


 


Pulse  74 74 


 


Resp  20 20 


 


B/P (MAP)  120/77 (91) 120/77 (91) 


 


Pulse Ox 97 97 97 


 


O2 Delivery Nasal Cannula Nasal Cannula Nasal Cannula 


 


O2 Flow Rate 2.00 2.00 2.00 


 


    





 9/9/20 9/9/20 9/9/20 9/9/20





 01:45 03:11 05:16 05:28


 


Temp 37.2   


 


Pulse  70 65 63


 


Resp   20 


 


B/P (MAP)  83/42 128/66 (56) 


 


Pulse Ox   100 


 


O2 Delivery   Nasal Cannula 


 


O2 Flow Rate   2.00 


 


    





 9/9/20 9/9/20 9/9/20 9/9/20





 05:30 05:45 05:58 06:00


 


Temp 36.4   


 


Pulse 70 96  72


 


Resp 21 29  30


 


B/P (MAP) 117/77 (90) 111/52 (71)  90/44 (59)


 


Pulse Ox 97 91 98 98


 


O2 Delivery Nasal Cannula Nasal Cannula Nasal Cannula Nasal Cannula


 


O2 Flow Rate 2.00 2.00 2.00 2.00


 


    





 9/9/20 9/9/20 9/9/20 





 06:15 06:30 06:45 


 


Pulse 62 80 71 


 


Resp 26 21 27 


 


B/P (MAP) 118/50 (72) 127/86 (100) 115/87 (96) 


 


Pulse Ox 97 93 98 


 


O2 Delivery Nasal Cannula Nasal Cannula Nasal Cannula 


 


O2 Flow Rate 2.00 2.00 2.00 





Capillary Refill : Less Than 3 Seconds








Blood Pressure Mean:                    91








Progress Note #1:  


   Time:  01:55


Progress Note


Patient was seen and examined on arrival.  Septic workup was pursued.  Rapid 

influenza and COVID-19 tests are negative.  Chest x-ray suggests pneumonia.  

Antibiotic therapy was initiated with meropenem due to patient's allergy 

profile.  He is presently receiving a liter of IV normal saline.  The last coupl

e of blood pressure measurements were hypotensive.  We will monitor this closely

as we replace fluid.  Disposition to floor versus ICU is pending his response to

fluid resuscitation.


Progress Note #2:  


   Time:  02:32


Progress Note


Care was discussed with Dr. Martínez who agreed to admission on the medical 

floor.  However, patient has since become hypotensive.  His blood pressure is 

presently 89/54 after 1 L of IV fluid.  He is receiving a second liter of IV 

fluid.  If this does not resuscitate his blood pressure, we will consider 

placement of a central line and initiation of pressors.


Progress Note #3:  


Progress Note


Patient remained hypotensive after 2 L normal saline which is more than a 30 ML 

per kilogram.  I attempted to place a right subclavian line but was unable to 

thread the catheter after starting the guidewire.  I made multiple attempts but 

was unsuccessful.  Dr. Carcamo was eventually consulted for placement of a 

central line.  Dr. Patino was consulted for septic shock management.





ECG


Initial ECG Impression Date:  Sep 9, 2020


Initial ECG Impression Time:  02:01


Initial ECG Rate:  69


Initial ECG Rhythm:  Normal Sinus


Comment


Sinus rhythm with no ST elevation or depression.  PACs noted.  Borderline left 

axis deviation.





Diagnostic Imaging





   Diagonstic Imaging:  Xray


   Plain Films/CT/US/NM/MRI:  chest


Comments


Chest x-ray viewed by me and compared with prior.  Increasing infiltrates in 

both lower lung fields.  Report not yet available.





Departure


Communication (Admissions)


Time/Spoke to Admitting Phy:  01:33


Dr. Martínez


Time/Spoke to Consulting Phy:  04:15


Dr. Patino





Impression





   Primary Impression:  


   Septic shock


   Additional Impressions:  


   Pneumonia


   Qualified Codes:  J18.9 - Pneumonia, unspecified organism


   Person under investigation for COVID-19


Disposition:  09 ADMITTED AS INPATIENT


Condition:  Improved





Admissions


Decision to Admit Reason:  Admit from ER (General)


Decision to Admit/Date:  Sep 9, 2020


Time/Decision to Admit Time:  01:00





Departure-Patient Inst.


Referrals:  


TOR CONNELLY DO (PCP/Family)


Primary Care Physician











SHANNEN PALMER MD         Sep 9, 2020 01:15

## 2020-09-10 VITALS — SYSTOLIC BLOOD PRESSURE: 123 MMHG | DIASTOLIC BLOOD PRESSURE: 56 MMHG

## 2020-09-10 VITALS — SYSTOLIC BLOOD PRESSURE: 103 MMHG | DIASTOLIC BLOOD PRESSURE: 67 MMHG

## 2020-09-10 VITALS — DIASTOLIC BLOOD PRESSURE: 59 MMHG | SYSTOLIC BLOOD PRESSURE: 121 MMHG

## 2020-09-10 VITALS — DIASTOLIC BLOOD PRESSURE: 76 MMHG | SYSTOLIC BLOOD PRESSURE: 113 MMHG

## 2020-09-10 VITALS — DIASTOLIC BLOOD PRESSURE: 58 MMHG | SYSTOLIC BLOOD PRESSURE: 111 MMHG

## 2020-09-10 VITALS — DIASTOLIC BLOOD PRESSURE: 62 MMHG | SYSTOLIC BLOOD PRESSURE: 132 MMHG

## 2020-09-10 VITALS — SYSTOLIC BLOOD PRESSURE: 109 MMHG | DIASTOLIC BLOOD PRESSURE: 58 MMHG

## 2020-09-10 VITALS — DIASTOLIC BLOOD PRESSURE: 65 MMHG | SYSTOLIC BLOOD PRESSURE: 108 MMHG

## 2020-09-10 VITALS — SYSTOLIC BLOOD PRESSURE: 115 MMHG | DIASTOLIC BLOOD PRESSURE: 59 MMHG

## 2020-09-10 VITALS — SYSTOLIC BLOOD PRESSURE: 113 MMHG | DIASTOLIC BLOOD PRESSURE: 49 MMHG

## 2020-09-10 VITALS — DIASTOLIC BLOOD PRESSURE: 78 MMHG | SYSTOLIC BLOOD PRESSURE: 106 MMHG

## 2020-09-10 VITALS — DIASTOLIC BLOOD PRESSURE: 56 MMHG | SYSTOLIC BLOOD PRESSURE: 123 MMHG

## 2020-09-10 VITALS — SYSTOLIC BLOOD PRESSURE: 102 MMHG | DIASTOLIC BLOOD PRESSURE: 75 MMHG

## 2020-09-10 VITALS — DIASTOLIC BLOOD PRESSURE: 58 MMHG | SYSTOLIC BLOOD PRESSURE: 113 MMHG

## 2020-09-10 LAB
BASOPHILS # BLD AUTO: 0 10^3/UL (ref 0–0.1)
BASOPHILS NFR BLD AUTO: 0 % (ref 0–10)
BUN/CREAT SERPL: 15
CALCIUM SERPL-MCNC: 7.8 MG/DL (ref 8.5–10.1)
CHLORIDE SERPL-SCNC: 105 MMOL/L (ref 98–107)
CO2 SERPL-SCNC: 27 MMOL/L (ref 21–32)
CREAT SERPL-MCNC: 0.73 MG/DL (ref 0.6–1.3)
EOSINOPHIL # BLD AUTO: 0.2 10^3/UL (ref 0–0.3)
EOSINOPHIL NFR BLD AUTO: 2 % (ref 0–10)
GFR SERPLBLD BASED ON 1.73 SQ M-ARVRAT: > 60 ML/MIN
GLUCOSE SERPL-MCNC: 113 MG/DL (ref 70–105)
HCT VFR BLD CALC: 27 % (ref 40–54)
HGB BLD-MCNC: 8.8 G/DL (ref 13.3–17.7)
LYMPHOCYTES # BLD AUTO: 2.5 X 10^3 (ref 1–4)
LYMPHOCYTES NFR BLD AUTO: 26 % (ref 12–44)
MAGNESIUM SERPL-MCNC: 1.9 MG/DL (ref 1.6–2.4)
MANUAL DIFFERENTIAL PERFORMED BLD QL: NO
MCH RBC QN AUTO: 30 PG (ref 25–34)
MCHC RBC AUTO-ENTMCNC: 32 G/DL (ref 32–36)
MCV RBC AUTO: 93 FL (ref 80–99)
MONOCYTES # BLD AUTO: 0.8 X 10^3 (ref 0–1)
MONOCYTES NFR BLD AUTO: 9 % (ref 0–12)
NEUTROPHILS # BLD AUTO: 6 X 10^3 (ref 1.8–7.8)
NEUTROPHILS NFR BLD AUTO: 64 % (ref 42–75)
PHOSPHATE SERPL-MCNC: 2.5 MG/DL (ref 2.3–4.7)
PLATELET # BLD: 225 10^3/UL (ref 130–400)
PMV BLD AUTO: 8.8 FL (ref 7.4–10.4)
POTASSIUM SERPL-SCNC: 3.8 MMOL/L (ref 3.6–5)
SODIUM SERPL-SCNC: 139 MMOL/L (ref 135–145)
WBC # BLD AUTO: 9.5 10^3/UL (ref 4.3–11)

## 2020-09-10 RX ADMIN — APIXABAN SCH MG: 2.5 TABLET, FILM COATED ORAL at 20:11

## 2020-09-10 RX ADMIN — IPRATROPIUM BROMIDE AND ALBUTEROL SULFATE SCH ML: .5; 3 SOLUTION RESPIRATORY (INHALATION) at 19:10

## 2020-09-10 RX ADMIN — Medication SCH MLS/HR: at 02:07

## 2020-09-10 RX ADMIN — VASOPRESSIN SCH MLS/HR: 20 INJECTION INTRAVENOUS at 05:45

## 2020-09-10 RX ADMIN — APIXABAN SCH MG: 2.5 TABLET, FILM COATED ORAL at 09:13

## 2020-09-10 RX ADMIN — INSULIN ASPART SCH UNIT: 100 INJECTION, SOLUTION INTRAVENOUS; SUBCUTANEOUS at 05:46

## 2020-09-10 RX ADMIN — IPRATROPIUM BROMIDE AND ALBUTEROL SULFATE SCH ML: .5; 3 SOLUTION RESPIRATORY (INHALATION) at 22:03

## 2020-09-10 RX ADMIN — IPRATROPIUM BROMIDE AND ALBUTEROL SULFATE SCH ML: .5; 3 SOLUTION RESPIRATORY (INHALATION) at 11:21

## 2020-09-10 RX ADMIN — IPRATROPIUM BROMIDE AND ALBUTEROL SULFATE SCH ML: .5; 3 SOLUTION RESPIRATORY (INHALATION) at 14:55

## 2020-09-10 RX ADMIN — METOPROLOL TARTRATE SCH MG: 25 TABLET, FILM COATED ORAL at 09:14

## 2020-09-10 RX ADMIN — MEROPENEM SCH MLS/HR: 1 INJECTION, POWDER, FOR SOLUTION INTRAVENOUS at 02:13

## 2020-09-10 RX ADMIN — MEROPENEM SCH MLS/HR: 1 INJECTION, POWDER, FOR SOLUTION INTRAVENOUS at 09:46

## 2020-09-10 RX ADMIN — FLUTICASONE PROPIONATE AND SALMETEROL XINAFOATE SCH PUFF: 115; 21 AEROSOL, METERED RESPIRATORY (INHALATION) at 19:10

## 2020-09-10 RX ADMIN — INSULIN ASPART SCH UNIT: 100 INJECTION, SOLUTION INTRAVENOUS; SUBCUTANEOUS at 09:46

## 2020-09-10 RX ADMIN — METOPROLOL TARTRATE SCH MG: 25 TABLET, FILM COATED ORAL at 20:11

## 2020-09-10 RX ADMIN — INSULIN ASPART SCH UNIT: 100 INJECTION, SOLUTION INTRAVENOUS; SUBCUTANEOUS at 15:47

## 2020-09-10 RX ADMIN — MEROPENEM SCH MLS/HR: 1 INJECTION, POWDER, FOR SOLUTION INTRAVENOUS at 17:27

## 2020-09-10 RX ADMIN — FLUTICASONE PROPIONATE AND SALMETEROL XINAFOATE SCH PUFF: 115; 21 AEROSOL, METERED RESPIRATORY (INHALATION) at 08:02

## 2020-09-10 RX ADMIN — INSULIN ASPART SCH UNIT: 100 INJECTION, SOLUTION INTRAVENOUS; SUBCUTANEOUS at 20:17

## 2020-09-10 NOTE — DIAGNOSTIC IMAGING REPORT
INDICATION: Dyspnea



Single AP view of the chest is obtained with comparison made

study of one day earlier.



Overall heart size is within normal limits. There is background

emphysema. Right apical pleural parenchymal density is similar to

previous study. Chronic bilateral parenchymal opacities have not

significantly changed. There is possible left jugular central

venous catheter with tip projecting to the right of midline in

the upper mediastinum.



IMPRESSION: Stable chronic findings without significant adverse

change. There is no evidence of pneumothorax.



Dictated by: 



  Dictated on workstation # [id number]

## 2020-09-10 NOTE — NUR
SPOKE WITH THE PT AND HIS DAUGHTER PATRICE, WENT THRU THE EXT MED HISTORY AND CALLED DR. DIAZ OFFICE TO COMPLETE THE MED REC



LASIX IS WRITTEN FOR TWICE DAILY HOWEVER PT IS JUST TAKING ONCE DAILY

GABAPENTIN 100MG- DIRECTIONS SHOW 1 TAB DAILY AND AT NOON AND 3 TABS HS- HOWEVER PT IS ONLY 
TAKING 1 TAB DAILY AND 2 HS



PT GETS NOVOLIN R AND NOVOLIN N OTC FROM [facility] DUE TO COST



PT GETS BREO 100/25MCG AS A SAMPLE FROM DR. DIAZ OFFICE- THE LAST DATE THEY HAVE 
DOCUMENTED THAT HE RECEIVED A SAMPLE WAS 07- # 



OTC MEDS:

IRON 325MG

## 2020-09-10 NOTE — NUR
PT TRANSPORTED TO 4TH MEDICAL/SURGICAL FLOOR VIA PT BED WITH THIS RN AT [facility] AND [facility] 
[facility] [place]. IV X2 AND CENTRAL LINE INTACT UPON ARRIVAL TO NEW PT ROOM. PT BED LOCKED AND PUT IN 
LOWEST POSITION, CALL LIGHT GIVEN TO PT. PT TOLERATED TRANSPORT WELL.

## 2020-09-10 NOTE — NUR
Patient transferred to 405-1 per BED accompanied by ICU staff.  Patient and family notified 
and understand transfer.  Personal belongings with patient.  Report given to THIS RN BY ICU 
RN ART [id number].

## 2020-09-10 NOTE — NUR
RD ASSESSMENT 



PMHx: COPD; emphysema; pneumonia; afib; CAD; hypercholesterolemia; HTN; stroke; BPH; GERD; 
DM 



PT INTERACTION: Pt was awake and pleasant during nutrition assessment. Pt states current 

appetite is alright. Note avg PO intake 33% x2d, per chart review. Pt states following a 
regular diet at home, and has no issues with chewing/swallowing food. Pt states no recent 
issues with nausea, vomiting, constipation, or diarrhea, and that his last BM was 9/9. Note 
pt not currently on bowel regimen per chart review. Pt states some recent wt changes. "I was 
probably at 280# when we talked last, and now I'm down to 260." Note recent hospital stay in 
06/2020. Note recent 14# wt gain x3mon, per chart review. Pt states current DM management is 
pretty good. Note unable to determine recent HbA1c, per chart review. 



ABNORMAL NUTRITION-RELATED LAB VALUES

LOW: Ca 7.8

HIGH: glu 113



Est. kcal needs: 1450 kcal | 20 kcal/kg  

Est. Pro needs:  58 g Pro | 0.8 g Pro/kg



PES STATEMENT: Inadequate oral intake (NI-2.1) related to loss of appetite as evidenced by 
pt interview | avg PO intake 33% x1d



INTERVENTION:  

Continue with current diet order of CHO 60g/m 1snack diet. 

Pt may benefit from nutrition supplementation if PO intake remains low. 

Offered diet education on DM management, but pt declined at this time. Will attempt to offer 
again prior to discharge. 

Will continue to follow and reassess as pt needs, intake, and status change. 



MONITOR/EVALUATE:  

PO Intake; Plan of Care; Hydration Status; Weight Status; Lab Values 





ADA Light, MS, RD, LD

## 2020-09-10 NOTE — PULMONARY PROGRESS NOTE
HARIKA SCOTT MED STUDENT 9/10/20 0350:


Subjective


Date Seen by a Provider:  Sep 10, 2020


Time Seen by a Provider:  03:30


Subjective/Events-last exam


Guanaco Moulton reports feeling much improved today, he has reduced SOB and cough 

compared to yesterday.  He is on room air.  Had a central line placed yesterday 

due to hypotension, his neck is sore at the site of the central line.  Notes his

back is sore when he takes deep breaths.  He reports being somewhat confused 

yesterday, which has improved.  Denies having any other issues.


Review of Systems


General:  No Chills


HEENT:  No Sinus Congestion, No Sore Throat


Pulmonary:  Dyspnea (improved), Cough (improved)


Cardiovascular:  No: Chest Pain, Palpitations, Edema


Gastrointestinal:  No: Nausea, Vomiting, Abdominal Pain, Diarrhea, Constipation


Genitourinary:  No Dysuria, No Frequency; Retention


Neurological:  No: Weakness, Numbness, Confusion





Sepsis Event


Evaluation


Height, Weight, BMI


Height: 5'9.00"


Weight: 156lbs. 5.0oz. 70.431271is; 25.05 BMI


Method:Stated





Focused Exam


Lactate Level


9/9/20 00:15: Lactic Acid Level 0.72


Time of Focused Exam:  04:15





Exam


Exam





Vital Signs








  Date Time  Temp Pulse Resp B/P (MAP) Pulse Ox O2 Delivery O2 Flow Rate FiO2


 


9/10/20 03:00    103/67 (79) 93 Room Air  


 


9/10/20 02:00    113/76 (88) 94 Room Air  


 


9/10/20 01:00    111/58 (75) 93 Room Air  


 


9/10/20 01:00  110      


 


9/10/20 00:00     93 Room Air  


 


9/10/20 00:00   34 109/58 (75) 96 Room Air  


 


9/9/20 23:00   22 115/65 (82) 95 Room Air  


 


9/9/20 22:00   26 118/70 (86) 95 Room Air  


 


9/9/20 21:00   26 114/64 (81) 93 Room Air  


 


9/9/20 20:00     93 Room Air  


 


9/9/20 20:00 37.2       


 


9/9/20 20:00  89 36 114/60 (78)  Room Air  


 


9/9/20 19:00  90      


 


9/9/20 19:00  93 31 121/62 (81)  Room Air  


 


9/9/20 18:00   19 100/55 (70)  Room Air  


 


9/9/20 17:00  73 32 122/54 (76)  Room Air  


 


9/9/20 16:39 36.1       


 


9/9/20 16:00  81 28 123/56 (78)  Room Air  


 


9/9/20 16:00     95 Room Air  


 


9/9/20 15:00  71  96/54 (68) 97 Room Air  


 


9/9/20 14:00  81 24 118/63 (81) 95 Room Air  


 


9/9/20 13:17  84      


 


9/9/20 13:00   29 122/67 (85)  Room Air  


 


9/9/20 12:00   24 118/57 (77) 97 Room Air  


 


9/9/20 12:00     94 Room Air  


 


9/9/20 11:00  66 28 109/67 (81) 96 Room Air  


 


9/9/20 10:00   44 133/60 (84) 95 Room Air  


 


9/9/20 09:00    125/57 (79) 89 Room Air  


 


9/9/20 08:39      Room Air  


 


9/9/20 08:02     97 Nasal Cannula 2.00 


 


9/9/20 08:01 36.6     Nasal Cannula 2.00 


 


9/9/20 08:00  87 21 116/66 (83) 97 Nasal Cannula 2.00 


 


9/9/20 07:00  73 29 124/64 (84) 97 Nasal Cannula 2.00 


 


9/9/20 06:45  71 27 115/87 (96) 98 Nasal Cannula 2.00 


 


9/9/20 06:36  82      


 


9/9/20 06:30  80 21 127/86 (100) 93 Nasal Cannula 2.00 


 


9/9/20 06:15  62 26 118/50 (72) 97 Nasal Cannula 2.00 


 


9/9/20 06:00  72 30 90/44 (59) 98 Nasal Cannula 2.00 


 


9/9/20 05:58     98 Nasal Cannula 2.00 


 


9/9/20 05:45  96 29 111/52 (71) 91 Nasal Cannula 2.00 


 


9/9/20 05:30 36.4 70 21 117/77 (90) 97 Nasal Cannula 2.00 


 


9/9/20 05:28  63      


 


9/9/20 05:16  65 20 128/66 (56) 100 Nasal Cannula 2.00 














I & O 


 


 9/10/20





 07:00


 


Intake Total 2420 ml


 


Output Total 2950 ml


 


Balance -530 ml








Height & Weight


Height: 5'9.00"


Weight: 156lbs. 5.0oz. 70.749048et; 25.05 BMI


Method:Stated


General Appearance:  No Apparent Distress, Chronically ill


HEENT:  PERRL/EOMI; No Scleral Icterus (L), No Scleral Icterus (R)


Neck:  Normal Inspection, Supple, Tender Lateral (at site of central line on L)


Respiratory:  No Accessory Muscle Use, Decreased Breath Sounds (right lower lung

post), Wheezing, Other (nasal cannula under chin- satting 96%)


Cardiovascular:  Regular Rate, Rhythm, No Edema, No JVD, No Murmur, Normal 

Peripheral Pulses


Capillary Refill:  Less Than 3 Seconds


Peripheral Pulses:  2+ Dorsalis Pedis (R), 2+ Left Dors-Pedis (L), 2+ Radial 

Pulses (R), 2+ Radial Pulses (L)


Extremity:  Normal Capillary Refill, Non Tender, No Calf Tenderness, No Pedal 

Edema


Neurologic/Psychiatric:  Alert, Oriented x3, Normal Mood/Affect


Skin:  Normal Color, Warm/Dry





Results


Lab


Laboratory Tests


9/9/20 00:15








9/10/20 03:10











Assessment/Plan


Assessment/Plan


Septic Shock


  - central line placed, continue norepinephrine


  - continue LR





Pneumonia


  -  Chest x-ray suggestive of increasing infiltrates in both lower lung fields.

 Report not yet available.


  -  Continue meropenem


  -  on room air, O2sat around 94





DM


 -  continue sliding scale insulin





DVT prophylaxis


  -  continue eliquis





COPD


  -  consider nebulizer treatments





SMITH MUNOZ DO 9/10/20 0605:


Subjective


Time Seen by a Provider:  06:00





Exam


Exam


General Appearance:  No Apparent Distress, Chronically ill


HEENT:  PERRL/EOMI


Neck:  Normal Inspection, Supple


Respiratory:  No Accessory Muscle Use, Decreased Breath Sounds (right lower lung

post)


Cardiovascular:  Regular Rate, Rhythm, No Edema, No JVD, No Murmur, Normal 

Peripheral Pulses


Extremity:  Normal Capillary Refill, Non Tender, No Calf Tenderness, No Pedal 

Edema


Neurologic/Psychiatric:  Alert, Oriented x3, Normal Mood/Affect


Skin:  Normal Color, Warm/Dry





Assessment/Plan


Assessment/Plan


Sepsis - improved 


  - central line placed,


   - norepinephrine-- not requiring now


  -SL IVF 





Pneumonia


  -  Continue meropenem


  -  on room air, O2sat around 94





DM


 -  continue sliding scale insulin





DVT prophylaxis


  -  continue eliquis





COPD


  -  HARIKA Duong MED STUDENT          Sep 10, 2020 03:50


SMITH MUNOZ DO              Sep 10, 2020 06:05

## 2020-09-10 NOTE — PROGRESS NOTE - HOSPITALIST
Subjective


HPI/CC On Admission


Date Seen by Provider:  Sep 10, 2020


Time Seen by Provider:  08:01


vision is an 80-year-old  male past medical history of COPD who 

presented to the emergency department due to fever and cough.  He reports that 

he's had a cough and fever for the past couple of days.  The cough is 

productive.  He was found to be quite hypotensive. he has chronic respiratory 

failure and uses 2 L per nasal cannula at home. he denies any sick contacts but 

has recently been to a wedding.  This morning he states that he is feeling 

better and he denies any specific complaints other than it is hard to 

differentiate people when they wear masks.


Subjective/Events-last exam


Pt reports feeling better today. Only complaint is that he would like his trash 

can emptied.





Focused Exam


Lactate Level


20 00:15: Lactic Acid Level 0.72





Time of Focused Exam:  04:15





Objective


Exam


Vital Signs





Vital Signs








  Date Time  Temp Pulse Resp B/P (MAP) Pulse Ox O2 Delivery O2 Flow Rate FiO2


 


9/10/20 06:00  77  113/49 (70) 93 Room Air  


 


9/10/20 04:00 35.9       


 


9/10/20 00:00   34     


 


20 08:02       2.00 





Capillary Refill : Less Than 3 Seconds


General Appearance:  No Apparent Distress, Chronically ill


Respiratory:  Lungs Clear, No Respiratory Distress


Cardiovascular:  No Murmur, Tachycardia


Gastrointestinal:  Normal Bowel Sounds, Non Tender, Soft


Neurologic/Psychiatric:  Alert, Oriented x3





Results/Procedures


Lab


Laboratory Tests


9/10/20 03:10








Patient resulted labs reviewed.





Assessment/Plan


Assessment and Plan


Assess & Plan/Chief Complaint


Septic Shock- shock resolved


CAP- left


COVID PUI


   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 

resistant pseudomonas so will need to follow sensitivities closely)


   Await cultures, still pending


   Dr Patino consulted, appreciate recs


   COVID negative


   Transfer to 4th floor





IDDMII


    cont SSI





CAD


paroxysmal A-fib


HTN


   Tachycardia this AM so now that off pressors will resume home rate 

controlling meds


   Continue Eliquis


   


dvt ppx: Eliquis as above





Diagnosis/Problems


Diagnosis/Problems





(1) Chronic anticoagulation


(2) HLD (hyperlipidemia)


(3) Hypertension


(4) CAD (coronary artery disease)


(5) Anemia


(6) Septic shock


Status:  Acute


(7) Person under investigation for COVID-19


Status:  Acute


(8) COPD (chronic obstructive pulmonary disease)


Status:  Chronic


(9) Left lower lobe pneumonia


Status:  Acute


(10) Paroxysmal atrial fibrillation


Status:  Chronic





Clinical Quality Measures


DVT/VTE Risk/Contraindication:


Risk Factor Score Per Nursin


RFS Level Per Nursing on Admit:  4+=Very High











NICOLETTE HERNÁNDEZ MD              Sep 10, 2020 08:07

## 2020-09-10 NOTE — NUR
CM/SS visited with patient for discharge planning. 



Plan: Patient will return home at time of discharge. 



Home: The patient reports that him and his wife live alone. They have been  for 66 
years. He states that she is legally blind and he is her primary caregiver. The patient 
verbalized that she is ambulatory around the home but needs assistance with most other 
things. He states that his daughter lives right next door to them and assist with care. 
Patient states that he is independent and does not use any assistive devices. Patient still 
currently drives. 



Equipment: Patient reports that he has a wheelchair and a walker but does not use them. 



Caregiving/home health: Patient reports that he does not have any paid caregivers at this 
time. He states that his wife at one point has had Home Health but he has not. Patient made 
the comment that he felt that home health may be helpful. CM/SS explained that this sw could 
set it up for him but not his wife. He verbalized understanding. CM/SS did discuss [facility] to assist with homemaker services. He stated he did not need them at this 
time. 



Patient enjoyed telling this sw stories of life accomplishments, family, and different 
experiences. CM/SS will continue to follow for discharge planning.

## 2020-09-11 VITALS — SYSTOLIC BLOOD PRESSURE: 122 MMHG | DIASTOLIC BLOOD PRESSURE: 70 MMHG

## 2020-09-11 VITALS — SYSTOLIC BLOOD PRESSURE: 120 MMHG | DIASTOLIC BLOOD PRESSURE: 62 MMHG

## 2020-09-11 VITALS — SYSTOLIC BLOOD PRESSURE: 118 MMHG | DIASTOLIC BLOOD PRESSURE: 68 MMHG

## 2020-09-11 VITALS — SYSTOLIC BLOOD PRESSURE: 107 MMHG | DIASTOLIC BLOOD PRESSURE: 56 MMHG

## 2020-09-11 LAB
BASOPHILS # BLD AUTO: 0 10^3/UL (ref 0–0.1)
BASOPHILS NFR BLD AUTO: 0 % (ref 0–10)
BUN/CREAT SERPL: 15
CALCIUM SERPL-MCNC: 8.2 MG/DL (ref 8.5–10.1)
CHLORIDE SERPL-SCNC: 105 MMOL/L (ref 98–107)
CO2 SERPL-SCNC: 27 MMOL/L (ref 21–32)
CREAT SERPL-MCNC: 0.75 MG/DL (ref 0.6–1.3)
EOSINOPHIL # BLD AUTO: 0.3 10^3/UL (ref 0–0.3)
EOSINOPHIL NFR BLD AUTO: 4 % (ref 0–10)
GFR SERPLBLD BASED ON 1.73 SQ M-ARVRAT: > 60 ML/MIN
GLUCOSE SERPL-MCNC: 128 MG/DL (ref 70–105)
HCT VFR BLD CALC: 28 % (ref 40–54)
HGB BLD-MCNC: 8.9 G/DL (ref 13.3–17.7)
LYMPHOCYTES # BLD AUTO: 2 X 10^3 (ref 1–4)
LYMPHOCYTES NFR BLD AUTO: 28 % (ref 12–44)
MANUAL DIFFERENTIAL PERFORMED BLD QL: NO
MCH RBC QN AUTO: 30 PG (ref 25–34)
MCHC RBC AUTO-ENTMCNC: 32 G/DL (ref 32–36)
MCV RBC AUTO: 93 FL (ref 80–99)
MONOCYTES # BLD AUTO: 0.6 X 10^3 (ref 0–1)
MONOCYTES NFR BLD AUTO: 9 % (ref 0–12)
NEUTROPHILS # BLD AUTO: 4.1 X 10^3 (ref 1.8–7.8)
NEUTROPHILS NFR BLD AUTO: 59 % (ref 42–75)
PLATELET # BLD: 242 10^3/UL (ref 130–400)
PMV BLD AUTO: 8.9 FL (ref 7.4–10.4)
POTASSIUM SERPL-SCNC: 4.1 MMOL/L (ref 3.6–5)
SODIUM SERPL-SCNC: 141 MMOL/L (ref 135–145)
WBC # BLD AUTO: 7 10^3/UL (ref 4.3–11)

## 2020-09-11 RX ADMIN — INSULIN ASPART SCH UNIT: 100 INJECTION, SOLUTION INTRAVENOUS; SUBCUTANEOUS at 06:49

## 2020-09-11 RX ADMIN — FLUTICASONE PROPIONATE AND SALMETEROL XINAFOATE SCH PUFF: 115; 21 AEROSOL, METERED RESPIRATORY (INHALATION) at 10:58

## 2020-09-11 RX ADMIN — MEROPENEM SCH MLS/HR: 1 INJECTION, POWDER, FOR SOLUTION INTRAVENOUS at 12:20

## 2020-09-11 RX ADMIN — IPRATROPIUM BROMIDE AND ALBUTEROL SULFATE SCH ML: .5; 3 SOLUTION RESPIRATORY (INHALATION) at 01:52

## 2020-09-11 RX ADMIN — METOPROLOL TARTRATE SCH MG: 25 TABLET, FILM COATED ORAL at 09:33

## 2020-09-11 RX ADMIN — INSULIN ASPART SCH UNIT: 100 INJECTION, SOLUTION INTRAVENOUS; SUBCUTANEOUS at 11:55

## 2020-09-11 RX ADMIN — IPRATROPIUM BROMIDE AND ALBUTEROL SULFATE SCH ML: .5; 3 SOLUTION RESPIRATORY (INHALATION) at 10:57

## 2020-09-11 RX ADMIN — APIXABAN SCH MG: 2.5 TABLET, FILM COATED ORAL at 09:33

## 2020-09-11 RX ADMIN — MEROPENEM SCH MLS/HR: 1 INJECTION, POWDER, FOR SOLUTION INTRAVENOUS at 01:58

## 2020-09-11 RX ADMIN — IPRATROPIUM BROMIDE AND ALBUTEROL SULFATE SCH ML: .5; 3 SOLUTION RESPIRATORY (INHALATION) at 08:11

## 2020-09-11 RX ADMIN — INSULIN ASPART SCH UNIT: 100 INJECTION, SOLUTION INTRAVENOUS; SUBCUTANEOUS at 15:53

## 2020-09-11 NOTE — PULMONARY PROGRESS NOTE
Subjective


Time Seen by a Provider:  07:31


Subjective/Events-last exam


Pt is doing better.





Sepsis Event


Evaluation


Height, Weight, BMI


Height: 5'9.00"


Weight: 156lbs. 5.0oz. 70.809396qw; 25.05 BMI


Method:Stated





Focused Exam


Lactate Level


9/9/20 00:15: Lactic Acid Level 0.72


Time of Focused Exam:  04:15





Exam


Exam





Vital Signs








  Date Time  Temp Pulse Resp B/P (MAP) Pulse Ox O2 Delivery O2 Flow Rate FiO2


 


9/11/20 03:44 36.7 91 16 118/68 (85) 97 Room Air  


 


9/11/20 01:00  101      


 


9/10/20 23:52 37.3 98 18 113/58 (76) 97 Room Air  


 


9/10/20 22:04     95 Room Air  


 


9/10/20 21:03 36.9 127 20 123/56 (78) 94 Room Air  


 


9/10/20 20:13  102  132/62 (85)    


 


9/10/20 20:10      Room Air  


 


9/10/20 19:46 36.9 127 20 123/56 (78) 94 Room Air  


 


9/10/20 19:11     94 Room Air  


 


9/10/20 19:00  114      


 


9/10/20 16:25 36.1 116 18 121/59 (79) 96 Room Air  


 


9/10/20 16:00     98 Room Air  


 


9/10/20 15:05     93 Room Air  


 


9/10/20 12:47  127      


 


9/10/20 12:03     98 Room Air  


 


9/10/20 12:00 36.3 112 18 115/59 (77) 94 Room Air  


 


9/10/20 11:22     94 Room Air  


 


9/10/20 10:44     98 Room Air  


 


9/10/20 09:17 37.0       


 


9/10/20 08:00     98 Room Air  


 


9/10/20 08:00    106/78 (87)  Room Air  














I & O 


 


 9/11/20





 07:00


 


Intake Total 1295 ml


 


Output Total 575 ml


 


Balance 720 ml








Height & Weight


Height: 5'9.00"


Weight: 156lbs. 5.0oz. 70.150997mm; 25.05 BMI


Method:Stated


General Appearance:  No Apparent Distress, Chronically ill


HEENT:  PERRL/EOMI, Moist Mucous Membranes


Neck:  Normal Inspection, Supple


Respiratory:  Lungs Clear, No Accessory Muscle Use, Other (nasal cannula under 

chin- satting 96%)


Cardiovascular:  Regular Rate, Rhythm, No Murmur


Capillary Refill:  Less Than 3 Seconds


Peripheral Pulses:  2+ Dorsalis Pedis (R), 2+ Left Dors-Pedis (L), 2+ Radial 

Pulses (R), 2+ Radial Pulses (L)


Extremity:  Normal Capillary Refill, No Calf Tenderness, No Pedal Edema


Neurologic/Psychiatric:  Alert, Oriented x3, Normal Mood/Affect


Skin:  Normal Color, Warm/Dry





Results


Lab


Laboratory Tests


9/10/20 03:10








9/11/20 06:05











Assessment/Plan


Assessment/Plan


Sepsis - improved


  -SL IVF 


Pneumonia


  -  Continue meropenem





DM


 -  continue sliding scale insulin





COPD


  -  Duoneb











SMITH MUNOZ DO              Sep 11, 2020 07:32

## 2020-09-11 NOTE — NUR
CM/SS finalized discharge. 



Plan: Patient will discharge home with home health today 9/11. 



Home Health: The patient was provided with a patient preference form. He states that he 
would like his daughter to chose. She chose [facility] and stated they have used them 
before. CM/SS contacted [facility] and spoke with Rachel to make a referral. Home 
Health orders have been placed in computer.



CM/SS contacted the patient's daughter Aurea ([phone]). She reports that she will be 
able to  the patient around 4:00 p.m. today after her appointment. Aurea lives right 
next door to the patient and his spouse. She reports that she checks on them daily and 
assist with any needs that they may have. Aurea stated that the patient get's around well at 
home and only have slight confusion every once in a while. 



No further needs at this time.

## 2020-09-11 NOTE — PROGRESS NOTE - SURGERY
RICKI CHANDLER MED STUDENT 20 0840:


Subjective


Date Seen by a Provider:  Sep 11, 2020


Time Seen by a Provider:  08:00


Subjective/Events-last exam


Pt admitted for respiratory sx and was diagnosed with pneumonia. COVID test 

negative. Pt says he is doing well today and denies CP/SOB, fever, or chills. 

PICC line placed on , states no problems with that.





Focused Exam


Lactate Level


20 00:15: Lactic Acid Level 0.72


Time of Focused Exam:  04:15





Objective


Exam





Vital Signs








  Date Time  Temp Pulse Resp B/P (MAP) Pulse Ox O2 Delivery O2 Flow Rate FiO2


 


20 07:00  77      


 


20 03:44 36.7 91 16 118/68 (85) 97 Room Air  


 


20 01:00  101      


 


9/10/20 23:52 37.3 98 18 113/58 (76) 97 Room Air  


 


9/10/20 22:04     95 Room Air  


 


9/10/20 21:03 36.9 127 20 123/56 (78) 94 Room Air  


 


9/10/20 20:13  102  132/62 (85)    


 


9/10/20 20:10      Room Air  


 


9/10/20 19:46 36.9 127 20 123/56 (78) 94 Room Air  


 


9/10/20 19:11     94 Room Air  


 


9/10/20 19:00  114      


 


9/10/20 16:25 36.1 116 18 121/59 (79) 96 Room Air  


 


9/10/20 16:00     98 Room Air  


 


9/10/20 15:05     93 Room Air  


 


9/10/20 12:47  127      


 


9/10/20 12:03     98 Room Air  


 


9/10/20 12:00 36.3 112 18 115/59 (77) 94 Room Air  


 


9/10/20 11:22     94 Room Air  


 


9/10/20 10:44     98 Room Air  


 


9/10/20 09:17 37.0       














I & O 


 


 20





 07:00


 


Intake Total 1295 ml


 


Output Total 575 ml


 


Balance 720 ml





Capillary Refill : Less Than 3 Seconds


General Appearance:  No Apparent Distress, Thin


HEENT:  PERRL/EOMI


Neck:  Normal Inspection, Supple, Other (left jugular PICC line in place, no 

surrounding erythema)


Respiratory:  Other (mild bilateral end expiratory wheeze)


Cardiovascular:  Regular Rate, Rhythm, No Murmur


Peripheral Pulses:  2+ Dorsalis Pedis (R), 2+ Left Dors-Pedis (L), 2+ Radial 

Pulses (R), 2+ Radial Pulses (L)


Neurologic/Psychiatric:  Alert, Normal Mood/Affect


Skin:  Normal Color, Warm/Dry





Results


Lab


Laboratory Tests


9/10/20 09:32: Glucometer 206H


9/10/20 15:13: Glucometer 241H


9/10/20 19:40: Glucometer 238H


20 06:05: 


White Blood Count 7.0, Red Blood Count 2.97L, Hemoglobin 8.9L, Hematocrit 28L, 

Mean Corpuscular Volume 93, Mean Corpuscular Hemoglobin 30, Mean Corpuscular 

Hemoglobin Concent 32, Red Cell Distribution Width 16.5H, Platelet Count 242, Me

an Platelet Volume 8.9, Neutrophils (%) (Auto) 59, Lymphocytes (%) (Auto) 28, 

Monocytes (%) (Auto) 9, Eosinophils (%) (Auto) 4, Basophils (%) (Auto) 0, 

Neutrophils # (Auto) 4.1, Lymphocytes # (Auto) 2.0, Monocytes # (Auto) 0.6, 

Eosinophils # (Auto) 0.3, Basophils # (Auto) 0.0, Sodium Level 141, Potassium 

Level 4.1, Chloride Level 105, Carbon Dioxide Level 27, Anion Gap 9, Blood Urea 

Nitrogen 11, Creatinine 0.75, Estimat Glomerular Filtration Rate > 60, 

BUN/Creatinine Ratio 15, Glucose Level 128H, Calcium Level 8.2L





Microbiology


20 MRSA Screen - Final, Complete


         MRSA not isolated


20 Urine Culture - Final, Complete


         NO GROWTH


20 Blood Culture - Preliminary, Resulted


         No growth





Assessment/Plan


Septic shock - source unknown


Pneumonia


Venous insufficiency


Left central line in place


COVID negative





Clinical Quality Measures


DVT/VTE Risk/Contraindication:


Risk Factor Score Per Nursin


RFS Level Per Nursing on Admit:  4+=Very High





VINCENT FLOWERS DO 20 1130:


Assessment/Plan


Pt had central line placed, it is doing fine, needs to be removed 7 days after 

placement.





Supervisory-Addendum Brief


Verification & Attestation


Participated in pt care:  history, MDM, physical


Personally performed:  other (I did not see pt, I already signed off)


Care discussed with:  Medical Student


Procedures:  n/a


I did not see pt, I only discussed medical students H&P and Assesment.











RICKI CHANDLER MED STUDENT     Sep 11, 2020 08:40


VINCENT FLOWERS DO               Sep 11, 2020 11:30

## 2020-09-11 NOTE — DISCHARGE SUMMARY
Diagnosis/Chief Complaint


Date of Admission


Sep 9, 2020 at 04:23


Date of Discharge





Discharge Date:  Sep 11, 2020


Admission Diagnosis


Septic Shock


Primary Care


Jay Holley DO


Discharge Diagnosis





(1) Chronic anticoagulation


(2) HLD (hyperlipidemia)


(3) Hypertension


(4) CAD (coronary artery disease)


(5) Anemia


(6) Septic shock


Status:  Acute


(7) Person under investigation for COVID-19


Status:  Acute


(8) COPD (chronic obstructive pulmonary disease)


Status:  Chronic


(9) Left lower lobe pneumonia


Status:  Acute


(10) Paroxysmal atrial fibrillation


Status:  Chronic





Discharge Summary


Procedures/Consulations


Pulm- Dr Patino





Discharge Physical Exam


Allergies:  


Coded Allergies:  


     morphine (Verified  Allergy, Severe, Pt has received Lortab in the past w/o

issue, 20)


     Sulfa (Sulfonamide Antibiotics) (Verified  Allergy, Unknown, 20)


     penicillin G (Verified  Allergy, Unknown, Pt has received Cefepime & 

Meropenem in the past, 20)


     levofloxacin (Verified  Adverse Reaction, Unknown, 20)


Vitals & I&Os





Vital Signs








  Date Time  Temp Pulse Resp B/P (MAP) Pulse Ox O2 Delivery O2 Flow Rate FiO2


 


20 15:29 36.4 94 20 107/56 (73) 95 Room Air  


 


20 08:02       2.00 








General Appearance:  No Apparent Distress, WD/WN


Respiratory:  Lungs Clear, No Respiratory Distress


Cardiovascular:  Regular Rate, Rhythm, No Murmur


Neurologic/Psychiatric:  Alert, Oriented x3





Hospital Course


Pt was admitted secondary to septic shock due to pneumonia.  He was tested for 

COVID and is negative. He was treated with IV antibiotics and was briefly on 

levophed for blood pressure support.  This was able to be weaned off and he did 

well. Blood cultures were negative. He was transitioned to Omnicef for discharge

as he did well with Cefepime and has an allergy to Levaquin. He was discharged 

home in stable condition to follow up with his primary care doctor, Dr Holley

in the next week to follow up this hospital stay.


Labs (last 24 hrs)





Microbiology


20 MRSA Screen - Final, Complete


         MRSA not isolated


20 Urine Culture - Final, Complete


         NO GROWTH


20 Blood Culture - Preliminary, Resulted


         No growth


Patient resulted labs reviewed.


Pending Labs








Discussion & Recommendations


Discharge Planning:  >30 minutes discharge planning





Discharge


Home Medications:





Active Scripts


Active


Ferrous Sulfate 325 Mg Tablet 325 Mg PO DAILY


Cefdinir 300 Mg Capsule 300 Mg PO BID


Reported


Breo Ellipta 100-25 Mcg INH (Fluticasone/Vilanterol) 1 Each Blst.w.dev 1 Each IH

DAILY


     LAST DATE SAMPLES WERE GIVEN:2020 #1


Potassium Chloride 10 Meq Tab.er.prt 10 Meq PO DAILY


Albuterol Sulfate 2.5 Mg/0.5 Ml Vial.neb 2.5 Mg INH Q4H


Eliquis (Apixaban) 2.5 Mg Tablet 2.5 Mg PO BID


Furosemide 40 Mg Tablet 40 Mg PO DAILY


Gabapentin 100 Mg Capsule 200 Mg PO HS


     TAKES 2 (100MG) CAPS


Metoprolol Tartrate 25 Mg Tablet 25 Mg PO BID


Pantoprazole Sodium 40 Mg Tablet.dr 40 Mg PO DAILY


Novolin N (Insulin NPH Human Isophane) 100 Unit/1 Ml Vial 5 Units SQ BIDPC


     USES ALONG WITH NOVOLIN R


Humulin R (Insulin Regular, Human) 1,000 Units/10 Ml Soln 5 Units SQ BIDPC


     USES ALONG WITH NOVOLIN N


Gabapentin 100 Mg Capsule 100 Mg PO DAILY


Montelukast Sodium 10 Mg Tablet 10 Mg PO HS


Simvastatin 20 Mg Tablet 20 Mg PO HS





Instructions to patient/family


Please see electronic discharge instructions given to patient.





Clinical Quality Measures


DVT/VTE Risk/Contraindication:


Risk Factor Score Per Nursin


RFS Level Per Nursing on Admit:  4+=Very High





Copy


Copies To 1:   JAY HOLLEY KATELYN M MD              Sep 11, 2020 14:42

## 2020-09-11 NOTE — D/C HH FACE TO FACE ORDER
D/C  Face to Face Orders


Instructions for Patient


[facility], [phone]


Patient Instructions/FollowUp:  


Please continue taking her medications as written.  Please follow up with


her primary care doctor in the next week to follow up this hospital stay.


Physician to follow Patient:  Dr Holley


Discharge Diet for Home:  Cardiac Diet





Patient Data-Allergies,Ht & Wt


Patient Allergies:  


Coded Allergies:  


     morphine (Verified  Allergy, Severe, Pt has received Lortab in the past w/o

issue, 9/9/20)


     Sulfa (Sulfonamide Antibiotics) (Verified  Allergy, Unknown, 9/9/20)


     penicillin G (Verified  Allergy, Unknown, Pt has received Cefepime & 

Meropenem in the past, 9/9/20)


     levofloxacin (Verified  Adverse Reaction, Unknown, 9/9/20)


Height (Feet):  5


Height (Inches):  9.00


Weight (Pounds):  156


Weight (Ounces):  5.0





Home Health Need/Face to Face


Date of Face to Face:  Sep 11, 2020


Clinical Findings:  Generalized weakness and fatigue, Instability


I have seen Pt face-to-face:  Yes


Discharged To:  Home


Diagnosis/Conditions:  


Septic Shock, pneumonia


Patient is Homebound due to:  Rolan fall risk due to instabilty


Homebound Status


   Due to the above stated illness, injury or surgical procedure (medical 

condition or diagnosis) and associated clinical findings, the patient is 

homebound because of his/her inability to leave home except with aid of a 

supportive device and/or person AND leaving the home requires a considerable and

taxing effort or is medically contraindicated.


Pt req the following assistanc:  Aid of another person





Home Health Nursing Orders


Home Health Services Order:  Physical Therapy-Evaluate & Treat





Home Health Infusion Therapy


Line Start Date:  Sep 9, 2020





Therapy Orders


Therapy Orders:  Physical Therapy, PT to assess for OT


Therapy Specific Orders:  Eval assistive deivces, Teach enviro 

modifications/safety, Gait training, Increase strength/endurance


Certify Stmt


I certify that this patient is under my care and that I, a nurse practitioner or

a physician; a assistant working with me, had a face to face encounter that -

meets the physician face to face encounter requirements with this patient as 

dated.











NICOLETTE HERNÁNDEZ MD              Sep 11, 2020 11:43
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
## 2020-10-06 ENCOUNTER — HOSPITAL ENCOUNTER (EMERGENCY)
Dept: HOSPITAL 75 - ER | Age: 80
Discharge: HOME | End: 2020-10-06
Payer: MEDICARE

## 2020-10-06 VITALS — BODY MASS INDEX: 22.11 KG/M2 | WEIGHT: 149.25 LBS | HEIGHT: 68.98 IN

## 2020-10-06 VITALS — DIASTOLIC BLOOD PRESSURE: 79 MMHG | SYSTOLIC BLOOD PRESSURE: 100 MMHG

## 2020-10-06 DIAGNOSIS — Z95.5: ICD-10-CM

## 2020-10-06 DIAGNOSIS — S32.030A: Primary | ICD-10-CM

## 2020-10-06 DIAGNOSIS — Z95.1: ICD-10-CM

## 2020-10-06 DIAGNOSIS — I10: ICD-10-CM

## 2020-10-06 DIAGNOSIS — Z79.01: ICD-10-CM

## 2020-10-06 DIAGNOSIS — E11.9: ICD-10-CM

## 2020-10-06 DIAGNOSIS — Z87.891: ICD-10-CM

## 2020-10-06 DIAGNOSIS — Z80.8: ICD-10-CM

## 2020-10-06 DIAGNOSIS — Z88.5: ICD-10-CM

## 2020-10-06 DIAGNOSIS — Z88.0: ICD-10-CM
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
## 2020-10-06 DIAGNOSIS — Z82.49: ICD-10-CM

## 2020-10-06 DIAGNOSIS — K59.00: ICD-10-CM

## 2020-10-06 DIAGNOSIS — J44.9: ICD-10-CM

## 2020-10-06 DIAGNOSIS — Z88.2: ICD-10-CM
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
## 2020-10-06 DIAGNOSIS — K21.9: ICD-10-CM

## 2020-10-06 DIAGNOSIS — Z20.828: ICD-10-CM

## 2020-10-06 DIAGNOSIS — Z88.1: ICD-10-CM

## 2020-10-06 DIAGNOSIS — E78.00: ICD-10-CM

## 2020-10-06 DIAGNOSIS — Z80.52: ICD-10-CM

## 2020-10-06 DIAGNOSIS — Z79.4: ICD-10-CM

## 2020-10-06 DIAGNOSIS — Z83.3: ICD-10-CM

## 2020-10-06 PROCEDURE — 72128 CT CHEST SPINE W/O DYE: CPT

## 2020-10-06 PROCEDURE — 72131 CT LUMBAR SPINE W/O DYE: CPT

## 2020-10-06 NOTE — ED FALL/INJURY
General


Chief Complaint:  Trauma-Non Activation


Stated Complaint:  FALL;BACK PAIN


Nursing Triage Note:  


TO ED C/O FALLING  ON FRIDAY HAS BEEN HARD TO GET AROUND.  HAS BEEN STAYING IN 


BED C/O   BACK PAIN





History of Present Illness


Date Seen by Provider:  Oct 6, 2020


Time Seen by Provider:  11:28


Initial Comments


80-year-old  [place] [id number] at his home. He was 

getting out of bed and fell backwards hitting the railing on his bed and then 

sliding onto the floor. He denies loss of consciousness or head injury at the 

time. He ambulates with a cane and was able to drive himself here today. He also

reports no bowel movement since the fall. He has not been taking pain medication

as he is out. He did try Tylenol with no improvement in his symptoms. He denies 

any history of injuries to his back. He has been taking medication for the 

constipation. He denies previous history of constipation. He was here 

approximately one month ago for sepsis. He lives at home with his wife who is 

blind.


Occurred:  last week


Severity:  moderate


Injuries/Pain Location:  back (thoracic and lumbar)


Loss of Consciousness:  no loss of consciousness


Associated Symptoms (Fall):  No Abdominal Pain, No Chest Pain, No Confusion, No 

Dizziness, No Headache, No Lightheadedness; Muscle Spasms; No Nausea/Vomiting, 

No Neck Pain, No Ringing in Ears, No Seizures, No Shortness of Air, No Slurred 

Speech; Trouble Walking (chronic); No Vision Changes





Allergies and Home Medications


Allergies


Coded Allergies:  


     morphine (Verified  Allergy, Severe, Pt has received Lortab in the past w/o

issue, 20)


     Sulfa (Sulfonamide Antibiotics) (Verified  Allergy, Unknown, 20)


     penicillin G (Verified  Allergy, Unknown, Pt has received Cefepime & 

Meropenem in the past, 20)


     levofloxacin (Verified  Adverse Reaction, Unknown, 20)





Home Medications


Albuterol Sulfate 2.5 Mg/0.5 Ml Vial.neb, 2.5 MG INH Q4H, (Reported)


Apixaban 2.5 Mg Tablet, 2.5 MG PO BID, (Reported)


Cefdinir 300 Mg Capsule, 300 MG PO BID


   Prescribed by: NICOLETTE HERNÁNDEZ on 20 1210


Ferrous Sulfate 325 Mg Tablet, 325 MG PO DAILY


   Prescribed by: NICOLETTE HERNÁNDEZ on 20 1210


Fluticasone/Vilanterol 1 Each Blst.w.dev, 1 EACH IH DAILY, (Reported)


   LAST DATE SAMPLES WERE GIVEN:2020 #1 


Furosemide 40 Mg Tablet, 40 MG PO DAILY, (Reported)


Gabapentin 100 Mg Capsule, 100 MG PO DAILY, (Reported)


Gabapentin 100 Mg Capsule, 200 MG PO HS, (Reported)


   TAKES 2 (100MG) CAPS 


Insulin NPH Human Isophane 100 Unit/1 Ml Vial, 5 UNITS SQ BIDPC, (Reported)


   USES ALONG WITH NOVOLIN R 


Insulin Regular, Human 1,000 Units/10 Ml Soln, 5 UNITS SQ BIDPC, (Reported)


   USES ALONG WITH NOVOLIN N 


Metoprolol Tartrate 25 Mg Tablet, 25 MG PO BID, (Reported)


Montelukast Sodium 10 Mg Tablet, 10 MG PO HS, (Reported)


Pantoprazole Sodium 40 Mg Tablet.dr, 40 MG PO DAILY, (Reported)


Potassium Chloride 10 Meq Tab.er.prt, 10 MEQ PO DAILY, (Reported)


Simvastatin 20 Mg Tablet, 20 MG PO HS, (Reported)


Tramadol HCl 50 Mg Tablet, 50 MG PO Q6H PRN for PAIN


   Prescribed by: KRIS BAILEY on 10/6/20 1246





Patient Home Medication List


Home Medication List Reviewed:  Yes





Review of Systems


Review of Systems


Constitutional:  no symptoms reported, see HPI


Musculoskeletal:  see HPI, back pain





All Other Systems Reviewed


Negative Unless Noted:  Yes





Past Medical-Social-Family Hx


Past Med/Social Hx:  Reviewed Nursing Past Med/Soc Hx


Patient Social History


Type Used:  Cigarettes


Former Smoker, Quit:  1985


2nd Hand Smoke Exposure:  No


Recent Foreign Travel:  No


Contact w/Someone Who Travel:  No


Recent Infectious Disease Expo:  No


Recent Hopitalizations:  Yes





Immunizations Up To Date


Tetanus Booster (TDap):  More than 5yrs


PED Vaccines UTD:  No


Date of Pneumonia Vaccine:  Oct 2, 2018


Date of Influenza Vaccine:  Sep 18, 2019





Seasonal Allergies


Seasonal Allergies:  No





Past Medical History


Surgeries:  Yes (right hip, lung)


Cardiac, CABG, Coronary Stent, Eye Surgery, Joint Replacement, Lobectomy, 

Orthopedic, Prostatectomy, Tonsillectomy


Respiratory:  Yes


Pneumonia, Sleep Apnea, COPD, Emphysema


Currently Using CPAP:  No


Currently Using BIPAP:  No


Cardiac:  Yes (diastolic heart failure)


Atrial Fibrillation, Coronary Artery Disease, High Cholesterol, Hypertension, 

Peripheral Vascular


Neurological:  Yes


Stroke


Reproductive Disorders:  No


Sexually Transmitted Disease:  No


HIV/AIDS:  No


Genitourinary:  Yes


Benign Prostatic Hyperpl, Prostate Problems, Bladder Infection


Gastrointestinal:  Yes


Gastroesophageal Reflux, Gastrointestinal Bleed


Musculoskeletal:  Yes


Arthritis


Endocrine:  Yes


Diabetes, Insulin dep


HEENT:  Yes


Cataract


Loss of Vision:  Denies


Hearing Impairment:  Hard of Hearing


Cancer:  Yes (Throat)


Psychosocial:  No


Integumentary:  No


Blood Disorders:  No


Adverse Reaction/Blood Tranf:  No





Family Medical History





BLADD


  09 BROTHER


BLADD


  09 BROTHER


Cancer


  03 FATHER (THROAT)


  09 BROTHER


Dementia


  03 MOTHER


FH: bladder cancer


FH: bladder cancer


Family history: Diabetes mellitus


  03 MOTHER


Family history: Thyroid disorder


  03 MOTHER


Infertile


  03 MOTHER (X5 MISCARRIAGES)


Myocardial infarction


  09 BROTHER





No Family History of:


  Abdominal aortic aneurysm


  Shayne's disease


  Alcoholism


  Aphasia


  Cancer of colon


  Cataract


  Chest pain


  Congenital heart disease


  Congestive heart failure


  Cystic fibrosis


  Dysphagia


  Family history: Allergy


  Family history: Alzheimer's disease


  Family history: Arthritis


  Family history: Asthma


  Family history: Breast disease


  Family history: Cardiovascular disease


  Family history: Coronary thrombosis


  Family history: Gastrointestinal disease


  Family history: Glaucoma


  Family history: Hypertension


  Family history: Osteoporosis


  Headache


  Hearing loss


  Heart disease


  Hereditary disease


  History of - anemia


  History of - disorder


  History of - respiratory disease


  History of drug abuse


  Human immunodeficiency virus (HIV) seropositivity


  Hypercholesterolemia


  Kidney disease


  Malignant neoplasm of lung


  Parkinson's disease


  Prostate cancer


  Psychotic disorder


  Seizure disorder


  Stroke


  Tuberculosis


  Visual impairment


Heart Disease, Cancer, Stroke, Other Conditions/Hx





Physical Exam


Vital Signs





Vital Signs - First Documented








 10/6/20





 11:26


 


Temp 36.8


 


Pulse 92


 


Resp 18


 


B/P (MAP) 118/81 (93)


 


Pulse Ox 98





Capillary Refill : Less Than 3 Seconds


Height, Weight, BMI


Height: 5'9.00"


Weight: 156lbs. 5.0oz. 70.274789kp; 22.00 BMI


Method:Stated


General Appearance:  WD/WN, no apparent distress


HEENT:  PERRL/EOMI, normal ENT inspection, TMs normal, pharynx normal


Neck:  non-tender, full range of motion, supple, normal inspection


Cardiovascular:  normal peripheral pulses, regular rate, rhythm


Respiratory:  chest non-tender, lungs clear, normal breath sounds


Gastrointestinal:  normal bowel sounds, non tender, soft; No rebound, No 

tenderness


Back:  normal inspection (no ecchymosis or erythema), decreased range of motion,

muscle spasm, vertebral tenderness (thoracic and lumbar spine), other (Power V/V

L4-S1, or vascular status intact bilateral lower extremities.)


Extremities:  normal range of motion, non-tender, normal inspection, no pedal 

edema, pelvis stable; No calf tenderness, No pedal edema


Neurologic/Psychiatric:  no motor/sensory deficits, alert, normal mood/affect, 

oriented x 3





[place] Coma Score


Best Eye Response:  (4) Open Spontaneously


Best Verbal Response:  (5) Oriented


Best Motor Response:  (6) Obeys Commands


Estela Total:  15





Progress/Results/Core Measures


Results/Orders


My Orders





Orders - KRIS BAILEY


Ct Thoracic/Lumbar Spine Wo (10/6/20 11:34)


Tramadol Tablet (Ultram Tablet) (10/6/20 11:35)





Vital Signs/I&O











 10/6/20





 11:26


 


Temp 36.8


 


Pulse 92


 


Resp 18


 


B/P (MAP) 118/81 (93)


 


Pulse Ox 98














Blood Pressure Mean:                    93











Diagnostic Imaging





   Diagonstic Imaging:  CT


   Plain Films/CT/US/NM/MRI:  other (T and L spine)


Comments


NAME:   NSEHA PANCHAL


MED REC#:   [id number]


ACCOUNT#:   [id number]


PT STATUS:   REG ER


:   1940


PHYSICIAN:   KRIS BAILEY


ADMIT DATE:   10/06/20/ER


                                   ***Draft***


Date of Exam:10/06/20





CT THORACIC/LUMBAR SPINE WO








PROCEDURE: CT thoracic and lumbar spine without contrast.





TECHNIQUE: Multiple contiguous axial images were obtained through


the thoracic and lumbar spine without the use of intravenous


contrast. Sagittal and coronal reformations were then performed.


All CT scans use one or more of the following dose optimizing


techniques: automated exposure control, MA and/or KvP adjustment


based on a patient size and exam type, or iterative


reconstruction. 





INDICATION:  Fall and back pain.





CT thoracic spine:





Curvature and alignment of the thoracic spine is normal.


Vertebral body heights are maintained. No definite acute


compression fracture is identified. There is multilevel


degenerative disc disease of variable disc space narrowing and


marginal spurring. Paraspinous tissues demonstrate chronic


appearing parenchymal consolidation in the posterior right lower


lobe which appears be slightly greater than on prior PET/CT study


from one year earlier.





IMPRESSION: 


1.  No acute bony abnormalities detected. There is generalized


thoracic spondylosis.


2. Airspace consolidation posterior right lower lobe, slightly


increased when compared with the PET/CT study from one year


earlier.





CT lumbar spine:





Curvature of the lumbar spine is normal. There is minimal


anterolisthesis of L4 on L5. There is a chronic compression


fracture deformity of L3 vertebral body, similar to prior CT from


2020. Remaining lumbar vertebrae show normal stature. No


acute fracture is seen. Paraspinous tissues are unremarkable.





IMPRESSION: Chronic compression fracture deformity L3. No acute


compression fracture is detected.





  Dictated on workstation # [id number]








Dict:   10/06/20 1215


Trans:   10/06/20 1237


CV [id number]





Interpreted by:     SUKHJINDER GUERRERO MD


Electronically signed by:





Departure


Impression





   Primary Impression:  


   Fall


   Qualified Codes:  W19.XXXA - Unspecified fall, initial encounter


   Additional Impressions:  


   Low back pain


   Qualified Codes:  M54.5 - Low back pain


   Compression fracture


   Constipation


   Qualified Codes:  K59.00 - Constipation, unspecified


Disposition:   HOME, SELF-CARE


Condition:  Improved





Departure-Patient Inst.


Decision time for Depature:  12:40


Referrals:  


TOR CONNELLY DO (PCP/Family)


Primary Care Physician


Patient Instructions:  Constipation, Adult (DC), Low Back Pain  (DC)





Add. Discharge Instructions:  


Alternate heat and ice to your low back for pain.


Take Tylenol 650 mg alternating with ibuprofen 600 mg every 4 hours for pain.


Use the Ultram for pain not controlled by the Tylenol and ibuprofen.


Follow-up with your primary care provider if symptoms are not improved or 

worsen.


You may use over-the-counter stool softeners and milk of magnesia for the 

constipation.


Increase water intake, use cane for ambulation and walk around your house 

regularly. 


Return to the emergency department for new, urgent health care needs.





All discharge instructions reviewed with patient and/or family. Voiced 

understanding.


Scripts


Tramadol HCl (Tramadol HCl) 50 Mg Tablet


50 MG PO Q6H PRN for PAIN, #20 TAB 0 Refills


   Prov: KRIS BAILEY         10/6/20





Copy


Copies To 1:   TOR CONNELLY AMY ARNP                   Oct 6, 2020 11:56

## 2020-10-06 NOTE — DIAGNOSTIC IMAGING REPORT
PROCEDURE: CT thoracic and lumbar spine without contrast.



TECHNIQUE: Multiple contiguous axial images were obtained through

the thoracic and lumbar spine without the use of intravenous

contrast. Sagittal and coronal reformations were then performed.

All CT scans use one or more of the following dose optimizing

techniques: automated exposure control, MA and/or KvP adjustment

based on a patient size and exam type, or iterative

reconstruction. 



INDICATION:  Fall and back pain.



CT thoracic spine:



Curvature and alignment of the thoracic spine is normal.

Vertebral body heights are maintained. No definite acute

compression fracture is identified. There is multilevel

degenerative disc disease of variable disc space narrowing and

marginal spurring. Paraspinous tissues demonstrate chronic

appearing parenchymal consolidation in the posterior right lower

lobe which appears be slightly greater than on prior PET/CT study

from one year earlier.



IMPRESSION: 

1.  No acute bony abnormalities detected. There is generalized

thoracic spondylosis.

2. Airspace consolidation posterior right lower lobe, slightly

increased when compared with the PET/CT study from one year

earlier.



CT lumbar spine:



Curvature of the lumbar spine is normal. There is minimal

anterolisthesis of L4 on L5. There is a chronic compression

fracture deformity of L3 vertebral body, similar to prior CT from

March 2020. Remaining lumbar vertebrae show normal stature. No

acute fracture is seen. Paraspinous tissues are unremarkable.



IMPRESSION: Chronic compression fracture deformity L3. No acute

compression fracture is detected.



Dictated by: 



  Dictated on workstation # [id number]

## 2020-10-13 ENCOUNTER — HOSPITAL ENCOUNTER (EMERGENCY)
Dept: HOSPITAL 75 - ER | Age: 80
Discharge: HOME | End: 2020-10-13
Payer: MEDICARE

## 2020-10-13 VITALS — SYSTOLIC BLOOD PRESSURE: 116 MMHG | DIASTOLIC BLOOD PRESSURE: 56 MMHG

## 2020-10-13 VITALS — BODY MASS INDEX: 13.94 KG/M2 | WEIGHT: 94.14 LBS | HEIGHT: 68.98 IN

## 2020-10-13 DIAGNOSIS — Z82.49: ICD-10-CM

## 2020-10-13 DIAGNOSIS — K21.9: ICD-10-CM

## 2020-10-13 DIAGNOSIS — I10: ICD-10-CM

## 2020-10-13 DIAGNOSIS — Z88.5: ICD-10-CM

## 2020-10-13 DIAGNOSIS — Z80.52: ICD-10-CM

## 2020-10-13 DIAGNOSIS — Z95.1: ICD-10-CM

## 2020-10-13 DIAGNOSIS — Z85.21: ICD-10-CM

## 2020-10-13 DIAGNOSIS — Z95.5: ICD-10-CM

## 2020-10-13 DIAGNOSIS — Z88.0: ICD-10-CM

## 2020-10-13 DIAGNOSIS — J44.9: ICD-10-CM

## 2020-10-13 DIAGNOSIS — E11.9: ICD-10-CM

## 2020-10-13 DIAGNOSIS — Z83.3: ICD-10-CM

## 2020-10-13 DIAGNOSIS — K59.03: Primary | ICD-10-CM

## 2020-10-13 DIAGNOSIS — Z87.891: ICD-10-CM

## 2020-10-13 DIAGNOSIS — I48.91: ICD-10-CM

## 2020-10-13 DIAGNOSIS — Z79.01: ICD-10-CM

## 2020-10-13 DIAGNOSIS — M54.5: ICD-10-CM

## 2020-10-13 DIAGNOSIS — Z88.1: ICD-10-CM

## 2020-10-13 DIAGNOSIS — T40.2X5A: ICD-10-CM

## 2020-10-13 DIAGNOSIS — E78.00: ICD-10-CM

## 2020-10-13 DIAGNOSIS — Z79.4: ICD-10-CM

## 2020-10-13 DIAGNOSIS — Z20.828: ICD-10-CM

## 2020-10-13 DIAGNOSIS — Z80.1: ICD-10-CM

## 2020-10-13 DIAGNOSIS — Z88.2: ICD-10-CM

## 2020-10-13 PROCEDURE — 99281 EMR DPT VST MAYX REQ PHY/QHP: CPT

## 2020-10-13 NOTE — ED BACK PAIN
General


Chief Complaint:  General Problems/Pain


Stated Complaint:  ABD PAIN


Nursing Triage Note:  


AMB TO ED WITH C/O L SIDE PAIN HAS HAD IT FOR 2 WEEKS HAS BEEN SEEN IN ED FOR  


HAD NEG EXAM.


Nursing Sepsis Screen:  No Definite Risk


Source of Information:  Patient


Exam Limitations:  No Limitations





History of Present Illness


Date Seen by Provider:  Oct 13, 2020


Time Seen by Provider:  10:15


Initial Comments


patient presents ER by private conveyance from home with chief complaint of 

continued back pain bilateral lower lumbar spine very painful to movement and 

inability to have a significant bowel movement the past week. He does take iron 

tablets that she has continued to take as well as he was started on opiates 

after he had a fall about a week and a half ago. He was seen in the ER had CT 

scans done showing an old compression fracture. He has continued to take the 

opiates on a regular scheduled basis throughout the day but says that he does 

not think they helped very much. He wants help with his back pain as well as 

having a stool. He follows with Dr. Connelly. He is not doing physical therapy 

or using a back brace. He is not using topical creams. He has insulin-dependent 

diabetes. He denies any chest pain, palpitations, syncope, loss of consciousness

striking his head or pain. He has already tried enemas and MiraLAX.





Allergies and Home Medications


Allergies


Coded Allergies:  


     morphine (Verified  Allergy, Severe, Pt has received Lortab in the past w/o

issue, 9/9/20)


     Sulfa (Sulfonamide Antibiotics) (Verified  Allergy, Unknown, 9/9/20)


     penicillin G (Verified  Allergy, Unknown, Pt has received Cefepime & 

Meropenem in the past, 9/9/20)


     levofloxacin (Verified  Adverse Reaction, Unknown, 9/9/20)





Home Medications


Albuterol Sulfate 2.5 Mg/0.5 Ml Vial.neb, 2.5 MG INH Q4H, (Reported)


Apixaban 2.5 Mg Tablet, 2.5 MG PO BID, (Reported)


Cefdinir 300 Mg Capsule, 300 MG PO BID


   Prescribed by: NICOLETTE HERNÁNDEZ on 9/11/20 1210


Ferrous Sulfate 325 Mg Tablet, 325 MG PO DAILY


   Prescribed by: NICOLETTE HERNÁNDEZ on 9/11/20 1210


Fluticasone/Vilanterol 1 Each Blst.w.dev, 1 EACH IH DAILY, (Reported)


   LAST DATE SAMPLES WERE GIVEN:07- #1 


Furosemide 40 Mg Tablet, 40 MG PO DAILY, (Reported)


Gabapentin 100 Mg Capsule, 100 MG PO DAILY, (Reported)


Gabapentin 100 Mg Capsule, 200 MG PO HS, (Reported)


   TAKES 2 (100MG) CAPS 


Insulin NPH Human Isophane 100 Unit/1 Ml Vial, 5 UNITS SQ BIDPC, (Reported)


   USES ALONG WITH NOVOLIN R 


Insulin Regular, Human 1,000 Units/10 Ml Soln, 5 UNITS SQ BIDPC, (Reported)


   USES ALONG WITH NOVOLIN N 


Metoprolol Tartrate 25 Mg Tablet, 25 MG PO BID, (Reported)


Montelukast Sodium 10 Mg Tablet, 10 MG PO HS, (Reported)


Pantoprazole Sodium 40 Mg Tablet.dr, 40 MG PO DAILY, (Reported)


Potassium Chloride 10 Meq Tab.er.prt, 10 MEQ PO DAILY, (Reported)


Simvastatin 20 Mg Tablet, 20 MG PO HS, (Reported)


Tramadol HCl 50 Mg Tablet, 50 MG PO Q6H PRN for PAIN


   Prescribed by: KRIS BAILEY on 10/6/20 1246





Patient Home Medication List


Home Medication List Reviewed:  Yes





Review of Systems


Constitutional:  No chills, No diaphoresis, No fever


EENTM:  No ear discharge, No ear pain


Respiratory:  No cough, No short of breath


Cardiovascular:  No chest pain, No edema


Gastrointestinal:  No abdominal pain; constipation; No diarrhea, No melena, No 

nausea, No vomiting


Genitourinary:  No discharge, No dysuria





All Other Systems Reviewed


Negative Unless Noted:  Yes





Past Medical-Social-Family Hx


Patient Social History


Alcohol Use:  Denies Use


Recreational Drug Use:  No


Type Used:  Cigarettes


Former Smoker, Quit:  Apr 30, 1985


2nd Hand Smoke Exposure:  No


Recent Foreign Travel:  No


Contact w/Someone Who Travel:  No


Recent Infectious Disease Expo:  No


Recent Hopitalizations:  Yes





Immunizations Up To Date


Tetanus Booster (TDap):  More than 5yrs


PED Vaccines UTD:  No


Date of Pneumonia Vaccine:  Oct 2, 2018


Date of Influenza Vaccine:  Sep 18, 2019





Seasonal Allergies


Seasonal Allergies:  No





Past Medical History


Surgeries:  Yes (right hip, lung)


Cardiac, CABG, Coronary Stent, Eye Surgery, Joint Replacement, Lobectomy, 

Orthopedic, Prostatectomy, Tonsillectomy


Respiratory:  Yes


Pneumonia, Sleep Apnea, COPD, Emphysema


Currently Using CPAP:  No


Currently Using BIPAP:  No


Cardiac:  Yes (diastolic heart failure)


Atrial Fibrillation, Coronary Artery Disease, High Cholesterol, Hypertension, 

Peripheral Vascular


Neurological:  Yes


Stroke


Reproductive Disorders:  No


Sexually Transmitted Disease:  No


HIV/AIDS:  No


Genitourinary:  Yes


Benign Prostatic Hyperpl, Prostate Problems, Bladder Infection


Gastrointestinal:  Yes


Gastroesophageal Reflux, Gastrointestinal Bleed


Musculoskeletal:  Yes


Arthritis


Endocrine:  Yes


Diabetes, Insulin dep


HEENT:  Yes


Cataract


Loss of Vision:  Denies


Hearing Impairment:  Hard of Hearing


Cancer:  Yes (Throat)


Psychosocial:  No


Integumentary:  No


Blood Disorders:  No


Adverse Reaction/Blood Tranf:  No





Family Medical History





BLADD


  09 BROTHER


BLADD


  09 BROTHER


Cancer


  03 FATHER (THROAT)


  09 BROTHER


Dementia


  03 MOTHER


FH: bladder cancer


FH: bladder cancer


Family history: Diabetes mellitus


  03 MOTHER


Family history: Thyroid disorder


  03 MOTHER


Infertile


  03 MOTHER (X5 MISCARRIAGES)


Myocardial infarction


  09 BROTHER





No Family History of:


  Abdominal aortic aneurysm


  Shayne's disease


  Alcoholism


  Aphasia


  Cancer of colon


  Cataract


  Chest pain


  Congenital heart disease


  Congestive heart failure


  Cystic fibrosis


  Dysphagia


  Family history: Allergy


  Family history: Alzheimer's disease


  Family history: Arthritis


  Family history: Asthma


  Family history: Breast disease


  Family history: Cardiovascular disease


  Family history: Coronary thrombosis


  Family history: Gastrointestinal disease


  Family history: Glaucoma


  Family history: Hypertension


  Family history: Osteoporosis


  Headache


  Hearing loss


  Heart disease


  Hereditary disease


  History of - anemia


  History of - disorder


  History of - respiratory disease


  History of drug abuse


  Human immunodeficiency virus (HIV) seropositivity


  Hypercholesterolemia


  Kidney disease


  Malignant neoplasm of lung


  Parkinson's disease


  Prostate cancer


  Psychotic disorder


  Seizure disorder


  Stroke


  Tuberculosis


  Visual impairment


Heart Disease, Cancer, Stroke, Other Conditions/Hx





Physical Exam


Vital Signs





Vital Signs - First Documented








 10/13/20





 09:45


 


Temp 35.5


 


Pulse 50


 


Resp 18


 


B/P (MAP) 116/61 (79)


 


Pulse Ox 95


 


O2 Delivery Room Air





Capillary Refill : Less Than 3 Seconds


Height, Weight, BMI


Height: 5'9.00"


Weight: 156lbs. 5.0oz. [id number]; 13.00 BMI


Method:Stated


General Appearance:  No Apparent Distress, Thin


HEENT:  PERRL/EOMI, Pharynx Normal, Moist Mucous Membranes


Neck:  Full Range of Motion, Normal Inspection


Cardiovascular:  Regular Rate, Rhythm, No Edema


Respiratory:  Lungs Clear, Normal Breath Sounds


Peripheral Pulses:  2+ Radial Pulses (R), 2+ Radial Pulses (L)


Gastrointestinal:  Normal Bowel Sounds (quiescent), No Organomegaly, Non Tender,

Soft; No Distended


Back:  Normal Inspection, No Vertebral Tenderness, Decreased Range of Motion 

(2/2 pain), Muscle Spasm (Bilateral lumbar paraspinal muscle spasms and 

tenderness to palpation.)


Neurologic/Psychiatric:  Alert, Oriented x3


Skin:  Normal Color, Warm/Dry





Progress/Results/Core Measures


Results/Orders


Vital Signs/I&O











 10/13/20





 09:45


 


Temp 35.5


 


Pulse 50


 


Resp 18


 


B/P (MAP) 116/61 (79)


 


Pulse Ox 95


 


O2 Delivery Room Air














Blood Pressure Mean:                    79











Progress


Progress Note :  


   Time:  10:42


Progress Note


Patient seems in accommodation of iron and opiate-induced constipation. 

Recurrent encourage him to continue using the MiraLAX and enemas and going to 

add Relistor. We have encouraged him to stop using the iron. His back exam is 

largely unremarkable. He does not have any evidence of rash. Steroids may be a 

poor choice at this time since he is diabetic on insulin. We are going to 

recommend him to physical therapy and if that does not help then he can follow-

up with Dr. Connelly and discussed steroids or other intervention. He is having

no neurologic symptoms today to suggest worsening back disease.





Departure


Impression





   Primary Impression:  


   Therapeutic opioid-induced constipation (OIC)


   Additional Impression:  


   Lumbago


   Qualified Codes:  M54.5 - Low back pain


Disposition:  01 HOME, SELF-CARE


Condition:  Stable





Departure-Patient Inst.


Decision time for Depature:  10:42


Referrals:  


TOR CONNELLY DO (PCP/Family)


Primary Care Physician


Patient Instructions:  Constipation, Adult (DC), Opioids for Short-Term Treatm

ent of Pain





Add. Discharge Instructions:  


Take one tablet of oral store daily until you have adequate bowel movements.


Hold off on taking any laxatives while taking the Relistor. You may resume 

laxatives after the Relistor has worked.


After 3 days of Relistor if you have not had a bowel movement then you need to 

return to the ER or follow up with your primary care provider.


Drink lots of fluids.


Enemas are acceptable daily.


Call [facility] physical therapy and get scheduled for a evaluation in the next

day or 2.


If you're still having significant relief of pain in your back then you can 

follow-up with Dr. Connelly.


All discharge instructions reviewed with patient and/or family. Voiced 

understanding.


Scripts


Methylnaltrexone Bromide (Relistor) 150 Mg Tablet


450 MG PO DAILY PRN for CONSTIPATION-1ST LINE for 3 Days, #9 TAB 0 Refills


   Prov: ZACH BAZAN         10/13/20





Copy


Copies To 1:   TOR CONNELLY DO











ZACH BAZAN                 Oct 13, 2020 10:37

## 2020-12-01 ENCOUNTER — HOSPITAL ENCOUNTER (INPATIENT)
Dept: HOSPITAL 75 - ER | Age: 80
LOS: 2 days | Discharge: HOME | DRG: 871 | End: 2020-12-03
Attending: INTERNAL MEDICINE | Admitting: INTERNAL MEDICINE
Payer: MEDICARE

## 2020-12-01 VITALS — BODY MASS INDEX: 14.69 KG/M2 | WEIGHT: 99.21 LBS | HEIGHT: 68.9 IN

## 2020-12-01 VITALS — SYSTOLIC BLOOD PRESSURE: 100 MMHG | DIASTOLIC BLOOD PRESSURE: 58 MMHG

## 2020-12-01 DIAGNOSIS — J96.21: ICD-10-CM

## 2020-12-01 DIAGNOSIS — E11.9: ICD-10-CM

## 2020-12-01 DIAGNOSIS — Z95.1: ICD-10-CM

## 2020-12-01 DIAGNOSIS — J43.9: ICD-10-CM

## 2020-12-01 DIAGNOSIS — Z20.828: ICD-10-CM

## 2020-12-01 DIAGNOSIS — I48.0: ICD-10-CM

## 2020-12-01 DIAGNOSIS — Z90.79: ICD-10-CM

## 2020-12-01 DIAGNOSIS — R32: ICD-10-CM

## 2020-12-01 DIAGNOSIS — Z79.01: ICD-10-CM

## 2020-12-01 DIAGNOSIS — E78.00: ICD-10-CM

## 2020-12-01 DIAGNOSIS — K21.9: ICD-10-CM

## 2020-12-01 DIAGNOSIS — Z95.5: ICD-10-CM

## 2020-12-01 DIAGNOSIS — I11.0: ICD-10-CM

## 2020-12-01 DIAGNOSIS — N40.1: ICD-10-CM

## 2020-12-01 DIAGNOSIS — M19.91: ICD-10-CM

## 2020-12-01 DIAGNOSIS — I49.3: ICD-10-CM

## 2020-12-01 DIAGNOSIS — Z85.819: ICD-10-CM

## 2020-12-01 DIAGNOSIS — I50.30: ICD-10-CM

## 2020-12-01 DIAGNOSIS — A41.9: Primary | ICD-10-CM

## 2020-12-01 DIAGNOSIS — Z90.2: ICD-10-CM

## 2020-12-01 DIAGNOSIS — Z79.4: ICD-10-CM

## 2020-12-01 DIAGNOSIS — Z87.891: ICD-10-CM

## 2020-12-01 DIAGNOSIS — Z90.89: ICD-10-CM

## 2020-12-01 DIAGNOSIS — I25.10: ICD-10-CM

## 2020-12-01 DIAGNOSIS — E83.42: ICD-10-CM

## 2020-12-01 DIAGNOSIS — E78.5: ICD-10-CM

## 2020-12-01 DIAGNOSIS — J18.9: ICD-10-CM

## 2020-12-01 LAB
ALBUMIN SERPL-MCNC: 3 GM/DL (ref 3.2–4.5)
ALP SERPL-CCNC: 82 U/L (ref 40–136)
ALT SERPL-CCNC: 18 U/L (ref 0–55)
AMORPH SED URNS QL MICRO: (no result) /LPF
APTT BLD: 52 SEC (ref 24–35)
APTT PPP: YELLOW S
BACTERIA #/AREA URNS HPF: (no result) /HPF
BASOPHILS # BLD AUTO: 0 10^3/UL (ref 0–0.1)
BASOPHILS NFR BLD AUTO: 0 % (ref 0–10)
BILIRUB SERPL-MCNC: 0.5 MG/DL (ref 0.1–1)
BILIRUB UR QL STRIP: NEGATIVE
BUN/CREAT SERPL: 16
CALCIUM SERPL-MCNC: 8.4 MG/DL (ref 8.5–10.1)
CHLORIDE SERPL-SCNC: 95 MMOL/L (ref 98–107)
CO2 SERPL-SCNC: 29 MMOL/L (ref 21–32)
CREAT SERPL-MCNC: 1.02 MG/DL (ref 0.6–1.3)
EOSINOPHIL # BLD AUTO: 0 10^3/UL (ref 0–0.3)
EOSINOPHIL NFR BLD AUTO: 0 % (ref 0–10)
FIBRINOGEN PPP-MCNC: CLEAR MG/DL
GFR SERPLBLD BASED ON 1.73 SQ M-ARVRAT: > 60 ML/MIN
GLUCOSE SERPL-MCNC: 242 MG/DL (ref 70–105)
GLUCOSE UR STRIP-MCNC: NEGATIVE MG/DL
HCT VFR BLD CALC: 32 % (ref 40–54)
HGB BLD-MCNC: 10.2 G/DL (ref 13.3–17.7)
INR PPP: 1.2 (ref 0.8–1.4)
KETONES UR QL STRIP: NEGATIVE
LEUKOCYTE ESTERASE UR QL STRIP: NEGATIVE
LYMPHOCYTES # BLD AUTO: 1.5 10^3/UL (ref 1–4)
LYMPHOCYTES NFR BLD AUTO: 12 % (ref 12–44)
MAGNESIUM SERPL-MCNC: 1.4 MG/DL (ref 1.6–2.4)
MANUAL DIFFERENTIAL PERFORMED BLD QL: NO
MCH RBC QN AUTO: 30 PG (ref 25–34)
MCHC RBC AUTO-ENTMCNC: 32 G/DL (ref 32–36)
MCV RBC AUTO: 95 FL (ref 80–99)
MONOCYTES # BLD AUTO: 0.9 10^3/UL (ref 0–1)
MONOCYTES NFR BLD AUTO: 7 % (ref 0–12)
NEUTROPHILS # BLD AUTO: 10.2 10^3/UL (ref 1.8–7.8)
NEUTROPHILS NFR BLD AUTO: 80 % (ref 42–75)
NITRITE UR QL STRIP: NEGATIVE
PH UR STRIP: 6 [PH] (ref 5–9)
PLATELET # BLD: 326 10^3/UL (ref 130–400)
PMV BLD AUTO: 9.3 FL (ref 9–12.2)
POTASSIUM SERPL-SCNC: 4.4 MMOL/L (ref 3.6–5)
PROT SERPL-MCNC: 6.9 GM/DL (ref 6.4–8.2)
PROT UR QL STRIP: NEGATIVE
PROTHROMBIN TIME: 15.3 SEC (ref 12.2–14.7)
RBC #/AREA URNS HPF: (no result) /HPF
SODIUM SERPL-SCNC: 135 MMOL/L (ref 135–145)
SP GR UR STRIP: 1.02 (ref 1.02–1.02)
WBC # BLD AUTO: 12.7 10^3/UL (ref 4.3–11)
WBC #/AREA URNS HPF: (no result) /HPF

## 2020-12-01 PROCEDURE — 87635 SARS-COV-2 COVID-19 AMP PRB: CPT

## 2020-12-01 PROCEDURE — 83880 ASSAY OF NATRIURETIC PEPTIDE: CPT

## 2020-12-01 PROCEDURE — 71045 X-RAY EXAM CHEST 1 VIEW: CPT

## 2020-12-01 PROCEDURE — 83735 ASSAY OF MAGNESIUM: CPT

## 2020-12-01 PROCEDURE — 76937 US GUIDE VASCULAR ACCESS: CPT

## 2020-12-01 PROCEDURE — 87040 BLOOD CULTURE FOR BACTERIA: CPT

## 2020-12-01 PROCEDURE — 86141 C-REACTIVE PROTEIN HS: CPT

## 2020-12-01 PROCEDURE — 36415 COLL VENOUS BLD VENIPUNCTURE: CPT

## 2020-12-01 PROCEDURE — 83605 ASSAY OF LACTIC ACID: CPT

## 2020-12-01 PROCEDURE — 93041 RHYTHM ECG TRACING: CPT

## 2020-12-01 PROCEDURE — 85025 COMPLETE CBC W/AUTO DIFF WBC: CPT

## 2020-12-01 PROCEDURE — 93005 ELECTROCARDIOGRAM TRACING: CPT

## 2020-12-01 PROCEDURE — 84484 ASSAY OF TROPONIN QUANT: CPT

## 2020-12-01 PROCEDURE — 80061 LIPID PANEL: CPT

## 2020-12-01 PROCEDURE — 85610 PROTHROMBIN TIME: CPT

## 2020-12-01 PROCEDURE — 87088 URINE BACTERIA CULTURE: CPT

## 2020-12-01 PROCEDURE — 83615 LACTATE (LD) (LDH) ENZYME: CPT

## 2020-12-01 PROCEDURE — 80053 COMPREHEN METABOLIC PANEL: CPT

## 2020-12-01 PROCEDURE — 84145 PROCALCITONIN (PCT): CPT

## 2020-12-01 PROCEDURE — 85730 THROMBOPLASTIN TIME PARTIAL: CPT

## 2020-12-01 PROCEDURE — 83874 ASSAY OF MYOGLOBIN: CPT

## 2020-12-01 PROCEDURE — 82962 GLUCOSE BLOOD TEST: CPT

## 2020-12-01 PROCEDURE — 81000 URINALYSIS NONAUTO W/SCOPE: CPT

## 2020-12-01 PROCEDURE — 87804 INFLUENZA ASSAY W/OPTIC: CPT

## 2020-12-01 PROCEDURE — 94640 AIRWAY INHALATION TREATMENT: CPT

## 2020-12-01 RX ADMIN — APIXABAN SCH MG: 2.5 TABLET, FILM COATED ORAL at 23:51

## 2020-12-01 NOTE — ED GENERAL
General


Chief Complaint:  Respiratory Problems


Stated Complaint:  WEAKNESS/LOW BP


Nursing Triage Note:  


PT ARRIVED PER EMS, PT FAMILY STATES PT UNRESPONSIVE. PT AWAKE FOR EMS, PT O2 AT




HOME OFF, EMS PUT BACK ON AT 3L AND 02 UP TO 98%. PT AWAKE AND ALERT. PT HAS SL 


IN PLACE IN L AC. PT STATES IS BEING TREATED FOR PNEM


Nursing Sepsis Screen:  No Definite Risk


Source of Information:  Patient, EMS, Old Records


Exam Limitations:  Other (Poor historian)





History of Present Illness


Date Seen by Provider:  Dec 1, 2020


Time Seen by Provider:  18:48


Initial Comments


This 80-year-old gentleman presents to the emergency room with complaints of 

shortness of breath.  EMS was called to the home for an unresponsive episode.  

They report blood pressures are marginal.  Patient was found to be without his 

nasal cannula on and oxygen saturations were in the 80s.  This promptly 

resuscitated to the 90s on 3 L.  Patient was alert for EMS.  He denies any pain 

but says he is short of breath.  EMS reports he has recently been treated for 

pneumonia.





20:00 -I received more information from the patient's daughter, Aurea.  She 

reports patient had not worn his oxygen all day.  We she came to check on him he

had been incontinent in the bed and was confused.  He was not able to stand up. 

Patient's wife had called Aurea because she could not wake him up.  He apparently

also did not take his insulin today.  Temperature as measured by his daughter 

was 102.1.  She also comments he was started on cefdinir  after seeing

Dr. Connelly in the clinic.





Allergies and Home Medications


Allergies


Coded Allergies:  


     morphine (Verified  Allergy, Severe, Pt has received Lortab in the past w/o

issue, 20)


     Sulfa (Sulfonamide Antibiotics) (Verified  Allergy, Unknown, 20)


     penicillin G (Verified  Allergy, Unknown, Pt has received Cefepime & 

Meropenem in the past, 20)


     levofloxacin (Verified  Adverse Reaction, Unknown, 20)





Home Medications


Albuterol Sulfate 2.5 Mg/0.5 Ml Vial.neb, 2.5 MG INH Q4H, (Reported)


Apixaban 2.5 Mg Tablet, 2.5 MG PO BID, (Reported)


Cefdinir 300 Mg Capsule, 300 MG PO BID


   Prescribed by: NICOLETTE HERNÁNDEZ on 20 1210


Ferrous Sulfate 325 Mg Tablet, 325 MG PO DAILY


   Prescribed by: NICOLETTE HERNÁNDEZ on 20 1210


Fluticasone/Vilanterol 1 Each Blst.w.dev, 1 EACH IH DAILY, (Reported)


   LAST DATE SAMPLES WERE GIVEN:2020 #1 


Furosemide 40 Mg Tablet, 40 MG PO DAILY, (Reported)


Gabapentin 100 Mg Capsule, 100 MG PO DAILY, (Reported)


Gabapentin 100 Mg Capsule, 200 MG PO HS, (Reported)


   TAKES 2 (100MG) CAPS 


Insulin NPH Human Isophane 100 Unit/1 Ml Vial, 5 UNITS SQ BIDPC, (Reported)


   USES ALONG WITH NOVOLIN R 


Insulin Regular, Human 1,000 Units/10 Ml Soln, 5 UNITS SQ BIDPC, (Reported)


   USES ALONG WITH NOVOLIN N 


Methylnaltrexone Bromide 150 Mg Tablet, 450 MG PO DAILY PRN for CONSTIPATION-1ST

LINE


   Prescribed by: ZACH BAZAN on 10/13/20 1056


Metoprolol Tartrate 25 Mg Tablet, 25 MG PO BID, (Reported)


Montelukast Sodium 10 Mg Tablet, 10 MG PO HS, (Reported)


Pantoprazole Sodium 40 Mg Tablet.dr, 40 MG PO DAILY, (Reported)


Potassium Chloride 10 Meq Tab.er.prt, 10 MEQ PO DAILY, (Reported)


Simvastatin 20 Mg Tablet, 20 MG PO HS, (Reported)


Tramadol HCl 50 Mg Tablet, 50 MG PO Q6H PRN for PAIN


   Prescribed by: KRIS BAILEY on 10/6/20 1246





Patient Home Medication List


Home Medication List Reviewed:  Yes





Review of Systems


Review of Systems


Constitutional:  see HPI


EENTM:  no symptoms reported


Respiratory:  see HPI


Cardiovascular:  no symptoms reported


Gastrointestinal:  no symptoms reported


Genitourinary:  see HPI


Musculoskeletal:  no symptoms reported


Skin:  no symptoms reported


Psychiatric/Neurological:  See HPI


Hematologic/Lymphatic:  No Symptoms Reported


Immunological/Allergic:  no symptoms reported





Past Medical-Social-Family Hx


Past Med/Social Hx:  Reviewed Nursing Past Med/Soc Hx


Patient Social History


Alcohol Use:  Denies Use


Recreational Drug Use:  No


Smoking Status:  Former Smoker


Type Used:  Cigarettes


Former Smoker, Quit:  1985


2nd Hand Smoke Exposure:  No


Recent Foreign Travel:  No


Contact w/Someone Who Travel:  No


Recent Infectious Disease Expo:  No


Recent Hopitalizations:  Yes


Physical Abuse:  No


Sexual Abuse:  No





Immunizations Up To Date


Tetanus Booster (TDap):  More than 5yrs


PED Vaccines UTD:  No


Date of Pneumonia Vaccine:  Oct 2, 2018


Date of Influenza Vaccine:  Sep 18, 2019





Seasonal Allergies


Seasonal Allergies:  No





Past Medical History


Surgeries:  Yes (right hip, lung)


Cardiac, CABG, Coronary Stent, Eye Surgery, Joint Replacement, Lobectomy, 

Orthopedic, Prostatectomy, Tonsillectomy


Respiratory:  Yes


Pneumonia, Sleep Apnea, COPD, Emphysema


Currently Using CPAP:  No


Currently Using BIPAP:  No


Cardiac:  Yes (diastolic heart failure)


Atrial Fibrillation, Coronary Artery Disease, High Cholesterol, Hypertension, 

Peripheral Vascular


Neurological:  Yes


Stroke


Reproductive Disorders:  No


Sexually Transmitted Disease:  No


HIV/AIDS:  No


Genitourinary:  Yes


Benign Prostatic Hyperpl, Prostate Problems, Bladder Infection


Gastrointestinal:  Yes


Gastroesophageal Reflux, Gastrointestinal Bleed


Musculoskeletal:  Yes


Arthritis


Endocrine:  Yes


Diabetes, Insulin dep


HEENT:  Yes


Cataract


Loss of Vision:  Denies


Hearing Impairment:  Hard of Hearing


Cancer:  Yes (Throat)


Psychosocial:  No


Integumentary:  No


Blood Disorders:  No


Adverse Reaction/Blood Tranf:  No





Family Medical History





BLADD


  09 BROTHER


BLADD


  09 BROTHER


Cancer


  03 FATHER (THROAT)


  09 BROTHER


Dementia


  03 MOTHER


FH: bladder cancer


FH: bladder cancer


Family history: Diabetes mellitus


  03 MOTHER


Family history: Thyroid disorder


  03 MOTHER


Infertile


  03 MOTHER (X5 MISCARRIAGES)


Myocardial infarction


  09 BROTHER





No Family History of:


  Abdominal aortic aneurysm


  [place]'s disease


  Alcoholism


  Aphasia


  Cancer of colon


  Cataract


  Chest pain


  Congenital heart disease


  Congestive heart failure


  Cystic fibrosis


  Dysphagia


  Family history: Allergy


  Family history: Alzheimer's disease


  Family history: Arthritis


  Family history: Asthma


  Family history: Breast disease


  Family history: Cardiovascular disease


  Family history: Coronary thrombosis


  Family history: Gastrointestinal disease


  Family history: Glaucoma


  Family history: Hypertension


  Family history: Osteoporosis


  Headache


  Hearing loss


  Heart disease


  Hereditary disease


  History of - anemia


  History of - disorder


  History of - respiratory disease


  History of drug abuse


  Human immunodeficiency virus (HIV) seropositivity


  Hypercholesterolemia


  Kidney disease


  Malignant neoplasm of lung


  Parkinson's disease


  Prostate cancer


  Psychotic disorder


  Seizure disorder


  Stroke


  Tuberculosis


  Visual impairment


Heart Disease, Cancer, Stroke, Other Conditions/Hx





Physical Exam-Suspected Sepsis


Physical Exam


Vital Signs





Vital Signs - First Documented




















Capillary Refill : Less Than 3 Seconds








Blood Pressure Mean:                    68








Height, Weight, BMI


Height: 5'9.00"


Weight: 156lbs. 5.0oz. [id number]; 14.00 BMI


Method:Stated


General Appearance:  No Apparent Distress, WD/WN, Thin


HEENT:  PERRL/EOMI, Normal ENT Inspection, Pharynx Normal


Neck:  Normal Inspection


Respiratory:  No Accessory Muscle Use, No Respiratory Distress, Crackles (Bib

asilar), Other (Mild tachypnea)


Cardiovascular:  No Edema, No Murmur, Irregularly Irregular


Gastrointestinal:  Normal Bowel Sounds, No Organomegaly, Soft


Extremity:  Normal Inspection, Non Tender, No Pedal Edema


Neurologic/Psychiatric:  Alert, No Motor/Sensory Deficits, Normal Mood/Affect, 

CNs II-XII Norm as Tested, Other (Mildly confused, poor historian)


Skin:  normal color, warm/dry





Focused Exam


Lactate Level


20 19:15: Lactic Acid Level 1.14





Lactic Acid Level





Laboratory Tests








Test


 20


19:15


 


Lactic Acid Level


 1.14 MMOL/L


(0.50-2.00)











Progress/Results/Core Measures


Suspected Sepsis


Recent Fever Within 48 Hours:  No


Infection Criteria Present:  None


New/Unexplained  Altered Menta:  No


Sepsis Screen:  No Definite Risk


SIRS


Temperature: 


Pulse: 115 


Respiratory Rate: 20


 


Laboratory Tests


20 19:15: White Blood Count 12.7H


Blood Pressure 101 /51 


Mean: 68


 





20 19:15: Lactic Acid Level 1.14


Laboratory Tests


20 19:15: 


Creatinine 1.02, INR Comment 1.2, Platelet Count 326, Total Bilirubin 0.5








Results/Orders


Lab Results





Laboratory Tests








Test


 20


19:15 20


19:21 20


19:30 20


21:00 Range/Units


 


 


White Blood Count


 12.7 H


 


 


 


 4.3-11.0


10^3/uL


 


Red Blood Count


 3.37 L


 


 


 


 4.30-5.52


10^6/uL


 


Hemoglobin 10.2 L    13.3-17.7  g/dL


 


Hematocrit 32 L    40-54  %


 


Mean Corpuscular Volume 95     80-99  fL


 


Mean Corpuscular Hemoglobin 30     25-34  pg


 


Mean Corpuscular Hemoglobin


Concent 32 


 


 


 


 32-36  g/dL





 


Red Cell Distribution Width 15.3 H    10.0-14.5  %


 


Platelet Count


 326 


 


 


 


 130-400


10^3/uL


 


Mean Platelet Volume 9.3     9.0-12.2  fL


 


Immature Granulocyte % (Auto) 1      %


 


Neutrophils (%) (Auto) 80 H    42-75  %


 


Lymphocytes (%) (Auto) 12     12-44  %


 


Monocytes (%) (Auto) 7     0-12  %


 


Eosinophils (%) (Auto) 0     0-10  %


 


Basophils (%) (Auto) 0     0-10  %


 


Neutrophils # (Auto)


 10.2 H


 


 


 


 1.8-7.8


10^3/uL


 


Lymphocytes # (Auto)


 1.5 


 


 


 


 1.0-4.0


10^3/uL


 


Monocytes # (Auto)


 0.9 


 


 


 


 0.0-1.0


10^3/uL


 


Eosinophils # (Auto)


 0.0 


 


 


 


 0.0-0.3


10^3/uL


 


Basophils # (Auto)


 0.0 


 


 


 


 0.0-0.1


10^3/uL


 


Immature Granulocyte # (Auto)


 0.1 


 


 


 


 0.0-0.1


10^3/uL


 


Prothrombin Time 15.3 H    12.2-14.7  SEC


 


INR Comment 1.2     0.8-1.4  


 


Activated Partial


Thromboplast Time 52 H


 


 


 


 24-35  SEC





 


Sodium Level 135     135-145  MMOL/L


 


Potassium Level 4.4     3.6-5.0  MMOL/L


 


Chloride Level 95 L      MMOL/L


 


Carbon Dioxide Level 29     21-32  MMOL/L


 


Anion Gap 11     5-14  MMOL/L


 


Blood Urea Nitrogen 16     7-18  MG/DL


 


Creatinine


 1.02 


 


 


 


 0.60-1.30


MG/DL


 


Estimat Glomerular Filtration


Rate > 60 


 


 


 


  





 


BUN/Creatinine Ratio 16      


 


Glucose Level 242 H      MG/DL


 


Lactic Acid Level


 1.14 


 


 


 


 0.50-2.00


MMOL/L


 


Calcium Level 8.4 L    8.5-10.1  MG/DL


 


Corrected Calcium 9.2     8.5-10.1  MG/DL


 


Magnesium Level 1.4 L    1.6-2.4  MG/DL


 


Total Bilirubin 0.5     0.1-1.0  MG/DL


 


Aspartate Amino Transf


(AST/SGOT) 18 


 


 


 


 5-34  U/L





 


Alanine Aminotransferase


(ALT/SGPT) 18 


 


 


 


 0-55  U/L





 


Alkaline Phosphatase 82       U/L


 


Lactate Dehydrogenase 188     125-220  U/L


 


Myoglobin


 67.9 


 


 


 


 10.0-92.0


NG/ML


 


Troponin I < 0.028     <0.028  NG/ML


 


C-Reactive Protein High


Sensitivity 15.30 H


 


 


 


 0.00-0.50


MG/DL


 


B-Type Natriuretic Peptide 144.8 H    <100.0  PG/ML


 


Total Protein 6.9     6.4-8.2  GM/DL


 


Albumin 3.0 L    3.2-4.5  GM/DL


 


Procalcitonin 0.13 H    <0.10  NG/ML


 


Coronavirus 2019 (LIZET)  Negative    Negative  


 


Urine Color   YELLOW    


 


Urine Clarity   CLEAR    


 


Urine pH   6.0   5-9  


 


Urine Specific Gravity   1.020   1.016-1.022  


 


Urine Protein   NEGATIVE   NEGATIVE  


 


Urine Glucose (UA)   NEGATIVE   NEGATIVE  


 


Urine Ketones   NEGATIVE   NEGATIVE  


 


Urine Nitrite   NEGATIVE   NEGATIVE  


 


Urine Bilirubin   NEGATIVE   NEGATIVE  


 


Urine Urobilinogen   0.2   < = 1.0  MG/DL


 


Urine Leukocyte Esterase   NEGATIVE   NEGATIVE  


 


Urine RBC (Auto)   2+ H  NEGATIVE  


 


Urine RBC   0-2    /HPF


 


Urine WBC   0-2    /HPF


 


Urine Crystals   PRESENT H   /LPF


 


Urine Amorphous Sediment


 


 


 FEW REVA


URATES H 


  /LPF





 


Urine Bacteria   TRACE    /HPF


 


Urine Casts   NONE    /LPF


 


Urine Mucus   SMALL H   /LPF


 


Urine Culture Indicated


 


 


 CULTURE


PENDING 


  











Micro Results





Microbiology


20 Influenza Types A,B Antigen ([place]) - Final, Complete


          





My Orders





Orders - SHANNEN PALMER MD


Cbc With Automated Diff (20 18:58)


Comprehensive Metabolic Panel (20 18:58)


Blood Culture (20 18:58)


Sputum Culture (20 18:58)


Urinalysis (20 18:58)


Urine Culture (20 18:58)


Protime With Inr (20 18:58)


Partial Thromboplastin Time (20 18:58)


Chest 1 View, Ap/Pa Only (20 18:58)


Ed Iv/Invasive Line Start (20 18:58)


Ed Iv/Invasive Line Start (20 18:58)


Vital Signs Adult Sepsis Patie Q15M (20 18:58)


O2 (20 18:58)


Remove Rings In Anticipation O (20 18:58)


Lactic Acid Analyzer (20 18:58)


Influenza A And B Antigens (20 18:58)


Magnesium (20 18:58)


Ekg Tracing (20 18:58)


Myoglobin Serum (20 18:58)


O2 (20 18:58)


Monitor-Rhythm Ecg Trace Only (20 18:58)


Lipid Panel (20 06:00)


BNP (20 18:58)


Troponin I (20 18:58)


Procalcitonin (Pct) (20 18:58)


Hs C Reactive Protein (20 18:58)


LDH (20 18:58)


Covid 19 Inhouse Test (20 18:58)


Meropenem (Merrem 500 Mg) (20 20:30)


Magnesium 1 Gm/100 Ml Ivpb (Magnesium [place] (20 20:30)


Coronavirus Sars-Cov-2 So 2019 (20 20:36)





Medications Given in ED





Current Medications








 Medications  Dose


 Ordered  Sig/Bhavin


 Route  Start Time


 Stop Time Status Last Admin


Dose Admin


 


 Magnesium Sulfate/


 Dextrose  100 ml @ 


 100 mls/hr  ONCE  ONCE


 IV  20 20:30


 20 21:29  20 20:59


100 MLS/HR


 


 Meropenem 500 mg/


 Sterile Water  10 ml @ 


 200 mls/hr  ONCE  ONCE


 IV  20 20:30


 20 20:32 DC 20 20:55


200 MLS/HR








Vital Signs/I&O











 20





 18:48 18:48 21:06


 


Temp 37.3  36.8


 


Pulse 115  91


 


Resp 20  18


 


B/P (MAP) 101/51 (68)  117/64


 


Pulse Ox 98 98 96


 


O2 Delivery Nasal Cannula Nasal Cannula Nasal Cannula


 


O2 Flow Rate 3.00 3.00 3.00





Capillary Refill : Less Than 3 Seconds








Blood Pressure Mean:                    68








Progress Note :  


   Time:  20:57


Progress Note


I have concerns about this patient's functional status given the fact that he 

has not been able to get out of bed, has been confused, has been incontinent, 

and has been short of breath for several days.  Additionally, his wife is 

debilitated with blindness and very poor hearing.  He is normally very active 

and able to drive.  His daughter is quite concerned about his current condition.

 Although he is afebrile here, his daughter reported a fever up to 102.1 at 

home.  I discussed the situation with Dr. Martínez who is agreeable to 

admission.  We have elected to obtain the Covid PCR test as the rapid test was 

negative but patient does have symptoms.  Chart was reviewed and he was found to

have meropenem sensitive Pseudomonas on a sputum culture during his visit in 

September.  Therefore, meropenem was started in the ER.  Because he had severe 

sepsis within the last 90 days, we will also add vancomycin.  Magnesium was low 

and a gram of magnesium was started in the emergency room.  I discussed CODE 

STATUS with daughter.  She reports he has never requested a DO NOT RESUSCITATE. 

He had a full CODE STATUS on his last admission as well so he will remain full 

code now.  Patient himself confirms full CODE STATUS.





ECG


Initial ECG Impression Date:  Dec 1, 2020


Initial ECG Impression Time:  19:12


Initial ECG Rhythm:  Normal Sinus


Comment


Sinus arrhythmia with PVCs, possibly converting into A. fib at the end of the 

strip.  No ischemic ST elevation or depression.





Diagnostic Imaging





   Diagonstic Imaging:  Xray


   Plain Films/CT/US/NM/MRI:  chest


Comments


Chest x-ray viewed by me and compared with prior.  Report reviewed.  See report 

below:





NAME:   NESHA PANCHAL


MED REC#:   [id number]


ACCOUNT#:   [id number]


PT STATUS:   REG ER


:   1940


PHYSICIAN:   SHANNEN PALMER MD


ADMIT DATE:   20/ER


***Draft***


Date of Exam:20





CHEST 1 VIEW, AP/PA ONLY





EXAMINATION: Chest 1 view





HISTORY: Sepsis





COMPARISON: Chest radiograph, 09/10/2020





FINDINGS: 





Heart size and pulmonary vasculature are normal. Stable loop


recorder overlying the left lower lung. Stable elevation of the


right hemidiaphragm. There are patchy interstitial airspace


opacities throughout both lungs. Likely background coarse


interstitial markings. Surgical clips overlying the left upper


lung. Blunting of the left costophrenic angle. No pneumothorax.


The osseous structures are intact.





IMPRESSION: 





1. Patchy interstitial and airspace opacities throughout both


lungs may represent chronic lung findings with possible


superimposed pneumonia in the appropriate clinical setting.


2. Likely small left pleural effusion.





  Dictated on workstation # [id number]





Dict:   20


Trans:   20


[facility] [id number]





Interpreted by:     DEE LEDESMA DO





Departure


Communication (Admissions)


Time/Spoke to Admitting Phy:  20:40


Dr. Martínez





Impression





   Primary Impression:  


   Pneumonia


   Qualified Codes:  J18.9 - Pneumonia, unspecified organism


   Additional Impressions:  


   generalized weakness


   Confusion


   Hypomagnesemia


Disposition:   ADMITTED AS INPATIENT


Condition:  Improved





Admissions


Decision to Admit Reason:  Admit from ER (General)


Decision to Admit/Date:  Dec 1, 2020


Time/Decision to Admit Time:  20:30





Departure-Patient Inst.


Referrals:  


TOR CONNELLY DO (PCP/Family)


Primary Care Physician











SHANNEN PALMER MD         Dec 1, 2020 19:31

## 2020-12-01 NOTE — DIAGNOSTIC IMAGING REPORT
EXAMINATION: Chest 1 view



HISTORY: Sepsis



COMPARISON: Chest radiograph, 09/10/2020



FINDINGS: 



Heart size and pulmonary vasculature are normal. Stable loop

recorder overlying the left lower lung. Stable elevation of the

right hemidiaphragm. There are patchy interstitial airspace

opacities throughout both lungs. Likely background coarse

interstitial markings. Surgical clips overlying the left upper

lung. Blunting of the left costophrenic angle. No pneumothorax.

The osseous structures are intact.



IMPRESSION: 



1. Patchy interstitial and airspace opacities throughout both

lungs may represent chronic lung findings with possible

superimposed pneumonia in the appropriate clinical setting.

2. Likely small left pleural effusion.



Dictated by: 



  Dictated on workstation # [id number]

## 2020-12-01 NOTE — NUR
NESHA PANCHAL admitted to room 420-1, with an admitting diagnosis of PNEUMONIA, WEAKNESS, 
CONFUSION, on 12/01/20 from ED via , accompanied by STAFF.NESHA PANCHAL introduced to 
surroundings, call light, bed controls, phone, TV, temperature control, lights, meal times, 
smoking policy, visitor policy, side rail policy, bathrooms and showers.  Patient Rights 
given to patient in the handbook. NESHA PANCHAL verbalizes understanding that [facility] 
is not responsible for the loss or damage to any personal effects or valuables that are kept 
in the patients possession during their hospitalization.

## 2020-12-02 VITALS — DIASTOLIC BLOOD PRESSURE: 55 MMHG | SYSTOLIC BLOOD PRESSURE: 119 MMHG

## 2020-12-02 VITALS — DIASTOLIC BLOOD PRESSURE: 58 MMHG | SYSTOLIC BLOOD PRESSURE: 111 MMHG

## 2020-12-02 VITALS — DIASTOLIC BLOOD PRESSURE: 61 MMHG | SYSTOLIC BLOOD PRESSURE: 119 MMHG

## 2020-12-02 VITALS — SYSTOLIC BLOOD PRESSURE: 88 MMHG | DIASTOLIC BLOOD PRESSURE: 74 MMHG

## 2020-12-02 VITALS — DIASTOLIC BLOOD PRESSURE: 56 MMHG | SYSTOLIC BLOOD PRESSURE: 111 MMHG

## 2020-12-02 VITALS — SYSTOLIC BLOOD PRESSURE: 108 MMHG | DIASTOLIC BLOOD PRESSURE: 74 MMHG

## 2020-12-02 VITALS — SYSTOLIC BLOOD PRESSURE: 96 MMHG | DIASTOLIC BLOOD PRESSURE: 54 MMHG

## 2020-12-02 LAB
ALBUMIN SERPL-MCNC: 2.6 GM/DL (ref 3.2–4.5)
ALP SERPL-CCNC: 83 U/L (ref 40–136)
ALT SERPL-CCNC: 16 U/L (ref 0–55)
BASOPHILS # BLD AUTO: 0 10^3/UL (ref 0–0.1)
BASOPHILS NFR BLD AUTO: 0 % (ref 0–10)
BILIRUB SERPL-MCNC: 0.4 MG/DL (ref 0.1–1)
BUN/CREAT SERPL: 20
CALCIUM SERPL-MCNC: 7.7 MG/DL (ref 8.5–10.1)
CHLORIDE SERPL-SCNC: 97 MMOL/L (ref 98–107)
CHOLEST SERPL-MCNC: 91 MG/DL (ref ?–200)
CO2 SERPL-SCNC: 29 MMOL/L (ref 21–32)
CREAT SERPL-MCNC: 0.86 MG/DL (ref 0.6–1.3)
EOSINOPHIL # BLD AUTO: 0.1 10^3/UL (ref 0–0.3)
EOSINOPHIL NFR BLD AUTO: 1 % (ref 0–10)
GFR SERPLBLD BASED ON 1.73 SQ M-ARVRAT: > 60 ML/MIN
GLUCOSE SERPL-MCNC: 237 MG/DL (ref 70–105)
HCT VFR BLD CALC: 27 % (ref 40–54)
HDLC SERPL-MCNC: 24 MG/DL (ref 40–60)
HGB BLD-MCNC: 8.5 G/DL (ref 13.3–17.7)
LYMPHOCYTES # BLD AUTO: 1.4 10^3/UL (ref 1–4)
LYMPHOCYTES NFR BLD AUTO: 17 % (ref 12–44)
MAGNESIUM SERPL-MCNC: 2.1 MG/DL (ref 1.6–2.4)
MANUAL DIFFERENTIAL PERFORMED BLD QL: NO
MCH RBC QN AUTO: 29 PG (ref 25–34)
MCHC RBC AUTO-ENTMCNC: 31 G/DL (ref 32–36)
MCV RBC AUTO: 95 FL (ref 80–99)
MONOCYTES # BLD AUTO: 0.5 10^3/UL (ref 0–1)
MONOCYTES NFR BLD AUTO: 6 % (ref 0–12)
NEUTROPHILS # BLD AUTO: 6 10^3/UL (ref 1.8–7.8)
NEUTROPHILS NFR BLD AUTO: 75 % (ref 42–75)
PLATELET # BLD: 256 10^3/UL (ref 130–400)
PMV BLD AUTO: 9.3 FL (ref 9–12.2)
POTASSIUM SERPL-SCNC: 4 MMOL/L (ref 3.6–5)
PROT SERPL-MCNC: 5.9 GM/DL (ref 6.4–8.2)
SODIUM SERPL-SCNC: 134 MMOL/L (ref 135–145)
TRIGL SERPL-MCNC: 54 MG/DL (ref ?–150)
VLDLC SERPL CALC-MCNC: 11 MG/DL (ref 5–40)
WBC # BLD AUTO: 8.1 10^3/UL (ref 4.3–11)

## 2020-12-02 RX ADMIN — INSULIN ASPART SCH UNIT: 100 INJECTION, SOLUTION INTRAVENOUS; SUBCUTANEOUS at 16:27

## 2020-12-02 RX ADMIN — SODIUM CHLORIDE SCH MLS/HR: 900 INJECTION INTRAVENOUS at 14:44

## 2020-12-02 RX ADMIN — ALBUTEROL SULFATE SCH GM: 90 AEROSOL, METERED RESPIRATORY (INHALATION) at 11:06

## 2020-12-02 RX ADMIN — SODIUM CHLORIDE SCH MLS/HR: 900 INJECTION INTRAVENOUS at 06:21

## 2020-12-02 RX ADMIN — APIXABAN SCH MG: 2.5 TABLET, FILM COATED ORAL at 20:36

## 2020-12-02 RX ADMIN — ALBUTEROL SULFATE SCH GM: 90 AEROSOL, METERED RESPIRATORY (INHALATION) at 22:39

## 2020-12-02 RX ADMIN — APIXABAN SCH MG: 2.5 TABLET, FILM COATED ORAL at 09:30

## 2020-12-02 RX ADMIN — INSULIN ASPART SCH UNIT: 100 INJECTION, SOLUTION INTRAVENOUS; SUBCUTANEOUS at 20:38

## 2020-12-02 RX ADMIN — INSULIN ASPART SCH UNIT: 100 INJECTION, SOLUTION INTRAVENOUS; SUBCUTANEOUS at 06:20

## 2020-12-02 RX ADMIN — INSULIN ASPART SCH UNIT: 100 INJECTION, SOLUTION INTRAVENOUS; SUBCUTANEOUS at 09:30

## 2020-12-02 RX ADMIN — ALBUTEROL SULFATE SCH GM: 90 AEROSOL, METERED RESPIRATORY (INHALATION) at 07:05

## 2020-12-02 RX ADMIN — ALBUTEROL SULFATE SCH GM: 90 AEROSOL, METERED RESPIRATORY (INHALATION) at 18:34

## 2020-12-02 RX ADMIN — SODIUM CHLORIDE SCH MLS/HR: 900 INJECTION INTRAVENOUS at 21:19

## 2020-12-02 NOTE — NUR
SPOKE WITH THE PT (CALLED HIS ROOM PHONE), WENT THRU THE EXT MED HISTORY AND CALLED DR. DIAZ AND DR. REYES OFFICE TO COMPLETE THE MED REC



BREO 100-25: ON 09- PT RECEIVED #2 FROM DR. DIAZ OFFICE

ELIQUIS 2.5MG: PT GETS SAMPLES FROM DR. SEWELL OFFICE, UNFORTUNATELY THE OFFICE DOES 
NOT HAVE A DATE OR QTY FOR THE LAST SAMPLES GIVEN.



FUROSEMIDE- ACCORDING TO THE PT HE TAKES 1 TAB DAILY AND CAN TAKE AND 2ND ONE IF NEEDED



GABAPENTIN- ACCORDING TO THE PT HE TAKES 2 TABS HS



NOVOLIN N AND NOVOLIN R: PT GETS BOTH THESE FROM [facility] BUT GETS THEN OTC



OTC MEDS:

STOOL SOFTENER

## 2020-12-02 NOTE — NUR
CM/MAIKEL discharge planning. 



The patient is in isolation precautions for pending Covid test. MAUREEN/SS contacted the 
patient's daughter Aurea to discuss discharge planning. 



Home: The patient lives at home with his wife. His wife is visually impaired. He assist his 
wife with daily needs along with the patient's family. The patient is independent with his 
ADL's. He is currently still able to drive. 



Home Health: The patient was on service with [facility] but was 
discharged from services due to home bound status. The patient does not want to be home 
bound at this time. 



Supports: The patient has a large support system. His daughter Aurea checks on the patient 
and his wife twice daily. Aurea's  also checks on the patient every Wednesday morning 
before going to work. The patient's son calls daily around 4:00 p.m. to check on them. The 
family assist them whenever needed. 



Caregivers: MAUREEN/MAIKEL discussed private caregivers with Aurea. She is going to call [facility] 
on Aging to speak with Joan Hahn regarding eligibility for financial assistance with 
caregivers. She is also familiar with most of the caregiving agencies. 



CM/MAIKEL will speak with patient to inform him of assisted living/independent living options 
per the daughter's request. CM/MAIKEL will continue to follow for discharge planning.

## 2020-12-02 NOTE — DIAGNOSTIC IMAGING REPORT
Indication: Pneumonia



Portable chest shows normal heart size and vascularity. The

airspace disease is again seen with slightly more infiltrate seen

in the left upper lobe compared to the 12/01/2020 study with no

other significant change. There is no effusion or pneumothorax.



IMPRESSION: There are persistent bilateral infiltrates. The left

upper lobe infiltrate is slightly more dense compared to the

prior study.



Dictated by: 



  Dictated on workstation # [id number]

## 2020-12-02 NOTE — NUR
RD ASSESSMENT 



PMHx: pneumonia; COPD; emphysema; afib; CAD; hypercholesterolemia; HTN; stroke; BPH; GERD; 
DM; 



PT INTERACTION: Note pt is currently in COVID isolation, per chart review. Note all diet 
information for nutrition assessment is per Sahra HYDE or per chart review. Sahra states 
current appetite appears so-so. Note PO intake 75% x1meal, per chart review. Sahra states 
no issues with n/v/c/d that she is aware of. Note no BM has been recorded, and pt not 
currently on bowel regimen per chart review. Note recent 5# wt gain x6w, per chart review. 
Note unable to determine current level of DM management, and unable to determine recent 
HbA1c, per chart review. Note pt has BMI of 14.7 (Underweight BMI for age). 



ABNORMAL NUTRITION-RELATED LAB VALUES

LOW: Na 134; Cl 97; Ca 7.7; Pro 5.9; alb 2.6; HDL 24; 

HIGH: glu 237



Est. kcal needs: 8571-1836 kcal | 30-35 kcal/kg  

Est. Pro needs:  45-54 g Pro | 1.0-1.2 g Pro/kg 



PES STATEMENT: Underweight (NC-1.3) related to inadequate energy intake as evidenced by BMI 
14.7, and chart review. 



INTERVENTION:  

Continue with current diet order of CHO 60g/m 3snack diet. 

Add Glucerna (vary) to meals TID, for increased kcal intake. Provides 220 kcal and 10 g Pro 
per serving. 

Would encourage pt to eat when able. 

Did not offer diet education on DM management, d/t COVID isolation. 

Will continue to follow and reassess as pt needs, intake, and status change. 







ADA Light, MS RD LD 

[phone] cell

## 2020-12-02 NOTE — HISTORY & PHYSICAL-HOSPITALIST
History of Present Illness


HPI/Chief Complaint


Pt is an 80yoCM with a PMH of COPD who presented to the ER due to confusion, 

cough, and fever. He reports that his symptoms started just a day or two ago. He

denies loss of taste or smell, contact with known COVID patients, or other sick 

contacts. I spoke with his daughter who states he had not been well at all but 

was refusing to go to the doctor. When he developed a fever last night of 100.8 

they decided to call EMS. In the ER he was found to have pneumonia and was 

admitted. He has no complaints this morning and states he feels much better. In 

fact he is requesting discharge home to take care of his wife. I did confirm 

with his daughter that his wife is home and fine and she and her  are 

caring for her.


Source:  patient


Date Seen


20


Time Seen by a Provider:  10:56


Attending Physician


Marina Martínez MD


PCP


Tor Connelly DO


Referring Physician





Date of Admission


Dec 1, 2020 at 21:01





Home Medications & Allergies


Home Medications


Reviewed patient Home Medication Reconciliation performed by pharmacy medication

reconciliations technician and/or nursing.


Patients Allergies have been reviewed.





Allergies





Allergies


Coded Allergies


  morphine (Verified Allergy, Severe, Pt has received Lortab in the past w/o 

issue, 20)


  Sulfa (Sulfonamide Antibiotics) (Verified Allergy, Unknown, 20)


  penicillin G (Verified Allergy, Unknown, Pt has received Cefepime & Meropenem 

in the past, 20)


  levofloxacin (Verified Adverse Reaction, Unknown, 20)








Past Medical-Social-Family Hx


Past Med/Social Hx:  Reviewed Nursing Past Med/Soc Hx


Patient Social History


Marrital Status:  


Alcohol Use:  Denies Use


Recreational Drug Use:  No


Smoking Status:  Former Smoker


Former Smoker, Quit:  1985


Type Used:  Cigarettes


2nd Hand Smoke Exposure:  No


Recent Foreign Travel:  No


Contact w/other who traveled:  No


Recent Hopitalizations:  Yes


Recent Infectious Disease Expo:  No





Immunizations Up To Date


Tetanus Booster (TDap):  More than 5yrs


Pediatric:  No


Date of Pneumonia Vaccine:  Oct 2, 2018


Date of Influenza Vaccine:  Sep 18, 2019





Seasonal Allergies


Seasonal Allergies:  No





Past Medical History


Surgeries:  Cardiac, CABG, Coronary Stent, Eye Surgery, Joint Replacement, 

Lobectomy, Orthopedic, Prostatectomy, Tonsillectomy


Respiratory:  COPD, Emphysema, Pneumonia


Currently Using CPAP:  No


Currently Using BIPAP:  No


Cardiac:  Atrial Fibrillation, Coronary Artery Disease, High Cholesterol, 

Hypertension, Peripheral Vascular


Neurological:  Stroke


Reproductive:  No


Sexually Transmitted Disease:  No


HIV/AIDS:  No


Genitourinary:  Benign Prostatic Hyperpl, Prostate Problems, Bladder Infection


Gastrointestinal:  Gastroesophageal Reflux, Gastrointestinal Bleed


Musculoskeletal:  Arthritis


Endocrine:  Diabetes, Insulin dep


HEENT:  Cataract


Loss of Vision:  Denies


Hearing Impairment:  Hard of Hearing


History of Blood Disorders:  No


Adverse Reaction to Blood Tapia:  No





Family History





BLADD


  09 BROTHER


BLADD


  09 BROTHER


Cancer


  03 FATHER (THROAT)


  09 BROTHER


Dementia


  03 MOTHER


FH: bladder cancer


FH: bladder cancer


Family history: Diabetes mellitus


  03 MOTHER


Family history: Thyroid disorder


  03 MOTHER


Infertile


  03 MOTHER (X5 MISCARRIAGES)


Myocardial infarction


  09 BROTHER





No Family History of:


  Abdominal aortic aneurysm


  Shayne's disease


  Alcoholism


  Aphasia


  Cancer of colon


  Cataract


  Chest pain


  Congenital heart disease


  Congestive heart failure


  Cystic fibrosis


  Dysphagia


  Family history: Allergy


  Family history: Alzheimer's disease


  Family history: Arthritis


  Family history: Asthma


  Family history: Breast disease


  Family history: Cardiovascular disease


  Family history: Coronary thrombosis


  Family history: Gastrointestinal disease


  Family history: Glaucoma


  Family history: Hypertension


  Family history: Osteoporosis


  Headache


  Hearing loss


  Heart disease


  Hereditary disease


  History of - anemia


  History of - disorder


  History of - respiratory disease


  History of drug abuse


  Human immunodeficiency virus (HIV) seropositivity


  Hypercholesterolemia


  Kidney disease


  Malignant neoplasm of lung


  Parkinson's disease


  Prostate cancer


  Psychotic disorder


  Seizure disorder


  Stroke


  Tuberculosis


  Visual impairment


Heart Disease, Cancer, Stroke, Other Conditions/Hx





Review of Systems


Constitutional:  fever, malaise


EENTM:  no symptoms reported


Respiratory:  cough; No phlegm; short of breath


Cardiovascular:  No chest pain, No palpitations


Gastrointestinal:  no symptoms reported


Genitourinary:  no symptoms reported


Musculoskeletal:  no symptoms reported


Skin:  no symptoms reported


Psychiatric/Neurological:  No Symptoms Reported





Physical Exam


Physical Exam


Vital Signs





Vital Signs - First Documented








 20





 07:05


 


FiO2 98





Capillary Refill : Less Than 3 Seconds


Height, Weight, BMI


Height: 5'9.00"


Weight: 156lbs. 5.0oz. 70.028536bt; 14.69 BMI


Method:Stated


General Appearance:  No Apparent Distress, Chronically ill, Cachetic, Thin


HEENT:  PERRL/EOMI, Moist Mucous Membranes; No Scleral Icterus (L), No Scleral 

Icterus (R)


Neck:  Normal Inspection, Supple


Respiratory:  No Accessory Muscle Use, Rhonci, Other (on 1.5lpm)


Cardiovascular:  No Murmur, Irregularly Irregular


Gastrointestinal:  Normal Bowel Sounds, Non Tender, Soft


Neurologic/Psychiatric:  Alert, Oriented x3, Normal Mood/Affect





Results


Results/Procedures


Labs


Laboratory Tests


20 19:15








20 05:30








Patient resulted labs reviewed.


Imaging:  Reviewed Imaging Report


Imaging


                 [facility].


                                [place], [place]





NAME:   NESHA PANCHAL


MED REC#:   [id number]


ACCOUNT#:   [id number]


PT STATUS:   REG ER


:   1940


PHYSICIAN:   SHANNEN PALMER MD


ADMIT DATE:   20/ER


                                  ***Signed***


Date of Exam:20





CHEST 1 VIEW, AP/PA ONLY








EXAMINATION: Chest 1 view





HISTORY: Sepsis





COMPARISON: Chest radiograph, 09/10/2020





FINDINGS: 





Heart size and pulmonary vasculature are normal. Stable loop


recorder overlying the left lower lung. Stable elevation of the


right hemidiaphragm. There are patchy interstitial airspace


opacities throughout both lungs. Likely background coarse


interstitial markings. Surgical clips overlying the left upper


lung. Blunting of the left costophrenic angle. No pneumothorax.


The osseous structures are intact.





IMPRESSION: 





1. Patchy interstitial and airspace opacities throughout both


lungs may represent chronic lung findings with possible


superimposed pneumonia in the appropriate clinical setting.


2. Likely small left pleural effusion.





Dictated by: 





  Dictated on workstation # [id number]








Dict:   20


Trans:   20


[facility] [id number]





Interpreted by:     DEE LEDESMA DO


Electronically signed by: DEE LEDESMA DO 20





Assessment/Plan


Admission Diagnosis


Sepsis from Pneumonia- present on arrival


Admission Status:  Inpatient Order (span 2 midnights)


Reason for Inpatient Admission:  


see below





Assessment and Plan


Sepsis from Pneumonia- present on arrival


Acute on Chronic Hypoxic Respiratory Failure


COVID PUI


COPD


   Continue on IV abx (Merrem due to allergies)


   Follow up cultures


   COVID PCR pending


   Titrate oxygen as able, normally only on nocturnal oxygen





IDDMII


    cont SSI





CAD


paroxysmal A-fib


HTN


HLD


   Hold home antihypertensives as normotensive


   Continue Eliquis


   


dvt ppx: Eliquis as above





Diagnosis/Problems


Diagnosis/Problems





(1) Sepsis


Status:  Acute


Qualifiers:  


   Sepsis type:  sepsis due to unspecified organism  Sepsis acute organ 

dysfunction status:  without acute organ dysfunction  Qualified Codes:  A41.9 - 

Sepsis, unspecified organism


(2) Atrial fibrillation


Qualifiers:  


   Atrial fibrillation type:  unspecified  Qualified Codes:  I48.91 - 

Unspecified atrial fibrillation


(3) Acute respiratory failure


Status:  Acute


Qualifiers:  


   Respiratory failure complication:  hypoxia  Qualified Codes:  J96.01 - Acute 

respiratory failure with hypoxia


(4) Hyperlipidemia


Status:  Chronic


Qualifiers:  


   Hyperlipidemia type:  unspecified  Qualified Codes:  E78.5 - Hyperlipidemia, 

unspecified


(5) Insulin dependent diabetes mellitus


Status:  Chronic


(6) Person under investigation for COVID-19


Status:  Acute


(7) Left lower lobe pneumonia


Status:  Acute


Qualifiers:  


   Pneumonia type:  due to unspecified organism  Qualified Codes:  J18.9 - 

Pneumonia, unspecified organism


(8) Paroxysmal atrial fibrillation


Status:  Chronic


(9) Pneumonia


Status:  Acute


Qualifiers:  


   Pneumonia type:  due to unspecified organism  Laterality:  left  Lung 

location:  upper lobe of lung  Qualified Codes:  J18.9 - Pneumonia, unspecified 

organism


(10) CAD (coronary artery disease)


Status:  Chronic


Qualifiers:  


   Coronary Disease-Associated Artery/Lesion type:  native artery  Native vs. 

transplanted heart:  native heart  Associated angina:  without angina  Qualified

Codes:  I25.10 - Atherosclerotic heart disease of native coronary artery without

angina pectoris





Clinical Quality Measures


DVT/VTE Risk/Contraindication:


Risk Factor Score Per Nursin


RFS Level Per Nursing on Admit:  4+=Very High





Copy


Copies To 1:   TOR CONNELLY KATELYN M MD               Dec 2, 2020 11:03

## 2020-12-03 VITALS — SYSTOLIC BLOOD PRESSURE: 122 MMHG | DIASTOLIC BLOOD PRESSURE: 66 MMHG

## 2020-12-03 VITALS — SYSTOLIC BLOOD PRESSURE: 134 MMHG | DIASTOLIC BLOOD PRESSURE: 81 MMHG

## 2020-12-03 VITALS — DIASTOLIC BLOOD PRESSURE: 56 MMHG | SYSTOLIC BLOOD PRESSURE: 114 MMHG

## 2020-12-03 VITALS — DIASTOLIC BLOOD PRESSURE: 66 MMHG | SYSTOLIC BLOOD PRESSURE: 122 MMHG

## 2020-12-03 VITALS — SYSTOLIC BLOOD PRESSURE: 114 MMHG | DIASTOLIC BLOOD PRESSURE: 48 MMHG

## 2020-12-03 RX ADMIN — INSULIN ASPART SCH UNIT: 100 INJECTION, SOLUTION INTRAVENOUS; SUBCUTANEOUS at 07:24

## 2020-12-03 RX ADMIN — ALBUTEROL SULFATE SCH GM: 90 AEROSOL, METERED RESPIRATORY (INHALATION) at 01:59

## 2020-12-03 RX ADMIN — APIXABAN SCH MG: 2.5 TABLET, FILM COATED ORAL at 10:31

## 2020-12-03 RX ADMIN — ALBUTEROL SULFATE SCH PUFF: 90 AEROSOL, METERED RESPIRATORY (INHALATION) at 06:40

## 2020-12-03 RX ADMIN — INSULIN ASPART SCH UNIT: 100 INJECTION, SOLUTION INTRAVENOUS; SUBCUTANEOUS at 10:28

## 2020-12-03 RX ADMIN — ALBUTEROL SULFATE SCH PUFF: 90 AEROSOL, METERED RESPIRATORY (INHALATION) at 10:23

## 2020-12-03 RX ADMIN — SODIUM CHLORIDE SCH MLS/HR: 900 INJECTION INTRAVENOUS at 06:00

## 2020-12-03 NOTE — DISCHARGE INST-SIMPLE/STANDARD
Discharge Inst-Standard


Discharge Medications


New, Converted or Re-Newed RX:  Transmitted to Pharmacy





Patient Instructions/Follow Up


Plan of Care/Instructions/FU:  


Please continue to take your medications as written. Please follow up with


Dr Holley to follow up this hospital stay.


Activity as Tolerated:  Yes


Discharge Diet:  Cardiac Diet


Return to The Hospital For:  


Chest pain, shortness of breath, fever, confusion, weakness, if you feel


you are getting worse.











NICOLETTE HERNÁNDEZ MD               Dec 3, 2020 08:26

## 2020-12-03 NOTE — NUR
CM/SS finalized discharge. 



Plan: The patient discharged home today 12/4 with outpatient follow up. 



IV antibiotics: The physician spoke with the patient regarding outpatient treatment. The 
patient and the patient's daughter are willing to do outpatient IV antibiotics daily. CM/SS 
tubed the antibiotic script to [facility] and informed them of patient coming tomorrow for 
treatment. They verbalized understanding. 



CM/SS attempted to contact the patient's daughter Aurea to check of any further needs. No 
answer, voicemail left.

## 2020-12-03 NOTE — NUR
NESHA PANCHAL demonstrates understanding of discharge instructions and accurately returns 
instructions upon questioning.  Copy of Post-Discharge Instructions and Medication Discharge 
Instructions given to PT. NESHA PANCHAL is able to manage continuing needs after 
discharge.  Patients belongings returned to PT. Skin dry and intact; no breakdown noted. 
Patient discharged from [facility]- on 12/03/20 at 1500. NESHA PANCHAL left floor via , 
accompanied by STAFF.

## 2020-12-03 NOTE — DISCHARGE SUMMARY
Diagnosis/Chief Complaint


Date of Admission


Dec 1, 2020 at 21:01


Date of Discharge





Admission Diagnosis


Sepsis from Pneumonia- present on arrival


Primary Care


Jay Holley DO


Discharge Diagnosis





(1) Sepsis


Status:  Acute


(2) Atrial fibrillation


(3) Acute respiratory failure


Status:  Acute


(4) Hyperlipidemia


Status:  Chronic


(5) Insulin dependent diabetes mellitus


Status:  Chronic


(6) Person under investigation for COVID-19


Status:  Acute


(7) Left lower lobe pneumonia


Status:  Acute


(8) Paroxysmal atrial fibrillation


Status:  Chronic


(9) Pneumonia


Status:  Acute


(10) CAD (coronary artery disease)


Status:  Chronic





Discharge Summary


Discharge Physical Exam


Allergies:  


Coded Allergies:  


     morphine (Verified  Allergy, Severe, Pt has received Lortab in the past w/o

issue, 20)


     Sulfa (Sulfonamide Antibiotics) (Verified  Allergy, Unknown, 20)


     penicillin G (Verified  Allergy, Unknown, Pt has received Cefepime & 

Meropenem in the past, 20)


     levofloxacin (Verified  Adverse Reaction, Unknown, 20)


Vitals & I&Os





Vital Signs








  Date Time  Temp Pulse Resp B/P (MAP) Pulse Ox O2 Delivery O2 Flow Rate FiO2


 


12/3/20 04:00 37.0 106 18 114/48 (70) 96 Nasal Cannula 1.00 


 


20 18:34        94








General Appearance:  No Apparent Distress, Chronically ill, Thin


Respiratory:  Lungs Clear, No Respiratory Distress


Cardiovascular:  Regular Rate, Rhythm, No Murmur


Neurologic/Psychiatric:  Alert, Oriented x3





Hospital Course


Pt was admitted due to acute hypoxic respiratory failure.


Labs (last 24 hrs)


Laboratory Tests


20 09:29: Glucometer 205H


20 13:58: Glucometer 244H


20 20:03: Glucometer 216H


12/3/20 05:24: Glucometer 150H


12/3/20 06:06: Glucometer 160H





Microbiology


20 Blood Culture - Preliminary, Resulted


          No growth


20 Urine Culture - Final, Complete


          NO GROWTH


20 Influenza Types A,B Antigen ([place]) - Final, Complete


          


Patient resulted labs reviewed.


Pending Labs


Laboratory Tests


12/3/20 05:24: Glucometer 150


12/3/20 06:06: Glucometer 160





Imaging:  Reviewed Imaging Report





Discharge


Home Medications:





Active Scripts


Active


Reported


Stool Softener (Docusate Sodium) 100 Mg Capsule 100 Mg PO DAILY


Furosemide 40 Mg Tablet 40 Mg PO DAILY PRN


Breo Ellipta 100-25 Mcg INH (Fluticasone/Vilanterol) 1 Each Blst.w.dev 1 Each IH

DAILY


Potassium Chloride 10 Meq Tab.er.prt 10 Meq PO DAILY


Albuterol Sulfate 2.5 Mg/0.5 Ml Vial.neb 2.5 Mg INH Q4H


Eliquis (Apixaban) 2.5 Mg Tablet 2.5 Mg PO BID


Furosemide 40 Mg Tablet 40 Mg PO DAILY


Gabapentin 100 Mg Capsule 200 Mg PO HS


     TAKES 2 (100MG) CAPS


Metoprolol Tartrate 25 Mg Tablet 25 Mg PO BID


Pantoprazole Sodium 40 Mg Tablet.dr 40 Mg PO DAILY


Novolin N (Insulin NPH Human Isophane) 100 Unit/1 Ml Vial 5 Units SQ BIDPC


     USES ALONG WITH NOVOLIN R


Humulin R (Insulin Regular, Human) 1,000 Units/10 Ml Soln 5 Units SQ BIDPC


     USES ALONG WITH NOVOLIN N


Montelukast Sodium 10 Mg Tablet 10 Mg PO HS


Simvastatin 20 Mg Tablet 20 Mg PO HS





Instructions to patient/family


Please see electronic discharge instructions given to patient.





Clinical Quality Measures


DVT/VTE Risk/Contraindication:


Risk Factor Score Per Nursin


RFS Level Per Nursing on Admit:  4+=Very High





Problem Qualifiers





(1) Sepsis:  


Sepsis type:  sepsis due to unspecified organism  Sepsis acute organ dysfunction

status:  without acute organ dysfunction  Qualified Codes:  A41.9 - Sepsis, 

unspecified organism


(2) Atrial fibrillation:  


Atrial fibrillation type:  unspecified  Qualified Codes:  I48.91 - Unspecified 

atrial fibrillation


(3) Acute respiratory failure:  


Respiratory failure complication:  hypoxia  Qualified Codes:  J96.01 - Acute 

respiratory failure with hypoxia


(4) Hyperlipidemia:  


Hyperlipidemia type:  unspecified  Qualified Codes:  E78.5 - Hyperlipidemia, 

unspecified


(5) Left lower lobe pneumonia:  


Pneumonia type:  due to unspecified organism  Qualified Codes:  J18.9 - 

Pneumonia, unspecified organism


(6) Pneumonia:  


Pneumonia type:  due to unspecified organism  Laterality:  left  Lung location: 

upper lobe of lung  Qualified Codes:  J18.9 - Pneumonia, unspecified organism


(7) CAD (coronary artery disease):  


Coronary Disease-Associated Artery/Lesion type:  native artery  Native vs. 

transplanted heart:  native heart  Associated angina:  without angina  Qualified

Codes:  I25.10 - Atherosclerotic heart disease of native coronary artery without

angina pectoris








NICOLETTE HERNÁNDEZ MD               Dec 3, 2020 08:19

## 2020-12-04 VITALS — SYSTOLIC BLOOD PRESSURE: 87 MMHG | DIASTOLIC BLOOD PRESSURE: 53 MMHG

## 2020-12-04 RX ADMIN — SODIUM CHLORIDE SCH MLS/HR: 900 INJECTION INTRAVENOUS at 12:42

## 2020-12-05 VITALS — SYSTOLIC BLOOD PRESSURE: 93 MMHG | DIASTOLIC BLOOD PRESSURE: 65 MMHG

## 2020-12-05 RX ADMIN — SODIUM CHLORIDE SCH MLS/HR: 900 INJECTION INTRAVENOUS at 10:17

## 2020-12-06 VITALS — SYSTOLIC BLOOD PRESSURE: 90 MMHG | DIASTOLIC BLOOD PRESSURE: 69 MMHG

## 2020-12-06 RX ADMIN — SODIUM CHLORIDE SCH MLS/HR: 900 INJECTION INTRAVENOUS at 10:10

## 2020-12-07 VITALS — SYSTOLIC BLOOD PRESSURE: 99 MMHG | DIASTOLIC BLOOD PRESSURE: 66 MMHG

## 2020-12-07 RX ADMIN — SODIUM CHLORIDE SCH MLS/HR: 900 INJECTION INTRAVENOUS at 09:27

## 2020-12-08 VITALS — SYSTOLIC BLOOD PRESSURE: 100 MMHG | DIASTOLIC BLOOD PRESSURE: 57 MMHG

## 2020-12-08 RX ADMIN — SODIUM CHLORIDE SCH MLS/HR: 900 INJECTION INTRAVENOUS at 09:20

## 2020-12-09 VITALS — DIASTOLIC BLOOD PRESSURE: 64 MMHG | SYSTOLIC BLOOD PRESSURE: 95 MMHG

## 2020-12-09 RX ADMIN — SODIUM CHLORIDE SCH MLS/HR: 900 INJECTION INTRAVENOUS at 09:53

## 2020-12-10 VITALS — DIASTOLIC BLOOD PRESSURE: 57 MMHG | SYSTOLIC BLOOD PRESSURE: 94 MMHG

## 2020-12-10 RX ADMIN — SODIUM CHLORIDE SCH MLS/HR: 900 INJECTION INTRAVENOUS at 09:59

## 2020-12-11 VITALS — DIASTOLIC BLOOD PRESSURE: 60 MMHG | SYSTOLIC BLOOD PRESSURE: 85 MMHG

## 2020-12-11 RX ADMIN — SODIUM CHLORIDE SCH MLS/HR: 900 INJECTION INTRAVENOUS at 09:18

## 2020-12-12 VITALS — DIASTOLIC BLOOD PRESSURE: 66 MMHG | SYSTOLIC BLOOD PRESSURE: 92 MMHG

## 2020-12-12 RX ADMIN — SODIUM CHLORIDE SCH MLS/HR: 900 INJECTION INTRAVENOUS at 09:17

## 2020-12-13 ENCOUNTER — HOSPITAL ENCOUNTER (OUTPATIENT)
Dept: HOSPITAL 75 - SDC | Age: 80
Discharge: HOME | End: 2020-12-13
Attending: FAMILY MEDICINE
Payer: MEDICARE

## 2020-12-13 VITALS — BODY MASS INDEX: 46.65 KG/M2 | WEIGHT: 315 LBS | HEIGHT: 69.02 IN

## 2020-12-13 VITALS — SYSTOLIC BLOOD PRESSURE: 96 MMHG | DIASTOLIC BLOOD PRESSURE: 53 MMHG

## 2020-12-13 DIAGNOSIS — Z01.89: Primary | ICD-10-CM

## 2020-12-13 PROCEDURE — 96374 THER/PROPH/DIAG INJ IV PUSH: CPT

## 2020-12-13 RX ADMIN — SODIUM CHLORIDE SCH MLS/HR: 900 INJECTION INTRAVENOUS at 09:09

## 2021-01-20 ENCOUNTER — HOSPITAL ENCOUNTER (INPATIENT)
Dept: HOSPITAL 75 - ER | Age: 81
LOS: 2 days | Discharge: HOME HEALTH | DRG: 177 | End: 2021-01-22
Attending: INTERNAL MEDICINE | Admitting: INTERNAL MEDICINE
Payer: MEDICARE

## 2021-01-20 VITALS — WEIGHT: 156.31 LBS | BODY MASS INDEX: 23.15 KG/M2 | HEIGHT: 69 IN

## 2021-01-20 VITALS — DIASTOLIC BLOOD PRESSURE: 58 MMHG | SYSTOLIC BLOOD PRESSURE: 99 MMHG

## 2021-01-20 VITALS — SYSTOLIC BLOOD PRESSURE: 116 MMHG | DIASTOLIC BLOOD PRESSURE: 56 MMHG

## 2021-01-20 VITALS — SYSTOLIC BLOOD PRESSURE: 118 MMHG | DIASTOLIC BLOOD PRESSURE: 59 MMHG

## 2021-01-20 DIAGNOSIS — U07.1: Primary | ICD-10-CM

## 2021-01-20 DIAGNOSIS — Z99.81: ICD-10-CM

## 2021-01-20 DIAGNOSIS — M19.90: ICD-10-CM

## 2021-01-20 DIAGNOSIS — R73.9: ICD-10-CM

## 2021-01-20 DIAGNOSIS — E11.42: ICD-10-CM

## 2021-01-20 DIAGNOSIS — Z95.5: ICD-10-CM

## 2021-01-20 DIAGNOSIS — R53.81: ICD-10-CM

## 2021-01-20 DIAGNOSIS — E87.2: ICD-10-CM

## 2021-01-20 DIAGNOSIS — I11.0: ICD-10-CM

## 2021-01-20 DIAGNOSIS — Z88.5: ICD-10-CM

## 2021-01-20 DIAGNOSIS — Z88.1: ICD-10-CM

## 2021-01-20 DIAGNOSIS — Z79.4: ICD-10-CM
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
## 2021-01-20 DIAGNOSIS — I25.10: ICD-10-CM

## 2021-01-20 DIAGNOSIS — Z86.73: ICD-10-CM

## 2021-01-20 DIAGNOSIS — Z95.1: ICD-10-CM

## 2021-01-20 DIAGNOSIS — K21.9: ICD-10-CM

## 2021-01-20 DIAGNOSIS — J12.82: ICD-10-CM

## 2021-01-20 DIAGNOSIS — N40.0: ICD-10-CM

## 2021-01-20 DIAGNOSIS — I50.32: ICD-10-CM

## 2021-01-20 DIAGNOSIS — T38.0X5A: ICD-10-CM

## 2021-01-20 DIAGNOSIS — J43.9: ICD-10-CM

## 2021-01-20 DIAGNOSIS — Z88.2: ICD-10-CM

## 2021-01-20 DIAGNOSIS — Z88.0: ICD-10-CM

## 2021-01-20 DIAGNOSIS — J96.10: ICD-10-CM

## 2021-01-20 DIAGNOSIS — I48.91: ICD-10-CM

## 2021-01-20 DIAGNOSIS — E78.00: ICD-10-CM

## 2021-01-20 LAB
ALBUMIN SERPL-MCNC: 2.4 GM/DL (ref 3.2–4.5)
ALP SERPL-CCNC: 96 U/L (ref 40–136)
ALT SERPL-CCNC: 15 U/L (ref 0–55)
APTT BLD: 46 SEC (ref 24–35)
APTT PPP: YELLOW S
BACTERIA #/AREA URNS HPF: (no result) /HPF
BASOPHILS # BLD AUTO: 0 10^3/UL (ref 0–0.1)
BASOPHILS NFR BLD AUTO: 0 % (ref 0–10)
BILIRUB SERPL-MCNC: 0.2 MG/DL (ref 0.1–1)
BILIRUB UR QL STRIP: NEGATIVE
BUN/CREAT SERPL: 12
CALCIUM SERPL-MCNC: 8 MG/DL (ref 8.5–10.1)
CHLORIDE SERPL-SCNC: 96 MMOL/L (ref 98–107)
CO2 SERPL-SCNC: 27 MMOL/L (ref 21–32)
CREAT SERPL-MCNC: 1.21 MG/DL (ref 0.6–1.3)
EOSINOPHIL # BLD AUTO: 0.1 10^3/UL (ref 0–0.3)
EOSINOPHIL NFR BLD AUTO: 1 % (ref 0–10)
FIBRINOGEN PPP-MCNC: CLEAR MG/DL
GFR SERPLBLD BASED ON 1.73 SQ M-ARVRAT: 57 ML/MIN
GLUCOSE SERPL-MCNC: 134 MG/DL (ref 70–105)
GLUCOSE UR STRIP-MCNC: NEGATIVE MG/DL
HCT VFR BLD CALC: 28 % (ref 40–54)
HGB BLD-MCNC: 8.7 G/DL (ref 13.3–17.7)
INR PPP: 1.3 (ref 0.8–1.4)
KETONES UR QL STRIP: NEGATIVE
LEUKOCYTE ESTERASE UR QL STRIP: NEGATIVE
LYMPHOCYTES # BLD AUTO: 1.4 10^3/UL (ref 1–4)
LYMPHOCYTES NFR BLD AUTO: 11 % (ref 12–44)
MANUAL DIFFERENTIAL PERFORMED BLD QL: NO
MCH RBC QN AUTO: 30 PG (ref 25–34)
MCHC RBC AUTO-ENTMCNC: 31 G/DL (ref 32–36)
MCV RBC AUTO: 94 FL (ref 80–99)
MONOCYTES # BLD AUTO: 0.9 10^3/UL (ref 0–1)
MONOCYTES NFR BLD AUTO: 7 % (ref 0–12)
NEUTROPHILS # BLD AUTO: 10.2 10^3/UL (ref 1.8–7.8)
NEUTROPHILS NFR BLD AUTO: 80 % (ref 42–75)
NITRITE UR QL STRIP: NEGATIVE
PH UR STRIP: 6 [PH] (ref 5–9)
PLATELET # BLD: 325 10^3/UL (ref 130–400)
PMV BLD AUTO: 8.8 FL (ref 9–12.2)
POTASSIUM SERPL-SCNC: 4.2 MMOL/L (ref 3.6–5)
PROT SERPL-MCNC: 6.6 GM/DL (ref 6.4–8.2)
PROT UR QL STRIP: NEGATIVE
PROTHROMBIN TIME: 16.4 SEC (ref 12.2–14.7)
RBC #/AREA URNS HPF: (no result) /HPF
SODIUM SERPL-SCNC: 134 MMOL/L (ref 135–145)
SP GR UR STRIP: 1.01 (ref 1.02–1.02)
WBC # BLD AUTO: 12.7 10^3/UL (ref 4.3–11)
WBC #/AREA URNS HPF: (no result) /HPF

## 2021-01-20 PROCEDURE — 87088 URINE BACTERIA CULTURE: CPT

## 2021-01-20 PROCEDURE — 85730 THROMBOPLASTIN TIME PARTIAL: CPT

## 2021-01-20 PROCEDURE — 36415 COLL VENOUS BLD VENIPUNCTURE: CPT

## 2021-01-20 PROCEDURE — 85025 COMPLETE CBC W/AUTO DIFF WBC: CPT

## 2021-01-20 PROCEDURE — 94664 DEMO&/EVAL PT USE INHALER: CPT

## 2021-01-20 PROCEDURE — 87040 BLOOD CULTURE FOR BACTERIA: CPT

## 2021-01-20 PROCEDURE — 73110 X-RAY EXAM OF WRIST: CPT

## 2021-01-20 PROCEDURE — 94760 N-INVAS EAR/PLS OXIMETRY 1: CPT

## 2021-01-20 PROCEDURE — 86900 BLOOD TYPING SEROLOGIC ABO: CPT

## 2021-01-20 PROCEDURE — 70450 CT HEAD/BRAIN W/O DYE: CPT

## 2021-01-20 PROCEDURE — 86901 BLOOD TYPING SEROLOGIC RH(D): CPT

## 2021-01-20 PROCEDURE — 71045 X-RAY EXAM CHEST 1 VIEW: CPT

## 2021-01-20 PROCEDURE — 81000 URINALYSIS NONAUTO W/SCOPE: CPT

## 2021-01-20 PROCEDURE — 71250 CT THORAX DX C-: CPT

## 2021-01-20 PROCEDURE — 84145 PROCALCITONIN (PCT): CPT

## 2021-01-20 PROCEDURE — 80053 COMPREHEN METABOLIC PANEL: CPT

## 2021-01-20 PROCEDURE — 87635 SARS-COV-2 COVID-19 AMP PRB: CPT

## 2021-01-20 PROCEDURE — 83605 ASSAY OF LACTIC ACID: CPT

## 2021-01-20 PROCEDURE — 94640 AIRWAY INHALATION TREATMENT: CPT

## 2021-01-20 PROCEDURE — 82962 GLUCOSE BLOOD TEST: CPT

## 2021-01-20 PROCEDURE — 85610 PROTHROMBIN TIME: CPT

## 2021-01-20 PROCEDURE — 80048 BASIC METABOLIC PNL TOTAL CA: CPT

## 2021-01-20 PROCEDURE — 72125 CT NECK SPINE W/O DYE: CPT

## 2021-01-20 RX ADMIN — SODIUM CHLORIDE SCH MLS/HR: 900 INJECTION, SOLUTION INTRAVENOUS at 19:27

## 2021-01-20 RX ADMIN — SODIUM CHLORIDE SCH MLS/HR: 900 INJECTION, SOLUTION INTRAVENOUS at 14:46

## 2021-01-20 RX ADMIN — DOCUSATE SODIUM SCH MG: 100 CAPSULE ORAL at 21:34

## 2021-01-20 RX ADMIN — SODIUM CHLORIDE SCH MLS/HR: 900 INJECTION, SOLUTION INTRAVENOUS at 13:38

## 2021-01-20 RX ADMIN — SENNOSIDES SCH MG: 8.6 TABLET, FILM COATED ORAL at 21:34

## 2021-01-20 RX ADMIN — INSULIN ASPART SCH UNIT: 100 INJECTION, SOLUTION INTRAVENOUS; SUBCUTANEOUS at 21:50

## 2021-01-20 RX ADMIN — APIXABAN SCH MG: 2.5 TABLET, FILM COATED ORAL at 21:50

## 2021-01-20 NOTE — DIAGNOSTIC IMAGING REPORT
PROCEDURE: 

CT head and CT cervical spine without contrast.



TECHNIQUE: 

Multiple contiguous axial images were obtained through the brain

and cervical spine without the use of intravenous contrast.

Sagittal and coronal reformations through the cervical spine were

then performed. Auto Exposure Controls were utilized during the

CT exam to meet ALARA standards for radiation dose reduction. 



INDICATION:  

Fall on blood thinners.



COMPARISON: 

Correlation is made with the prior CT from 01/30/2020.



FINDINGS:



CT HEAD:

The ventricles and sulci are prominent, consistent with the

patient's age. There is an area of low density in the right

posterior parietal lobe which may represent an area of

encephalomalacia from prior infarct. There is no sulcal

effacement or midline shift. No acute intra-axial or extra-axial

hemorrhage is detected. The cisterns are patent. The visualized

paranasal sinuses are clear.



IMPRESSION: 

Chronic and senescent changes. No acute intracranial process is

detected.





CT CERVICAL SPINE:

Curvature of the cervical spine is normal. There is minimal

anterolisthesis of C4 on C5 with minimal retrolisthesis of C5 on

C6. No fractures are identified. There is multilevel degenerative

disc and facet disease. The prevertebral tissues are normal. The

odontoid is intact.



IMPRESSION: 

Cervical spondylosis. No acute bony abnormality is detected.







 



Dictated by: 



  Dictated on workstation # [id number]

## 2021-01-20 NOTE — NUR
Patient's daughter Aurea called and is given update on patient. She is informed 
that patient will be admitted.

## 2021-01-20 NOTE — DIAGNOSTIC IMAGING REPORT
INDICATION: Right wrist pain post fall.



AP, oblique, and lateral views of the right wrist are obtained.



There are advanced degenerative changes of the right wrist with

severe radiocarpal joint space narrowing and subchondral

sclerosis as well as diffuse degenerative changes throughout the

carpal bones. There is chronic widening of the scapholunate

distance. There are vascular calcifications noted.



IMPRESSION: Extensive degenerative changes with no acute

appearing bony abnormality.



Dictated by: 



  Dictated on workstation # [id number]

## 2021-01-20 NOTE — DIAGNOSTIC IMAGING REPORT
CLINICAL INDICATION:

Patient with sepsis.



EXAM: 

Portable chest x-ray, upright view.



COMPARISON: 

Chest x-ray dated 12/02/2020.



FINDINGS:

There are diffuse bilateral lung patchy airspace opacities and

increased lung markings which are most pronounced in both lung

bases (left side more than the right). There is slight improved

aeration of the left upper lobe and right upper lobe region. The

other areas are otherwise stable. The cardiac silhouette and

pulmonary vasculature are obscured. The left costophrenic angle

is blunted as noted on the prior study which may be related to

lung disease but pleural effusion or pleural thickening may also

be considered. There is no right pleural effusion. There is no

pneumothorax. Surgical clips are again seen overlying the medial

left upper chest region.



IMPRESSION:

There are diffuse bilateral lung patchy opacities and increased

lung markings which are worse in both lung bases. There is slight

improved aeration in both upper lung regions.



Dictated by: 



  Dictated on workstation # [id number]

## 2021-01-20 NOTE — ED GENERAL
General


Chief Complaint:  General Problems/Pain


Stated Complaint:  WEAKNESS


Source of Information:  Patient


Exam Limitations:  No Limitations





History of Present Illness


Date Seen by Provider:  2021


Time Seen by Provider:  13:06


Initial Comments


This is a 79 yo male who presented to the ER via [facility] with c/o of

weakness and fall. Spouse states he was recently diagnosed with a UTI and has 

been having progressive weakness for the past week. Reports he fell this AM and 

hit his head on the kitchen table and his right arm on the floor. Spouse sates 

when he was assisted to his bedroom his legs gave out and he fell again on the 

floor. Family was unable to get him up. She reports that he does take Eliquis 

daily for his Afib. Additionally, he has been having issues with recurrent 

pneumonia over the past month, and has been closely followed by Dr. Connelly.  

Spouse states he was at the doctor's office yesterday and was diagnosed with a 

UTI and placed on antibiotics, unsure of name/dose.  He reports no headache, 

neck pain, chest pain, difficulty breathing, abdominal pain, nausea, vomiting, 

diarrhea.  States he had a negative COVID test 3 days ago. Also, reports wearing

oxygen at home 2 liters via NC continuously.





Allergies and Home Medications


Allergies


Coded Allergies:  


     morphine (Verified  Allergy, Severe, Pt has received Lortab in the past w/o

issue, 20)


     Sulfa (Sulfonamide Antibiotics) (Verified  Allergy, Unknown, 20)


     penicillin G (Verified  Allergy, Unknown, Pt has received Cefepime & 

Meropenem in the past, 20)


     levofloxacin (Verified  Adverse Reaction, Unknown, 20)





Home Medications


Albuterol Sulfate 2.5 Mg/0.5 Ml Vial.neb, 2.5 MG INH Q4H, (Reported)


Apixaban 2.5 Mg Tablet, 2.5 MG PO BID, (Reported)


Cefepime HCl/Dextrose, Iso-Osm 2 Gm/100 Ml Froz.piggy, 2 GM IV DAILY


   Prescribed by: NICOLETTE HERNÁNDEZ on 12/3/20 0953


Docusate Sodium 100 Mg Capsule, 100 MG PO DAILY, (Reported)


Fluticasone/Vilanterol 1 Each Blst.w.dev, 1 EACH IH DAILY, (Reported)


Furosemide 40 Mg Tablet, 40 MG PO DAILY, (Reported)


Furosemide 40 Mg Tablet, 40 MG PO DAILY PRN for FLUID RETENTION, (Reported)


Gabapentin 100 Mg Capsule, 200 MG PO HS, (Reported)


   TAKES 2 (100MG) CAPS 


Insulin NPH Human Isophane 100 Unit/1 Ml Vial, 5 UNITS SQ BIDPC, (Reported)


   USES ALONG WITH NOVOLIN R 


Insulin Regular, Human 1,000 Units/10 Ml Soln, 5 UNITS SQ BIDPC, (Reported)


   USES ALONG WITH NOVOLIN N 


Metoprolol Tartrate 25 Mg Tablet, 25 MG PO BID, (Reported)


Montelukast Sodium 10 Mg Tablet, 10 MG PO HS, (Reported)


Pantoprazole Sodium 40 Mg Tablet.dr, 40 MG PO DAILY, (Reported)


Potassium Chloride 10 Meq Tab.er.prt, 10 MEQ PO DAILY, (Reported)


Simvastatin 20 Mg Tablet, 20 MG PO HS, (Reported)





Patient Home Medication List


Home Medication List Reviewed:  Yes





Review of Systems


Review of Systems


Constitutional:  no symptoms reported


EENTM:  no symptoms reported


Respiratory:  no symptoms reported


Cardiovascular:  no symptoms reported


Gastrointestinal:  no symptoms reported


Genitourinary:  frequency


Musculoskeletal:  other (right wrist pain )


Skin:  other (skin tear right wrist )


Psychiatric/Neurological:  No Symptoms Reported


Hematologic/Lymphatic:  No Symptoms Reported


Immunological/Allergic:  no symptoms reported





Past Medical-Social-Family Hx


Patient Social History


Alcohol Use:  Denies Use


Type Used:  Cigarettes


Former Smoker, Quit:  1985


2nd Hand Smoke Exposure:  No


Recent Hopitalizations:  Yes





Immunizations Up To Date


Tetanus Booster (TDap):  More than 5yrs


PED Vaccines UTD:  No


Date of Pneumonia Vaccine:  Oct 2, 2018


Date of Influenza Vaccine:  Sep 18, 2019





Seasonal Allergies


Seasonal Allergies:  No





Past Medical History


Surgeries:  Yes (right hip, lung)


Cardiac, CABG, Coronary Stent, Eye Surgery, Joint Replacement, Lobectomy, 

Orthopedic, Prostatectomy, Tonsillectomy


Respiratory:  Yes


Pneumonia, Sleep Apnea, COPD, Emphysema


Currently Using CPAP:  No


Currently Using BIPAP:  No


Cardiac:  Yes (diastolic heart failure)


Atrial Fibrillation, Coronary Artery Disease, High Cholesterol, Hypertension, 

Peripheral Vascular


Neurological:  Yes


Stroke


Reproductive Disorders:  No


Sexually Transmitted Disease:  No


HIV/AIDS:  No


Genitourinary:  Yes


Benign Prostatic Hyperpl, Prostate Problems, Bladder Infection


Gastrointestinal:  Yes


Gastroesophageal Reflux, Gastrointestinal Bleed


Musculoskeletal:  Yes


Arthritis


Endocrine:  Yes


Diabetes, Insulin dep


HEENT:  Yes


Cataract


Loss of Vision:  Denies


Hearing Impairment:  Hard of Hearing


Cancer:  Yes (Throat)


Psychosocial:  No


Integumentary:  No


Blood Disorders:  No


Adverse Reaction/Blood Tranf:  No





Family Medical History





BLADD


  09 BROTHER


BLADD


  09 BROTHER


Cancer


  03 FATHER (THROAT)


  09 BROTHER


Dementia


  03 MOTHER


FH: bladder cancer


FH: bladder cancer


Family history: Diabetes mellitus


  03 MOTHER


Family history: Thyroid disorder


  03 MOTHER


Infertile


  03 MOTHER (X5 MISCARRIAGES)


Myocardial infarction


  09 BROTHER





No Family History of:


  Abdominal aortic aneurysm


  Shayne's disease


  Alcoholism


  Aphasia


  Cancer of colon


  Cataract


  Chest pain


  Congenital heart disease


  Congestive heart failure


  Cystic fibrosis


  Dysphagia


  Family history: Allergy


  Family history: Alzheimer's disease


  Family history: Arthritis


  Family history: Asthma


  Family history: Breast disease


  Family history: Cardiovascular disease


  Family history: Coronary thrombosis


  Family history: Gastrointestinal disease


  Family history: Glaucoma


  Family history: Hypertension


  Family history: Osteoporosis


  Headache


  Hearing loss


  Heart disease


  Hereditary disease


  History of - anemia


  History of - disorder


  History of - respiratory disease


  History of drug abuse


  Human immunodeficiency virus (HIV) seropositivity


  Hypercholesterolemia


  Kidney disease


  Malignant neoplasm of lung


  Parkinson's disease


  Prostate cancer


  Psychotic disorder


  Seizure disorder


  Stroke


  Tuberculosis


  Visual impairment


Heart Disease, Cancer, Stroke, Other Conditions/Hx





Physical Exam-Suspected Sepsis


Physical Exam


Vital Signs





Vital Signs - First Documented








 21





 13:06 17:35


 


Temp  35.9


 


Pulse  58


 


Resp  20


 


B/P (MAP)  114/63


 


Pulse Ox 99 


 


O2 Delivery Nasal Cannula 


 


O2 Flow Rate 4.00 





Capillary Refill :


Height, Weight, BMI


Height: 5'9.00"


Weight: 156lbs. 5.0oz. 70.816197ml; 14.69 BMI


Method:Stated


General Appearance:  No Apparent Distress, WD/WN, Chronically ill


Eyes:  Bilateral Eye Normal Inspection, Bilateral Eye PERRL, Bilateral Eye EOMI


HEENT:  PERRL/EOMI, TMs Normal, Normal ENT Inspection, Pharynx Normal, Moist 

Mucous Membranes


Neck:  Other (c-collar in place )


Respiratory:  No Accessory Muscle Use, No Respiratory Distress, Crackles, 

Decreased Breath Sounds


Cardiovascular:  Normal Peripheral Pulses, Irregularly Irregular


Gastrointestinal:  Normal Bowel Sounds, Soft, Tenderness (suprapubic tenderness 

with palpation )


Back:  Normal Inspection, No Vertebral Tenderness


Extremity:  Normal Capillary Refill, Normal Inspection, Normal Range of Motion, 

No Pedal Edema


Neurologic/Psychiatric:  Alert, Oriented x3, No Motor/Sensory Deficits, Normal 

Mood/Affect


Skin:  normal color, warm/dry





Focused Exam


Sepsis Stage:  Sepsis


Possible Source:  Pulmonary


Lactate Level


21 13:17: Lactic Acid Level 3.21*H


21 15:13: Lactic Acid Level 1.73





Time of Focused Exam:  16:23


Respiratory:  Chest Non Tender, No Accessory Muscle Use, No Respiratory 

Distress, Crackles


Cardiovascular:  No Edema, Normal Peripheral Pulses, Irregularly Irregular


Capillary Refill:  Less Than 3 Seconds


Skin:  normal color, warm/dry


Lactic Acid Level





Within 3hrs of presentation:  Admin fluids, Admin ABX, Focus exam





Progress/Results/Core Measures


Suspected Sepsis


SIRS


Temperature: 


Pulse:  


Respiratory Rate: 


 


Laboratory Tests


21 13:17: White Blood Count 12.7H


Blood Pressure  / 


Mean: 


 





21 13:17: Lactic Acid Level 3.21*H


21 15:13: Lactic Acid Level 1.73


Laboratory Tests


21 13:17: 


Creatinine 1.21, INR Comment 1.3, Platelet Count 325, Total Bilirubin 0.2








Results/Orders


Lab Results





Laboratory Tests








Test


 21


13:17 21


15:11 21


15:13 21


16:23 Range/Units


 


 


White Blood Count


 12.7 H


 


 


 


 4.3-11.0


10^3/uL


 


Red Blood Count


 2.95 L


 


 


 


 4.30-5.52


10^6/uL


 


Hemoglobin 8.7 L    13.3-17.7  g/dL


 


Hematocrit 28 L    40-54  %


 


Mean Corpuscular Volume 94     80-99  fL


 


Mean Corpuscular Hemoglobin 30     25-34  pg


 


Mean Corpuscular Hemoglobin


Concent 31 L


 


 


 


 32-36  g/dL





 


Red Cell Distribution Width 15.9 H    10.0-14.5  %


 


Platelet Count


 325 


 


 


 


 130-400


10^3/uL


 


Mean Platelet Volume 8.8 L    9.0-12.2  fL


 


Immature Granulocyte % (Auto) 1      %


 


Neutrophils (%) (Auto) 80 H    42-75  %


 


Lymphocytes (%) (Auto) 11 L    12-44  %


 


Monocytes (%) (Auto) 7     0-12  %


 


Eosinophils (%) (Auto) 1     0-10  %


 


Basophils (%) (Auto) 0     0-10  %


 


Neutrophils # (Auto)


 10.2 H


 


 


 


 1.8-7.8


10^3/uL


 


Lymphocytes # (Auto)


 1.4 


 


 


 


 1.0-4.0


10^3/uL


 


Monocytes # (Auto)


 0.9 


 


 


 


 0.0-1.0


10^3/uL


 


Eosinophils # (Auto)


 0.1 


 


 


 


 0.0-0.3


10^3/uL


 


Basophils # (Auto)


 0.0 


 


 


 


 0.0-0.1


10^3/uL


 


Immature Granulocyte # (Auto)


 0.1 


 


 


 


 0.0-0.1


10^3/uL


 


Prothrombin Time 16.4 H    12.2-14.7  SEC


 


INR Comment 1.3     0.8-1.4  


 


Activated Partial


Thromboplast Time 46 H


 


 


 


 24-35  SEC





 


Sodium Level 134 L    135-145  MMOL/L


 


Potassium Level 4.2     3.6-5.0  MMOL/L


 


Chloride Level 96 L      MMOL/L


 


Carbon Dioxide Level 27     21-32  MMOL/L


 


Anion Gap 11     5-14  MMOL/L


 


Blood Urea Nitrogen 15     7-18  MG/DL


 


Creatinine


 1.21 


 


 


 


 0.60-1.30


MG/DL


 


Estimat Glomerular Filtration


Rate 57 


 


 


 


  





 


BUN/Creatinine Ratio 12      


 


Glucose Level 134 H      MG/DL


 


Lactic Acid Level


 3.21 *H


 


 1.73 


 


 0.50-2.00


MMOL/L


 


Calcium Level 8.0 L    8.5-10.1  MG/DL


 


Corrected Calcium 9.3     8.5-10.1  MG/DL


 


Total Bilirubin 0.2     0.1-1.0  MG/DL


 


Aspartate Amino Transf


(AST/SGOT) 22 


 


 


 


 5-34  U/L





 


Alanine Aminotransferase


(ALT/SGPT) 15 


 


 


 


 0-55  U/L





 


Alkaline Phosphatase 96       U/L


 


Total Protein 6.6     6.4-8.2  GM/DL


 


Albumin 2.4 L    3.2-4.5  GM/DL


 


Procalcitonin 0.12 H    <0.10  NG/ML


 


Urine Color  YELLOW     


 


Urine Clarity  CLEAR     


 


Urine pH  6.0    5-9  


 


Urine Specific Gravity  1.015 L   1.016-1.022  


 


Urine Protein  NEGATIVE    NEGATIVE  


 


Urine Glucose (UA)  NEGATIVE    NEGATIVE  


 


Urine Ketones  NEGATIVE    NEGATIVE  


 


Urine Nitrite  NEGATIVE    NEGATIVE  


 


Urine Bilirubin  NEGATIVE    NEGATIVE  


 


Urine Urobilinogen  0.2    < = 1.0  MG/DL


 


Urine Leukocyte Esterase  NEGATIVE    NEGATIVE  


 


Urine RBC (Auto)  TRACE-L    NEGATIVE  


 


Urine RBC  2-5 H    /HPF


 


Urine WBC  2-5     /HPF


 


Urine Crystals  NONE     /LPF


 


Urine Bacteria  TRACE     /HPF


 


Urine Casts  NONE     /LPF


 


Urine Mucus  NEGATIVE     /LPF


 


Urine Culture Indicated


 


 CULTURE


PENDING 


 


  





 


Coronavirus 2019 (LIZET)    Positive H Negative  


 


Test


 21


21:25 


 


 


 Range/Units


 


 


Glucometer 222 H      MG/DL








My Orders





Orders - ROBIN LORENZO APRN


Cbc With Automated Diff (21 13:29)


Comprehensive Metabolic Panel (21 13:29)


Blood Culture (21 13:29)


Sputum Culture (21 13:29)


Urinalysis (21 13:29)


Urine Culture (21 13:29)


Protime With Inr (21 13:29)


Partial Thromboplastin Time (21 13:29)


Chest 1 View, Ap/Pa Only (21 13:29)


Ed Iv/Invasive Line Start (21 13:29)


Vital Signs Adult Sepsis Patie Q15M (21 13:29)


O2 (21 13:29)


Lactic Acid Analyzer (21 13:29)


Ns Iv 1000 Ml (Sodium Chloride 0.9%) (21 13:30)


Meropenem (Merrem 500 Mg) (21 14:00)


Ct Head/Cervical Spine Wo (21 14:15)


Wrist, Right, 3 Views Or More (21 14:15)


Procalcitonin (Pct) (21 16:12)


Ct Chest Wo (21 16:14)


Ns Iv 1000 Ml (Sodium Chloride 0.9%) (21 16:30)


Covid 19 Inhouse Test (21 16:48)


Abo Rh Type (21 17:13)


Dexamethasone Injection (Decadron Inje (21 17:45)





Medications Given in ED





Current Medications








 Medications  Dose


 Ordered  Sig/Bhavin


 Route  Start Time


 Stop Time Status Last Admin


Dose Admin


 


 Meropenem 500 mg/


 Sterile Water  10 ml @ 


 200 mls/hr  ONCE  ONCE


 IV  21 14:00


 21 14:02 DC 21 14:19


200 MLS/HR








Vital Signs/I&O











 121





 13:06 17:35 18:31 18:48


 


Temp  35.9 36.8 35.9


 


Pulse  58 58 78


 


Resp  20 24 18


 


B/P (MAP)  114/63 118/59 (78) 99/58 (72)


 


Pulse Ox 99 99 95 98


 


O2 Delivery Nasal Cannula Nasal Cannula Nasal Cannula Nasal Cannula


 


O2 Flow Rate 4.00 4.00 4.00 4.00





   4.00 


 


    





 21





 19:00 20:38 22:02 22:30


 


Temp 36.8 35.9  


 


Pulse 64 78  


 


Resp 28   


 


B/P (MAP) 116/56 (76)   


 


Pulse Ox 93 98 95 93


 


O2 Delivery Nasal Cannula  Nasal Cannula Nasal Cannula


 


O2 Flow Rate 4.00  4.00 4.00


 


    





 21   





 23:09   


 


Pulse Ox 93   


 


O2 Delivery Nasal Cannula   


 


O2 Flow Rate 4.00   





Capillary Refill :


Progress Note :  


Progress Note


Pt. examined. BP 90's/60's. He was reported to have recent UTI. Initiated sepsis

workup. Orders placed for 30ml/kg bolus, he is 60kg and was given 2 liter NS 

bolus. 





Orders placed for Meropenem 500mg IV x1 for possible sepsis, unknown cause at 

this time, but suspect UTI d/t recent infection. He is allergic to PCN, Le

vaquin, and Sulfa. 





CXR reviewed and shows bilateral PNA. Labs reviewed, he is noted to have a 

slight elevation in WBC which is likely caused from his PNA. His Hgb is at 8.7. 

He is noted to have chronic anemia. Elevated Lactic >2. Will repeat in 2 hours a

fter completion of aggressive fluid replacement. After further discussion with 

family, he was reported to have fallen twice today. Spouse reports he hit his 

head on the kitchen table. He is also noted to be on Eliquis 2.5mg PO BID. 

Orders placed for Head/C-spine CT w/o contrast as well as x-ray for his right 

wrist. He has no complaints in his chest/abd/hips. Orders placed for Rapid COVID

as he will likely require hospital admission. 





C-collar removed after clearing C-spine. He had full ROM with no tenderness. 

Systolic blood pressure 110's-120's after 2nd fluid bolus completed. He 

continued to rest comfortably in ED. 





Rapid COVID was positive. Discussed case with Dr. Miramontes. Recommended adding 

Procalcitonin and obtaining CT chest d/t recurrent PNA to evaluate for any 

pulmonary masses. He agrees with inpatient admission for COVID PNA. Orders 

placed for procal and Chest CT. 





Discussed recommendations with patient, spouse and daughter. All are agreeable 

with admission and plan of care. 





Repeat Lactic acid <2, will continue maintenance fluids on unit. He is stable 

for transfer to medical unit. 





Plan: 


-CBC/CMP in AM


-CXR in AM


-Decadron 6mg IVP daily


-Conv. Plasma, ABORH ordered 


-Albuterol nebs initially ordered, however will likely need to be changed to 

inhaler d/t COVID status


-Tylenol PRN


-Zofran PRN


-No abx ordered as procal was normal


-O2 PRN to keep above 92%


-NS at 75ml/hr maintenance 





FULL CODE





Diagnostic Imaging





   Diagonstic Imaging:  Xray


   Plain Films/CT/US/NM/MRI:  chest


Comments


NAME:   NESHA PANCHAL


MED REC#:   [id number]


ACCOUNT#:   [id number]


PT STATUS:   REG ER


:   [id number]


PHYSICIAN:   ROBIN LORENZO APRN


ADMIT DATE:   21/ER


                                   ***Draft***


Date of Exam:21





CHEST 1 VIEW, AP/PA ONLY








CLINICAL INDICATION:


Patient with sepsis.





EXAM: 


Portable chest x-ray, upright view.





COMPARISON: 


Chest x-ray dated 2020.





FINDINGS:


There are diffuse bilateral lung patchy airspace opacities and


increased lung markings which are most pronounced in both lung


bases (left side more than the right). There is slight improved


aeration of the left upper lobe and right upper lobe region. The


other areas are otherwise stable. The cardiac silhouette and


pulmonary vasculature are obscured. The left costophrenic angle


is blunted as noted on the prior study which may be related to


lung disease but pleural effusion or pleural thickening may also


be considered. There is no right pleural effusion. There is no


pneumothorax. Surgical clips are again seen overlying the medial


left upper chest region.





IMPRESSION:


There are diffuse bilateral lung patchy opacities and increased


lung markings which are worse in both lung bases. There is slight


improved aeration in both upper lung regions.





  Dictated on workstation # [id number]








Dict:   21 1413


Trans:   21 1417


 [id number]





Interpreted by:     ERIBERTO BENAVIDES MD


Electronically signed by:





Departure


Communication (Admissions)


Time/Spoke to Admitting Phy:  15:45


Discussed with Dr. Miramontes





Impression





   Primary Impression:  


   Sepsis


   Additional Impressions:  


   Bilateral pneumonia


   COVID-19


Disposition:  09 ADMITTED AS INPATIENT


Condition:  Stable





Admissions


Decision to Admit Reason:  Admit from ER (General)


Decision to Admit/Date:  2021


Time/Decision to Admit Time:  15:30





Departure-Patient Inst.


Referrals:  


TOR CONNELLY DO (PCP/Family)


Primary Care Physician





Copy


Copies To 1:   TOR CONNELLY STORMY D APRKATHERINE           2021 13:33

## 2021-01-20 NOTE — DIAGNOSTIC IMAGING REPORT
PROCEDURE: CT chest without contrast.



TECHNIQUE: Multiple contiguous axial images were obtained through

the chest without the use of intravenous contrast. Auto Exposure

Controls were utilized during the CT exam to meet ALARA standards

for radiation dose reduction. 



INDICATION: Recurrent pneumonia.



COMPARISON STUDY: Chest from earlier today. PET/CT from

10/22/2019.



FINDINGS: Noncontrast CT scanning of the chest demonstrates

increasing bilateral pleural thickening with subpleural

emphysematous changes developing. There is increasing fibrosis,

mainly in the lower lungs but some seen throughout. Cardiomegaly

is present. Dense calcifications are seen in the coronary

arteries. There is also calcification of the aortic valve

leaflets. Heart size is mildly enlarged. There is a small hiatal

hernia. Minimal right pleural effusion is present. No enlarged

axillary, mediastinal, or hilar lymph nodes are present.

Visualized portions of the abdomen appear unremarkable.

Degenerative changes are present in the spine.



IMPRESSION:

1. There is increasing pleural thickening and fibrous

emphysematous changes. Underlying mass within this cannot be

excluded. Consider PET scan for further evaluation.

2. Periaortic lymph nodes are upper limits of normal in size. No

definitely enlarged lymph nodes are seen.

3. Cardiomegaly is present with arteriosclerosis and aortic valve

calcifications.



Dictated by: 



  Dictated on workstation # [id number]

## 2021-01-21 VITALS — DIASTOLIC BLOOD PRESSURE: 65 MMHG | SYSTOLIC BLOOD PRESSURE: 144 MMHG

## 2021-01-21 VITALS — SYSTOLIC BLOOD PRESSURE: 145 MMHG | DIASTOLIC BLOOD PRESSURE: 65 MMHG

## 2021-01-21 VITALS — SYSTOLIC BLOOD PRESSURE: 92 MMHG | DIASTOLIC BLOOD PRESSURE: 52 MMHG

## 2021-01-21 VITALS — SYSTOLIC BLOOD PRESSURE: 133 MMHG | DIASTOLIC BLOOD PRESSURE: 70 MMHG

## 2021-01-21 VITALS — SYSTOLIC BLOOD PRESSURE: 135 MMHG | DIASTOLIC BLOOD PRESSURE: 67 MMHG

## 2021-01-21 VITALS — SYSTOLIC BLOOD PRESSURE: 133 MMHG | DIASTOLIC BLOOD PRESSURE: 64 MMHG

## 2021-01-21 VITALS — DIASTOLIC BLOOD PRESSURE: 67 MMHG | SYSTOLIC BLOOD PRESSURE: 135 MMHG

## 2021-01-21 VITALS — DIASTOLIC BLOOD PRESSURE: 80 MMHG | SYSTOLIC BLOOD PRESSURE: 138 MMHG

## 2021-01-21 VITALS — SYSTOLIC BLOOD PRESSURE: 129 MMHG | DIASTOLIC BLOOD PRESSURE: 63 MMHG

## 2021-01-21 LAB
ALBUMIN SERPL-MCNC: 2.3 GM/DL (ref 3.2–4.5)
ALP SERPL-CCNC: 81 U/L (ref 40–136)
ALT SERPL-CCNC: 13 U/L (ref 0–55)
BASOPHILS # BLD AUTO: 0 10^3/UL (ref 0–0.1)
BASOPHILS NFR BLD AUTO: 0 % (ref 0–10)
BILIRUB SERPL-MCNC: 0.2 MG/DL (ref 0.1–1)
BUN/CREAT SERPL: 16
CALCIUM SERPL-MCNC: 7.8 MG/DL (ref 8.5–10.1)
CHLORIDE SERPL-SCNC: 102 MMOL/L (ref 98–107)
CO2 SERPL-SCNC: 25 MMOL/L (ref 21–32)
CREAT SERPL-MCNC: 0.89 MG/DL (ref 0.6–1.3)
EOSINOPHIL # BLD AUTO: 0 10^3/UL (ref 0–0.3)
EOSINOPHIL NFR BLD AUTO: 0 % (ref 0–10)
GFR SERPLBLD BASED ON 1.73 SQ M-ARVRAT: > 60 ML/MIN
GLUCOSE SERPL-MCNC: 136 MG/DL (ref 70–105)
HCT VFR BLD CALC: 25 % (ref 40–54)
HGB BLD-MCNC: 7.9 G/DL (ref 13.3–17.7)
LYMPHOCYTES # BLD AUTO: 1.3 10^3/UL (ref 1–4)
LYMPHOCYTES NFR BLD AUTO: 16 % (ref 12–44)
MANUAL DIFFERENTIAL PERFORMED BLD QL: NO
MCH RBC QN AUTO: 30 PG (ref 25–34)
MCHC RBC AUTO-ENTMCNC: 32 G/DL (ref 32–36)
MCV RBC AUTO: 94 FL (ref 80–99)
MONOCYTES # BLD AUTO: 0.2 10^3/UL (ref 0–1)
MONOCYTES NFR BLD AUTO: 3 % (ref 0–12)
NEUTROPHILS # BLD AUTO: 6.2 10^3/UL (ref 1.8–7.8)
NEUTROPHILS NFR BLD AUTO: 81 % (ref 42–75)
PLATELET # BLD: 281 10^3/UL (ref 130–400)
PMV BLD AUTO: 9.1 FL (ref 9–12.2)
POTASSIUM SERPL-SCNC: 4.9 MMOL/L (ref 3.6–5)
PROT SERPL-MCNC: 6.1 GM/DL (ref 6.4–8.2)
SODIUM SERPL-SCNC: 137 MMOL/L (ref 135–145)
WBC # BLD AUTO: 7.7 10^3/UL (ref 4.3–11)

## 2021-01-21 PROCEDURE — XW13325 TRANSFUSION OF CONVALESCENT PLASMA (NONAUTOLOGOUS) INTO PERIPHERAL VEIN, PERCUTANEOUS APPROACH, NEW TECHNOLOGY GROUP 5: ICD-10-PCS | Performed by: INTERNAL MEDICINE

## 2021-01-21 RX ADMIN — ACYCLOVIR SCH MG: 400 TABLET ORAL at 20:48

## 2021-01-21 RX ADMIN — ACYCLOVIR SCH MG: 400 TABLET ORAL at 11:34

## 2021-01-21 RX ADMIN — SENNOSIDES SCH MG: 8.6 TABLET, FILM COATED ORAL at 20:07

## 2021-01-21 RX ADMIN — APIXABAN SCH MG: 2.5 TABLET, FILM COATED ORAL at 09:07

## 2021-01-21 RX ADMIN — DOCUSATE SODIUM SCH MG: 100 CAPSULE ORAL at 09:08

## 2021-01-21 RX ADMIN — INSULIN ASPART SCH UNIT: 100 INJECTION, SOLUTION INTRAVENOUS; SUBCUTANEOUS at 11:39

## 2021-01-21 RX ADMIN — ALBUTEROL SULFATE SCH GM: 90 AEROSOL, METERED RESPIRATORY (INHALATION) at 07:19

## 2021-01-21 RX ADMIN — ACYCLOVIR SCH MG: 400 TABLET ORAL at 09:08

## 2021-01-21 RX ADMIN — ACYCLOVIR SCH MG: 400 TABLET ORAL at 16:33

## 2021-01-21 RX ADMIN — ALBUTEROL SULFATE SCH GM: 90 AEROSOL, METERED RESPIRATORY (INHALATION) at 15:22

## 2021-01-21 RX ADMIN — DOCUSATE SODIUM SCH MG: 100 CAPSULE ORAL at 20:07

## 2021-01-21 RX ADMIN — APIXABAN SCH MG: 2.5 TABLET, FILM COATED ORAL at 20:48

## 2021-01-21 RX ADMIN — SENNOSIDES SCH MG: 8.6 TABLET, FILM COATED ORAL at 09:09

## 2021-01-21 RX ADMIN — ALBUTEROL SULFATE SCH PUFF: 90 AEROSOL, METERED RESPIRATORY (INHALATION) at 18:49

## 2021-01-21 RX ADMIN — ACYCLOVIR SCH MG: 400 TABLET ORAL at 06:32

## 2021-01-21 RX ADMIN — ALBUTEROL SULFATE SCH GM: 90 AEROSOL, METERED RESPIRATORY (INHALATION) at 11:04

## 2021-01-21 RX ADMIN — INSULIN ASPART SCH UNIT: 100 INJECTION, SOLUTION INTRAVENOUS; SUBCUTANEOUS at 05:23

## 2021-01-21 RX ADMIN — SODIUM CHLORIDE SCH MLS/HR: 900 INJECTION, SOLUTION INTRAVENOUS at 04:40

## 2021-01-21 RX ADMIN — INSULIN ASPART SCH UNIT: 100 INJECTION, SOLUTION INTRAVENOUS; SUBCUTANEOUS at 20:48

## 2021-01-21 RX ADMIN — ACETAMINOPHEN PRN MG: 325 TABLET ORAL at 11:35

## 2021-01-21 RX ADMIN — ALBUTEROL SULFATE SCH PUFF: 90 AEROSOL, METERED RESPIRATORY (INHALATION) at 21:57

## 2021-01-21 RX ADMIN — INSULIN ASPART SCH UNIT: 100 INJECTION, SOLUTION INTRAVENOUS; SUBCUTANEOUS at 16:28

## 2021-01-21 NOTE — PHYSICAL THERAPY EVALUATION
PT Evaluation-General


Medical Diagnosis


Admission Date


Jan 20, 2021 at 15:48


Medical Diagnosis:  Covid (+)


Onset Date:  Jan 20, 2021





Therapy Diagnosis


Therapy Diagnosis:  generalized weakness/debility





Height/Weight


Height (Feet):  5


Height (Inches):  9.00


Weight (Pounds):  156


Weight (Ounces):  5.0





Precautions


Precautions/Isolations:  Contact Isolation, Droplet Isolation, Fall Prevention, 

Standard Precautions





Referral


Physician:  Fe


Reason for Referral:  Evaluation/Treatment





Medical History


Pertinent Medical History:  Atrial Fib, Arthritis, CABG, CAD, COPD, CVA, DM


Current History


EMS secondary to fall/weakness


Reviewed History:  Yes





Social History


Home:  Single Level


Current Living Status:  Spouse





Prior


Prior Level of Function


SCALE: Activities may be completed with or without assistive devices.





6-Indepedent-patient completes the activity by him/herself with no assistance 

from a helper.


5-Set-up or Clean-up Assistance-helper sets up or cleans up; patient completes 

activity. [place] assists only prior to or  


    following the activity.


4-Supervision or Touching Assistance-helper provides verbal cues and/or 

touching/steadying and/or contact guard assistance as patient completes 

activity. Assistance may be provided   


    throughout the activity or intermittently.


3-Partial/Moderate Assistance-helper does LESS THAN HALF the effort. [place] 

lifts, holds or supports trunk or limbs, but provides less than half the effort.


2-Substantial/Maximal Assistance-helper does MORE THAN HALF the effort. [place] 

lifts or holds trunk or limbs and provides more than half the effort.


9-Crnhdsdcc-plfyam does ALL the effort. Patient does none of the effort to 

complete the activity. Or, the assistance of 2 or more helpers is required for 

the patient to complete the  


    activity.


If activity was not attempted, code reason:


7-Patient Refused.


9-Not Applicable-not attempted and the patient did not perform the activity 

before the current illness, exacerbation or injury.


10-Not Attempted due to Environmental Limitations-(lack of equipment, weather 

restraints, etc.).


88-Not Attempted due to Medical Conditions or Safety Concerns.


Bed Mobility:  5


Transfers (B,C,W/C):  5


Gait:  5


Indoor Mobility (Ambulation):  Independent


Prior Devices Use:  Walker





PT Evaluation-Current


Subjective


Patient is very confused on this date.





Objective


Patient Orientation:  Confused


Attachments:  Oxygen, IV





ROM/Strength


ROM Lower Extremities


bilateral LE WFL


Strength Lower Extremities


3/5 grossly bilateral LE





Integumentary/Posture


Integumentary


refer to nursing notes


Posture


kyphotic





Neuromuscular


(Tone, Coordination, Reflexes)


grossly intact





Sensory


Vision:  Functional


Hearing:  Impaired





Transfers


Sit to Lying (QC):  4


Lying to Sitting/Side of Bed(Q:  4


Sit to Stand (QC):  4


Chair/Bed-to-Chair Xfer(QC):  4





Gait


Does the Patient Walk?:  Yes


Mode of Locomotion:  Walk


Anticipated Mode of Locomotion:  Walk


Walk 10 feet (QC):  4


Walk 50 ft with 2 Turns(QC):  88


Walk 150 ft (QC):  88


Gait Assistive Device:  FWW


Comments/Gait Description


slightly unsteady





Balance


Sitting Static:  Normal


Sitting Dynamic:  Normal


Standing Static:  Fair


 Standing Dynamic:  Fair





Assessment/Needs


Patient tolerates minimal activity.  He will benefit from skilled PT to address 

functional strength and mobility.  Alarm activated for safety due to increased 

confusion.


Rehab Potential:  Guarded





PT Long Term Goals


Long Term Goals


PT Long Term Goals Time Frame:  Feb 13, 2021


Roll Left & Right (QC):  5


Sit to Lying (QC):  5


Lying-Sitting on Side/Bed(QC):  5


Sit to Stand (QC):  5


Chair/Bed-to-Chair Xfer(QC):  5


Toilet Transfer (QC):  5


Car Transfer (QC):  5


Does the Patient Walk:  Yes


Walk 10 feet (QC):  5


Walk 50ft with 2 Turns (QC):  5


Walk 150 ft (QC):  5





PT Plan


Problem List


Problem List:  Activity Tolerance, Functional Strength, Safety, Balance, Gait, 

Transfer, Bed Mobility





Treatment/Plan


Treatment Plan:  Continue Plan of Care


Treatment Plan:  Bed Mobility, Education, Functional Activity Hema, Functional 

Strength, Gait, Safety, Therapeutic Exercise, Transfers


Treatment Duration:  Feb 13, 2021


Frequency:  6 times per week


Estimated Hrs Per Day:  .25 hour per day





Time/GCodes


Time In:  1115


Time Out:  1128


Total Billed Treatment Time:  13


Total Billed Treatment


1 visit


EVModC 13 min











DOMINGO OCHOA PT              Jan 21, 2021 13:26

## 2021-01-21 NOTE — HISTORY & PHYSICAL-HOSPITALIST
History of Present Illness


HPI/Chief Complaint


Guanaco Moulton is an 80-year-old male with past medical history of hypertension, 

hyperlipidemia, COPD with oxygen dependence, atrial fibrillation, diabetes, who 

presented with shortness of breath.  He was reportedly been having trouble with 

"pneumonia" for the past month and has been following with Dr. Holley for thi

s.  He reports feeling weak.  He says that he did have a fall at home.  He is 

not having any fevers.  He is having cough.  He denies any chest pain.  He 

denies any abdominal pain.  He denies any nausea or vomiting.  He denies 

diarrhea.  He has no other complaints or concerns.


Source:  patient


Exam Limitations:  no limitations


Date Seen


1/21/21


Time Seen by a Provider:  10:50


Attending Physician


Kwasi Aguirre MD


PCP


Jay Holley DO


Referring Physician





Date of Admission


Jan 20, 2021 at 15:48





Home Medications & Allergies


Home Medications


Reviewed patient Home Medication Reconciliation performed by pharmacy medication

reconciliations technician and/or nursing.


Patients Allergies have been reviewed.





Allergies





Allergies


Coded Allergies


  morphine (Verified Allergy, Severe, Pt has received Lortab in the past w/o 

issue, 9/9/20)


  Sulfa (Sulfonamide Antibiotics) (Verified Allergy, Unknown, 9/9/20)


  penicillin G (Verified Allergy, Unknown, Pt has received Cefepime & Meropenem 

in the past, 9/9/20)


  levofloxacin (Verified Adverse Reaction, Unknown, 9/9/20)








Past Medical-Social-Family Hx


Past Med/Social Hx:  Reviewed Nursing Past Med/Soc Hx


Patient Social History


Alcohol Use:  Denies Use


Recreational Drug Use:  No


Smoking Status:  Former Smoker


Former Smoker, Quit:  Apr 30, 1985


Type Used:  Cigarettes


2nd Hand Smoke Exposure:  No


Recent Foreign Travel:  No


Contact w/other who traveled:  No


Recent Hopitalizations:  Yes


Recent Infectious Disease Expo:  No





Immunizations Up To Date


Tetanus Booster (TDap):  More than 5yrs


Pediatric:  No


Date of Pneumonia Vaccine:  Oct 2, 2018


Date of Influenza Vaccine:  Oct 1, 2020





Seasonal Allergies


Seasonal Allergies:  No





Past Medical History


Surgeries:  Cardiac, CABG, Coronary Stent, Eye Surgery, Joint Replacement, 

Lobectomy, Orthopedic, Prostatectomy, Tonsillectomy


Respiratory:  COPD, Emphysema, Pneumonia


Currently Using CPAP:  No


Currently Using BIPAP:  No


Cardiac:  Atrial Fibrillation, Coronary Artery Disease, High Cholesterol, 

Hypertension, Peripheral Vascular


Neurological:  Stroke


Reproductive:  No


Sexually Transmitted Disease:  No


HIV/AIDS:  No


Genitourinary:  Benign Prostatic Hyperpl, Prostate Problems, Bladder Infection


Gastrointestinal:  Gastroesophageal Reflux, Gastrointestinal Bleed


Musculoskeletal:  Arthritis


Endocrine:  Diabetes, Insulin dep


HEENT:  Cataract


Loss of Vision:  Denies


Hearing Impairment:  Hard of Hearing


History of Blood Disorders:  No


Adverse Reaction to Blood Tapia:  No





Family History





BLADD


  09 BROTHER


BLADD


  09 BROTHER


Cancer


  03 FATHER (THROAT)


  09 BROTHER


Dementia


  03 MOTHER


FH: bladder cancer


FH: bladder cancer


Family history: Diabetes mellitus


  03 MOTHER


Family history: Thyroid disorder


  03 MOTHER


Infertile


  03 MOTHER (X5 MISCARRIAGES)


Myocardial infarction


  09 BROTHER





No Family History of:


  Abdominal aortic aneurysm


  Jerome's disease


  Alcoholism


  Aphasia


  Cancer of colon


  Cataract


  Chest pain


  Congenital heart disease


  Congestive heart failure


  Cystic fibrosis


  Dysphagia


  Family history: Allergy


  Family history: Alzheimer's disease


  Family history: Arthritis


  Family history: Asthma


  Family history: Breast disease


  Family history: Cardiovascular disease


  Family history: Coronary thrombosis


  Family history: Gastrointestinal disease


  Family history: Glaucoma


  Family history: Hypertension


  Family history: Osteoporosis


  Headache


  Hearing loss


  Heart disease


  Hereditary disease


  History of - anemia


  History of - disorder


  History of - respiratory disease


  History of drug abuse


  Human immunodeficiency virus (HIV) seropositivity


  Hypercholesterolemia


  Kidney disease


  Malignant neoplasm of lung


  Parkinson's disease


  Prostate cancer


  Psychotic disorder


  Seizure disorder


  Stroke


  Tuberculosis


  Visual impairment


Heart Disease, Cancer, Stroke, Other Conditions/Hx





Review of Systems


Constitutional:  weakness


EENTM:  no symptoms reported


Respiratory:  cough, short of breath


Cardiovascular:  no symptoms reported


Gastrointestinal:  no symptoms reported


Genitourinary:  no symptoms reported


Musculoskeletal:  no symptoms reported


Skin:  no symptoms reported


Psychiatric/Neurological:  No Symptoms Reported





Physical Exam


Physical Exam


Vital Signs





Vital Signs - First Documented








 1/20/21 1/20/21





 13:06 17:35


 


Temp  35.9


 


Pulse  58


 


Resp  20


 


B/P (MAP)  114/63


 


Pulse Ox 99 


 


O2 Delivery Nasal Cannula 


 


O2 Flow Rate 4.00 





Capillary Refill : Less Than 3 Seconds


Height, Weight, BMI


Height: 5'9.00"


Weight: 156lbs. 5.0oz. 70.647630ab; 18.27 BMI


Method:Stated


General Appearance:  No Apparent Distress, Chronically ill, Thin


HEENT:  PERRL/EOMI, Pharynx Normal


Neck:  Normal Inspection, Supple


Respiratory:  No Respiratory Distress, Decreased Breath Sounds


Cardiovascular:  Regular Rate, Rhythm, No Edema, No Murmur


Gastrointestinal:  Normal Bowel Sounds, Non Tender, Soft


Extremity:  Normal Inspection, Non Tender, No Pedal Edema


Neurologic/Psychiatric:  Alert, Oriented x3, Normal Mood/Affect, Motor Weakness


Skin:  Normal Color, Warm/Dry





Results


Results/Procedures


Labs


Laboratory Tests


1/20/21 13:17








1/21/21 06:02








Patient resulted labs reviewed.


Imaging:  Reviewed Imaging Report





Assessment/Plan


Admission Diagnosis


Pneumonia due to COVID-19


Admission Status:  Inpatient Order (span 2 midnights)


Reason for Inpatient Admission:  


COVID, weakness





Assessment and Plan


Pneumonia due to COVID-19


Fall at home


Debility


   COVID LIZET positive


   Chest xray with bilateral infiltrates


   CT chest with pleural thickening, periaortic lymph nodes upper limit of 

normal


   Procalcitonin normal


   Oxygen requirement at baseline


   Started on Decadron


   Convalescent plasma ordered


   Antibiotics not indicated


   Continue Eliquis


   PT/OT





Lactic acidosis


   Resolved


   IV fluids





T2DM


Steroid-induced hyperglycemia


   Levemir


   Sliding scale insulin





HTN


HLD


COPD


AFib


   Continue home meds





DVT prophylaxis: already receiving therapeutic anticoagulation





Diagnosis/Problems


Diagnosis/Problems





(1) Pneumonia due to COVID-19 virus


Status:  Acute


(2) Fall at home


Status:  Acute


(3) COPD (chronic obstructive pulmonary disease)


Status:  Chronic


(4) Dependence on supplemental oxygen


Status:  Chronic


(5) HTN (hypertension)


Status:  Chronic


(6) HLD (hyperlipidemia)


Status:  Chronic


(7) Anemia


Status:  Chronic


(8) Atrial fibrillation


Status:  Chronic


(9) Debility


Status:  Acute











KWASI AGUIRRE MD              Jan 21, 2021 14:37

## 2021-01-21 NOTE — NUR
SPOKE WITH THE PTS DAUGHTER (PATRICE) AND SHE IS SENDING ME A MEDICATION LIST HOWEVER SHE IS 
NOT CURRENTLY HOME. SHE LET ME KNOW IT WOULD PROBABLY BE AROUND 6PM BEFORE SHE GETS HOME. 

-------------------------------------------------------------------------------

Addendum: 01/22/21 at 1120 by TINA MAURICIO [facility]

-------------------------------------------------------------------------------

SPOKE WITH PTARICE- SHE HAD A MED LIST BUT IT WAS NOT ACCURATE, THEN CALLED [facility], ALSO CALLED 
DR. AMY AHMADI OFFICE AND DR. DIAZ OFFICE TO COMPLETE THE MED REC



PT WAS RECENTLY SWITCHED FROM BREO TO ADVAIR 250/50



PT GETS ELIQUIS 2.5MG AS A SAMPLES FROM DR. REYES OFFICE HOWEVER THE OFFICE DOES NOT 
KEEP TRACK OF WHEN THEY LAST GAVE SAMPLES



NOVOLIN N AND NOVOLIN R PT BUYS FROM THE PHARMACY BUT DOES NOT HAVE A PRESCRIPTION FOR THEM



GABAPENTIN 100MG- DIRECTIONS SHOW 1 AM, 1 NOON AND 3 HS BUT ACCORDING TO THE PT HE ONLY 
TAKES 2 (100MG) CAPS HS



OTC MEDS:

FERROUS SULFATE

DOCUSATE

## 2021-01-21 NOTE — OCCUPATIONAL THERAPY EVAL
OT Evaluation-General/PLF


Medical Diagnosis


Admission Date


Jan 20, 2021 at 15:48


Medical Diagnosis:  Covid (+)/pneumonia


Onset Date:  Jan 20, 2021





Therapy Diagnosis


Therapy Diagnosis:  Weakness





Height/Weight


Height (Feet):  5


Height (Inches):  9.00


Weight (Pounds):  156


Weight (Ounces):  5.0





Precautions


Precautions/Isolations:  Contact Isolation, Droplet Isolation, Fall Prevention, 

Standard Precautions


Comments


Pt. also with shingles.





Weight Bear Status


Weight Bearing Restriction:  Weight Bearing/Tolerated





Referral


Physician:  Fe


Referral Reason:  Activity Tolerance, Self Care, Evaluation/Treatment, 

Strengthening/ROM





Medical History


Pertinent Medical History:  Atrial Fib, Arthritis, CABG, CAD, COPD, CVA, DM


Additional Medical History


Coronary stent, right hip surgery, lung surgery


Current History


Pt. fell at home x 2, hitting head.


Reviewed History:  Yes





Social History


Home:  Single Level


Current Living Status:  Spouse


Entry Into Home:  Level Entry





ADL-Prior Level of Function


SCALE: Activities may be completed with or without assistive devices.





6-Indepedent-patient completes the activity by him/herself with no assistance 

from a helper.


5-Set-up or Clean-up Assistance-helper sets up or cleans up; patient completes 

activity. [place] assists only prior to or  


    following the activity.


4-Supervision or Touching Assistance-helper provides verbal cues and/or 

touching/steadying and/or contact guard assistance as patient completes 

activity. Assistance may be provided   


    throughout the activity or intermittently.


3-Partial/Moderate Assistance-helper does LESS THAN HALF the effort. [place] 

lifts, holds or supports trunk or limbs, but provides less than half the effort.


2-Substantial/Maximal Assistance-helper does MORE THAN HALF the effort. [place] 

lifts or holds trunk or limbs and provides more than half the effort.


9-Xirylvjzc-ubbisp does ALL the effort. Patient does none of the effort to 

complete the activity. Or, the assistance of 2 or more helpers is required for 

the patient to complete the  


    activity.


If activity was not attempted, code reason:


7-Patient Refused.


9-Not Applicable-not attempted and the patient did not perform the activity 

before the current illness, exacerbation or injury.


10-Not Attempted due to Environmental Limitations-(lack of equipment, weather 

restraints, etc.).


88-Not Attempted due to Medical Conditions or Safety Concerns.


ADL PLOF Comments


Pt. was independent with daily tasks.


Self Care:  Independent


Functional Cognition:  Unknown





OT Current Status


Subjective


pt. is very confused.  He reports that he is in [place].  He states that he has not

eaten.  He reports that he wants to leave.  No pain reported.





Mental Status/Objective


Patient Orientation:  Confused





Current


Upper Extremity ROM


WFL





ADL-Treatment


Eating (QC):  5 (per nursing)


On/Off Footwear (QC):  4





Other Treatments


Pt. is able to ambulate in room with CGA, as he ambulates with this OT without 

walker.  Pt. is confused at time of this evaluation.  He tells a story about 

being in the hospital in [place], and currently thinks that is where he is.  He 

reports that he he has not eaten, and that his bed is where "they throw the 

trash."  Pt. says other things that don't make sense, and reports he does not 

know why he is here.  Pt. is able to transfer into/out of bed with SBA.  All bed

rails put up and bed alarm set.  Pt. with call light, water, and phone when OT 

leaves room.  Notified nursing immediately of pt's confusion, and safety issues.

Nursing reports pt. not confused earlier, and so will re-assess.





Education


OT Patient Education:  Correct positioning, Modified ADL techniques, Progress 

toward Goal/Update tx plan, Purpose of tx/functional activities, Reviewed 

precautions, Rehab process, Transfer techniques


Teaching Recipient:  Patient


Teaching Methods:  Demonstration





OT Long Term Goals


Long Term Goals


Time Frame:  Feb 4, 2021


Eating (QC):  6


Oral Hygiene (QC):  6


Toileting Hygiene (QC):  6


Shower/Bathe Self (QC):  4


Upper Body Dressing (QC):  6


Lower Body Dressing (QC):  6


On/Off Footwear (QC):  6


Additional Goals:  1-Demonstrate ADL Tasks, 2-Verbalize Understanding, 3-

ImproveStrength/Hema


1=Demonstrate adherence to instructed precautions during ADL tasks.


2=Patient will verbalize/demonstrate understanding of assistive devices/mo

difications for ADL.


3=Patient will improve strength/tolerance for activity to enable patient to 

perform ADL's.





OT Education/Plan


Problem List/Assessment


Assessment:  Decreased Activ Tolerance, Impaired Cognition, Impaired I ADL's, 

Impaired Self-Care Skills





Discharge Recommendations


Plan/Recommendations:  Continue POC





Treatment Plan/Plan of Care


Treatment,Training & Education:  Yes


Patient would benefit from OT for education, treatment and training to promote 

independence in ADL's, mobility, safety and/or upper extremity function for 

ADL's.


Plan of Care:  ADL Retraining, Functional Mobility, UE Funct Exercise/Act


Treatment Duration:  Feb 4, 2021


Frequency:  5 times per week


Estimated Hrs Per Day:  .25 hour per day


Agreement:  Yes


Rehab Potential:  Fair





Time/GCodes


Start Time:  14:40


Stop Time:  15:05


Total Time Billed (hr/min):  25


Billed Treatment Time


1, EVM x 10minutes, FA x 15minutes











RIZWAN MURPHY OT           Jan 21, 2021 15:53

## 2021-01-21 NOTE — NUR
RD ASSESSMENT 



PMHx: pneumonia; COPD; afib; CAD; hypercholesterolemia; HTN; BPH; GERD; DM; 



PT INTERACTION: Received dietary consult for MST score. Note pt currently in COVID isolation 
per chart review. Note all diet information for nutrition consult is per Christina HYDE or per 
chart review. Christina states current appetite appears good. Note avg PO intake 56% x3meal, per 
chart review. Christina states no issues with n/v/c/d that she is aware of, and that his last BM 
was 1/20. Note pt currently on bowel regimen of coalce BID, and senna BID, per chart review. 
Note unable to determine current level of DM management, and unable to determine recent 
HbA1c, per chart review. Note recent 14# wt loss x3mon, per chart review. Note this is 
significant wt loss at 9%. Note unable to complete visual assessment d/t isolation 
precautions. 



Given wt hx and PO intake, pt meets criteria for malnutrition per ASPEN guidelines. 



Est. kcal needs: 9837-1232 kcal | 30-35 kcal/kg  

Est. Pro needs:  61-73 g Pro | 1.0-1.2 g Pro/kg 



PES STATEMENT: Inadequate oral intake (NI-2.1) related to loss of appetite, as evidenced by 
pt interview and avg PO intake 56% x3meal. 



INTERVENTION:  

Continue with current diet order of 2000mg Na diet. Pt may benefit from consistent CHO 
restriction if blood glucose levels become elevated. 

Add Glucerna (vary) to meals TID, for increased kcal intake. Provides 220 kcal and 10 g Pro 
per serving. 

Did not offer diet education on DM management d/t isolation precautions. 

Would encourage pt to eat when able. 

Will continue to follow and reassess as pt needs, intake, and status change. 





DARREL PRICE, MS RD LD 

[phone] cell

## 2021-01-21 NOTE — DIAGNOSTIC IMAGING REPORT
INDICATION: Pneumonia.



Comparison is made with prior examination from 01/20/2021.



FINDINGS: There is cardiomegaly. There is some venous congestion.

There are patchy bilateral pulmonary infiltrates. There are small

bilateral pleural effusions. There is no pneumothorax.



IMPRESSION: Persistent patchy bibasilar pulmonary infiltrates and

small bilateral pleural effusions.



Cardiomegaly and some central pulmonary venous congestion.



Dictated by: 



  Dictated on workstation # [id number]

## 2021-01-22 VITALS — DIASTOLIC BLOOD PRESSURE: 63 MMHG | SYSTOLIC BLOOD PRESSURE: 139 MMHG

## 2021-01-22 VITALS — DIASTOLIC BLOOD PRESSURE: 62 MMHG | SYSTOLIC BLOOD PRESSURE: 123 MMHG

## 2021-01-22 LAB
BASOPHILS # BLD AUTO: 0 10^3/UL (ref 0–0.1)
BASOPHILS NFR BLD AUTO: 0 % (ref 0–10)
BUN/CREAT SERPL: 22
CALCIUM SERPL-MCNC: 8.5 MG/DL (ref 8.5–10.1)
CHLORIDE SERPL-SCNC: 99 MMOL/L (ref 98–107)
CO2 SERPL-SCNC: 30 MMOL/L (ref 21–32)
CREAT SERPL-MCNC: 0.77 MG/DL (ref 0.6–1.3)
EOSINOPHIL # BLD AUTO: 0 10^3/UL (ref 0–0.3)
EOSINOPHIL NFR BLD AUTO: 0 % (ref 0–10)
GFR SERPLBLD BASED ON 1.73 SQ M-ARVRAT: > 60 ML/MIN
GLUCOSE SERPL-MCNC: 120 MG/DL (ref 70–105)
HCT VFR BLD CALC: 25 % (ref 40–54)
HGB BLD-MCNC: 7.8 G/DL (ref 13.3–17.7)
LYMPHOCYTES # BLD AUTO: 1.6 10^3/UL (ref 1–4)
LYMPHOCYTES NFR BLD AUTO: 20 % (ref 12–44)
MANUAL DIFFERENTIAL PERFORMED BLD QL: NO
MCH RBC QN AUTO: 29 PG (ref 25–34)
MCHC RBC AUTO-ENTMCNC: 32 G/DL (ref 32–36)
MCV RBC AUTO: 92 FL (ref 80–99)
MONOCYTES # BLD AUTO: 0.8 10^3/UL (ref 0–1)
MONOCYTES NFR BLD AUTO: 9 % (ref 0–12)
NEUTROPHILS # BLD AUTO: 5.8 10^3/UL (ref 1.8–7.8)
NEUTROPHILS NFR BLD AUTO: 70 % (ref 42–75)
PLATELET # BLD: 307 10^3/UL (ref 130–400)
PMV BLD AUTO: 8.9 FL (ref 9–12.2)
POTASSIUM SERPL-SCNC: 4.7 MMOL/L (ref 3.6–5)
SODIUM SERPL-SCNC: 138 MMOL/L (ref 135–145)
WBC # BLD AUTO: 8.2 10^3/UL (ref 4.3–11)

## 2021-01-22 RX ADMIN — INSULIN ASPART SCH UNIT: 100 INJECTION, SOLUTION INTRAVENOUS; SUBCUTANEOUS at 10:49

## 2021-01-22 RX ADMIN — ACYCLOVIR SCH MG: 400 TABLET ORAL at 12:55

## 2021-01-22 RX ADMIN — ALBUTEROL SULFATE SCH PUFF: 90 AEROSOL, METERED RESPIRATORY (INHALATION) at 07:09

## 2021-01-22 RX ADMIN — ALBUTEROL SULFATE SCH PUFF: 90 AEROSOL, METERED RESPIRATORY (INHALATION) at 11:08

## 2021-01-22 RX ADMIN — APIXABAN SCH MG: 2.5 TABLET, FILM COATED ORAL at 09:16

## 2021-01-22 RX ADMIN — INSULIN ASPART SCH UNIT: 100 INJECTION, SOLUTION INTRAVENOUS; SUBCUTANEOUS at 06:27

## 2021-01-22 RX ADMIN — DOCUSATE SODIUM SCH MG: 100 CAPSULE ORAL at 09:16

## 2021-01-22 RX ADMIN — INSULIN ASPART SCH UNIT: 100 INJECTION, SOLUTION INTRAVENOUS; SUBCUTANEOUS at 16:18

## 2021-01-22 RX ADMIN — ALBUTEROL SULFATE SCH PUFF: 90 AEROSOL, METERED RESPIRATORY (INHALATION) at 16:08

## 2021-01-22 RX ADMIN — ALBUTEROL SULFATE SCH PUFF: 90 AEROSOL, METERED RESPIRATORY (INHALATION) at 01:53

## 2021-01-22 RX ADMIN — ACYCLOVIR SCH MG: 400 TABLET ORAL at 09:18

## 2021-01-22 RX ADMIN — SENNOSIDES SCH MG: 8.6 TABLET, FILM COATED ORAL at 09:16

## 2021-01-22 RX ADMIN — ACETAMINOPHEN PRN MG: 325 TABLET ORAL at 09:40

## 2021-01-22 RX ADMIN — ACYCLOVIR SCH MG: 400 TABLET ORAL at 05:18

## 2021-01-22 RX ADMIN — ACYCLOVIR SCH MG: 400 TABLET ORAL at 16:18

## 2021-01-22 NOTE — DISCHARGE SUMMARY
Discharge Summary


Reconcile Patient Problems


Problems Reviewed?:  Yes





Instructions for Patient


[facility], [phone]


Assessment/Instructions


Take medications as prescribed.


Physician to follow Patient:  Leydi


Discharge Diet for Home:  ADA Diet


Hospital Course


Date of Admission: Jan 20, 2021 at 15:48 


Admission Diagnosis:  COVID-19





Family Physician/Provider: Jay Holley DO  





Date of Discharge: 1/22/21 


Discharge Diagnosis:  COVID-19








Hospital Course:


Guanaco Moulton is an 80 year old male with oxygen-dependent COPD who presented 

after a fall at home and was admitted with COVID-19.  His oxygen requirement was

at baseline.  He was treated with Decadron and convalescent plasma.  He remained

stable from a respiratory standpoint.  He was evaluated by physical therapy and 

occupational therapy and he was set up with [facility] for ongoing therapies on

discharge.  He did have some issues with delirium and some minor confusion.  He 

should follow-up with his primary care physician in a week or two.














Labs and Pending Lab Test:


Laboratory Tests


1/21/21 16:20: Glucometer 412*H


1/21/21 19:48: Glucometer 373H


1/22/21 05:53: 


White Blood Count 8.2, Red Blood Count 2.68L, Hemoglobin 7.8L, Hematocrit 25L, 

Mean Corpuscular Volume 92, Mean Corpuscular Hemoglobin 29, Mean Corpuscular 

Hemoglobin Concent 32, Red Cell Distribution Width 15.9H, Platelet Count 307, 

Mean Platelet Volume 8.9L, Immature Granulocyte % (Auto) 1, Neutrophils (%) 

(Auto) 70, Lymphocytes (%) (Auto) 20, Monocytes (%) (Auto) 9, Eosinophils (%) 

(Auto) 0, Basophils (%) (Auto) 0, Neutrophils # (Auto) 5.8, Lymphocytes # (Auto)

1.6, Monocytes # (Auto) 0.8, Eosinophils # (Auto) 0.0, Basophils # (Auto) 0.0, 

Immature Granulocyte # (Auto) 0.1, Sodium Level 138, Potassium Level 4.7, 

Chloride Level 99, Carbon Dioxide Level 30, Anion Gap 9, Blood Urea Nitrogen 17,

Creatinine 0.77, Estimat Glomerular Filtration Rate > 60, BUN/Creatinine Ratio 

22, Glucose Level 120H, Calcium Level 8.5


1/22/21 10:42: Glucometer 228H





Microbiology


1/20/21 Urine Culture - Final, Complete


          NO GROWTH


1/20/21 Blood Culture - Preliminary, Resulted


          No growth





Home Meds


Active


Reported


Ferrous Sulfate 325 Mg Tablet 325 Mg PO DAILY


Fluticasone-Salmeterol 250-50 (Fluticasone Propion/Salmeterol) 1 Each Blst.w.dev

1 Puff INH BID


     RINSE MOUTH AND SPIT AFTER EACH USE


Bactrim Ds Tablet (Sulfamethoxazole/Trimethoprim) 1 Each Tablet 1 Ea PO BID


     FILLED 01- #14/7 DAY SUPPLY


Stool Softener (Docusate Sodium) 100 Mg Capsule 100 Mg PO DAILY


Furosemide 40 Mg Tablet 40 Mg PO DAILY PRN


     TAKES 40MG DAILY AND REPEATS DOSE FOR FLUID RETENTION/SWELLING


Potassium Chloride 10 Meq Tab.er.prt 10 Meq PO DAILY


Albuterol Sulfate 2.5 Mg/0.5 Ml Vial.neb 2.5 Mg INH Q4H


Eliquis (Apixaban) 2.5 Mg Tablet 2.5 Mg PO BID


Furosemide 40 Mg Tablet 40 Mg PO DAILY


Gabapentin 100 Mg Capsule 200 Mg PO HS


     TAKES 2 (100MG) CAPS


Metoprolol Tartrate 25 Mg Tablet 25 Mg PO BID


Pantoprazole Sodium 40 Mg Tablet.dr 40 Mg PO DAILY


Novolin N (Insulin NPH Human Isophane) 100 Unit/1 Ml Vial 5 Units SQ BIDPC


     USES ALONG WITH NOVOLIN R


Humulin R (Insulin Regular, Human) 1,000 Units/10 Ml Soln 5 Units SQ BIDPC


     USES ALONG WITH NOVOLIN N


Montelukast Sodium 10 Mg Tablet 10 Mg PO HS


Simvastatin 20 Mg Tablet 20 Mg PO HS


Patient Allergies:  


Coded Allergies:  


     morphine (Verified  Allergy, Severe, Pt has received Lortab in the past w/o

issue, 9/9/20)


     Sulfa (Sulfonamide Antibiotics) (Verified  Allergy, Unknown, 9/9/20)


     penicillin G (Verified  Allergy, Unknown, Pt has received Cefepime & 

Meropenem in the past, 9/9/20)


     levofloxacin (Verified  Adverse Reaction, Unknown, 9/9/20)


Height (Feet):  5


Height (Inches):  9.00


Weight (Pounds):  156


Weight (Ounces):  5.0





Home Health Need/Face to Face


Date of Face to Face:  Jan 22, 2021


Clinical Findings:  Generalized weakness and fatigue, Muscle weakness, Shortness

of breath, Unsteady gait


I have seen Pt face-to-face:  Yes


Discharged To:  Home


Diagnosis/Conditions:  


Oxygen dependent COPD


COVID


Debility


Problems/Diagnosis/Condition:  


(1) COPD (chronic obstructive pulmonary disease)


(2) Dependence on supplemental oxygen


(3) Pneumonia due to COVID-19 virus


(4) Fall at home


Patient is Homebound due to:  Rolan fall risk due to instabilty, Muscle 

weakness, Shortness of breath/distress


Homebound Status


   Due to the above stated illness, injury or surgical procedure (medical 

condition or diagnosis) and associated clinical findings, the patient is 

homebound because of his/her inability to leave home except with aid of a 

supportive device and/or person AND leaving the home requires a considerable and

taxing effort or is medically contraindicated.


Pt req the following assistanc:  Aid of another person





Home Health Nursing Orders


Home Health Services Order:  Nursing Services, Occupational Ther-Evaluate & 

Treat, Physical Therapy-Evaluate & Treat





Home Health Infusion Therapy


Line Start Date:  Jan 20, 2021





Therapy Orders


Therapy Orders:  OT (must have SN or PT order), Physical Therapy


Therapy Specific Orders:  Eval assistive deivces, Teach enviro modifica

tions/safety, Gait training, Increase strength/endurance


Certify Stmt


I certify that this patient is under my care and that I, a nurse practitioner or

a physician; a assistant working with me, had a face to face encounter that 

-meets the physician face to face encounter requirements with this patient as 

dated.





Discharge Physical Exam


General:  Alert, Cooperative, No Acute Distress, Other (thin, disoriented to 

place)


HEENT:  Atraumatic, PERRLA, EOMI


Lungs:  Clear to Auscultation, Normal Air Movement


Heart:  Regular Rate, Normal S1, Normal S2, No Murmurs


Abdomen:  Normal Bowel Sounds, Soft, No Tenderness


Extremities:  No Edema, No Tenderness/Swelling


Skin:  No Rashes, No Significant Lesion


Neuro:  Other (motor weakness)


Psych/Mental Status:  Mental Status NL, Mood NL











KWASI AGUIRRE MD              Jan 22, 2021 12:23

## 2021-01-22 NOTE — PHYSICIAN QUERY CLARIFICATION
Physician Query-General


Query to Physician:


The medical record reflects the following clinical scenario:





History/Risk factors:  Covid 19 pos, Severe Chronic illnesses 





Clinical Findings: Admit VS/Labs: RR  20  /63  SpO2 99% on 4LNC  T  35.9, 

WBC 12.7, LA 3.21





Treatment: ER Treatment: Meropenem, NS 1L 





Question:  Do you agree with the impression of Sepsis  per ER Provider ADA OJEDA? 


 If you agree, please document in Progress Notes or Discharge Summary.  





1. Yes; will document Sepsis due to Covid 19 Pneumonia present on admission in 

the Progress Notes





2. No; will continue to document Covid 19 Pneumonia in the Progress Notes 





3. Other; will document explanation of clinical findings





4. Clinically undetermined; no explanation for clinical findings








Please remember a lack of response to the above will prompt a phone page by 

CDI/coding staff.





In responding to this query, please exercise your independent professional 

judgment.  The purpose of this communication is to more accurately reflect the 

complexity of your patients condition. The fact that a question is asked does 

not imply that any particular answer is desired or expected.  











Thank you for timely response to this clarification.      


   





Tresa James, MSN, RN


RN Specialist-Clinical Doc Improvement 


CD -[facility] Info Mg[facility] Operations 001


[facility] [address] [facility] ([facility]


t: [phone] | f: [phone]


If you are unable to reach me at my extension, I may be working from home. 

Please contact me at [phone]





PHYSICIAN RESPONSE:


Based on the clinical findings in the record, please respond to the query above 

on this document as an addendum. 








Physician Response:


Physician Response


2














If you have questions please contact:


                   


:


Ext:





Thank you for your time and cooperation.


Clinical /








*********************This is a permanent part of the medical 

record*******************











TRESA JAMES                   Jan 22, 2021 15:19


KWASI AGUIRRE MD              Jan 22, 2021 18:07

## 2021-01-22 NOTE — PHYSICAL THERAPY DAILY NOTE
PT Daily Note-Current


Subjective


Patient is in bed and very confused.  Unaware of surroundings, place, time, etc.

 Per nursing, patient is up in room ad linda due to confusion and inability to 

comprehend safety concerns.





Mental Status


Patient Orientation:  Confused


Attachments:  Oxygen





Transfers


SCALE: Activities may be completed with or without assistive devices.





6-Indepedent-patient completes the activity by him/herself with no assistance 

from a helper.


5-Set-up or Clean-up Assistance-helper sets up or cleans up; patient completes 

activity. Sidney assists only prior to or  


    following the activity.


4-Supervision or Touching Assistance-helper provides verbal cues and/or 

touching/steadying and/or contact guard assistance as patient completes 

activity. Assistance may be provided   


    throughout the activity or intermittently.


3-Partial/Moderate Assistance-helper does LESS THAN HALF the effort. Sidney 

lifts, holds or supports trunk or limbs, but provides less than half the effort.


2-Substantial/Maximal Assistance-helper does MORE THAN HALF the effort. Sidney 

lifts or holds trunk or limbs and provides more than half the effort.


1-Atrwpzapn-heivcg does ALL the effort. Patient does none of the effort to 

complete the activity. Or, the assistance of 2 or more helpers is required for 

the patient to complete the  


    activity.


If activity was not attempted, code reason:


7-Patient Refused.


9-Not Applicable-not attempted and the patient did not perform the activity 

before the current illness, exacerbation or injury.


10-Not Attempted due to Environmental Limitations-(lack of equipment, weather 

restraints, etc.).


88-Not Attempted due to Medical Conditions or Safety Concerns.


Sit to Lying (QC):  4


Lying to Sitting/Side of Bed(Q:  4


Sit to Stand (QC):  4





Gait Training


Distance:  30' x 2


Walk 10 feet (QC):  4


Gait Assistive Device:  None (utilized bed to steady/FWW in room)





Exercises


Seated Therapy Exercises:  Ankle pumps, Long arc quads


Seated Reps:  12 (AAROM and VC's for patient to remain on task)





Assessment


Patient returned to bed with needs met.  PT to continue to address pulmonary 

function and strength





PT Long Term Goals


Long Term Goals


PT Long Term Goals Time Frame:  Feb 13, 2021


Roll Left & Right (QC):  5


Sit to Lying (QC):  5


Lying-Sitting on Side/Bed(QC):  5


Sit to Stand (QC):  5


Chair/Bed-to-Chair Xfer(QC):  5


Toilet Transfer (QC):  5


Car Transfer (QC):  5


Does the Patient Walk:  Yes


Walk 10 feet (QC):  5


Walk 50ft with 2 Turns (QC):  5


Walk 150 ft (QC):  5





PT Plan


Treatment/Plan


Treatment Plan:  Continue Plan of Care


Treatment Plan:  Bed Mobility, Education, Functional Activity Hema, Functional 

Strength, Gait, Safety, Therapeutic Exercise, Transfers


Treatment Duration:  Feb 13, 2021


Frequency:  6 times per week


Estimated Hrs Per Day:  .25 hour per day





Time/GCodes


Time In:  1020


Time Out:  1029


Total Billed Treatment Time:  9


Total Billed Treatment


1 visit


FA 9 min











DOMINGO OCHOA PT              Jan 22, 2021 11:16

## 2021-01-22 NOTE — NUR
CM/SS finalized discharge. 



Plan: The patient will discharge to home today with home health and family support. 



Home Health: The patient has used [facility] in the past. According to the physician, 
the patient is agreeable with home health at discharge. Aurea reports she would like to use 
[facility] again. 



Oxygen: The patient's daughter Aurea will bring the portable oxygen. 



CM/SS spoke with the patient's daughter for discharge planning. CM/SS informed her that the 
patient is still having confusion and that if she was available to stay a few nights with 
him he may benefit from the support. She verbalized understanding and is agreeable to 
staying with him. 



No further needs.

## 2021-01-22 NOTE — PHYSICIAN QUERY CLARIFICATION
Physician Query-General


Query to Physician:


History/Risk factors:  COPD, Dependence on Supplemental Oxygen 





Clinical Findings:  SOA with Exertion, 02 per NC at 2-4L  





Treatment:  Monitoring of Respiratory Status, Albuterol Breathing RX, O2 

Administration








Question:  What condition best reflects the above clinical scenario?


Please document response in the Progress notes or Discharge Summary.





1. Chronic Respiratory Failure





2. Oxygen requirement at baseline (as currently documented)





3. Other , with explanation of the clinical findings





4. Clinically undetermined, no explanation for the clinical findings








Please remember a lack of response to the above will prompt a phone page by 

CDI/coding staff





In responding to this query, please exercise your independent professional 

judgment.  The purpose of this communication is to more accurately reflect the 

complexity of your patients condition. The fact that a question is asked does 

not imply that any particular answer is desired or expected.  








Thank you for timely response to this clarification.   


      





Tresa James, MSN, RN


RN Specialist-Clinical Doc Improvement 


CD -Health Info Mgmt Operations 001


[facility] [address] [facility]


t: [phone] | f: [phone]


If you are unable to reach me at my extension, I may be working from home. 

Please contact me at [phone]





PHYSICIAN RESPONSE:


Based on the clinical findings in the record, please respond to the query above 

on this document as an addendum. 








Physician Response:


Physician Response


1














If you have questions please contact:


                   


:


Ext:





Thank you for your time and cooperation.


Clinical /








*********************This is a permanent part of the medical 

record*******************











TRESA JAMES                   Jan 22, 2021 16:04


KWASI AGUIRRE MD              Jan 22, 2021 18:08

## 2021-02-02 ENCOUNTER — HOSPITAL ENCOUNTER (OUTPATIENT)
Dept: HOSPITAL 75 - RAD | Age: 81
End: 2021-02-02
Attending: NURSE PRACTITIONER
Payer: MEDICARE

## 2021-02-02 DIAGNOSIS — J18.9: Primary | ICD-10-CM

## 2021-02-02 LAB
BUN/CREAT SERPL: 21
CREAT SERPL-MCNC: 1.03 MG/DL (ref 0.6–1.3)
GFR SERPLBLD BASED ON 1.73 SQ M-ARVRAT: > 60 ML/MIN

## 2021-02-02 PROCEDURE — 84520 ASSAY OF UREA NITROGEN: CPT

## 2021-02-02 PROCEDURE — 82565 ASSAY OF CREATININE: CPT

## 2021-02-02 PROCEDURE — 71260 CT THORAX DX C+: CPT

## 2021-02-02 PROCEDURE — 36415 COLL VENOUS BLD VENIPUNCTURE: CPT

## 2021-02-02 NOTE — DIAGNOSTIC IMAGING REPORT
EXAMINATION: CT Chest with intravenous contrast.



TECHNIQUE: Multiple contiguous axial images were obtained through

the chest after the uneventful administration of intravenous

contrast. All CT scans use one or more of the following dose

optimizing techniques: automated exposure control, MA and/or KvP

adjustment based on a patient size and exam type, or iterative

reconstruction. 



HISTORY: PNEUMONIA



COMPARISON: 01/20/2021.



FINDINGS: 



Lungs are severely emphysematous. There are peripheral and

basilar predominant reticulations with areas of honeycombing in

the lung bases and traction bronchiectasis. The disease severity

is similar to prior exam. There are few peripheral areas of

consolidation which are unchanged. These likely represent areas

of confluent fibrosis. There is a tiny right pleural effusion. No

pneumothorax. No suspicious nodules.



There is no axillary or supraclavicular lymphadenopathy.

Periaortic lymph node measuring 15 mm is unchanged. Mildly

enlarged subcarinal lymph node is unchanged. Heart is enlarged.

There are moderate coronary artery calcifications.  No

pericardial effusion. Aorta is normal in caliber. There is a

small hiatal hernia.



Limited views of the upper abdomen are unremarkable.



There are no suspicious osseus lesions. There are three thoracic

spine compression fractures which are unchanged.



IMPRESSION:



1. Unchanged interstitial lung disease in a typical usual

interstitial pneumonia pattern according to Fleischner Society

guidelines. It is associated with fairly significant emphysema

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

emphysema (CPFE).



Dictated by: 



  Dictated on workstation # [id number]

## 2021-02-03 ENCOUNTER — HOSPITAL ENCOUNTER (EMERGENCY)
Dept: HOSPITAL 75 - ER | Age: 81
Discharge: HOME | End: 2021-02-03
Payer: MEDICARE

## 2021-02-03 VITALS — SYSTOLIC BLOOD PRESSURE: 117 MMHG | DIASTOLIC BLOOD PRESSURE: 63 MMHG

## 2021-02-03 VITALS — BODY MASS INDEX: 23.53 KG/M2 | HEIGHT: 66.93 IN | WEIGHT: 149.91 LBS

## 2021-02-03 DIAGNOSIS — Z79.51: ICD-10-CM

## 2021-02-03 DIAGNOSIS — I25.10: ICD-10-CM

## 2021-02-03 DIAGNOSIS — I48.91: ICD-10-CM

## 2021-02-03 DIAGNOSIS — Z95.5: ICD-10-CM

## 2021-02-03 DIAGNOSIS — Z88.5: ICD-10-CM

## 2021-02-03 DIAGNOSIS — I50.30: ICD-10-CM

## 2021-02-03 DIAGNOSIS — K29.00: Primary | ICD-10-CM

## 2021-02-03 DIAGNOSIS — Z88.0: ICD-10-CM

## 2021-02-03 DIAGNOSIS — Z80.52: ICD-10-CM

## 2021-02-03 DIAGNOSIS — Z88.1: ICD-10-CM

## 2021-02-03 DIAGNOSIS — Z79.01: ICD-10-CM

## 2021-02-03 DIAGNOSIS — Z79.4: ICD-10-CM

## 2021-02-03 DIAGNOSIS — K21.9: ICD-10-CM

## 2021-02-03 DIAGNOSIS — E11.9: ICD-10-CM

## 2021-02-03 DIAGNOSIS — E78.00: ICD-10-CM

## 2021-02-03 DIAGNOSIS — Z88.2: ICD-10-CM

## 2021-02-03 DIAGNOSIS — Z95.1: ICD-10-CM

## 2021-02-03 DIAGNOSIS — J44.9: ICD-10-CM

## 2021-02-03 DIAGNOSIS — Z85.818: ICD-10-CM

## 2021-02-03 DIAGNOSIS — Z87.891: ICD-10-CM

## 2021-02-03 DIAGNOSIS — I11.0: ICD-10-CM

## 2021-02-03 LAB
ALBUMIN SERPL-MCNC: 2.9 GM/DL (ref 3.2–4.5)
ALP SERPL-CCNC: 111 U/L (ref 40–136)
ALT SERPL-CCNC: 11 U/L (ref 0–55)
APTT BLD: 67 SEC (ref 24–35)
BASOPHILS # BLD AUTO: 0.1 10^3/UL (ref 0–0.1)
BASOPHILS NFR BLD AUTO: 1 % (ref 0–10)
BILIRUB SERPL-MCNC: 0.4 MG/DL (ref 0.1–1)
BUN/CREAT SERPL: 21
CALCIUM SERPL-MCNC: 8.6 MG/DL (ref 8.5–10.1)
CHLORIDE SERPL-SCNC: 96 MMOL/L (ref 98–107)
CO2 SERPL-SCNC: 31 MMOL/L (ref 21–32)
CREAT SERPL-MCNC: 1.15 MG/DL (ref 0.6–1.3)
EOSINOPHIL # BLD AUTO: 0 10^3/UL (ref 0–0.3)
EOSINOPHIL NFR BLD AUTO: 0 % (ref 0–10)
GFR SERPLBLD BASED ON 1.73 SQ M-ARVRAT: > 60 ML/MIN
GLUCOSE SERPL-MCNC: 167 MG/DL (ref 70–105)
HCT VFR BLD CALC: 30 % (ref 40–54)
HGB BLD-MCNC: 9.7 G/DL (ref 13.3–17.7)
INR PPP: 1.3 (ref 0.8–1.4)
LIPASE SERPL-CCNC: 11 U/L (ref 8–78)
LYMPHOCYTES # BLD AUTO: 2.1 10^3/UL (ref 1–4)
LYMPHOCYTES NFR BLD AUTO: 18 % (ref 12–44)
MAGNESIUM SERPL-MCNC: 2 MG/DL (ref 1.6–2.4)
MANUAL DIFFERENTIAL PERFORMED BLD QL: NO
MCH RBC QN AUTO: 31 PG (ref 25–34)
MCHC RBC AUTO-ENTMCNC: 32 G/DL (ref 32–36)
MCV RBC AUTO: 97 FL (ref 80–99)
MONOCYTES # BLD AUTO: 1 10^3/UL (ref 0–1)
MONOCYTES NFR BLD AUTO: 8 % (ref 0–12)
NEUTROPHILS # BLD AUTO: 8.7 10^3/UL (ref 1.8–7.8)
NEUTROPHILS NFR BLD AUTO: 73 % (ref 42–75)
PLATELET # BLD: 235 10^3/UL (ref 130–400)
PMV BLD AUTO: 9.1 FL (ref 9–12.2)
POTASSIUM SERPL-SCNC: 4.3 MMOL/L (ref 3.6–5)
PROT SERPL-MCNC: 7.2 GM/DL (ref 6.4–8.2)
PROTHROMBIN TIME: 17 SEC (ref 12.2–14.7)
SODIUM SERPL-SCNC: 139 MMOL/L (ref 135–145)
WBC # BLD AUTO: 11.9 10^3/UL (ref 4.3–11)

## 2021-02-03 PROCEDURE — 93041 RHYTHM ECG TRACING: CPT

## 2021-02-03 PROCEDURE — 83880 ASSAY OF NATRIURETIC PEPTIDE: CPT

## 2021-02-03 PROCEDURE — 86308 HETEROPHILE ANTIBODY SCREEN: CPT

## 2021-02-03 PROCEDURE — 84484 ASSAY OF TROPONIN QUANT: CPT

## 2021-02-03 PROCEDURE — 80053 COMPREHEN METABOLIC PANEL: CPT

## 2021-02-03 PROCEDURE — 85730 THROMBOPLASTIN TIME PARTIAL: CPT

## 2021-02-03 PROCEDURE — 74177 CT ABD & PELVIS W/CONTRAST: CPT

## 2021-02-03 PROCEDURE — 85025 COMPLETE CBC W/AUTO DIFF WBC: CPT

## 2021-02-03 PROCEDURE — 36415 COLL VENOUS BLD VENIPUNCTURE: CPT

## 2021-02-03 PROCEDURE — 85610 PROTHROMBIN TIME: CPT

## 2021-02-03 PROCEDURE — 93005 ELECTROCARDIOGRAM TRACING: CPT

## 2021-02-03 PROCEDURE — 83874 ASSAY OF MYOGLOBIN: CPT

## 2021-02-03 PROCEDURE — 71045 X-RAY EXAM CHEST 1 VIEW: CPT

## 2021-02-03 PROCEDURE — 83735 ASSAY OF MAGNESIUM: CPT

## 2021-02-03 PROCEDURE — 83690 ASSAY OF LIPASE: CPT

## 2021-02-03 NOTE — DIAGNOSTIC IMAGING REPORT
EXAMINATION: CT abdomen and pelvis with intravenous contrast.



TECHNIQUE: Multiple contiguous axial images were obtained through

the abdomen and pelvis after the uneventful administration of

intravenous contrast. All CT scans use one or more of the

following dose optimizing techniques: automated exposure control,

MA and/or KvP adjustment based on patient size and exam type or

iterative reconstruction. 



HISTORY: Left upper quadrant abdominal pain.



COMPARISON: CT chest on 02/02/2021.



FINDINGS: The heart is unremarkable. Scattered patchy opacities

are again seen in the lung bases, unchanged compared to the prior

exam. Bronchiectasis and scarring is again noted.



There is hepatic steatosis. Scattered areas of transient hepatic

attenuation differences are noted. The portal vein is patent. The

gallbladder is unremarkable.



The spleen, pancreas, adrenal glands and kidneys have a normal

appearance. There is no pathologically enlarged mesenteric or

retroperitoneal adenopathy. 



The bowel loops are nondilated. A moderate amount of stool is

seen in the colon. There is no free fluid or free air. 



Age-indeterminate compression deformities are visualized at T7,

T9, T12, and L3. No significant bony retropulsion is seen. There

is grade 1 anterolisthesis of L4 on L5.



The urinary bladder is nondistended. There is no free air,

loculated collection or adenopathy in the pelvis. 



IMPRESSION:

1. Moderate amount of stool in the colon, which can be seen with

constipation. No evidence of bowel obstruction. No free fluid or

free air.

2. Stable patchy opacities in the bilateral lung bases

superimposed on chronic scarring.

3. Hepatic steatosis.

4. Multiple age-indeterminate compression deformities in the

thoracic and lumbar spine. Recommend correlation with point

tenderness and, if indicated, MRI of the thoracic and lumbar

spine to further evaluate.



Dictated by: 



  Dictated on workstation # [id number]

## 2021-02-03 NOTE — DIAGNOSTIC IMAGING REPORT
EXAMINATION: Chest 1 view.



HISTORY: Chest pain.



COMPARISON: 01/21/2021.



FINDINGS: Patchy opacities are again seen throughout the lungs

superimposed on chronic interstitial scarring. Stable cardiac

silhouette. A loop recorder is noted overlying the cardiac

silhouette. Surgical clips are noted overlying the left upper

lobe.



IMPRESSION: Relatively unchanged patchy opacities throughout the

lungs, greatest in the lung bases. These are superimposed on

chronic fibrotic changes. 



Dictated by: 



  Dictated on workstation # [id number]

## 2021-02-03 NOTE — ED CHEST PAIN
General


Chief Complaint:  Chest Pain


Stated Complaint:  LEFT SIDED CP


Nursing Triage Note:  


PT PRESENTS TO ED VIA EMS FROM HOME WITH COMPLAINT OF L SIDED CP STARTING APROX 


2 HOURS PTA.


Nursing Sepsis Screen:  No Definite Risk


Source:  patient


Exam Limitations:  no limitations





History of Present Illness


Date Seen by Provider:  Feb 3, 2021


Time Seen by Provider:  19:50


Initial Comments


Patient presents ER by EMS from home with chief complaint for the past couple 

weeks has been weak tired laying in bed and his wife getting up to walk him 

every day.  Has had pneumonia that he is been treated for.  He has primary care 

by Dr. Connelly and followed by Dr. Meyer but denies a history of heart 

disease.  He says he is having chest pain all day today worse when he takes a 

deep breath then in his left lower anterior chest.  He has a history of 

interstitial lung disease and used to follow-up with Dr. Patino.  He has tried 

multiple different things including surgeries, steroids but nothing recently.  

He rated his pain as a 10 out of 10 earlier today but only a 1 or 2 out of 10 

presently.  He wishes to be full code.  He denies nausea vomiting diarrhea 

constipation or sick contacts.





Allergies and Home Medications


Allergies


Coded Allergies:  


     morphine (Verified  Allergy, Severe, Pt has received Lortab in the past w/o

issue, 20)


     Sulfa (Sulfonamide Antibiotics) (Verified  Allergy, Unknown, 20)


     penicillin G (Verified  Allergy, Unknown, Pt has received Cefepime & 

Meropenem in the past, 20)


     levofloxacin (Verified  Adverse Reaction, Unknown, 20)





Home Medications


Albuterol Sulfate 2.5 Mg/0.5 Ml Vial.neb, 2.5 MG INH Q4H, (Reported)


Apixaban 2.5 Mg Tablet, 2.5 MG PO BID, (Reported)


Docusate Sodium 100 Mg Capsule, 100 MG PO DAILY, (Reported)


Ferrous Sulfate 325 Mg Tablet, 325 MG PO DAILY, (Reported)


Fluticasone Propion/Salmeterol 1 Each Blst.w.dev, 1 PUFF INH BID, (Reported)


   RINSE MOUTH AND SPIT AFTER EACH USE 


Furosemide 40 Mg Tablet, 40 MG PO DAILY, (Reported)


Furosemide 40 Mg Tablet, 40 MG PO DAILY PRN for FLUID RETENTION, (Reported)


   TAKES 40MG DAILY AND REPEATS DOSE FOR FLUID RETENTION/SWELLING 


Gabapentin 100 Mg Capsule, 200 MG PO HS, (Reported)


   TAKES 2 (100MG) CAPS 


Insulin NPH Human Isophane 100 Unit/1 Ml Vial, 5 UNITS SQ BIDPC, (Reported)


   USES ALONG WITH NOVOLIN R 


Insulin Regular, Human 1,000 Units/10 Ml Soln, 5 UNITS SQ BIDPC, (Reported)


   USES ALONG WITH NOVOLIN N 


Metoprolol Tartrate 25 Mg Tablet, 25 MG PO BID, (Reported)


Montelukast Sodium 10 Mg Tablet, 10 MG PO HS, (Reported)


Pantoprazole Sodium 40 Mg Tablet.dr, 40 MG PO DAILY, (Reported)


Potassium Chloride 10 Meq Tab.er.prt, 10 MEQ PO DAILY, (Reported)


Simvastatin 20 Mg Tablet, 20 MG PO HS, (Reported)


Sucralfate 1 Gm Tablet, 1 GM PO QIDACHS


   Prescribed by: ZACH BAZAN on 2/3/21 2138





Patient Home Medication List


Home Medication List Reviewed:  Yes





Review of Systems


Review of Systems


Constitutional:  No chills, No diaphoresis, No fever; weakness


EENTM:  No Eye Pain, No Eye Tearing


Respiratory:  Denies Cough, Denies Orthopnea, Denies Shortness of Air


Cardiovascular:  See HPI, Chest Pain; Denies Edema, Denies Syncope


Gastrointestinal:  See HPI, Abdominal Pain; Denies Constipated, Denies Diarrhea,

Denies Nausea


Genitourinary:  Denies Burning, Denies Discharge


Musculoskeletal:  No back pain, No joint pain


Skin:  No pruritus, No rash


Psychiatric/Neurological:  Denies Depressed, Denies Headache





All Other Systems Reviewed


Negative Unless Noted:  Yes





Past Medical-Social-Family Hx


Patient Social History


Alcohol Use:  Denies Use


Smoking Status:  Former Smoker


Type Used:  Cigarettes


Former Smoker, Quit:  1985


2nd Hand Smoke Exposure:  No


Recent Infectious Disease Expo:  No


Recent Hopitalizations:  Yes





Immunizations Up To Date


Tetanus Booster (TDap):  More than 5yrs


PED Vaccines UTD:  No


Date of Pneumonia Vaccine:  Oct 2, 2018


Date of Influenza Vaccine:  Oct 1, 2020





Seasonal Allergies


Seasonal Allergies:  No





Past Medical History


Surgeries:  Yes (right hip, lung)


Cardiac, CABG, Coronary Stent, Eye Surgery, Joint Replacement, Lobectomy, 

Orthopedic, Prostatectomy, Tonsillectomy


Respiratory:  Yes


Pneumonia, Sleep Apnea, COPD, Emphysema


Currently Using CPAP:  No


Currently Using BIPAP:  No


Cardiac:  Yes (diastolic heart failure)


Atrial Fibrillation, Coronary Artery Disease, High Cholesterol, Hypertension, 

Peripheral Vascular


Neurological:  Yes


Stroke


Reproductive Disorders:  No


Sexually Transmitted Disease:  No


HIV/AIDS:  No


Genitourinary:  Yes


Benign Prostatic Hyperpl, Prostate Problems, Bladder Infection


Gastrointestinal:  Yes


Gastroesophageal Reflux, Gastrointestinal Bleed


Musculoskeletal:  Yes


Arthritis


Endocrine:  Yes


Diabetes, Insulin dep


HEENT:  Yes


Cataract


Loss of Vision:  Denies


Hearing Impairment:  Hard of Hearing


Cancer:  Yes (Throat)


Psychosocial:  No


Integumentary:  No


Blood Disorders:  No


Adverse Reaction/Blood Tranf:  No





Family Medical History





BLADD


  09 BROTHER


BLADD


  09 BROTHER


Cancer


  03 FATHER (THROAT)


  09 BROTHER


Dementia


  03 MOTHER


FH: bladder cancer


FH: bladder cancer


Family history: Diabetes mellitus


  03 MOTHER


Family history: Thyroid disorder


  03 MOTHER


Infertile


  03 MOTHER (X5 MISCARRIAGES)


Myocardial infarction


  09 BROTHER





No Family History of:


  Abdominal aortic aneurysm


  Fresno's disease


  Alcoholism


  Aphasia


  Cancer of colon


  Cataract


  Chest pain


  Congenital heart disease


  Congestive heart failure


  Cystic fibrosis


  Dysphagia


  Family history: Allergy


  Family history: Alzheimer's disease


  Family history: Arthritis


  Family history: Asthma


  Family history: Breast disease


  Family history: Cardiovascular disease


  Family history: Coronary thrombosis


  Family history: Gastrointestinal disease


  Family history: Glaucoma


  Family history: Hypertension


  Family history: Osteoporosis


  Headache


  Hearing loss


  Heart disease


  Hereditary disease


  History of - anemia


  History of - disorder


  History of - respiratory disease


  History of drug abuse


  Human immunodeficiency virus (HIV) seropositivity


  Hypercholesterolemia


  Kidney disease


  Malignant neoplasm of lung


  Parkinson's disease


  Prostate cancer


  Psychotic disorder


  Seizure disorder


  Stroke


  Tuberculosis


  Visual impairment


Heart Disease, Cancer, Stroke, Other Conditions/Hx





Physical Exam


Vital Signs





Vital Signs - First Documented








 2/3/21 2/3/21





 19:50 19:59


 


Temp  36.7


 


Pulse  67


 


Resp  18


 


B/P (MAP)  92/48 (63)


 


Pulse Ox  100


 


O2 Delivery Nasal Cannula 


 


O2 Flow Rate 4.0 





Capillary Refill : Less Than 3 Seconds


Height, Weight, BMI


Height: 5'9.00"


Weight: 156lbs. 5.0oz. [id number]; 23.00 BMI


Method:Stated


General Appearance:  Anxious, Chronically ill


HEENT:  PERRL/EOMI, Pharynx Normal; No Moist Mucous Membranes


Neck:  Full Range of Motion, Normal Inspection, Supple


Respiratory:  Chest Non Tender, Lungs Clear, Normal Breath Sounds, No Accessory 

Muscle Use, No Respiratory Distress


Cardiovascular:  Regular Rate, Rhythm, No Edema, Normal Peripheral Pulses


Gastrointestinal:  Normal Bowel Sounds, No Organomegaly, Soft, Tenderness 

(Tenderness over the epigastric region), Other (Negative for Chirinos sign or 

McBurney's point tenderness.  Negative for Rovsing sign or mesenteric signs)


Extremity:  Normal Capillary Refill, Normal Inspection, No Pedal Edema


Neurologic/Psychiatric:  Alert, Oriented x3


Skin:  Normal Color, Warm/Dry





Progress/Results/Core Measures


Results/Orders


Lab Results





Laboratory Tests








Test


 2/3/21


19:56 Range/Units


 


 


White Blood Count


 11.9 H


 4.3-11.0


10^3/uL


 


Red Blood Count


 3.15 L


 4.30-5.52


10^6/uL


 


Hemoglobin 9.7 L 13.3-17.7  g/dL


 


Hematocrit 30 L 40-54  %


 


Mean Corpuscular Volume 97  80-99  fL


 


Mean Corpuscular Hemoglobin 31  25-34  pg


 


Mean Corpuscular Hemoglobin


Concent 32 


 32-36  g/dL





 


Red Cell Distribution Width 18.6 H 10.0-14.5  %


 


Platelet Count


 235 


 130-400


10^3/uL


 


Mean Platelet Volume 9.1  9.0-12.2  fL


 


Immature Granulocyte % (Auto) 0   %


 


Neutrophils (%) (Auto) 73  42-75  %


 


Lymphocytes (%) (Auto) 18  12-44  %


 


Monocytes (%) (Auto) 8  0-12  %


 


Eosinophils (%) (Auto) 0  0-10  %


 


Basophils (%) (Auto) 1  0-10  %


 


Neutrophils # (Auto)


 8.7 H


 1.8-7.8


10^3/uL


 


Lymphocytes # (Auto)


 2.1 


 1.0-4.0


10^3/uL


 


Monocytes # (Auto)


 1.0 


 0.0-1.0


10^3/uL


 


Eosinophils # (Auto)


 0.0 


 0.0-0.3


10^3/uL


 


Basophils # (Auto)


 0.1 


 0.0-0.1


10^3/uL


 


Immature Granulocyte # (Auto)


 0.0 


 0.0-0.1


10^3/uL


 


Prothrombin Time 17.0 H 12.2-14.7  SEC


 


INR Comment 1.3  0.8-1.4  


 


Activated Partial


Thromboplast Time 67 H


 24-35  SEC





 


Sodium Level 139  135-145  MMOL/L


 


Potassium Level 4.3  3.6-5.0  MMOL/L


 


Chloride Level 96 L   MMOL/L


 


Carbon Dioxide Level 31  21-32  MMOL/L


 


Anion Gap 12  5-14  MMOL/L


 


Blood Urea Nitrogen 24 H 7-18  MG/DL


 


Creatinine


 1.15 


 0.60-1.30


MG/DL


 


Estimat Glomerular Filtration


Rate > 60 


  





 


BUN/Creatinine Ratio 21   


 


Glucose Level 167 H   MG/DL


 


Calcium Level 8.6  8.5-10.1  MG/DL


 


Corrected Calcium 9.5  8.5-10.1  MG/DL


 


Magnesium Level 2.0  1.6-2.4  MG/DL


 


Total Bilirubin 0.4  0.1-1.0  MG/DL


 


Aspartate Amino Transf


(AST/SGOT) 16 


 5-34  U/L





 


Alanine Aminotransferase


(ALT/SGPT) 11 


 0-55  U/L





 


Alkaline Phosphatase 111    U/L


 


Myoglobin


 70.5 


 10.0-92.0


NG/ML


 


Troponin I < 0.028  <0.028  NG/ML


 


B-Type Natriuretic Peptide 217.7 H <100.0  PG/ML


 


Total Protein 7.2  6.4-8.2  GM/DL


 


Albumin 2.9 L 3.2-4.5  GM/DL


 


Lipase 11  8-78  U/L


 


Monoscreen NEGATIVE  NEGATIVE  








My Orders





Orders - ZACH BAZAN


Aspirin Chewable Tablet (Baby Aspirin Ch (2/3/21 19:58)


Lactated Ringers (Lr 1000 Ml Iv Solution (2/3/21 19:58)


Cbc With Automated Diff (2/3/21 20:04)


Magnesium (2/3/21 20:04)


Chest 1 View, Ap/Pa Only (2/3/21 20:04)


Ekg Tracing (2/3/21 20:04)


Comprehensive Metabolic Panel (2/3/21 20:04)


Myoglobin Serum (2/3/21 20:04)


Protime With Inr (2/3/21 20:04)


Partial Thromboplastin Time (2/3/21 20:04)


O2 (2/3/21 20:04)


Monitor-Rhythm Ecg Trace Only (2/3/21 20:04)


Ed Iv/Invasive Line Start (2/3/21 20:04)


Lipase (2/3/21 20:04)


BNP (2/3/21 20:04)


Troponin I (2/3/21 20:04)


Aspirin Chewable Tablet (Baby Aspirin Ch (2/3/21 20:15)


Lidocaine 2% Viscous 15 Ml (Xylocaine Vi (2/3/21 20:15)


Antacid  Suspension (Mylanta  Suspension (2/3/21 20:15)


Lactated Ringers (Lr 1000 Ml Iv Solution (2/3/21 20:04)


Famotidine Injection (Pepcid Injection) (2/3/21 20:04)


Ed Iv/Invasive Line Start (2/3/21 20:04)


Monotest (2/3/21 20:04)


Ct Abdomen/Pelvis W (2/3/21 20:04)


Antacid  Suspension (Mylanta  Suspension (2/3/21 19:59)


Lidocaine 2% Viscous 15 Ml (Xylocaine Vi (2/3/21 20:00)


Iohexol Injection (Omnipaque 350 Mg/Ml 1 (2/3/21 20:30)


Received Contrast (Hold Metformin- Contr (2/3/21 20:30)


Ns (Ivpb) (Sodium Chloride 0.9% Ivpb Bag (2/3/21 20:30)





Medications Given in ED





Current Medications








 Medications  Dose


 Ordered  Sig/Bhavin


 Route  Start Time


 Stop Time Status Last Admin


Dose Admin


 


 Al Hydrox/Mg


 Hydrox/Simethicone  30 ml  ONCE  ONCE


 PO  2/3/21 20:15


 2/3/21 20:16 DC 2/3/21 20:10


30 ML


 


 Aspirin  324 mg  ONCE  ONCE


 PO  2/3/21 20:15


 2/3/21 20:16 DC 2/3/21 20:09


324 MG


 


 Iohexol  75 ml  ONCE  ONCE


 IV  2/3/21 20:30


 2/3/21 21:45 DC 2/3/21 20:38


75 ML


 


 Lidocaine HCl  15 ml  ONCE  ONCE


 PO  2/3/21 20:15


 2/3/21 20:16 DC 2/3/21 20:10


15 ML


 


 Sodium Chloride  100 ml  ONCE  ONCE


 IV  2/3/21 20:30


 2/3/21 21:45 DC 2/3/21 20:39


100 ML








Vital Signs/I&O











 2/3/21 2/3/21 2/3/21





 19:50 19:59 21:58


 


Temp  36.7 


 


Pulse  67 56


 


Resp  18 20


 


B/P (MAP)  92/48 (63) 117/63


 


Pulse Ox  100 98


 


O2 Delivery Nasal Cannula  


 


O2 Flow Rate 4.0  














Blood Pressure Mean:                    63











Progress


Progress Note #1:  


   Time:  21:18


Progress Note


On arrival we gave him some aspirin and check a troponin which was negative.  

His symptoms are going on greater than 12 hours so a delta troponin is unlikely 

to be beneficial.  After a dose of GI cocktail he said his symptoms went away.  

Suspect gastritis.  Patient blood pressure is around 90/60 which is not unusual 

for him.  He is a little dry.  He is received 500 of his first liter of saline. 

He has no further complaints at this time other than just feeling chronically 

tired and ill.  We did discuss goals of care, comfort care etc.


Progress Note #2:  


   Time:  21:36


Progress Note


 At this time he wishes to be full code.  He is tolerating oral fluids.  After a

liter of fluid his blood pressures up to 117 systolic.  He still is not having 

any discomfort.  He had no material deterioration and did improve with GI 

cocktail.  Plan to put him on Carafate and have him follow-up with Dr. Connelly

next week.  If indicated they can discuss giving endoscopy.  The patient states 

that he is ready to go home.


Initial ECG Impression Date:  Feb 3, 2021


Initial ECG Rhythm:  Normal Sinus


Initial ECG Intervals:  Normal


Initial ECG Impression:  Normal


Initial ECG Comparisson:  Unchanged


Comment


Normal sinus rhythm without clinically relevant ST elevation or depression.





Diagnostic Imaging





   Diagonstic Imaging:  Xray


   Plain Films/CT/US/NM/MRI:  chest


Comments


                 [facility] [place], [place]





NAME:   NESHA PANCHAL


MED REC#:   [id number]


ACCOUNT#:   [id number]


PT STATUS:   REG ER


:   1940


PHYSICIAN:   ZACH BAZAN MD


ADMIT DATE:   21/ER


                                   ***Draft***


Date of Exam:21





CHEST 1 VIEW, AP/PA ONLY








EXAMINATION: Chest 1 view.





HISTORY: Chest pain.





COMPARISON: 2021.





FINDINGS: Patchy opacities are again seen throughout the lungs


superimposed on chronic interstitial scarring. Stable cardiac


silhouette. A loop recorder is noted overlying the cardiac


silhouette. Surgical clips are noted overlying the left upper


lobe.





IMPRESSION: Relatively unchanged patchy opacities throughout the


lungs, greatest in the lung bases. These are superimposed on


chronic fibrotic changes. 





  Dictated on workstation # [id number]








Dict:   21


Trans:   21


[facility] [id number]





Interpreted by:     GALI MÉNDEZ DO


Electronically signed by:


   Reviewed:  Reviewed by Me








   Diagonstic Imaging:  CT


   Plain Films/CT/US/NM/MRI:  abdomen, pelvis


Comments


                 [facility] [place], [place]





NAME:   NESHA PANCHAL


MED REC#:   [id number]


ACCOUNT#:   [id number]


PT STATUS:   REG ER


:   1940


PHYSICIAN:   ZACH BAZAN MD


ADMIT DATE:   21/ER


                                   ***Draft***


Date of Exam:21





CT ABDOMEN/PELVIS W








EXAMINATION: CT abdomen and pelvis with intravenous contrast.





TECHNIQUE: Multiple contiguous axial images were obtained through


the abdomen and pelvis after the uneventful administration of


intravenous contrast. All CT scans use one or more of the


following dose optimizing techniques: automated exposure control,


MA and/or KvP adjustment based on patient size and exam type or


iterative reconstruction. 





HISTORY: Left upper quadrant abdominal pain.





COMPARISON: CT chest on 2021.





FINDINGS: The heart is unremarkable. Scattered patchy opacities


are again seen in the lung bases, unchanged compared to the prior


exam. Bronchiectasis and scarring is again noted.





There is hepatic steatosis. Scattered areas of transient hepatic


attenuation differences are noted. The portal vein is patent. The


gallbladder is unremarkable.





The spleen, pancreas, adrenal glands and kidneys have a normal


appearance. There is no pathologically enlarged mesenteric or


retroperitoneal adenopathy. 





The bowel loops are nondilated. A moderate amount of stool is


seen in the colon. There is no free fluid or free air. 





Age-indeterminate compression deformities are visualized at T7,


T9, T12, and L3. No significant bony retropulsion is seen. There


is grade 1 anterolisthesis of L4 on L5.





The urinary bladder is nondistended. There is no free air,


loculated collection or adenopathy in the pelvis. 





IMPRESSION:


1. Moderate amount of stool in the colon, which can be seen with


constipation. No evidence of bowel obstruction. No free fluid or


free air.


2. Stable patchy opacities in the bilateral lung bases


superimposed on chronic scarring.


3. Hepatic steatosis.


4. Multiple age-indeterminate compression deformities in the


thoracic and lumbar spine. Recommend correlation with point


tenderness and, if indicated, MRI of the thoracic and lumbar


spine to further evaluate.





  Dictated on workstation # [id number]








Dict:   21


Trans:   21


[facility] [id number]





Interpreted by:     GALI MÉNDEZ DO


Electronically signed by:


   Reviewed:  Reviewed by Me





Departure


Impression





   Primary Impression:  


   Gastritis


   Qualified Codes:  K29.00 - Acute gastritis without bleeding


Disposition:   HOME, SELF-CARE


Condition:  Stable





Departure-Patient Inst.


Decision time for Depature:  21:36


Referrals:  


TOR CONNELLY DO (PCP/Family)


Primary Care Physician


Patient Instructions:  Gastritis (DC)





Add. Discharge Instructions:  


Continue taking omeprazole or pantoprazole daily.  If you are not already taking

this then start taking 40 mg pantoprazole daily for the next 30 days.


Start taking the Carafate half an hour before meals and at bedtime for 2 weeks 

to protect the lining of your stomach.


Make an appointment to follow-up with Dr. CONNELLY to discuss your symptoms 

within the next 1 to 2 weeks.


Return to the ER for significantly worsening symptoms such as chest pain, 

shortness of air fever etc.


All discharge instructions reviewed with patient and/or family. Voiced under

standing.


Scripts


Sucralfate (Carafate) 1 Gm Tablet


1 GM PO QIDACHS for 14 Days, #56 TAB 0 Refills


   Prov: ZACH BAZAN         2/3/21





Copy


Copies To 1:   TOR CONNELLY TITUS J                  Feb 3, 2021 20:07

## 2021-02-04 ENCOUNTER — HOSPITAL ENCOUNTER (EMERGENCY)
Dept: HOSPITAL 75 - ER | Age: 81
Discharge: ANOTHER HOSPITAL | End: 2021-02-04
Payer: MEDICARE

## 2021-02-04 VITALS — HEIGHT: 65.75 IN | BODY MASS INDEX: 19.36 KG/M2 | WEIGHT: 119.05 LBS

## 2021-02-04 VITALS — DIASTOLIC BLOOD PRESSURE: 50 MMHG | SYSTOLIC BLOOD PRESSURE: 91 MMHG

## 2021-02-04 DIAGNOSIS — Z79.01: ICD-10-CM

## 2021-02-04 DIAGNOSIS — Z80.1: ICD-10-CM

## 2021-02-04 DIAGNOSIS — Z95.1: ICD-10-CM

## 2021-02-04 DIAGNOSIS — Z88.0: ICD-10-CM

## 2021-02-04 DIAGNOSIS — E78.00: ICD-10-CM

## 2021-02-04 DIAGNOSIS — I48.91: ICD-10-CM

## 2021-02-04 DIAGNOSIS — Z88.2: ICD-10-CM

## 2021-02-04 DIAGNOSIS — Z85.818: ICD-10-CM

## 2021-02-04 DIAGNOSIS — Z88.1: ICD-10-CM

## 2021-02-04 DIAGNOSIS — E11.9: ICD-10-CM

## 2021-02-04 DIAGNOSIS — F41.9: ICD-10-CM

## 2021-02-04 DIAGNOSIS — K21.9: ICD-10-CM

## 2021-02-04 DIAGNOSIS — Z79.4: ICD-10-CM

## 2021-02-04 DIAGNOSIS — I10: ICD-10-CM

## 2021-02-04 DIAGNOSIS — U07.1: Primary | ICD-10-CM

## 2021-02-04 DIAGNOSIS — J12.82: ICD-10-CM

## 2021-02-04 DIAGNOSIS — Z87.891: ICD-10-CM

## 2021-02-04 DIAGNOSIS — Z95.5: ICD-10-CM

## 2021-02-04 DIAGNOSIS — Z83.3: ICD-10-CM

## 2021-02-04 DIAGNOSIS — J44.9: ICD-10-CM

## 2021-02-04 DIAGNOSIS — Z80.52: ICD-10-CM

## 2021-02-04 DIAGNOSIS — Z86.73: ICD-10-CM

## 2021-02-04 DIAGNOSIS — Z88.5: ICD-10-CM

## 2021-02-04 DIAGNOSIS — Z82.49: ICD-10-CM

## 2021-02-04 LAB
ALBUMIN SERPL-MCNC: 2.8 GM/DL (ref 3.2–4.5)
ALP SERPL-CCNC: 103 U/L (ref 40–136)
ALT SERPL-CCNC: 10 U/L (ref 0–55)
ANISOCYTOSIS BLD QL SMEAR: SLIGHT
APTT BLD: 63 SEC (ref 24–35)
APTT PPP: YELLOW S
BACTERIA #/AREA URNS HPF: (no result) /HPF
BASOPHILS # BLD AUTO: 0.1 10^3/UL (ref 0–0.1)
BASOPHILS NFR BLD AUTO: 0 % (ref 0–10)
BASOPHILS NFR BLD MANUAL: 0 %
BILIRUB SERPL-MCNC: 0.5 MG/DL (ref 0.1–1)
BILIRUB UR QL STRIP: NEGATIVE
BUN/CREAT SERPL: 20
CALCIUM SERPL-MCNC: 8.5 MG/DL (ref 8.5–10.1)
CHLORIDE SERPL-SCNC: 95 MMOL/L (ref 98–107)
CO2 SERPL-SCNC: 33 MMOL/L (ref 21–32)
CREAT SERPL-MCNC: 0.92 MG/DL (ref 0.6–1.3)
D DIMER PPP FEU-MCNC: 1.54 UG/ML (ref 0–0.49)
EOSINOPHIL # BLD AUTO: 0 10^3/UL (ref 0–0.3)
EOSINOPHIL NFR BLD AUTO: 0 % (ref 0–10)
EOSINOPHIL NFR BLD MANUAL: 0 %
FIBRINOGEN PPP-MCNC: CLEAR MG/DL
GFR SERPLBLD BASED ON 1.73 SQ M-ARVRAT: > 60 ML/MIN
GLUCOSE SERPL-MCNC: 134 MG/DL (ref 70–105)
GLUCOSE UR STRIP-MCNC: NEGATIVE MG/DL
HCT VFR BLD CALC: 30 % (ref 40–54)
HGB BLD-MCNC: 9.7 G/DL (ref 13.3–17.7)
INR PPP: 1.4 (ref 0.8–1.4)
KETONES UR QL STRIP: (no result)
LEUKOCYTE ESTERASE UR QL STRIP: NEGATIVE
LYMPHOCYTES # BLD AUTO: 1.7 10^3/UL (ref 1–4)
LYMPHOCYTES NFR BLD AUTO: 8 % (ref 12–44)
MANUAL DIFFERENTIAL PERFORMED BLD QL: YES
MCH RBC QN AUTO: 31 PG (ref 25–34)
MCHC RBC AUTO-ENTMCNC: 32 G/DL (ref 32–36)
MCV RBC AUTO: 97 FL (ref 80–99)
MONOCYTES # BLD AUTO: 1.2 10^3/UL (ref 0–1)
MONOCYTES NFR BLD AUTO: 6 % (ref 0–12)
MONOCYTES NFR BLD: 6 %
NEUTROPHILS # BLD AUTO: 17 10^3/UL (ref 1.8–7.8)
NEUTROPHILS NFR BLD AUTO: 85 % (ref 42–75)
NEUTS BAND NFR BLD MANUAL: 86 %
NEUTS BAND NFR BLD: 1 %
NITRITE UR QL STRIP: NEGATIVE
PH UR STRIP: 7 [PH] (ref 5–9)
PLATELET # BLD: 229 10^3/UL (ref 130–400)
PMV BLD AUTO: 9.5 FL (ref 9–12.2)
POTASSIUM SERPL-SCNC: 4.3 MMOL/L (ref 3.6–5)
PROT SERPL-MCNC: 7 GM/DL (ref 6.4–8.2)
PROT UR QL STRIP: (no result)
PROTHROMBIN TIME: 17.2 SEC (ref 12.2–14.7)
RBC #/AREA URNS HPF: (no result) /HPF
SODIUM SERPL-SCNC: 140 MMOL/L (ref 135–145)
SP GR UR STRIP: 1.01 (ref 1.02–1.02)
SQUAMOUS #/AREA URNS HPF: (no result) /HPF
VARIANT LYMPHS NFR BLD MANUAL: 7 %
WBC # BLD AUTO: 20.1 10^3/UL (ref 4.3–11)
WBC #/AREA URNS HPF: (no result) /HPF

## 2021-02-04 PROCEDURE — 80053 COMPREHEN METABOLIC PANEL: CPT

## 2021-02-04 PROCEDURE — 84145 PROCALCITONIN (PCT): CPT

## 2021-02-04 PROCEDURE — 85379 FIBRIN DEGRADATION QUANT: CPT

## 2021-02-04 PROCEDURE — 87077 CULTURE AEROBIC IDENTIFY: CPT

## 2021-02-04 PROCEDURE — 81000 URINALYSIS NONAUTO W/SCOPE: CPT

## 2021-02-04 PROCEDURE — 51702 INSERT TEMP BLADDER CATH: CPT

## 2021-02-04 PROCEDURE — 36415 COLL VENOUS BLD VENIPUNCTURE: CPT

## 2021-02-04 PROCEDURE — 93005 ELECTROCARDIOGRAM TRACING: CPT

## 2021-02-04 PROCEDURE — 87804 INFLUENZA ASSAY W/OPTIC: CPT

## 2021-02-04 PROCEDURE — 85027 COMPLETE CBC AUTOMATED: CPT

## 2021-02-04 PROCEDURE — 71045 X-RAY EXAM CHEST 1 VIEW: CPT

## 2021-02-04 PROCEDURE — 87040 BLOOD CULTURE FOR BACTERIA: CPT

## 2021-02-04 PROCEDURE — 87088 URINE BACTERIA CULTURE: CPT

## 2021-02-04 PROCEDURE — 85007 BL SMEAR W/DIFF WBC COUNT: CPT

## 2021-02-04 PROCEDURE — 84484 ASSAY OF TROPONIN QUANT: CPT

## 2021-02-04 PROCEDURE — 83605 ASSAY OF LACTIC ACID: CPT

## 2021-02-04 PROCEDURE — 85730 THROMBOPLASTIN TIME PARTIAL: CPT

## 2021-02-04 PROCEDURE — 87186 SC STD MICRODIL/AGAR DIL: CPT

## 2021-02-04 PROCEDURE — 85610 PROTHROMBIN TIME: CPT

## 2021-02-04 NOTE — ED RESPIRATORY
General


Chief Complaint:  Cough/Cold/Flu Symptoms


Stated Complaint:  SOB


Nursing Triage Note:  


ARRIVED VIA EMS FROM HOME WITH FEVER, WEAKNESS, AND COUGH.  POSITIVE COVID ON 


DEC 20TH.


Source:  patient, EMS


Exam Limitations:  no limitations





History of Present Illness


Date Seen by Provider:  2021


Time Seen by Provider:  12:40


Initial Comments


This is an 80-year-old male who presents to the ER with complaints of cough and 

shortness of breath per [facility]. EMS reports being activated due to 

low oxygen saturation at home of upper 80's on his home 2 liters. Reports Home 

Health RN increased his oxygen to 5LPM via NC to bring his SpO2 above 90%. He 

was seen in this ER yesterday with c/o of chest pain and discharged home after 

receiving treatment for gastritis. Reports increasing weakness, fatigue, and 

shortness of breath. Denies chest pain at this time.





Allergies and Home Medications


Allergies


Coded Allergies:  


     morphine (Verified  Allergy, Severe, Pt has received Lortab in the past w/o

issue, 20)


     Sulfa (Sulfonamide Antibiotics) (Verified  Allergy, Unknown, 20)


     penicillin G (Verified  Allergy, Unknown, Pt has received Cefepime & 

Meropenem in the past, 20)


     levofloxacin (Verified  Adverse Reaction, Unknown, 20)





Home Medications


Albuterol Sulfate 2.5 Mg/0.5 Ml Vial.neb, 2.5 MG INH Q4H, (Reported)


Apixaban 2.5 Mg Tablet, 2.5 MG PO BID, (Reported)


Docusate Sodium 100 Mg Capsule, 100 MG PO DAILY, (Reported)


Ferrous Sulfate 325 Mg Tablet, 325 MG PO DAILY, (Reported)


Fluticasone Propion/Salmeterol 1 Each Blst.w.dev, 1 PUFF INH BID, (Reported)


   RINSE MOUTH AND SPIT AFTER EACH USE 


Furosemide 40 Mg Tablet, 40 MG PO DAILY, (Reported)


Furosemide 40 Mg Tablet, 40 MG PO DAILY PRN for FLUID RETENTION, (Reported)


   TAKES 40MG DAILY AND REPEATS DOSE FOR FLUID RETENTION/SWELLING 


Gabapentin 100 Mg Capsule, 200 MG PO HS, (Reported)


   TAKES 2 (100MG) CAPS 


Insulin NPH Human Isophane 100 Unit/1 Ml Vial, 5 UNITS SQ BIDPC, (Reported)


   USES ALONG WITH NOVOLIN R 


Insulin Regular, Human 1,000 Units/10 Ml Soln, 5 UNITS SQ BIDPC, (Reported)


   USES ALONG WITH NOVOLIN N 


Metoprolol Tartrate 25 Mg Tablet, 25 MG PO BID, (Reported)


Montelukast Sodium 10 Mg Tablet, 10 MG PO HS, (Reported)


Pantoprazole Sodium 40 Mg Tablet.dr, 40 MG PO DAILY, (Reported)


Potassium Chloride 10 Meq Tab.er.prt, 10 MEQ PO DAILY, (Reported)


Simvastatin 20 Mg Tablet, 20 MG PO HS, (Reported)


Sucralfate 1 Gm Tablet, 1 GM PO QIDACHS


   Prescribed by: ZACH BAZAN on 2/3/21 2138





Patient Home Medication List


Home Medication List Reviewed:  Yes





Review of Systems


Review of Systems


Constitutional:  malaise, weakness


EENTM:  no symptoms reported


Respiratory:  cough, dyspnea on exertion; No hemoptysis; short of breath


Cardiovascular:  no symptoms reported


Gastrointestinal:  no symptoms reported


Genitourinary:  no symptoms reported


Musculoskeletal:  no symptoms reported


Skin:  no symptoms reported


Psychiatric/Neurological:  Anxiety


Hematologic/Lymphatic:  Anemia


Immunological/Allergic:  no symptoms reported





Past Medical-Social-Family Hx


Patient Social History


Type Used:  Cigarettes


Former Smoker, Quit:  1985


2nd Hand Smoke Exposure:  No


Recent Infectious Disease Expo:  No


Recent Hopitalizations:  Yes





Immunizations Up To Date


Tetanus Booster (TDap):  More than 5yrs


PED Vaccines UTD:  No


Date of Pneumonia Vaccine:  Oct 2, 2018


Date of Influenza Vaccine:  Oct 1, 2020





Seasonal Allergies


Seasonal Allergies:  No





Past Medical History


Surgeries:  Yes (right hip, lung)


Cardiac, CABG, Coronary Stent, Eye Surgery, Joint Replacement, Lobectomy, 

Orthopedic, Prostatectomy, Tonsillectomy


Respiratory:  Yes


Pneumonia, Sleep Apnea, COPD, Emphysema


Currently Using CPAP:  No


Currently Using BIPAP:  No


Cardiac:  Yes (diastolic heart failure)


Atrial Fibrillation, Coronary Artery Disease, High Cholesterol, Hypertension, 

Peripheral Vascular


Neurological:  Yes


Stroke


Reproductive Disorders:  No


Sexually Transmitted Disease:  No


HIV/AIDS:  No


Genitourinary:  Yes


Benign Prostatic Hyperpl, Prostate Problems, Bladder Infection


Gastrointestinal:  Yes


Gastroesophageal Reflux, Gastrointestinal Bleed


Musculoskeletal:  Yes


Arthritis


Endocrine:  Yes


Diabetes, Insulin dep


HEENT:  Yes


Cataract


Loss of Vision:  Denies


Hearing Impairment:  Hard of Hearing


Cancer:  Yes (Throat)


Psychosocial:  No


Integumentary:  No


Blood Disorders:  No


Adverse Reaction/Blood Tranf:  No





Family Medical History





BLADD


  09 BROTHER


BLADD


  09 BROTHER


Cancer


  03 FATHER (THROAT)


  09 BROTHER


Dementia


  03 MOTHER


FH: bladder cancer


FH: bladder cancer


Family history: Diabetes mellitus


  03 MOTHER


Family history: Thyroid disorder


  03 MOTHER


Infertile


  03 MOTHER (X5 MISCARRIAGES)


Myocardial infarction


  09 BROTHER





No Family History of:


  Abdominal aortic aneurysm


  Rochester's disease


  Alcoholism


  Aphasia


  Cancer of colon


  Cataract


  Chest pain


  Congenital heart disease


  Congestive heart failure


  Cystic fibrosis


  Dysphagia


  Family history: Allergy


  Family history: Alzheimer's disease


  Family history: Arthritis


  Family history: Asthma


  Family history: Breast disease


  Family history: Cardiovascular disease


  Family history: Coronary thrombosis


  Family history: Gastrointestinal disease


  Family history: Glaucoma


  Family history: Hypertension


  Family history: Osteoporosis


  Headache


  Hearing loss


  Heart disease


  Hereditary disease


  History of - anemia


  History of - disorder


  History of - respiratory disease


  History of drug abuse


  Human immunodeficiency virus (HIV) seropositivity


  Hypercholesterolemia


  Kidney disease


  Malignant neoplasm of lung


  Parkinson's disease


  Prostate cancer


  Psychotic disorder


  Seizure disorder


  Stroke


  Tuberculosis


  Visual impairment


Heart Disease, Cancer, Stroke, Other Conditions/Hx





Physical Exam





Vital Signs - First Documented








 21





 12:37


 


Temp 38.4


 


Pulse 106


 


Resp 20


 


B/P (MAP) 101/48 (65)


 


Pulse Ox 99


 


O2 Delivery Nasal Cannula


 


O2 Flow Rate 5.00





Capillary Refill : Less Than 3 Seconds


Height: 5'9.00"


Weight: 156lbs. 5.0oz. [id number]; 19.00 BMI


Method:Stated


General Appearance:  WD/WN, no apparent distress


Eyes:  Bilateral Eye Normal Inspection, Bilateral Eye PERRL, Bilateral Eye EOMI


HEENT:  PERRL/EOMI, normal ENT inspection


Neck:  full range of motion, normal inspection


Respiratory:  chest non-tender, no respiratory distress, no accessory muscle 

use, crackles (bilateral bases)


Cardiovascular:  normal peripheral pulses, no edema, irregularly irregular


Gastrointestinal:  normal bowel sounds, non tender, soft


Extremities:  normal range of motion, non-tender, normal inspection


Neurologic/Psychiatric:  no motor/sensory deficits, alert, normal mood/affect, 

oriented x 3





Focused Exam


Lactate Level


21 12:49: Lactic Acid Level 1.92





Lactic Acid Level





Laboratory Tests








Test


 21


12:49


 


Lactic Acid Level


 1.92 MMOL/L


(0.50-2.00)











Progress/Results/Core Measures


Suspected Sepsis


Recent Fever Within 48 Hours:  Yes


Infection Criteria Present:  Suspected New Infection


New/Unexplained  Altered Menta:  No


Sepsis Screen:  Possible Sepsis Risk


SIRS


Temperature: 


Pulse: 106 


Respiratory Rate: 20


 


Laboratory Tests


21 12:49: White Blood Count 20.1H


Blood Pressure 101 /48 


Mean: 65


 


21 12:49: Lactic Acid Level 1.92


Laboratory Tests


21 12:49: 


Creatinine 0.92, INR Comment 1.4, Platelet Count 229, Total Bilirubin 0.5








Results/Orders


Lab Results





Laboratory Tests








Test


 21


12:49 21


12:58 Range/Units


 


 


White Blood Count


 20.1 H


 


 4.3-11.0


10^3/uL


 


Red Blood Count


 3.11 L


 


 4.30-5.52


10^6/uL


 


Hemoglobin 9.7 L  13.3-17.7  g/dL


 


Hematocrit 30 L  40-54  %


 


Mean Corpuscular Volume 97   80-99  fL


 


Mean Corpuscular Hemoglobin 31   25-34  pg


 


Mean Corpuscular Hemoglobin


Concent 32 


 


 32-36  g/dL





 


Red Cell Distribution Width 18.8 H  10.0-14.5  %


 


Platelet Count


 229 


 


 130-400


10^3/uL


 


Mean Platelet Volume 9.5   9.0-12.2  fL


 


Immature Granulocyte % (Auto) 1    %


 


Neutrophils (%) (Auto) 85 H  42-75  %


 


Lymphocytes (%) (Auto) 8 L  12-44  %


 


Monocytes (%) (Auto) 6   0-12  %


 


Eosinophils (%) (Auto) 0   0-10  %


 


Basophils (%) (Auto) 0   0-10  %


 


Neutrophils # (Auto)


 17.0 H


 


 1.8-7.8


10^3/uL


 


Lymphocytes # (Auto)


 1.7 


 


 1.0-4.0


10^3/uL


 


Monocytes # (Auto)


 1.2 H


 


 0.0-1.0


10^3/uL


 


Eosinophils # (Auto)


 0.0 


 


 0.0-0.3


10^3/uL


 


Basophils # (Auto)


 0.1 


 


 0.0-0.1


10^3/uL


 


Immature Granulocyte # (Auto)


 0.2 H


 


 0.0-0.1


10^3/uL


 


Neutrophils % (Manual) 86    %


 


Lymphocytes % (Manual) 7    %


 


Monocytes % (Manual) 6    %


 


Eosinophils % (Manual) 0    %


 


Basophils % (Manual) 0    %


 


Band Neutrophils 1    %


 


Anisocytosis SLIGHT    


 


Prothrombin Time 17.2 H  12.2-14.7  SEC


 


INR Comment 1.4   0.8-1.4  


 


Activated Partial


Thromboplast Time 63 H


 


 24-35  SEC





 


D-Dimer


 1.54 H


 


 0.00-0.49


UG/ML


 


Sodium Level 140   135-145  MMOL/L


 


Potassium Level 4.3   3.6-5.0  MMOL/L


 


Chloride Level 95 L    MMOL/L


 


Carbon Dioxide Level 33 H  21-32  MMOL/L


 


Anion Gap 12   5-14  MMOL/L


 


Blood Urea Nitrogen 18   7-18  MG/DL


 


Creatinine


 0.92 


 


 0.60-1.30


MG/DL


 


Estimat Glomerular Filtration


Rate > 60 


 


  





 


BUN/Creatinine Ratio 20    


 


Glucose Level 134 H    MG/DL


 


Lactic Acid Level


 1.92 


 


 0.50-2.00


MMOL/L


 


Calcium Level 8.5   8.5-10.1  MG/DL


 


Corrected Calcium 9.5   8.5-10.1  MG/DL


 


Total Bilirubin 0.5   0.1-1.0  MG/DL


 


Aspartate Amino Transf


(AST/SGOT) 16 


 


 5-34  U/L





 


Alanine Aminotransferase


(ALT/SGPT) 10 


 


 0-55  U/L





 


Alkaline Phosphatase 103     U/L


 


Troponin I < 0.028   <0.028  NG/ML


 


Total Protein 7.0   6.4-8.2  GM/DL


 


Albumin 2.8 L  3.2-4.5  GM/DL


 


Procalcitonin 0.18 H  <0.10  NG/ML


 


Urine Color  YELLOW   


 


Urine Clarity  CLEAR   


 


Urine pH  7.0  5-9  


 


Urine Specific Gravity  1.010 L 1.016-1.022  


 


Urine Protein  TRACE H NEGATIVE  


 


Urine Glucose (UA)  NEGATIVE  NEGATIVE  


 


Urine Ketones  TRACE H NEGATIVE  


 


Urine Nitrite  NEGATIVE  NEGATIVE  


 


Urine Bilirubin  NEGATIVE  NEGATIVE  


 


Urine Urobilinogen  1.0  < = 1.0  MG/DL


 


Urine Leukocyte Esterase  NEGATIVE  NEGATIVE  


 


Urine RBC (Auto)  2+ H NEGATIVE  


 


Urine RBC  5-10 H  /HPF


 


Urine WBC  RARE   /HPF


 


Urine Squamous Epithelial


Cells 


 RARE 


  /HPF





 


Urine Crystals  NONE   /LPF


 


Urine Bacteria  TRACE   /HPF


 


Urine Casts  NONE   /LPF


 


Urine Mucus  NEGATIVE   /LPF


 


Urine Culture Indicated


 


 CULTURE


PENDING  











Micro Results





Microbiology


21 Influenza Types A,B Antigen ([place]) - Final, Complete


         





My Orders





Orders - ROBIN LORENZO APRN


Cbc With Automated Diff (21 12:51)


Comprehensive Metabolic Panel (21 12:51)


Blood Culture (21 12:51)


Sputum Culture (21 12:51)


Urinalysis (21 12:51)


Urine Culture (21 12:51)


Protime With Inr (21 12:51)


Partial Thromboplastin Time (21 12:51)


Chest 1 View, Ap/Pa Only (21 12:51)


Acetaminophen  Tablet (Tylenol  Tablet) (21 13:00)


Ed Iv/Invasive Line Start (21 12:51)


Ekg Tracing (21 12:51)


Troponin I (21 12:51)


Vital Signs Adult Sepsis Patie Q15M (21 12:51)


O2 (21 12:51)


Lactic Acid Analyzer (21 12:51)


Influenza A And B Antigens (21 12:51)


Ns Iv 1000 Ml (Sodium Chloride 0.9%) (21 13:00)


Fibrin Degradation Products (21 12:51)


Manual Differential (21 12:49)


Procalcitonin (Pct) (21 13:27)


Ns Iv 500 Ml (Sodium Chloride 0.9%) (21 14:30)


Cefepime Injection (Maxipime Injection) (21 14:30)





Medications Given in ED





Current Medications








 Medications  Dose


 Ordered  Sig/Bhavin


 Route  Start Time


 Stop Time Status Last Admin


Dose Admin


 


 Acetaminophen  1,000 mg  ONCE  PRN


 PO  21 13:00


 21 13:10 DC 21 13:10


1,000 MG


 


 Cefepime HCl 1000


 mg/Sterile Water  10 ml @ 


 200 mls/hr  ONCE  ONCE


 IV  21 14:30


 21 14:32 DC 21 14:35


200 MLS/HR


 


 Sodium Chloride  500 ml @ 


 30 mls/hr  [id number] ONCE


 IV  21 14:30


 21 07:09  21 14:35


30 MLS/HR








Vital Signs/I&O











 21





 12:37


 


Temp 38.4


 


Pulse 106


 


Resp 20


 


B/P (MAP) 101/48 (65)


 


Pulse Ox 99


 


O2 Delivery Nasal Cannula


 


O2 Flow Rate 5.00





Capillary Refill : Less Than 3 Seconds








Blood Pressure Mean:                    65








Progress Note :  


Progress Note


Upon arrival he was examined and in no acute distress. Temperature noted at 38.4

C. He is noted to have chronic hypotension,  However, sepsis workup was initiate

d  due to recent infection with COVID and recurrent pneumonia. Orders placed for

Acetaminophen 1000mg PO x1. 





 Labs returned, WBC-20.1, Lactic 1.92, and Hgb-9.7 which is improved from his 

prior level of 7.8.  Chest x-ray shows small bilateral pleural eff, and LLL PNA.

 Patient will require admission. However, there are no beds available at this 

facility at this time. 





Discussed this with Dr. Peres, she is agreeable for admission to [facility] for treatment of  pneumonia and COPD. 





Discussed transfer and POC at [facility] with patient and family, 

they are agreeable with plan at this time. 





Will receive cefepime 1 g IV prior to dismissal and normal saline 500 bolus for 

blood pressure.





After receiving NS bolus, BP increased to 97/55, which is his baseline. He is 

resting comfortably, pending transfer at this time. Stable on 2 liter O2.





ECG


Initial ECG Impression Date:  2021


Initial ECG Impression Time:  13:07


Initial ECG Rate:  90


Initial ECG Rhythm:  A Fib/Flutter


Initial ECG Impression:  Nonspecific Changes, Atrial Fibrillation





Diagnostic Imaging





   Diagonstic Imaging:  Xray


   Plain Films/CT/US/NM/MRI:  chest


Comments


NAME:   NESHA PANCHAL


MED REC#:   [id number]


ACCOUNT#:   [id number]


PT STATUS:   REG ER


:   [id number]


PHYSICIAN:   ROBIN LORENZO APRN


ADMIT DATE:   21/ER


***Draft***


Date of Exam:21





CHEST 1 VIEW, AP/PA ONLY








INDICATION: Sepsis.





COMPARISON:  2021.





FINDINGS: Portable chest. Bilateral 5 lobe patchy alveolar


infiltrate is again noted most severe again in the left lower


lobe. The overall appearance has improved slightly today with


slightly better overall aeration of both lungs. Probable small


bilateral pleural effusions remain present. Heart upper limits of


normal.





IMPRESSION: Slight improvement in overall aeration today.


Persistent bilateral infiltrates with most dense infiltrate


remaining left lower lobe.





  Dictated on workstation # [id number]








Dict:   21 1328


Trans:   21 1331


CV [id number]





Interpreted by:     VINCENT BASURTO MD


Electronically signed by:


   Reviewed:  Reviewed by Me





Departure


Impression





   Primary Impression:  


   Pneumonia due to COVID-19 virus


   Additional Impression:  


   COPD (chronic obstructive pulmonary disease)


Disposition:   XFER SHT-TRM HOSP


Condition:  Stable/Unchanged





Admissions


Decision to Admit/Date:  2021


Time/Decision to Admit Time:  13:50





Transfer


Transfer Reason:  Diversion


Time Spoke to Accepting Phy:  13:50


Transfer Progress Notes


Discussed case with Dr. Peres, accepted patient transfer to [facility].





Departure-Patient Inst.


Decision time for Depature:  13:50


Referrals:  


TOR CONNELLY DO (PCP/Family)


Primary Care Physician











ROBIN LORENZO APRN            2021 13:59

## 2021-02-04 NOTE — DIAGNOSTIC IMAGING REPORT
INDICATION: Sepsis.



COMPARISON:  02/03/2021.



FINDINGS: Portable chest. Bilateral 5 lobe patchy alveolar

infiltrate is again noted most severe again in the left lower

lobe. The overall appearance has improved slightly today with

slightly better overall aeration of both lungs. Probable small

bilateral pleural effusions remain present. Heart upper limits of

normal.



IMPRESSION: Slight improvement in overall aeration today.

Persistent bilateral infiltrates with most dense infiltrate

remaining left lower lobe.



Dictated by: 



  Dictated on workstation # [id number]

## 2021-06-20 NOTE — SPEECH THERAPY DAILY NOTE
Patient was no show to today's group      Speech Daily Progress Note


Subjective


Date Seen by Provider:  Sep 5, 2018


Time Seen by Provider:  11:15


Pt alert and cooperative.





Pain





   Numeric Pain Scale:  0-No Pain





Objective


Memory - Picture recall after 20 minutes was 100% acc with no cues.


Matching like Pictures: Pt did a good job of matching pictures without 

repeating the same choices over and over.





Assessment


Assessment Current Status:  Fair Progress





Treatment Plan


Continue Plan of Care





Communication


Comprehension:  6


Expression:  7





Social Cognition


Social Interaction:  7


Problem Solvin


Memory:  4





Speech Short Term Goals


Short Term Goals


Short Term Goals


1.  Pt will complete various memory tasks with at least 80% accuracy with  min 

assist.


2.  Pt will complete various problem solving tasks with at least 80% accuracy 

and min assist.


Comprehension:  6


Expression:  7


Social Interaction:  7


Problem Solvin


Memory:  5





Speech Long Term Goals


Long Term Goals


Pt will demonstrate functional memory and problem solving ability for maximum 

independence in the home.


Comprehension:  6


Expression:  7


Social Interaction:  7


Problem Solvin


Memory:  6





Speech-Plan


Patient/Family Goals


Patient/Family Goals:  


to return home





Treatment Plan


Speech Therapy Treatment Plan:  Continue Plan of Care


pt pleasant during tx sessions and cooperative.


Treatment Duration:  Sep 11, 2018


Frequency:  5 times per week


Estimated Hrs Per Day:  .5 hour per day


Rehab Potential:  Fair


Pt/Family Agrees to Plan:  Yes





Safety Risks/Education


Teaching Recipient:  Patient


Teaching Methods:  Discussion


Response to Teaching:  Verbalize Understanding





Time


Speech Therapy Time In:  11:15


Speech Therapy Time Out:  11:45


Total Billed Time:  30


Billed Treatment Time


1, COLETTE Mills Sep 5, 2018 14:39

## 2021-10-17 NOTE — INDIVIDUALIZED PLAN OF CARE
Individualized Plan of Care


Rehab Nursing IPOC Order


Admission Date


Nov 21, 2019 at 09:45


Current Orders





Orders


Admission Arrival Bed Request (11/21/19 09:45)


Ambulate 08,12,20 (11/21/19 09:58)


Sequential Compression Device Q4H (11/21/19 09:58)


Dvt/Vte Risk - Notifiy Physici Q4H (11/21/19 09:58)


Ensure Enlive (11/21/19 Lunch)


Code/Resuscitation (11/21/19 10:20)


Accucheck Achs ACHS (11/21/19 10:20)


Oxygen-Administer 07,19 (11/21/19 10:20)


Sequential Compression Device Q4H (11/21/19 10:20)


Jamel Hose 09,21 (11/21/19 10:20)


Vital Signs: Anesthesia Post P (11/21/19 10:20)


Vital Signs: Special (Order) (11/21/19 10:20)


Weight Bearing Status (11/21/19 10:20)


Albuterol/Ipra Inhalation Soln (Duoneb I (11/21/19 14:00)


Diphenhydramine Injection (Benadryl Inje (11/21/19 10:30)


Bisacodyl Suppository (Dulcolax Supposit (11/21/19 10:30)


Docusate Sodium Capsule (Colace Capsule) (11/22/19 09:00)


Ferrous Sulfate Tablet (Feosol Tablet) (11/21/19 21:00)


Gabapentin Capsule/Tablet (Neurontin Cap (11/21/19 14:00)


Enoxaparin Injection (Lovenox Injection) (11/21/19 20:00)


Magnesium Hydroxide Oral Susp (Mom Oral (11/21/19 10:30)


Montelukast Tablet (Singulair Tablet) (11/21/19 21:00)


Pantoprazole Tablet (Protonix Tablet) (11/22/19 09:00)


Senna S Tablet (Senokot S Tablet) (11/21/19 21:00)


Ketorolac Injection (Toradol Injection) (11/21/19 12:00)


Acetaminophen  Tablet (Tylenol  Tablet) (11/21/19 12:00)


Warfarin Tablet (Coumadin Tablet) (11/21/19 18:00)


Insulin Aspart (Novolog) (Novolog (Charg (11/21/19 11:00)


Metoprolol Tartrate (Ir) Tab (Lopressor (11/21/19 21:00)


Consult Pulmonology (11/21/19 10:20)


Incentive Spirometry Initial (11/21/19 10:20)


Oxygen Delivery Set Up (11/21/19 10:20)


Svn Small Volume Nebulizer (11/21/19 10:20)


Svn Small Volume Nebulizer (11/21/19 10:20)


Incentive Spirometry (Nursing) Q2H (11/21/19 10:20)


Admission Order(Inpt,Obs,Sdc) (11/21/19 10:20)


Vital Signs: Per Unit Policy ( 08,16,00 (11/21/19 10:20)


-Inpt Rehab Con (11/21/19 10:20)


Rehab Nursing Orders-Ipoc (11/21/19 10:20)


Physical Therapy Rehab Orders (11/21/19 10:20)


Occupational Therapy Rehab Ord (11/21/19 10:20)


Speech Therapy Rehab Orders (11/21/19 10:20)


Cbc With Automated Diff (11/22/19 06:00)


Comprehensive Metabolic Panel (11/22/19 06:00)


Intake & Output 06,14,22 (11/21/19 10:20)


Precautions (Aru) (11/21/19 10:20)


Weekly Weight WEEK (11/21/19 10:20)


Rehab-Intensity Of Therapy (11/21/19 10:20)


Initiate Admission Nursing Pro .admission (11/21/19 10:20)


Alprazolam Tablet (Xanax Tablet) (11/21/19 10:30)


Calcium Carbonate Chew Tablet (Antacid C (11/21/19 10:30)


Diphenhydramine Tablet (Benadryl Tablet) (11/21/19 10:30)


Docusate Sodium Capsule (Colace Capsule) (11/21/19 10:30)


Lactulose Oral Solution (Enulose Oral So (11/21/19 10:30)


Na Phos/Na Biphos Enema (Fleet Enema Adarsh (11/21/19 10:30)


Guaifenesin/Codeine Syrup (Robitussin Ac (11/21/19 10:30)


Loperamide Tablet (Imodium Tablet) (11/21/19 10:30)


Melatonin Tablet (Melatonin Tablet) (11/21/19 10:30)


Polyethylene Glycol Powder Pkt (Miralax (11/21/19 21:00)


Ondansetron  Oral Dissolve Tab (Zofran (11/21/19 10:30)


Senna S Tablet (Senokot S Tablet) (11/21/19 21:00)


Initiate Admission Nursing Pro .admission (11/21/19 10:20)


Protime With Inr (11/22/19 06:00)


Patient Visit (11/21/19 )


Pt Eval Moderate Complexity (11/21/19 )


Gait Training, Ea 15 Min (11/21/19 )


Exercise Therap, Ea 15 Min (11/21/19 )


Follow-Up Appointment (11/21/19 11:57)


Ice: Apply To Affected Area (11/21/19 11:57)


Staple/Suture Removal (11/21/19 11:57)


Nursing Communication (Order) (11/21/19 11:57)


Acetaminophen  Tablet (Tylenol  Tablet) (11/21/19 12:30)


Hydrocodone/Apap 10/325 Tablet (Lortab 1 (11/21/19 12:30)


General/Regular (11/21/19 Lunch)


Patient Visit (11/21/19 )


Speech Sound Lang Comp (11/21/19 )


Ua Culture If Indicated (11/22/19 06:35)


Lactic Acid Analyzer (11/22/19 05:42)


Basic Metabolic Panel (11/23/19 06:00)


Cbc With Automated Diff (11/23/19 06:00)


Protime With Inr (11/23/19 06:00)


Chest Pa/Lat (2 View) (11/22/19 08:25)


Cefepime Injection (Maxipime Injection) (11/22/19 09:40)


Blood Culture (11/22/19 09:36)


Sputum Culture (11/22/19 09:36)





Rehab Nursing Orders:  Ongoing Assess. of Function Status, Bowel Management, 

Disease Management & Educaiton, DVT Prophylaxis, Fall Prevention, 

Fluid/Electrolyte/Nutrition Mgmt, Infection Prevention, Medication Management & 

Education, Management of Risks & Complications, Management of Skin Intergrity, 

Nutrition Management, Pain Management, Patient/Family Support, Safety Management





Intensity of Therapy to be met


Patient to be seen:  Min.3h per day/5 of 7d





PT IPOC


Problem List:  Activity Tolerance, Functional Strength, Safety, Balance, Gait, 

Transfer, Bed Mobility


Treatment Plan:  Continue Plan of Care


Bed Mobility, Education, Functional Activity Hema, Functional Strength, Group 

Therapy, Gait, Safety, Therapeutic Exercise, Transfers


Treatment Duration:  Dec 5, 2019


Frequency:  At least 5 of 7 days/Wk (IRF)


Estimated Hrs Per Day:  1.5 hours per day





OT IPOC


Problems:  Decreased Activ Tolerance, Decreased UE Strength, Impaired Funct 

Balance, Impaired I ADL's, Impaired Self-Care Skills


OT Treatment, Training and Edu:  Yes


Plan of Care:  ADL Retraining, Functional Mobility, Group Exercise/Act as Ind, 

UE Funct Exercise/Act


Treatment Duration:  Dec 20, 2019


Frequency:  At least 5 of 7 days/Wk (IRF)


Estimated Hrs Per Day:  1.5 hours per day





ST IPOC


Speech Therapy Treatment Plan:  Continue Plan of Care


Treatment Duration:  Nov 27, 2019


Frequency:  5 times per week


Estimated Hrs Per Day:  .5 hour per day





/Case Mgmt


/Case Managemen:  Discharge Planning





Dietitian/Nutritionist


Dietitian/Nutritionist to monitor nutritional status and make changes and/or 

recommendations as needed and work with speech pathology on dietary upgrades as 

the occur.





Physician IPOC


Medical Issues being managed closely and that require the 24 hour availability 

of a physician:





Patient with severe COPD and recurrent pneumonia at high risk for recurrent 

pneumonia considering hip fracture and debilitated status


Medical Issues:  Bowel/Bladder Function, DVT Prophylaxis, Falls Precautions, 

Fluid/Electrolyte/Nutrition Balance, Infection Protection, Pain Management


Brief Synthesis of Preadmission Screen, Post-Admission Evaluation, and Therapy 

Evaluations:





PT will focus on improving ambulation with a walker with fall prevention


OT will focus on regaining independent ADLs in order to go home and live 

independently


Medical Prognosis:  Good


Anticipated Length of Stay:  7 days











ALEKS WILSON DO                Nov 21, 2019 21:57


POS
No excercise/No heavy lifting/No sports/gym/No weight bearing/Quiet play/Elevate extremity

## 2021-11-19 NOTE — DIAGNOSTIC IMAGING REPORT
Indication: Pneumonia. 



Comparison:  Exam compared to 04/30/2018.



Findings:  5 lobe interstitial opacity and elevation of the right

diaphragm with a central and infrahilar basilar bronchiectasis,

all unchanged. A parenchymal density superimposed in the lower

lobes, particularly on the right, are believed to reflect an

element of pneumonia superimposed, as at least a small right

pleural effusion. Postsurgical changes and a loop recorder

stable.



Impression: 

1.  Extensive chronic interstitial disease as a stable finding,

superimposition of basilar infiltrates likely pneumonia most

notably on the right are again noted and not substantially

changed.



Dictated by: 



  Dictated on workstation # [id number] Filler: Juvederm Vollure XC

## 2022-12-08 NOTE — PHYSICAL THERAPY DAILY NOTE
PT Daily Note-Current


Subjective


Agreeable to rx. Loves to visit





Pain





   Numeric Pain Scale:  0-No Pain





Mental Status


Patient Orientation:  Normal For Age


Attachments:  Other-See Comments (knee immobl)





Transfers


              Functional [place] Measure


0=Not Assessed/NA   4=Minimal Assistance


1=Total Assistance   5=Supervision or Setup


2=Maximal Assistance   6=Modified [place]


3=Moderate Assistance   7=Complete IndependenceIRFPAI Quality Coding Scale











6 Independent with activity with or without an assistive device


 


5  Patient requires set up or clean up by helper.  Patient completes activity  

by  themselves


 


4 Supervision or touching assist (CGA). [place] provide cues , steadying assist


 


3 The helper provides less than half the effort to complete the activity


 


2 The helper provides more than half the effort to complete the activity


 


1 Dependent.  The helper does all the effort to complete an activity 


 


7 Patient refused to complete or attempt activity


 


9 The patient did not perform the activity before the current illness or injury


 


88 Not attempted due to Medical conditions or safety concerns








Transfers (B, C, W/C) (FIM):  5


Scootin


Rollin


Supine to/from Sit:  5


Sit to/from Stand:  5





Weight Bearing


Right Lower Extremity:  Right


Full Weight Bearing


Left Lower Extremity:  Left


Touch Toe Bearing





Gait Training


Does the Patient Walk?:  Yes


Gait (FIM):  2


Distance (FIM):  5=831-60 ft (75x3)


Gait Level of Assist:  4


Gait Persons Needed:  1


Gait Assistive Device:  FWW


instructed to be cautious about wt bearing precautions





Wheelchair Training


Does the Pt Use a Wheelchair?:  Yes


Wheelchair (FIM):  5


Wheelchair Distance:  3=150 ft


Wheelchair Level of Assist:  5


Type of Wheelchair:  Manual





Exercises


Seated Therapy Exercises:  Ankle pumps, Sit to stand, Long arc quads, Hip 

flexion


Seated Reps:  20


NuStep Minutes:  10


 NuStep Workload:  2





Assessment


Current Status:  Good Progress





PT Short Term Goals


Short Term Goals


Time Frame:  Sep 4, 2018


Transfers (B,C,W/C) (FIM):  4 (CGA)


Gait (FIM):  1


Gait Distance Comment:  25'


Gait Level of Assist:  4


Gait Assistive Device:  FWW


Wheelchair Distance:  100'





PT Long Term Goals


Long Term Goals


PT Long Term Goals Time Frame:  Sep 18, 2018


Transfers (B,C,W/C) (FIM):  5


Sit to Lying (QC):  4


Lying-Sitting on Side/Bed(QC):  4


Sit to Stand (QC):  4


Rollin


Roll Left to Right (QC):  4


Chair/Bed-to-Chair Xfer(QC):  4


Car Transfer (QC):  4


Gait (FIM):  2


Distance:  50'


Walk 10 feet (QC):  4


Walk 10ft-Uneven Surface(QC):  4


Walk 50ft with 2 Turns (QC):  4


Gait Level of Assist:  5


Gait Assistive Device:  FWW


Stairs (FIM):  1


# of Steps:  1


1 Step (curb) (QC):  4


Stairs Level Of Assist:  4





PT Plan


Treatment/Plan


Treatment Plan:  Continue Plan of Care


Treatment Plan:  Bed Mobility, Concurrent Therapy, Education, Functional 

Activity Hema, Functional Strength, Group Therapy, Gait, Safety, Therapeutic 

Exercise, Transfers


Treatment Duration:  Sep 18, 2018


Frequency:  At least 5 of 7 days/Wk (IRF)


Estimated Hrs Per Day:  1.5 hours per day


Patient and/or Family Agrees t:  Yes





Safety Risks/Education


Patient Education:  Gait Training, Transfer Techniques, Correct Positioning, W/

C Management, Disease Process, Safety Issues


Teaching Recipient:  Patient


Teaching Methods:  Demonstration, Discussion


Response to Teaching:  Verbalize Understanding, Return Demonstration, 

Reinforcement Needed





Time/GCodes


Time In:  1000


Time Out:  1100


Total Billed Treatment Time:  60


Total Billed Treatment


1,GT25m,EX35m


G Codes Necessary:  YA Palma PTA Sep 5, 2018 12:05
PT Daily Note-Current


Subjective


Patient agrees to PT.





Pain





   Numeric Pain Scale:  0-No Pain


   Location:  No Pain Reported





Mental Status


Patient Orientation:  Person, Time, Situation





Transfers


              Functional [place] Measure


0=Not Assessed/NA   4=Minimal Assistance


1=Total Assistance   5=Supervision or Setup


2=Maximal Assistance   6=Modified [place]


3=Moderate Assistance   7=Complete IndependenceIRFPAI Quality Coding Scale











6 Independent with activity with or without an assistive device


 


5  Patient requires set up or clean up by helper.  Patient completes activity  

by  themselves


 


4 Supervision or touching assist (CGA). [place] provide cues , steadying assist


 


3 The helper provides less than half the effort to complete the activity


 


2 The helper provides more than half the effort to complete the activity


 


1 Dependent.  The helper does all the effort to complete an activity 


 


7 Patient refused to complete or attempt activity


 


9 The patient did not perform the activity before the current illness or injury


 


88 Not attempted due to Medical conditions or safety concerns








Transfers (B, C, W/C) (FIM):  5


Scootin


Rollin


Roll Left to Right (QC):  5


Supine to/from Sit:  5


Sit to/from Stand:  5


Sit to Lying (QC):  5


Sit to Stand (QC):  5


Chair/Bed-to-Chair Xfer(QC):  5


Bed to/from Chair:  5





Weight Bearing


Right Lower Extremity:  Right


Full Weight Bearing


Left Lower Extremity:  Left


Weight Bearing/Tolerated





Gait Training


Does the Patient Walk?:  Yes


Gait (FIM):  5


Distance (FIM):  3=150 ft


Distance:  200' x 2


Walk 10 feet (QC):  5


Walk 50 ft with 2 Turns(QC):  5


Walk 150 ft (QC):  5


Gait Level of Assist:  5


Gait Assistive Device:  FWW


skilled verbal instruction for body placement in FWW.  Patient continues to 

have difficulty with safety concerns and awareness.  PT and patient donned 

hinged knee brace left LE 0-90 degrees lock out.





Treatments


Orthofit with medium left hinged knee brace with patient requiring max assist 

to don.





Assessment


Patient continues to have severely diminished safety awareness and is impulsive 

to perform activities.  Bed alarm activated with 4 rails up.





PT Short Term Goals


Short Term Goals


Time Frame:  Sep 4, 2018


Transfers (B,C,W/C) (FIM):  4 (CGA)


Gait (FIM):  1


Gait Distance Comment:  25'


Gait Level of Assist:  4


Gait Assistive Device:  FWW


Wheelchair Distance:  150'





PT Long Term Goals


Long Term Goals


PT Long Term Goals Time Frame:  Sep 18, 2018


Transfers (B,C,W/C) (FIM):  5


Sit to Lying (QC):  4


Lying-Sitting on Side/Bed(QC):  4


Sit to Stand (QC):  4


Rollin


Roll Left to Right (QC):  4


Chair/Bed-to-Chair Xfer(QC):  4


Car Transfer (QC):  4


Gait (FIM):  2


Distance:  50'


Walk 10 feet (QC):  4


Walk 10ft-Uneven Surface(QC):  4


Walk 50ft with 2 Turns (QC):  4


Gait Level of Assist:  5


Gait Assistive Device:  FWW


Stairs (FIM):  1


# of Steps:  1


1 Step (curb) (QC):  4


Stairs Level Of Assist:  4





PT Plan


Treatment/Plan


Treatment Plan:  Continue Plan of Care


Treatment Plan:  Bed Mobility, Concurrent Therapy, Education, Functional 

Activity Hema, Functional Strength, Group Therapy, Gait, Safety, Therapeutic 

Exercise, Transfers


Treatment Duration:  Sep 18, 2018


Frequency:  At least 5 of 7 days/Wk (IRF)


Estimated Hrs Per Day:  1.5 hours per day


Patient and/or Family Agrees t:  Yes





Time/GCodes


Time In:  1330


Time Out:  1400


Total Billed Treatment Time:  30


Total Billed Treatment


1 visit


Orthofit 20 min


GT 10 min











DOMINGO OCHOA PT Sep 11, 2018 14:53
PT Daily Note-Current


Subjective


Pt  sitting in [facility] in room upon arrival.  Pt agrees to PT.





Pain





   Numeric Pain Scale:  4


   Location:  Left


   Location Body Site:  Hip


   Pain Description:  Ache


   Comment:  Pt reports L hip pain in addition to L knee.





Mental Status


Patient Orientation:  Person, Confused, Place, Situation


Attachments:  Oxygen


Pt on O2 at night only.





Transfers


              Functional [place] Measure


0=Not Assessed/NA   4=Minimal Assistance


1=Total Assistance   5=Supervision or Setup


2=Maximal Assistance   6=Modified [place]


3=Moderate Assistance   7=Complete IndependenceIRFPAI Quality Coding Scale











6 Independent with activity with or without an assistive device


 


5  Patient requires set up or clean up by helper.  Patient completes activity  

by  themselves


 


4 Supervision or touching assist (CGA). [place] provide cues , steadying assist


 


3 The helper provides less than half the effort to complete the activity


 


2 The helper provides more than half the effort to complete the activity


 


1 Dependent.  The helper does all the effort to complete an activity 


 


7 Patient refused to complete or attempt activity


 


9 The patient did not perform the activity before the current illness or injury


 


88 Not attempted due to Medical conditions or safety concerns








Scootin


Supine to/from Sit:  4


Sit to/from Stand:  5


Sit to Lying (QC):  4


Sit to Stand (QC):  5





Weight Bearing


Right Lower Extremity:  Right


Full Weight Bearing


Left Lower Extremity:  Left


Weight Bearing/Tolerated





Gait Training


Does the Patient Walk?:  Yes


Distance (FIM):  0=769-27 ft


Distance:  100'


Walk 10 feet (QC):  4


Walk 50 ft with 2 Turns(QC):  4


Gait Level of Assist:  4


Gait Persons Needed:  1


Gait Assistive Device:  FWW


Pt is unable to consistently keep WB status.  PTA gives VC to remind but pt 

does not always adhere to them.  Dr Latham is consulted and changes WB status 

to WBAT.





Wheelchair Training


Does the Pt Use a Wheelchair?:  Yes


Wheelchair Distance:  3=150 ft


Distance:  150'


Wheelchair Level of Assist:  5


Wheel 50 ft with 2 turns (QC):  5


Wheel 150 ft (QC):  5


Type of Wheelchair:  Manual





Stair Training


Pt wants to attempt stairs but is unable to keep TTWB during ambulation so did 

not attempt.  PTA gave education to pt on why is unsafe and keeping WB status.





Exercises


Seated Therapy Exercises:  Ankle pumps, Long arc quads, Hip flexion, Kicking 

activity





Treatments


Pt propels [facility] in hallway and to Therapy Gym.  Pt wants to attempts stairs so 

PTA explains why this is unsafe and what pt needs to be able to do before 

attempting.  Pt completes Seated Ex in [facility].  Pt ambulates using FWW at [facility] and 

given VC for keeping WB status.  Pt returns to room to rest at end of tx.  Pt 

has all needs met.





Assessment


Current Status:  Fair Progress


Pt continues to be unsafe when trying to sit w/o bringing FWW back all the way 

to seat and not maintaining WB status even with VC.





PT Short Term Goals


Short Term Goals


Time Frame:  Sep 4, 2018


Transfers (B,C,W/C) (FIM):  4 (CGA)


Gait (FIM):  1


Gait Distance Comment:  25'


Gait Level of Assist:  4


Gait Assistive Device:  FWW


Wheelchair Distance:  100'





PT Long Term Goals


Long Term Goals


PT Long Term Goals Time Frame:  Sep 18, 2018


Transfers (B,C,W/C) (FIM):  5


Sit to Lying (QC):  4


Lying-Sitting on Side/Bed(QC):  4


Sit to Stand (QC):  4


Rollin


Roll Left to Right (QC):  4


Chair/Bed-to-Chair Xfer(QC):  4


Car Transfer (QC):  4


Gait (FIM):  2


Distance:  50'


Walk 10 feet (QC):  4


Walk 10ft-Uneven Surface(QC):  4


Walk 50ft with 2 Turns (QC):  4


Gait Level of Assist:  5


Gait Assistive Device:  FWW


Stairs (FIM):  1


# of Steps:  1


1 Step (curb) (QC):  4


Stairs Level Of Assist:  4





PT Plan


Problem List


Problem List:  Activity Tolerance, Functional Strength, Safety, Balance, Gait, 

Transfer





Treatment/Plan


Treatment Plan:  Continue Plan of Care


Treatment Plan:  Bed Mobility, Concurrent Therapy, Education, Functional 

Activity Hema, Functional Strength, Group Therapy, Gait, Safety, Therapeutic 

Exercise, Transfers


Treatment Duration:  Sep 18, 2018


Frequency:  At least 5 of 7 days/Wk (IRF)


Estimated Hrs Per Day:  1.5 hours per day


Patient and/or Family Agrees t:  Yes





Safety Risks/Education


Patient Education:  Gait Training, Transfer Techniques, Correct Positioning, W/

C Management, Safety Issues


Teaching Recipient:  Patient


Teaching Methods:  Discussion


Response to Teaching:  Reinforcement Needed





Time/GCodes


Time In:  1000


Time Out:  1100


Total Billed Treatment Time:  60


Total Billed Treatment


1, GT (15m), FA x2 (30m) & EX (15m)


G Codes Necessary:  LINDSAY Blanco PTA Sep 6, 2018 12:03
PT Daily Note-Current


Subjective


Pt agrees to Rx, wants to ascend descend steps today.





Pain





   Numeric Pain Scale:  0-No Pain





Mental Status


Patient Orientation:  Person, Place, Time, Situation


Attachments:  Other-See Comments (knee immoblizer left)





Transfers


              Functional [place] Measure


0=Not Assessed/NA   4=Minimal Assistance


1=Total Assistance   5=Supervision or Setup


2=Maximal Assistance   6=Modified [place]


3=Moderate Assistance   7=Complete IndependenceIRFPAI Quality Coding Scale











6 Independent with activity with or without an assistive device


 


5  Patient requires set up or clean up by helper.  Patient completes activity  

by  themselves


 


4 Supervision or touching assist (CGA). [place] provide cues , steadying assist


 


3 The helper provides less than half the effort to complete the activity


 


2 The helper provides more than half the effort to complete the activity


 


1 Dependent.  The helper does all the effort to complete an activity 


 


7 Patient refused to complete or attempt activity


 


9 The patient did not perform the activity before the current illness or injury


 


88 Not attempted due to Medical conditions or safety concerns








Transfers (B, C, W/C) (FIM):  5


Scootin


Rollin


Supine to/from Sit:  5


Sit to/from Stand:  5





Weight Bearing


Right Lower Extremity:  Right


Full Weight Bearing


Left Lower Extremity:  Left


Weight Bearing/Tolerated





Gait Training


Does the Patient Walk?:  Yes


Gait (FIM):  5


Distance (FIM):  3=150 ft (200x2)


Gait Level of Assist:  5


Gait Persons Needed:  1


Gait Assistive Device:  FWW





Stair Training


 Stair Training: Handrails/:  1 handrail


Stairs (FIM):  5


#of Steps:  4


Stairs:  Pattern:  Step to


Level of Assist:  5 (instructions for sequence)





Exercises


Supine Ex:  Ankle pumps, Quad Set, Glut sets, Heel Slides, Straight leg raise, 

Hip abd/add


Supine Reps:  12





Assessment


Current Status:  Good Progress





PT Short Term Goals


Short Term Goals


Time Frame:  Sep 4, 2018


Transfers (B,C,W/C) (FIM):  4 (CGA)


Gait (FIM):  1


Gait Distance Comment:  25'


Gait Level of Assist:  4


Gait Assistive Device:  FWW


Wheelchair Distance:  150'





PT Long Term Goals


Long Term Goals


PT Long Term Goals Time Frame:  Sep 18, 2018


Transfers (B,C,W/C) (FIM):  5


Sit to Lying (QC):  4


Lying-Sitting on Side/Bed(QC):  4


Sit to Stand (QC):  4


Rollin


Roll Left to Right (QC):  4


Chair/Bed-to-Chair Xfer(QC):  4


Car Transfer (QC):  4


Gait (FIM):  2


Distance:  50'


Walk 10 feet (QC):  4


Walk 10ft-Uneven Surface(QC):  4


Walk 50ft with 2 Turns (QC):  4


Gait Level of Assist:  5


Gait Assistive Device:  FWW


Stairs (FIM):  1


# of Steps:  1


1 Step (curb) (QC):  4


Stairs Level Of Assist:  4





PT Plan


Treatment/Plan


Treatment Plan:  Continue Plan of Care


Treatment Plan:  Bed Mobility, Concurrent Therapy, Education, Functional 

Activity Hema, Functional Strength, Group Therapy, Gait, Safety, Therapeutic 

Exercise, Transfers


Treatment Duration:  Sep 18, 2018


Frequency:  At least 5 of 7 days/Wk (IRF)


Estimated Hrs Per Day:  1.5 hours per day


Patient and/or Family Agrees t:  Yes





Safety Risks/Education


Patient Education:  Gait Training, Transfer Techniques, Steps, Correct 

Positioning, Disease Process, Safety Issues


Teaching Recipient:  Patient


Teaching Methods:  Demonstration, Discussion


Response to Teaching:  Verbalize Understanding, Return Demonstration, 

Reinforcement Needed





Time/GCodes


Time In:  845


Time Out:  910


Total Billed Treatment Time:  25


Total Billed Treatment


1,GT15,EX10


G Codes Necessary:  YA Palma PTA Sep 8, 2018 10:57
PT Daily Note-Current


Subjective


Pt finishing with OT upon arrival.  Pt agrees to PT for FIM Scoring for 

discharge today.





Pain





   Location:  No Pain Reported





Mental Status


Patient Orientation:  Person, Place, Time, Situation


Attachments:  Other-See Comments (L knee brace)





Transfers


              Functional [place] Measure


0=Not Assessed/NA   4=Minimal Assistance


1=Total Assistance   5=Supervision or Setup


2=Maximal Assistance   6=Modified [place]


3=Moderate Assistance   7=Complete IndependenceIRFPAI Quality Coding Scale











6 Independent with activity with or without an assistive device


 


5  Patient requires set up or clean up by helper.  Patient completes activity  

by  themselves


 


4 Supervision or touching assist (CGA). [place] provide cues , steadying assist


 


3 The helper provides less than half the effort to complete the activity


 


2 The helper provides more than half the effort to complete the activity


 


1 Dependent.  The helper does all the effort to complete an activity 


 


7 Patient refused to complete or attempt activity


 


9 The patient did not perform the activity before the current illness or injury


 


88 Not attempted due to Medical conditions or safety concerns








Transfers (B, C, W/C) (FIM):  5


Scootin


Rollin


Roll Left to Right (QC):  6


Supine to/from Sit:  5


Sit to/from Stand:  5


Sit to Lying (QC):  5


Sit to Stand (QC):  5


Chair/Bed-to-Chair Xfer(QC):  5


Bed to/from Chair:  5


Car Transfer (QC):  4


Pt still demonstrates safety concerns but Therapy staff has addressed these 

numerous times and pt still performs transfers and ambulation as he see fits.  

Pt lets go of FWW to transfer to seated surface.  Pt steps into car, standing 

on one leg instead of backing towards car then sitting.  PT is again given 

reminders/instruction of proper way to complete these tasks.





Weight Bearing


Right Lower Extremity:  Right


Full Weight Bearing


Left Lower Extremity:  Left


Weight Bearing/Tolerated





Gait Training


Does the Patient Walk?:  Yes


Gait (FIM):  6


Distance (FIM):  3=150 ft


Distance:  200'


Walk 10 feet (QC):  6


Walk 50 ft with 2 Turns(QC):  6


Walk 150 ft (QC):  6


Walking 10ft/uneven surface-QC:  6


Gait Level of Assist:  6


Gait Persons Needed:  1


Gait Assistive Device:  FWW


PTA gives occasional VC to remind pt to stay closer to FWW.





Wheelchair Training


Does the Pt Use a Wheelchair?:  No





Stair Training


 Stair Training: Handrails/:  2 handrails


Stairs (FIM):  3


#of Steps:  8


1 Step (curb) (QC):  6


4 Steps (QC):  6


12 Steps (QC):  88


Stairs:  Pattern:  Step to


Level of Assist:  6





Balance


Picking up an Object (QC):  88


Special Test Comments


Pt reports that he does not feel safe completing this task and uses reachers at 

home to  items.





Treatments


Pt completes transfers, bed mobility, car transfers, 2 sets of steps as well as 

ambulation to attain FIM scores.  Pt returns to room at end of tx to rest at 

EOB.  Pt has all needs met.





Assessment


Current Status:  Good Progress


Pt is able to complete tasks given although they are not always completed as 

safe as Therapy staff would like.  VC & reminders are given but not always 

followed.





PT Short Term Goals


Short Term Goals


Time Frame:  Sep 4, 2018


Transfers (B,C,W/C) (FIM):  4 (CGA)


Gait (FIM):  1


Gait Distance Comment:  25'


Gait Level of Assist:  4


Gait Assistive Device:  FWW


Wheelchair Distance:  150'





PT Long Term Goals


Long Term Goals


PT Long Term Goals Time Frame:  Sep 18, 2018


Transfers (B,C,W/C) (FIM):  5


Sit to Lying (QC):  4


Lying-Sitting on Side/Bed(QC):  4


Sit to Stand (QC):  4


Rollin


Roll Left to Right (QC):  4


Chair/Bed-to-Chair Xfer(QC):  4


Car Transfer (QC):  4


Gait (FIM):  2


Distance:  50'


Walk 10 feet (QC):  4


Walk 10ft-Uneven Surface(QC):  4


Walk 50ft with 2 Turns (QC):  4


Gait Level of Assist:  5


Gait Assistive Device:  FWW


Stairs (FIM):  1


# of Steps:  1


1 Step (curb) (QC):  4


Stairs Level Of Assist:  4





PT Plan


Problem List


Problem List:  Activity Tolerance, Safety





Treatment/Plan


Treatment Plan:  Continue Plan of Care


Treatment Plan:  Bed Mobility, Concurrent Therapy, Education, Functional 

Activity Hema, Functional Strength, Group Therapy, Gait, Safety, Therapeutic 

Exercise, Transfers


Treatment Duration:  Sep 18, 2018


Frequency:  At least 5 of 7 days/Wk (IRF)


Estimated Hrs Per Day:  1.5 hours per day


Patient and/or Family Agrees t:  Yes





Safety Risks/Education


Patient Education:  Gait Training, Transfer Techniques, Steps, Correct 

Positioning, Safety Issues


Teaching Recipient:  Patient


Teaching Methods:  Discussion


Response to Teaching:  Reinforcement Needed





Time/GCodes


Time In:  900


Time Out:  925


Total Billed Treatment Time:  25


Total Billed Treatment


1, FA x2 (25m)


G Codes Necessary:  LINDSAY Blanco PTA Sep 13, 2018 10:43
PT Daily Note-Current


Subjective


Pt in bed pre tx, agrees to PT, pain 4/10 L knee, pt states he feels urge to 

urinate, used his plastic container upon standing, but occasionally misses the 

container





Appearance


Pt in bed post tx w/ phone, call light, tray, all needs met, nurse in the room





Mental Status


Patient Orientation:  Person, Confused


Attachments:  Oxygen (2L)





Transfers


              Functional [place] Measure


0=Not Assessed/NA   4=Minimal Assistance


1=Total Assistance   5=Supervision or Setup


2=Maximal Assistance   6=Modified [place]


3=Moderate Assistance   7=Complete IndependenceIRFPAI Quality Coding Scale











6 Independent with activity with or without an assistive device


 


5  Patient requires set up or clean up by helper.  Patient completes activity  

by  themselves


 


4 Supervision or touching assist (CGA). [place] provide cues , steadying assist


 


3 The helper provides less than half the effort to complete the activity


 


2 The helper provides more than half the effort to complete the activity


 


1 Dependent.  The helper does all the effort to complete an activity 


 


7 Patient refused to complete or attempt activity


 


9 The patient did not perform the activity before the current illness or injury


 


88 Not attempted due to Medical conditions or safety concerns








Transfers (B, C, W/C) (FIM):  3


Supine to/from Sit:  3


Sit to/from Stand:  3


Bed to/from Chair:  3


supine<-> sit w/ modA, pt needs help w/ both legs and has trouble maintaining 

sitting balance w/o support from PT, sit<->stand modA with cues for hand 

placement and WB status, chair<->bed modA w/ cues for positioning and safety.





Weight Bearing


Right Lower Extremity:  Right


Full Weight Bearing


Left Lower Extremity:  Left


Touch Toe Bearing





Wheelchair Training


Does the Pt Use a Wheelchair?:  Yes


Wheelchair (FIM):  2


Distance:  100'x2


Type of Wheelchair:  Manual


Propels very slowly and slumps a lot in the chair.





Exercises


Standing:  3 way Ex=Flex, Abd, Ext


Standing Reps:  40


3-way hip w/ LLE only 2x20 in parallel bars





Assessment


Current Status:  Poor Progress


inability to maintain WB status, pt presents w/ SOBOE





PT Short Term Goals


Short Term Goals


Time Frame:  Sep 4, 2018


Transfers (B,C,W/C) (FIM):  4 (CGA)


Gait (FIM):  1


Gait Distance Comment:  25'


Gait Level of Assist:  4


Gait Assistive Device:  FWW


Wheelchair Distance:  100'x2





PT Long Term Goals


Long Term Goals


PT Long Term Goals Time Frame:  Sep 18, 2018


Transfers (B,C,W/C) (FIM):  5


Sit to Lying (QC):  4


Lying-Sitting on Side/Bed(QC):  4


Sit to Stand (QC):  4


Rollin


Roll Left to Right (QC):  4


Chair/Bed-to-Chair Xfer(QC):  4


Car Transfer (QC):  4


Gait (FIM):  2


Distance:  50'


Walk 10 feet (QC):  4


Walk 10ft-Uneven Surface(QC):  4


Walk 50ft with 2 Turns (QC):  4


Gait Level of Assist:  5


Gait Assistive Device:  FWW


Stairs (FIM):  1


# of Steps:  1


1 Step (curb) (QC):  4


Stairs Level Of Assist:  4





PT Plan


Problem List


Problem List:  Activity Tolerance, Functional Strength, Safety, Balance, Gait, 

Transfer, Bed Mobility, ROM





Treatment/Plan


Treatment Plan:  Continue Plan of Care


Treatment Plan:  Bed Mobility, Concurrent Therapy, Education, Functional 

Activity Hema, Functional Strength, Group Therapy, Gait, Safety, Therapeutic 

Exercise, Transfers


Treatment Duration:  Sep 18, 2018


Frequency:  At least 5 of 7 days/Wk (IRF)


Estimated Hrs Per Day:  1.5 hours per day


Patient and/or Family Agrees t:  Yes





Safety Risks/Education


Patient Education:  Transfer Techniques, Correct Positioning, Safety Issues


Teaching Recipient:  Patient


Teaching Methods:  Demonstration, Discussion


Response to Teaching:  Reinforcement Needed





Time/GCodes


Time In:  1300


Time Out:  1330


Total Billed Treatment Time:  30


Total Billed Treatment


1 visit


[facility] 15'


EX 15'











ANGELIA BROWN PT Aug 29, 2018 13:56
PT Daily Note-Current


Subjective


Pt in bed pre tx, agrees to PT, pain 6/10 L knee





Appearance


Pt in bed post tx w/ phone, call light, tray, all needs met, bed alarm on.





Mental Status


Patient Orientation:  Person, Place, Time





Transfers


              Functional [place] Measure


0=Not Assessed/NA   4=Minimal Assistance


1=Total Assistance   5=Supervision or Setup


2=Maximal Assistance   6=Modified [place]


3=Moderate Assistance   7=Complete IndependenceIRFPAI Quality Coding Scale











6 Independent with activity with or without an assistive device


 


5  Patient requires set up or clean up by helper.  Patient completes activity  

by  themselves


 


4 Supervision or touching assist (CGA). [place] provide cues , steadying assist


 


3 The helper provides less than half the effort to complete the activity


 


2 The helper provides more than half the effort to complete the activity


 


1 Dependent.  The helper does all the effort to complete an activity 


 


7 Patient refused to complete or attempt activity


 


9 The patient did not perform the activity before the current illness or injury


 


88 Not attempted due to Medical conditions or safety concerns








Transfers (B, C, W/C) (FIM):  4


Supine to/from Sit:  4


Sit to/from Stand:  4


Bed to/from Chair:  4


supine<->sit CGA Sit<->stand CGA w/ FWW w/ cues for WB status compliance, pt 

shows progress w/ hand placement, bed<->chair w/ stand pivot Kayla, using FWW CGA

,





Weight Bearing


Right Lower Extremity:  Right


Full Weight Bearing


Left Lower Extremity:  Left


Touch Toe Bearing





Gait Training


Does the Patient Walk?:  Yes


Gait (FIM):  1


Distance (FIM):  1=up to 49 ft


Distance:  2x20'


Gait Level of Assist:  4


Gait Persons Needed:  1


Gait Assistive Device:  FWW


Pt ambulates across gym using FWW w/ CGA, lots of cues needed for WB status, pt 

does well in first 4-5 steps, but fatigues quickly and begins to bear weight 

through L leg w/ reciprocal gait pattern despite cues to stand on RLE only.





Wheelchair Training


Does the Pt Use a Wheelchair?:  Yes


Wheelchair (FIM):  2


Wheelchair Distance:  8=958-68 ft


Distance:  100'x2


Wheelchair Level of Assist:  5


Type of Wheelchair:  Manual


Pt uses w/c to/from gym w/ SBA, moves very slowly, but is improving, cues for 

turning and positioning





Exercises


NuStep Minutes:  15


 NuStep Workload:  4





Treatments


gait training and mobility, endurance training, wheelchair training





Assessment


Current Status:  Fair Progress


improved transfers, WCH mobility, and compliance w/ WB status, pt tends to 

disregard WB status when fatigued, SOBOE w/ increased rest time needed





PT Short Term Goals


Short Term Goals


Time Frame:  Sep 4, 2018


Transfers (B,C,W/C) (FIM):  4 (CGA)


Gait (FIM):  1


Gait Distance Comment:  25'


Gait Level of Assist:  4


Gait Assistive Device:  FWW


Wheelchair Distance:  50x2





PT Long Term Goals


Long Term Goals


PT Long Term Goals Time Frame:  Sep 18, 2018


Transfers (B,C,W/C) (FIM):  5


Sit to Lying (QC):  4


Lying-Sitting on Side/Bed(QC):  4


Sit to Stand (QC):  4


Rollin


Roll Left to Right (QC):  4


Chair/Bed-to-Chair Xfer(QC):  4


Car Transfer (QC):  4


Gait (FIM):  2


Distance:  50'


Walk 10 feet (QC):  4


Walk 10ft-Uneven Surface(QC):  4


Walk 50ft with 2 Turns (QC):  4


Gait Level of Assist:  5


Gait Assistive Device:  FWW


Stairs (FIM):  1


# of Steps:  1


1 Step (curb) (QC):  4


Stairs Level Of Assist:  4





PT Plan


Problem List


Problem List:  Activity Tolerance, Functional Strength, Safety, Balance, Gait, 

Transfer, Bed Mobility, ROM





Treatment/Plan


Treatment Plan:  Continue Plan of Care


Treatment Plan:  Bed Mobility, Concurrent Therapy, Education, Functional 

Activity Hema, Functional Strength, Group Therapy, Gait, Safety, Therapeutic 

Exercise, Transfers


Treatment Duration:  Sep 18, 2018


Frequency:  At least 5 of 7 days/Wk (IRF)


Estimated Hrs Per Day:  1.5 hours per day


Patient and/or Family Agrees t:  Yes





Safety Risks/Education


Patient Education:  Gait Training, Transfer Techniques, Reviewed Precautions, 

Correct Positioning, W/C Management, Safety Issues


Teaching Recipient:  Patient


Teaching Methods:  Demonstration, Discussion


Response to Teaching:  Reinforcement Needed





Time/GCodes


Time In:  900


Time Out:  1000


Total Billed Treatment Time:  60


Total Billed Treatment


1 visit


[facility] 25'


GT 20'


EX 15'











ANGELIA BROWN PT Aug 31, 2018 09:39
PT Daily Note-Current


Subjective


Pt in bed pre tx, agrees to PT, pain 6/10 left leg.





Appearance


Pt in bed post tx, w/ call light, tray, phone, all needs med, nurse in the room





Mental Status


Patient Orientation:  Person, Confused





Transfers


              Functional [place] Measure


0=Not Assessed/NA   4=Minimal Assistance


1=Total Assistance   5=Supervision or Setup


2=Maximal Assistance   6=Modified [place]


3=Moderate Assistance   7=Complete IndependenceIRFPAI Quality Coding Scale











6 Independent with activity with or without an assistive device


 


5  Patient requires set up or clean up by helper.  Patient completes activity  

by  themselves


 


4 Supervision or touching assist (CGA). [place] provide cues , steadying assist


 


3 The helper provides less than half the effort to complete the activity


 


2 The helper provides more than half the effort to complete the activity


 


1 Dependent.  The helper does all the effort to complete an activity 


 


7 Patient refused to complete or attempt activity


 


9 The patient did not perform the activity before the current illness or injury


 


88 Not attempted due to Medical conditions or safety concerns








Transfers (B, C, W/C) (FIM):  4


Supine to/from Sit:  4


Sit to/from Stand:  4


Bed to/from Chair:  4


supine<->sit Kayla. Pt needs help to get trunk vertical into sitting and getting 

leg into bed when going to supine. Sit<->stand Kayla, pt needs lots of cues for 

hand placement and complying w/ WB status, pt performs stand pivot transfer 

when going from w/c<->bed using FWW w/ Kayla





Weight Bearing


Right Lower Extremity:  Right


Full Weight Bearing


Left Lower Extremity:  Left


Touch Toe Bearing





Wheelchair Training


Does the Pt Use a Wheelchair?:  Yes


Wheelchair (FIM):  2


Wheelchair Distance:  9=353-64 ft


Distance:  100'x2


Wheelchair Level of Assist:  4


Type of Wheelchair:  Manual


Pt uses w/c to/from gym w/ Kayla very slowly. Pt needs assistance turning and 

avoiding obstacles. Cues needed for how to turn and reaching back further w/ 

arms to increase propulsion.





Exercises


Supine Ex:  Ankle pumps, Heel Slides (RLE), Short Arc Quads (RLE), Straight leg 

raise


Supine Reps:  40


Standing:  Sit to Stand


Standing Reps:  5





Assessment


Current Status:  Poor Progress


improved w/c mobility and sit<->stand transfers but patient is now fairly 

confused and complies even more poorly with his weight bearing status.  

Ambulation was not even attempted due to his unwillingness or inability to 

comply with his weight bearing status.





PT Short Term Goals


Short Term Goals


Time Frame:  Sep 4, 2018


Transfers (B,C,W/C) (FIM):  4 (CGA)


Gait (FIM):  1


Gait Distance Comment:  25'


Gait Level of Assist:  4


Gait Assistive Device:  FWW


Wheelchair Distance:  100'X2





PT Long Term Goals


Long Term Goals


PT Long Term Goals Time Frame:  Sep 18, 2018


Transfers (B,C,W/C) (FIM):  5


Sit to Lying (QC):  4


Lying-Sitting on Side/Bed(QC):  4


Sit to Stand (QC):  4


Rollin


Roll Left to Right (QC):  4


Chair/Bed-to-Chair Xfer(QC):  4


Car Transfer (QC):  4


Gait (FIM):  2


Distance:  50'


Walk 10 feet (QC):  4


Walk 10ft-Uneven Surface(QC):  4


Walk 50ft with 2 Turns (QC):  4


Gait Level of Assist:  5


Gait Assistive Device:  FWW


Stairs (FIM):  1


# of Steps:  1


1 Step (curb) (QC):  4


Stairs Level Of Assist:  4





PT Plan


Problem List


Problem List:  Activity Tolerance, Functional Strength, Safety, Balance, Gait, 

Transfer, Bed Mobility, ROM





Treatment/Plan


Treatment Plan:  Continue Plan of Care


Treatment Plan:  Bed Mobility, Concurrent Therapy, Education, Functional 

Activity Hema, Functional Strength, Group Therapy, Gait, Safety, Therapeutic 

Exercise, Transfers


Treatment Duration:  Sep 18, 2018


Frequency:  At least 5 of 7 days/Wk (IRF)


Estimated Hrs Per Day:  1.5 hours per day


Patient and/or Family Agrees t:  Yes





Safety Risks/Education


Patient Education:  Gait Training, Transfer Techniques, Reviewed Precautions, 

Correct Positioning, W/C Management, Reviewed Don/Doff Brace, Safety Issues


Teaching Recipient:  Patient


Teaching Methods:  Demonstration, Discussion


Response to Teaching:  Reinforcement Needed





Time/GCodes


Time In:  900


Time Out:  1000


Total Billed Treatment Time:  60


Total Billed Treatment


1 visit


[facility] 20'


EX 30'


FA 10'











ANGELIA BROWN PT Aug 29, 2018 10:05
PT Daily Note-Current


Subjective


Pt in bed pre tx, agrees to PT, pain L knee 10





Appearance


Pt in bed post tx w/ phone, call light, tray, all needs met





Mental Status


Patient Orientation:  Person, Place, Time





Transfers


              Functional [place] Measure


0=Not Assessed/NA   4=Minimal Assistance


1=Total Assistance   5=Supervision or Setup


2=Maximal Assistance   6=Modified [place]


3=Moderate Assistance   7=Complete IndependenceIRFPAI Quality Coding Scale











6 Independent with activity with or without an assistive device


 


5  Patient requires set up or clean up by helper.  Patient completes activity  

by  themselves


 


4 Supervision or touching assist (CGA). [place] provide cues , steadying assist


 


3 The helper provides less than half the effort to complete the activity


 


2 The helper provides more than half the effort to complete the activity


 


1 Dependent.  The helper does all the effort to complete an activity 


 


7 Patient refused to complete or attempt activity


 


9 The patient did not perform the activity before the current illness or injury


 


88 Not attempted due to Medical conditions or safety concerns








Transfers (B, C, W/C) (FIM):  3


Supine to/from Sit:  3


Sit to/from Stand:  4


Bed to/from Chair:  3


sit<->stand Kayla w/ cues for hand placement and compliance w/ WB status, supine<

->sit modA, pt needs help sitting up and getting legs in/out of bed





Weight Bearing


Right Lower Extremity:  Right


Full Weight Bearing


Left Lower Extremity:  Left


Touch Toe Bearing





Gait Training


Does the Patient Walk?:  Yes


Gait (FIM):  1


Distance (FIM):  1=up to 49 ft


Distance:  15'


Gait Level of Assist:  4


Gait Persons Needed:  1


Gait Assistive Device:  FWW


Pt ambulates 15' w/ Kayla using FWW but is non-compliant w/ TTWB in LLE. Pt 

needs constant cues to keep LLE off the floor or keep from putting weight 

through it, but pt seems to ignore cues and continues to bear weight.





Wheelchair Training


Does the Pt Use a Wheelchair?:  Yes


Wheelchair (FIM):  2


Wheelchair Distance:  7=811-38 ft


Distance:  100'X2


Wheelchair Level of Assist:  4


Type of Wheelchair:  Manual


Pt uses w/c to gym 100' w/ Kayla, needs help turning and avoiding objects.





Treatments


bed mobility, transfers, gait training, wheelchair training





Assessment


Current Status:  Poor Progress


pt is non-compliant with and unable to follow cues with WB status





PT Short Term Goals


Short Term Goals


Time Frame:  Sep 4, 2018


Transfers (B,C,W/C) (FIM):  4 (CGA)


Gait (FIM):  1


Gait Distance Comment:  25'


Gait Level of Assist:  4


Gait Assistive Device:  FWW


Wheelchair Distance:  20'





PT Long Term Goals


Long Term Goals


PT Long Term Goals Time Frame:  Sep 18, 2018


Transfers (B,C,W/C) (FIM):  5


Sit to Lying (QC):  4


Lying-Sitting on Side/Bed(QC):  4


Sit to Stand (QC):  4


Rollin


Roll Left to Right (QC):  4


Chair/Bed-to-Chair Xfer(QC):  4


Car Transfer (QC):  4


Gait (FIM):  2


Distance:  50'


Walk 10 feet (QC):  4


Walk 10ft-Uneven Surface(QC):  4


Walk 50ft with 2 Turns (QC):  4


Gait Level of Assist:  5


Gait Assistive Device:  FWW


Stairs (FIM):  1


# of Steps:  1


1 Step (curb) (QC):  4


Stairs Level Of Assist:  4





PT Plan


Problem List


Problem List:  Activity Tolerance, Functional Strength, Safety, Balance, Gait, 

Transfer, Bed Mobility, ROM





Treatment/Plan


Treatment Plan:  Continue Plan of Care


Treatment Plan:  Bed Mobility, Concurrent Therapy, Education, Functional 

Activity Hema, Functional Strength, Group Therapy, Gait, Safety, Therapeutic 

Exercise, Transfers


Treatment Duration:  Sep 18, 2018


Frequency:  At least 5 of 7 days/Wk (IRF)


Estimated Hrs Per Day:  1.5 hours per day


Patient and/or Family Agrees t:  Yes





Safety Risks/Education


Patient Education:  Gait Training, Transfer Techniques, Reviewed Precautions, 

Correct Positioning, W/C Management, Safety Issues


Teaching Recipient:  Patient


Teaching Methods:  Demonstration, Discussion


Response to Teaching:  Reinforcement Needed





Time/GCodes


Time In:  320


Time Out:  350


Total Billed Treatment Time:  30


Total Billed Treatment


1 visit


[facility] 20'


GT 10'











ANGELIA BROWN PT Aug 28, 2018 15:57
PT Daily Note-Current


Subjective


Pt in bed pre tx, agrees to PT, reports pain 6/10 in L knee





Appearance


Pt in bed post tx, w/ phone, call light, and tray, all needs met. Pt toileted 

for BM prior to going BTB. Bed alarm on.





Mental Status


Patient Orientation:  Person, Confused


left knee immobilizer





Transfers


              Functional [place] Measure


0=Not Assessed/NA   4=Minimal Assistance


1=Total Assistance   5=Supervision or Setup


2=Maximal Assistance   6=Modified [place]


3=Moderate Assistance   7=Complete IndependenceIRFPAI Quality Coding Scale











6 Independent with activity with or without an assistive device


 


5  Patient requires set up or clean up by helper.  Patient completes activity  

by  themselves


 


4 Supervision or touching assist (CGA). [place] provide cues , steadying assist


 


3 The helper provides less than half the effort to complete the activity


 


2 The helper provides more than half the effort to complete the activity


 


1 Dependent.  The helper does all the effort to complete an activity 


 


7 Patient refused to complete or attempt activity


 


9 The patient did not perform the activity before the current illness or injury


 


88 Not attempted due to Medical conditions or safety concerns








Transfers (B, C, W/C) (FIM):  4


Rollin


Supine to/from Sit:  4


Sit to/from Stand:  4


Bed to/from Chair:  4


Pt Kayla w/ all transfers. Needs assistance w/ LLE getting in and out of bed 

during supine<->sit. Occasionally needs support w/ sitting balance. Sit<->stand 

Kayla, pt needs constant cues for pushing off/reaching back for bed with one hand

, and tends to flop onto bed instead of slow and controlled, pt needs reminders 

for compliance w/ WB status





Weight Bearing


Right Lower Extremity:  Right


Full Weight Bearing


Left Lower Extremity:  Left


Touch Toe Bearing





Wheelchair Training


Does the Pt Use a Wheelchair?:  Yes


Wheelchair (FIM):  2


Wheelchair Distance:  3=830-96 ft


Distance:  60'x2


Wheelchair Level of Assist:  4


Type of Wheelchair:  Manual


Pt uses WCH to and from gym w/ Kayla to avoid obstacles and turn. Pt becomes 

fatigued easily and moves slowly, uses arms w/ small pushes, needs cues to 

reach back to help him push further





Exercises


NuStep Minutes:  15


 NuStep Workload:  5





Treatments


bed mobility and transfers, endurance training, functional activity, patient 

was toileted once for a BM





Assessment


Current Status:  Fair Progress


improved compliance w/ WB statues, but ambulation still contraindicated. 

improved transfers and bed mobility





PT Short Term Goals


Short Term Goals


Time Frame:  Sep 4, 2018


Transfers (B,C,W/C) (FIM):  4 (CGA)


Gait (FIM):  1


Gait Distance Comment:  25'


Gait Level of Assist:  4


Gait Assistive Device:  FWW


Wheelchair Distance:  100'x2





PT Long Term Goals


Long Term Goals


PT Long Term Goals Time Frame:  Sep 18, 2018


Transfers (B,C,W/C) (FIM):  5


Sit to Lying (QC):  4


Lying-Sitting on Side/Bed(QC):  4


Sit to Stand (QC):  4


Rollin


Roll Left to Right (QC):  4


Chair/Bed-to-Chair Xfer(QC):  4


Car Transfer (QC):  4


Gait (FIM):  2


Distance:  50'


Walk 10 feet (QC):  4


Walk 10ft-Uneven Surface(QC):  4


Walk 50ft with 2 Turns (QC):  4


Gait Level of Assist:  5


Gait Assistive Device:  FWW


Stairs (FIM):  1


# of Steps:  1


1 Step (curb) (QC):  4


Stairs Level Of Assist:  4





PT Plan


Problem List


Problem List:  Activity Tolerance, Functional Strength, Safety, Balance, Gait, 

Transfer, Bed Mobility, ROM





Treatment/Plan


Treatment Plan:  Continue Plan of Care


Treatment Plan:  Bed Mobility, Concurrent Therapy, Education, Functional 

Activity Hema, Functional Strength, Group Therapy, Gait, Safety, Therapeutic 

Exercise, Transfers


Treatment Duration:  Sep 18, 2018


Frequency:  At least 5 of 7 days/Wk (IRF)


Estimated Hrs Per Day:  1.5 hours per day


Patient and/or Family Agrees t:  Yes





Safety Risks/Education


Patient Education:  Gait Training, Transfer Techniques, Reviewed Precautions, 

Correct Positioning, W/C Management, Disease Process, Safety Issues


Teaching Recipient:  Patient


Teaching Methods:  Demonstration, Discussion


Response to Teaching:  Reinforcement Needed





Time/GCodes


Time In:  800


Time Out:  900


Total Billed Treatment Time:  60


Total Billed Treatment


1 visit


[facility] 20'


EX 15'


FA 25'











ANGELIA BROWN PT Aug 30, 2018 08:58
PT Daily Note-Current


Subjective


Pt in recliner pre tx, agrees to PT, pain L knee 7/10





Appearance


Pt in recliner post tx, w/ phone, call light, tray, all needs met





Mental Status


Patient Orientation:  Person, Place, Time





Transfers


              Functional [place] Measure


0=Not Assessed/NA   4=Minimal Assistance


1=Total Assistance   5=Supervision or Setup


2=Maximal Assistance   6=Modified [place]


3=Moderate Assistance   7=Complete IndependenceIRFPAI Quality Coding Scale











6 Independent with activity with or without an assistive device


 


5  Patient requires set up or clean up by helper.  Patient completes activity  

by  themselves


 


4 Supervision or touching assist (CGA). [place] provide cues , steadying assist


 


3 The helper provides less than half the effort to complete the activity


 


2 The helper provides more than half the effort to complete the activity


 


1 Dependent.  The helper does all the effort to complete an activity 


 


7 Patient refused to complete or attempt activity


 


9 The patient did not perform the activity before the current illness or injury


 


88 Not attempted due to Medical conditions or safety concerns








Transfers (B, C, W/C) (FIM):  4


Rollin


Supine to/from Sit:  4


Sit to/from Stand:  4


Bed to/from Chair:  4


Pt Kayla with all transfers, needs constant cues for pushing off bed/chair or 

reaching back for bed/chair. Pt continues unsafe behavior, tried to transfer 

from chair to bed on his own post tx while PT was changing over O2 line





Weight Bearing


Right Lower Extremity:  Right


Full Weight Bearing


Left Lower Extremity:  Left


Touch Toe Bearing





Wheelchair Training


Does the Pt Use a Wheelchair?:  Yes


Wheelchair (FIM):  2


Wheelchair Distance:  2=529-46 ft


Distance:  50x2


Wheelchair Level of Assist:  4


Type of Wheelchair:  Manual


Pt uses w/c to/from gym but moves very slowly and fatigues easily. Needs Kayla 

for turns and avoiding objects





Exercises


Standing:  3 way Ex=Flex, Abd, Ext


Standing Reps:  40


3- way hip in parallel bars 2x20 each. Pt continues to struggle to comply w/ WB 

status despite numerous cues, using LLE w/ reciprocal gait pattern to move 

forward and backward in parallel bars.





Treatments


wheelchair and mobility training, functional strengthening/AROM





Assessment


Current Status:  Fair Progress


pt demonstrates improving bed mobility and sit<->stand transfers, but continues 

unsafe behavior and inability/unwillingness to comply w/ TTWB in LLE





PT Short Term Goals


Short Term Goals


Time Frame:  Sep 4, 2018


Transfers (B,C,W/C) (FIM):  4 (CGA)


Gait (FIM):  1


Gait Distance Comment:  25'


Gait Level of Assist:  4


Gait Assistive Device:  FWW


Wheelchair Distance:  60'x2





PT Long Term Goals


Long Term Goals


PT Long Term Goals Time Frame:  Sep 18, 2018


Transfers (B,C,W/C) (FIM):  5


Sit to Lying (QC):  4


Lying-Sitting on Side/Bed(QC):  4


Sit to Stand (QC):  4


Rollin


Roll Left to Right (QC):  4


Chair/Bed-to-Chair Xfer(QC):  4


Car Transfer (QC):  4


Gait (FIM):  2


Distance:  50'


Walk 10 feet (QC):  4


Walk 10ft-Uneven Surface(QC):  4


Walk 50ft with 2 Turns (QC):  4


Gait Level of Assist:  5


Gait Assistive Device:  FWW


Stairs (FIM):  1


# of Steps:  1


1 Step (curb) (QC):  4


Stairs Level Of Assist:  4





PT Plan


Problem List


Problem List:  Activity Tolerance, Functional Strength, Safety, Balance, Gait, 

Transfer, Bed Mobility, ROM





Treatment/Plan


Treatment Plan:  Continue Plan of Care


Treatment Plan:  Bed Mobility, Concurrent Therapy, Education, Functional 

Activity Hema, Functional Strength, Group Therapy, Gait, Safety, Therapeutic 

Exercise, Transfers


Treatment Duration:  Sep 18, 2018


Frequency:  At least 5 of 7 days/Wk (IRF)


Estimated Hrs Per Day:  1.5 hours per day


Patient and/or Family Agrees t:  Yes





Safety Risks/Education


Patient Education:  Gait Training, Transfer Techniques, Reviewed Precautions, 

Correct Positioning, W/C Management, Safety Issues


Teaching Recipient:  Patient


Teaching Methods:  Demonstration, Discussion


Response to Teaching:  Reinforcement Needed





Time/GCodes


Time In:  1300


Time Out:  1330


Total Billed Treatment Time:  30


Total Billed Treatment


1 VISIT


[facility] 15'


EX 15'











VIANEY FRANCO PT Aug 30, 2018 14:45
PT Daily Note-Current


Subjective


Pt laying Supine in bed upon arrival.  Pt agrees to PT.





Pain





   Location:  No Pain Reported





Mental Status


Patient Orientation:  Person, Place, Time, Situation


Attachments:  Other-See Comments (L knee brace)





Transfers


              Functional [place] Measure


0=Not Assessed/NA   4=Minimal Assistance


1=Total Assistance   5=Supervision or Setup


2=Maximal Assistance   6=Modified [place]


3=Moderate Assistance   7=Complete IndependenceIRFPAI Quality Coding Scale











6 Independent with activity with or without an assistive device


 


5  Patient requires set up or clean up by helper.  Patient completes activity  

by  themselves


 


4 Supervision or touching assist (CGA). [place] provide cues , steadying assist


 


3 The helper provides less than half the effort to complete the activity


 


2 The helper provides more than half the effort to complete the activity


 


1 Dependent.  The helper does all the effort to complete an activity 


 


7 Patient refused to complete or attempt activity


 


9 The patient did not perform the activity before the current illness or injury


 


88 Not attempted due to Medical conditions or safety concerns











Weight Bearing


Right Lower Extremity:  Right


Full Weight Bearing


Left Lower Extremity:  Left


Weight Bearing/Tolerated





Exercises


Supine Ex:  Ankle pumps, Quad Set, Glut sets, Heel Slides, Straight leg raise, 

Hip abd/add


Supine Reps:  20





Treatments


Pt reports feeling tired and wanting to lay in bed.  Pt completes Supine Ex in 

bed.  Pt takes a couple rest breaks.  Pt resting in bed at end of tx.  Pt has 

all needs met.





Assessment


Current Status:  Fair Progress


Pt needs VC to stay on task.  Pt wants to visit with PTA instead of work.





PT Short Term Goals


Short Term Goals


Time Frame:  Sep 4, 2018


Transfers (B,C,W/C) (FIM):  4 (CGA)


Gait (FIM):  1


Gait Distance Comment:  25'


Gait Level of Assist:  4


Gait Assistive Device:  FWW


Wheelchair Distance:  150'





PT Long Term Goals


Long Term Goals


PT Long Term Goals Time Frame:  Sep 18, 2018


Transfers (B,C,W/C) (FIM):  5


Sit to Lying (QC):  4


Lying-Sitting on Side/Bed(QC):  4


Sit to Stand (QC):  4


Rollin


Roll Left to Right (QC):  4


Chair/Bed-to-Chair Xfer(QC):  4


Car Transfer (QC):  4


Gait (FIM):  2


Distance:  50'


Walk 10 feet (QC):  4


Walk 10ft-Uneven Surface(QC):  4


Walk 50ft with 2 Turns (QC):  4


Gait Level of Assist:  5


Gait Assistive Device:  FWW


Stairs (FIM):  1


# of Steps:  1


1 Step (curb) (QC):  4


Stairs Level Of Assist:  4





PT Plan


Problem List


Problem List:  Activity Tolerance, Functional Strength, Safety





Treatment/Plan


Treatment Plan:  Continue Plan of Care


Treatment Plan:  Bed Mobility, Concurrent Therapy, Education, Functional 

Activity Hema, Functional Strength, Group Therapy, Gait, Safety, Therapeutic 

Exercise, Transfers


Treatment Duration:  Sep 18, 2018


Frequency:  At least 5 of 7 days/Wk (IRF)


Estimated Hrs Per Day:  1.5 hours per day


Patient and/or Family Agrees t:  Yes





Safety Risks/Education


Patient Education:  Correct Positioning, Safety Issues


Teaching Recipient:  Patient


Teaching Methods:  Discussion


Response to Teaching:  Reinforcement Needed





Time/GCodes


Time In:  1345


Time Out:  1415


Total Billed Treatment Time:  30


Total Billed Treatment


1, EX (20m) & FA (10m)


G Codes Necessary:  LINDSAY Blanco PTA Sep 12, 2018 14:28
PT Daily Note-Current


Subjective


Pt laying Supine in bed upon arrival.  Pt agrees to Supine Ex for PT.





Mental Status


Patient Orientation:  Person, Confused


Attachments:  Other-See Comments





Transfers


              Functional [place] Measure


0=Not Assessed/NA   4=Minimal Assistance


1=Total Assistance   5=Supervision or Setup


2=Maximal Assistance   6=Modified [place]


3=Moderate Assistance   7=Complete IndependenceIRFPAI Quality Coding Scale











6 Independent with activity with or without an assistive device


 


5  Patient requires set up or clean up by helper.  Patient completes activity  

by  themselves


 


4 Supervision or touching assist (CGA). [place] provide cues , steadying assist


 


3 The helper provides less than half the effort to complete the activity


 


2 The helper provides more than half the effort to complete the activity


 


1 Dependent.  The helper does all the effort to complete an activity 


 


7 Patient refused to complete or attempt activity


 


9 The patient did not perform the activity before the current illness or injury


 


88 Not attempted due to Medical conditions or safety concerns











Weight Bearing


Right Lower Extremity:  Right


Full Weight Bearing


Left Lower Extremity:  Left


Weight Bearing/Tolerated





Exercises


Supine Ex:  Ankle pumps, Quad Set, Heel Slides, Straight leg raise, Hip abd/add


Supine Reps:  20





Treatments


Pt completes Supine Ex in bed.  PTA informs pt that Dr Latham has changed pt's 

WB status to WBAT.   arrives at end of tx to visit with pt over 

POC.  Pt resting at end of tx with all needs met.





Assessment


Current Status:  Fair Progress


Pt is confused at times and does not all adhere to directions given.





PT Short Term Goals


Short Term Goals


Time Frame:  Sep 4, 2018


Transfers (B,C,W/C) (FIM):  4 (CGA)


Gait (FIM):  1


Gait Distance Comment:  25'


Gait Level of Assist:  4


Gait Assistive Device:  FWW


Wheelchair Distance:  150'





PT Long Term Goals


Long Term Goals


PT Long Term Goals Time Frame:  Sep 18, 2018


Transfers (B,C,W/C) (FIM):  5


Sit to Lying (QC):  4


Lying-Sitting on Side/Bed(QC):  4


Sit to Stand (QC):  4


Rollin


Roll Left to Right (QC):  4


Chair/Bed-to-Chair Xfer(QC):  4


Car Transfer (QC):  4


Gait (FIM):  2


Distance:  50'


Walk 10 feet (QC):  4


Walk 10ft-Uneven Surface(QC):  4


Walk 50ft with 2 Turns (QC):  4


Gait Level of Assist:  5


Gait Assistive Device:  FWW


Stairs (FIM):  1


# of Steps:  1


1 Step (curb) (QC):  4


Stairs Level Of Assist:  4





PT Plan


Problem List


Problem List:  Activity Tolerance, Functional Strength, Safety, Balance, Gait, 

Transfer





Treatment/Plan


Treatment Plan:  Continue Plan of Care


Treatment Plan:  Bed Mobility, Concurrent Therapy, Education, Functional 

Activity Hema, Functional Strength, Group Therapy, Gait, Safety, Therapeutic 

Exercise, Transfers


Treatment Duration:  Sep 18, 2018


Frequency:  At least 5 of 7 days/Wk (IRF)


Estimated Hrs Per Day:  1.5 hours per day


Patient and/or Family Agrees t:  Yes





Safety Risks/Education


Patient Education:  Transfer Techniques, Correct Positioning, Safety Issues


Teaching Recipient:  Patient


Teaching Methods:  Discussion


Response to Teaching:  Verbalize Understanding





Time/GCodes


Time In:  1400


Time Out:  1415


Total Billed Treatment Time:  15


Total Billed Treatment


1, FA (15m)


G Codes Necessary:  LINDSAY Blanco PTA Sep 6, 2018 15:01
PT Daily Note-Current


Subjective


Pt laying supine in bed and is difficult to keep awake.  Pt agrees to Supine Ex 

in bed.





Pain





   Location:  No Pain Reported





Mental Status


Patient Orientation:  Person, Place, Situation


Attachments:  Other-See Comments (L knee brace)





Transfers


              Functional [place] Measure


0=Not Assessed/NA   4=Minimal Assistance


1=Total Assistance   5=Supervision or Setup


2=Maximal Assistance   6=Modified [place]


3=Moderate Assistance   7=Complete IndependenceIRFPAI Quality Coding Scale











6 Independent with activity with or without an assistive device


 


5  Patient requires set up or clean up by helper.  Patient completes activity  

by  themselves


 


4 Supervision or touching assist (CGA). [place] provide cues , steadying assist


 


3 The helper provides less than half the effort to complete the activity


 


2 The helper provides more than half the effort to complete the activity


 


1 Dependent.  The helper does all the effort to complete an activity 


 


7 Patient refused to complete or attempt activity


 


9 The patient did not perform the activity before the current illness or injury


 


88 Not attempted due to Medical conditions or safety concerns











Weight Bearing


Right Lower Extremity:  Right


Full Weight Bearing


Left Lower Extremity:  Left


Touch Toe Bearing





Exercises


Supine Ex:  Ankle pumps, Quad Set, Rolling, Hip abd/add


Supine Reps:  15





Treatments


Pt completes Supine Ex in bed although is difficult to keep awake.  PTA has to 

wake pt several times to continue with Ex .  Pt resting R sidelying at end of 

tx with all needs met.





Assessment


Current Status:  Fair Progress


Pt is very drowsy and difficult to keep awake.





PT Short Term Goals


Short Term Goals


Time Frame:  Sep 4, 2018


Transfers (B,C,W/C) (FIM):  4 (CGA)


Gait (FIM):  1


Gait Distance Comment:  25'


Gait Level of Assist:  4


Gait Assistive Device:  FWW


Wheelchair Distance:  100'





PT Long Term Goals


Long Term Goals


PT Long Term Goals Time Frame:  Sep 18, 2018


Transfers (B,C,W/C) (FIM):  5


Sit to Lying (QC):  4


Lying-Sitting on Side/Bed(QC):  4


Sit to Stand (QC):  4


Rollin


Roll Left to Right (QC):  4


Chair/Bed-to-Chair Xfer(QC):  4


Car Transfer (QC):  4


Gait (FIM):  2


Distance:  50'


Walk 10 feet (QC):  4


Walk 10ft-Uneven Surface(QC):  4


Walk 50ft with 2 Turns (QC):  4


Gait Level of Assist:  5


Gait Assistive Device:  FWW


Stairs (FIM):  1


# of Steps:  1


1 Step (curb) (QC):  4


Stairs Level Of Assist:  4





PT Plan


Problem List


Problem List:  Activity Tolerance, Functional Strength, Safety, Balance, Gait, 

Transfer





Treatment/Plan


Treatment Plan:  Continue Plan of Care


Treatment Plan:  Bed Mobility, Concurrent Therapy, Education, Functional 

Activity Hema, Functional Strength, Group Therapy, Gait, Safety, Therapeutic 

Exercise, Transfers


Treatment Duration:  Sep 18, 2018


Frequency:  At least 5 of 7 days/Wk (IRF)


Estimated Hrs Per Day:  1.5 hours per day


Patient and/or Family Agrees t:  Yes





Time/GCodes


Time In:  1330


Time Out:  1400


Total Billed Treatment Time:  30


Total Billed Treatment


1, EX (20m) & FA (10m)


G Codes Necessary:  LINDSAY Blanco PTA Sep 4, 2018 14:03
PT Daily Note-Current


Subjective


Pt reports that he is feeling good, and is ready to get up.





Mental Status


Patient Orientation:  Person, Place, Time, Situation





Transfers


              Functional [place] Measure


0=Not Assessed/NA   4=Minimal Assistance


1=Total Assistance   5=Supervision or Setup


2=Maximal Assistance   6=Modified [place]


3=Moderate Assistance   7=Complete IndependenceIRFPAI Quality Coding Scale











6 Independent with activity with or without an assistive device


 


5  Patient requires set up or clean up by helper.  Patient completes activity  

by  themselves


 


4 Supervision or touching assist (CGA). [place] provide cues , steadying assist


 


3 The helper provides less than half the effort to complete the activity


 


2 The helper provides more than half the effort to complete the activity


 


1 Dependent.  The helper does all the effort to complete an activity 


 


7 Patient refused to complete or attempt activity


 


9 The patient did not perform the activity before the current illness or injury


 


88 Not attempted due to Medical conditions or safety concerns











Weight Bearing


Right Lower Extremity:  Right


Full Weight Bearing


Left Lower Extremity:  Left


Touch Toe Bearing





Gait Training


Does the Patient Walk?:  Yes


Gait (FIM):  2


Distance:  110ft


Gait Level of Assist:  2


Gait Persons Needed:  1


Gait Assistive Device:  FWW





Exercises


Supine Ex:  LE Protocol


Supine Reps:  15





Assessment


Good adherence to TTWB protocol during gait.





PT Short Term Goals


Short Term Goals


Time Frame:  Sep 4, 2018


Transfers (B,C,W/C) (FIM):  4 (CGA)


Gait (FIM):  1


Gait Distance Comment:  25'


Gait Level of Assist:  4


Gait Assistive Device:  FWW


Wheelchair Distance:  100'x2





PT Long Term Goals


Long Term Goals


PT Long Term Goals Time Frame:  Sep 18, 2018


Transfers (B,C,W/C) (FIM):  5


Sit to Lying (QC):  4


Lying-Sitting on Side/Bed(QC):  4


Sit to Stand (QC):  4


Rollin


Roll Left to Right (QC):  4


Chair/Bed-to-Chair Xfer(QC):  4


Car Transfer (QC):  4


Gait (FIM):  2


Distance:  50'


Walk 10 feet (QC):  4


Walk 10ft-Uneven Surface(QC):  4


Walk 50ft with 2 Turns (QC):  4


Gait Level of Assist:  5


Gait Assistive Device:  FWW


Stairs (FIM):  1


# of Steps:  1


1 Step (curb) (QC):  4


Stairs Level Of Assist:  4





PT Plan


Treatment/Plan


Treatment Plan:  Continue Plan of Care


Treatment Plan:  Bed Mobility, Concurrent Therapy, Education, Functional 

Activity Hema, Functional Strength, Group Therapy, Gait, Safety, Therapeutic 

Exercise, Transfers


Treatment Duration:  Sep 18, 2018


Frequency:  At least 5 of 7 days/Wk (IRF)


Estimated Hrs Per Day:  1.5 hours per day


Patient and/or Family Agrees t:  Yes





Time/GCodes


Time In:  0815


Time Out:  0845


Total Billed Treatment Time:  30


Total Billed Treatment


1, gt 15, ex 15











SON GARCIA PT Sep 1, 2018 11:07
PT Daily Note-Current


Subjective


Pt sitting in [facility] in [facility] visiting w/Dr Weber upon arrival.  Pt 

agrees to PT.





Pain





   Location:  No Pain Reported





Mental Status


Patient Orientation:  Person, Place, Situation


Attachments:  Other-See Comments (L knee brace)





Transfers


              Functional [place] Measure


0=Not Assessed/NA   4=Minimal Assistance


1=Total Assistance   5=Supervision or Setup


2=Maximal Assistance   6=Modified [place]


3=Moderate Assistance   7=Complete IndependenceIRFPAI Quality Coding Scale











6 Independent with activity with or without an assistive device


 


5  Patient requires set up or clean up by helper.  Patient completes activity  

by  themselves


 


4 Supervision or touching assist ([facility]). [place] provide cues , steadying assist


 


3 The helper provides less than half the effort to complete the activity


 


2 The helper provides more than half the effort to complete the activity


 


1 Dependent.  The helper does all the effort to complete an activity 


 


7 Patient refused to complete or attempt activity


 


9 The patient did not perform the activity before the current illness or injury


 


88 Not attempted due to Medical conditions or safety concerns








Scootin


Sit to/from Stand:  4


Sit to Stand (QC):  4


Pt can physically complete transfers at [facility] although for safety pt transfers at 

[facility].  Pt does not always adhere to WB status or safety and will plop to sit.





Weight Bearing


Right Lower Extremity:  Right


Full Weight Bearing


Left Lower Extremity:  Left


Touch Toe Bearing





Gait Training


Does the Patient Walk?:  Yes


Distance (FIM):  1=168-72 ft


Distance:  75'


Walk 10 feet (QC):  4


Walk 50 ft with 2 Turns(QC):  4


Gait Level of Assist:  4


Gait Persons Needed:  1


Gait Assistive Device:  FWW


Pt does not adhere to WB status well.  Pt needs VC/reminders to remain TTWB.  Pt

's gait is slow and fatigues quickly.





Wheelchair Training


Does the Pt Use a Wheelchair?:  Yes


Wheelchair Distance:  8=756-03 ft


Distance:  100'


Wheelchair Level of Assist:  5


Wheel 50 ft with 2 turns (QC):  4


Type of Wheelchair:  Manual





Exercises


Seated Therapy Exercises:  Ankle pumps, Long arc quads, Hip flexion, Kicking 

activity


Seated Reps:  20


NuStep Minutes:  12


 NuStep Workload:  4





Treatments


Pt transfers from seated surface at [facility].  Pt ambulates short distance but has 

difficulty keeping WB status, fatigues easily.  Pt uses NuStep for 12m at [facility] 4 

and completes Seated Ex before returning to room.  Pt reports SOA but was not 

wearing O2 upon arrival.  Pt practices transfers to bed to rest.  Pt has all 

needs met at end of tx.





Assessment


Current Status:  Fair Progress


Pt has difficulty with safety (keeping WB status and flopping while sitting).  

PTA gives VC/reminders although pt does not always adhere.





PT Short Term Goals


Short Term Goals


Time Frame:  Sep 4, 2018


Transfers (B,C,W/C) (FIM):  4 (CGA)


Gait (FIM):  1


Gait Distance Comment:  25'


Gait Level of Assist:  4


Gait Assistive Device:  FWW


Wheelchair Distance:  100'x2





PT Long Term Goals


Long Term Goals


PT Long Term Goals Time Frame:  Sep 18, 2018


Transfers (B,C,W/C) (FIM):  5


Sit to Lying (QC):  4


Lying-Sitting on Side/Bed(QC):  4


Sit to Stand (QC):  4


Rollin


Roll Left to Right (QC):  4


Chair/Bed-to-Chair Xfer(QC):  4


Car Transfer (QC):  4


Gait (FIM):  2


Distance:  50'


Walk 10 feet (QC):  4


Walk 10ft-Uneven Surface(QC):  4


Walk 50ft with 2 Turns (QC):  4


Gait Level of Assist:  5


Gait Assistive Device:  FWW


Stairs (FIM):  1


# of Steps:  1


1 Step (curb) (QC):  4


Stairs Level Of Assist:  4





PT Plan


Problem List


Problem List:  Activity Tolerance, Functional Strength, Safety, Balance, Gait, 

Transfer





Treatment/Plan


Treatment Plan:  Continue Plan of Care


Treatment Plan:  Bed Mobility, Concurrent Therapy, Education, Functional 

Activity Hema, Functional Strength, Group Therapy, Gait, Safety, Therapeutic 

Exercise, Transfers


Treatment Duration:  Sep 18, 2018


Frequency:  At least 5 of 7 days/Wk (IRF)


Estimated Hrs Per Day:  1.5 hours per day


Patient and/or Family Agrees t:  Yes





Safety Risks/Education


Patient Education:  Gait Training, Transfer Techniques, Correct Positioning, W/

C Management, Safety Issues


Teaching Recipient:  Patient


Teaching Methods:  Discussion


Response to Teaching:  Reinforcement Needed





Time/GCodes


Time In:  915


Time Out:  1000


Total Billed Treatment Time:  60


Total Billed Treatment


1, EX x2 (30m) & GT (15m), FA (15m)





Time: 915-1000 & [id number]


G Codes Necessary:  LINDSAY Blanco PTA Sep 4, 2018 12:10
PT Daily Note-Current


Subjective


Pt sitting in recliner upon arrival.  Pt agrees to PT but really wants to 

shower.  PTA explains that OT will shower when they are scheduled later this 

morning.





Pain





   Location:  No Pain Reported





Mental Status


Patient Orientation:  Person, Place, Time, Situation


Attachments:  Other-See Comments (L knee brace)





Transfers


              Functional [place] Measure


0=Not Assessed/NA   4=Minimal Assistance


1=Total Assistance   5=Supervision or Setup


2=Maximal Assistance   6=Modified [place]


3=Moderate Assistance   7=Complete IndependenceIRFPAI Quality Coding Scale











6 Independent with activity with or without an assistive device


 


5  Patient requires set up or clean up by helper.  Patient completes activity  

by  themselves


 


4 Supervision or touching assist (CGA). [place] provide cues , steadying assist


 


3 The helper provides less than half the effort to complete the activity


 


2 The helper provides more than half the effort to complete the activity


 


1 Dependent.  The helper does all the effort to complete an activity 


 


7 Patient refused to complete or attempt activity


 


9 The patient did not perform the activity before the current illness or injury


 


88 Not attempted due to Medical conditions or safety concerns








Scootin


Sit to/from Stand:  5


Sit to Stand (QC):  5





Weight Bearing


Right Lower Extremity:  Right


Full Weight Bearing


Left Lower Extremity:  Left


Weight Bearing/Tolerated





Gait Training


Does the Patient Walk?:  Yes


Distance (FIM):  3=150 ft


Distance:  225'


Walk 10 feet (QC):  5


Walk 50 ft with 2 Turns(QC):  5


Walk 150 ft (QC):  5


Gait Level of Assist:  5


Gait Persons Needed:  1


Gait Assistive Device:  FWW


Pt is walking better since WB status changed late last week.  PTA gives VC to 

 B feet instead of shuffling.





Wheelchair Training


Does the Pt Use a Wheelchair?:  No





Exercises


Seated Therapy Exercises:  Ankle pumps, Long arc quads, Hip flexion, Kicking 

activity


Seated Reps:  20


NuStep Minutes:  10


 NuStep Workload:  4





Treatments


Pt transfers from recliner at [facility] using FWW then ambulates in hallway using FWW 

at [facility] to [facility].  Pt uses NuStep for 10m at [facility] 4 followed by Seated Ex 

before returning to room.  Pt resting in recliner at end of tx with all needs 

met.





Assessment


Current Status:  Good Progress


Pt is improving with change in WB status last week.  Pt is able to complete 

more independently.





PT Short Term Goals


Short Term Goals


Time Frame:  Sep 4, 2018


Transfers (B,C,W/C) (FIM):  4 (CGA)


Gait (FIM):  1


Gait Distance Comment:  25'


Gait Level of Assist:  4


Gait Assistive Device:  FWW


Wheelchair Distance:  150'





PT Long Term Goals


Long Term Goals


PT Long Term Goals Time Frame:  Sep 18, 2018


Transfers (B,C,W/C) (FIM):  5


Sit to Lying (QC):  4


Lying-Sitting on Side/Bed(QC):  4


Sit to Stand (QC):  4


Rollin


Roll Left to Right (QC):  4


Chair/Bed-to-Chair Xfer(QC):  4


Car Transfer (QC):  4


Gait (FIM):  2


Distance:  50'


Walk 10 feet (QC):  4


Walk 10ft-Uneven Surface(QC):  4


Walk 50ft with 2 Turns (QC):  4


Gait Level of Assist:  5


Gait Assistive Device:  FWW


Stairs (FIM):  1


# of Steps:  1


1 Step (curb) (QC):  4


Stairs Level Of Assist:  4





PT Plan


Problem List


Problem List:  Activity Tolerance, Functional Strength, Safety, Gait





Treatment/Plan


Treatment Plan:  Continue Plan of Care


Treatment Plan:  Bed Mobility, Concurrent Therapy, Education, Functional 

Activity Hema, Functional Strength, Group Therapy, Gait, Safety, Therapeutic 

Exercise, Transfers


Treatment Duration:  Sep 18, 2018


Frequency:  At least 5 of 7 days/Wk (IRF)


Estimated Hrs Per Day:  1.5 hours per day


Patient and/or Family Agrees t:  Yes





Safety Risks/Education


Patient Education:  Gait Training, Transfer Techniques, Correct Positioning, 

Safety Issues


Teaching Recipient:  Patient


Teaching Methods:  Discussion


Response to Teaching:  Verbalize Understanding





Time/GCodes


Time In:  900


Time Out:  945


Total Billed Treatment Time:  45


Total Billed Treatment


1, GT (15m) & EX x2 (30m)


G Codes Necessary:  LINDSAY Blanco PTA Sep 11, 2018 09:55
PT Daily Note-Current


Subjective


Pt sitting in recliner upon arrival.  Pt agrees to PT.





Pain





   Location:  No Pain Reported





Mental Status


Patient Orientation:  Person, Place, Situation


Attachments:  Other-See Comments (L knee brace)





Transfers


              Functional [place] Measure


0=Not Assessed/NA   4=Minimal Assistance


1=Total Assistance   5=Supervision or Setup


2=Maximal Assistance   6=Modified [place]


3=Moderate Assistance   7=Complete IndependenceIRFPAI Quality Coding Scale











6 Independent with activity with or without an assistive device


 


5  Patient requires set up or clean up by helper.  Patient completes activity  

by  themselves


 


4 Supervision or touching assist (CGA). [place] provide cues , steadying assist


 


3 The helper provides less than half the effort to complete the activity


 


2 The helper provides more than half the effort to complete the activity


 


1 Dependent.  The helper does all the effort to complete an activity 


 


7 Patient refused to complete or attempt activity


 


9 The patient did not perform the activity before the current illness or injury


 


88 Not attempted due to Medical conditions or safety concerns








Scootin


Supine to/from Sit:  4


Sit to/from Stand:  5


Sit to Lying (QC):  4


Sit to Stand (QC):  5


Pt needs VC to remind not to flop into bed.  Pt is unsafe at times and needs 

reminders.





Weight Bearing


Right Lower Extremity:  Right


Full Weight Bearing


Left Lower Extremity:  Left


Touch Toe Bearing





Gait Training


Does the Patient Walk?:  Yes


Distance (FIM):  2=046-55 ft


Distance:  110'


Walk 10 feet (QC):  5


Walk 50 ft with 2 Turns(QC):  5


Gait Level of Assist:  5


Gait Persons Needed:  1


Gait Assistive Device:  FWW


Pt needs VC to remind of WB status.  Pt has slow, hopping gait at times.





Exercises


Seated Therapy Exercises:  Ankle pumps, Long arc quads, Hip flexion, Kicking 

activity


Seated Reps:  15


NuStep Minutes:  10


 NuStep Workload:  5





Treatments


Pt transfers from recliner to standing using FWW at [facility].  Pt ambulates in 

hallway to [facility] using FWW at [facility].  Pt uses NuStep and completes Seated 

Ex in chair.  Pt returns to room to rest.  Pt transfers to EOB but flops and 

PTA gives VC on safety and proper transfers.  Pt given education on transfers, 

safety and need for Ex for activity tolerance and strengthening.  Pt resting in 

bed at end of tx with all needs met.  PTA reminds pt to order lunch shortly so 

can be finished in time for Group.  Pt states not hungry right now.





Assessment


Current Status:  Fair Progress


PTA has to keep pt on task because pt likes to visit.  Pt needs reminders about 

safety especially with transfers.





PT Short Term Goals


Short Term Goals


Time Frame:  Sep 4, 2018


Transfers (B,C,W/C) (FIM):  4 (CGA)


Gait (FIM):  1


Gait Distance Comment:  25'


Gait Level of Assist:  4


Gait Assistive Device:  FWW


Wheelchair Distance:  100'x2





PT Long Term Goals


Long Term Goals


PT Long Term Goals Time Frame:  Sep 18, 2018


Transfers (B,C,W/C) (FIM):  5


Sit to Lying (QC):  4


Lying-Sitting on Side/Bed(QC):  4


Sit to Stand (QC):  4


Rollin


Roll Left to Right (QC):  4


Chair/Bed-to-Chair Xfer(QC):  4


Car Transfer (QC):  4


Gait (FIM):  2


Distance:  50'


Walk 10 feet (QC):  4


Walk 10ft-Uneven Surface(QC):  4


Walk 50ft with 2 Turns (QC):  4


Gait Level of Assist:  5


Gait Assistive Device:  FWW


Stairs (FIM):  1


# of Steps:  1


1 Step (curb) (QC):  4


Stairs Level Of Assist:  4





PT Plan


Problem List


Problem List:  Activity Tolerance, Functional Strength, Safety, Gait





Treatment/Plan


Treatment Plan:  Continue Plan of Care


Treatment Plan:  Bed Mobility, Concurrent Therapy, Education, Functional 

Activity Hema, Functional Strength, Group Therapy, Gait, Safety, Therapeutic 

Exercise, Transfers


Treatment Duration:  Sep 18, 2018


Frequency:  At least 5 of 7 days/Wk (IRF)


Estimated Hrs Per Day:  1.5 hours per day


Patient and/or Family Agrees t:  Yes





Safety Risks/Education


Patient Education:  Gait Training, Transfer Techniques, Correct Positioning, 

Safety Issues


Teaching Recipient:  Patient


Teaching Methods:  Discussion


Response to Teaching:  Verbalize Understanding





Time/GCodes


Time In:  1020


Time Out:  1120


Total Billed Treatment Time:  60


Total Billed Treatment


1, GT (15m), FA (15m) & EX x2 (30m)


G Codes Necessary:  LINDSAY Blanco PTA Sep 3, 2018 11:22
PT Daily Note-Current


Subjective


Pt sitting in recliner upon arrival.  Pt agrees to PT.  Pt wants to discharge 

tomorrow, PTA advised that will send info. to [facility] this afternoon.





Pain





   Location:  No Pain Reported





Mental Status


Patient Orientation:  Person, Place, Time, Situation


Attachments:  Other-See Comments (Knee brace for LLE)





Transfers


              Functional [place] Measure


0=Not Assessed/NA   4=Minimal Assistance


1=Total Assistance   5=Supervision or Setup


2=Maximal Assistance   6=Modified [place]


3=Moderate Assistance   7=Complete IndependenceIRFPAI Quality Coding Scale











6 Independent with activity with or without an assistive device


 


5  Patient requires set up or clean up by helper.  Patient completes activity  

by  themselves


 


4 Supervision or touching assist (CGA). [place] provide cues , steadying assist


 


3 The helper provides less than half the effort to complete the activity


 


2 The helper provides more than half the effort to complete the activity


 


1 Dependent.  The helper does all the effort to complete an activity 


 


7 Patient refused to complete or attempt activity


 


9 The patient did not perform the activity before the current illness or injury


 


88 Not attempted due to Medical conditions or safety concerns








Scootin


Sit to/from Stand:  5


Sit to Stand (QC):  5





Weight Bearing


Right Lower Extremity:  Right


Full Weight Bearing


Left Lower Extremity:  Left


Weight Bearing/Tolerated





Gait Training


Does the Patient Walk?:  Yes


Distance (FIM):  3=150 ft


Distance:  150'


Walk 10 feet (QC):  5


Walk 50 ft with 2 Turns(QC):  5


Walk 150 ft (QC):  5


Gait Level of Assist:  5


Gait Persons Needed:  1


Gait Assistive Device:  FWW


PTA encourages pt to walk w/in FWW instead of so far out in front of himself.  

Pt will change for short time then right back to previous behavior.





Wheelchair Training


Does the Pt Use a Wheelchair?:  No





Stair Training


 Stair Training: Handrails/:  2 handrails


#of Steps:  8


1 Step (curb) (QC):  5


4 Steps (QC):  5


Stairs:  Pattern:  Step to


Level of Assist:  5





Exercises


Seated Therapy Exercises:  Ankle pumps, Long arc quads, Hip flexion, Kicking 

activity


Seated Reps:  20


NuStep Minutes:  15


 NuStep Workload:  4





Treatments


Pt transfers using FWW at [facility] then ambulates in hallway as well as to Therapy 

Gym using FWW at [facility] with VC.  Pt uses NuStep for 15m at [facility] 4 followed by 

Seated EX in chair.  Pt returns to room to rest in recliner at end of tx with 

all needs met.





Assessment


Current Status:  Good Progress


Pt continues to gain independence with transfers and mobility although VC given 

but not adhered to for safety.





PT Short Term Goals


Short Term Goals


Time Frame:  Sep 4, 2018


Transfers (B,C,W/C) (FIM):  4 (CGA)


Gait (FIM):  1


Gait Distance Comment:  25'


Gait Level of Assist:  4


Gait Assistive Device:  FWW


Wheelchair Distance:  150'





PT Long Term Goals


Long Term Goals


PT Long Term Goals Time Frame:  Sep 18, 2018


Transfers (B,C,W/C) (FIM):  5


Sit to Lying (QC):  4


Lying-Sitting on Side/Bed(QC):  4


Sit to Stand (QC):  4


Rollin


Roll Left to Right (QC):  4


Chair/Bed-to-Chair Xfer(QC):  4


Car Transfer (QC):  4


Gait (FIM):  2


Distance:  50'


Walk 10 feet (QC):  4


Walk 10ft-Uneven Surface(QC):  4


Walk 50ft with 2 Turns (QC):  4


Gait Level of Assist:  5


Gait Assistive Device:  FWW


Stairs (FIM):  1


# of Steps:  1


1 Step (curb) (QC):  4


Stairs Level Of Assist:  4





PT Plan


Problem List


Problem List:  Activity Tolerance, Functional Strength, Safety, Gait





Treatment/Plan


Treatment Plan:  Continue Plan of Care


Treatment Plan:  Bed Mobility, Concurrent Therapy, Education, Functional 

Activity Hema, Functional Strength, Group Therapy, Gait, Safety, Therapeutic 

Exercise, Transfers


Treatment Duration:  Sep 18, 2018


Frequency:  At least 5 of 7 days/Wk (IRF)


Estimated Hrs Per Day:  1.5 hours per day


Patient and/or Family Agrees t:  Yes





Safety Risks/Education


Patient Education:  Gait Training, Transfer Techniques, Correct Positioning, 

Safety Issues


Teaching Recipient:  Patient


Teaching Methods:  Discussion


Response to Teaching:  Reinforcement Needed





Time/GCodes


Time In:  900


Time Out:  945


Total Billed Treatment Time:  45


Total Billed Treatment


1, GT (15m) & EX x2 (30m)


G Codes Necessary:  LINDSAY Blanco PTA Sep 12, 2018 09:53
PT Daily Note-Current


Subjective


Pt. agrees to Rx.  Watching TV, perturbed with politics





Pain





   Numeric Pain Scale:  0-No Pain





Mental Status


Patient Orientation:  Normal For Age


Attachments:  Oxygen (2L)





Transfers


              Functional [place] Measure


0=Not Assessed/NA   4=Minimal Assistance


1=Total Assistance   5=Supervision or Setup


2=Maximal Assistance   6=Modified [place]


3=Moderate Assistance   7=Complete IndependenceIRFPAI Quality Coding Scale











6 Independent with activity with or without an assistive device


 


5  Patient requires set up or clean up by helper.  Patient completes activity  

by  themselves


 


4 Supervision or touching assist (CGA). [place] provide cues , steadying assist


 


3 The helper provides less than half the effort to complete the activity


 


2 The helper provides more than half the effort to complete the activity


 


1 Dependent.  The helper does all the effort to complete an activity 


 


7 Patient refused to complete or attempt activity


 


9 The patient did not perform the activity before the current illness or injury


 


88 Not attempted due to Medical conditions or safety concerns








rolling left and right indep with instructions





Weight Bearing


Right Lower Extremity:  Right


Full Weight Bearing


Left Lower Extremity:  Left


Touch Toe Bearing





Exercises


Supine Ex:  Bridging, Ankle pumps, Quad Set, Rolling, Glut sets, Heel Slides, 

Short Arc Quads, Scooting (up in bed x 3), Straight leg raise, Hip abd/add


Supine Reps:  20





Assessment


Current Status:  Good Progress


fatigued





PT Short Term Goals


Short Term Goals


Time Frame:  Sep 4, 2018


Transfers (B,C,W/C) (FIM):  4 (CGA)


Gait (FIM):  1


Gait Distance Comment:  25'


Gait Level of Assist:  4


Gait Assistive Device:  FWW


Wheelchair Distance:  100'





PT Long Term Goals


Long Term Goals


PT Long Term Goals Time Frame:  Sep 18, 2018


Transfers (B,C,W/C) (FIM):  5


Sit to Lying (QC):  4


Lying-Sitting on Side/Bed(QC):  4


Sit to Stand (QC):  4


Rollin


Roll Left to Right (QC):  4


Chair/Bed-to-Chair Xfer(QC):  4


Car Transfer (QC):  4


Gait (FIM):  2


Distance:  50'


Walk 10 feet (QC):  4


Walk 10ft-Uneven Surface(QC):  4


Walk 50ft with 2 Turns (QC):  4


Gait Level of Assist:  5


Gait Assistive Device:  FWW


Stairs (FIM):  1


# of Steps:  1


1 Step (curb) (QC):  4


Stairs Level Of Assist:  4





PT Plan


Treatment/Plan


Treatment Plan:  Continue Plan of Care


Treatment Plan:  Bed Mobility, Concurrent Therapy, Education, Functional 

Activity Hema, Functional Strength, Group Therapy, Gait, Safety, Therapeutic 

Exercise, Transfers


Treatment Duration:  Sep 18, 2018


Frequency:  At least 5 of 7 days/Wk (IRF)


Estimated Hrs Per Day:  1.5 hours per day


Patient and/or Family Agrees t:  Yes





Safety Risks/Education


exercise instructions





Time/GCodes


Time In:  1445


Time Out:  1500


Total Billed Treatment Time:  15


Total Billed Treatment


1,EX15m


G Codes Necessary:  YA Palma PTA Sep 5, 2018 15:09
PT Daily Note-Current


Subjective


Pt. agrees to Rx. States he has been told he cannot go home until he has his 

front rail built bilaterally, Pt, currently has one rail on his right for 

ascending stairs.  States he has not been able to find someone to erect the 

rail for him





Pain





   Numeric Pain Scale:  0-No Pain





Mental Status


Patient Orientation:  Person, Place, Time, Situation


Attachments:  Other-See Comments (knee immoblizer left)





Transfers


              Functional [place] Measure


0=Not Assessed/NA   4=Minimal Assistance


1=Total Assistance   5=Supervision or Setup


2=Maximal Assistance   6=Modified [place]


3=Moderate Assistance   7=Complete IndependenceIRFPAI Quality Coding Scale











6 Independent with activity with or without an assistive device


 


5  Patient requires set up or clean up by helper.  Patient completes activity  

by  themselves


 


4 Supervision or touching assist (CGA). [place] provide cues , steadying assist


 


3 The helper provides less than half the effort to complete the activity


 


2 The helper provides more than half the effort to complete the activity


 


1 Dependent.  The helper does all the effort to complete an activity 


 


7 Patient refused to complete or attempt activity


 


9 The patient did not perform the activity before the current illness or injury


 


88 Not attempted due to Medical conditions or safety concerns








Transfers (B, C, W/C) (FIM):  6


Scootin


Rollin


Supine to/from Sit:  6


Sit to/from Stand:  6


Bed to/from Chair:  6


car TRF challenging with knee immoblizer and knee fully extended but with 

instruction pt. was able to accomplish this





Weight Bearing


Right Lower Extremity:  Right


Full Weight Bearing


Left Lower Extremity:  Left


Weight Bearing/Tolerated





Gait Training


Does the Patient Walk?:  Yes


Gait (FIM):  5


Distance (FIM):  3=150 ft (200x3)


Gait Level of Assist:  5


Gait Persons Needed:  1


Gait Assistive Device:  FWW


near up ad linda





Stair Training


 Stair Training: Handrails/:  1 handrail


Stairs (FIM):  5


#of Steps:  8


Stairs:  Pattern:  Step to


Level of Assist:  5


with repeated instruction and explanation pt did stairs with good sequence and 

use of only one rail simulating home situation.  Pt. would profit from family 

who is educated in the stair sequence to accompany pt. during steps





Exercises


Supine Ex:  Ankle pumps, Quad Set, Rolling, Glut sets, Heel Slides (right), 

Short Arc Quads (right), Scooting, Straight leg raise, Hip abd/add


Supine Reps:  15


Seated Therapy Exercises:  Long arc quads, Hip flexion


Seated Reps:  10





Assessment


Current Status:  Good Progress


much more functional now with TTWB, improved balance etc





PT Short Term Goals


Short Term Goals


Time Frame:  Sep 4, 2018


Transfers (B,C,W/C) (FIM):  4 (CGA)


Gait (FIM):  1


Gait Distance Comment:  25'


Gait Level of Assist:  4


Gait Assistive Device:  FWW


Wheelchair Distance:  150'





PT Long Term Goals


Long Term Goals


PT Long Term Goals Time Frame:  Sep 18, 2018


Transfers (B,C,W/C) (FIM):  5


Sit to Lying (QC):  4


Lying-Sitting on Side/Bed(QC):  4


Sit to Stand (QC):  4


Rollin


Roll Left to Right (QC):  4


Chair/Bed-to-Chair Xfer(QC):  4


Car Transfer (QC):  4


Gait (FIM):  2


Distance:  50'


Walk 10 feet (QC):  4


Walk 10ft-Uneven Surface(QC):  4


Walk 50ft with 2 Turns (QC):  4


Gait Level of Assist:  5


Gait Assistive Device:  FWW


Stairs (FIM):  1


# of Steps:  1


1 Step (curb) (QC):  4


Stairs Level Of Assist:  4





PT Plan


Treatment/Plan


Treatment Plan:  Continue Plan of Care


Treatment Plan:  Bed Mobility, Concurrent Therapy, Education, Functional 

Activity Hema, Functional Strength, Group Therapy, Gait, Safety, Therapeutic 

Exercise, Transfers


Treatment Duration:  Sep 18, 2018


Frequency:  At least 5 of 7 days/Wk (IRF)


Estimated Hrs Per Day:  1.5 hours per day


Patient and/or Family Agrees t:  Yes





Safety Risks/Education


Patient Education:  Gait Training, Transfer Techniques, Steps, Correct 

Positioning, Disease Process, Safety Issues


Teaching Recipient:  Patient


Teaching Methods:  Demonstration, Discussion


Response to Teaching:  Verbalize Understanding, Reinforcement Needed





Time/GCodes


Time In:  755


Time Out:  855


Total Billed Treatment Time:  60


Total Billed Treatment


1,FA25m,GT20m,EX15m


G Codes Necessary:  YA Palma PTA Sep 7, 2018 08:52
PT Daily Note-Current


Subjective


Pt. stats he is tired this afternoon. AT one point after 200 ft gait ptl 

commented on mild SOB, on room air O2 sats 93%





Pain





   Numeric Pain Scale:  0-No Pain





Mental Status


Patient Orientation:  Normal For Age


Attachments:  Other-See Comments (knee immoblizer LLE)





Transfers


              Functional [place] Measure


0=Not Assessed/NA   4=Minimal Assistance


1=Total Assistance   5=Supervision or Setup


2=Maximal Assistance   6=Modified [place]


3=Moderate Assistance   7=Complete IndependenceIRFPAI Quality Coding Scale











6 Independent with activity with or without an assistive device


 


5  Patient requires set up or clean up by helper.  Patient completes activity  

by  themselves


 


4 Supervision or touching assist (CGA). [place] provide cues , steadying assist


 


3 The helper provides less than half the effort to complete the activity


 


2 The helper provides more than half the effort to complete the activity


 


1 Dependent.  The helper does all the effort to complete an activity 


 


7 Patient refused to complete or attempt activity


 


9 The patient did not perform the activity before the current illness or injury


 


88 Not attempted due to Medical conditions or safety concerns








bed and chair mod I, car TRF SBA and instruction





Weight Bearing


Right Lower Extremity:  Right


Full Weight Bearing


Left Lower Extremity:  Left


Weight Bearing/Tolerated





Gait Training


Gait Assistive Device:  FWW


200ft, 150x3, no LOB, immoblizer slipped low on leg, readjusted and affixed but 

tends to slip





Exercises


Supine Ex:  Straight leg raise, Hip abd/add


Supine Reps:  12





Treatments


gait, car TRF, safety education





Assessment


Current Status:  Good Progress


good progress





PT Short Term Goals


Short Term Goals


Time Frame:  Sep 4, 2018


Transfers (B,C,W/C) (FIM):  4 (CGA)


Gait (FIM):  1


Gait Distance Comment:  25'


Gait Level of Assist:  4


Gait Assistive Device:  FWW


Wheelchair Distance:  150'





PT Long Term Goals


Long Term Goals


PT Long Term Goals Time Frame:  Sep 18, 2018


Transfers (B,C,W/C) (FIM):  5


Sit to Lying (QC):  4


Lying-Sitting on Side/Bed(QC):  4


Sit to Stand (QC):  4


Rollin


Roll Left to Right (QC):  4


Chair/Bed-to-Chair Xfer(QC):  4


Car Transfer (QC):  4


Gait (FIM):  2


Distance:  50'


Walk 10 feet (QC):  4


Walk 10ft-Uneven Surface(QC):  4


Walk 50ft with 2 Turns (QC):  4


Gait Level of Assist:  5


Gait Assistive Device:  FWW


Stairs (FIM):  1


# of Steps:  1


1 Step (curb) (QC):  4


Stairs Level Of Assist:  4





PT Plan


Treatment/Plan


Treatment Plan:  Continue Plan of Care


Treatment Plan:  Bed Mobility, Concurrent Therapy, Education, Functional 

Activity Hema, Functional Strength, Group Therapy, Gait, Safety, Therapeutic 

Exercise, Transfers


Treatment Duration:  Sep 18, 2018


Frequency:  At least 5 of 7 days/Wk (IRF)


Estimated Hrs Per Day:  1.5 hours per day


Patient and/or Family Agrees t:  Yes





Safety Risks/Education


Patient Education:  Gait Training, Transfer Techniques, Correct Positioning, 

Safety Issues


Teaching Recipient:  Patient


Teaching Methods:  Demonstration, Discussion


Response to Teaching:  Verbalize Understanding, Return Demonstration, 

Reinforcement Needed





Time/GCodes


Time In:  1330


Time Out:  1400


Total Billed Treatment Time:  30


Total Billed Treatment


1,GT15m,FA15m


G Codes Necessary:  YA Palma PTA Sep 10, 2018 14:07
PT Daily Note-Current


Subjective


pt. states he is feeling ready to be up adlib.  States his wife has several Dr 

appts this week. States his wife is blind, his dtr took off work today to take 

her to the Dr but didnt really want to as she had to take off work





Pain





   Numeric Pain Scale:  0-No Pain





Mental Status


Patient Orientation:  Normal For Age


Attachments:  Other-See Comments (knee immobl left)





Transfers


              Functional [place] Measure


0=Not Assessed/NA   4=Minimal Assistance


1=Total Assistance   5=Supervision or Setup


2=Maximal Assistance   6=Modified [place]


3=Moderate Assistance   7=Complete IndependenceIRFPAI Quality Coding Scale











6 Independent with activity with or without an assistive device


 


5  Patient requires set up or clean up by helper.  Patient completes activity  

by  themselves


 


4 Supervision or touching assist (CGA). [place] provide cues , steadying assist


 


3 The helper provides less than half the effort to complete the activity


 


2 The helper provides more than half the effort to complete the activity


 


1 Dependent.  The helper does all the effort to complete an activity 


 


7 Patient refused to complete or attempt activity


 


9 The patient did not perform the activity before the current illness or injury


 


88 Not attempted due to Medical conditions or safety concerns








Transfers (B, C, W/C) (FIM):  6


Scootin


Rollin


Supine to/from Sit:  6


Sit to/from Stand:  6


Bed to/from Chair:  6





Weight Bearing


Right Lower Extremity:  Right


Full Weight Bearing


Left Lower Extremity:  Left


Weight Bearing/Tolerated





Gait Training


Does the Patient Walk?:  Yes


Gait (FIM):  6


Distance (FIM):  3=150 ft (250x2,150)


Gait Level of Assist:  6


Gait Persons Needed:  0


Gait Assistive Device:  FWW





Stair Training


 Stair Training: Handrails/:  2 handrails


Stairs (FIM):  6


#of Steps:  12


Stairs:  Pattern:  Step to


Level of Assist:  6


pt. would need assist only to take FWW to top of steps, bottom of steps etc





Exercises


Supine Ex:  Quad Set, Rolling, Glut sets, Straight leg raise, Hip abd/add


Supine Reps:  15


NuStep Minutes:  12


 NuStep Workload:  3





Assessment


Current Status:  Good Progress


will progress to up ad linda soon





PT Short Term Goals


Short Term Goals


Time Frame:  Sep 4, 2018


Transfers (B,C,W/C) (FIM):  4 (CGA)


Gait (FIM):  1


Gait Distance Comment:  25'


Gait Level of Assist:  4


Gait Assistive Device:  FWW


Wheelchair Distance:  150'





PT Long Term Goals


Long Term Goals


PT Long Term Goals Time Frame:  Sep 18, 2018


Transfers (B,C,W/C) (FIM):  5


Sit to Lying (QC):  4


Lying-Sitting on Side/Bed(QC):  4


Sit to Stand (QC):  4


Rollin


Roll Left to Right (QC):  4


Chair/Bed-to-Chair Xfer(QC):  4


Car Transfer (QC):  4


Gait (FIM):  2


Distance:  50'


Walk 10 feet (QC):  4


Walk 10ft-Uneven Surface(QC):  4


Walk 50ft with 2 Turns (QC):  4


Gait Level of Assist:  5


Gait Assistive Device:  FWW


Stairs (FIM):  1


# of Steps:  1


1 Step (curb) (QC):  4


Stairs Level Of Assist:  4





PT Plan


Treatment/Plan


Treatment Plan:  Continue Plan of Care


Treatment Plan:  Bed Mobility, Concurrent Therapy, Education, Functional 

Activity Hema, Functional Strength, Group Therapy, Gait, Safety, Therapeutic 

Exercise, Transfers


Treatment Duration:  Sep 18, 2018


Frequency:  At least 5 of 7 days/Wk (IRF)


Estimated Hrs Per Day:  1.5 hours per day


Patient and/or Family Agrees t:  Yes





Safety Risks/Education


Patient Education:  Gait Training, Transfer Techniques, Steps, Correct 

Positioning, Disease Process, Safety Issues


Teaching Recipient:  Patient


Teaching Methods:  Demonstration, Discussion


Response to Teaching:  Verbalize Understanding, Return Demonstration, 

Reinforcement Needed





Time/GCodes


Time In:  1015


Time Out:  1100


Total Billed Treatment Time:  45


Total Billed Treatment


1,GT15m,EX20m,FA10m


G Codes Necessary:  YA Palma PTA Sep 10, 2018 11:12
Detail Level: Detailed
Show Applicator Variable?: Yes
Render Note In Bullet Format When Appropriate: No
Number Of Freeze-Thaw Cycles: 2 freeze-thaw cycles
Duration Of Freeze Thaw-Cycle (Seconds): 5
Consent: The patient's consent was obtained including but not limited to risks of crusting, scabbing, blistering, scarring, darker or lighter pigmentary change, recurrence, incomplete removal and infection.
Application Tool (Optional): Cry-AC
Post-Care Instructions: I reviewed with the patient in detail post-care instructions. Patient is to wear sunprotection, and avoid picking at any of the treated lesions. Pt may apply Vaseline to crusted or scabbing areas.

## 2024-01-10 NOTE — OCCUPATIONAL THER DAILY NOTE
Detail Level: Detailed
OT Current Status-Daily Note


Subjective


No pain reported.





Appearance


Pt. in bed.  Agrees to shower.





Mental Status/Objective


Patient Orientation:  Person, Place


              Functional [place] Measure


0=Not Assessed/NA   4=Minimal Assistance


1=Total Assistance   5=Supervision or Setup


2=Maximal Assistance   6=Modified [place]


3=Moderate Assistance   7=Complete [place]





ADL-Treatment


              Functional [place] Measure


0=Not Assessed/NA   4=Minimal Assistance


1=Total Assistance   5=Supervision or Setup


2=Maximal Assistance   6=Modified [place]


3=Moderate Assistance   7=Complete IndependenceIRFPAI Quality Coding Scale











6 Independent with activity with or without an assistive device


 


5  Patient requires set up or clean up by helper.  Patient completes activity  

by  themselves


 


4 Supervision or touching assist (CGA). [place] provide cues , steadying assist


 


3 The helper provides less than half the effort to complete the activity


 


2 The helper provides more than half the effort to complete the activity


 


1 Dependent.  The helper does all the effort to complete an activity 


 


7 Patient refused to complete or attempt activity


 


9 The patient did not perform the activity before the current illness or injury


 


88 Not attempted due to Medical conditions or safety concerns








Grooming (FIM):  5 (Pt. able to comb hair and shave while seated at sink.)


Bathing (FIM):  5 (SBA to shower self in large shower, seated.)


Shower/Bathe Self (QC):  4


Upper Body (FIM):  5


Upper Body Dressing (QC):  4


Lower Body Dressing (FIM):  3 (Pt. requires assist to thread left foot through 

underwear and pants.  Able to get right foot in.  CGA in stance to pull over 

hips.  Requires assist to don leg brace and slip on shoes. Pt. attempts to use 

dressing stick, but has difficulty with this.)


Lower Body Dressing (QC):  3


On/Off Footwear (QC):  3


Transfers (B, C, W/C) (FIM):  4 (CGA at times for balance in stance.)


Shower Transfer(FIM):  4





Other Treatment


After ADLs, pt. transferred to wheelchair and practiced self propulsion to 

therapy gym.  Completed 10 minutes on armbike to increase overall strength and 

endurance.  No SOA noted.  Tolerated fine motor task with 1 lb. wrist weights 

on to increase overall strength.  All needs met.  Pt. requested to stay up in 

wheelchair in dining area to wait for PT.





Education


OT Patient Education:  Correct positioning, Exercise program, Modified ADL 

techniques, Progress toward Goal/Update tx plan, Purpose of tx/functional 

activities, Reviewed precautions, Rehab process, Transfer techniques, Use of 

adapted equipment


Teaching Recipient:  Patient


Teaching Methods:  Demonstration


Response to Teaching:  Verbalize Understanding, Return Demonstration





OT Short Term Goals


Short Term Goals


Time Frame:  Sep 4, 2018


Eating(FIM):  5


Grooming(FIM):  5


Bathing(FIM):  4


Upper Body Dressing(FIM):  5


Lower Body Dressing(FIM):  3


Toileting(FIM):  4


Transfers (B,C,W/C) (FIM):  4 (CGA)


Toilet/Commode Transfer(FIM):  5


Shower Transfer(FIM):  4


Comprehension(FIM):  6


Expression(FIM):  7


Social Interaction(FIM):  7


Problem Solving(FIM):  5


Memory(FIM):  5


Additional Short Term Goals:  1-Demonstrate ADL Tasks, 2-Verbalize Understanding

, 3-ImproveStrength/Hema


1=Demonstrate adherence to instructed precautions during ADL tasks.


2=Patient will verbalize/demonstrate understanding of assistive devices/

modifications for ADL.


3=Patient will improve strength/tolerance for activity to enable patient to 

perform ADL's.





OT Long Term Goals


Long Term Goals


Time Frame:  Sep 11, 2018


Eating (FIM):  6


Eating (QC):  6


Groomin


Oral Hygiene (QC):  6


Bathing(FIM):  5


Shower/Bathe Self (QC):  5


Upper Body Dressing(FIM):  6


Upper Body Dressing (QC):  6


Lower Body Dressing(FIM):  5


Lower Body Dressing (QC):  5


On/Off Footwear (QC):  5


Toileting(FIM):  6


Toileting Hygiene (QC):  6


Transfers (B,C,W/C) (FIM):  6


Toilet/Commode Transfer(FIM):  6


Toilet/Commode Transfer (QC):  6


Shower Transfer(FIM):  5


Comprehension(FIM):  6


Expression (FIM):  7


Social Interaction(FIM):  7


Problem Solving(FIM):  6


Memory(FIM):  6


Additional Goals:  1-Demonstrate ADL Tasks, 2-Verbalize Understanding, 3-

ImproveStrength/Hema


1=Demonstrate adherence to instructed precautions during ADL tasks.


2=Patient will verbalize/demonstrate understanding of assistive devices/

modifications for ADL.


3=Patient will improve strength/tolerance for activity to enable patient to 

perform ADL's.





OT Education/Plan


Problem List/Assessment


Assessment:  Decreased Activ Tolerance, Impaired I ADL's, Impaired Self-Care 

Skills





Discharge Recommendations


Plan/Recommendations:  Continue POC


Therapy D/C Recommendations:  Home w/ Family Support, Occupational Therapy Home 

Care


Equpiment Recommendations-D/C:  Hip Kit





Treatment Plan/Plan of Care


Treatment,Training & Education:  Yes


Patient would benefit from OT for education, treatment and training to promote 

independence in ADL's, mobility, safety and/or upper extremity function for ADL'

s.


Plan of Care:  ADL Retraining, Functional Mobility, Group Exercise/Act as Ind, 

UE Funct Exercise/Act


Treatment Duration:  Sep 11, 2018


Frequency:  At least 5 of 7 days/Wk (IRF)


Estimated Hrs Per Day:  1.5 hours per day


Agreement:  Yes


Rehab Potential:  Fair





Time/GCodes


Start Time:  08:30


Stop Time:  09:45


Total Time Billed (hr/min):  75


Billed Treatment Time


1, ADL x 45minutes, Ex x 15minutes, FA x 15minutes











RIZWAN MURPHY OT Sep 5, 2018 11:36
OT Current Status-Daily Note


Subjective


No pain reported.





Appearance


Pt. in bed.  Asking nursing if he can get up.  OT assists with this.





Mental Status/Objective


Patient Orientation:  Person, Place


              Functional [place] Measure


0=Not Assessed/NA   4=Minimal Assistance


1=Total Assistance   5=Supervision or Setup


2=Maximal Assistance   6=Modified [place]


3=Moderate Assistance   7=Complete [place]





ADL-Treatment


              Functional [place] Measure


0=Not Assessed/NA   4=Minimal Assistance


1=Total Assistance   5=Supervision or Setup


2=Maximal Assistance   6=Modified [place]


3=Moderate Assistance   7=Complete IndependenceIRFPAI Quality Coding Scale











6 Independent with activity with or without an assistive device


 


5  Patient requires set up or clean up by helper.  Patient completes activity  

by  themselves


 


4 Supervision or touching assist (CGA). [place] provide cues , steadying assist


 


3 The helper provides less than half the effort to complete the activity


 


2 The helper provides more than half the effort to complete the activity


 


1 Dependent.  The helper does all the effort to complete an activity 


 


7 Patient refused to complete or attempt activity


 


9 The patient did not perform the activity before the current illness or injury


 


88 Not attempted due to Medical conditions or safety concerns








Lower Body Dressing (FIM):  5 (SBA with AE.  Pt. practiced doffing/donning 

socks and slippers with equipment.)


Lower Body Dressing (QC):  4


On/Off Footwear (QC):  4


Transfers (B, C, W/C) (FIM):  5 (SBA with all transfers.  Please see note below.

)


Pt. declines showering.  Pt. donned clean pajama pants.  Transferred with SBA 

to wheelchair.  Pt. able to self propel wheelchair to therapy gym.  Completed 

15 minutes on armbike, in 5 minute increments.  Worked on continued 

strengthening and endurance.  No oxygen on, and sats at 95%.  Pt.  practiced 

standing at parallel bar with emphasis to not put weight through left LE/ TTWB.

  Stood approximately 4 times.  Pt. demonstrated ability to balance self on 

left toe.  Worked on balance in stance with only several small LOB without 

assistive device.  Donned 1 lb. wrist weights and completed ball toss back and 

forth for increased UE strengthening.  Pt. then self propelled back to room.  

Transferred back to reclining chair with SBA.  All needs met.





Education


OT Patient Education:  Correct positioning, Exercise program, Modified ADL 

techniques, Progress toward Goal/Update tx plan, Purpose of tx/functional 

activities, Reviewed precautions, Rehab process, Transfer techniques, Use of 

adapted equipment


Teaching Recipient:  Patient


Teaching Methods:  Demonstration, Discussion


Response to Teaching:  Verbalize Understanding, Return Demonstration





OT Short Term Goals


Short Term Goals


Time Frame:  Sep 4, 2018


Eating(FIM):  5


Grooming(FIM):  5


Bathing(FIM):  4


Upper Body Dressing(FIM):  5


Lower Body Dressing(FIM):  3


Toileting(FIM):  4


Transfers (B,C,W/C) (FIM):  4 (CGA)


Toilet/Commode Transfer(FIM):  5


Shower Transfer(FIM):  4


Comprehension(FIM):  6


Expression(FIM):  7


Social Interaction(FIM):  7


Problem Solving(FIM):  5


Memory(FIM):  5


Additional Short Term Goals:  1-Demonstrate ADL Tasks, 2-Verbalize Understanding

, 3-ImproveStrength/Hema


1=Demonstrate adherence to instructed precautions during ADL tasks.


2=Patient will verbalize/demonstrate understanding of assistive devices/

modifications for ADL.


3=Patient will improve strength/tolerance for activity to enable patient to 

perform ADL's.





OT Long Term Goals


Long Term Goals


Time Frame:  Sep 11, 2018


Eating (FIM):  6


Eating (QC):  6


Groomin


Oral Hygiene (QC):  6


Bathing(FIM):  5


Shower/Bathe Self (QC):  5


Upper Body Dressing(FIM):  6


Upper Body Dressing (QC):  6


Lower Body Dressing(FIM):  5


Lower Body Dressing (QC):  5


On/Off Footwear (QC):  5


Toileting(FIM):  6


Toileting Hygiene (QC):  6


Transfers (B,C,W/C) (FIM):  6


Toilet/Commode Transfer(FIM):  6


Toilet/Commode Transfer (QC):  6


Shower Transfer(FIM):  5


Comprehension(FIM):  6


Expression (FIM):  7


Social Interaction(FIM):  7


Problem Solving(FIM):  6


Memory(FIM):  6


Additional Goals:  1-Demonstrate ADL Tasks, 2-Verbalize Understanding, 3-

ImproveStrength/Hema


1=Demonstrate adherence to instructed precautions during ADL tasks.


2=Patient will verbalize/demonstrate understanding of assistive devices/

modifications for ADL.


3=Patient will improve strength/tolerance for activity to enable patient to 

perform ADL's.





OT Education/Plan


Problem List/Assessment


Assessment:  Decreased Activ Tolerance, Decreased UE Strength, Impaired I ADL's

, Impaired Self-Care Skills





Discharge Recommendations


Plan/Recommendations:  Continue POC


Therapy D/C Recommendations:  Home w/ Family Support, Occupational Therapy Home 

Care


Equpiment Recommendations-D/C:  Hip Kit





Treatment Plan/Plan of Care


Treatment,Training & Education:  Yes


Patient would benefit from OT for education, treatment and training to promote 

independence in ADL's, mobility, safety and/or upper extremity function for ADL'

s.


Plan of Care:  ADL Retraining, Functional Mobility, Group Exercise/Act as Ind, 

UE Funct Exercise/Act


Treatment Duration:  Sep 11, 2018


Frequency:  At least 5 of 7 days/Wk (IRF)


Estimated Hrs Per Day:  1.5 hours per day


Agreement:  Yes


Rehab Potential:  Fair





Time/GCodes


Start Time:  08:10


Stop Time:  09:25


Total Time Billed (hr/min):  75


Billed Treatment Time


1, ADL x 30minutes, Ex x 45minutes











RIZWAN MURPHY OT Sep 6, 2018 15:10
OT Current Status-Daily Note


Subjective


No pain reported.





Appearance


Pt. in bed.  Pt. is dressed from last night.  Declines showering.  States, "I 

will do it tomorrow."





Mental Status/Objective


Patient Orientation:  Person, Place


              Functional [place] Measure


0=Not Assessed/NA   4=Minimal Assistance


1=Total Assistance   5=Supervision or Setup


2=Maximal Assistance   6=Modified [place]


3=Moderate Assistance   7=Complete [place]





ADL-Treatment


              Functional [place] Measure


0=Not Assessed/NA   4=Minimal Assistance


1=Total Assistance   5=Supervision or Setup


2=Maximal Assistance   6=Modified [place]


3=Moderate Assistance   7=Complete IndependenceIRFPAI Quality Coding Scale











6 Independent with activity with or without an assistive device


 


5  Patient requires set up or clean up by helper.  Patient completes activity  

by  themselves


 


4 Supervision or touching assist (CGA). [place] provide cues , steadying assist


 


3 The helper provides less than half the effort to complete the activity


 


2 The helper provides more than half the effort to complete the activity


 


1 Dependent.  The helper does all the effort to complete an activity 


 


7 Patient refused to complete or attempt activity


 


9 The patient did not perform the activity before the current illness or injury


 


88 Not attempted due to Medical conditions or safety concerns








Transfers (B, C, W/C) (FIM):  4 (Pt. requires min assist to move left LE out of 

bed.  SBA to stand and ambulate with walker.  Pt. is WBAT now.)





Other Treatment


Pt. ambulated to therapy gym.  Practiced doffing/donning leg brace, as he will 

not have assistance with this at home.  Pt. shown how to place the velcro straps

, and how to keep them from getting tangled.  Pt. required for his leg to be 

elevated while practicing this.  Pt. able to don brace with several attempts.  

States, "that feels better."  Pt. then tolerated 15 minutes on armbike at mod 

resistance for increased UE strengthening and overall endurance.  After this 

task, pt. ambulated back to room.  Had difficulty getting left LE into bed.  OT 

assisted with positioning of leg.  All needs met.





Education


OT Patient Education:  Correct positioning, Exercise program, Modified ADL 

techniques, Progress toward Goal/Update tx plan, Purpose of tx/functional 

activities, Reviewed precautions, Rehab process, Transfer techniques


Teaching Recipient:  Patient


Teaching Methods:  Demonstration, Discussion


Response to Teaching:  Verbalize Understanding, Return Demonstration





OT Short Term Goals


Short Term Goals


Time Frame:  Sep 4, 2018


Eating(FIM):  5


Grooming(FIM):  5


Bathing(FIM):  4


Upper Body Dressing(FIM):  5


Lower Body Dressing(FIM):  3


Toileting(FIM):  4


Transfers (B,C,W/C) (FIM):  4 (CGA)


Toilet/Commode Transfer(FIM):  5


Shower Transfer(FIM):  4


Comprehension(FIM):  6


Expression(FIM):  7


Social Interaction(FIM):  7


Problem Solving(FIM):  5


Memory(FIM):  5


Additional Short Term Goals:  1-Demonstrate ADL Tasks, 2-Verbalize Understanding

, 3-ImproveStrength/Hema


1=Demonstrate adherence to instructed precautions during ADL tasks.


2=Patient will verbalize/demonstrate understanding of assistive devices/

modifications for ADL.


3=Patient will improve strength/tolerance for activity to enable patient to 

perform ADL's.





OT Long Term Goals


Long Term Goals


Time Frame:  Sep 11, 2018


Eating (FIM):  6


Eating (QC):  6


Groomin


Oral Hygiene (QC):  6


Bathing(FIM):  5


Shower/Bathe Self (QC):  5


Upper Body Dressing(FIM):  6


Upper Body Dressing (QC):  6


Lower Body Dressing(FIM):  5


Lower Body Dressing (QC):  5


On/Off Footwear (QC):  5


Toileting(FIM):  6


Toileting Hygiene (QC):  6


Transfers (B,C,W/C) (FIM):  6


Toilet/Commode Transfer(FIM):  6


Toilet/Commode Transfer (QC):  6


Shower Transfer(FIM):  5


Comprehension(FIM):  6


Expression (FIM):  7


Social Interaction(FIM):  7


Problem Solving(FIM):  6


Memory(FIM):  6


Additional Goals:  1-Demonstrate ADL Tasks, 2-Verbalize Understanding, 3-

ImproveStrength/Hema


1=Demonstrate adherence to instructed precautions during ADL tasks.


2=Patient will verbalize/demonstrate understanding of assistive devices/

modifications for ADL.


3=Patient will improve strength/tolerance for activity to enable patient to 

perform ADL's.





OT Education/Plan


Problem List/Assessment


Assessment:  Decreased Activ Tolerance, Impaired Bed Mobility, Impaired I ADL's

, Impaired Self-Care Skills





Discharge Recommendations


Plan/Recommendations:  Continue POC


Therapy D/C Recommendations:  Home w/ Family Support, Occupational Therapy Home 

Care


Equpiment Recommendations-D/C:  Hip Kit





Treatment Plan/Plan of Care


Treatment,Training & Education:  Yes


Patient would benefit from OT for education, treatment and training to promote 

independence in ADL's, mobility, safety and/or upper extremity function for ADL'

s.


Plan of Care:  ADL Retraining, Functional Mobility, Group Exercise/Act as Ind, 

UE Funct Exercise/Act


Treatment Duration:  Sep 11, 2018


Frequency:  At least 5 of 7 days/Wk (IRF)


Estimated Hrs Per Day:  1.5 hours per day


Agreement:  Yes


Rehab Potential:  Fair





Time/GCodes


Start Time:  11:30


Stop Time:  12:15


Total Time Billed (hr/min):  45


Billed Treatment Time


1, ADL x 30minutes, Ex x 15minutes











RIZWAN MURPHY OT Sep 10, 2018 14:36
OT Current Status-Daily Note


Subjective


No pain reported.





Appearance


Pt. in chair.  Agreeable to work with OT.





Mental Status/Objective


Patient Orientation:  Person, Place


              Functional [place] Measure


0=Not Assessed/NA   4=Minimal Assistance


1=Total Assistance   5=Supervision or Setup


2=Maximal Assistance   6=Modified [place]


3=Moderate Assistance   7=Complete [place]





ADL-Treatment


              Functional [place] Measure


0=Not Assessed/NA   4=Minimal Assistance


1=Total Assistance   5=Supervision or Setup


2=Maximal Assistance   6=Modified [place]


3=Moderate Assistance   7=Complete IndependenceIRFPAI Quality Coding Scale











6 Independent with activity with or without an assistive device


 


5  Patient requires set up or clean up by helper.  Patient completes activity  

by  themselves


 


4 Supervision or touching assist (CGA). [place] provide cues , steadying assist


 


3 The helper provides less than half the effort to complete the activity


 


2 The helper provides more than half the effort to complete the activity


 


1 Dependent.  The helper does all the effort to complete an activity 


 


7 Patient refused to complete or attempt activity


 


9 The patient did not perform the activity before the current illness or injury


 


88 Not attempted due to Medical conditions or safety concerns








Grooming (FIM):  5 (Set up to comb hair.)


Bathing (FIM):  4 (CGA in stance to wash rear franck area.)


Shower/Bathe Self (QC):  4


Upper Body (FIM):  5


Upper Body Dressing (QC):  4


Lower Body Dressing (FIM):  3 (pt. had difficulty doffing/donning leg brace for 

shower task.  Unable to position correctly and unable to find the correct 

straps.  Pt. able to doff underwear and pants with dressing stick, and able to 

thread them over feet.  Required CGA in stance to don over hips, and assist to 

don slippers.)


Lower Body Dressing (QC):  3


On/Off Footwear (QC):  2


Transfers (B, C, W/C) (FIM):  5


Shower Transfer(FIM):  5





Other Treatment


Pt. demonstrates difficulty with donning/doffing brace.  Pt. states that he 

will have to be able to don this on his own.  Spoke with  who 

communicated with pt's orthopedic Dr.  Hinged brace will need to be worn until 

at least follow up.  Nursing to order special hinged brace and OT will continue 

to address the positioning and application for pt. independence.





Education


OT Patient Education:  Correct positioning, Modified ADL techniques, Progress 

toward Goal/Update tx plan, Purpose of tx/functional activities, Reviewed 

precautions, Rehab process, Transfer techniques


Teaching Recipient:  Patient


Teaching Methods:  Demonstration, Discussion


Response to Teaching:  Verbalize Understanding, Return Demonstration





OT Short Term Goals


Short Term Goals


Time Frame:  Sep 4, 2018


Eating(FIM):  5


Grooming(FIM):  5


Bathing(FIM):  4


Upper Body Dressing(FIM):  5


Lower Body Dressing(FIM):  3


Toileting(FIM):  4


Transfers (B,C,W/C) (FIM):  4 (CGA)


Toilet/Commode Transfer(FIM):  5


Shower Transfer(FIM):  4


Comprehension(FIM):  6


Expression(FIM):  7


Social Interaction(FIM):  7


Problem Solving(FIM):  5


Memory(FIM):  5


Additional Short Term Goals:  1-Demonstrate ADL Tasks, 2-Verbalize Understanding

, 3-ImproveStrength/Hema


1=Demonstrate adherence to instructed precautions during ADL tasks.


2=Patient will verbalize/demonstrate understanding of assistive devices/

modifications for ADL.


3=Patient will improve strength/tolerance for activity to enable patient to 

perform ADL's.





OT Long Term Goals


Long Term Goals


Time Frame:  Sep 11, 2018


Eating (FIM):  6


Eating (QC):  6


Groomin


Oral Hygiene (QC):  6


Bathing(FIM):  5


Shower/Bathe Self (QC):  5


Upper Body Dressing(FIM):  6


Upper Body Dressing (QC):  6


Lower Body Dressing(FIM):  5


Lower Body Dressing (QC):  5


On/Off Footwear (QC):  5


Toileting(FIM):  6


Toileting Hygiene (QC):  6


Transfers (B,C,W/C) (FIM):  6


Toilet/Commode Transfer(FIM):  6


Toilet/Commode Transfer (QC):  6


Shower Transfer(FIM):  5


Comprehension(FIM):  6


Expression (FIM):  7


Social Interaction(FIM):  7


Problem Solving(FIM):  6


Memory(FIM):  6


Additional Goals:  1-Demonstrate ADL Tasks, 2-Verbalize Understanding, 3-

ImproveStrength/Hema


1=Demonstrate adherence to instructed precautions during ADL tasks.


2=Patient will verbalize/demonstrate understanding of assistive devices/

modifications for ADL.


3=Patient will improve strength/tolerance for activity to enable patient to 

perform ADL's.





OT Education/Plan


Problem List/Assessment


Assessment:  Decreased Activ Tolerance, Impaired I ADL's, Impaired Self-Care 

Skills





Discharge Recommendations


Plan/Recommendations:  Continue POC


Therapy D/C Recommendations:  Home w/ Family Support, Occupational Therapy Home 

Care


Equpiment Recommendations-D/C:  Hip Kit





Treatment Plan/Plan of Care


Treatment,Training & Education:  Yes


Patient would benefit from OT for education, treatment and training to promote 

independence in ADL's, mobility, safety and/or upper extremity function for ADL'

s.


Plan of Care:  ADL Retraining, Functional Mobility, Group Exercise/Act as Ind, 

UE Funct Exercise/Act


Treatment Duration:  Sep 11, 2018


Frequency:  At least 5 of 7 days/Wk (IRF)


Estimated Hrs Per Day:  1.5 hours per day


Agreement:  Yes


Rehab Potential:  Fair





Time/GCodes


Start Time:  11:00


Stop Time:  12:00


Total Time Billed (hr/min):  60


Billed Treatment Time


1, ADL x 4











RIZWAN MURPHY OT Sep 11, 2018 13:03
OT Current Status-Daily Note


Subjective


No pain reported.





Appearance


Pt. is in bed.  Agrees to work with OT.





Mental Status/Objective


Patient Orientation:  Person


              Functional [place] Measure


0=Not Assessed/NA   4=Minimal Assistance


1=Total Assistance   5=Supervision or Setup


2=Maximal Assistance   6=Modified [place]


3=Moderate Assistance   7=Complete [place]





ADL-Treatment


              Functional [place] Measure


0=Not Assessed/NA   4=Minimal Assistance


1=Total Assistance   5=Supervision or Setup


2=Maximal Assistance   6=Modified [place]


3=Moderate Assistance   7=Complete IndependenceIRFPAI Quality Coding Scale











6 Independent with activity with or without an assistive device


 


5  Patient requires set up or clean up by helper.  Patient completes activity  

by  themselves


 


4 Supervision or touching assist (CGA). [place] provide cues , steadying assist


 


3 The helper provides less than half the effort to complete the activity


 


2 The helper provides more than half the effort to complete the activity


 


1 Dependent.  The helper does all the effort to complete an activity 


 


7 Patient refused to complete or attempt activity


 


9 The patient did not perform the activity before the current illness or injury


 


88 Not attempted due to Medical conditions or safety concerns








Eating (FIM):  6


Eating (QC):  6


Grooming (FIM):  6


Bathing (FIM):  6 (Mod I with safety concerns.)


Shower/Bathe Self (QC):  6


Upper Body (FIM):  5 (Set up)


Upper Body Dressing (QC):  5


Lower Body Dressing (FIM):  3 (Pt. attempted to don pants and leg brace both in 

sitting, and then laying in bed.  Pt. has great difficulty getting pants over 

left foot, and donning leg brace.  Pt. also has difficulty adjusting shoe over 

back of foot.)


Lower Body Dressing (QC):  3


On/Off Footwear (QC):  3


Toileting (FIM):  6 (Standing at toilet.)


Toileting Hygiene (QC):  6


Transfers (B, C, W/C) (FIM):  6 (Mod I with safety concerns.)


Shower Transfer(FIM):  5





Other Treatment


Pt. agrees to shower.  Pt. is able to doff clothing with instruction and cues 

to sequence.  Note safety concerns with shower transfer, in which pt. almost 

left his walker behind to walk to shower.  Pt. is able to complete all bathing 

tasks with Mod I using grab bars, but there are safety concerns when pt. 

insists on standing to dry off.  Pt. has difficulty donning brace.  When asked 

who will do this for him at home, pt. states, "I will have someone."  Pt. has 

difficulty with pants and shoes, but does not remember to use the dressing stick

, which will not be functional for him at home.  All needs met and pt. to work 

with PT at this time.





Education


OT Patient Education:  Correct positioning, Modified ADL techniques, Progress 

toward Goal/Update tx plan, Purpose of tx/functional activities, Reviewed 

precautions, Rehab process, Transfer techniques


Teaching Recipient:  Patient


Teaching Methods:  Demonstration, Discussion


Response to Teaching:  Verbalize Understanding, Return Demonstration





OT Short Term Goals


Short Term Goals


Time Frame:  Sep 4, 2018


Eating(FIM):  5


Grooming(FIM):  5


Bathing(FIM):  4


Upper Body Dressing(FIM):  5


Lower Body Dressing(FIM):  3


Toileting(FIM):  4


Transfers (B,C,W/C) (FIM):  4 (CGA)


Toilet/Commode Transfer(FIM):  5


Shower Transfer(FIM):  4


Comprehension(FIM):  6


Expression(FIM):  7


Social Interaction(FIM):  7


Problem Solving(FIM):  5


Memory(FIM):  5


Additional Short Term Goals:  1-Demonstrate ADL Tasks, 2-Verbalize Understanding

, 3-ImproveStrength/Hema


1=Demonstrate adherence to instructed precautions during ADL tasks.


2=Patient will verbalize/demonstrate understanding of assistive devices/

modifications for ADL.


3=Patient will improve strength/tolerance for activity to enable patient to 

perform ADL's.





OT Long Term Goals


Long Term Goals


Time Frame:  Sep 11, 2018


Eating (FIM):  6 (met)


Eating (QC):  6 (met)


Groomin (met)


Oral Hygiene (QC):  6 (not met.  Pt. declines oral care.)


Bathing(FIM):  5 (met)


Shower/Bathe Self (QC):  5 (met)


Upper Body Dressing(FIM):  6 (not met)


Upper Body Dressing (QC):  6 (not met)


Lower Body Dressing(FIM):  5 (not met)


Lower Body Dressing (QC):  5 (not met)


On/Off Footwear (QC):  5 (not met)


Toileting(FIM):  6 (met)


Toileting Hygiene (QC):  6 (met)


Transfers (B,C,W/C) (FIM):  6 (met)


Toilet/Commode Transfer(FIM):  6 (met)


Toilet/Commode Transfer (QC):  6 (met)


Shower Transfer(FIM):  5


Comprehension(FIM):  6


Expression (FIM):  7


Social Interaction(FIM):  7


Problem Solving(FIM):  6


Memory(FIM):  6


Additional Goals:  1-Demonstrate ADL Tasks, 2-Verbalize Understanding, 3-

ImproveStrength/Hema


1=Demonstrate adherence to instructed precautions during ADL tasks.


2=Patient will verbalize/demonstrate understanding of assistive devices/

modifications for ADL.


3=Patient will improve strength/tolerance for activity to enable patient to 

perform ADL's.





OT Education/Plan


Problem List/Assessment


Assessment:  Decreased Activ Tolerance, Decreased Safety Aware





Discharge Recommendations


Plan/Recommendations:  Follow-up Planned


Therapy D/C Recommendations:  Home w/ Family Support, Occupational Therapy Home 

Care





Treatment Plan/Plan of Care


Treatment,Training & Education:  Yes


Patient would benefit from OT for education, treatment and training to promote 

independence in ADL's, mobility, safety and/or upper extremity function for ADL'

s.


Plan of Care:  ADL Retraining, Functional Mobility, Group Exercise/Act as Ind, 

UE Funct Exercise/Act


Treatment Duration:  Sep 11, 2018


Frequency:  At least 5 of 7 days/Wk (IRF)


Estimated Hrs Per Day:  1.5 hours per day


Agreement:  Yes


Rehab Potential:  Good





Time/GCodes


Start Time:  08:30


Stop Time:  09:00


Total Time Billed (hr/min):  30


Billed Treatment Time


1, ADL x 2











RIZWAN MURPHY OT Sep 13, 2018 09:15
OT Current Status-Daily Note


Subjective


No pain reported.





Appearance


Pt. is sitting up in chair.  Agrees to work with OT.





Mental Status/Objective


Patient Orientation:  Person, Place


              Functional [place] Measure


0=Not Assessed/NA   4=Minimal Assistance


1=Total Assistance   5=Supervision or Setup


2=Maximal Assistance   6=Modified [place]


3=Moderate Assistance   7=Complete [place]





ADL-Treatment


              Functional [place] Measure


0=Not Assessed/NA   4=Minimal Assistance


1=Total Assistance   5=Supervision or Setup


2=Maximal Assistance   6=Modified [place]


3=Moderate Assistance   7=Complete IndependenceIRFPAI Quality Coding Scale











6 Independent with activity with or without an assistive device


 


5  Patient requires set up or clean up by helper.  Patient completes activity  

by  themselves


 


4 Supervision or touching assist (CGA). [place] provide cues , steadying assist


 


3 The helper provides less than half the effort to complete the activity


 


2 The helper provides more than half the effort to complete the activity


 


1 Dependent.  The helper does all the effort to complete an activity 


 


7 Patient refused to complete or attempt activity


 


9 The patient did not perform the activity before the current illness or injury


 


88 Not attempted due to Medical conditions or safety concerns








Grooming (FIM):  6 (Mod I while seated at sink to shave and comb hair.)


Toileting (FIM):  5 (SBA standing at toilet.)


Toileting Hygiene (QC):  4


Transfers (B, C, W/C) (FIM):  5 (SBA to ambulate and transfer using walker.)





Other Treatment


Pt. is dressed from last night, and declines bathing, as he showered yesterday.

  Pt. does request to shave.  Ambulated to sink area with walker with SBA.  Sat 

and able to complete grooming tasks with Mod I.  Pt. then ambulated to therapy 

gym.  Tolerated 15 minutes on armbike to increase overall strength and 

independence.  Pt. then donned 1 lb. wrist weights and completed fine motor 

tasks with reaching and utilizing arms.  Tolerated this well.  Doffed weights 

and ambulated to kitchen area.  Spoke with pt. in depth regarding kitchen set up

, and kitchen safety.  Pt. is educated about a walker basket and tray.  

Verbalizes understanding.  Pt. is encouraged to not use oven until he no longer 

uses a walker or has leg brace on.  Verbalizes understanding.  OT emphasized 

safety with carrying objects in kitchen while using walker.  Pt. states that 

[employer] delivers his meals to a cooler on his porch.  Pt. states at 

first that his spouse cant retrieve these for him due to her disabilities.  Pt. 

is educated on need to have someone obtain these, or to have [employer] 

person deliver to the inside.  Pt. is asked who is doing this now while he is 

in the hospital.  Pt. states that his wife is.  So pt. is then further educated 

that he should in fact have his spouse continue to retrieve the meals when he 

is home.  Pt. is educated about safety overall.  Ambulates to room and all 

needs met.  Pt. transfers to bed.





Education


OT Patient Education:  Correct positioning, Modified ADL techniques, Progress 

toward Goal/Update tx plan, Purpose of tx/functional activities, Reviewed 

precautions, Rehab process, Transfer techniques


Teaching Recipient:  Patient


Teaching Methods:  Demonstration, Discussion


Response to Teaching:  Verbalize Understanding, Return Demonstration





OT Short Term Goals


Short Term Goals


Time Frame:  Sep 4, 2018


Eating(FIM):  5


Grooming(FIM):  5


Bathing(FIM):  4


Upper Body Dressing(FIM):  5


Lower Body Dressing(FIM):  3


Toileting(FIM):  4


Transfers (B,C,W/C) (FIM):  4 (CGA)


Toilet/Commode Transfer(FIM):  5


Shower Transfer(FIM):  4


Comprehension(FIM):  6


Expression(FIM):  7


Social Interaction(FIM):  7


Problem Solving(FIM):  5


Memory(FIM):  5


Additional Short Term Goals:  1-Demonstrate ADL Tasks, 2-Verbalize Understanding

, 3-ImproveStrength/Hema


1=Demonstrate adherence to instructed precautions during ADL tasks.


2=Patient will verbalize/demonstrate understanding of assistive devices/

modifications for ADL.


3=Patient will improve strength/tolerance for activity to enable patient to 

perform ADL's.





OT Long Term Goals


Long Term Goals


Time Frame:  Sep 11, 2018


Eating (FIM):  6


Eating (QC):  6


Groomin


Oral Hygiene (QC):  6


Bathing(FIM):  5


Shower/Bathe Self (QC):  5


Upper Body Dressing(FIM):  6


Upper Body Dressing (QC):  6


Lower Body Dressing(FIM):  5


Lower Body Dressing (QC):  5


On/Off Footwear (QC):  5


Toileting(FIM):  6


Toileting Hygiene (QC):  6


Transfers (B,C,W/C) (FIM):  6


Toilet/Commode Transfer(FIM):  6


Toilet/Commode Transfer (QC):  6


Shower Transfer(FIM):  5


Comprehension(FIM):  6


Expression (FIM):  7


Social Interaction(FIM):  7


Problem Solving(FIM):  6


Memory(FIM):  6


Additional Goals:  1-Demonstrate ADL Tasks, 2-Verbalize Understanding, 3-

ImproveStrength/Hema


1=Demonstrate adherence to instructed precautions during ADL tasks.


2=Patient will verbalize/demonstrate understanding of assistive devices/

modifications for ADL.


3=Patient will improve strength/tolerance for activity to enable patient to 

perform ADL's.





OT Education/Plan


Problem List/Assessment


Assessment:  Decreased Activ Tolerance, Impaired I ADL's, Impaired Self-Care 

Skills





Discharge Recommendations


Plan/Recommendations:  Continue POC


Therapy D/C Recommendations:  Home w/ Family Support, Occupational Therapy Home 

Care, Scheduled Assistance


Comment


Pt. has used a dressing stick at times at this facility, but is unable to 

initiate it.  OT provides detailed instruction each time.  Pt. also has 

difficulty fastening/unfastening leg brace.  This has been addressed and pt. 

has been encouraged to practice.





Treatment Plan/Plan of Care


Treatment,Training & Education:  Yes


Patient would benefit from OT for education, treatment and training to promote 

independence in ADL's, mobility, safety and/or upper extremity function for ADL'

s.


Plan of Care:  ADL Retraining, Functional Mobility, Group Exercise/Act as Ind, 

UE Funct Exercise/Act


Treatment Duration:  Sep 11, 2018


Frequency:  At least 5 of 7 days/Wk (IRF)


Estimated Hrs Per Day:  1.5 hours per day


Agreement:  Yes


Rehab Potential:  Fair





Time/GCodes


Start Time:  10:30


Stop Time:  11:45


Total Time Billed (hr/min):  75


Billed Treatment Time


1, Ex x 30minutes, FA x 30minutes, ADL x 15minutes











RIZWAN MURPHY OT Sep 12, 2018 14:07
OT Current Status-Daily Note


Subjective


No pain reported.  Pt. states that he doesn't have an appetite right now.





Appearance


Pt. up in wheelchair after finishing PT.  Pt. declines showering, stating that 

he did this yesterday.  Pt. does agree to change his clothing.





Mental Status/Objective


Patient Orientation:  Person, Place


              Functional [place] Measure


0=Not Assessed/NA   4=Minimal Assistance


1=Total Assistance   5=Supervision or Setup


2=Maximal Assistance   6=Modified [place]


3=Moderate Assistance   7=Complete [place]


Attachments:  Oxygen





ADL-Treatment


              Functional [place] Measure


0=Not Assessed/NA   4=Minimal Assistance


1=Total Assistance   5=Supervision or Setup


2=Maximal Assistance   6=Modified [place]


3=Moderate Assistance   7=Complete IndependenceIRFPAI Quality Coding Scale











6 Independent with activity with or without an assistive device


 


5  Patient requires set up or clean up by helper.  Patient completes activity  

by  themselves


 


4 Supervision or touching assist (CGA). [place] provide cues , steadying assist


 


3 The helper provides less than half the effort to complete the activity


 


2 The helper provides more than half the effort to complete the activity


 


1 Dependent.  The helper does all the effort to complete an activity 


 


7 Patient refused to complete or attempt activity


 


9 The patient did not perform the activity before the current illness or injury


 


88 Not attempted due to Medical conditions or safety concerns








Upper Body (FIM):  5


Upper Body Dressing (QC):  4


Lower Body Dressing (FIM):  3 (Pt. utilized AE to complete LE dressing.  Pt. 

able to don boxer shorts without equipment, but utilized dressing stick to don 

pants.  Pt. wanted to wear his house slippers, and required assistance to don 

these.)


Lower Body Dressing (QC):  3


On/Off Footwear (QC):  3


Transfers (B, C, W/C) (FIM):  4 (Min assist to stand and transfer to reclining 

chair.)





Other Treatment


After dressing in room, pt requested to go outside.  States that he hasn't been 

outside since he has been here.  Pt. worked on self propelling wheelchair, but 

got tired after approximately 100 feet.  OT pushed him the rest of the way.  

Sat outside for fresh air and talked about home set up.  Went back to therapy 

gym and pt. completed armbike x 10 minutes.  Pt. somewhat SOA and sats taken.  

Pt. at 89%.  Re-applied 2 L 02.  Completed shoulder ROM/endurance task with arm 

arc back and forth.  Self propelled wheelchair back to room.  Transferred to 

reclining chair with all needs met.





Education


OT Patient Education:  Correct positioning, Exercise program, Modified ADL 

techniques, Progress toward Goal/Update tx plan, Purpose of tx/functional 

activities, Reviewed precautions, Rehab process, Transfer techniques, Use of 

adapted equipment


Teaching Recipient:  Patient


Teaching Methods:  Demonstration, Discussion


Response to Teaching:  Verbalize Understanding, Return Demonstration





OT Short Term Goals


Short Term Goals


Time Frame:  Sep 4, 2018


Eating(FIM):  5


Grooming(FIM):  5


Bathing(FIM):  4


Upper Body Dressing(FIM):  5


Lower Body Dressing(FIM):  3


Toileting(FIM):  4


Transfers (B,C,W/C) (FIM):  4 (CGA)


Toilet/Commode Transfer(FIM):  5


Shower Transfer(FIM):  4


Comprehension(FIM):  6


Expression(FIM):  7


Social Interaction(FIM):  7


Problem Solving(FIM):  5


Memory(FIM):  5


Additional Short Term Goals:  1-Demonstrate ADL Tasks, 2-Verbalize Understanding

, 3-ImproveStrength/Hema


1=Demonstrate adherence to instructed precautions during ADL tasks.


2=Patient will verbalize/demonstrate understanding of assistive devices/

modifications for ADL.


3=Patient will improve strength/tolerance for activity to enable patient to 

perform ADL's.





OT Long Term Goals


Long Term Goals


Time Frame:  Sep 11, 2018


Eating (FIM):  6


Eating (QC):  6


Groomin


Oral Hygiene (QC):  6


Bathing(FIM):  5


Shower/Bathe Self (QC):  5


Upper Body Dressing(FIM):  6


Upper Body Dressing (QC):  6


Lower Body Dressing(FIM):  5


Lower Body Dressing (QC):  5


On/Off Footwear (QC):  5


Toileting(FIM):  6


Toileting Hygiene (QC):  6


Transfers (B,C,W/C) (FIM):  6


Toilet/Commode Transfer(FIM):  6


Toilet/Commode Transfer (QC):  6


Shower Transfer(FIM):  5


Comprehension(FIM):  6


Expression (FIM):  7


Social Interaction(FIM):  7


Problem Solving(FIM):  6


Memory(FIM):  6


Additional Goals:  1-Demonstrate ADL Tasks, 2-Verbalize Understanding, 3-

ImproveStrength/Hema


1=Demonstrate adherence to instructed precautions during ADL tasks.


2=Patient will verbalize/demonstrate understanding of assistive devices/

modifications for ADL.


3=Patient will improve strength/tolerance for activity to enable patient to 

perform ADL's.





OT Education/Plan


Problem List/Assessment


Assessment:  Decreased Activ Tolerance, Dependent Transfers, Impaired I ADL's, 

Impaired Self-Care Skills





Discharge Recommendations


Plan/Recommendations:  Continue POC


Therapy D/C Recommendations:  Home w/ Family Support, Occupational Therapy Home 

Care


Equpiment Recommendations-D/C:  Hip Kit





Treatment Plan/Plan of Care


Treatment,Training & Education:  Yes


Patient would benefit from OT for education, treatment and training to promote 

independence in ADL's, mobility, safety and/or upper extremity function for ADL'

s.


Plan of Care:  ADL Retraining, Functional Mobility, Group Exercise/Act as Ind, 

UE Funct Exercise/Act


Treatment Duration:  Sep 11, 2018


Frequency:  At least 5 of 7 days/Wk (IRF)


Estimated Hrs Per Day:  1.5 hours per day


Agreement:  Yes


Rehab Potential:  Fair





Time/GCodes


Start Time:  10:00


Stop Time:  11:15


Total Time Billed (hr/min):  75


Billed Treatment Time


1, ADL x 30minutes, FA x 15minutes, Ex x 30minutes











RIZWAN MURPHY OT Sep 4, 2018 11:45
OT Current Status-Daily Note


Subjective


No pain reported.  Pt. states that he is tired.





Appearance


Pt. agrees to work with OT.





Mental Status/Objective


              Functional [place] Measure


0=Not Assessed/NA   4=Minimal Assistance


1=Total Assistance   5=Supervision or Setup


2=Maximal Assistance   6=Modified [place]


3=Moderate Assistance   7=Complete [place]





ADL-Treatment


              Functional [place] Measure


0=Not Assessed/NA   4=Minimal Assistance


1=Total Assistance   5=Supervision or Setup


2=Maximal Assistance   6=Modified [place]


3=Moderate Assistance   7=Complete IndependenceIRFPAI Quality Coding Scale











6 Independent with activity with or without an assistive device


 


5  Patient requires set up or clean up by helper.  Patient completes activity  

by  themselves


 


4 Supervision or touching assist (CGA). [place] provide cues , steadying assist


 


3 The helper provides less than half the effort to complete the activity


 


2 The helper provides more than half the effort to complete the activity


 


1 Dependent.  The helper does all the effort to complete an activity 


 


7 Patient refused to complete or attempt activity


 


9 The patient did not perform the activity before the current illness or injury


 


88 Not attempted due to Medical conditions or safety concerns











Other Treatment


Pt. agrees to bilateral UE exercises x 15 reps each, x 3 exercises with red 

theraband in all planes.  Completed these at bed level.  Tolerated exercises 

well for improved UE strength and independence.  Pt. also completed 20 

bilateral hand squeezes with yellow therapy sponge for improved hand strength.  

Pt. is able to tolerate this well.





Education


OT Patient Education:  Correct positioning, Exercise program, Progress toward 

Goal/Update tx plan, Purpose of tx/functional activities, Reviewed precautions, 

Rehab process, Transfer techniques


Teaching Recipient:  Patient


Teaching Methods:  Demonstration, Discussion


Response to Teaching:  Verbalize Understanding, Return Demonstration





OT Short Term Goals


Short Term Goals


Time Frame:  Sep 4, 2018


Eating(FIM):  5


Grooming(FIM):  5


Bathing(FIM):  4


Upper Body Dressing(FIM):  5


Lower Body Dressing(FIM):  3


Toileting(FIM):  4


Transfers (B,C,W/C) (FIM):  4 (CGA)


Toilet/Commode Transfer(FIM):  5


Shower Transfer(FIM):  4


Comprehension(FIM):  6


Expression(FIM):  7


Social Interaction(FIM):  7


Problem Solving(FIM):  5


Memory(FIM):  5


Additional Short Term Goals:  1-Demonstrate ADL Tasks, 2-Verbalize Understanding

, 3-ImproveStrength/Hema


1=Demonstrate adherence to instructed precautions during ADL tasks.


2=Patient will verbalize/demonstrate understanding of assistive devices/

modifications for ADL.


3=Patient will improve strength/tolerance for activity to enable patient to 

perform ADL's.





OT Long Term Goals


Long Term Goals


Time Frame:  Sep 11, 2018


Eating (FIM):  6


Eating (QC):  6


Groomin


Oral Hygiene (QC):  6


Bathing(FIM):  5


Shower/Bathe Self (QC):  5


Upper Body Dressing(FIM):  6


Upper Body Dressing (QC):  6


Lower Body Dressing(FIM):  5


Lower Body Dressing (QC):  5


On/Off Footwear (QC):  5


Toileting(FIM):  6


Toileting Hygiene (QC):  6


Transfers (B,C,W/C) (FIM):  6


Toilet/Commode Transfer(FIM):  6


Toilet/Commode Transfer (QC):  6


Shower Transfer(FIM):  5


Comprehension(FIM):  6


Expression (FIM):  7


Social Interaction(FIM):  7


Problem Solving(FIM):  6


Memory(FIM):  6


Additional Goals:  1-Demonstrate ADL Tasks, 2-Verbalize Understanding, 3-

ImproveStrength/Hema


1=Demonstrate adherence to instructed precautions during ADL tasks.


2=Patient will verbalize/demonstrate understanding of assistive devices/

modifications for ADL.


3=Patient will improve strength/tolerance for activity to enable patient to 

perform ADL's.





OT Education/Plan


Problem List/Assessment


Assessment:  Decreased Activ Tolerance, Impaired I ADL's, Impaired Self-Care 

Skills





Discharge Recommendations


Plan/Recommendations:  Continue POC


Therapy D/C Recommendations:  Home w/ Family Support, Occupational Therapy Home 

Care





Treatment Plan/Plan of Care


Treatment,Training & Education:  Yes


Patient would benefit from OT for education, treatment and training to promote 

independence in ADL's, mobility, safety and/or upper extremity function for ADL'

s.


Plan of Care:  ADL Retraining, Functional Mobility, Group Exercise/Act as Ind, 

UE Funct Exercise/Act


Treatment Duration:  Sep 11, 2018


Frequency:  At least 5 of 7 days/Wk (IRF)


Estimated Hrs Per Day:  1.5 hours per day


Agreement:  Yes


Rehab Potential:  Fair





Time/GCodes


Start Time:  13:00


Stop Time:  13:30


Total Time Billed (hr/min):  30


Billed Treatment Time


1, EX x 2











RIZWAN MURPHY OT Sep 11, 2018 13:59
OT Current Status-Daily Note


Subjective


Pt in bed, agrees to treatment.





Mental Status/Objective


              Functional [place] Measure


0=Not Assessed/NA   4=Minimal Assistance


1=Total Assistance   5=Supervision or Setup


2=Maximal Assistance   6=Modified [place]


3=Moderate Assistance   7=Complete [place]


Attachments:  Oxygen





ADL-Treatment


Pt supine to sit with minimal assistance. Sit to stand and transfer to w/c with 

minimal assistance using FWW. Skilled cues for safety and WB status. Pt 

declined bathing or dressing tasks this morning, would like to complete 

grooming. Pt required assist to maneuver w/c up to sink. Pt washed face and 

combed hair with SBA and increased time.


              Functional [place] Measure


0=Not Assessed/NA   4=Minimal Assistance


1=Total Assistance   5=Supervision or Setup


2=Maximal Assistance   6=Modified [place]


3=Moderate Assistance   7=Complete IndependenceIRFPAI Quality Coding Scale











6 Independent with activity with or without an assistive device


 


5  Patient requires set up or clean up by helper.  Patient completes activity  

by  themselves


 


4 Supervision or touching assist (CGA). [place] provide cues , steadying assist


 


3 The helper provides less than half the effort to complete the activity


 


2 The helper provides more than half the effort to complete the activity


 


1 Dependent.  The helper does all the effort to complete an activity 


 


7 Patient refused to complete or attempt activity


 


9 The patient did not perform the activity before the current illness or injury


 


88 Not attempted due to Medical conditions or safety concerns








Grooming (FIM):  5





Other Treatment


To therapy gym via w/c. Pt completed three bilateral UE exercises x15 reps with 

2# weights to increase strength needed for ADLs and transfers. Rest breaks 

taken between exercises. Skilled cues for proper exercise technique. Pt 

completed fine motor task with nuts and bolts to increase coordination skills 

and activity tolerance. Graded clothespins task with bilateral hands to 

increase /pinch strength. Pt returned to room, transferred to bed with 

minimal assistance. Sit to supine with minimal assistance for left LE. Pt 

resting in bed with needs met, bed alarm on, and telesitter present.





OT Short Term Goals


Short Term Goals


Time Frame:  Sep 4, 2018


Eating(FIM):  5


Grooming(FIM):  5


Bathing(FIM):  4


Upper Body Dressing(FIM):  5


Lower Body Dressing(FIM):  3


Toileting(FIM):  4


Transfers (B,C,W/C) (FIM):  4 (CGA)


Toilet/Commode Transfer(FIM):  5


Shower Transfer(FIM):  4


Comprehension(FIM):  6


Expression(FIM):  7


Social Interaction(FIM):  7


Problem Solving(FIM):  5


Memory(FIM):  5


Additional Short Term Goals:  1-Demonstrate ADL Tasks, 2-Verbalize Understanding

, 3-ImproveStrength/Hema


1=Demonstrate adherence to instructed precautions during ADL tasks.


2=Patient will verbalize/demonstrate understanding of assistive devices/

modifications for ADL.


3=Patient will improve strength/tolerance for activity to enable patient to 

perform ADL's.





OT Long Term Goals


Long Term Goals


Time Frame:  Sep 11, 2018


Eating (FIM):  6


Eating (QC):  6


Groomin


Oral Hygiene (QC):  6


Bathing(FIM):  5


Shower/Bathe Self (QC):  5


Upper Body Dressing(FIM):  6


Upper Body Dressing (QC):  6


Lower Body Dressing(FIM):  5


Lower Body Dressing (QC):  5


On/Off Footwear (QC):  5


Toileting(FIM):  6


Toileting Hygiene (QC):  6


Transfers (B,C,W/C) (FIM):  6


Toilet/Commode Transfer(FIM):  6


Toilet/Commode Transfer (QC):  6


Shower Transfer(FIM):  5


Comprehension(FIM):  6


Expression (FIM):  7


Social Interaction(FIM):  7


Problem Solving(FIM):  6


Memory(FIM):  6


Additional Goals:  1-Demonstrate ADL Tasks, 2-Verbalize Understanding, 3-

ImproveStrength/Ehma


1=Demonstrate adherence to instructed precautions during ADL tasks.


2=Patient will verbalize/demonstrate understanding of assistive devices/

modifications for ADL.


3=Patient will improve strength/tolerance for activity to enable patient to 

perform ADL's.





OT Education/Plan


Discharge Recommendations


Plan/Recommendations:  Continue POC





Treatment Plan/Plan of Care


Patient would benefit from OT for education, treatment and training to promote 

independence in ADL's, mobility, safety and/or upper extremity function for ADL'

s.


Plan of Care:  ADL Retraining, Functional Mobility, Group Exercise/Act as Ind, 

UE Funct Exercise/Act


Treatment Duration:  Sep 11, 2018


Frequency:  At least 5 of 7 days/Wk (IRF)


Estimated Hrs Per Day:  1.5 hours per day


Agreement:  Yes


Rehab Potential:  Fair





Time/GCodes


Start Time:  10:30


Stop Time:  11:30


Total Time Billed (hr/min):  60


Billed Treatment Time


1 visit, FA(15minutes), ADL(15minutes), EXx2(30minutes)











GURJIT LOPEZ OT Aug 31, 2018 12:00
OT Current Status-Daily Note


Subjective


Pt in bed, agrees to treatment.





Mental Status/Objective


              Functional [place] Measure


0=Not Assessed/NA   4=Minimal Assistance


1=Total Assistance   5=Supervision or Setup


2=Maximal Assistance   6=Modified [place]


3=Moderate Assistance   7=Complete [place]


Attachments:  Knee Immobilizer





ADL-Treatment


Pt doffed/donned shirt with set up. Donned pants with minimal assistance to 

thread left LE into pant leg using dressing stick.


              Functional [place] Measure


0=Not Assessed/NA   4=Minimal Assistance


1=Total Assistance   5=Supervision or Setup


2=Maximal Assistance   6=Modified [place]


3=Moderate Assistance   7=Complete IndependenceIRFPAI Quality Coding Scale











6 Independent with activity with or without an assistive device


 


5  Patient requires set up or clean up by helper.  Patient completes activity  

by  themselves


 


4 Supervision or touching assist (CGA). [place] provide cues , steadying assist


 


3 The helper provides less than half the effort to complete the activity


 


2 The helper provides more than half the effort to complete the activity


 


1 Dependent.  The helper does all the effort to complete an activity 


 


7 Patient refused to complete or attempt activity


 


9 The patient did not perform the activity before the current illness or injury


 


88 Not attempted due to Medical conditions or safety concerns








Upper Body (FIM):  5


Lower Body Dressing (FIM):  4





Other Treatment


Pt supine to sit with modified independence. Sit to stand with modified 

independence. Gait to therapy gym with FWW. Arm bike x15 minutes to increase 

overall strength and activity tolerance needed for ADLs and transfers. Pt 

completed task with minimal resistance and steady pace. Two brief rest breaks 

during task. Pt completed fine motor task with nuts and bolts with 1# weights 

in place on bilateral UE to increase strength for functional tasks. 


Pt in bed with needs met after session.





OT Short Term Goals


Short Term Goals


Time Frame:  Sep 4, 2018


Eating(FIM):  5


Grooming(FIM):  5


Bathing(FIM):  4


Upper Body Dressing(FIM):  5


Lower Body Dressing(FIM):  3


Toileting(FIM):  4


Transfers (B,C,W/C) (FIM):  4 (CGA)


Toilet/Commode Transfer(FIM):  5


Shower Transfer(FIM):  4


Comprehension(FIM):  6


Expression(FIM):  7


Social Interaction(FIM):  7


Problem Solving(FIM):  5


Memory(FIM):  5


Additional Short Term Goals:  1-Demonstrate ADL Tasks, 2-Verbalize Understanding

, 3-ImproveStrength/Hema


1=Demonstrate adherence to instructed precautions during ADL tasks.


2=Patient will verbalize/demonstrate understanding of assistive devices/

modifications for ADL.


3=Patient will improve strength/tolerance for activity to enable patient to 

perform ADL's.





OT Long Term Goals


Long Term Goals


Time Frame:  Sep 11, 2018


Eating (FIM):  6


Eating (QC):  6


Groomin


Oral Hygiene (QC):  6


Bathing(FIM):  5


Shower/Bathe Self (QC):  5


Upper Body Dressing(FIM):  6


Upper Body Dressing (QC):  6


Lower Body Dressing(FIM):  5


Lower Body Dressing (QC):  5


On/Off Footwear (QC):  5


Toileting(FIM):  6


Toileting Hygiene (QC):  6


Transfers (B,C,W/C) (FIM):  6


Toilet/Commode Transfer(FIM):  6


Toilet/Commode Transfer (QC):  6


Shower Transfer(FIM):  5


Comprehension(FIM):  6


Expression (FIM):  7


Social Interaction(FIM):  7


Problem Solving(FIM):  6


Memory(FIM):  6


Additional Goals:  1-Demonstrate ADL Tasks, 2-Verbalize Understanding, 3-

ImproveStrength/Hema


1=Demonstrate adherence to instructed precautions during ADL tasks.


2=Patient will verbalize/demonstrate understanding of assistive devices/

modifications for ADL.


3=Patient will improve strength/tolerance for activity to enable patient to 

perform ADL's.





OT Education/Plan


Discharge Recommendations


Plan/Recommendations:  Continue POC





Treatment Plan/Plan of Care


Patient would benefit from OT for education, treatment and training to promote 

independence in ADL's, mobility, safety and/or upper extremity function for ADL'

s.


Plan of Care:  ADL Retraining, Functional Mobility, Group Exercise/Act as Ind, 

UE Funct Exercise/Act


Treatment Duration:  Sep 11, 2018


Frequency:  At least 5 of 7 days/Wk (IRF)


Estimated Hrs Per Day:  1.5 hours per day


Agreement:  Yes


Rehab Potential:  Fair





Time/GCodes


Start Time:  09:00


Stop Time:  10:00


Total Time Billed (hr/min):  60


Billed Treatment Time


1 visit, ADL(15minutes), EXx3(45minutes)











GURJIT LOPEZ OT Sep 7, 2018 09:35
OT Current Status-Daily Note


Subjective


Pt sitting in chair, agrees to treatment.





Mental Status/Objective


              Functional [place] Measure


0=Not Assessed/NA   4=Minimal Assistance


1=Total Assistance   5=Supervision or Setup


2=Maximal Assistance   6=Modified [place]


3=Moderate Assistance   7=Complete [place]


Attachments:  Oxygen





ADL-Treatment


Pt states he has already completed dressing with assist from nursing this 

morning. Pt requests to use urinal. Pt able to place and use urinal without 

assistance, but requires assist for safety and balance during clothing 

management. Sit to stand and transfer to w/c with CGA using FWW. Skilled cues 

for safety during transfer.


              Functional [place] Measure


0=Not Assessed/NA   4=Minimal Assistance


1=Total Assistance   5=Supervision or Setup


2=Maximal Assistance   6=Modified [place]


3=Moderate Assistance   7=Complete IndependenceIRFPAI Quality Coding Scale











6 Independent with activity with or without an assistive device


 


5  Patient requires set up or clean up by helper.  Patient completes activity  

by  themselves


 


4 Supervision or touching assist (CGA). [place] provide cues , steadying assist


 


3 The helper provides less than half the effort to complete the activity


 


2 The helper provides more than half the effort to complete the activity


 


1 Dependent.  The helper does all the effort to complete an activity 


 


7 Patient refused to complete or attempt activity


 


9 The patient did not perform the activity before the current illness or injury


 


88 Not attempted due to Medical conditions or safety concerns











Other Treatment


Pt performed w/c mobility to therapy gym with SBA and increased time, assist to 

manage O2. Arm bike x8 minutes to increase overall strength and activity 

tolerance needed for ADLs and transfers. Pt completed task without resistance. 

Uses steady pace, and no rest breaks needed. Pt completed resistance peg 

activity with bilateral UE with 1# weights in place to increase strength and 

coordination skills. Pt completed task without assistance. Graded clothespin 

task with bilateral hands to increase /pinch strength. Pt completed putty 

activity with bilateral hands to increase strength and coordination skills. Pt 

able to remove small beads from putty using bilateral hands. Pt returned to room

, sitting in chair with needs met, chair alarm in place, and telesitter present 

in room.





OT Short Term Goals


Short Term Goals


Time Frame:  Sep 4, 2018


Eating(FIM):  5


Grooming(FIM):  5


Bathing(FIM):  4


Upper Body Dressing(FIM):  5


Lower Body Dressing(FIM):  3


Toileting(FIM):  4


Transfers (B,C,W/C) (FIM):  4 (CGA)


Toilet/Commode Transfer(FIM):  5


Shower Transfer(FIM):  4


Comprehension(FIM):  6


Expression(FIM):  7


Social Interaction(FIM):  7


Problem Solving(FIM):  5


Memory(FIM):  5


Additional Short Term Goals:  1-Demonstrate ADL Tasks, 2-Verbalize Understanding

, 3-ImproveStrength/Hema


1=Demonstrate adherence to instructed precautions during ADL tasks.


2=Patient will verbalize/demonstrate understanding of assistive devices/

modifications for ADL.


3=Patient will improve strength/tolerance for activity to enable patient to 

perform ADL's.





OT Long Term Goals


Long Term Goals


Time Frame:  Sep 11, 2018


Eating (FIM):  6


Eating (QC):  6


Groomin


Oral Hygiene (QC):  6


Bathing(FIM):  5


Shower/Bathe Self (QC):  5


Upper Body Dressing(FIM):  6


Upper Body Dressing (QC):  6


Lower Body Dressing(FIM):  5


Lower Body Dressing (QC):  5


On/Off Footwear (QC):  5


Toileting(FIM):  6


Toileting Hygiene (QC):  6


Transfers (B,C,W/C) (FIM):  6


Toilet/Commode Transfer(FIM):  6


Toilet/Commode Transfer (QC):  6


Shower Transfer(FIM):  5


Comprehension(FIM):  6


Expression (FIM):  7


Social Interaction(FIM):  7


Problem Solving(FIM):  6


Memory(FIM):  6


Additional Goals:  1-Demonstrate ADL Tasks, 2-Verbalize Understanding, 3-

ImproveStrength/Hema


1=Demonstrate adherence to instructed precautions during ADL tasks.


2=Patient will verbalize/demonstrate understanding of assistive devices/

modifications for ADL.


3=Patient will improve strength/tolerance for activity to enable patient to 

perform ADL's.





OT Education/Plan


Discharge Recommendations


Plan/Recommendations:  Continue POC





Treatment Plan/Plan of Care


Patient would benefit from OT for education, treatment and training to promote 

independence in ADL's, mobility, safety and/or upper extremity function for ADL'

s.


Plan of Care:  ADL Retraining, Functional Mobility, Group Exercise/Act as Ind, 

UE Funct Exercise/Act


Treatment Duration:  Sep 11, 2018


Frequency:  At least 5 of 7 days/Wk (IRF)


Estimated Hrs Per Day:  1.5 hours per day


Agreement:  Yes


Rehab Potential:  Fair





Time/GCodes


Start Time:  09:15


Stop Time:  10:15


Total Time Billed (hr/min):  60


Billed Treatment Time


1, visit, ADL(15minutes), EXx3(45minutes)











GURJIT LOPEZ OT Sep 3, 2018 09:50
OT Current Status-Daily Note


Subjective


Pt. is in bed.  Agreeable to work with OT.





Appearance


No pain reported.





Mental Status/Objective


Patient Orientation:  Person, Place, Time, Situation


              Functional [place] Measure


0=Not Assessed/NA   4=Minimal Assistance


1=Total Assistance   5=Supervision or Setup


2=Maximal Assistance   6=Modified [place]


3=Moderate Assistance   7=Complete [place]





ADL-Treatment


              Functional [place] Measure


0=Not Assessed/NA   4=Minimal Assistance


1=Total Assistance   5=Supervision or Setup


2=Maximal Assistance   6=Modified [place]


3=Moderate Assistance   7=Complete IndependenceIRFPAI Quality Coding Scale











6 Independent with activity with or without an assistive device


 


5  Patient requires set up or clean up by helper.  Patient completes activity  

by  themselves


 


4 Supervision or touching assist (CGA). [place] provide cues , steadying assist


 


3 The helper provides less than half the effort to complete the activity


 


2 The helper provides more than half the effort to complete the activity


 


1 Dependent.  The helper does all the effort to complete an activity 


 


7 Patient refused to complete or attempt activity


 


9 The patient did not perform the activity before the current illness or injury


 


88 Not attempted due to Medical conditions or safety concerns








Transfers (B, C, W/C) (FIM):  5





Other Treatment


Pt. ambulated to therapy gym with SBA.  OT applied a strap to pt's leg brace, 

to assist with getting into/out of bed.  Pt. ambulated to mat and practiced 

this.  Pt. able to get into/out of bed with SBA using strap.  After this task, 

completed bilateral UE exercises x 2 lb. dumbbell x 15 reps each x 3 exercises.

  Ambulated back to room.  All needs met.  Pt. transferred back to bed with SBA.





Education


OT Patient Education:  Correct positioning, Exercise program, Modified ADL 

techniques, Progress toward Goal/Update tx plan, Purpose of tx/functional 

activities, Reviewed precautions, Rehab process, Transfer techniques


Teaching Recipient:  Patient


Teaching Methods:  Demonstration, Discussion


Response to Teaching:  Verbalize Understanding, Return Demonstration





OT Short Term Goals


Short Term Goals


Time Frame:  Sep 4, 2018


Eating(FIM):  5


Grooming(FIM):  5


Bathing(FIM):  4


Upper Body Dressing(FIM):  5


Lower Body Dressing(FIM):  3


Toileting(FIM):  4


Transfers (B,C,W/C) (FIM):  4 (CGA)


Toilet/Commode Transfer(FIM):  5


Shower Transfer(FIM):  4


Comprehension(FIM):  6


Expression(FIM):  7


Social Interaction(FIM):  7


Problem Solving(FIM):  5


Memory(FIM):  5


Additional Short Term Goals:  1-Demonstrate ADL Tasks, 2-Verbalize Understanding

, 3-ImproveStrength/Hema


1=Demonstrate adherence to instructed precautions during ADL tasks.


2=Patient will verbalize/demonstrate understanding of assistive devices/

modifications for ADL.


3=Patient will improve strength/tolerance for activity to enable patient to 

perform ADL's.





OT Long Term Goals


Long Term Goals


Time Frame:  Sep 11, 2018


Eating (FIM):  6


Eating (QC):  6


Groomin


Oral Hygiene (QC):  6


Bathing(FIM):  5


Shower/Bathe Self (QC):  5


Upper Body Dressing(FIM):  6


Upper Body Dressing (QC):  6


Lower Body Dressing(FIM):  5


Lower Body Dressing (QC):  5


On/Off Footwear (QC):  5


Toileting(FIM):  6


Toileting Hygiene (QC):  6


Transfers (B,C,W/C) (FIM):  6


Toilet/Commode Transfer(FIM):  6


Toilet/Commode Transfer (QC):  6


Shower Transfer(FIM):  5


Comprehension(FIM):  6


Expression (FIM):  7


Social Interaction(FIM):  7


Problem Solving(FIM):  6


Memory(FIM):  6


Additional Goals:  1-Demonstrate ADL Tasks, 2-Verbalize Understanding, 3-

ImproveStrength/Hema


1=Demonstrate adherence to instructed precautions during ADL tasks.


2=Patient will verbalize/demonstrate understanding of assistive devices/

modifications for ADL.


3=Patient will improve strength/tolerance for activity to enable patient to 

perform ADL's.





OT Education/Plan


Problem List/Assessment


Assessment:  Decreased Activ Tolerance, Impaired I ADL's, Impaired Self-Care 

Skills





Discharge Recommendations


Plan/Recommendations:  Continue POC


Therapy D/C Recommendations:  Home w/ Family Support, Occupational Therapy Home 

Care





Treatment Plan/Plan of Care


Treatment,Training & Education:  Yes


Patient would benefit from OT for education, treatment and training to promote 

independence in ADL's, mobility, safety and/or upper extremity function for ADL'

s.


Plan of Care:  ADL Retraining, Functional Mobility, Group Exercise/Act as Ind, 

UE Funct Exercise/Act


Treatment Duration:  Sep 11, 2018


Frequency:  At least 5 of 7 days/Wk (IRF)


Estimated Hrs Per Day:  1.5 hours per day


Agreement:  Yes


Rehab Potential:  Fair





Time/GCodes


Start Time:  13:00


Stop Time:  13:30


Total Time Billed (hr/min):  30


Billed Treatment Time


1, ADL x 15minutes, Ex x 15minutes











RIZWAN MURPHY OT Sep 10, 2018 14:44
OT Current Status-Daily Note


Subjective


Pt. states that he is tired this morning.  No pain reported.





Appearance


Pt. in bed.  Agrees to work with OT.





Mental Status/Objective


Patient Orientation:  Unable to Assess


              Functional [place] Measure


0=Not Assessed/NA   4=Minimal Assistance


1=Total Assistance   5=Supervision or Setup


2=Maximal Assistance   6=Modified [place]


3=Moderate Assistance   7=Complete [place]


Attachments:  IV, Oxygen





ADL-Treatment


              Functional [place] Measure


0=Not Assessed/NA   4=Minimal Assistance


1=Total Assistance   5=Supervision or Setup


2=Maximal Assistance   6=Modified [place]


3=Moderate Assistance   7=Complete IndependenceIRFPAI Quality Coding Scale











6 Independent with activity with or without an assistive device


 


5  Patient requires set up or clean up by helper.  Patient completes activity  

by  themselves


 


4 Supervision or touching assist (CGA). [place] provide cues , steadying assist


 


3 The helper provides less than half the effort to complete the activity


 


2 The helper provides more than half the effort to complete the activity


 


1 Dependent.  The helper does all the effort to complete an activity 


 


7 Patient refused to complete or attempt activity


 


9 The patient did not perform the activity before the current illness or injury


 


88 Not attempted due to Medical conditions or safety concerns








Grooming (FIM):  5 (Pt. is able to shave and comb hair at wheelchair level at 

sink with SBA.)


Bathing (FIM):  3 (Pt. requires assistance in stance with washing franck area.  

OT washes feet.  Pt. able to wash upper body.)


Shower/Bathe Self (QC):  3


Upper Body (FIM):  5


Upper Body Dressing (QC):  4


Lower Body Dressing (FIM):  3 (Pt. is issued AE to don LE clothing.  Pt. 

requires cues and assistance.)


Lower Body Dressing (QC):  3


On/Off Footwear (QC):  2


Transfers (B, C, W/C) (FIM):  4 (Min assist with supine-sit.  Min assist sit-

stand.  Min assist transfer to wheelchair.)





Other Treatment


Pt. agrees to spongebathe.  Transferred to side of bed with min assist.  Pt. 

had difficulty staying upright, and would lean back.  Transferred to 

wheelchair.  Completed spongebath up in chair.  Pt. requires cues and close 

supervision throughout.  After ADLs, self propelled wheelchair to therapy gym.  

Tolerated 3 bilateral exercises x 2lb dumbbells x 15 reps each to increase 

overall strength and independence.  Went back to room.  Transferred to chair in 

room.  All needs met.





Education


OT Patient Education:  Exercise program, Modified ADL techniques, Progress 

toward Goal/Update tx plan, Purpose of tx/functional activities, Reviewed 

precautions, Rehab process, Transfer techniques


Teaching Recipient:  Patient


Teaching Methods:  Demonstration, Discussion


Response to Teaching:  Verbalize Understanding, Return Demonstration





OT Short Term Goals


Short Term Goals


Time Frame:  Sep 4, 2018


Eating(FIM):  5


Grooming(FIM):  5


Bathing(FIM):  4


Upper Body Dressing(FIM):  5


Lower Body Dressing(FIM):  3


Toileting(FIM):  4


Transfers (B,C,W/C) (FIM):  4 (CGA)


Toilet/Commode Transfer(FIM):  5


Shower Transfer(FIM):  4


Comprehension(FIM):  6


Expression(FIM):  7


Social Interaction(FIM):  7


Problem Solving(FIM):  5


Memory(FIM):  5


Additional Short Term Goals:  1-Demonstrate ADL Tasks, 2-Verbalize Understanding

, 3-ImproveStrength/Hema


1=Demonstrate adherence to instructed precautions during ADL tasks.


2=Patient will verbalize/demonstrate understanding of assistive devices/

modifications for ADL.


3=Patient will improve strength/tolerance for activity to enable patient to 

perform ADL's.





OT Long Term Goals


Long Term Goals


Time Frame:  Sep 11, 2018


Eating (FIM):  6


Eating (QC):  6


Groomin


Oral Hygiene (QC):  6


Bathing(FIM):  5


Shower/Bathe Self (QC):  5


Upper Body Dressing(FIM):  6


Upper Body Dressing (QC):  6


Lower Body Dressing(FIM):  5


Lower Body Dressing (QC):  5


On/Off Footwear (QC):  5


Toileting(FIM):  6


Toileting Hygiene (QC):  6


Transfers (B,C,W/C) (FIM):  6


Toilet/Commode Transfer(FIM):  6


Toilet/Commode Transfer (QC):  6


Shower Transfer(FIM):  5


Comprehension(FIM):  6


Expression (FIM):  7


Social Interaction(FIM):  7


Problem Solving(FIM):  6


Memory(FIM):  6


Additional Goals:  1-Demonstrate ADL Tasks, 2-Verbalize Understanding, 3-

ImproveStrength/Hema


1=Demonstrate adherence to instructed precautions during ADL tasks.


2=Patient will verbalize/demonstrate understanding of assistive devices/

modifications for ADL.


3=Patient will improve strength/tolerance for activity to enable patient to 

perform ADL's.





OT Education/Plan


Problem List/Assessment


Assessment:  Decreased Activ Tolerance, Decreased Safety Aware, Decreased UE 

Strength, Dependent Transfers, Impaired Bed Mobility, Impaired Cognition, 

Impaired Funct Balance, Impaired I ADL's, Impaired Self-Care Skills





Discharge Recommendations


Plan/Recommendations:  Continue POC


Therapy D/C Recommendations:  24 hr Supervision





Treatment Plan/Plan of Care


Treatment,Training & Education:  Yes


Patient would benefit from OT for education, treatment and training to promote 

independence in ADL's, mobility, safety and/or upper extremity function for ADL'

s.


Plan of Care:  ADL Retraining, Functional Mobility, Group Exercise/Act as Ind, 

UE Funct Exercise/Act


Treatment Duration:  Sep 11, 2018


Frequency:  At least 5 of 7 days/Wk (IRF)


Estimated Hrs Per Day:  1.5 hours per day


Agreement:  Yes


Rehab Potential:  Fair





Time/GCodes


Start Time:  10:45


Stop Time:  12:00


Total Time Billed (hr/min):  75


Billed Treatment Time


1, ADL x 60minutes, Ex x 15minutes











RIZWAN MURPHY OT Aug 30, 2018 14:28
OT Current Status-Daily Note


Subjective


Pt. winces with pain when left LE is moved, but does not report a pain level.





Appearance


Pt. is in bed.  Agrees to work with OT.





Mental Status/Objective


Patient Orientation:  Person, Place, Time, Situation


              Functional [place] Measure


0=Not Assessed/NA   4=Minimal Assistance


1=Total Assistance   5=Supervision or Setup


2=Maximal Assistance   6=Modified [place]


3=Moderate Assistance   7=Complete [place]


Attachments:  IV





ADL-Treatment


              Functional [place] Measure


0=Not Assessed/NA   4=Minimal Assistance


1=Total Assistance   5=Supervision or Setup


2=Maximal Assistance   6=Modified [place]


3=Moderate Assistance   7=Complete IndependenceIRFPAI Quality Coding Scale











6 Independent with activity with or without an assistive device


 


5  Patient requires set up or clean up by helper.  Patient completes activity  

by  themselves


 


4 Supervision or touching assist (CGA). [place] provide cues , steadying assist


 


3 The helper provides less than half the effort to complete the activity


 


2 The helper provides more than half the effort to complete the activity


 


1 Dependent.  The helper does all the effort to complete an activity 


 


7 Patient refused to complete or attempt activity


 


9 The patient did not perform the activity before the current illness or injury


 


88 Not attempted due to Medical conditions or safety concerns








Grooming (FIM):  4 (Pt. requires assistance to comb hair due to "not having a 

mirror.")


Bathing (FIM):  3 (Please see note below.)


Shower/Bathe Self (QC):  3


Upper Body (FIM):  3


Upper Body Dressing (QC):  3


Lower Body Dressing (FIM):  2


Lower Body Dressing (QC):  2


On/Off Footwear (QC):  1


Transfers (B, C, W/C) (FIM):  3


Shower Transfer(FIM):  1





Other Treatment


Pt. somewhat confused at times this morning.  Transferred supine-sit with mod 

assist.  Pt. unable to touch feet to floor.  When given cue to "scoot forward," 

pt. impulsively started to scoot so far that he was unsafe.  Called another 

person in room to assist with transfer.  Transferred to shower chair with mod 

assist.  Propelled pt. into shower via shower chair.  OT removed leg brace.  

Due to not being able to stand, OT gave extra assistance on shower chair.  Pt. 

exhibited safety concerns whenever he would reposition self in shower chair, so 

OT assisted with donning shirt as well.  OT cleansed rear franck areas, back, and 

bilateral LE.  Pt. able to wash all other parts.  OT donned socks, shoe, brace, 

and pants for pt as he was sitting and unable to  the setting.  Went 

back to room and stood with mod assist while OT pulled up pants for him.  

Transferred back to wheelchair and pt. went to therapy gym.  Completed fine 

motor coordination task with nut/bolt activity.  All needs met.  Went to room 

and transferred back to bed with mod assist and cues for safety.  Pt. made 

comfortable and all needs met.





Education


OT Patient Education:  Correct positioning, Exercise program, Modified ADL 

techniques, Progress toward Goal/Update tx plan, Purpose of tx/functional 

activities, Reviewed precautions, Rehab process, Transfer techniques


Teaching Recipient:  Patient


Teaching Methods:  Demonstration, Discussion


Response to Teaching:  Verbalize Understanding, Return Demonstration





OT Short Term Goals


Short Term Goals


Time Frame:  Sep 4, 2018


Eating(FIM):  5


Grooming(FIM):  5


Bathing(FIM):  4


Upper Body Dressing(FIM):  5


Lower Body Dressing(FIM):  3


Toileting(FIM):  4


Transfers (B,C,W/C) (FIM):  4 (CGA)


Toilet/Commode Transfer(FIM):  5


Shower Transfer(FIM):  4


Comprehension(FIM):  6


Expression(FIM):  7


Social Interaction(FIM):  7


Problem Solving(FIM):  5


Memory(FIM):  5


Additional Short Term Goals:  1-Demonstrate ADL Tasks, 2-Verbalize Understanding

, 3-ImproveStrength/Hema


1=Demonstrate adherence to instructed precautions during ADL tasks.


2=Patient will verbalize/demonstrate understanding of assistive devices/

modifications for ADL.


3=Patient will improve strength/tolerance for activity to enable patient to 

perform ADL's.





OT Long Term Goals


Long Term Goals


Time Frame:  Sep 11, 2018


Eating (FIM):  6


Eating (QC):  6


Groomin


Oral Hygiene (QC):  6


Bathing(FIM):  5


Shower/Bathe Self (QC):  5


Upper Body Dressing(FIM):  6


Upper Body Dressing (QC):  6


Lower Body Dressing(FIM):  5


Lower Body Dressing (QC):  5


On/Off Footwear (QC):  5


Toileting(FIM):  6


Toileting Hygiene (QC):  6


Transfers (B,C,W/C) (FIM):  6


Toilet/Commode Transfer(FIM):  6


Toilet/Commode Transfer (QC):  6


Shower Transfer(FIM):  5


Comprehension(FIM):  6


Expression (FIM):  7


Social Interaction(FIM):  7


Problem Solving(FIM):  6


Memory(FIM):  6


Additional Goals:  1-Demonstrate ADL Tasks, 2-Verbalize Understanding, 3-

ImproveStrength/Hema


1=Demonstrate adherence to instructed precautions during ADL tasks.


2=Patient will verbalize/demonstrate understanding of assistive devices/

modifications for ADL.


3=Patient will improve strength/tolerance for activity to enable patient to 

perform ADL's.





OT Education/Plan


Problem List/Assessment


Assessment:  Decreased Activ Tolerance, Decreased Safety Aware, Decreased UE 

Strength, Dependent Transfers, Impaired Bed Mobility, Impaired Cognition, 

Impaired Funct Balance, Impaired I ADL's, Impaired Self-Care Skills





Discharge Recommendations


Plan/Recommendations:  Continue POC


Therapy D/C Recommendations:  Home w/ Family Support, Occupational Therapy Home 

Care, Scheduled Assistance


Equpiment Recommendations-D/C:  Hip Kit





Treatment Plan/Plan of Care


Treatment,Training & Education:  Yes


Patient would benefit from OT for education, treatment and training to promote 

independence in ADL's, mobility, safety and/or upper extremity function for ADL'

s.


Plan of Care:  ADL Retraining, Functional Mobility, Group Exercise/Act as Ind, 

UE Funct Exercise/Act


Treatment Duration:  Sep 11, 2018


Frequency:  At least 5 of 7 days/Wk (IRF)


Estimated Hrs Per Day:  1.5 hours per day


Agreement:  Yes


Rehab Potential:  Fair





Time/GCodes


Start Time:  10:30


Stop Time:  11:45


Total Time Billed (hr/min):  75


Billed Treatment Time


1, ADL x 45minutes, FA x 30minutes











RIZWAN MURPHY OT Aug 29, 2018 11:50
Quality 110: Preventive Care And Screening: Influenza Immunization: Influenza Immunization Administered during Influenza season
Quality 111:Pneumonia Vaccination Status For Older Adults: Patient received any pneumococcal conjugate or polysaccharide vaccine on or after their 60th birthday and before the end of the measurement period
Quality 130: Documentation Of Current Medications In The Medical Record: Current Medications Documented